# Patient Record
Sex: FEMALE | Race: BLACK OR AFRICAN AMERICAN | NOT HISPANIC OR LATINO | Employment: OTHER | URBAN - METROPOLITAN AREA
[De-identification: names, ages, dates, MRNs, and addresses within clinical notes are randomized per-mention and may not be internally consistent; named-entity substitution may affect disease eponyms.]

---

## 2017-03-31 ENCOUNTER — MEDICAL CORRESPONDENCE (OUTPATIENT)
Dept: ULTRASOUND IMAGING | Facility: CLINIC | Age: 27
End: 2017-03-31

## 2017-03-31 ENCOUNTER — HOSPITAL ENCOUNTER (OUTPATIENT)
Dept: ULTRASOUND IMAGING | Facility: CLINIC | Age: 27
Discharge: HOME | End: 2017-03-31
Attending: MIDWIFE | Admitting: MIDWIFE
Payer: COMMERCIAL

## 2017-03-31 DIAGNOSIS — Z32.01 POSITIVE PREGNANCY TEST: ICD-10-CM

## 2017-03-31 DIAGNOSIS — N91.2 AMENORRHEA: ICD-10-CM

## 2017-03-31 PROCEDURE — 76801 OB US < 14 WKS SINGLE FETUS: CPT

## 2017-04-20 ENCOUNTER — TRANSFERRED RECORDS (OUTPATIENT)
Dept: HEALTH INFORMATION MANAGEMENT | Facility: CLINIC | Age: 27
End: 2017-04-20

## 2017-05-17 ENCOUNTER — TRANSFERRED RECORDS (OUTPATIENT)
Dept: HEALTH INFORMATION MANAGEMENT | Facility: CLINIC | Age: 27
End: 2017-05-17

## 2017-06-05 ENCOUNTER — APPOINTMENT (OUTPATIENT)
Dept: LAB | Facility: CLINIC | Age: 27
End: 2017-06-05
Attending: OBSTETRICS & GYNECOLOGY
Payer: COMMERCIAL

## 2017-06-05 ENCOUNTER — OFFICE VISIT (OUTPATIENT)
Dept: OBGYN | Facility: CLINIC | Age: 27
End: 2017-06-05
Attending: OBSTETRICS & GYNECOLOGY
Payer: COMMERCIAL

## 2017-06-05 VITALS
DIASTOLIC BLOOD PRESSURE: 76 MMHG | HEART RATE: 80 BPM | SYSTOLIC BLOOD PRESSURE: 116 MMHG | WEIGHT: 133.1 LBS | BODY MASS INDEX: 24.34 KG/M2

## 2017-06-05 DIAGNOSIS — N96 HISTORY OF RECURRENT MISCARRIAGES: Primary | ICD-10-CM

## 2017-06-05 LAB — B-HCG SERPL-ACNC: 2 IU/L (ref 0–5)

## 2017-06-05 PROCEDURE — 84702 CHORIONIC GONADOTROPIN TEST: CPT | Performed by: OBSTETRICS & GYNECOLOGY

## 2017-06-05 PROCEDURE — 99212 OFFICE O/P EST SF 10 MIN: CPT | Mod: ZF

## 2017-06-05 PROCEDURE — 36415 COLL VENOUS BLD VENIPUNCTURE: CPT | Performed by: OBSTETRICS & GYNECOLOGY

## 2017-06-05 ASSESSMENT — PAIN SCALES - GENERAL: PAINLEVEL: NO PAIN (0)

## 2017-06-05 NOTE — NURSING NOTE
Chief Complaint   Patient presents with     Establish Care     Infertility consult   Olga Milian LPN

## 2017-06-05 NOTE — LETTER
"2017       RE: Pam Syed  [address]     [place] [address]     Dear Colleague,    Thank you for referring your patient, Pam Syed, to the [facility] at [facility]. Please see a copy of my visit note below.    26 yo  who is seen today for concern of RPL.  She notes her one FT pregnancy was achieved after she was put on progesterone for duration of her pregnancy.      She had  of term baby in . Subsequently she was pregnant again 2015.  She was told she was 7 wks pregnant recently and then found u/s showing no CA.  MAB was diagnosed. She never required d and c for MAB tx.  Passed tissue 2 wks ago in late May. Denies further bleeding.     Previously, she states she has \"million tests\" done in [place] re: RPL and all was negative. None of those records are available today.    She is interested in pursuing pregnancy, but is frustrated w/ recent multiple miscarriages. She wonders if she could be on progesterone in a future pregnancy or not.      In her term pregnancy, the only change was using progesterone.  She did not have HTN or DM.  She did not develop pre E. Her baby was normal wt and size w/o IUGR.       No past medical history on file.  Past Surgical History:   Procedure Laterality Date     APPENDECTOMY        ROS: 10 point ROS neg other than the symptoms noted above in the HPI.  Current Outpatient Prescriptions:      ibuprofen (ADVIL,MOTRIN) 600 MG tablet, Take 1 tablet (600 mg) by mouth every 6 hours as needed for moderate pain, Disp: 90 tablet, Rfl: 1     VITAMIN D, CHOLECALCIFEROL, PO, Take 2,000 Units by mouth daily, Disp: , Rfl:      Ferrous Sulfate (IRON SUPPLEMENT PO), Take 325 mg by mouth daily, Disp: , Rfl:   No current facility-administered medications for this visit.     Facility-Administered Medications Ordered in Other Visits:      lidocaine-EPINEPHrine 1.5 %-1:945453 injection, , EPIDURAL, PRN, " Agustina Salomon MD, 2 mL at 12/03/15 0234     bupivacaine (MARCAINE) 0.125 % injection (diluted from stock concentration by MD or CRNA), , EPIDURAL, PRN, Agustina Salomon MD, 5 mL at 12/03/15 0238      Vitals:    06/05/17 1433   BP: 116/76   Pulse: 80   Weight: 60.4 kg (133 lb 1.6 oz)     Gen: AA nad   Remainder of exam is not done.    26 yo  w/ 4 early miscarriages most recently in past month.      1. We discussed repeating HCG today and f/u u/s for evaluation of uterine contour/EM stripe.    2. We discussed normal recent TSH at [facility]. Consider records from [place] to evaluate APAS eval.  If not done, labs to eval.   3. We discussed possible MFM consult re: RPL in setting of normal labs.  Consider baby ASA and progesterone supplementation (despite lack of supporting evidence) in the setting of low side effects and possible benefits.     Rosanna Ybarra MD, FACOG  [facility]  OB/GYN    6/6/2017  2:37 PM    TT spent 15 min, >50% counseling and coordinating care

## 2017-06-05 NOTE — MR AVS SNAPSHOT
After Visit Summary   6/5/2017    Pam Syed    MRN: [id number]           Patient Information     Date Of Birth          1990        Visit Information        Provider Department      6/5/2017 2:15 PM Rosanna Ybarra MD [facility]        Today's Diagnoses     History of recurrent miscarriages    -  1       Follow-ups after your visit        Your next 10 appointments already scheduled     Jun 14, 2017  8:00 AM CDT   ULTRASOUND with [facility] ULTRASOUND   [facility] ([facility])    [place] [facility] [address]  [address]  [facility] [id number] [phone]              Future tests that were ordered for you today     Open Future Orders        Priority Expected Expires Ordered    US GYN Complete Transvaginal - 04038 (In Clinic) Routine  10/3/2017 6/5/2017            Who to contact     Please call your clinic at [phone] to:    Ask questions about your health    Make or cancel appointments    Discuss your medicines    Learn about your test results    Speak to your doctor   If you have compliments or concerns about an experience at your clinic, or if you wish to file a complaint, please contact [facility] Patient Relations at [phone] or email us at [email or web address]         Additional Information About Your Visit        MyChart Information     [employer] is an electronic gateway that provides easy, online access to your medical records. With [employer]onics, you can request a clinic appointment, read your test results, renew a prescription or communicate with your care team.     To sign up for Info[employer] visit the website at [email or web address]   You will be asked to enter the access code listed below, as well as some personal information. Please follow the directions to create your username and password.     Your access code is: [email or web address]  Expires: 8/20/2017  6:30  AM     Your access code will  in 90 days. If you need help or a new code, please contact your [facility] or call [phone] for assistance.        Care EveryWhere ID     This is your Care EveryWhere ID. This could be used by other organizations to access your [facility] medical records  [id number]        Your Vitals Were     Pulse Breastfeeding? BMI (Body Mass Index)             80 No 24.34 kg/m2          Blood Pressure from Last 3 Encounters:   17 116/76   12/05/15 114/76    Weight from Last 3 Encounters:   17 60.4 kg (133 lb 1.6 oz)   12/02/15 69.1 kg (152 lb 6.1 oz)              We Performed the Following     HCG Quantitative Pregnancy          Today's Medication Changes          These changes are accurate as of: 17 11:59 PM.  If you have any questions, ask your nurse or doctor.               Stop taking these medicines if you haven't already. Please contact your care team if you have questions.     prenatal multivitamin  plus iron 27-0.8 MG Tabs per tablet   Stopped by:  Rosanna Ybarra MD                    Primary Care Provider Office Phone # Fax #    [facility] [phone]        [facility] [address]        Thank you!     Thank you for choosing [facility]  for your care. Our goal is always to provide you with excellent care. Hearing back from our patients is one way we can continue to improve our services. Please take a few minutes to complete the written survey that you may receive in the mail after your visit with us. Thank you!             Your Updated Medication List - Protect others around you: Learn how to safely use, store and throw away your medicines at www.disposemymeds.org.          This list is accurate as of: 17 11:59 PM.  Always use your most recent med list.                   Brand Name Dispense Instructions for use    ibuprofen 600 MG tablet    ADVIL/MOTRIN    90  tablet    Take 1 tablet (600 mg) by mouth every 6 hours as needed for moderate pain       IRON SUPPLEMENT PO      Take 325 mg by mouth daily       VITAMIN D (CHOLECALCIFEROL) PO      Take 2,000 Units by mouth daily

## 2017-06-06 NOTE — PROGRESS NOTES
"28 yo  who is seen today for concern of RPL.  She notes her one FT pregnancy was achieved after she was put on progesterone for duration of her pregnancy.      She had  of term baby in . Subsequently she was pregnant again 2015.  She was told she was 7 wks pregnant recently and then found u/s showing no CA.  MAB was diagnosed. She never required d and c for MAB tx.  Passed tissue 2 wks ago in late May. Denies further bleeding.     Previously, she states she has \"million tests\" done in [place] re: RPL and all was negative. None of those records are available today.    She is interested in pursuing pregnancy, but is frustrated w/ recent multiple miscarriages. She wonders if she could be on progesterone in a future pregnancy or not.      In her term pregnancy, the only change was using progesterone.  She did not have HTN or DM.  She did not develop pre E. Her baby was normal wt and size w/o IUGR.       No past medical history on file.  Past Surgical History:   Procedure Laterality Date     APPENDECTOMY        ROS: 10 point ROS neg other than the symptoms noted above in the HPI.      Current Outpatient Prescriptions:      ibuprofen (ADVIL,MOTRIN) 600 MG tablet, Take 1 tablet (600 mg) by mouth every 6 hours as needed for moderate pain, Disp: 90 tablet, Rfl: 1     VITAMIN D, CHOLECALCIFEROL, PO, Take 2,000 Units by mouth daily, Disp: , Rfl:      Ferrous Sulfate (IRON SUPPLEMENT PO), Take 325 mg by mouth daily, Disp: , Rfl:   No current facility-administered medications for this visit.     Facility-Administered Medications Ordered in Other Visits:      lidocaine-EPINEPHrine 1.5 %-1:953706 injection, , EPIDURAL, PRN, Agustina Salomon MD, 2 mL at 12/03/15 0234     bupivacaine (MARCAINE) 0.125 % injection (diluted from stock concentration by MD or CRNA), , EPIDURAL, PRN, Agustina Salomon MD, 5 mL at 12/03/15 0238      Vitals:    17 1433   BP: 116/76   Pulse: 80   Weight: 60.4 kg (133 lb 1.6 oz)     Gen: " AA nad   Remainder of exam is not done.    26 yo  w/ 4 early miscarriages most recently in past month.      1. We discussed repeating HCG today and f/u u/s for evaluation of uterine contour/EM stripe.    2. We discussed normal recent TSH at [facility]. Consider records from [place] to evaluate APAS eval.  If not done, labs to eval.   3. We discussed possible MFM consult re: RPL in setting of normal labs.  Consider baby ASA and progesterone supplementation (despite lack of supporting evidence) in the setting of low side effects and possible benefits.     Rosanna Ybarra MD, FACOG  [facility]  OB/GYN    6/6/2017  2:37 PM    TT spent 15 min, >50% counseling and coordinating care

## 2017-07-13 ENCOUNTER — HOSPITAL ENCOUNTER (OUTPATIENT)
Dept: ULTRASOUND IMAGING | Facility: CLINIC | Age: 27
Discharge: HOME | End: 2017-07-13
Attending: OBSTETRICS & GYNECOLOGY | Admitting: OBSTETRICS & GYNECOLOGY
Payer: COMMERCIAL

## 2017-07-13 DIAGNOSIS — N96 HISTORY OF RECURRENT MISCARRIAGES: ICD-10-CM

## 2017-07-13 PROCEDURE — 76801 OB US < 14 WKS SINGLE FETUS: CPT

## 2017-09-25 ENCOUNTER — TELEPHONE (OUTPATIENT)
Dept: OBGYN | Facility: CLINIC | Age: 27
End: 2017-09-25

## 2017-09-25 DIAGNOSIS — O02.1 MISSED ABORTION: Primary | ICD-10-CM

## 2017-09-25 RX ORDER — DOXYCYCLINE 100 MG/10ML
100 INJECTION, POWDER, LYOPHILIZED, FOR SOLUTION INTRAVENOUS
Status: CANCELLED | OUTPATIENT
Start: 2017-09-25

## 2017-09-25 NOTE — TELEPHONE ENCOUNTER
Confirmed surgery date, time and location with patient 9/26/17 with arrival time at 8:00a.m with nothing to eat eight hours before scheduled surgery time and clear liquids up to two hours before scheduled surgery time.

## 2017-09-26 ENCOUNTER — SURGERY (OUTPATIENT)
Age: 27
End: 2017-09-26

## 2017-09-26 ENCOUNTER — HOSPITAL ENCOUNTER (OUTPATIENT)
Facility: CLINIC | Age: 27
Discharge: HOME | End: 2017-09-26
Attending: OBSTETRICS & GYNECOLOGY | Admitting: OBSTETRICS & GYNECOLOGY
Payer: COMMERCIAL

## 2017-09-26 ENCOUNTER — ANESTHESIA EVENT (OUTPATIENT)
Dept: SURGERY | Facility: CLINIC | Age: 27
End: 2017-09-26
Payer: COMMERCIAL

## 2017-09-26 ENCOUNTER — ANESTHESIA (OUTPATIENT)
Dept: SURGERY | Facility: CLINIC | Age: 27
End: 2017-09-26
Payer: COMMERCIAL

## 2017-09-26 VITALS
HEIGHT: 63 IN | TEMPERATURE: 98.2 F | BODY MASS INDEX: 23.48 KG/M2 | DIASTOLIC BLOOD PRESSURE: 83 MMHG | RESPIRATION RATE: 16 BRPM | WEIGHT: 132.5 LBS | SYSTOLIC BLOOD PRESSURE: 118 MMHG | OXYGEN SATURATION: 100 %

## 2017-09-26 DIAGNOSIS — Z98.890 POSTOPERATIVE STATE: Primary | ICD-10-CM

## 2017-09-26 LAB
ABO + RH BLD: NORMAL
ABO + RH BLD: NORMAL
BLD GP AB SCN SERPL QL: NORMAL
BLOOD BANK CMNT PATIENT-IMP: NORMAL
GLUCOSE BLDC GLUCOMTR-MCNC: 84 MG/DL (ref 70–99)
HGB BLD-MCNC: 12.3 G/DL (ref 11.7–15.7)
SPECIMEN EXP DATE BLD: NORMAL

## 2017-09-26 PROCEDURE — 88262 CHROMOSOME ANALYSIS 15-20: CPT | Performed by: OBSTETRICS & GYNECOLOGY

## 2017-09-26 PROCEDURE — 25000128 H RX IP 250 OP 636: Performed by: NURSE ANESTHETIST, CERTIFIED REGISTERED

## 2017-09-26 PROCEDURE — 86901 BLOOD TYPING SEROLOGIC RH(D): CPT | Performed by: OBSTETRICS & GYNECOLOGY

## 2017-09-26 PROCEDURE — 25000132 ZZH RX MED GY IP 250 OP 250 PS 637: Performed by: ANESTHESIOLOGY

## 2017-09-26 PROCEDURE — 37000009 ZZH ANESTHESIA TECHNICAL FEE, EACH ADDTL 15 MIN: Performed by: OBSTETRICS & GYNECOLOGY

## 2017-09-26 PROCEDURE — 88233 TISSUE CULTURE SKIN/BIOPSY: CPT | Performed by: OBSTETRICS & GYNECOLOGY

## 2017-09-26 PROCEDURE — 27210794 ZZH OR GENERAL SUPPLY STERILE: Performed by: OBSTETRICS & GYNECOLOGY

## 2017-09-26 PROCEDURE — 86900 BLOOD TYPING SEROLOGIC ABO: CPT | Performed by: OBSTETRICS & GYNECOLOGY

## 2017-09-26 PROCEDURE — 36415 COLL VENOUS BLD VENIPUNCTURE: CPT | Performed by: OBSTETRICS & GYNECOLOGY

## 2017-09-26 PROCEDURE — 88305 TISSUE EXAM BY PATHOLOGIST: CPT | Mod: 26 | Performed by: OBSTETRICS & GYNECOLOGY

## 2017-09-26 PROCEDURE — 25000125 ZZHC RX 250: Performed by: OBSTETRICS & GYNECOLOGY

## 2017-09-26 PROCEDURE — 36000053 ZZH SURGERY LEVEL 2 EA 15 ADDTL MIN - UMMC: Performed by: OBSTETRICS & GYNECOLOGY

## 2017-09-26 PROCEDURE — 25000125 ZZHC RX 250: Performed by: NURSE ANESTHETIST, CERTIFIED REGISTERED

## 2017-09-26 PROCEDURE — 00000159 ZZHCL STATISTIC H-SEND OUTS PREP: Performed by: OBSTETRICS & GYNECOLOGY

## 2017-09-26 PROCEDURE — 85018 HEMOGLOBIN: CPT | Performed by: OBSTETRICS & GYNECOLOGY

## 2017-09-26 PROCEDURE — 71000027 ZZH RECOVERY PHASE 2 EACH 15 MINS: Performed by: OBSTETRICS & GYNECOLOGY

## 2017-09-26 PROCEDURE — 36000051 ZZH SURGERY LEVEL 2 1ST 30 MIN - UMMC: Performed by: OBSTETRICS & GYNECOLOGY

## 2017-09-26 PROCEDURE — 40000170 ZZH STATISTIC PRE-PROCEDURE ASSESSMENT II: Performed by: OBSTETRICS & GYNECOLOGY

## 2017-09-26 PROCEDURE — 25000128 H RX IP 250 OP 636: Performed by: ANESTHESIOLOGY

## 2017-09-26 PROCEDURE — 37000008 ZZH ANESTHESIA TECHNICAL FEE, 1ST 30 MIN: Performed by: OBSTETRICS & GYNECOLOGY

## 2017-09-26 PROCEDURE — 88305 TISSUE EXAM BY PATHOLOGIST: CPT | Performed by: OBSTETRICS & GYNECOLOGY

## 2017-09-26 PROCEDURE — 82962 GLUCOSE BLOOD TEST: CPT

## 2017-09-26 PROCEDURE — 40000892 ZZHCL STATISTIC DNA ISOLATION: Performed by: OBSTETRICS & GYNECOLOGY

## 2017-09-26 PROCEDURE — 86850 RBC ANTIBODY SCREEN: CPT | Performed by: OBSTETRICS & GYNECOLOGY

## 2017-09-26 RX ORDER — ONDANSETRON 4 MG/1
4 TABLET, ORALLY DISINTEGRATING ORAL
Status: DISCONTINUED | OUTPATIENT
Start: 2017-09-26 | End: 2017-09-26 | Stop reason: HOSPADM

## 2017-09-26 RX ORDER — ACETAMINOPHEN 325 MG/1
650 TABLET ORAL
Status: DISCONTINUED | OUTPATIENT
Start: 2017-09-26 | End: 2017-09-26 | Stop reason: HOSPADM

## 2017-09-26 RX ORDER — LIDOCAINE 40 MG/G
CREAM TOPICAL
Status: DISCONTINUED | OUTPATIENT
Start: 2017-09-26 | End: 2017-09-26 | Stop reason: HOSPADM

## 2017-09-26 RX ORDER — SODIUM CHLORIDE, SODIUM LACTATE, POTASSIUM CHLORIDE, CALCIUM CHLORIDE 600; 310; 30; 20 MG/100ML; MG/100ML; MG/100ML; MG/100ML
INJECTION, SOLUTION INTRAVENOUS CONTINUOUS
Status: DISCONTINUED | OUTPATIENT
Start: 2017-09-26 | End: 2017-09-26 | Stop reason: HOSPADM

## 2017-09-26 RX ORDER — PROPOFOL 10 MG/ML
INJECTION, EMULSION INTRAVENOUS PRN
Status: DISCONTINUED | OUTPATIENT
Start: 2017-09-26 | End: 2017-09-26

## 2017-09-26 RX ORDER — IBUPROFEN 600 MG/1
600 TABLET, FILM COATED ORAL EVERY 6 HOURS PRN
Qty: 30 TABLET | Refills: 1 | Status: SHIPPED | OUTPATIENT
Start: 2017-09-26 | End: 2017-11-07

## 2017-09-26 RX ORDER — ONDANSETRON 4 MG/1
4 TABLET, ORALLY DISINTEGRATING ORAL EVERY 30 MIN PRN
Status: DISCONTINUED | OUTPATIENT
Start: 2017-09-26 | End: 2017-09-26 | Stop reason: HOSPADM

## 2017-09-26 RX ORDER — NALOXONE HYDROCHLORIDE 0.4 MG/ML
.1-.4 INJECTION, SOLUTION INTRAMUSCULAR; INTRAVENOUS; SUBCUTANEOUS
Status: DISCONTINUED | OUTPATIENT
Start: 2017-09-26 | End: 2017-09-26 | Stop reason: HOSPADM

## 2017-09-26 RX ORDER — PROPOFOL 10 MG/ML
INJECTION, EMULSION INTRAVENOUS CONTINUOUS PRN
Status: DISCONTINUED | OUTPATIENT
Start: 2017-09-26 | End: 2017-09-26

## 2017-09-26 RX ORDER — OXYCODONE HYDROCHLORIDE 5 MG/1
5-10 TABLET ORAL
Status: DISCONTINUED | OUTPATIENT
Start: 2017-09-26 | End: 2017-09-26 | Stop reason: HOSPADM

## 2017-09-26 RX ORDER — ACETAMINOPHEN 500 MG
1000 TABLET ORAL ONCE
Status: COMPLETED | OUTPATIENT
Start: 2017-09-26 | End: 2017-09-26

## 2017-09-26 RX ORDER — KETOROLAC TROMETHAMINE 30 MG/ML
INJECTION, SOLUTION INTRAMUSCULAR; INTRAVENOUS PRN
Status: DISCONTINUED | OUTPATIENT
Start: 2017-09-26 | End: 2017-09-26

## 2017-09-26 RX ORDER — ONDANSETRON 2 MG/ML
INJECTION INTRAMUSCULAR; INTRAVENOUS PRN
Status: DISCONTINUED | OUTPATIENT
Start: 2017-09-26 | End: 2017-09-26

## 2017-09-26 RX ORDER — LIDOCAINE HYDROCHLORIDE 10 MG/ML
INJECTION, SOLUTION INFILTRATION; PERINEURAL PRN
Status: DISCONTINUED | OUTPATIENT
Start: 2017-09-26 | End: 2017-09-26 | Stop reason: HOSPADM

## 2017-09-26 RX ORDER — GABAPENTIN 100 MG/1
300 CAPSULE ORAL ONCE
Status: COMPLETED | OUTPATIENT
Start: 2017-09-26 | End: 2017-09-26

## 2017-09-26 RX ORDER — HYDROMORPHONE HYDROCHLORIDE 1 MG/ML
.3-.5 INJECTION, SOLUTION INTRAMUSCULAR; INTRAVENOUS; SUBCUTANEOUS EVERY 10 MIN PRN
Status: DISCONTINUED | OUTPATIENT
Start: 2017-09-26 | End: 2017-09-26 | Stop reason: HOSPADM

## 2017-09-26 RX ORDER — LIDOCAINE HYDROCHLORIDE 20 MG/ML
INJECTION, SOLUTION INFILTRATION; PERINEURAL PRN
Status: DISCONTINUED | OUTPATIENT
Start: 2017-09-26 | End: 2017-09-26

## 2017-09-26 RX ORDER — ONDANSETRON 2 MG/ML
4 INJECTION INTRAMUSCULAR; INTRAVENOUS EVERY 30 MIN PRN
Status: DISCONTINUED | OUTPATIENT
Start: 2017-09-26 | End: 2017-09-26 | Stop reason: HOSPADM

## 2017-09-26 RX ORDER — DOXYCYCLINE 100 MG/10ML
100 INJECTION, POWDER, LYOPHILIZED, FOR SOLUTION INTRAVENOUS
Status: COMPLETED | OUTPATIENT
Start: 2017-09-26 | End: 2017-09-26

## 2017-09-26 RX ORDER — FENTANYL CITRATE 50 UG/ML
INJECTION, SOLUTION INTRAMUSCULAR; INTRAVENOUS PRN
Status: DISCONTINUED | OUTPATIENT
Start: 2017-09-26 | End: 2017-09-26

## 2017-09-26 RX ADMIN — ACETAMINOPHEN 1000 MG: 500 TABLET ORAL at 09:32

## 2017-09-26 RX ADMIN — FENTANYL CITRATE 25 MCG: 50 INJECTION, SOLUTION INTRAMUSCULAR; INTRAVENOUS at 10:44

## 2017-09-26 RX ADMIN — FENTANYL CITRATE 25 MCG: 50 INJECTION, SOLUTION INTRAMUSCULAR; INTRAVENOUS at 10:48

## 2017-09-26 RX ADMIN — GABAPENTIN 300 MG: 300 CAPSULE ORAL at 09:32

## 2017-09-26 RX ADMIN — MIDAZOLAM HYDROCHLORIDE 2 MG: 1 INJECTION, SOLUTION INTRAMUSCULAR; INTRAVENOUS at 10:37

## 2017-09-26 RX ADMIN — SODIUM CHLORIDE, POTASSIUM CHLORIDE, SODIUM LACTATE AND CALCIUM CHLORIDE: 600; 310; 30; 20 INJECTION, SOLUTION INTRAVENOUS at 09:45

## 2017-09-26 RX ADMIN — LIDOCAINE HYDROCHLORIDE 20 MG: 20 INJECTION, SOLUTION INFILTRATION; PERINEURAL at 10:45

## 2017-09-26 RX ADMIN — ONDANSETRON 4 MG: 2 INJECTION INTRAMUSCULAR; INTRAVENOUS at 11:04

## 2017-09-26 RX ADMIN — DOXYCYCLINE 100 MG: 100 INJECTION, POWDER, LYOPHILIZED, FOR SOLUTION INTRAVENOUS at 10:39

## 2017-09-26 RX ADMIN — KETOROLAC TROMETHAMINE 30 MG: 30 INJECTION, SOLUTION INTRAMUSCULAR at 11:09

## 2017-09-26 RX ADMIN — PROPOFOL 100 MCG/KG/MIN: 10 INJECTION, EMULSION INTRAVENOUS at 10:45

## 2017-09-26 RX ADMIN — LIDOCAINE HYDROCHLORIDE 20 ML: 10 INJECTION, SOLUTION INFILTRATION; PERINEURAL at 10:59

## 2017-09-26 RX ADMIN — PROPOFOL 30 MG: 10 INJECTION, EMULSION INTRAVENOUS at 10:45

## 2017-09-26 NOTE — ANESTHESIA CARE TRANSFER NOTE
"Patient: Pam Syed    Procedure(s):  Suction Dilation And Curettage  - Wound Class: II-Clean Contaminated    Diagnosis: Missed    Diagnosis Additional Information: No value filed.    Anesthesia Type:   MAC     Note:    Patient transferred to:Phase II  Comments: Patient is awake, conversive, stating she \"feels funny\". VSS, exchanging room air with O2 Sats 98%, normothermic. Reassured that sedation is wearing off. IV is patent. Report to RN.       Vitals: (Last set prior to Anesthesia Care Transfer)    CRNA VITALS  2017 1045 - 2017 1129      2017             NIBP: 98/72    Pulse: 78    SpO2: 98 %    Resp Rate (observed): 14                Electronically Signed By: ASHIA Wilson CRNA  2017  11:29 AM  "

## 2017-09-26 NOTE — OP NOTE
Operative Note   Name: Pam Syed  MRN:[id number]  : 1990  Date of Surgery: 2017    Pre-operative Diagnosis: Missed  at 11w5d gestation by ultrasound measurements  Post-operative Diagnosis: Same  Procedure(s): Suction Dilation & Curettage    Surgeon: Yolanda Samaniego MD   Assistants: Rob Haddad MD    Anesthesia: MAC  EBL: 10 mL   Urine Output: NA, patient voided before procedure   Fluids: 900 mL crystalloid    Specimens: Products of conception for routine pathology and cytogenetics   Complications: None apparent.  Findings:  EUA revealed normal cervix and mid-position uterus, no adnexal masses. External genitalia revealed one moderate and several small condyloma on left labia. Additional moderate condyloma on right labia. Class III infundibulation.    Indications: Pam Syed is a 27 year old year old  woman presenting for D&C due to 11w5d fetal demise. Hx of multiple first trimester pregnancy losses. She was counseled on the risks, benefits, and alternatives of the procedure, and she consented.   Procedure: The patient was taken to the operating room where she underwent MAC anesthesia without difficulty. She was placed in the dorsal lithotomy position. An examination was done and it was noted that her uterus was mid-position. She was prepped and draped in the usual sterile fashion. A sterile speculum was inserted into the vagina.   2 cc of 1% lidocaine was injected into the anterior cervical lip. A single tooth tenaculum was placed on that location. An additional 18cc of 1% lidocaine was injected at 4 o'clock and 8 o'clock to create a paracervical block.   The cervix was serially dilated to 35 Khmer using Roman dilators. A 11 mm suction curette was advanced gently to the uterine fundus. The suction device was activated and the curette rotated to clear the uterus of products of conception. Six additional passes of the suction curettage were performed. A gritty texture was noted in  the uterine cavity. There was minimal bleeding noted and the tenaculum removed with good hemostasis noted. The patient tolerated the procedure well and was taken to the recovery area in stable condition.     Dr. Samaniego was present for the entire procedure    Rob Haddad MD  09/26/2017, 10:38 AM    I was present during the entire procedure detailed above.     Yolanda Samaniego MD

## 2017-09-26 NOTE — DISCHARGE INSTRUCTIONS
Discharge Instructions: Following a Dilation   and Curettage/Dilation and Evacuation    What to expect:    Expect small to moderate amount of vaginal bleeding which should taper off in 4-5 days. It should not be heavier than your regular menstrual flow.    Do not douche, and use a pad rather than tampons.     No intercourse until bleeding has ceased.    Activity:    Rest the day of surgery. You may resume normal activity the next day.    You may bathe or shower.    Avoid heavy lifting (10-15 lbs) for one week.    Comfort:    The amount of discomfort you can expect is very unpredictable. If you have pain that cannot be controlled with non-aspirin pain relievers or with the prescription you may have received, you should notify your doctor.    Abdominal cramping (like menstrual cramps) or low back ache are common and should not be a cause for concern. You will be drowsy and weak the day of surgery and possibly the following day.    Diet:    You have no restrictions on your diet. Following surgery, drink plenty of fluids and eat a light meal.    Nausea:    The anesthesia medications you received during your surgical procedure may produce some nausea.    If you feel nauseated, stay in bed, keep your head down and try drinking fluids such as Seven-Up, tea or soup.    Notify Physician at once if you experience:    A fever over 100 degrees (a low grade fever under 100 degrees is usual after surgery).    Heavy flow and/or passing large clots. Saturating more than 1 pad per hour for 2 or more hours.     Severe pain or cramps.  Rev. 5/12        Same-Day Surgery   Adult Discharge Orders & Instructions     For 24 hours after surgery:  1. Get plenty of rest.  A responsible adult must stay with you for at least 24 hours after you leave the hospital.   2. Pain medication can slow your reflexes. Do not drive or use heavy equipment.  If you have weakness or tingling, don't drive or use heavy equipment until this feeling goes  away.  3. Mixing alcohol and pain medication can cause dizziness and slow your breathing. It can even be fatal. Do not drink alcohol while taking pain medication.  4. Avoid strenuous or risky activities.  Ask for help when climbing stairs.   5. You may feel lightheaded.  If so, sit for a few minutes before standing.  Have someone help you get up.   6. If you have nausea (feel sick to your stomach), drink only clear liquids such as apple juice, ginger ale, broth or 7-Up.  Rest may also help.  Be sure to drink enough fluids.  Move to a regular diet as you feel able. Take pain medications with a small amount of solid food, such as toast or crackers, to avoid nausea.   7. A slight fever is normal. Call the doctor if your fever is over 100 F (37.7 C) (taken under the tongue) or lasts longer than 24 hours.  8. You may have a dry mouth, muscle aches, trouble sleeping or a sore throat.  These symptoms should go away after 24 hours.  9. Do not make important or legal decisions.   Pain Management:      1. Take pain medication (if prescribed) for pain as directed by your physician.        2. WARNING: If the pain medication you have been prescribed contains Tylenol  (acetaminophen), DO NOT take additional doses of Tylenol (acetaminophen).     Call your doctor for any of the followin.  Signs of infection (fever, growing tenderness at the surgery site, severe pain, a large amount of drainage or bleeding, foul-smelling drainage, redness, swelling).    2.  It has been over 8 to 10 hours since surgery and you are still not able to urinate (pee).    3.  Headache for over 24 hours.    4.  Numbness, tingling or weakness the day after surgery (if you had spinal anesthesia).  To contact a doctor, call ________Dr Samaniego_____________________________ or:      [phone] and ask for the Resident On Call for:          _________________GET_________________________ (answered 24 hours a day)      Emergency Department:  [place]  Emergency Department: [phone]  [place] Emergency Department: [phone]               Rev. 10/2014

## 2017-09-26 NOTE — IP AVS SNAPSHOT
[facility] PREOP/PHASE II    [address] [place] [address]    Phone:  [phone]                                       After Visit Summary   9/26/2017    Pam Syed    MRN: [id number]           After Visit Summary Signature Page     I have received my discharge instructions, and my questions have been answered. I have discussed any challenges I see with this plan with the nurse or doctor.    ..........................................................................................................................................  Patient/Patient Representative Signature      ..........................................................................................................................................  Patient Representative Print Name and Relationship to Patient    ..................................................               ................................................  Date                                            Time    ..........................................................................................................................................  Reviewed by Signature/Title    ...................................................              ..............................................  Date                                                            Time

## 2017-09-26 NOTE — IP AVS SNAPSHOT
MRN:[id number]                      After Visit Summary   9/26/2017    Pam Syed    MRN: [id number]           Thank you!     Thank you for choosing [facility] for your care. Our goal is always to provide you with excellent care. Hearing back from our patients is one way we can continue to improve our services. Please take a few minutes to complete the written survey that you may receive in the mail after you visit with us. Thank you!        Patient Information     Date Of Birth          1990        About your hospital stay     You were admitted on:  September 26, 2017 You last received care in the:  UR PREOP/PHASE II    You were discharged on:  September 26, 2017       Who to Call     For medical emergencies, please call 911.  For non-urgent questions about your medical care, please call your primary care provider or clinic, [phone]  For questions related to your surgery, please call your surgery clinic        Attending Provider     Provider Specialty    Yolanda Samaniego MD OB/Gyn       Primary Care Provider Office Phone # Fax #    Clinic [facility] [phone]      After Care Instructions     Discharge Instructions       Patient has a follow-up appointment with Dr. Samaniego on 10/11/17            Ice to affected area       PRN as tolerated            Shower       Shower on Post-op day  0.   DO NOT take a bath                  Your next 10 appointments already scheduled     Oct 11, 2017  1:15 PM CDT   Return Visit with Yolanda Samanigeo MD   [facility] ([facility])    [facility] [facility] [address]  [address] [facility] [id number] [phone]              Further instructions from your care team       Discharge Instructions: Following a Dilation   and Curettage/Dilation and Evacuation    What to expect:    Expect small to moderate amount of vaginal bleeding which should taper off in 4-5 days. It should not  be heavier than your regular menstrual flow.    Do not douche, and use a pad rather than tampons.     No intercourse until bleeding has ceased.    Activity:    Rest the day of surgery. You may resume normal activity the next day.    You may bathe or shower.    Avoid heavy lifting (10-15 lbs) for one week.    Comfort:    The amount of discomfort you can expect is very unpredictable. If you have pain that cannot be controlled with non-aspirin pain relievers or with the prescription you may have received, you should notify your doctor.    Abdominal cramping (like menstrual cramps) or low back ache are common and should not be a cause for concern. You will be drowsy and weak the day of surgery and possibly the following day.    Diet:    You have no restrictions on your diet. Following surgery, drink plenty of fluids and eat a light meal.    Nausea:    The anesthesia medications you received during your surgical procedure may produce some nausea.    If you feel nauseated, stay in bed, keep your head down and try drinking fluids such as Seven-Up, tea or soup.    Notify Physician at once if you experience:    A fever over 100 degrees (a low grade fever under 100 degrees is usual after surgery).    Heavy flow and/or passing large clots. Saturating more than 1 pad per hour for 2 or more hours.     Severe pain or cramps.  Rev. 5/12        Same-Day Surgery   Adult Discharge Orders & Instructions     For 24 hours after surgery:  1. Get plenty of rest.  A responsible adult must stay with you for at least 24 hours after you leave the hospital.   2. Pain medication can slow your reflexes. Do not drive or use heavy equipment.  If you have weakness or tingling, don't drive or use heavy equipment until this feeling goes away.  3. Mixing alcohol and pain medication can cause dizziness and slow your breathing. It can even be fatal. Do not drink alcohol while taking pain medication.  4. Avoid strenuous or risky activities.  Ask for help  when climbing stairs.   5. You may feel lightheaded.  If so, sit for a few minutes before standing.  Have someone help you get up.   6. If you have nausea (feel sick to your stomach), drink only clear liquids such as apple juice, ginger ale, broth or 7-Up.  Rest may also help.  Be sure to drink enough fluids.  Move to a regular diet as you feel able. Take pain medications with a small amount of solid food, such as toast or crackers, to avoid nausea.   7. A slight fever is normal. Call the doctor if your fever is over 100 F (37.7 C) (taken under the tongue) or lasts longer than 24 hours.  8. You may have a dry mouth, muscle aches, trouble sleeping or a sore throat.  These symptoms should go away after 24 hours.  9. Do not make important or legal decisions.   Pain Management:      1. Take pain medication (if prescribed) for pain as directed by your physician.        2. WARNING: If the pain medication you have been prescribed contains Tylenol  (acetaminophen), DO NOT take additional doses of Tylenol (acetaminophen).     Call your doctor for any of the followin.  Signs of infection (fever, growing tenderness at the surgery site, severe pain, a large amount of drainage or bleeding, foul-smelling drainage, redness, swelling).    2.  It has been over 8 to 10 hours since surgery and you are still not able to urinate (pee).    3.  Headache for over 24 hours.    4.  Numbness, tingling or weakness the day after surgery (if you had spinal anesthesia).  To contact a doctor, call ________Dr Samaniego_____________________________ or:      [phone] and ask for the Resident On Call for:          _________________GET_________________________ (answered 24 hours a day)      Emergency Department:  [facility]: [phone]  [facility]: [phone]               Rev. 10/2014       Pending Results     Date and Time Order Name Status Description    2017 1139 Surgical pathology exam In  "process     2017 1102 CHROMOSOME SKIN PRODUCTS OF CONCEPTION With Professional Interpretation In process             Admission Information     Date & Time Provider Department Dept. Phone    2017 Yolanda Samaniego MD UR PREOP/PHASE -[phone]      Your Vitals Were     Blood Pressure Temperature Respirations Height Weight Pulse Oximetry    118/86 97.5  F (36.4  C) (Oral) 16 1.6 m (5' 3\") 60.1 kg (132 lb 7.9 oz) 100%    BMI (Body Mass Index)                   23.47 kg/m2           [employer] Information     [employer] lets you send messages to your doctor, view your test results, renew your prescriptions, schedule appointments and more. To sign up, go to [email or web address] . Click on \"Log in\" on the left side of the screen, which will take you to the Welcome page. Then click on \"Sign up Now\" on the right side of the page.     You will be asked to enter the access code listed below, as well as some personal information. Please follow the directions to create your username and password.     Your access code is: [email or web address]  Expires: 2017 11:52 AM     Your access code will  in 90 days. If you need help or a new code, please call your [facility] clinic or [phone].        Care EveryWhere ID     This is your Care EveryWhere ID. This could be used by other organizations to access your [facility] medical records  [id number]        Equal Access to Services     [facility]: Hadii courtney adams hadasho Sodenisseali, waaxda luqadaha, qaybta kaalmada carmitaegyacheryl, waxay federico spangler . So [facility] [phone].    ATENCIÓN: Si habla español, tiene a kaufman disposición servicios gratuitos de asistencia lingüística. Llame al [phone].    We comply with applicable federal civil rights laws and [place] laws. We do not discriminate on the basis of race, color, national origin, age, disability sex, sexual orientation or gender identity.               Review of your medicines      CONTINUE these " medicines which may have CHANGED, or have new prescriptions. If we are uncertain of the size of tablets/capsules you have at home, strength may be listed as something that might have changed.        Dose / Directions    * ibuprofen 600 MG tablet   Commonly known as:  ADVIL/MOTRIN   This may have changed:  Another medication with the same name was added. Make sure you understand how and when to take each.   Used for:   (normal spontaneous vaginal delivery)        Dose:  600 mg   Take 1 tablet (600 mg) by mouth every 6 hours as needed for moderate pain   Quantity:  90 tablet   Refills:  1       * ibuprofen 600 MG tablet   Commonly known as:  ADVIL/MOTRIN   This may have changed:  You were already taking a medication with the same name, and this prescription was added. Make sure you understand how and when to take each.   Used for:  Postoperative state        Dose:  600 mg   Take 1 tablet (600 mg) by mouth every 6 hours as needed for moderate pain   Quantity:  30 tablet   Refills:  1       * Notice:  This list has 2 medication(s) that are the same as other medications prescribed for you. Read the directions carefully, and ask your doctor or other care provider to review them with you.      CONTINUE these medicines which have NOT CHANGED        Dose / Directions    IRON SUPPLEMENT PO   Indication:  Anemia From Inadequate Iron in the Body        Dose:  325 mg   Take 325 mg by mouth daily   Refills:  0       VITAMIN D (CHOLECALCIFEROL) PO        Dose:  2000 Units   Take 2,000 Units by mouth daily   Refills:  0            Where to get your medicines      These medications were sent to [facility] [place], [place] - [address]  [address] [place] [address], [facility] [address]     Phone:  [phone]     ibuprofen 600 MG tablet                Protect others around you: Learn how to safely use, store and throw away your medicines at www.disposemymeds.org.             Medication List: This is a list of  all your medications and when to take them. Check marks below indicate your daily home schedule. Keep this list as a reference.      Medications           Morning Afternoon Evening Bedtime As Needed    * ibuprofen 600 MG tablet   Commonly known as:  ADVIL/MOTRIN   Take 1 tablet (600 mg) by mouth every 6 hours as needed for moderate pain                                * ibuprofen 600 MG tablet   Commonly known as:  ADVIL/MOTRIN   Take 1 tablet (600 mg) by mouth every 6 hours as needed for moderate pain                                IRON SUPPLEMENT PO   Take 325 mg by mouth daily                                VITAMIN D (CHOLECALCIFEROL) PO   Take 2,000 Units by mouth daily                                * Notice:  This list has 2 medication(s) that are the same as other medications prescribed for you. Read the directions carefully, and ask your doctor or other care provider to review them with you.

## 2017-09-26 NOTE — ANESTHESIA POSTPROCEDURE EVALUATION
Patient: Pam Syed    Procedure(s):  Suction Dilation And Curettage  - Wound Class: II-Clean Contaminated    Diagnosis:Missed    Diagnosis Additional Information: No value filed.    Anesthesia Type:  MAC    Note:  Anesthesia Post Evaluation    Patient location during evaluation: Phase 2  Patient participation: Able to fully participate in evaluation  Level of consciousness: awake and alert  Pain management: adequate  Airway patency: patent  Cardiovascular status: acceptable  Respiratory status: acceptable  Hydration status: acceptable  PONV: none     Anesthetic complications: None          Last vitals:  Vitals:    17 1215 17 1230 17 1245   BP: 102/80 118/83    Resp: 16 16 16   Temp:   36.8  C (98.2  F)   SpO2: 98% 100% 100%         Electronically Signed By: Ignacia House MD  2017  1:34 PM

## 2017-09-26 NOTE — ANESTHESIA PREPROCEDURE EVALUATION
Physical Exam  Normal systems: cardiovascular and pulmonary    Airway   Mallampati: I  TM distance: >3 FB  Neck ROM: full    Dental     Cardiovascular       Pulmonary                     Anesthesia Plan      History & Physical Review  History and physical reviewed and following examination; no interval change.    ASA Status:  2 .    NPO Status:  > 8 hours    Plan for MAC with Intravenous and Propofol induction. Maintenance will be TIVA.  Reason for MAC:  Deep or markedly invasive procedure (G8)  PONV prophylaxis:  Ondansetron (or other 5HT-3)       Postoperative Care  Postoperative pain management:  IV analgesics, Oral pain medications and Multi-modal analgesia.      Consents  Anesthetic plan, risks, benefits and alternatives discussed with:  Patient..                          .

## 2017-09-29 LAB — COPATH REPORT: NORMAL

## 2017-10-11 ENCOUNTER — OFFICE VISIT (OUTPATIENT)
Dept: OBGYN | Facility: CLINIC | Age: 27
End: 2017-10-11
Attending: OBSTETRICS & GYNECOLOGY
Payer: MEDICAID

## 2017-10-11 VITALS
SYSTOLIC BLOOD PRESSURE: 117 MMHG | DIASTOLIC BLOOD PRESSURE: 75 MMHG | WEIGHT: 139.1 LBS | HEIGHT: 63 IN | BODY MASS INDEX: 24.64 KG/M2 | HEART RATE: 80 BPM

## 2017-10-11 DIAGNOSIS — N96 HISTORY OF RECURRENT MISCARRIAGES, NOT CURRENTLY PREGNANT: Primary | ICD-10-CM

## 2017-10-11 NOTE — LETTER
"10/11/2017       RE: Pam Syed  [address]   [place] [address]     Dear Colleague,    Thank you for referring your patient, Pam Syed, to the [facility] Please see a copy of my visit note below.    [facility]  Postop visit    S:  Pam Syed is a 27 year old  who presents for follow up after suction D&C for 11w5d missed  on . She reports feeling well since surgery. She had two days of light bleeding, and none since. She has occasional cramping pain, which is mild and does not require pain medication. She is eating and drinking normally. She denies fevers, chills, nausea, vomiting or other complaints.    O:  Vitals:    10/11/17 1313   BP: 117/75   Pulse: 80   Weight: 63.1 kg (139 lb 1.6 oz)   Height: 1.6 m (5' 3\")     Gen: alert, oriented, NAD  Abd: soft, non-tender, non-distended     Pathology:   SPECIMEN(S):   Products of conception     FINAL DIAGNOSIS:     Products of conception, dilatation and curettage:        - Fetal and placental parts.      - 11 weeks five days gestation by dates.      - 12 weeks gestation by foot length.      - Chorionic villi with villous stromal sclerosis and rare foci of   perivillous fibrinoid.      - Fragments of decidua with patchy acute inflammation and necrosis.      - Fragments of fetal organs with appropriate development for   gestational age.     COMMENT:   Changes noted are consistent with intrauterine demise and retention. The   etiology of the demise is not clear.   I have personally reviewed all specimens and/or slides, including the   listed special stains, and used them with my medical judgement to   determine or confirm the final diagnosis.     A/P:  27 year old , history of recurrent pregnancy loss, presenting two weeks postop s/p suction D&C for missed , doing well    - Reviewed pathology  - Chromosomes still pending; will " call patient with results  - Discussed recurrent pregnancy loss. Reviewed genetic spontaneous abnormalities most likely cause. She has had 5 miscarriages; only this most recent one was treated with D&C. Will obtain SIS to rule out structural causes, however explained with prior normal pregnancy this is unlikely. With her son she was on vaginal prometrium; reviewed limited data to support this, but okay to try; explained use and Rx sent. Could consider APLA testing given this miscarriage was >10 weeks; will discuss with patient after chromosomes result as abnormal finding would be most likely explanation.    Again, thank you for allowing me to participate in the care of your patient.      Sincerely,    Yolanda Samaniego MD

## 2017-10-11 NOTE — PROGRESS NOTES
"[facility]  Postop visit    S:  Pam Syed is a 27 year old  who presents for follow up after suction D&C for 11w5d missed  on . She reports feeling well since surgery. She had two days of light bleeding, and none since. She has occasional cramping pain, which is mild and does not require pain medication. She is eating and drinking normally. She denies fevers, chills, nausea, vomiting or other complaints.    O:  Vitals:    10/11/17 1313   BP: 117/75   Pulse: 80   Weight: 63.1 kg (139 lb 1.6 oz)   Height: 1.6 m (5' 3\")     Gen: alert, oriented, NAD  Abd: soft, non-tender, non-distended     Pathology:   SPECIMEN(S):   Products of conception     FINAL DIAGNOSIS:     Products of conception, dilatation and curettage:        - Fetal and placental parts.      - 11 weeks five days gestation by dates.      - 12 weeks gestation by foot length.      - Chorionic villi with villous stromal sclerosis and rare foci of   perivillous fibrinoid.      - Fragments of decidua with patchy acute inflammation and necrosis.      - Fragments of fetal organs with appropriate development for   gestational age.     COMMENT:   Changes noted are consistent with intrauterine demise and retention. The   etiology of the demise is not clear.   I have personally reviewed all specimens and/or slides, including the   listed special stains, and used them with my medical judgement to   determine or confirm the final diagnosis.     A/P:  27 year old , history of recurrent pregnancy loss, presenting two weeks postop s/p suction D&C for missed , doing well    - Reviewed pathology  - Chromosomes still pending; will call patient with results  - Discussed recurrent pregnancy loss. Reviewed genetic spontaneous abnormalities most likely cause. She has had 5 miscarriages; only this most recent one was treated with D&C. Will obtain SIS to rule out structural causes, however explained with prior normal pregnancy " this is unlikely. With her son she was on vaginal prometrium; reviewed limited data to support this, but okay to try; explained use and Rx sent. Could consider APLA testing given this miscarriage was >10 weeks; will discuss with patient after chromosomes result as abnormal finding would be most likely explanation.    Yolanda Samaniego MD

## 2017-10-11 NOTE — MR AVS SNAPSHOT
After Visit Summary   10/11/2017    Pam Syed    MRN: [id number]           Patient Information     Date Of Birth          1990        Visit Information        Provider Department      10/11/2017 1:15 PM Yolanda Samaniego MD [facility]        Today's Diagnoses     History of recurrent miscarriages, not currently pregnant    -  1       Follow-ups after your visit        Follow-up notes from your care team     Return if symptoms worsen or fail to improve.      Future tests that were ordered for you today     Open Future Orders        Priority Expected Expires Ordered    US Hysterosonography (In Clinic) Routine  2018 10/11/2017            Who to contact     Please call your clinic at [phone] to:    Ask questions about your health    Make or cancel appointments    Discuss your medicines    Learn about your test results    Speak to your doctor   If you have compliments or concerns about an experience at your clinic, or if you wish to file a complaint, please contact [facility] Patient Relations at [phone] or email us at [email or web address]         Additional Information About Your Visit        [employer] Information     [employer] is an electronic gateway that provides easy, online access to your medical records. With [employer], you can request a clinic appointment, read your test results, renew a prescription or communicate with your care team.     To sign up for [employer] visit the website at [email or web address]   You will be asked to enter the access code listed below, as well as some personal information. Please follow the directions to create your username and password.     Your access code is: [email or web address]  Expires: 2017 11:52 AM     Your access code will  in 90 days. If you need help or a new code, please contact your [facility] or call [phone] for assistance.       "  Care EveryWhere ID     This is your Care EveryWhere ID. This could be used by other organizations to access your [facility] medical records  [id number]        Your Vitals Were     Pulse Height BMI (Body Mass Index)             80 1.6 m (5' 3\") 24.64 kg/m2          Blood Pressure from Last 3 Encounters:   10/11/17 117/75   09/26/17 118/83   06/05/17 116/76    Weight from Last 3 Encounters:   10/11/17 63.1 kg (139 lb 1.6 oz)   09/26/17 60.1 kg (132 lb 7.9 oz)   06/05/17 60.4 kg (133 lb 1.6 oz)                 Today's Medication Changes          These changes are accurate as of: 10/11/17  2:06 PM.  If you have any questions, ask your nurse or doctor.               Start taking these medicines.        Dose/Directions    progesterone 200 MG capsule   Commonly known as:  PROMETRIUM   Used for:  History of recurrent miscarriages, not currently pregnant   Started by:  Yolanda Samaniego MD        Dose:  200 mg   Place 1 capsule (200 mg) vaginally 2 times daily Start using on day 21 of your menstrual cycle (first day of bleeding is day 1)   Quantity:  60 capsule   Refills:  3            Where to get your medicines      These medications were sent to [id number] - [place], [place] - [address], [place] [address]     Phone:  [phone]     progesterone 200 MG capsule                Primary Care Provider Office Phone # Fax #    [facility] [phone]       [address] [facility] [address]        Equal Access to Services     JAZMIN HARO : Hadmireya cordero Sotere, waaxda luqadaha, qaybta kaalmada adeegyada, waxmorales idiphilipp crouch. So [facility] [phone].    ATENCIÓN: Si habla español, tiene a kaufman disposición servicios gratuitos de asistencia lingüística. Llame al [phone].    We comply with applicable federal civil rights laws and [place] laws. We do not discriminate on the basis of race, color, national origin, age, " disability, sex, sexual orientation, or gender identity.            Thank you!     Thank you for choosing [facility]  for your care. Our goal is always to provide you with excellent care. Hearing back from our patients is one way we can continue to improve our services. Please take a few minutes to complete the written survey that you may receive in the mail after your visit with us. Thank you!             Your Updated Medication List - Protect others around you: Learn how to safely use, store and throw away your medicines at www.disposemymeds.org.          This list is accurate as of: 10/11/17  2:06 PM.  Always use your most recent med list.                   Brand Name Dispense Instructions for use Diagnosis    * ibuprofen 600 MG tablet    ADVIL/MOTRIN    90 tablet    Take 1 tablet (600 mg) by mouth every 6 hours as needed for moderate pain     (normal spontaneous vaginal delivery)       * ibuprofen 600 MG tablet    ADVIL/MOTRIN    30 tablet    Take 1 tablet (600 mg) by mouth every 6 hours as needed for moderate pain    Postoperative state       IRON SUPPLEMENT PO      Take 325 mg by mouth daily        progesterone 200 MG capsule    PROMETRIUM    60 capsule    Place 1 capsule (200 mg) vaginally 2 times daily Start using on day 21 of your menstrual cycle (first day of bleeding is day 1)    History of recurrent miscarriages, not currently pregnant       VITAMIN D (CHOLECALCIFEROL) PO      Take 2,000 Units by mouth daily        * Notice:  This list has 2 medication(s) that are the same as other medications prescribed for you. Read the directions carefully, and ask your doctor or other care provider to review them with you.

## 2017-10-31 ENCOUNTER — OFFICE VISIT (OUTPATIENT)
Dept: OBGYN | Facility: CLINIC | Age: 27
End: 2017-10-31
Attending: OBSTETRICS & GYNECOLOGY
Payer: MEDICAID

## 2017-10-31 VITALS
SYSTOLIC BLOOD PRESSURE: 114 MMHG | BODY MASS INDEX: 24.54 KG/M2 | WEIGHT: 138.5 LBS | HEIGHT: 63 IN | HEART RATE: 74 BPM | DIASTOLIC BLOOD PRESSURE: 77 MMHG

## 2017-10-31 DIAGNOSIS — N84.0 ENDOMETRIAL POLYP: Primary | ICD-10-CM

## 2017-10-31 DIAGNOSIS — N96 HISTORY OF RECURRENT MISCARRIAGES, NOT CURRENTLY PREGNANT: ICD-10-CM

## 2017-10-31 PROCEDURE — 40000269 ZZH STATISTIC NO CHARGE FACILITY FEE

## 2017-10-31 PROCEDURE — 99212 OFFICE O/P EST SF 10 MIN: CPT | Mod: ZF

## 2017-10-31 PROCEDURE — 58340 CATHETER FOR HYSTEROGRAPHY: CPT | Mod: ZF | Performed by: OBSTETRICS & GYNECOLOGY

## 2017-10-31 NOTE — NURSING NOTE
Chief Complaint   Patient presents with     Minor Procedure     SIS       See VALERIE Hoyos 10/31/2017

## 2017-10-31 NOTE — MR AVS SNAPSHOT
After Visit Summary   10/31/2017    Pam Syed    MRN: [id number]           Patient Information     Date Of Birth          1990        Visit Information        Provider Department      10/31/2017 9:30 AM Eli Pickard MD; [facility] ULTRASOUND [facility]        Today's Diagnoses     Endometrial polyp    -  1    History of recurrent miscarriages, not currently pregnant           Follow-ups after your visit        Who to contact     Please call your clinic at [phone] to:    Ask questions about your health    Make or cancel appointments    Discuss your medicines    Learn about your test results    Speak to your doctor   If you have compliments or concerns about an experience at your clinic, or if you wish to file a complaint, please contact [facility] Patient Relations at [phone] or email us at [email or web address]         Additional Information About Your Visit        MyChart Information     [employer] is an electronic gateway that provides easy, online access to your medical records. With [employer], you can request a clinic appointment, read your test results, renew a prescription or communicate with your care team.     To sign up for [employer] visit the website at [email or web address]   You will be asked to enter the access code listed below, as well as some personal information. Please follow the directions to create your username and password.     Your access code is: [email or web address]  Expires: 2017 11:52 AM     Your access code will  in 90 days. If you need help or a new code, please contact your [facility] or call [phone] for assistance.        Care EveryWhere ID     This is your Care EveryWhere ID. This could be used by other organizations to access your [place] medical records  [id number]        Your Vitals Were     Pulse Height Last Period Breastfeeding? BMI (Body  "Mass Index)       74 1.6 m (5' 3\") 10/27/2017 (Exact Date) No 24.53 kg/m2        Blood Pressure from Last 3 Encounters:   10/31/17 114/77   10/11/17 117/75   17 118/83    Weight from Last 3 Encounters:   10/31/17 62.8 kg (138 lb 8 oz)   10/11/17 63.1 kg (139 lb 1.6 oz)   17 60.1 kg (132 lb 7.9 oz)              We Performed the Following     Mary-Operative Worksheet (Operative Hysteroscopy)     US Hysterosonography (In Clinic)        Primary Care Provider Office Phone # Fax #    Clinic [facility] [phone]        [facility] [address]        Equal Access to Services     JAZMIN HARO : Evelia cordero Sotere, waaxda luqadaha, qaybta kaalmada adeegyada, ada spangler . [facility] [phone].    ATENCIÓN: Si habla español, tiene a kaufman disposición servicios gratuitos de asistencia lingüística. [facility] [phone].    We comply with applicable federal civil rights laws and [place] laws. We do not discriminate on the basis of race, color, national origin, age, disability, sex, sexual orientation, or gender identity.            Thank you!     Thank you for choosing [facility]  for your care. Our goal is always to provide you with excellent care. Hearing back from our patients is one way we can continue to improve our services. Please take a few minutes to complete the written survey that you may receive in the mail after your visit with us. Thank you!             Your Updated Medication List - Protect others around you: Learn how to safely use, store and throw away your medicines at www.disposemymeds.org.          This list is accurate as of: 10/31/17 11:59 PM.  Always use your most recent med list.                   Brand Name Dispense Instructions for use Diagnosis    * ibuprofen 600 MG tablet    ADVIL/MOTRIN    90 tablet    Take 1 tablet (600 mg) by mouth every 6 hours as needed for moderate pain     (normal " spontaneous vaginal delivery)       * ibuprofen 600 MG tablet    ADVIL/MOTRIN    30 tablet    Take 1 tablet (600 mg) by mouth every 6 hours as needed for moderate pain    Postoperative state       IRON SUPPLEMENT PO      Take 325 mg by mouth daily        progesterone 200 MG capsule    PROMETRIUM    60 capsule    Place 1 capsule (200 mg) vaginally 2 times daily Start using on day 21 of your menstrual cycle (first day of bleeding is day 1)    History of recurrent miscarriages, not currently pregnant       VITAMIN D (CHOLECALCIFEROL) PO      Take 2,000 Units by mouth daily        * Notice:  This list has 2 medication(s) that are the same as other medications prescribed for you. Read the directions carefully, and ask your doctor or other care provider to review them with you.

## 2017-10-31 NOTE — LETTER
"10/31/2017       RE: Pam Syed  [address]   [place] [address]     Dear Colleague,    Thank you for referring your patient, Pam Syed, to the [facility] at [facility]. Please see a copy of my visit note below.    Saline Infusion Sonohysterogram                                                                       Time Out - \"Pause for the Cause\"  Just before the procedure begins, through verbal and active participation of team members, verify:    Initials   Patient Name    SLH   Patient Date of Birth SLH   Procedure to be performed  SLH   Site, laterality, level or multiples, noting patient position   SLH   Relevant images or diagnostics available/displayed   SLH   Implants, special equipment or special requirements        SLH     Consent:  Risks, benefits of treatment, and no treatment were discussed.  Patient's questions were elicited and answered. , Written consent signed and scanned into medical record. and Patient received and verbalized understanding of discharge instructions    Indication: Recurrent pregnancy loss      Using a medium Kathy speculum, the cervix was visualized. The cervix was prepped with Betadine. The catheter was then placed through the cervix and speculum was removed. Ultrasound probe then inserted and ultrasound performed. 20cc saline instilled to visualize caviity. Small polyp noted on anterior wall uterus. No other abnormality seen.     EBL: 0  Complications:  None  Tolerance of Procedure:  Patient did tolerate the procedure well.     Patient was instructed to call if she experiences any heavy bleeding, severe cramping, or abnormal vaginal discharge.  May take ibuprofen 400-800 mg PO TID PRN or naproxen 500 mg PO BID for cramping.    Reviwed findings of small polyp. Discussed that this is not usually associated with pregnancy loss, but can cause problems with becoming pregnant. Discussed hysteroscopic " resection of polyp and would be able to evaluate full cavity at that time as well. She is in agreement. Risks and benefits of surgery discussed and orders placed.       Again, thank you for allowing me to participate in the care of your patient.      Sincerely,    Eli Pickard MD

## 2017-11-01 NOTE — PROGRESS NOTES
"Saline Infusion Sonohysterogram                                                                       Time Out - \"Pause for the Cause\"  Just before the procedure begins, through verbal and active participation of team members, verify:    Initials   Patient Name    SLH   Patient Date of Birth SLH   Procedure to be performed  SLH   Site, laterality, level or multiples, noting patient position   SLH   Relevant images or diagnostics available/displayed   SLH   Implants, special equipment or special requirements        SLH     Consent:  Risks, benefits of treatment, and no treatment were discussed.  Patient's questions were elicited and answered. , Written consent signed and scanned into medical record. and Patient received and verbalized understanding of discharge instructions    Indication: Recurrent pregnancy loss      Using a medium Kathy speculum, the cervix was visualized. The cervix was prepped with Betadine. The catheter was then placed through the cervix and speculum was removed. Ultrasound probe then inserted and ultrasound performed. 20cc saline instilled to visualize caviity. Small polyp noted on anterior wall uterus. No other abnormality seen.     EBL: 0  Complications:  None  Tolerance of Procedure:  Patient did tolerate the procedure well.     Patient was instructed to call if she experiences any heavy bleeding, severe cramping, or abnormal vaginal discharge.  May take ibuprofen 400-800 mg PO TID PRN or naproxen 500 mg PO BID for cramping.    Reviwed findings of small polyp. Discussed that this is not usually associated with pregnancy loss, but can cause problems with becoming pregnant. Discussed hysteroscopic resection of polyp and would be able to evaluate full cavity at that time as well. She is in agreement. Risks and benefits of surgery discussed and orders placed.     Eli Pickard MD        "

## 2017-11-02 ENCOUNTER — TELEPHONE (OUTPATIENT)
Dept: OBGYN | Facility: CLINIC | Age: 27
End: 2017-11-02

## 2017-11-02 LAB — COPATH REPORT: NORMAL

## 2017-11-02 NOTE — TELEPHONE ENCOUNTER
Confirmed surgery date with patient 11/8/17 with arrival time at 9:00a.m with nothing to eat eight hours before scheduled and clear liquids up to two hours before scheduled surgery time, informed patient that she will need to sched a pre op physical within thirty days of surgery and that a map and letter will be mailed out.     to complete the following fields:            CHECKLIST     Google Calendar : Yes     Resident notified: Not Applicable     Clinic schedule blocked: Not Applicable    Patient notified:Yes      Pre op information sent: Yes     Given to patient over the phone.Yes    Comments:

## 2017-11-07 ENCOUNTER — ANESTHESIA EVENT (OUTPATIENT)
Dept: SURGERY | Facility: CLINIC | Age: 27
End: 2017-11-07
Payer: MEDICAID

## 2017-11-07 NOTE — ANESTHESIA PREPROCEDURE EVALUATION
Physical Exam  Normal systems: cardiovascular and pulmonary    Airway   Mallampati: I  TM distance: >3 FB  Neck ROM: full    Dental     Cardiovascular       Pulmonary                     Anesthesia Plan      History & Physical Review  History and physical reviewed and following examination; no interval change.    ASA Status:  2 .    NPO Status:  > 8 hours    Plan for MAC with Intravenous and Propofol induction. Maintenance will be TIVA.  Reason for MAC:  Deep or markedly invasive procedure (G8)  PONV prophylaxis:  Ondansetron (or other 5HT-3) and Dexamethasone or Solumedrol       Postoperative Care  Postoperative pain management:  IV analgesics, Oral pain medications and Multi-modal analgesia.      Consents  Anesthetic plan, risks, benefits and alternatives discussed with:  Patient..          ANESTHESIA PREOP EVALUATION    PROCEDURE: Procedure(s):  Operative Hysteroscopy, Dilation and Curettage and Polypectomy  - Wound Class:     HPI: Pam Syed is a 27 year old female who presents for above procedure.    PAST MEDICAL HISTORY:    History reviewed. No pertinent past medical history.    PAST SURGICAL HISTORY:    Past Surgical History:   Procedure Laterality Date     APPENDECTOMY       DILATION AND CURETTAGE SUCTION N/A 9/26/2017    Procedure: DILATION AND CURETTAGE SUCTION;  Suction Dilation And Curettage ;  Surgeon: Yolanda Samaniego MD;  Location: [facility] OR     GYN SURGERY  09/26/2017    suction D&C       PAST ANESTHESIA HISTORY:     No personal or family h/o anesthesia problems    SOCIAL HISTORY:       Social History   Substance Use Topics     Smoking status: Never Smoker     Smokeless tobacco: Never Used     Alcohol use No       ALLERGIES:     No Known Allergies    MEDICATIONS:     No prescriptions prior to admission.       Current Outpatient Prescriptions   Medication Sig Dispense Refill     Prenatal Vit-Fe Fumarate-FA (PRENATAL VITAMIN AND MINERAL PO) Take 1 tablet by mouth daily         Current  Facility-Administered Medications Ordered in Epic   Medication Dose Route Frequency Provider Last Rate Last Dose     lidocaine-EPINEPHrine 1.5 %-1:541693 injection   EPIDURAL PRN Agustina Salomon MD   2 mL at 12/03/15 0234     bupivacaine (MARCAINE) 0.125 % injection (diluted from stock concentration by MD or CRNA)   EPIDURAL PRN Agustina Salomon MD   5 mL at 12/03/15 0238     Current Outpatient Prescriptions Ordered in [facility]   Medication Sig Dispense Refill     Prenatal Vit-Fe Fumarate-FA (PRENATAL VITAMIN AND MINERAL PO) Take 1 tablet by mouth daily         PHYSICAL EXAM:    Vitals: T Data Unavailable, P Data Unavailable, BP Data Unavailable, R Data Unavailable, SpO2  , Weight   Wt Readings from Last 2 Encounters:   10/31/17 62.8 kg (138 lb 8 oz)   10/11/17 63.1 kg (139 lb 1.6 oz)       See doc flowsheet      No Data Recorded    NPO STATUS: see doc flowsheet    LABS:    BMP:  No results for input(s): NA, POTASSIUM, CHLORIDE, CO2, BUN, CR, GLC, JHON in the last 70232 hours.    LFTs:   No results for input(s): PROTTOTAL, ALBUMIN, BILITOTAL, ALKPHOS, AST, ALT, BILIDIRECT in the last 95610 hours.    CBC:   Recent Labs   Lab Test  09/26/17   0918  12/04/15   0630   WBC   --   13.5*   RBC   --   2.81*   HGB  12.3  7.1*   HCT   --   22.9*   MCV   --   82   MCH   --   25.3*   MCHC   --   31.0*   RDW   --   17.7*   PLT   --   127*       Coags:  No results for input(s): INR, PTT, FIBR in the last 08514 hours.    Imaging:  No orders to display       Red Slater MD  Anesthesiology Staff  Pager [phone]    11/7/2017  3:32 PM                    .

## 2017-11-08 ENCOUNTER — ANESTHESIA (OUTPATIENT)
Dept: SURGERY | Facility: CLINIC | Age: 27
End: 2017-11-08
Payer: MEDICAID

## 2017-11-08 ENCOUNTER — HOSPITAL ENCOUNTER (OUTPATIENT)
Facility: CLINIC | Age: 27
Discharge: HOME | End: 2017-11-08
Attending: OBSTETRICS & GYNECOLOGY | Admitting: OBSTETRICS & GYNECOLOGY
Payer: MEDICAID

## 2017-11-08 ENCOUNTER — SURGERY (OUTPATIENT)
Age: 27
End: 2017-11-08

## 2017-11-08 VITALS
OXYGEN SATURATION: 100 % | DIASTOLIC BLOOD PRESSURE: 76 MMHG | SYSTOLIC BLOOD PRESSURE: 121 MMHG | WEIGHT: 136.47 LBS | HEIGHT: 63 IN | HEART RATE: 73 BPM | RESPIRATION RATE: 16 BRPM | TEMPERATURE: 99 F | BODY MASS INDEX: 24.18 KG/M2

## 2017-11-08 DIAGNOSIS — G89.18 POST-OPERATIVE PAIN: Primary | ICD-10-CM

## 2017-11-08 LAB
ABO + RH BLD: NORMAL
ABO + RH BLD: NORMAL
BLD GP AB SCN SERPL QL: NORMAL
BLOOD BANK CMNT PATIENT-IMP: NORMAL
GLUCOSE SERPL-MCNC: 83 MG/DL (ref 70–99)
HCG UR QL: NEGATIVE
HGB BLD-MCNC: 11.4 G/DL (ref 11.7–15.7)
SPECIMEN EXP DATE BLD: NORMAL

## 2017-11-08 PROCEDURE — 85018 HEMOGLOBIN: CPT | Performed by: OBSTETRICS & GYNECOLOGY

## 2017-11-08 PROCEDURE — 25000125 ZZHC RX 250: Performed by: OBSTETRICS & GYNECOLOGY

## 2017-11-08 PROCEDURE — 86900 BLOOD TYPING SEROLOGIC ABO: CPT | Performed by: OBSTETRICS & GYNECOLOGY

## 2017-11-08 PROCEDURE — 88342 IMHCHEM/IMCYTCHM 1ST ANTB: CPT | Performed by: OBSTETRICS & GYNECOLOGY

## 2017-11-08 PROCEDURE — 86901 BLOOD TYPING SEROLOGIC RH(D): CPT | Performed by: OBSTETRICS & GYNECOLOGY

## 2017-11-08 PROCEDURE — 25000132 ZZH RX MED GY IP 250 OP 250 PS 637: Performed by: STUDENT IN AN ORGANIZED HEALTH CARE EDUCATION/TRAINING PROGRAM

## 2017-11-08 PROCEDURE — 85730 THROMBOPLASTIN TIME PARTIAL: CPT | Performed by: OBSTETRICS & GYNECOLOGY

## 2017-11-08 PROCEDURE — 00000167 ZZHCL STATISTIC INR NC: Performed by: OBSTETRICS & GYNECOLOGY

## 2017-11-08 PROCEDURE — 00000159 ZZHCL STATISTIC H-SEND OUTS PREP: Performed by: OBSTETRICS & GYNECOLOGY

## 2017-11-08 PROCEDURE — 86850 RBC ANTIBODY SCREEN: CPT | Performed by: OBSTETRICS & GYNECOLOGY

## 2017-11-08 PROCEDURE — 37000008 ZZH ANESTHESIA TECHNICAL FEE, 1ST 30 MIN: Performed by: OBSTETRICS & GYNECOLOGY

## 2017-11-08 PROCEDURE — 37000009 ZZH ANESTHESIA TECHNICAL FEE, EACH ADDTL 15 MIN: Performed by: OBSTETRICS & GYNECOLOGY

## 2017-11-08 PROCEDURE — 86146 BETA-2 GLYCOPROTEIN ANTIBODY: CPT | Performed by: OBSTETRICS & GYNECOLOGY

## 2017-11-08 PROCEDURE — 71000027 ZZH RECOVERY PHASE 2 EACH 15 MINS: Performed by: OBSTETRICS & GYNECOLOGY

## 2017-11-08 PROCEDURE — 25000128 H RX IP 250 OP 636

## 2017-11-08 PROCEDURE — 81025 URINE PREGNANCY TEST: CPT | Performed by: OBSTETRICS & GYNECOLOGY

## 2017-11-08 PROCEDURE — 86147 CARDIOLIPIN ANTIBODY EA IG: CPT | Performed by: OBSTETRICS & GYNECOLOGY

## 2017-11-08 PROCEDURE — 27210794 ZZH OR GENERAL SUPPLY STERILE: Performed by: OBSTETRICS & GYNECOLOGY

## 2017-11-08 PROCEDURE — 82947 ASSAY GLUCOSE BLOOD QUANT: CPT | Performed by: STUDENT IN AN ORGANIZED HEALTH CARE EDUCATION/TRAINING PROGRAM

## 2017-11-08 PROCEDURE — 88342 IMHCHEM/IMCYTCHM 1ST ANTB: CPT | Mod: 26 | Performed by: OBSTETRICS & GYNECOLOGY

## 2017-11-08 PROCEDURE — 36000059 ZZH SURGERY LEVEL 3 EA 15 ADDTL MIN UMMC: Performed by: OBSTETRICS & GYNECOLOGY

## 2017-11-08 PROCEDURE — 00000401 ZZHCL STATISTIC THROMBIN TIME NC: Performed by: OBSTETRICS & GYNECOLOGY

## 2017-11-08 PROCEDURE — 25000128 H RX IP 250 OP 636: Performed by: NURSE ANESTHETIST, CERTIFIED REGISTERED

## 2017-11-08 PROCEDURE — 85613 RUSSELL VIPER VENOM DILUTED: CPT | Performed by: OBSTETRICS & GYNECOLOGY

## 2017-11-08 PROCEDURE — 36000057 ZZH SURGERY LEVEL 3 1ST 30 MIN - UMMC: Performed by: OBSTETRICS & GYNECOLOGY

## 2017-11-08 PROCEDURE — 40000170 ZZH STATISTIC PRE-PROCEDURE ASSESSMENT II: Performed by: OBSTETRICS & GYNECOLOGY

## 2017-11-08 PROCEDURE — 25000125 ZZHC RX 250

## 2017-11-08 PROCEDURE — 88305 TISSUE EXAM BY PATHOLOGIST: CPT | Mod: 26 | Performed by: OBSTETRICS & GYNECOLOGY

## 2017-11-08 PROCEDURE — 36415 COLL VENOUS BLD VENIPUNCTURE: CPT | Performed by: OBSTETRICS & GYNECOLOGY

## 2017-11-08 PROCEDURE — 88305 TISSUE EXAM BY PATHOLOGIST: CPT | Performed by: OBSTETRICS & GYNECOLOGY

## 2017-11-08 RX ORDER — ACETAMINOPHEN 325 MG/1
650 TABLET ORAL EVERY 4 HOURS PRN
Qty: 20 TABLET | Refills: 0 | Status: SHIPPED | OUTPATIENT
Start: 2017-11-08 | End: 2018-05-02

## 2017-11-08 RX ORDER — ACETAMINOPHEN 325 MG/1
975 TABLET ORAL ONCE
Status: COMPLETED | OUTPATIENT
Start: 2017-11-08 | End: 2017-11-08

## 2017-11-08 RX ORDER — ONDANSETRON 4 MG/1
4 TABLET, ORALLY DISINTEGRATING ORAL EVERY 30 MIN PRN
Status: DISCONTINUED | OUTPATIENT
Start: 2017-11-08 | End: 2017-11-08 | Stop reason: HOSPADM

## 2017-11-08 RX ORDER — ONDANSETRON 2 MG/ML
4 INJECTION INTRAMUSCULAR; INTRAVENOUS EVERY 30 MIN PRN
Status: DISCONTINUED | OUTPATIENT
Start: 2017-11-08 | End: 2017-11-08 | Stop reason: HOSPADM

## 2017-11-08 RX ORDER — SODIUM CHLORIDE, SODIUM LACTATE, POTASSIUM CHLORIDE, CALCIUM CHLORIDE 600; 310; 30; 20 MG/100ML; MG/100ML; MG/100ML; MG/100ML
INJECTION, SOLUTION INTRAVENOUS CONTINUOUS
Status: DISCONTINUED | OUTPATIENT
Start: 2017-11-08 | End: 2017-11-08 | Stop reason: HOSPADM

## 2017-11-08 RX ORDER — OXYCODONE HYDROCHLORIDE 5 MG/1
5-10 TABLET ORAL
Status: DISCONTINUED | OUTPATIENT
Start: 2017-11-08 | End: 2017-11-08 | Stop reason: HOSPADM

## 2017-11-08 RX ORDER — FENTANYL CITRATE 50 UG/ML
25-50 INJECTION, SOLUTION INTRAMUSCULAR; INTRAVENOUS
Status: DISCONTINUED | OUTPATIENT
Start: 2017-11-08 | End: 2017-11-08 | Stop reason: HOSPADM

## 2017-11-08 RX ORDER — LIDOCAINE 40 MG/G
CREAM TOPICAL
Status: DISCONTINUED | OUTPATIENT
Start: 2017-11-08 | End: 2017-11-08 | Stop reason: HOSPADM

## 2017-11-08 RX ORDER — NALOXONE HYDROCHLORIDE 0.4 MG/ML
.1-.4 INJECTION, SOLUTION INTRAMUSCULAR; INTRAVENOUS; SUBCUTANEOUS
Status: DISCONTINUED | OUTPATIENT
Start: 2017-11-08 | End: 2017-11-08 | Stop reason: HOSPADM

## 2017-11-08 RX ORDER — IBUPROFEN 600 MG/1
600 TABLET, FILM COATED ORAL EVERY 6 HOURS PRN
Qty: 30 TABLET | Refills: 0 | Status: SHIPPED | OUTPATIENT
Start: 2017-11-08 | End: 2018-05-02

## 2017-11-08 RX ORDER — ONDANSETRON 2 MG/ML
INJECTION INTRAMUSCULAR; INTRAVENOUS PRN
Status: DISCONTINUED | OUTPATIENT
Start: 2017-11-08 | End: 2017-11-08

## 2017-11-08 RX ORDER — LIDOCAINE HYDROCHLORIDE 20 MG/ML
INJECTION, SOLUTION INFILTRATION; PERINEURAL PRN
Status: DISCONTINUED | OUTPATIENT
Start: 2017-11-08 | End: 2017-11-08

## 2017-11-08 RX ORDER — FENTANYL CITRATE 50 UG/ML
INJECTION, SOLUTION INTRAMUSCULAR; INTRAVENOUS PRN
Status: DISCONTINUED | OUTPATIENT
Start: 2017-11-08 | End: 2017-11-08

## 2017-11-08 RX ORDER — GABAPENTIN 100 MG/1
300 CAPSULE ORAL ONCE
Status: COMPLETED | OUTPATIENT
Start: 2017-11-08 | End: 2017-11-08

## 2017-11-08 RX ORDER — LIDOCAINE HYDROCHLORIDE 10 MG/ML
INJECTION, SOLUTION INFILTRATION; PERINEURAL PRN
Status: DISCONTINUED | OUTPATIENT
Start: 2017-11-08 | End: 2017-11-08 | Stop reason: HOSPADM

## 2017-11-08 RX ORDER — PROPOFOL 10 MG/ML
INJECTION, EMULSION INTRAVENOUS PRN
Status: DISCONTINUED | OUTPATIENT
Start: 2017-11-08 | End: 2017-11-08

## 2017-11-08 RX ORDER — SODIUM CHLORIDE, SODIUM LACTATE, POTASSIUM CHLORIDE, CALCIUM CHLORIDE 600; 310; 30; 20 MG/100ML; MG/100ML; MG/100ML; MG/100ML
INJECTION, SOLUTION INTRAVENOUS CONTINUOUS PRN
Status: DISCONTINUED | OUTPATIENT
Start: 2017-11-08 | End: 2017-11-08

## 2017-11-08 RX ORDER — DEXAMETHASONE SODIUM PHOSPHATE 4 MG/ML
INJECTION, SOLUTION INTRA-ARTICULAR; INTRALESIONAL; INTRAMUSCULAR; INTRAVENOUS; SOFT TISSUE PRN
Status: DISCONTINUED | OUTPATIENT
Start: 2017-11-08 | End: 2017-11-08

## 2017-11-08 RX ORDER — PROPOFOL 10 MG/ML
INJECTION, EMULSION INTRAVENOUS CONTINUOUS PRN
Status: DISCONTINUED | OUTPATIENT
Start: 2017-11-08 | End: 2017-11-08

## 2017-11-08 RX ORDER — IBUPROFEN 600 MG/1
600 TABLET, FILM COATED ORAL
Status: COMPLETED | OUTPATIENT
Start: 2017-11-08 | End: 2017-11-08

## 2017-11-08 RX ORDER — MEPERIDINE HYDROCHLORIDE 25 MG/ML
12.5 INJECTION INTRAMUSCULAR; INTRAVENOUS; SUBCUTANEOUS
Status: DISCONTINUED | OUTPATIENT
Start: 2017-11-08 | End: 2017-11-08 | Stop reason: HOSPADM

## 2017-11-08 RX ADMIN — FENTANYL CITRATE 50 MCG: 50 INJECTION, SOLUTION INTRAMUSCULAR; INTRAVENOUS at 12:11

## 2017-11-08 RX ADMIN — FENTANYL CITRATE 25 MCG: 50 INJECTION, SOLUTION INTRAMUSCULAR; INTRAVENOUS at 12:21

## 2017-11-08 RX ADMIN — PROPOFOL 30 MG: 10 INJECTION, EMULSION INTRAVENOUS at 12:11

## 2017-11-08 RX ADMIN — GABAPENTIN 300 MG: 300 CAPSULE ORAL at 09:47

## 2017-11-08 RX ADMIN — LIDOCAINE HYDROCHLORIDE 12 ML: 10 INJECTION, SOLUTION INFILTRATION; PERINEURAL at 12:22

## 2017-11-08 RX ADMIN — ACETAMINOPHEN 975 MG: 325 TABLET, FILM COATED ORAL at 09:47

## 2017-11-08 RX ADMIN — DEXAMETHASONE SODIUM PHOSPHATE 4 MG: 4 INJECTION, SOLUTION INTRAMUSCULAR; INTRAVENOUS at 12:25

## 2017-11-08 RX ADMIN — PROPOFOL 15 MG: 10 INJECTION, EMULSION INTRAVENOUS at 12:21

## 2017-11-08 RX ADMIN — IBUPROFEN 600 MG: 600 TABLET ORAL at 13:11

## 2017-11-08 RX ADMIN — MIDAZOLAM HYDROCHLORIDE 2 MG: 1 INJECTION, SOLUTION INTRAMUSCULAR; INTRAVENOUS at 12:04

## 2017-11-08 RX ADMIN — ONDANSETRON 4 MG: 2 INJECTION INTRAMUSCULAR; INTRAVENOUS at 12:48

## 2017-11-08 RX ADMIN — SODIUM CHLORIDE, POTASSIUM CHLORIDE, SODIUM LACTATE AND CALCIUM CHLORIDE: 600; 310; 30; 20 INJECTION, SOLUTION INTRAVENOUS at 12:04

## 2017-11-08 RX ADMIN — PROPOFOL 100 MCG/KG/MIN: 10 INJECTION, EMULSION INTRAVENOUS at 12:11

## 2017-11-08 RX ADMIN — LIDOCAINE HYDROCHLORIDE 40 MG: 20 INJECTION, SOLUTION INFILTRATION; PERINEURAL at 12:11

## 2017-11-08 NOTE — ANESTHESIA POSTPROCEDURE EVALUATION
Patient: Pam Syed    Procedure(s):  Operative Hysteroscopy, Dilation and Curettage  - Wound Class: II-Clean Contaminated    Diagnosis:Endometrial Polyp   Diagnosis Additional Information: No value filed.    Anesthesia Type:  MAC    Note:  Anesthesia Post Evaluation    Patient location during evaluation: PACU  Patient participation: Able to fully participate in evaluation  Level of consciousness: awake and alert  Pain management: adequate  Airway patency: patent  Cardiovascular status: acceptable  Respiratory status: acceptable  Hydration status: acceptable  PONV: none     Anesthetic complications: None          Last vitals:  Vitals:    11/08/17 1300 11/08/17 1315 11/08/17 1330   BP: 124/80 125/76 121/76   Pulse:      Resp: 18 16 16   Temp:   37.2  C (99  F)   SpO2: 100% 100% 100%         Electronically Signed By: Red Slater MD  November 8, 2017

## 2017-11-08 NOTE — IP AVS SNAPSHOT
MRN:[id number]                      After Visit Summary   11/8/2017    Pam Syed    MRN: [id number]           Thank you!     Thank you for choosing [facility] for your care. Our goal is always to provide you with excellent care. Hearing back from our patients is one way we can continue to improve our services. Please take a few minutes to complete the written survey that you may receive in the mail after you visit with us. Thank you!        Patient Information     Date Of Birth          1990        About your hospital stay     You were admitted on:  November 8, 2017 You last received care in the:  [facility] OR    You were discharged on:  November 8, 2017       Who to Call     For medical emergencies, please call 911.  For non-urgent questions about your medical care, please call your primary care provider or clinic, [phone]  For questions related to your surgery, please call your surgery clinic        Attending Provider     Provider Eli Barbosa MD OB/Gyn       Primary Care Provider Office Phone # Fax #    Clinic [facility] [phone]      After Care Instructions     Discharge Instructions       Resume pre procedure diet            Discharge Instructions       Pelvic Rest. No tampons, douching or intercourse until vaginal bleeding stops            No alcohol       NO ALCOHOL for 24 hours post procedure            No driving or operating machinery       No driving or operating machinery until day after procedure            Shower        Shower on Post-op day  1.   DO NOT take a bath                  Your next 10 appointments already scheduled     Nov 28, 2017 10:45 AM CST   Return Visit with Eli Pickard MD   [facility] ([facility])    [facility] [address]  [address]  [address] [facility] [address] [phone]              Further instructions from your care team       Same-Day Surgery    Adult Discharge Orders & Instructions     For 24 hours after surgery:  1. Get plenty of rest.  A responsible adult must stay with you for at least 24 hours after you leave the hospital.   2. Pain medication can slow your reflexes. Do not drive or use heavy equipment.  If you have weakness or tingling, don't drive or use heavy equipment until this feeling goes away.  3. Mixing alcohol and pain medication can cause dizziness and slow your breathing. It can even be fatal. Do not drink alcohol while taking pain medication.  4. Avoid strenuous or risky activities.  Ask for help when climbing stairs.   5. You may feel lightheaded.  If so, sit for a few minutes before standing.  Have someone help you get up.   6. If you have nausea (feel sick to your stomach), drink only clear liquids such as apple juice, ginger ale, broth or 7-Up.  Rest may also help.  Be sure to drink enough fluids.  Move to a regular diet as you feel able. Take pain medications with a small amount of solid food, such as toast or crackers, to avoid nausea.   7. A slight fever is normal. Call the doctor if your fever is over 100 F (37.7 C) (taken under the tongue) or lasts longer than 24 hours.  8. You may have a dry mouth, muscle aches, trouble sleeping or a sore throat.  These symptoms should go away after 24 hours.  9. Do not make important or legal decisions.   Pain Management:      1. Take pain medication (if prescribed) for pain as directed by your physician.        2. WARNING: If the pain medication you have been prescribed contains Tylenol  (acetaminophen), DO NOT take additional doses of Tylenol (acetaminophen).     Call your doctor for any of the followin.  Signs of infection (fever, growing tenderness at the surgery site, severe pain, a large amount of drainage or bleeding, foul-smelling drainage, redness, swelling).    2.  It has been over 8 to 10 hours since surgery and you are still not able to urinate (pee).    3.  Headache for over  24 hours.    4.  Numbness, tingling or weakness the day after surgery (if you had spinal anesthesia).  To contact a doctor, call _____________________________________ or:      [phone] and ask for the Resident On Call for:          __________________________________________ (answered 24 hours a day)      Emergency Department:  [place]: [phone]  [place] [facility]: [phone]               Rev. 10/2014   Discharge Instructions: Following a Dilation   and Curettage/Dilation and Evacuation    What to expect:    Expect small to moderate amount of vaginal bleeding which should taper off in 4-5 days. It should not be heavier than your regular menstrual flow.    Do not douche, and use a pad rather than tampons.     No intercourse until bleeding has ceased.    Activity:    Rest the day of surgery. You may resume normal activity the next day.    You may bathe or shower.    Avoid heavy lifting (10-15 lbs) for one week.    Comfort:    The amount of discomfort you can expect is very unpredictable. If you have pain that cannot be controlled with non-aspirin pain relievers or with the prescription you may have received, you should notify your doctor.    Abdominal cramping (like menstrual cramps) or low back ache are common and should not be a cause for concern. You will be drowsy and weak the day of surgery and possibly the following day.    Diet:    You have no restrictions on your diet. Following surgery, drink plenty of fluids and eat a light meal.    Nausea:    The anesthesia medications you received during your surgical procedure may produce some nausea.    If you feel nauseated, stay in bed, keep your head down and try drinking fluids such as Seven-Up, tea or soup.    Notify Physician at once if you experience:    A fever over 100 degrees (a low grade fever under 100 degrees is usual after surgery).    Heavy flow and/or passing large clots. Saturating more than 1 pad per hour  "for 2 or more hours.     Severe pain or cramps.  Rev.           Pending Results     Date and Time Order Name Status Description    2017 09 Beta 2 Glycoprotein Antibodies IGG IGM In process     2017 Cardiolipin Mavis IgG and IgM In process     2017 09 Lupus Anticoagulant Panel In process             Admission Information     Date & Time Provider Department Dept. Phone    2017 Eli Pickard MD [facility] [phone]      Your Vitals Were     Blood Pressure Pulse Temperature Respirations Height Weight    118/72 73 98.1  F (36.7  C) (Oral) 16 1.6 m (5' 2.99\") 61.9 kg (136 lb 7.4 oz)    Last Period Pulse Oximetry BMI (Body Mass Index)             10/27/2017 (Exact Date) 100% 24.18 kg/m2         [email or web address] Information     [employer] lets you send messages to your doctor, view your test results, renew your prescriptions, schedule appointments and more. To sign up, go to [email or web address] . Click on \"Log in\" on the left side of the screen, which will take you to the Welcome page. Then click on \"Sign up Now\" on the right side of the page.     You will be asked to enter the access code listed below, as well as some personal information. Please follow the directions to create your username and password.     Your access code is: [email or web address]  Expires: 2017 10:52 AM     Your access code will  in 90 days. If you need help or a new code, please call your [facility] clinic or [phone].        Care EveryWhere ID     This is your Care EveryWhere ID. This could be used by other organizations to access your [facility] medical records  [id number]        Equal Access to Services     JAZMIN HARO : Evelia Harrison, christina madrid, john bearden, ada crouch. [facility] [phone].    ATENCIÓN: Si habla español, tiene a kaufman disposición servicios gratuitos de asistencia lingüística. Llaleyda al [phone].    We comply with " applicable federal civil rights laws and [place] laws. We do not discriminate on the basis of race, color, national origin, age, disability, sex, sexual orientation, or gender identity.               Review of your medicines      START taking        Dose / Directions    acetaminophen 325 MG tablet   Commonly known as:  TYLENOL   Used for:  Post-operative pain        Dose:  650 mg   Take 2 tablets (650 mg) by mouth every 4 hours as needed for other (mild pain)   Quantity:  20 tablet   Refills:  0       ibuprofen 600 MG tablet   Commonly known as:  ADVIL/MOTRIN   Used for:  Post-operative pain        Dose:  600 mg   Take 1 tablet (600 mg) by mouth every 6 hours as needed for pain (mild)   Quantity:  30 tablet   Refills:  0         CONTINUE these medicines which have NOT CHANGED        Dose / Directions    PRENATAL VITAMIN AND MINERAL PO        Dose:  1 tablet   Take 1 tablet by mouth daily   Refills:  0            Where to get your medicines      These medications were sent to [facility] [address]  [address] [address]     Phone:  [phone]     acetaminophen 325 MG tablet    ibuprofen 600 MG tablet                Protect others around you: Learn how to safely use, store and throw away your medicines at www.disposemymeds.org.             Medication List: This is a list of all your medications and when to take them. Check marks below indicate your daily home schedule. Keep this list as a reference.      Medications           Morning Afternoon Evening Bedtime As Needed    acetaminophen 325 MG tablet   Commonly known as:  TYLENOL   Take 2 tablets (650 mg) by mouth every 4 hours as needed for other (mild pain)   Last time this was given:  975 mg on 11/8/2017  9:47 AM                                ibuprofen 600 MG tablet   Commonly known as:  ADVIL/MOTRIN   Take 1 tablet (600 mg) by mouth every 6 hours as needed for pain (mild)                                 PRENATAL VITAMIN AND MINERAL PO   Take 1 tablet by mouth daily

## 2017-11-08 NOTE — IP AVS SNAPSHOT
MAIN OR    [address]    [place] [address]    Phone:  [phone]                                       After Visit Summary   11/8/2017    Pam Syed    MRN: [id number]           After Visit Summary Signature Page     I have received my discharge instructions, and my questions have been answered. I have discussed any challenges I see with this plan with the nurse or doctor.    ..........................................................................................................................................  Patient/Patient Representative Signature      ..........................................................................................................................................  Patient Representative Print Name and Relationship to Patient    ..................................................               ................................................  Date                                            Time    ..........................................................................................................................................  Reviewed by Signature/Title    ...................................................              ..............................................  Date                                                            Time

## 2017-11-08 NOTE — ANESTHESIA CARE TRANSFER NOTE
Patient: Pam Syed    Procedure(s):  Operative Hysteroscopy, Dilation and Curettage  - Wound Class: II-Clean Contaminated    Diagnosis: Endometrial Polyp   Diagnosis Additional Information: No value filed.    Anesthesia Type:   MAC     Note:  Airway :Face Mask  Patient transferred to:Phase II  Comments: To patient's room, VSS.  Report to RN.    118/73, sat 97%, RR 16, HR 72Handoff Report: Identifed the Patient, Identified the Reponsible Provider, Reviewed the pertinent medical history, Discussed the surgical course, Reviewed Intra-OP anesthesia mangement and issues during anesthesia, Set expectations for post-procedure period and Allowed opportunity for questions and acknowledgement of understanding      Vitals: (Last set prior to Anesthesia Care Transfer)    CRNA VITALS  11/8/2017 1213 - 11/8/2017 1253      11/8/2017             Pulse: 87    SpO2: 100 %                Electronically Signed By: ASHIA Hogue CRNA  November 8, 2017  12:53 PM

## 2017-11-08 NOTE — DISCHARGE INSTRUCTIONS
Same-Day Surgery   Adult Discharge Orders & Instructions     For 24 hours after surgery:  1. Get plenty of rest.  A responsible adult must stay with you for at least 24 hours after you leave the hospital.   2. Pain medication can slow your reflexes. Do not drive or use heavy equipment.  If you have weakness or tingling, don't drive or use heavy equipment until this feeling goes away.  3. Mixing alcohol and pain medication can cause dizziness and slow your breathing. It can even be fatal. Do not drink alcohol while taking pain medication.  4. Avoid strenuous or risky activities.  Ask for help when climbing stairs.   5. You may feel lightheaded.  If so, sit for a few minutes before standing.  Have someone help you get up.   6. If you have nausea (feel sick to your stomach), drink only clear liquids such as apple juice, ginger ale, broth or 7-Up.  Rest may also help.  Be sure to drink enough fluids.  Move to a regular diet as you feel able. Take pain medications with a small amount of solid food, such as toast or crackers, to avoid nausea.   7. A slight fever is normal. Call the doctor if your fever is over 100 F (37.7 C) (taken under the tongue) or lasts longer than 24 hours.  8. You may have a dry mouth, muscle aches, trouble sleeping or a sore throat.  These symptoms should go away after 24 hours.  9. Do not make important or legal decisions.   Pain Management:      1. Take pain medication (if prescribed) for pain as directed by your physician.        2. WARNING: If the pain medication you have been prescribed contains Tylenol  (acetaminophen), DO NOT take additional doses of Tylenol (acetaminophen).     Call your doctor for any of the followin.  Signs of infection (fever, growing tenderness at the surgery site, severe pain, a large amount of drainage or bleeding, foul-smelling drainage, redness, swelling).    2.  It has been over 8 to 10 hours since surgery and you are still not able to urinate (pee).    3.   Headache for over 24 hours.    4.  Numbness, tingling or weakness the day after surgery (if you had spinal anesthesia).  To contact a doctor, call _____________________________________ or:      [phone] and ask for the Resident On Call for:          __________________________________________ (answered 24 hours a day)      Emergency Department:  [facility]: [phone]  [facility]: [phone]               Rev. 10/2014   Discharge Instructions: Following a Dilation   and Curettage/Dilation and Evacuation    What to expect:    Expect small to moderate amount of vaginal bleeding which should taper off in 4-5 days. It should not be heavier than your regular menstrual flow.    Do not douche, and use a pad rather than tampons.     No intercourse until bleeding has ceased.    Activity:    Rest the day of surgery. You may resume normal activity the next day.    You may bathe or shower.    Avoid heavy lifting (10-15 lbs) for one week.    Comfort:    The amount of discomfort you can expect is very unpredictable. If you have pain that cannot be controlled with non-aspirin pain relievers or with the prescription you may have received, you should notify your doctor.    Abdominal cramping (like menstrual cramps) or low back ache are common and should not be a cause for concern. You will be drowsy and weak the day of surgery and possibly the following day.    Diet:    You have no restrictions on your diet. Following surgery, drink plenty of fluids and eat a light meal.    Nausea:    The anesthesia medications you received during your surgical procedure may produce some nausea.    If you feel nauseated, stay in bed, keep your head down and try drinking fluids such as Seven-Up, tea or soup.    Notify Physician at once if you experience:    A fever over 100 degrees (a low grade fever under 100 degrees is usual after surgery).    Heavy flow and/or passing large clots. Saturating more  than 1 pad per hour for 2 or more hours.     Severe pain or cramps.  Rev. 5/12

## 2017-11-08 NOTE — BRIEF OP NOTE
[facility] Gynecology Brief Operative Note    Pre-operative diagnosis: Endometrial Polyp    Post-operative diagnosis: Filmy uterine adhesions, no polyp noted   Procedure: Operative Hysteroscopy  Dilation and curettage    Surgeon: Eli Pickard MD   Assistant(s): Sinai Clark MD PhD PGY-2   Anesthesia: MAC (monitored anesthesia care)   Estimated blood loss: 5 ml   Total IV fluids: (See anesthesia record)  600ml  Fluid deficit 200 ml   Blood transfusion: No transfusion was given during surgery   Total urine output: (See anesthesia record)  None   Drains: None   Specimens: Endometrial curettings   Findings: EUA: small mobile uterus, no adnexal mass or fullness noted. Female circumcision present. Two condylomas present at 2 and 11 o'clock around the introitus    Complications: None   Condition: Stable   Comments: See dictated operative report for full details    Sinai Clark MD PhD  Ob/Gyn PGY-2  11/8/2017 12:44 PM

## 2017-11-08 NOTE — OP NOTE
[facility]  Full Operative Note   Date of Service: 2017  Surgeon:  Eli Pickard MD  Assistants:  Sinai Clark PGY2       Preop Dx:    - Endometrial polyp  - Recurrent pregnancy loss    Postop Dx:    - recurrent pregnancy loss  - Filmy adhesions at both cornua and fundua  - Bilateral Vulvar condyloma    Procedure:  EUA, hysteroscopy, dilation & curettage    Anesthesia:  MAC & Local  EBL:  5 cc  IVF:  600 cc crystalloid   UOP:  0 cc  Drains:  None  Fluid Deficit: 200 cc  Specimens:  Endometrial curettings   Complications:  None apparent    Indications:   Ms. Pam Syed is a 27 year old  with recurrent pregnancy loss, with an endometrial polyp found on SIS.  Hysteroscopy was recommended. The risks, benefits, and alternatives were discussed with the patient, and she agreed to proceed.     Findings:   EUA: small mobile uterus, no adnexal mass or fullness noted. Female circumcision present. Two condylomas present at 2 and 11 o'clock around the introitus   Hysteroscopy: Cervix dilated easily with roman dilator. Filmy adhesions noted in both cornua and at the fundus with were taken down sharply. Otherwise endometrial cavity and cervix were normal in appearance. No endometrial polyp was noted.     Procedure Details:   The patient was brought to the operating room where adequate MAC was administered. Exam under anesthesia revealed the findings noted above. The patient was prepped and draped in the usual sterile fashion. A sterile speculum was placed. 2 cc of 1% lidocaine was injected into the anterior lip of the cervix, which was then grasped with a single tooth tenaculum. A paracervical block was performed with a total of 10 cc of the 1 % lidocaine injected at 4 and 8 o'clock in the cervicovaginal junction. The cervix was dilated easily to 23 using a Roman dilator. The hysteroscope was placed, and the uterine cavity was explored with the findings noted above. A scissors was placed  through the hysteroscope and the filmy adhesions at both cornua were taken down sharply. The hysteroscope and scissors were removed.    Sharp curettage of the endometrium was performed. The endometrial curettings were sent to pathology.  The tenaculum was removed and tenaculum sites were noted to be hemostatic. All instruments were removed from the vagina.     The sponge, needle, and instrument counts were correct. The patient tolerated the procedure well and was transferred to recovery in stable condition. Dr. Pickard was present and scrubbed for the entirety of the procedure.     Sinai Clark MD, PhD  Ob/Gyn, PGY-2  11/8/2017, 10:37 AM    I was present and scrubbed for entire procedure.   Eli Pickard MD

## 2017-11-09 LAB
B2 GLYCOPROT1 IGG SERPL IA-ACNC: <0.6 U/ML
B2 GLYCOPROT1 IGM SERPL IA-ACNC: 1.7 U/ML
CARDIOLIPIN ANTIBODY IGG: <1.6 GPL-U/ML (ref 0–19.9)
CARDIOLIPIN ANTIBODY IGM: <0.2 MPL-U/ML (ref 0–19.9)
LA PPP-IMP: NEGATIVE

## 2017-11-14 LAB — COPATH REPORT: NORMAL

## 2017-11-28 ENCOUNTER — OFFICE VISIT (OUTPATIENT)
Dept: OBGYN | Facility: CLINIC | Age: 27
End: 2017-11-28
Attending: OBSTETRICS & GYNECOLOGY
Payer: MEDICAID

## 2017-11-28 VITALS
SYSTOLIC BLOOD PRESSURE: 118 MMHG | DIASTOLIC BLOOD PRESSURE: 77 MMHG | HEART RATE: 80 BPM | WEIGHT: 137.3 LBS | BODY MASS INDEX: 24.33 KG/M2

## 2017-11-28 DIAGNOSIS — N96 RECURRENT PREGNANCY LOSS WITHOUT CURRENT PREGNANCY: Primary | ICD-10-CM

## 2017-11-28 PROCEDURE — 99212 OFFICE O/P EST SF 10 MIN: CPT | Mod: ZF

## 2017-11-28 NOTE — PROGRESS NOTES
[facility] Visit    CC: post op check    S: 27 year old  here for post op visit. Had hysteroscopy for suspected endometrial polyp. During procedure noted to have some filmy adhesions near cornua, pathology returned as possible old chorionic villi. Has history of recurrent pregnancy loss. APS labs done at that time were normal. Used prometrium with last 2 pregnancies, used 200mg with term pregnancy and 100mg with most recent loss.     O: /77 (BP Location: Right arm, Patient Position: Chair)  Pulse 80  Wt 62.3 kg (137 lb 4.8 oz)  LMP 11/20/2017  BMI 24.33 kg/m2  General: No distress  Abdomen: Soft, non-tender, non-distended, no masses      A:27 year old s/p hysteroscopy with recurrent pregnancy loss    P: Reviewed operative findings, pathology  Plan for prometrium in next pregnancy- 200mg QHS- will call with +upt      Eli Pickard MD FACOG

## 2017-11-28 NOTE — MR AVS SNAPSHOT
After Visit Summary   2017    Pam Syed    MRN: [id number]           Patient Information     Date Of Birth          1990        Visit Information        Provider Department      2017 10:45 AM Eli Pickard MD [facility]        Today's Diagnoses     Recurrent pregnancy loss without current pregnancy    -  1       Follow-ups after your visit        Who to contact     Please call your clinic at [phone] to:    Ask questions about your health    Make or cancel appointments    Discuss your medicines    Learn about your test results    Speak to your doctor   If you have compliments or concerns about an experience at your clinic, or if you wish to file a complaint, please contact [facility] Patient Relations at [phone] or email us at [email or web address]         Additional Information About Your Visit        MyChart Information     Khush is an electronic gateway that provides easy, online access to your medical records. With Khush, you can request a clinic appointment, read your test results, renew a prescription or communicate with your care team.     To sign up for [employer] visit the website at [email or web address]   You will be asked to enter the access code listed below, as well as some personal information. Please follow the directions to create your username and password.     Your access code is: [email or web address]  Expires: 2017 10:52 AM     Your access code will  in 90 days. If you need help or a new code, please contact your [facility] or call [phone] for assistance.        Care EveryWhere ID     This is your Care EveryWhere ID. This could be used by other organizations to access your [place] medical records  [id number]        Your Vitals Were     Pulse Last Period BMI (Body Mass Index)             80 2017 24.33 kg/m2          Blood  Pressure from Last 3 Encounters:   11/28/17 118/77   11/08/17 121/76   10/31/17 114/77    Weight from Last 3 Encounters:   11/28/17 62.3 kg (137 lb 4.8 oz)   11/08/17 61.9 kg (136 lb 7.4 oz)   10/31/17 62.8 kg (138 lb 8 oz)               Primary Care Provider Office Phone # Fax #    [facility] [phone]       [address] [address]        Equal Access to Services     JAZMIN HARO : Hadii courtney adams hadasho Soomaali, waaxda luqadaha, qaybta kaalmada adeegyacheryl, ada crouch. So [facility] [phone].    ATENCIÓN: Si habla español, tiene a kaufman disposición servicios gratuitos de asistencia lingüística. Ll[facility] [phone].    We comply with applicable federal civil rights laws and [place] laws. We do not discriminate on the basis of race, color, national origin, age, disability, sex, sexual orientation, or gender identity.            Thank you!     Thank you for choosing [facility]  for your care. Our goal is always to provide you with excellent care. Hearing back from our patients is one way we can continue to improve our services. Please take a few minutes to complete the written survey that you may receive in the mail after your visit with us. Thank you!             Your Updated Medication List - Protect others around you: Learn how to safely use, store and throw away your medicines at www.disposemymeds.org.          This list is accurate as of: 11/28/17 11:59 PM.  Always use your most recent med list.                   Brand Name Dispense Instructions for use Diagnosis    acetaminophen 325 MG tablet    TYLENOL    20 tablet    Take 2 tablets (650 mg) by mouth every 4 hours as needed for other (mild pain)    Post-operative pain       ibuprofen 600 MG tablet    ADVIL/MOTRIN    30 tablet    Take 1 tablet (600 mg) by mouth every 6 hours as needed for pain (mild)    Post-operative pain       PRENATAL VITAMIN AND MINERAL PO      Take 1  tablet by mouth daily

## 2017-11-28 NOTE — LETTER
11/28/2017       RE: Pam Syed    [address]  [place] [address]     Dear Colleague,    Thank you for referring your patient, Pam Syed, to the [facility] at [facility]. Please see a copy of my visit note below.    [facility] Visit    CC: post op check    S: 27 year old  here for post op visit. Had hysteroscopy for suspected endometrial polyp. During procedure noted to have some filmy adhesions near cornua, pathology returned as possible old chorionic villi. Has history of recurrent pregnancy loss. APS labs done at that time were normal. Used prometrium with last 2 pregnancies, used 200mg with term pregnancy and 100mg with most recent loss.     O: /77 (BP Location: Right arm, Patient Position: Chair)  Pulse 80  Wt 62.3 kg (137 lb 4.8 oz)  LMP 11/20/2017  BMI 24.33 kg/m2  General: No distress  Abdomen: Soft, non-tender, non-distended, no masses      A:27 year old s/p hysteroscopy with recurrent pregnancy loss    P: Reviewed operative findings, pathology  Plan for prometrium in next pregnancy- 200mg QHS- will call with +upt      Eli Pickard MD FACOG

## 2017-12-18 ENCOUNTER — TELEPHONE (OUTPATIENT)
Dept: OBGYN | Facility: CLINIC | Age: 27
End: 2017-12-18

## 2017-12-18 DIAGNOSIS — N96 RECURRENT PREGNANCY LOSS WITHOUT CURRENT PREGNANCY: ICD-10-CM

## 2017-12-18 DIAGNOSIS — Z34.91 NORMAL PREGNANCY IN FIRST TRIMESTER: Primary | ICD-10-CM

## 2017-12-18 LAB — PROGEST SERPL-MCNC: 20.2 NG/ML

## 2017-12-18 PROCEDURE — 36415 COLL VENOUS BLD VENIPUNCTURE: CPT | Performed by: OBSTETRICS & GYNECOLOGY

## 2017-12-18 PROCEDURE — 84144 ASSAY OF PROGESTERONE: CPT | Performed by: OBSTETRICS & GYNECOLOGY

## 2017-12-18 NOTE — TELEPHONE ENCOUNTER
Patient called and would like to establish prenatal care   Patient LMP   Patient   Patient complains of nothing at this time.  Patient is taking prenatal vitamins.

## 2017-12-20 ENCOUNTER — TELEPHONE (OUTPATIENT)
Dept: OBGYN | Facility: CLINIC | Age: 27
End: 2017-12-20

## 2017-12-20 DIAGNOSIS — Z34.90 EARLY STAGE OF PREGNANCY: Primary | ICD-10-CM

## 2017-12-20 RX ORDER — PNV NO.95/FERROUS FUM/FOLIC AC 28MG-0.8MG
1 TABLET ORAL DAILY
Qty: 90 TABLET | Refills: 3 | Status: SHIPPED | OUTPATIENT
Start: 2017-12-20 | End: 2024-05-12

## 2017-12-20 NOTE — TELEPHONE ENCOUNTER
Called Pam with progesterone level- normal for early pregnancy. Is taking 200mg vaginal prometrium for recurrent pregnancy loss. WIll continue until 10weeks. Rx for prenatal vitamin sent to pharmacy. Has US and intake already scheduled.   Eli Pickard MD

## 2018-01-16 ENCOUNTER — OFFICE VISIT (OUTPATIENT)
Dept: OBGYN | Facility: CLINIC | Age: 28
End: 2018-01-16
Attending: ADVANCED PRACTICE MIDWIFE
Payer: COMMERCIAL

## 2018-01-16 VITALS — HEIGHT: 63 IN | BODY MASS INDEX: 24.45 KG/M2 | WEIGHT: 138 LBS

## 2018-01-16 DIAGNOSIS — O09.91 PREGNANCY, SUPERVISION FOR, HIGH-RISK, FIRST TRIMESTER: Primary | ICD-10-CM

## 2018-01-16 DIAGNOSIS — O09.91 HIGH-RISK PREGNANCY IN FIRST TRIMESTER: Primary | ICD-10-CM

## 2018-01-16 DIAGNOSIS — N96 HISTORY OF RECURRENT ABORTION, NOT CURRENTLY PREGNANT: ICD-10-CM

## 2018-01-16 LAB
ABO + RH BLD: NORMAL
ABO + RH BLD: NORMAL
BASOPHILS # BLD AUTO: 0 10E9/L (ref 0–0.2)
BASOPHILS NFR BLD AUTO: 0.3 %
BLD GP AB SCN SERPL QL: NORMAL
BLOOD BANK CMNT PATIENT-IMP: NORMAL
DIFFERENTIAL METHOD BLD: NORMAL
EOSINOPHIL # BLD AUTO: 0.1 10E9/L (ref 0–0.7)
EOSINOPHIL NFR BLD AUTO: 0.9 %
ERYTHROCYTE [DISTWIDTH] IN BLOOD BY AUTOMATED COUNT: 14.2 % (ref 10–15)
HCT VFR BLD AUTO: 35.1 % (ref 35–47)
HGB BLD-MCNC: 12 G/DL (ref 11.7–15.7)
IMM GRANULOCYTES # BLD: 0 10E9/L (ref 0–0.4)
IMM GRANULOCYTES NFR BLD: 0.3 %
LYMPHOCYTES # BLD AUTO: 2 10E9/L (ref 0.8–5.3)
LYMPHOCYTES NFR BLD AUTO: 26.7 %
MCH RBC QN AUTO: 28.6 PG (ref 26.5–33)
MCHC RBC AUTO-ENTMCNC: 34.2 G/DL (ref 31.5–36.5)
MCV RBC AUTO: 84 FL (ref 78–100)
MONOCYTES # BLD AUTO: 0.5 10E9/L (ref 0–1.3)
MONOCYTES NFR BLD AUTO: 6.5 %
NEUTROPHILS # BLD AUTO: 4.9 10E9/L (ref 1.6–8.3)
NEUTROPHILS NFR BLD AUTO: 65.3 %
NRBC # BLD AUTO: 0 10*3/UL
NRBC BLD AUTO-RTO: 0 /100
PLATELET # BLD AUTO: 215 10E9/L (ref 150–450)
PROGEST SERPL-MCNC: 40.1 NG/ML
RBC # BLD AUTO: 4.2 10E12/L (ref 3.8–5.2)
SPECIMEN EXP DATE BLD: NORMAL
WBC # BLD AUTO: 7.5 10E9/L (ref 4–11)

## 2018-01-16 PROCEDURE — 76801 OB US < 14 WKS SINGLE FETUS: CPT | Mod: ZF

## 2018-01-16 PROCEDURE — 87389 HIV-1 AG W/HIV-1&-2 AB AG IA: CPT | Performed by: ADVANCED PRACTICE MIDWIFE

## 2018-01-16 PROCEDURE — G0499 HEPB SCREEN HIGH RISK INDIV: HCPCS | Performed by: ADVANCED PRACTICE MIDWIFE

## 2018-01-16 PROCEDURE — 86850 RBC ANTIBODY SCREEN: CPT | Performed by: ADVANCED PRACTICE MIDWIFE

## 2018-01-16 PROCEDURE — 85025 COMPLETE CBC W/AUTO DIFF WBC: CPT | Performed by: ADVANCED PRACTICE MIDWIFE

## 2018-01-16 PROCEDURE — 84144 ASSAY OF PROGESTERONE: CPT | Performed by: ADVANCED PRACTICE MIDWIFE

## 2018-01-16 PROCEDURE — 86780 TREPONEMA PALLIDUM: CPT | Performed by: ADVANCED PRACTICE MIDWIFE

## 2018-01-16 PROCEDURE — 86901 BLOOD TYPING SEROLOGIC RH(D): CPT | Performed by: ADVANCED PRACTICE MIDWIFE

## 2018-01-16 PROCEDURE — 36415 COLL VENOUS BLD VENIPUNCTURE: CPT | Performed by: ADVANCED PRACTICE MIDWIFE

## 2018-01-16 PROCEDURE — 87086 URINE CULTURE/COLONY COUNT: CPT | Performed by: ADVANCED PRACTICE MIDWIFE

## 2018-01-16 PROCEDURE — 86762 RUBELLA ANTIBODY: CPT | Performed by: ADVANCED PRACTICE MIDWIFE

## 2018-01-16 PROCEDURE — G0463 HOSPITAL OUTPT CLINIC VISIT: HCPCS

## 2018-01-16 PROCEDURE — 82306 VITAMIN D 25 HYDROXY: CPT | Performed by: ADVANCED PRACTICE MIDWIFE

## 2018-01-16 PROCEDURE — 86900 BLOOD TYPING SEROLOGIC ABO: CPT | Performed by: ADVANCED PRACTICE MIDWIFE

## 2018-01-16 RX ORDER — SENNOSIDES 8.6 MG
1 TABLET ORAL 2 TIMES DAILY PRN
Qty: 60 TABLET | Refills: 1 | Status: SHIPPED | OUTPATIENT
Start: 2018-01-16 | End: 2019-02-22

## 2018-01-16 NOTE — PROGRESS NOTES
Subjective   27 year old female who presents to clinic for initiation of OB care.   at 8w1d with estimated date of delivery of Aug 27, 2018 based on LMP. US confirms. Pregnancy is planned.  Symptoms since LMP include nausea.  Patient has tried these relief measures: small frequent meals.  Co-morbids: recurrent pregnancy loss. Genetics: none. Reviewed dating ultrasound.       PERSONAL/SOCIAL HISTORY  Lives lives with her  and son.  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  Additional items: None  Objective  -VS: reviewed and within normal limits   -General appearance: no acute distress, patient is comfortable   NEUROLOGICAL/PSYCHIATRIC   - Orientated x3,   -Mood and affect: : normal   Genetic/Infection questionnaire completed, risks include history of pregnancy loss.    Assessment/Plan   at 8w1d  by Patient's last menstrual period was 2017. US done 2018 dates 8w 0d.  Encounter Diagnoses   Name Primary?     History of recurrent , not currently pregnant      High-risk pregnancy in first trimester Yes     Orders Placed This Encounter   Procedures     25- [place]-Vitamin D     CBC with Platelets Differential [XYO301]     Anti Treponema [SNS8785]     Rubella Antibody IgG Quantitative [JNX0088]     Hepatitis B Surface Antigen [ABN588]     HIV Antigen Antibody Combo [SVI1152]     Progesterone     ABO/Rh Type and Screen [VXM557]     Orders Placed This Encounter   Medications     PROGESTERONE MICRONIZED PO     Sig: Place 200 mg vaginally     sennosides (SENOKOT) 8.6 MG tablet     Sig: Take 1 tablet by mouth 2 times daily as needed for constipation     Dispense:  60 tablet     Refill:  1     -Patient desired progesterone level today which was ordered in addition to OB labs.  - Oriented to Practice, types of care, and how to reach a provider.   - Patient received 1st trimester new OB education packet complete with aide of [facility] [employer] [employer] booklet  including information on genetic screening test options.  - Patient declines 1st trimester screening.  - Patient was encouraged to start prenatal vitamins as tolerated.    - Patient was sent to lab for routine OB labs including progesterone level.    - Patient instructed to schedule new OB exam with provider at 10 weeks.   - Pregnancy concerns to be addressed by provider at new OB exam include: history of multiple pregnancy losses, high risk pregnancy first trimester.    Pt to RTO for NOB visit in 2 weeks and prn if questions or concerns.    I, AMELIA, am serving as a scribe to document services personally performed by CNM based on the provider's statements to me. AMELIA Holman  Seen with student who acted as my scribe. I personally assessed, examined and made medical decisions reflected in the documentation. Any necessary changes have been made by myself.  ASHIA Martin CNM

## 2018-01-16 NOTE — PROGRESS NOTES
SUBJECTIVE:    27 year old, female, , 8w1d,  who presents to the clinic today for a new ob visit. Feels well. Has  started PNV.  Estimated Date of Delivery: Aug 27, 2018   Aug 27, 2018 is calculated from Patient's last menstrual period was 2017. US done on 2018, date 8w0d.  She has not had bleeding since her LMP.   She has had mild nausea. Weight loss has not occurred.   This was a planned pregnancy.   FOB is involved.     OTHER CONCERNS: History of multiple pregnancy losses. Excited and worried about this pregnancy.    ===========================================  ROS  PHQ9: Last PHQ-9 score on record= No Value exists for the : HP#PHQ9  Social History   Substance Use Topics     Smoking status: Never Smoker     Smokeless tobacco: Never Used     Alcohol use No     History   Drug Use No     History   Smoking Status     Never Smoker   Smokeless Tobacco     Never Used       Alcohol use No     History reviewed. No pertinent family history.  ============================================  MEDICAL HISTORY   No Known Allergies    [unfilled]      Current Outpatient Prescriptions:      PROGESTERONE MICRONIZED PO, Place 200 mg vaginally, Disp: , Rfl:      sennosides (SENOKOT) 8.6 MG tablet, Take 1 tablet by mouth 2 times daily as needed for constipation, Disp: 60 tablet, Rfl: 1     Prenatal Vit-Fe Fumarate-FA (PRENATAL VITAMIN AND MINERAL) 28-0.8 MG TABS, Take 1 tablet by mouth daily, Disp: 90 tablet, Rfl: 3     acetaminophen (TYLENOL) 325 MG tablet, Take 2 tablets (650 mg) by mouth every 4 hours as needed for other (mild pain) (Patient not taking: Reported on 2018), Disp: 20 tablet, Rfl: 0     ibuprofen (ADVIL/MOTRIN) 600 MG tablet, Take 1 tablet (600 mg) by mouth every 6 hours as needed for pain (mild) (Patient not taking: Reported on 2018), Disp: 30 tablet, Rfl: 0  No current facility-administered medications for this visit.     Facility-Administered Medications Ordered in Other Visits:  "     lidocaine-EPINEPHrine 1.5 %-1:053346 injection, , EPIDURAL, Aime HERNANDEZ Chen Thay, MD, 2 mL at 12/03/15 0234     bupivacaine (MARCAINE) 0.125 % injection (diluted from stock concentration by MD or CRNA), , EPIDURAL, Aime HERNANDEZ Chen Thay, MD, 5 mL at 12/03/15 0238    History reviewed. No pertinent past medical history.    Past Surgical History:   Procedure Laterality Date     APPENDECTOMY Right      DILATION AND CURETTAGE SUCTION N/A 2017    Procedure: DILATION AND CURETTAGE SUCTION;  Suction Dilation And Curettage ;  Surgeon: Yolanda Samaniego MD;  Location: [facility]     DILATION AND CURETTAGE, HYSTEROSCOPY DIAGNOSTIC, COMBINED N/A 2017    Procedure: COMBINED DILATION AND CURETTAGE, HYSTEROSCOPY DIAGNOSTIC;  Operative Hysteroscopy, Dilation and Curettage ;  Surgeon: Eli Pickard MD;  Location: [facility] OR     GYN SURGERY  2017    suction D&C            Obstetric History       T1      L1     SAB3   TAB0   Ectopic0   Multiple0   Live Births1       # Outcome Date GA Lbr Michael/2nd Weight Sex Delivery Anes PTL Lv   7 Current            6 Term 12/03/15 38w5d 03:45 / 01:16 3.11 kg (6 lb 13.7 oz) M Vag-Spont EPI  RAMILA      Apgar1:  9                Apgar5: 9   5 AB            4 AB            3 SAB            2 SAB            1 SAB                   GYN History- *** Abnormal Pap Smears                        Cervical procedures: ***                        History of STI: ***    I personally reviewed the past social/family/medical and surgical history on the date of service.   I reviewed lab work done at Intake visit with patient.    OBJECTIVE:   PHYSICAL EXAM:  Ht 1.6 m (5' 2.99\")  Wt 62.6 kg (138 lb)  LMP 2017  BMI 24.45 kg/m2  BMI- Body mass index is 24.45 kg/(m^2)., GENERAL:  Pleasant pregnant female, alert, cooperative *** and well groomed.  SKIN:  Warm and dry, without lesions or rashes  HEAD: Symmetrical features.  MOUTH:  Buccal mucosa pink, moist without lesions.  Teeth " "in {GOOD/FAIR/POOR:658186::\"good\"} repair.    NECK:  Thyroid without enlargement and nodules.  Lymph nodes not palpable. ***  LUNGS:  Clear to auscultation.  BREAST:    No dominant, fixed or suspicious masses are noted.  No skin or nipple changes or axillary nodes.   Nipples {NIPPLES:591386::\"everted\"}.      HEART:  RRR without murmur.  ABDOMEN: Soft without masses , tenderness or organomegaly.  No CVA tenderness.  Uterus {UTERUS:123742} at size equal to dates.  {ABD OB:783538}. Fetal heart tones present.  MUSCULOSKELETAL:  Full range of motion  EXTREMITIES:  No edema. No significant varicosities.   PELVIC EXAM:  GENITALIA: EGBUS  External genitalia, Bartholin's glands, urethra & Talty's glands:normal. Vulva reveals no erythema or lesions.  ***       VAGINA:  pink, normal rugae and discharge, no lesions, good tone. ***  CERVIX:  smooth, without discharge or CMT. ***               UTERUS: {UTERINE POSITION:231088::\"Anteverted\"},  nontender *** week size.   ADNEXA:  Without masses or tenderness. ***  RECTAL:  Normal appearance.  Digital exam deferred.  WET PREP:{VAGINAL WET PREP:329456::\"Not done\"}  GC/CHLAMYDIA CULTURE OBTAINED:{YES/ NO (recomended):717439::\"PATIENT DECLINED\"}    ASSESSMENT:  Intrauterine pregnancy 8w1d size *** consistent with dates  Genetic Screening: {WHSGENETICSCREENIN}  Encounter Diagnoses   Name Primary?     History of recurrent , not currently pregnant      High-risk pregnancy in first trimester Yes        PLAN:  Orders Placed This Encounter   Procedures     25- OH-Vitamin D     CBC with Platelets Differential [PVM772]     Anti Treponema [DIM4637]     Rubella Antibody IgG Quantitative [QEV9356]     Hepatitis B Surface Antigen [JVU520]     HIV Antigen Antibody Combo [OOH1437]     Progesterone     ABO/Rh Type and Screen [BIU378]     Orders Placed This Encounter   Medications     PROGESTERONE MICRONIZED PO     Sig: Place 200 mg vaginally     sennosides (SENOKOT) 8.6 MG tablet "     Sig: Take 1 tablet by mouth 2 times daily as needed for constipation     Dispense:  60 tablet     Refill:  1     - Reviewed use of triage nurse line and contacting the on-call provider after hours for an urgent need such as fever, vagina bleeding, bladder or vaginal infection, rupture of membranes,  or term labor.    - Reviewed best evidence for: weight gain for her weight and height for pregnancy:  RECOMMENDED WEIGHT GAIN: {WGT GAIN:462977}   healthy diet and foods to avoid; exercise and activity during pregnancy;avoiding exposure to toxoplasmosis; and maintenance of a generally healthy lifestyle.   - Discussed the harms, benefits, side effects and alternative therapies for current prescribed and OTC medications.    - All pt's and FOB's *** questions discussed and answered.  Pt verbalized understanding of and agreement to plan of care.     - Continue scheduled prenatal care and prn if questions or concerns    Caron Mauricio

## 2018-01-16 NOTE — PROGRESS NOTES
27 year old female, , with LMP 2017, 8 1/7 weeks. Estimated Date of Delivery: 2018,  presents for confirmation of dates and assessment of viability. This study was done transabdominally.    Measurements     CRL = 15.7 mm = 8 0/7 weeks  EGA.     Fetal anatomy appears normal for gestational age.     Fetal/Fetal Cardiac Activity: Present.  FHR = 165bpm.     Implantation: Intrauterine.     Cervix = 3.5 cm      Maternal structures appear normal.    Impression: Single viable intrauterine pregnancy at 8w1d by LMP c/w today's scan.  Use BEBETO of 18 based on LMP.    Recommend comprehensive scan at 18 to 20 weeks.    CATIE Wells MD

## 2018-01-16 NOTE — LETTER
2018       RE: Pam Syed  [address]   [place] [address]     Dear Colleague,    Thank you for referring your patient, Pam Syed, to the [facility] at [facility]. Please see a copy of my visit note below.    Subjective   27 year old female who presents to clinic for initiation of OB care.   at 8w1d with estimated date of delivery of Aug 27, 2018 based on LMP. US confirms. Pregnancy is planned.  Symptoms since LMP include nausea.  Patient has tried these relief measures: small frequent meals.  Co-morbids: recurrent pregnancy loss. Genetics: none. Reviewed dating ultrasound.       PERSONAL/SOCIAL HISTORY  Lives lives with her  and son.  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  Additional items: None  Objective  -VS: reviewed and within normal limits   -General appearance: no acute distress, patient is comfortable   NEUROLOGICAL/PSYCHIATRIC   - Orientated x3,   -Mood and affect: : normal   Genetic/Infection questionnaire completed, risks include history of pregnancy loss.    Assessment/Plan   at 8w1d  by Patient's last menstrual period was 2017. US done 2018 dates 8w 0d.  Encounter Diagnoses   Name Primary?     History of recurrent , not currently pregnant      High-risk pregnancy in first trimester Yes     Orders Placed This Encounter   Procedures     25- OH-Vitamin D     CBC with Platelets Differential [WSW003]     Anti Treponema [PJJ4146]     Rubella Antibody IgG Quantitative [ZCN5251]     Hepatitis B Surface Antigen [ALB507]     HIV Antigen Antibody Combo [BMF0088]     Progesterone     ABO/Rh Type and Screen [KPZ242]     Orders Placed This Encounter   Medications     PROGESTERONE MICRONIZED PO     Sig: Place 200 mg vaginally     sennosides (SENOKOT) 8.6 MG tablet     Sig: Take 1 tablet by mouth 2 times daily as needed for constipation      Dispense:  60 tablet     Refill:  1     -Patient desired progesterone level today which was ordered in addition to OB labs.  - Oriented to Practice, types of care, and how to reach a provider.   - Patient received 1st trimester new OB education packet complete with aide of The Expectant Family booklet including information on genetic screening test options.  - Patient declines 1st trimester screening.  - Patient was encouraged to start prenatal vitamins as tolerated.    - Patient was sent to lab for routine OB labs including progesterone level.    - Patient instructed to schedule new OB exam with provider at 10 weeks.   - Pregnancy concerns to be addressed by provider at new OB exam include: history of multiple pregnancy losses, high risk pregnancy first trimester.    Pt to RTO for NOB visit in 2 weeks and prn if questions or concerns.    I, AMELIA, am serving as a scribe to document services personally performed by CNM based on the provider's statements to me. AMELIA Holman  Seen with student who acted as my scribe. I personally assessed, examined and made medical decisions reflected in the documentation. Any necessary changes have been made by myself.  ASHIA Martin CNM

## 2018-01-16 NOTE — LETTER
2018       RE: Pam Syed  [address]   [place] [address]     Dear Colleague,    Thank you for referring your patient, Pam Syed, to the [facility] at [facility]. Please see a copy of my visit note below.    27 year old female, , with LMP 2017, 8 1/7 weeks. Estimated Date of Delivery: 2018,  presents for confirmation of dates and assessment of viability. This study was done transabdominally.    Measurements     CRL = 15.7 mm = 8 0/7 weeks  EGA.     Fetal anatomy appears normal for gestational age.     Fetal/Fetal Cardiac Activity: Present.  FHR = 165bpm.     Implantation: Intrauterine.     Cervix = 3.5 cm      Maternal structures appear normal.    Impression: Single viable intrauterine pregnancy at 8w1d by LMP c/w today's scan.  Use BEBETO of 18 based on LMP.    Recommend comprehensive scan at 18 to 20 weeks.    CATIE Wells MD    Again, thank you for allowing me to participate in the care of your patient.      Sincerely,    [facility]

## 2018-01-16 NOTE — MR AVS SNAPSHOT
After Visit Summary   2018    Pam Syed    MRN: [id number]           Patient Information     Date Of Birth          1990        Visit Information        Provider Department      2018 3:00 PM Liz Reza APRN CNM [facility]        Today's Diagnoses     High-risk pregnancy in first trimester    -  1    History of recurrent , not currently pregnant           Follow-ups after your visit        Follow-up notes from your care team     Return in about 2 weeks (around 2018) for NOB exam 30 minutes.      Your next 10 appointments already scheduled     2018  2:15 PM CST   NEW OB with ASHIA Ruiz CNM   [facility] ([facility])    [facility] [address]  [address]  [place] [address] [phone]              Who to contact     Please call your clinic at [phone] to:    Ask questions about your health    Make or cancel appointments    Discuss your medicines    Learn about your test results    Speak to your doctor   If you have compliments or concerns about an experience at your clinic, or if you wish to file a complaint, please contact [facility] Patient Relations at [phone] or email us at [email or web address]         Additional Information About Your Visit        MyChart Information     [employer] is an electronic gateway that provides easy, online access to your medical records. With [employer], you can request a clinic appointment, read your test results, renew a prescription or communicate with your care team.     To sign up for [employer] visit the website at [email or web address]   You will be asked to enter the access code listed below, as well as some personal information. Please follow the directions to create your username and password.     Your access code is: [email or web address]  Expires: 2018  6:30 AM    "  Your access code will  in 90 days. If you need help or a new code, please contact your [facility] or call [phone] for assistance.        Care EveryWhere ID     This is your Care EveryWhere ID. This could be used by other organizations to access your [place] medical records  [id number]        Your Vitals Were     Height Last Period BMI (Body Mass Index)             1.6 m (5' 2.99\") 2017 24.45 kg/m2          Blood Pressure from Last 3 Encounters:   17 118/77   17 121/76   10/31/17 114/77    Weight from Last 3 Encounters:   18 62.6 kg (138 lb)   17 62.3 kg (137 lb 4.8 oz)   17 61.9 kg (136 lb 7.4 oz)              We Performed the Following     25- OH-Vitamin D     ABO/Rh Type and Screen [LDD497]     Anti Treponema [WCU4596]     CBC with Platelets Differential [GPO098]     Hepatitis B Surface Antigen [ZRS269]     HIV Antigen Antibody Combo [BJI4870]     Progesterone     Rubella Antibody IgG Quantitative [ZUM0735]     Urine Culture Aerobic Bacterial [RJK687]          Today's Medication Changes          These changes are accurate as of: 18  4:17 PM.  If you have any questions, ask your nurse or doctor.               Start taking these medicines.        Dose/Directions    sennosides 8.6 MG tablet   Commonly known as:  SENOKOT   Used for:  High-risk pregnancy in first trimester   Started by:  Liz Reza APRN CNM        Dose:  1 tablet   Take 1 tablet by mouth 2 times daily as needed for constipation   Quantity:  60 tablet   Refills:  1            Where to get your medicines      These medications were sent to [id number] - [place], [address] [place] [address]     Phone:  [phone]     sennosides 8.6 MG tablet                Primary Care Provider Office Phone # Fax #    [facility] [phone]        [facility] [address]        Equal Access to " Services     [facility]: Hadii aad ku hadcelinearnoldo Rebekahtere, waramseyda luqadaha, qaybta kakokocheryl bearden, ada spangler . So [facility] [phone].    ATENCIÓN: Si selwynla eamon, tiene a kaufman disposición servicios gratuitos de asistencia lingüística. Llame al [phone].    We comply with applicable federal civil rights laws and [place] laws. We do not discriminate on the basis of race, color, national origin, age, disability, sex, sexual orientation, or gender identity.            Thank you!     Thank you for choosing [facility]  for your care. Our goal is always to provide you with excellent care. Hearing back from our patients is one way we can continue to improve our services. Please take a few minutes to complete the written survey that you may receive in the mail after your visit with us. Thank you!             Your Updated Medication List - Protect others around you: Learn how to safely use, store and throw away your medicines at www.disposemymeds.org.          This list is accurate as of: 1/16/18  4:17 PM.  Always use your most recent med list.                   Brand Name Dispense Instructions for use Diagnosis    acetaminophen 325 MG tablet    TYLENOL    20 tablet    Take 2 tablets (650 mg) by mouth every 4 hours as needed for other (mild pain)    Post-operative pain       ibuprofen 600 MG tablet    ADVIL/MOTRIN    30 tablet    Take 1 tablet (600 mg) by mouth every 6 hours as needed for pain (mild)    Post-operative pain       PRENATAL VITAMIN AND MINERAL 28-0.8 MG Tabs     90 tablet    Take 1 tablet by mouth daily    Early stage of pregnancy       PROGESTERONE MICRONIZED PO      Place 200 mg vaginally        sennosides 8.6 MG tablet    SENOKOT    60 tablet    Take 1 tablet by mouth 2 times daily as needed for constipation    High-risk pregnancy in first trimester

## 2018-01-16 NOTE — MR AVS SNAPSHOT
After Visit Summary   2018    Pam Syed    MRN: [id number]           Patient Information     Date Of Birth          1990        Visit Information        Provider Department      2018 2:30 PM [facility]TRASOUND [facility]        Today's Diagnoses     Pregnancy, supervision for, high-risk, first trimester    -  1       Follow-ups after your visit        Who to contact     Please call your clinic at [phone] to:    Ask questions about your health    Make or cancel appointments    Discuss your medicines    Learn about your test results    Speak to your doctor   If you have compliments or concerns about an experience at your clinic, or if you wish to file a complaint, please contact [facility] Patient Relations at [phone] or email us at [email or web address]         Additional Information About Your Visit        MyChart Information     [employer] is an electronic gateway that provides easy, online access to your medical records. With [employer], you can request a clinic appointment, read your test results, renew a prescription or communicate with your care team.     To sign up for [employer] visit the website at [email or web address]   You will be asked to enter the access code listed below, as well as some personal information. Please follow the directions to create your username and password.     Your access code is: [email or web address]  Expires: 2018  6:30 AM     Your access code will  in 90 days. If you need help or a new code, please contact your [facility] or call [phone] for assistance.        Care EveryWhere ID     This is your Care EveryWhere ID. This could be used by other organizations to access your [place] medical records  [id number]        Your Vitals Were     Last Period                   2017            Blood Pressure from Last 3 Encounters:   17 118/77    11/08/17 121/76   10/31/17 114/77    Weight from Last 3 Encounters:   01/16/18 62.6 kg (138 lb)   11/28/17 62.3 kg (137 lb 4.8 oz)   11/08/17 61.9 kg (136 lb 7.4 oz)               Primary Care Provider Office Phone # Fax #    Clinic [facility] [phone]       [address] [facility] [address]        Equal Access to Services     JAZMIN HARO : Hadii aad ku hadasho Soomaali, waaxda luqadaha, qaybta kaalmada adeegyada, waxay idiin hayaan adejoss mathewaraaugusta lajack crouch. So [facility] [phone].    ATENCIÓN: Si habla español, tiene a kaufman disposición servicios gratuitos de asistencia lingüística. [facility] [phone].    We comply with applicable federal civil rights laws and [place] laws. We do not discriminate on the basis of race, color, national origin, age, disability, sex, sexual orientation, or gender identity.            Thank you!     Thank you for choosing [facility]  for your care. Our goal is always to provide you with excellent care. Hearing back from our patients is one way we can continue to improve our services. Please take a few minutes to complete the written survey that you may receive in the mail after your visit with us. Thank you!             Your Updated Medication List - Protect others around you: Learn how to safely use, store and throw away your medicines at www.disposemymeds.org.          This list is accurate as of: 1/16/18  3:11 PM.  Always use your most recent med list.                   Brand Name Dispense Instructions for use Diagnosis    acetaminophen 325 MG tablet    TYLENOL    20 tablet    Take 2 tablets (650 mg) by mouth every 4 hours as needed for other (mild pain)    Post-operative pain       ibuprofen 600 MG tablet    ADVIL/MOTRIN    30 tablet    Take 1 tablet (600 mg) by mouth every 6 hours as needed for pain (mild)    Post-operative pain       PRENATAL VITAMIN AND MINERAL 28-0.8 MG Tabs     90 tablet    Take 1 tablet by mouth daily    Early  stage of pregnancy       PROGESTERONE MICRONIZED PO      Place 200 mg vaginally

## 2018-01-17 LAB
BACTERIA SPEC CULT: NO GROWTH
DEPRECATED CALCIDIOL+CALCIFEROL SERPL-MC: 23 UG/L (ref 20–75)
HBV SURFACE AG SERPL QL IA: NONREACTIVE
HIV 1+2 AB+HIV1 P24 AG SERPL QL IA: NONREACTIVE
Lab: NORMAL
RUBV IGG SERPL IA-ACNC: 118 IU/ML
SPECIMEN SOURCE: NORMAL
T PALLIDUM IGG+IGM SER QL: NEGATIVE

## 2018-01-30 ENCOUNTER — OFFICE VISIT (OUTPATIENT)
Dept: OBGYN | Facility: CLINIC | Age: 28
End: 2018-01-30
Attending: ADVANCED PRACTICE MIDWIFE
Payer: COMMERCIAL

## 2018-01-30 VITALS
WEIGHT: 140.1 LBS | DIASTOLIC BLOOD PRESSURE: 75 MMHG | BODY MASS INDEX: 24.82 KG/M2 | HEART RATE: 85 BPM | HEIGHT: 63 IN | SYSTOLIC BLOOD PRESSURE: 107 MMHG

## 2018-01-30 DIAGNOSIS — O09.91 SUPERVISION OF HIGH RISK PREGNANCY IN FIRST TRIMESTER: Primary | ICD-10-CM

## 2018-01-30 DIAGNOSIS — Z36.89 ENCOUNTER FOR FETAL ANATOMIC SURVEY: ICD-10-CM

## 2018-01-30 DIAGNOSIS — Z36.82 ENCOUNTER FOR (NT) NUCHAL TRANSLUCENCY SCAN: ICD-10-CM

## 2018-01-30 DIAGNOSIS — O26.21 HISTORY OF RECURRENT ABORTION, CURRENTLY PREGNANT IN FIRST TRIMESTER: ICD-10-CM

## 2018-01-30 DIAGNOSIS — O09.91 HIGH-RISK PREGNANCY IN FIRST TRIMESTER: ICD-10-CM

## 2018-01-30 PROBLEM — O09.90 HIGH RISK PREGNANCY, ANTEPARTUM: Status: ACTIVE | Noted: 2018-01-16

## 2018-01-30 PROCEDURE — 87591 N.GONORRHOEAE DNA AMP PROB: CPT | Performed by: ADVANCED PRACTICE MIDWIFE

## 2018-01-30 PROCEDURE — 87491 CHLMYD TRACH DNA AMP PROBE: CPT | Performed by: ADVANCED PRACTICE MIDWIFE

## 2018-01-30 PROCEDURE — G0463 HOSPITAL OUTPT CLINIC VISIT: HCPCS | Mod: ZF

## 2018-01-30 ASSESSMENT — PATIENT HEALTH QUESTIONNAIRE - PHQ9: 5. POOR APPETITE OR OVEREATING: NOT AT ALL

## 2018-01-30 ASSESSMENT — ANXIETY QUESTIONNAIRES
1. FEELING NERVOUS, ANXIOUS, OR ON EDGE: NOT AT ALL
2. NOT BEING ABLE TO STOP OR CONTROL WORRYING: NOT AT ALL
GAD7 TOTAL SCORE: 0
7. FEELING AFRAID AS IF SOMETHING AWFUL MIGHT HAPPEN: NOT AT ALL
3. WORRYING TOO MUCH ABOUT DIFFERENT THINGS: NOT AT ALL
5. BEING SO RESTLESS THAT IT IS HARD TO SIT STILL: NOT AT ALL
6. BECOMING EASILY ANNOYED OR IRRITABLE: NOT AT ALL

## 2018-01-30 NOTE — LETTER
2018       RE: Pam Syed  [address]   [place] [address]     Dear Colleague,    Thank you for referring your patient, Pam Syed, to the [facility] at [facility]. Please see a copy of my visit note below.      SUBJECTIVE:    27 year old, female, , 10w1d,  who presents to the clinic today for a new ob visit.    Feels well. Has  started PNV but stopped recently d/t some nausea.  Estimated Date of Delivery: Aug 27, 2018   Aug 27, 2018 is calculated from Patient's last menstrual period was 2017..      She has not had bleeding since her LMP.   Having some discharge from progesterone but no malodor, no itching or pain.   She has had mild nausea. Weight loss has not occurred.   This was a planned pregnancy.   FOB is involved, very supportive but not present for this visit.      OTHER CONCERNS: In school now - taking 3 classes.  Has evening classes M, R and the other is online. Should finish degree around her due date.  Plans to go right into masters after taking the semester off initially postpartum.   - Has never had a pap smear and is willing to have done this pregnancy but desires to wait until postpartum exam.   - Is interested in first trimester screening.   - Has decided to continue coming to [facility] instead of [facility] since everything is in the same building with [facility].     ===========================================  ROS  PSYCHIATRIC:  Denies mood changes, difficulty concentrating, depression, anxiety, panic attacks or post partum depression  PHQ9: Last PHQ-9 score on record= 1  Social History   Substance Use Topics     Smoking status: Never Smoker     Smokeless tobacco: Never Used     Alcohol use No     History   Drug Use No     History   Smoking Status     Never Smoker   Smokeless Tobacco     Never Used       Alcohol use No     No family history on file.  ============================================  MEDICAL  HISTORY   No Known Allergies        Current Outpatient Prescriptions:      PROGESTERONE MICRONIZED PO, Place 200 mg vaginally, Disp: , Rfl:      sennosides (SENOKOT) 8.6 MG tablet, Take 1 tablet by mouth 2 times daily as needed for constipation, Disp: 60 tablet, Rfl: 1     Prenatal Vit-Fe Fumarate-FA (PRENATAL VITAMIN AND MINERAL) 28-0.8 MG TABS, Take 1 tablet by mouth daily, Disp: 90 tablet, Rfl: 3     acetaminophen (TYLENOL) 325 MG tablet, Take 2 tablets (650 mg) by mouth every 4 hours as needed for other (mild pain) (Patient not taking: Reported on 2018), Disp: 20 tablet, Rfl: 0     ibuprofen (ADVIL/MOTRIN) 600 MG tablet, Take 1 tablet (600 mg) by mouth every 6 hours as needed for pain (mild) (Patient not taking: Reported on 2018), Disp: 30 tablet, Rfl: 0  No current facility-administered medications for this visit.     Facility-Administered Medications Ordered in Other Visits:      lidocaine-EPINEPHrine 1.5 %-1:652567 injection, , EPIDURAL, PRN, Agustina Salomon MD, 2 mL at 12/03/15 0234     bupivacaine (MARCAINE) 0.125 % injection (diluted from stock concentration by MD or CRNA), , EPIDURAL, PRAime FERRER Chen Thay, MD, 5 mL at 12/03/15 0238    Past Medical History:   Diagnosis Date     Recurrent pregnancy loss (CODE)        Past Surgical History:   Procedure Laterality Date     APPENDECTOMY Right      DILATION AND CURETTAGE SUCTION N/A 2017    Procedure: DILATION AND CURETTAGE SUCTION;  Suction Dilation And Curettage ;  Surgeon: Yolanda Samaniego MD;  [facility]     DILATION AND CURETTAGE, HYSTEROSCOPY DIAGNOSTIC, COMBINED N/A 2017    Procedure: COMBINED DILATION AND CURETTAGE, HYSTEROSCOPY DIAGNOSTIC;  Operative Hysteroscopy, Dilation and Curettage ;  Surgeon: Eli Pickard MD;  Location: [facility] OR     GYN SURGERY  2017    suction D&C            Obstetric History       T1      L1     SAB5   TAB0   Ectopic0   Multiple0   Live Births1       # Outcome Date GA  "Lbr Michael/2nd Weight Sex Delivery Anes PTL Lv   7 Current            6 Term 12/03/15 38w5d 03:45 / 01:16 3.11 kg (6 lb 13.7 oz) M Vag-Spont EPI  RAMILA      Apgar1:  9                Apgar5: 9   5 SAB            4 SAB            3 SAB            2 SAB            1 SAB                   GYN History- No Abnormal Pap Smears - has never had one.  Plans postpartm                         Cervical procedures: none                        History of STI: none    I personally reviewed the past social/family/medical and surgical history on the date of service.   I reviewed lab work done at Intake visit with patient.    OBJECTIVE:   PHYSICAL EXAM:  /75  Pulse 85  Ht 1.6 m (5' 2.99\")  Wt 63.5 kg (140 lb 1.6 oz)  LMP 11/20/2017  BMI 24.82 kg/m2  BMI- Body mass index is 24.82 kg/(m^2)., GENERAL:  Pleasant pregnant female, alert, cooperative and well groomed.  SKIN:  Warm and dry, without lesions or rashes  HEAD: Symmetrical features.  MOUTH:  Buccal mucosa pink, moist without lesions.  Teeth in good repair.    NECK:  Thyroid without enlargement and nodules.  Lymph nodes not palpable.   LUNGS:  Clear to auscultation.  BREAST:   Declined   HEART:  RRR without murmur.  ABDOMEN: Soft without masses , tenderness or organomegaly.  No CVA tenderness.  Uterus palpable at size equal to dates.  Well healed scar from appendectomy. Fetal heart tones present.  MUSCULOSKELETAL:  Full range of motion  EXTREMITIES:  No edema. No significant varicosities.   PELVIC EXAM: Pt declined.  No worrisome s/s. Plans pap postpartum. Proven pelvis to ~7lbs.  +FHTs  GC/CHLAMYDIA CULTURE OBTAINED:YES    ASSESSMENT:  Intrauterine pregnancy 10w1d size is consistent with dates  Genetic Screening: First Trimester Screen  Encounter Diagnoses   Name Primary?     Supervision of high risk pregnancy in first trimester Yes     High-risk pregnancy in first trimester         PLAN:  (O09.91) Supervision of high risk pregnancy in first trimester  (primary encounter " diagnosis)  Plan: Neisseria gonorrhoeae PCR, Chlamydia PCR         (Clinic Collect)          - Reviewed use of triage nurse line and contacting the on-call provider after hours for an urgent need such as fever, vagina bleeding, bladder or vaginal infection, rupture of membranes,  or term labor.    - Reviewed best evidence for: weight gain for her weight and height for pregnancy:  RECOMMENDED WEIGHT GAIN: 25-35 lbs.   healthy diet and foods to avoid; exercise and activity during pregnancy;avoiding exposure to toxoplasmosis; and maintenance of a generally healthy lifestyle.   - Discussed the harms, benefits, side effects and alternative therapies for current prescribed and OTC medications.  - First trimester screen was discussed and ordered.    - Plan pap postpartum.   - All pt's questions discussed and answered.  Pt verbalized understanding of and agreement to plan of care.     - Continue scheduled prenatal care and prn if questions or concerns      Again, thank you for allowing me to participate in the care of your patient.      Sincerely,    ASHIA Disla CNM

## 2018-01-30 NOTE — PROGRESS NOTES
SUBJECTIVE:    27 year old, female, , 10w1d,  who presents to the clinic today for a new ob visit.    Feels well. Has  started PNV but stopped recently d/t some nausea.  Estimated Date of Delivery: Aug 27, 2018   Aug 27, 2018 is calculated from Patient's last menstrual period was 2017..      She has not had bleeding since her LMP.   Having some discharge from progesterone but no malodor, no itching or pain.   She has had mild nausea. Weight loss has not occurred.   This was a planned pregnancy.   FOB is involved, very supportive but not present for this visit.      OTHER CONCERNS: In school now - taking 3 classes.  Has evening classes M, R and the other is online. Should finish degree around her due date.  Plans to go right into masters after taking the semester off initially postpartum.   - Has never had a pap smear and is willing to have done this pregnancy but desires to wait until postpartum exam.   - Is interested in first trimester screening.   - Has decided to continue coming to [facility] instead of [facility] since everything is in the same building with [facility].     ===========================================  ROS  PSYCHIATRIC:  Denies mood changes, difficulty concentrating, depression, anxiety, panic attacks or post partum depression  PHQ9: Last PHQ-9 score on record= 1  Social History   Substance Use Topics     Smoking status: Never Smoker     Smokeless tobacco: Never Used     Alcohol use No     History   Drug Use No     History   Smoking Status     Never Smoker   Smokeless Tobacco     Never Used       Alcohol use No     No family history on file.  ============================================  MEDICAL HISTORY   No Known Allergies        Current Outpatient Prescriptions:      PROGESTERONE MICRONIZED PO, Place 200 mg vaginally, Disp: , Rfl:      sennosides (SENOKOT) 8.6 MG tablet, Take 1 tablet by mouth 2 times daily as needed for constipation, Disp: 60 tablet, Rfl: 1     Prenatal Vit-Fe Fumarate-FA  (PRENATAL VITAMIN AND MINERAL) 28-0.8 MG TABS, Take 1 tablet by mouth daily, Disp: 90 tablet, Rfl: 3     acetaminophen (TYLENOL) 325 MG tablet, Take 2 tablets (650 mg) by mouth every 4 hours as needed for other (mild pain) (Patient not taking: Reported on 2018), Disp: 20 tablet, Rfl: 0     ibuprofen (ADVIL/MOTRIN) 600 MG tablet, Take 1 tablet (600 mg) by mouth every 6 hours as needed for pain (mild) (Patient not taking: Reported on 2018), Disp: 30 tablet, Rfl: 0  No current facility-administered medications for this visit.     Facility-Administered Medications Ordered in Other Visits:      lidocaine-EPINEPHrine 1.5 %-1:987300 injection, , EPIDURAL, PRN, Agustina Salomon MD, 2 mL at 12/03/15 0234     bupivacaine (MARCAINE) 0.125 % injection (diluted from stock concentration by MD or CRNA), , EPIDURAL, PRN, Agustina Salomon MD, 5 mL at 12/03/15 0238    Past Medical History:   Diagnosis Date     Recurrent pregnancy loss (CODE)        Past Surgical History:   Procedure Laterality Date     APPENDECTOMY Right      DILATION AND CURETTAGE SUCTION N/A 2017    Procedure: DILATION AND CURETTAGE SUCTION;  Suction Dilation And Curettage ;  Surgeon: Yolanda Samaniego MD;  [facility]     DILATION AND CURETTAGE, HYSTEROSCOPY DIAGNOSTIC, COMBINED N/A 2017    Procedure: COMBINED DILATION AND CURETTAGE, HYSTEROSCOPY DIAGNOSTIC;  Operative Hysteroscopy, Dilation and Curettage ;  Surgeon: Eli Pickard MD;  [facility]     GYN SURGERY  2017    suction D&C            Obstetric History       T1      L1     SAB5   TAB0   Ectopic0   Multiple0   Live Births1       # Outcome Date GA Lbr Michael/2nd Weight Sex Delivery Anes PTL Lv   7 Current            6 Term 12/03/15 38w5d 03:45 / 01:16 3.11 kg (6 lb 13.7 oz) M Vag-Spont EPI  RAMILA      Apgar1:  9                Apgar5: 9   5 SAB            4 SAB            3 SAB            2 SAB            1 SAB                   GYN History- No  "Abnormal Pap Smears - has never had one.  Plans postpartm                         Cervical procedures: none                        History of STI: none    I personally reviewed the past social/family/medical and surgical history on the date of service.   I reviewed lab work done at Intake visit with patient.    OBJECTIVE:   PHYSICAL EXAM:  /75  Pulse 85  Ht 1.6 m (5' 2.99\")  Wt 63.5 kg (140 lb 1.6 oz)  LMP 2017  BMI 24.82 kg/m2  BMI- Body mass index is 24.82 kg/(m^2)., GENERAL:  Pleasant pregnant female, alert, cooperative and well groomed.  SKIN:  Warm and dry, without lesions or rashes  HEAD: Symmetrical features.  MOUTH:  Buccal mucosa pink, moist without lesions.  Teeth in good repair.    NECK:  Thyroid without enlargement and nodules.  Lymph nodes not palpable.   LUNGS:  Clear to auscultation.  BREAST:   Declined   HEART:  RRR without murmur.  ABDOMEN: Soft without masses , tenderness or organomegaly.  No CVA tenderness.  Uterus palpable at size equal to dates.  Well healed scar from appendectomy. Fetal heart tones present.  MUSCULOSKELETAL:  Full range of motion  EXTREMITIES:  No edema. No significant varicosities.   PELVIC EXAM: Pt declined.  No worrisome s/s. Plans pap postpartum. Proven pelvis to ~7lbs.  +FHTs  GC/CHLAMYDIA CULTURE OBTAINED:YES    ASSESSMENT:  Intrauterine pregnancy 10w1d size is consistent with dates  Genetic Screening: First Trimester Screen  Encounter Diagnoses   Name Primary?     Supervision of high risk pregnancy in first trimester Yes     High-risk pregnancy in first trimester         PLAN:  (O09.91) Supervision of high risk pregnancy in first trimester  (primary encounter diagnosis)  Plan: Neisseria gonorrhoeae PCR, Chlamydia PCR         (Clinic Collect)          - Reviewed use of triage nurse line and contacting the on-call provider after hours for an urgent need such as fever, vagina bleeding, bladder or vaginal infection, rupture of membranes,  or term " labor.    - Reviewed best evidence for: weight gain for her weight and height for pregnancy:  RECOMMENDED WEIGHT GAIN: 25-35 lbs.   healthy diet and foods to avoid; exercise and activity during pregnancy;avoiding exposure to toxoplasmosis; and maintenance of a generally healthy lifestyle.   - Discussed the harms, benefits, side effects and alternative therapies for current prescribed and OTC medications.  - First trimester screen was discussed and ordered.    - Plan pap postpartum.   - All pt's questions discussed and answered.  Pt verbalized understanding of and agreement to plan of care.     - Continue scheduled prenatal care and prn if questions or concerns    ASHIA Disla CNM

## 2018-01-30 NOTE — PATIENT INSTRUCTIONS
"  Thank you for choosing [facility] for your [facility]etrical care.  We are an integrated health clinic with obstetricians, midwives, a psychologist, an acupuncturist, a nutritionist, a pharmacist, internal medicine and family practice all in one.  If you have questions about services offered please ask.      o Please keep all appointments with your provider.  You will help catch, prevent and treat problems early.  o Review your Expectant Family booklet and folder given to you at this intake visit.  It can answer many basic questions including:    Treatment for nausea and vomiting    Medications that are safe in pregnancy    Healthy diet and weight gain    Exercise and activity  o ASK questions!!  Please use \"Questions for my New OB visit\" form to write down any questions or concerns for your next visit.  o Eat a healthy diet.  Visit www.choosemyplate.gov and click on  Pregnancy and Breastfeeding  for information and tips  o Do not smoke.  Avoid other people's smoke, too.  We are happy to help with referrals to stop smoking programs.  o Do not drink alcohol.  o Try to avoid people who have colds or other infections.  Practice good hand washing.  o Consider registering for our Healthy Pregnancy Class here at [facility].  This class is offered every 3rd Wednesday from 2:30-4:30 p.m.  [place] at [phone] or online at [email or web address] or [email or web address]  o Consider registering for prenatal education classes through [place] at [facility].  You can view class schedules and register online at [email or web address]) or call [phone] for questions     For urgent concerns, call [facility] at [phone] to speak with a triage nurse during regular clinic hours (8:00 am - 4:30 pm).  This line is answered by our service 24 hours a day, after hours a provider will return your call.  "

## 2018-01-31 LAB
C TRACH DNA SPEC QL NAA+PROBE: NEGATIVE
N GONORRHOEA DNA SPEC QL NAA+PROBE: NEGATIVE
SPECIMEN SOURCE: NORMAL
SPECIMEN SOURCE: NORMAL

## 2018-01-31 ASSESSMENT — ANXIETY QUESTIONNAIRES: GAD7 TOTAL SCORE: 0

## 2018-01-31 ASSESSMENT — PATIENT HEALTH QUESTIONNAIRE - PHQ9: SUM OF ALL RESPONSES TO PHQ QUESTIONS 1-9: 1

## 2018-02-20 ENCOUNTER — PRE VISIT (OUTPATIENT)
Dept: MATERNAL FETAL MEDICINE | Facility: CLINIC | Age: 28
End: 2018-02-20

## 2018-02-21 ENCOUNTER — HOSPITAL ENCOUNTER (OUTPATIENT)
Dept: ULTRASOUND IMAGING | Facility: CLINIC | Age: 28
Discharge: HOME | End: 2018-02-21
Attending: ADVANCED PRACTICE MIDWIFE | Admitting: OBSTETRICS & GYNECOLOGY
Payer: COMMERCIAL

## 2018-02-21 ENCOUNTER — OFFICE VISIT (OUTPATIENT)
Dept: MATERNAL FETAL MEDICINE | Facility: CLINIC | Age: 28
End: 2018-02-21
Attending: ADVANCED PRACTICE MIDWIFE
Payer: COMMERCIAL

## 2018-02-21 DIAGNOSIS — Z36.9 ENCOUNTER FOR ANTENATAL SCREENING OF MOTHER: ICD-10-CM

## 2018-02-21 DIAGNOSIS — O26.20 RECURRENT PREGNANCY LOSS WITH CURRENT PREGNANCY: Primary | ICD-10-CM

## 2018-02-21 DIAGNOSIS — Z36.82 NUCHAL TRANSLUCENCY OF FETUS ON PRENATAL ULTRASOUND: Primary | ICD-10-CM

## 2018-02-21 DIAGNOSIS — O26.90 PREGNANCY RELATED CONDITION, ANTEPARTUM: ICD-10-CM

## 2018-02-21 DIAGNOSIS — O09.90 HIGH RISK PREGNANCY, ANTEPARTUM: ICD-10-CM

## 2018-02-21 PROCEDURE — 76813 OB US NUCHAL MEAS 1 GEST: CPT

## 2018-02-21 PROCEDURE — 96040 ZZH GENETIC COUNSELING, EACH 30 MINUTES: CPT | Mod: ZF | Performed by: GENETIC COUNSELOR, MS

## 2018-02-21 NOTE — PROGRESS NOTES
[facility]  Genetic Counseling Consult    Patient: Pam Syed YOB: 1990   Date of Service: 18      Pam Syed was seen at [facility] for genetic consultation to discuss the options for screening and testing for fetal chromosome abnormalities.  The indication for genetic counseling is routine screening for aneuploidy. Pam was accompanied to her appointment today by her  Nislon and their young son.         Impression/Plan:   1.  Pam had an NT ultrasound today, the results of which were normal.  Pam declines serum screening at this time, but knows that there are testing options available throughout pregnancy.  We reviewed the cutoff for first trimester screen (13 weeks 6 days), that there is a similar screen available in the second trimester (Quad) and that NIPT is available at any point in time during pregnancy if Pam decides that screening would be beneficial for her.      2.  Maternal serum AFP (single marker screen) is recommended after 15 weeks to screen for open neural tube defects. This can be done as part of a quad screen if no previous aneuploidy screening has been completed or as a standalone test.    3.  Pam will return to [facility] for a comprehensive ultrasound at 18-20 weeks to screen for birth defects and markers of chromosome abnormalities.      Pregnancy History:   /Parity:    Age at Delivery: 28 year old  BEBETO: 2018, by Last Menstrual Period  Gestational Age: 13w2d    No significant complications or exposures were reported in the current pregnancy.    Pam s pregnancy history is significant for a reported 3 successive first trimester miscarriages with no known cause followed by 1 full term pregnancy with no reported complications and 1 additional first trimester miscarriage.  Her last miscarriage had a normal, 46, XX chromosome complement on POC testing.    Recurrent  pregnancy loss:     Pam reports having a full workup for her history of miscarriages with no specific cause identified.  We discussed chromosome analysis as a possible test today to assess both her and Nilson for possible chromosome rearrangements as an explanation for the couple's history of pregnancy loss.  Pam reports that she may have had this testing done previously, but she does not remember where.  These records were not available for our review today.  The couple had previously discussed additional testing to evaluate for potential causes of miscarriage and decided as a couple that they would prefer to defer any genetic testing of the parents until after the pregnancy, as they would prefer to focus on Pam's ongoing pregnancy instead.  Pam reports that she would not consider an amniocentesis because of the risks associated, and she would not alter the course of her pregnancy based on any genetic conditions.         Family History:   A three-generation pedigree was obtained, and is scanned under the  Media  tab.   The following significant findings were reported by Pam:    Son, 2 years old, healthy and developmentally on track    4 full siblings, all healthy, all with healthy children    4 maternal half siblings, all healthy, all with healthy children    Father  at 70 from pancreatic cancer, no other history of cancer in the family    FOB: 35, healthy with two healthy daughters from a previous relationship    FOB: 7 full sisters, all healthy, all with healthy children    FOB: 10 paternal half siblings, all healthy    FOB: Parents alive and well    The reported family history is negative for multiple miscarriages, stillbirths, birth defects, cognitive impairment, known genetic conditions, and consanguinity.       Carrier Screening:   The patient reports that she and the father of the pregnancy have  ancestry:      The hemoglobinopathies are a group of genetic blood diseases that occur with  increased frequency in individuals of  ancestry and carrier screening for these conditions is available.  Carrier screening for the hemoglobinopathies includes a CBC with red blood cell indices, a ferritin level, and a quantitative hemoglobin electrophoresis or HPLC.  In addition,  screening in the [facility] includes many of the hemoglobinopathies.      Expanded carrier screening for mutations in a large panel of genes associated with autosomal recessive conditions including cystic fibrosis, spinal muscular atrophy, and others, is now available.      The patient has had previous carrier screening for hemoglobinopathies, the results of which were normal.  A copy of the report was available for our review today.       Risk Assessment for Chromosome Conditions:   We explained that the risk for fetal chromosome abnormalities increases with maternal age. We discussed specific features of common chromosome abnormalities, including Down syndrome, trisomy 13, trisomy 18, and sex chromosome trisomies.      - At age 28 at midtrimester, the risk to have a baby with Down syndrome is 1 in 690.     - At age 28 at midtrimester, the risk to have a baby with any chromosome abnormality is 1 in 345.     - At age 28 at delivery, the risk to have a baby with Down syndrome is 1 in 952.    - At age 28 at delivery, the risk to have a baby with any chromosome abnormality is 1 in 384.          Testing Options:   We discussed the following options:   First trimester screening    First trimester ultrasound with nuchal translucency and nasal bone assessments, maternal plasma hCG, LA-A, and AFP measurement    Screens for fetal trisomy 21, trisomy 13, and trisomy 18    Cannot screen for open neural tube defects; maternal serum AFP after 15 weeks is recommended       Non-invasive Prenatal Testing (NIPT)    Maternal plasma cell-free DNA testing; first trimester ultrasound with nuchal translucency and nasal bone assessment  is recommended, when appropriate    Screens for fetal trisomy 21, trisomy 13, trisomy 18, and sex chromosome aneuploidy    Cannot screen for open neural tube defects; maternal serum AFP after 15 weeks is recommended       Quad screen:     Maternal plasma AFP, hCG, estriol, and inhibin measurement between 15-24 weeks gestation    Provides risk assessment for fetal Down syndrome, trisomy 18, and neural tube defects     Genetic Amniocentesis    Invasive procedure typically performed in the second trimester by which amniotic fluid is obtained for the purpose of chromosome analysis and/or other prenatal genetic analysis    Diagnostic results; >99% sensitivity for fetal chromosome abnormalities    AFAFP measurement tests for open neural tube defects       Comprehensive (Level II) ultrasound:     Detailed ultrasound performed between 18-22 weeks gestation    Screen for major birth defects and markers for aneuploidy    We reviewed the benefits and limitations of this testing.  Screening tests provide a risk assessment specific to the pregnancy for certain fetal chromosome abnormalities, but cannot definitively diagnose or exclude a fetal chromosome abnormality.  Follow-up genetic counseling and consideration of diagnostic testing is recommended with any abnormal screening result. Diagnostic tests carry inherent risks- including risk of miscarriage- that require careful consideration.  These tests can detect fetal chromosome abnormalities with greater than 99% certainty.  Results can be compromised by maternal cell contamination or mosaicism, and are limited by the resolution of cytogenetic G-banding technology.  There is no screening nor diagnostic test that can detect all forms of birth defects or mental disability.     It was a pleasure to be involved with [facility]. Face-to-face time of the meeting was 35 minutes.      Mario Alberto Anderson MS, [facility]  Licensed Genetic Counselor  Phone: [phone]  Pager:  [phone]

## 2018-02-21 NOTE — MR AVS SNAPSHOT
After Visit Summary   2/21/2018    Pam Syed    MRN: [id number]           Patient Information     Date Of Birth          1990        Visit Information        Provider Department      2/21/2018 3:30 PM Arnold Shields MD [facility] Maternal Fetal Medicine [facility]        Today's Diagnoses     Nuchal translucency of fetus on prenatal ultrasound    -  1       Follow-ups after your visit        Your next 10 appointments already scheduled     Feb 27, 2018  4:00 PM CST   RETURN OB with ASHIA Abraham CNM   [facility] ([facility])    [facility] [address]  [address]  [address]  [facility] [address]   [phone]            Mar 30, 2018  2:15 PM CDT   (Arrive by 2:00 PM)   JOSE US COMP with [id number]   [facility] Maternal Fetal Medicine Ultrasound - [place] ([facility])    [address]  [facility] [id number] [phone]           Wear comfortable clothes and leave your valuables at home.            Mar 30, 2018  2:45 PM CDT   Radiology MD with UR JOSE GONZALEZ   [facility] Maternal Fetal Medicine - [place] ([facility])    [address]  [facility] [address]   [phone]           Please arrive at the time given for your first appointment. This visit is used internally to schedule the physician's time during your ultrasound.              Who to contact     If you have questions or need follow up information about today's clinic visit or your schedule please contact [facility] MATERNAL FETAL MEDICINE [facility] directly at [phone].  Normal or non-critical lab and imaging results will be communicated to you by MyChart, letter or phone within 4 business days after the clinic has received the results. If you do not hear from us within 7 days, please contact the clinic through MyChart or phone. If you have a critical or abnormal lab  "result, we will notify you by phone as soon as possible.  Submit refill requests through Cycle or call your pharmacy and they will forward the refill request to us. Please allow 3 business days for your refill to be completed.          Additional Information About Your Visit        HeyLetshart Information     Cycle lets you send messages to your doctor, view your test results, renew your prescriptions, schedule appointments and more. To sign up, go to [email or web address] . Click on \"Log in\" on the left side of the screen, which will take you to the Welcome page. Then click on \"Sign up Now\" on the right side of the page.     You will be asked to enter the access code listed below, as well as some personal information. Please follow the directions to create your username and password.     Your access code is: [email or web address]  Expires: 2018  6:30 AM     Your access code will  in 90 days. If you need help or a new code, please call your [place] clinic or [phone].        Care EveryWhere ID     This is your Care EveryWhere ID. This could be used by other organizations to access your [place] medical records  [id number]        Your Vitals Were     Last Period                   2017            Blood Pressure from Last 3 Encounters:   18 107/75   17 118/77   17 121/76    Weight from Last 3 Encounters:   18 63.5 kg (140 lb 1.6 oz)   18 62.6 kg (138 lb)   17 62.3 kg (137 lb 4.8 oz)              Today, you had the following     No orders found for display       Primary Care Provider Office Phone # Fax #    Clinic [facility] [phone]        [facility] [address]        Equal Access to Services     JAZMIN HARO : Evelia Harrison, wakerri madrid, qaybta kaalmacheryl bearden, ada crouch. So [facility] [phone].    ATENCIÓN: Si habla español, tiene a kaufman disposición servicios gratuitos de asistencia " lingüística. Belén al [phone].    We comply with applicable federal civil rights laws and [place] laws. We do not discriminate on the basis of race, color, national origin, age, disability, sex, sexual orientation, or gender identity.            Thank you!     Thank you for choosing [facility]  for your care. Our goal is always to provide you with excellent care. Hearing back from our patients is one way we can continue to improve our services. Please take a few minutes to complete the written survey that you may receive in the mail after your visit with us. Thank you!             Your Updated Medication List - Protect others around you: Learn how to safely use, store and throw away your medicines at www.disposemymeds.org.          This list is accurate as of 2/21/18  4:45 PM.  Always use your most recent med list.                   Brand Name Dispense Instructions for use Diagnosis    acetaminophen 325 MG tablet    TYLENOL    20 tablet    Take 2 tablets (650 mg) by mouth every 4 hours as needed for other (mild pain)    Post-operative pain       ibuprofen 600 MG tablet    ADVIL/MOTRIN    30 tablet    Take 1 tablet (600 mg) by mouth every 6 hours as needed for pain (mild)    Post-operative pain       PRENATAL VITAMIN AND MINERAL 28-0.8 MG Tabs     90 tablet    Take 1 tablet by mouth daily    Early stage of pregnancy       PROGESTERONE MICRONIZED PO      Place 200 mg vaginally        sennosides 8.6 MG tablet    SENOKOT    60 tablet    Take 1 tablet by mouth 2 times daily as needed for constipation    High-risk pregnancy in first trimester

## 2018-02-21 NOTE — MR AVS SNAPSHOT
After Visit Summary   2018    Pam Syed    MRN: [id number]           Patient Information     Date Of Birth          1990        Visit Information        Provider Department      2018 2:15 PM Mario Alberto Anderson GC [facility] Maternal Fetal Medicine [facility]        Today's Diagnoses     Recurrent pregnancy loss with current pregnancy    -  1    Pregnancy related condition, antepartum        High risk pregnancy, antepartum        Encounter for  screening of mother           Follow-ups after your visit        Your next 10 appointments already scheduled     2018  4:00 PM CST   RETURN OB with ASHIA Abraham CNM   [facility] ([facility])    [facility] [address]  [address]  [address]  [facility] [address] [phone]            Mar 30, 2018  2:15 PM CDT   (Arrive by 2:00 PM)   JOSE US COMP with URMFMUSR1   [facility] Maternal Fetal Medicine Ultrasound - [facility])    [address]  [facility] [address] [phone]           Wear comfortable clothes and leave your valuables at home.            Mar 30, 2018  2:45 PM CDT   Radiology MD with UR JOSE GONZALEZ   [facility] Maternal Fetal Medicine - [facility])    [address]  [facility] [address] [phone]           Please arrive at the time given for your first appointment. This visit is used internally to schedule the physician's time during your ultrasound.              Who to contact     If you have questions or need follow up information about today's clinic visit or your schedule please contact [facility] MATERNAL FETAL MEDICINE [facility] directly at [phone].  Normal or non-critical lab and imaging results will be communicated to you by MyChart, letter or phone within 4 business days after the clinic has received the results. If  "you do not hear from us within 7 days, please contact the clinic through Transcepta or phone. If you have a critical or abnormal lab result, we will notify you by phone as soon as possible.  Submit refill requests through Transcepta or call your pharmacy and they will forward the refill request to us. Please allow 3 business days for your refill to be completed.          Additional Information About Your Visit        [employer] Information     Transcepta lets you send messages to your doctor, view your test results, renew your prescriptions, schedule appointments and more. To sign up, go to [email or web address] . Click on \"Log in\" on the left side of the screen, which will take you to the Welcome page. Then click on \"Sign up Now\" on the right side of the page.     You will be asked to enter the access code listed below, as well as some personal information. Please follow the directions to create your username and password.     Your access code is: [email or web address]  Expires: 2018  6:30 AM     Your access code will  in 90 days. If you need help or a new code, please call your [place] clinic or [phone].        Care EveryWhere ID     This is your Care EveryWhere ID. This could be used by other organizations to access your [place] medical records  [id number]        Your Vitals Were     Last Period                   2017            Blood Pressure from Last 3 Encounters:   18 107/75   17 118/77   17 121/76    Weight from Last 3 Encounters:   18 63.5 kg (140 lb 1.6 oz)   18 62.6 kg (138 lb)   17 62.3 kg (137 lb 4.8 oz)              We Performed the Following     [facility] Genetic Counseling        Primary Care Provider Office Phone # Fax #    Clinic [facility] [phone]        [facility] [address]        Equal Access to Services     JAZMIN HARO AH: Evelia Harrison, waramseyda luqadaha, qaybta anivalalayla bearden, ada ma " lisa spangler ah. So [facility] [phone].    ATENCIÓN: Si katheryn knutson, tiene a kaufman disposición servicios gratuitos de asistencia lingüística. Belén gonzalez [phone].    We comply with applicable federal civil rights laws and [place] laws. We do not discriminate on the basis of race, color, national origin, age, disability, sex, sexual orientation, or gender identity.            Thank you!     Thank you for choosing [facility]  for your care. Our goal is always to provide you with excellent care. Hearing back from our patients is one way we can continue to improve our services. Please take a few minutes to complete the written survey that you may receive in the mail after your visit with us. Thank you!             Your Updated Medication List - Protect others around you: Learn how to safely use, store and throw away your medicines at www.disposemymeds.org.          This list is accurate as of 2/21/18  4:06 PM.  Always use your most recent med list.                   Brand Name Dispense Instructions for use Diagnosis    acetaminophen 325 MG tablet    TYLENOL    20 tablet    Take 2 tablets (650 mg) by mouth every 4 hours as needed for other (mild pain)    Post-operative pain       ibuprofen 600 MG tablet    ADVIL/MOTRIN    30 tablet    Take 1 tablet (600 mg) by mouth every 6 hours as needed for pain (mild)    Post-operative pain       PRENATAL VITAMIN AND MINERAL 28-0.8 MG Tabs     90 tablet    Take 1 tablet by mouth daily    Early stage of pregnancy       PROGESTERONE MICRONIZED PO      Place 200 mg vaginally        sennosides 8.6 MG tablet    SENOKOT    60 tablet    Take 1 tablet by mouth 2 times daily as needed for constipation    High-risk pregnancy in first trimester

## 2018-02-21 NOTE — PROGRESS NOTES
Please see full imaging report from ViewPoint program under imaging tab.    Normal NT. Declines serum component of first trimester screen.     Arnold Shields MD  Maternal Fetal Medicine

## 2018-02-27 ENCOUNTER — OFFICE VISIT (OUTPATIENT)
Dept: OBGYN | Facility: CLINIC | Age: 28
End: 2018-02-27
Attending: ADVANCED PRACTICE MIDWIFE
Payer: COMMERCIAL

## 2018-02-27 VITALS
SYSTOLIC BLOOD PRESSURE: 114 MMHG | WEIGHT: 142.5 LBS | HEIGHT: 62 IN | BODY MASS INDEX: 26.22 KG/M2 | HEART RATE: 99 BPM | DIASTOLIC BLOOD PRESSURE: 75 MMHG

## 2018-02-27 DIAGNOSIS — O09.892 SUPERVISION OF OTHER HIGH RISK PREGNANCIES, SECOND TRIMESTER: Primary | ICD-10-CM

## 2018-02-27 PROCEDURE — G0463 HOSPITAL OUTPT CLINIC VISIT: HCPCS | Mod: ZF

## 2018-02-27 NOTE — PROGRESS NOTES
"Subjective:      28 year old  at 14w1d presents for a routine prenatal appointment.       No vaginal bleeding or leakage of fluid.  No contractions or cramping.    Starting to feel fetal movement.       No HA, visual changes, RUQ or epigastric pain.   The patient presents with the following concerns: Has been taking progesterone, may now stop. Had nuchal translucency screen yesterday, wnl. Level 2 arranged. Nausea improving.   Offer MSAFP next visit.      Objective:  Vitals:    18 1625   BP: 114/75   BP Location: Left arm   Patient Position: Chair   Pulse: 99   Weight: 64.6 kg (142 lb 8 oz)   Height: 1.575 m (5' 2\")      See OB flowsheet    Assessment/Plan     Encounter Diagnosis   Name Primary?     Supervision of other high risk pregnancies, second trimester Yes          Patient education/orders or handouts today:  Stop progesterone   AFP only  - Reviewed total weight gain, encouraged continued healthy diet and exercise.      - Reviewed why/how to contact provider.   - Desires follow up in 2 weeks due to history of recurrent loss, would like to hear FHTs for reassurance.    Return to clinic in 2 weeks and prn if questions or concerns.   ASHIA Abraham CNM              "

## 2018-02-27 NOTE — LETTER
"2018       RE: Pam Syed  [address]   [place] [address]     Dear Colleague,    Thank you for referring your patient, Pam Syed, to the [facility] at [facility]. Please see a copy of my visit note below.    Subjective:      28 year old  at 14w1d presents for a routine prenatal appointment.       No vaginal bleeding or leakage of fluid.  No contractions or cramping.    Starting to feel fetal movement.       No HA, visual changes, RUQ or epigastric pain.   The patient presents with the following concerns: Has been taking progesterone, may now stop. Had nuchal translucency screen yesterday, wnl. Level 2 arranged. Nausea improving.   Offer MSAFP next visit.      Objective:  Vitals:    18 1625   BP: 114/75   BP Location: Left arm   Patient Position: Chair   Pulse: 99   Weight: 64.6 kg (142 lb 8 oz)   Height: 1.575 m (5' 2\")      See OB flowsheet    Assessment/Plan     Encounter Diagnosis   Name Primary?     Supervision of other high risk pregnancies, second trimester Yes          Patient education/orders or handouts today:  Stop progesterone   AFP only  - Reviewed total weight gain, encouraged continued healthy diet and exercise.      - Reviewed why/how to contact provider.   - Desires follow up in 2 weeks due to history of recurrent loss, would like to hear FHTs for reassurance.    Return to clinic in 2 weeks and prn if questions or concerns.   ASHIA Abraham CNM      "

## 2018-02-27 NOTE — MR AVS SNAPSHOT
After Visit Summary   2/27/2018    Pam Syed    MRN: [id number]           Patient Information     Date Of Birth          1990        Visit Information        Provider Department      2/27/2018 4:00 PM Cortney Paul APRN CNM [facility]        Today's Diagnoses     Supervision of other high risk pregnancies, second trimester    -  1       Follow-ups after your visit        Follow-up notes from your care team     Return in about 2 weeks (around 3/13/2018).      Your next 10 appointments already scheduled     Mar 13, 2018  3:15 PM CDT   RETURN OB with ASHIA Ruggiero CNM   [facility] ([facility])    [facility] [address]  [address]  [address]  [facility] [address] [phone]            Mar 30, 2018  2:15 PM CDT   (Arrive by 2:00 PM)   JOSE US COMP with [id number]   [facility]aternal [facility]etal [facility] Ultrasound - [facility])    [address]  [facility] [address] [phone]           Wear comfortable clothes and leave your valuables at home.            Mar 30, 2018  2:45 PM CDT   Radiology MD with UR JOSE GONZALEZ   [facility] - [facility])    [address]  [facility] [address] [phone]           Please arrive at the time given for your first appointment. This visit is used internally to schedule the physician's time during your ultrasound.              Who to contact     Please call your clinic at [phone] to:    Ask questions about your health    Make or cancel appointments    Discuss your medicines    Learn about your test results    Speak to your doctor            Additional Information About Your Visit        [address] [employer] Information     MindEdge is an electronic gateway that provides easy, online access to your medical records. With MindEdge, you can request  "a clinic appointment, read your test results, renew a prescription or communicate with your care team.     To sign up for Agilencet visit the website at [email or web address]   You will be asked to enter the access code listed below, as well as some personal information. Please follow the directions to create your username and password.     Your access code is: [email or web address]  Expires: 2018  6:30 AM     Your access code will  in 90 days. If you need help or a new code, please contact your [facility] or call [phone] for assistance.        Care EveryWhere ID     This is your Care EveryWhere ID. This could be used by other organizations to access your [place] medical records  [id number]        Your Vitals Were     Pulse Height Last Period BMI (Body Mass Index)          99 1.575 m (5' 2\") 2017 26.06 kg/m2         Blood Pressure from Last 3 Encounters:   18 114/75   18 107/75   17 118/77    Weight from Last 3 Encounters:   18 64.6 kg (142 lb 8 oz)   18 63.5 kg (140 lb 1.6 oz)   18 62.6 kg (138 lb)              Today, you had the following     No orders found for display       Primary Care Provider Office Phone # Fax #    Clinic [facility] [phone]        [facility] [address]        Equal Access to Services     JAZMIN HARO : Hadii courtney ku hadasho Soomaali, waaxda luqadaha, qaybta kaalmada adeegyacheryl, ada spangler . So [facility] [phone].    ATENCIÓN: Si habla eamon, tiene a kaufman disposición servicios gratuitos de asistencia lingüística. Llame al [phone].    We comply with applicable federal civil rights laws and [place] laws. We do not discriminate on the basis of race, color, national origin, age, disability, sex, sexual orientation, or gender identity.            Thank you!     Thank you for choosing [facility]  for your care. Our goal is " always to provide you with excellent care. Hearing back from our patients is one way we can continue to improve our services. Please take a few minutes to complete the written survey that you may receive in the mail after your visit with us. Thank you!             Your Updated Medication List - Protect others around you: Learn how to safely use, store and throw away your medicines at www.disposemymeds.org.          This list is accurate as of 2/27/18  5:02 PM.  Always use your most recent med list.                   Brand Name Dispense Instructions for use Diagnosis    acetaminophen 325 MG tablet    TYLENOL    20 tablet    Take 2 tablets (650 mg) by mouth every 4 hours as needed for other (mild pain)    Post-operative pain       ibuprofen 600 MG tablet    ADVIL/MOTRIN    30 tablet    Take 1 tablet (600 mg) by mouth every 6 hours as needed for pain (mild)    Post-operative pain       PRENATAL VITAMIN AND MINERAL 28-0.8 MG Tabs     90 tablet    Take 1 tablet by mouth daily    Early stage of pregnancy       PROGESTERONE MICRONIZED PO      Place 200 mg vaginally        sennosides 8.6 MG tablet    SENOKOT    60 tablet    Take 1 tablet by mouth 2 times daily as needed for constipation    High-risk pregnancy in first trimester

## 2018-03-13 ENCOUNTER — OFFICE VISIT (OUTPATIENT)
Dept: OBGYN | Facility: CLINIC | Age: 28
End: 2018-03-13
Payer: COMMERCIAL

## 2018-03-13 VITALS
BODY MASS INDEX: 26.43 KG/M2 | HEIGHT: 62 IN | HEART RATE: 123 BPM | SYSTOLIC BLOOD PRESSURE: 118 MMHG | DIASTOLIC BLOOD PRESSURE: 79 MMHG | WEIGHT: 143.6 LBS

## 2018-03-13 DIAGNOSIS — O09.90 HIGH RISK PREGNANCY, ANTEPARTUM: Primary | ICD-10-CM

## 2018-03-13 PROCEDURE — 82105 ALPHA-FETOPROTEIN SERUM: CPT | Performed by: ADVANCED PRACTICE MIDWIFE

## 2018-03-13 PROCEDURE — 36415 COLL VENOUS BLD VENIPUNCTURE: CPT | Performed by: ADVANCED PRACTICE MIDWIFE

## 2018-03-13 PROCEDURE — G0463 HOSPITAL OUTPT CLINIC VISIT: HCPCS | Mod: ZF

## 2018-03-13 PROCEDURE — 84999 UNLISTED CHEMISTRY PROCEDURE: CPT | Performed by: ADVANCED PRACTICE MIDWIFE

## 2018-03-13 NOTE — PROGRESS NOTES
"Subjective:      28 year old  at 16w1d presents for a routine prenatal appointment.       No vaginal bleeding or leakage of fluid.  No contractions or cramping.    Feeling fetal movement x 2 weeks.       No HA, visual changes, RUQ or epigastric pain.   The patient presents with the following concerns: Feeling much better but has low energy. Able to nap when her son naps. Still feeling nervous about the pregnancy given her hx of miscarriages. Tearful today when hearing heart tones. Reassured that she is now feeling fetal movement most days.   Level II US is scheduled.   Offered AFP - desires today.      Objective:  Vitals:    18 1519   BP: 118/79   Pulse: 123   Weight: 65.1 kg (143 lb 9.6 oz)   Height: 1.575 m (5' 2.01\")     See OB flowsheet    Assessment/Plan     Encounter Diagnosis   Name Primary?     High risk pregnancy, antepartum - [facility] CNM Yes     - Reviewed total weight gain, encouraged continued healthy diet and exercise.      - Reviewed why/how to contact provider.      Return to clinic in 4 weeks and prn if questions or concerns.     Corina Campbell, ASHIA CNM                "

## 2018-03-13 NOTE — MR AVS SNAPSHOT
After Visit Summary   3/13/2018    Pam Syed    MRN: [id number]           Patient Information     Date Of Birth          1990        Visit Information        Provider Department      3/13/2018 3:15 PM Corina Campbell APRN CNM [facility]        Today's Diagnoses     High risk pregnancy, antepartum - [facility] CNM    -  1       Follow-ups after your visit        Follow-up notes from your care team     Return in about 4 weeks (around 4/10/2018) for Return OB.      Your next 10 appointments already scheduled     Mar 30, 2018  2:15 PM CDT   (Arrive by 2:00 PM)   JOSE US COMP with [id number]   [facility])    [address]  [facility] [address] [phone]           Wear comfortable clothes and leave your valuables at home.            Mar 30, 2018  2:45 PM CDT   Radiology MD with UR JOSE GONZALEZ   [facility])    [address]  [facility] [id number] [phone]           Please arrive at the time given for your first appointment. This visit is used internally to schedule the physician's time during your ultrasound.              Who to contact     Please call your clinic at [phone] to:    Ask questions about your health    Make or cancel appointments    Discuss your medicines    Learn about your test results    Speak to your doctor            Additional Information About Your Visit        MyChart Information     Nuforce is an electronic gateway that provides easy, online access to your medical records. With Nuforce, you can request a clinic appointment, read your test results, renew a prescription or communicate with your care team.     To sign up for SocialMartt visit the website at [email or web address]   You will be asked to enter the access code listed below, as well as some  "personal information. Please follow the directions to create your username and password.     Your access code is: [email or web address]  Expires: 2018  7:30 AM     Your access code will  in 90 days. If you need help or a new code, please contact your [facility] or call [phone] for assistance.        Care EveryWhere ID     This is your Care EveryWhere ID. This could be used by other organizations to access your [place] medical records  [id number]        Your Vitals Were     Pulse Height Last Period BMI (Body Mass Index)          123 1.575 m (5' 2.01\") 2017 26.26 kg/m2         Blood Pressure from Last 3 Encounters:   18 118/79   18 114/75   18 107/75    Weight from Last 3 Encounters:   18 65.1 kg (143 lb 9.6 oz)   18 64.6 kg (142 lb 8 oz)   18 63.5 kg (140 lb 1.6 oz)              We Performed the Following     AFP - Alpha Fetoprotein Maternal Screen        Primary Care Provider Office Phone # Fax #    Clinic [facility] [phone]        [place] [address]        Equal Access to Services     JAZMIN HARO AH: Hadii aad ku hadasho Soomaali, waaxda luqadaha, qaybta kaalmada adeegyada, waxay idiin hayjeovanyn anil gonzalez lajack crouch. So [facility] [phone].    ATENCIÓN: Si habla español, tiene a kaufman disposición servicios gratuitos de asistencia lingüística. Llame al [phone].    We comply with applicable federal civil rights laws and [place] laws. We do not discriminate on the basis of race, color, national origin, age, disability, sex, sexual orientation, or gender identity.            Thank you!     Thank you for choosing [facility]  for your care. Our goal is always to provide you with excellent care. Hearing back from our patients is one way we can continue to improve our services. Please take a few minutes to complete the written survey that you may receive in the mail after your visit " with us. Thank you!             Your Updated Medication List - Protect others around you: Learn how to safely use, store and throw away your medicines at www.disposemymeds.org.          This list is accurate as of 3/13/18  3:38 PM.  Always use your most recent med list.                   Brand Name Dispense Instructions for use Diagnosis    acetaminophen 325 MG tablet    TYLENOL    20 tablet    Take 2 tablets (650 mg) by mouth every 4 hours as needed for other (mild pain)    Post-operative pain       ibuprofen 600 MG tablet    ADVIL/MOTRIN    30 tablet    Take 1 tablet (600 mg) by mouth every 6 hours as needed for pain (mild)    Post-operative pain       PRENATAL VITAMIN AND MINERAL 28-0.8 MG Tabs     90 tablet    Take 1 tablet by mouth daily    Early stage of pregnancy       PROGESTERONE MICRONIZED PO      Place 200 mg vaginally        sennosides 8.6 MG tablet    SENOKOT    60 tablet    Take 1 tablet by mouth 2 times daily as needed for constipation    High-risk pregnancy in first trimester

## 2018-03-30 ENCOUNTER — OFFICE VISIT (OUTPATIENT)
Dept: MATERNAL FETAL MEDICINE | Facility: CLINIC | Age: 28
End: 2018-03-30
Attending: ADVANCED PRACTICE MIDWIFE
Payer: COMMERCIAL

## 2018-03-30 ENCOUNTER — HOSPITAL ENCOUNTER (OUTPATIENT)
Dept: ULTRASOUND IMAGING | Facility: CLINIC | Age: 28
Discharge: HOME | End: 2018-03-30
Attending: ADVANCED PRACTICE MIDWIFE | Admitting: ADVANCED PRACTICE MIDWIFE
Payer: COMMERCIAL

## 2018-03-30 DIAGNOSIS — O26.90 PREGNANCY RELATED CONDITION, ANTEPARTUM: ICD-10-CM

## 2018-03-30 DIAGNOSIS — O26.22 RECURRENT PREGNANCY LOSS IN PREGNANT PATIENT IN SECOND TRIMESTER, ANTEPARTUM: Primary | ICD-10-CM

## 2018-03-30 PROCEDURE — 76805 OB US >/= 14 WKS SNGL FETUS: CPT

## 2018-03-30 NOTE — MR AVS SNAPSHOT
"              After Visit Summary   3/30/2018    Pam Syed    MRN: [id number]           Patient Information     Date Of Birth          1990        Visit Information        Provider Department      3/30/2018 2:45 PM [facility]ia Jacobs,  [facility]        Today's Diagnoses     Recurrent pregnancy loss in pregnant patient in second trimester, antepartum    -  1       Follow-ups after your visit        Your next 10 appointments already scheduled     Apr 10, 2018  3:15 PM CDT   RETURN OB with ASHIA Ruiz CNM   [facility])    [facility] [address]  [address]  [address]  [facility] [address] [phone]              Who to contact     If you have questions or need follow up information about today's clinic visit or your schedule please contact [facility] directly at [phone].  Normal or non-critical lab and imaging results will be communicated to you by Endocrine Technologyhart, letter or phone within 4 business days after the clinic has received the results. If you do not hear from us within 7 days, please contact the clinic through Endocrine Technologyhart or phone. If you have a critical or abnormal lab result, we will notify you by phone as soon as possible.  Submit refill requests through AirDroids or call your pharmacy and they will forward the refill request to us. Please allow 3 business days for your refill to be completed.          Additional Information About Your Visit        Endocrine TechnologyharNambii Information     AirDroids lets you send messages to your doctor, view your test results, renew your prescriptions, schedule appointments and more. To sign up, go to [email or web address] . Click on \"Log in\" on the left side of the screen, which will take you to the Welcome page. Then click on \"Sign up Now\" on the right side of the page.     You will be asked to enter the access code listed below, as " well as some personal information. Please follow the directions to create your username and password.     Your access code is: [email or web address]  Expires: 2018  7:30 AM     Your access code will  in 90 days. If you need help or a new code, please call your [place] clinic or [phone].        Care EveryWhere ID     This is your Care EveryWhere ID. This could be used by other organizations to access your [place] medical records  [id number]        Your Vitals Were     Last Period                   2017            Blood Pressure from Last 3 Encounters:   18 118/79   18 114/75   18 107/75    Weight from Last 3 Encounters:   18 65.1 kg (143 lb 9.6 oz)   18 64.6 kg (142 lb 8 oz)   18 63.5 kg (140 lb 1.6 oz)              Today, you had the following     No orders found for display       Primary Care Provider Office Phone # Fax #    Clinic [facility] [phone]        [facility] [address]        Equal Access to Services     JAZMIN HARO AH: Hadii courtney ku hadasho Sodenisseali, waaxda luqadaha, qaybta kaalmada ademarkos, ada spangler . So [facility] [phone].    ATENCIÓN: Si habla español, tiene a kaufman disposición servicios gratuitos de asistencia lingüística. Aroname al [phone].    We comply with applicable federal civil rights laws and [place] laws. We do not discriminate on the basis of race, color, national origin, age, disability, sex, sexual orientation, or gender identity.            Thank you!     Thank you for choosing [facility]  for your care. Our goal is always to provide you with excellent care. Hearing back from our patients is one way we can continue to improve our services. Please take a few minutes to complete the written survey that you may receive in the mail after your visit with us. Thank you!             Your Updated Medication List - Protect others around you: Learn  how to safely use, store and throw away your medicines at www.disposemymeds.org.          This list is accurate as of 3/30/18  4:17 PM.  Always use your most recent med list.                   Brand Name Dispense Instructions for use Diagnosis    acetaminophen 325 MG tablet    TYLENOL    20 tablet    Take 2 tablets (650 mg) by mouth every 4 hours as needed for other (mild pain)    Post-operative pain       ibuprofen 600 MG tablet    ADVIL/MOTRIN    30 tablet    Take 1 tablet (600 mg) by mouth every 6 hours as needed for pain (mild)    Post-operative pain       PRENATAL VITAMIN AND MINERAL 28-0.8 MG Tabs     90 tablet    Take 1 tablet by mouth daily    Early stage of pregnancy       PROGESTERONE MICRONIZED PO      Place 200 mg vaginally        sennosides 8.6 MG tablet    SENOKOT    60 tablet    Take 1 tablet by mouth 2 times daily as needed for constipation    High-risk pregnancy in first trimester

## 2018-03-30 NOTE — PROGRESS NOTES
"Please see \"Imaging\" tab under \"Chart Review\" for details of today's US.      Germania Jacobs, DO  Maternal-Fetal Medicine        "

## 2018-04-03 ENCOUNTER — TELEPHONE (OUTPATIENT)
Dept: OBGYN | Facility: CLINIC | Age: 28
End: 2018-04-03

## 2018-04-03 NOTE — TELEPHONE ENCOUNTER
Received call from Pam who stated she has the following yeast symptoms: itching and occasional white chunky discharge. Denies odor and bleeding. Has not tried any OTC products.    Advised can try OTC Monistat 7 day cream. Call clinic if this does not resolve symptoms or if they get worse. She indicated understanding and agreed with plan.

## 2018-04-10 ENCOUNTER — OFFICE VISIT (OUTPATIENT)
Dept: OBGYN | Facility: CLINIC | Age: 28
End: 2018-04-10
Attending: ADVANCED PRACTICE MIDWIFE
Payer: COMMERCIAL

## 2018-04-10 VITALS
SYSTOLIC BLOOD PRESSURE: 109 MMHG | HEIGHT: 62 IN | BODY MASS INDEX: 27.64 KG/M2 | DIASTOLIC BLOOD PRESSURE: 72 MMHG | WEIGHT: 150.2 LBS | HEART RATE: 89 BPM

## 2018-04-10 DIAGNOSIS — R35.0 URINARY FREQUENCY: ICD-10-CM

## 2018-04-10 DIAGNOSIS — O26.22 HISTORY OF RECURRENT ABORTION, CURRENTLY PREGNANT IN SECOND TRIMESTER: ICD-10-CM

## 2018-04-10 DIAGNOSIS — O99.891 BACK PAIN IN PREGNANCY: ICD-10-CM

## 2018-04-10 DIAGNOSIS — O09.90 HIGH RISK PREGNANCY, ANTEPARTUM: Primary | ICD-10-CM

## 2018-04-10 DIAGNOSIS — O26.899 PELVIC PAIN IN PREGNANCY: ICD-10-CM

## 2018-04-10 DIAGNOSIS — M54.9 BACK PAIN IN PREGNANCY: ICD-10-CM

## 2018-04-10 DIAGNOSIS — R10.2 PELVIC PAIN IN PREGNANCY: ICD-10-CM

## 2018-04-10 PROBLEM — N96 HISTORY OF RECURRENT ABORTION, NOT CURRENTLY PREGNANT: Status: RESOLVED | Noted: 2017-05-03 | Resolved: 2018-04-10

## 2018-04-10 PROCEDURE — 87086 URINE CULTURE/COLONY COUNT: CPT | Performed by: ADVANCED PRACTICE MIDWIFE

## 2018-04-10 PROCEDURE — 87088 URINE BACTERIA CULTURE: CPT | Performed by: ADVANCED PRACTICE MIDWIFE

## 2018-04-10 PROCEDURE — G0463 HOSPITAL OUTPT CLINIC VISIT: HCPCS | Mod: ZF

## 2018-04-10 NOTE — LETTER
"4/10/2018       RE: Pam Syed  [address]   [facility] [place] [address]     Dear Colleague,    Thank you for referring your patient, Pam Syed, to the [facility] at [facility]. Please see a copy of my visit note below.    Subjective:      28 year old  at 20w1d presents for a routine prenatal appointment with young son.   no vaginal bleeding or leakage of fluid.  no contractions or cramping.  + fetal movement.       No HA, visual changes, RUQ or epigastric pain.   The patient presents with the following concerns:    Level II US  Results reviewed normal scan.     - Treated yeast infection with home monistat, symptoms resolved  - Having some urinary frequency.   - Feeling \"more pregnant\" but nauesa resolved.  Agreeable to heat pad and maternity belt RX.     Objective:  Vitals:    04/10/18 1510   BP: 109/72   BP Location: Left arm   Patient Position: Chair   Pulse: 89   Weight: 68.1 kg (150 lb 3.2 oz)   Height: 1.575 m (5' 2\")     See OB flowsheet    Assessment/Plan     Encounter Diagnoses   Name Primary?     High risk pregnancy, antepartum - [facility] CNM Yes     History of recurrent , currently pregnant in second trimester      Urinary frequency      Pelvic pain in pregnancy      Back pain in pregnancy      (R35.0) Urinary frequency  Plan: Urine Culture Aerobic Bacterial    (O26.899,  R10.2) Pelvic pain in pregnancy  Plan: order for DME      (O26.899,  M54.9) Back pain in pregnancy  Plan: Heat Wraps (THERMACARE BACK/HIP) MISC    - Reviewed s/s of UTI and how/why to contact CNM prn and start ABX presumptively while waiting for urine culture - pt declines to do so today, prefers to wait for culture results.   - Reviewed total weight gain, encouraged continued healthy diet and exercise.      - Reviewed why/how to contact provider.    Patient education/orders or handouts today:PTL signs/symptoms, Fetal movement and PTL handout   " Return to clinic in 4 weeks and prn if questions or concerns.       Again, thank you for allowing me to participate in the care of your patient.      Sincerely,    ASHIA Disla CNM

## 2018-04-10 NOTE — MR AVS SNAPSHOT
After Visit Summary   4/10/2018    Pam Syed    MRN: [id number]           Patient Information     Date Of Birth          1990        Visit Information        Provider Department      4/10/2018 3:15 PM Kacie Obrien APRN CNM [facility]        Today's Diagnoses     High risk pregnancy, antepartum - [facility] CNM    -  1    History of recurrent , currently pregnant in second trimester        Urinary frequency        Pelvic pain in pregnancy        Back pain in pregnancy           Follow-ups after your visit        Follow-up notes from your care team     Discussed this visit Return in about 4 weeks (around 2018) for CAS.      Who to contact     Please call your clinic at [phone] to:    Ask questions about your health    Make or cancel appointments    Discuss your medicines    Learn about your test results    Speak to your doctor            Additional Information About Your Visit        MyChart Information     Looop Online is an electronic gateway that provides easy, online access to your medical records. With Looop Online, you can request a clinic appointment, read your test results, renew a prescription or communicate with your care team.     To sign up for Orecont visit the website at [email or web address]   You will be asked to enter the access code listed below, as well as some personal information. Please follow the directions to create your username and password.     Your access code is: [email or web address]  Expires: 2018  3:29 PM     Your access code will  in 90 days. If you need help or a new code, please contact your [facility] or call [phone] for assistance.        Care EveryWhere ID     This is your Care EveryWhere ID. This could be used by other organizations to access your [place] medical records  [id number]        Your Vitals Were     Pulse Height Last Period BMI (Body Mass Index)          89 1.575 m (5'  "2\") 11/20/2017 27.47 kg/m2         Blood Pressure from Last 3 Encounters:   04/10/18 109/72   03/13/18 118/79   02/27/18 114/75    Weight from Last 3 Encounters:   04/10/18 68.1 kg (150 lb 3.2 oz)   03/13/18 65.1 kg (143 lb 9.6 oz)   02/27/18 64.6 kg (142 lb 8 oz)              We Performed the Following     Urine Culture Aerobic Bacterial          Today's Medication Changes          These changes are accurate as of 4/10/18  3:29 PM.  If you have any questions, ask your nurse or doctor.               Start taking these medicines.        Dose/Directions    order for DME   Used for:  Pelvic pain in pregnancy        Maternity Belt order   Quantity:  1 each   Refills:  0       THERMACARE BACK/HIP Misc   Used for:  Back pain in pregnancy        Dose:  1 Device   1 Device daily as needed   Quantity:  9 each   Refills:  3            Where to get your medicines      These medications were sent to [id number] - [place], [address] [place] [address]     Phone:  [phone]     THERMACARE BACK/HIP Misc         Some of these will need a paper prescription and others can be bought over the counter.  Ask your nurse if you have questions.     Bring a paper prescription for each of these medications     order for DME                Primary Care Provider Office Phone # Fax #    Clinic [facility] [phone]       [address] [facility] [address]        Equal Access to Services     JAZMIN HARO AH: Hadmireya Harrison, waaxda luqadaha, qaybta kaalmada adejossyada, waxay federico crouch. So [facility] [phone].    ATENCIÓN: Si habla español, tiene a kaufman disposición servicios gratuitos de asistencia lingüística. Llame al [phone].    We comply with applicable federal civil rights laws and [place] laws. We do not discriminate on the basis of race, color, national origin, age, disability, sex, sexual orientation, or gender identity.            Thank " you!     Thank you for choosing [facility]  for your care. Our goal is always to provide you with excellent care. Hearing back from our patients is one way we can continue to improve our services. Please take a few minutes to complete the written survey that you may receive in the mail after your visit with us. Thank you!             Your Updated Medication List - Protect others around you: Learn how to safely use, store and throw away your medicines at www.disposemymeds.org.          This list is accurate as of 4/10/18  3:29 PM.  Always use your most recent med list.                   Brand Name Dispense Instructions for use Diagnosis    acetaminophen 325 MG tablet    TYLENOL    20 tablet    Take 2 tablets (650 mg) by mouth every 4 hours as needed for other (mild pain)    Post-operative pain       ibuprofen 600 MG tablet    ADVIL/MOTRIN    30 tablet    Take 1 tablet (600 mg) by mouth every 6 hours as needed for pain (mild)    Post-operative pain       order for DME     1 each    Maternity Belt order    Pelvic pain in pregnancy       PRENATAL VITAMIN AND MINERAL 28-0.8 MG Tabs     90 tablet    Take 1 tablet by mouth daily    Early stage of pregnancy       PROGESTERONE MICRONIZED PO      Place 200 mg vaginally        sennosides 8.6 MG tablet    SENOKOT    60 tablet    Take 1 tablet by mouth 2 times daily as needed for constipation    High-risk pregnancy in first trimester       THERMACARE BACK/HIP Misc     9 each    1 Device daily as needed    Back pain in pregnancy

## 2018-04-10 NOTE — PROGRESS NOTES
"Subjective:      28 year old  at 20w1d presents for a routine prenatal appointment with young son.   no vaginal bleeding or leakage of fluid.  no contractions or cramping.  + fetal movement.       No HA, visual changes, RUQ or epigastric pain.   The patient presents with the following concerns:    Level II US  Results reviewed normal scan.     - Treated yeast infection with home monistat, symptoms resolved  - Having some urinary frequency.   - Feeling \"more pregnant\" but nauesa resolved.  Agreeable to heat pad and maternity belt RX.     Objective:  Vitals:    04/10/18 1510   BP: 109/72   BP Location: Left arm   Patient Position: Chair   Pulse: 89   Weight: 68.1 kg (150 lb 3.2 oz)   Height: 1.575 m (5' 2\")     See OB flowsheet    Assessment/Plan     Encounter Diagnoses   Name Primary?     High risk pregnancy, antepartum - [facility] CNM Yes     History of recurrent , currently pregnant in second trimester      Urinary frequency      Pelvic pain in pregnancy      Back pain in pregnancy      (R35.0) Urinary frequency  Plan: Urine Culture Aerobic Bacterial    (O26.899,  R10.2) Pelvic pain in pregnancy  Plan: order for DME      (O26.899,  M54.9) Back pain in pregnancy  Plan: Heat Wraps (THERMACARE BACK/HIP) MISC    - Reviewed s/s of UTI and how/why to contact CNM prn and start ABX presumptively while waiting for urine culture - pt declines to do so today, prefers to wait for culture results.   - Reviewed total weight gain, encouraged continued healthy diet and exercise.      - Reviewed why/how to contact provider.    Patient education/orders or handouts today:PTL signs/symptoms, Fetal movement and PTL handout   Return to clinic in 4 weeks and prn if questions or concerns.   Kacie Obrien, ASHIA SIMMONSM                "

## 2018-04-12 LAB
BACTERIA SPEC CULT: ABNORMAL
BACTERIA SPEC CULT: ABNORMAL
Lab: ABNORMAL
SPECIMEN SOURCE: ABNORMAL

## 2018-04-17 ENCOUNTER — TELEPHONE (OUTPATIENT)
Dept: OBGYN | Facility: CLINIC | Age: 28
End: 2018-04-17

## 2018-04-17 DIAGNOSIS — B95.1 GROUP B STREPTOCOCCUS URINARY TRACT INFECTION AFFECTING PREGNANCY IN SECOND TRIMESTER: Primary | ICD-10-CM

## 2018-04-17 DIAGNOSIS — O23.42 GROUP B STREPTOCOCCUS URINARY TRACT INFECTION AFFECTING PREGNANCY IN SECOND TRIMESTER: Primary | ICD-10-CM

## 2018-04-17 DIAGNOSIS — R35.0 URINARY FREQUENCY: Primary | ICD-10-CM

## 2018-04-17 RX ORDER — AMOXICILLIN 500 MG/1
500 CAPSULE ORAL 2 TIMES DAILY
Qty: 10 CAPSULE | Refills: 0 | Status: SHIPPED | OUTPATIENT
Start: 2018-04-17 | End: 2019-02-22

## 2018-04-17 NOTE — TELEPHONE ENCOUNTER
Discussed +GBS in urine with patient- she will  prescription for amoxacillin today. She understands needing IV PCN during labor. She will return to lab for urine re-test after completed of abx.     Also states that she is having some round ligament pain and will go to  rx for maternity belt as sent by shaquille rodriguez.

## 2018-04-24 DIAGNOSIS — R35.0 URINARY FREQUENCY: ICD-10-CM

## 2018-04-24 PROCEDURE — 87086 URINE CULTURE/COLONY COUNT: CPT | Performed by: ADVANCED PRACTICE MIDWIFE

## 2018-04-25 LAB
# FETUSES US: NORMAL
AFP ADJ MOM AMN: NORMAL
AFP SERPL-MCNC: NORMAL NG/ML
AGE - REPORTED: NORMAL
BACTERIA SPEC CULT: NORMAL
DIABETES STATUS PATIENT: NO
FAMILY MEMBER DISEASES HX: NO
FAMILY MEMBER DISEASES HX: NORMAL
GA METHOD: NORMAL
GA METHOD: NORMAL
GA: NORMAL WK
HX OF HEREDITARY DISORDERS: NORMAL
IDDM PATIENT QL: NORMAL
INTEGRATED SCN PATIENT-IMP: NORMAL
LAB SCANNED RESULT: NORMAL
LMP START DATE: NORMAL
Lab: NORMAL
PREV HX CHROMOSOME ABNORMALITY: NORMAL
SPECIMEN DRAWN SERPL: NORMAL
SPECIMEN SOURCE: NORMAL
TWINS: NORMAL

## 2018-05-02 ENCOUNTER — HOSPITAL ENCOUNTER (OUTPATIENT)
Facility: CLINIC | Age: 28
Discharge: HOME | End: 2018-05-02
Attending: EMERGENCY MEDICINE | Admitting: ADVANCED PRACTICE MIDWIFE
Payer: COMMERCIAL

## 2018-05-02 VITALS
RESPIRATION RATE: 16 BRPM | SYSTOLIC BLOOD PRESSURE: 117 MMHG | OXYGEN SATURATION: 97 % | HEART RATE: 83 BPM | BODY MASS INDEX: 27.25 KG/M2 | DIASTOLIC BLOOD PRESSURE: 63 MMHG | WEIGHT: 149 LBS | TEMPERATURE: 98.2 F

## 2018-05-02 DIAGNOSIS — R10.84 ABDOMINAL PAIN, GENERALIZED: ICD-10-CM

## 2018-05-02 DIAGNOSIS — Z3A.23 23 WEEKS GESTATION OF PREGNANCY: ICD-10-CM

## 2018-05-02 DIAGNOSIS — O26.893 HEADACHE IN PREGNANCY, THIRD TRIMESTER: ICD-10-CM

## 2018-05-02 DIAGNOSIS — R51.9 HEADACHE IN PREGNANCY, THIRD TRIMESTER: ICD-10-CM

## 2018-05-02 DIAGNOSIS — O09.90 HIGH-RISK PREGNANCY, UNSPECIFIED TRIMESTER: ICD-10-CM

## 2018-05-02 DIAGNOSIS — D50.8 OTHER IRON DEFICIENCY ANEMIA: Primary | ICD-10-CM

## 2018-05-02 LAB
ABO + RH BLD: NORMAL
ABO + RH BLD: NORMAL
ALBUMIN SERPL-MCNC: 2.8 G/DL (ref 3.4–5)
ALBUMIN UR-MCNC: NEGATIVE MG/DL
ALP SERPL-CCNC: 87 U/L (ref 40–150)
ALT SERPL W P-5'-P-CCNC: 11 U/L (ref 0–50)
ANION GAP SERPL CALCULATED.3IONS-SCNC: 7 MMOL/L (ref 3–14)
APPEARANCE UR: CLEAR
APTT PPP: 28 SEC (ref 22–37)
AST SERPL W P-5'-P-CCNC: 14 U/L (ref 0–45)
BASOPHILS # BLD AUTO: 0 10E9/L (ref 0–0.2)
BASOPHILS NFR BLD AUTO: 0.1 %
BILIRUB SERPL-MCNC: 0.5 MG/DL (ref 0.2–1.3)
BILIRUB UR QL STRIP: NEGATIVE
BLD GP AB SCN SERPL QL: NORMAL
BLOOD BANK CMNT PATIENT-IMP: NORMAL
BUN SERPL-MCNC: 9 MG/DL (ref 7–30)
CALCIUM SERPL-MCNC: 8.6 MG/DL (ref 8.5–10.1)
CHLORIDE SERPL-SCNC: 106 MMOL/L (ref 94–109)
CO2 SERPL-SCNC: 24 MMOL/L (ref 20–32)
COLOR UR AUTO: ABNORMAL
CREAT SERPL-MCNC: 0.61 MG/DL (ref 0.52–1.04)
DIFFERENTIAL METHOD BLD: ABNORMAL
EOSINOPHIL # BLD AUTO: 0.1 10E9/L (ref 0–0.7)
EOSINOPHIL NFR BLD AUTO: 1.4 %
ERYTHROCYTE [DISTWIDTH] IN BLOOD BY AUTOMATED COUNT: 12.8 % (ref 10–15)
FIBRINOGEN PPP-MCNC: 424 MG/DL (ref 200–420)
GFR SERPL CREATININE-BSD FRML MDRD: >90 ML/MIN/1.7M2
GLUCOSE SERPL-MCNC: 94 MG/DL (ref 70–99)
GLUCOSE UR STRIP-MCNC: NEGATIVE MG/DL
HCT VFR BLD AUTO: 28.5 % (ref 35–47)
HGB BLD-MCNC: 9.6 G/DL (ref 11.7–15.7)
HGB F BLD QL KLEIH BETKE: NORMAL
HGB UR QL STRIP: NEGATIVE
IMM GRANULOCYTES # BLD: 0.1 10E9/L (ref 0–0.4)
IMM GRANULOCYTES NFR BLD: 0.6 %
INR PPP: 1.01 (ref 0.86–1.14)
KETONES UR STRIP-MCNC: NEGATIVE MG/DL
LEUKOCYTE ESTERASE UR QL STRIP: NEGATIVE
LYMPHOCYTES # BLD AUTO: 1.7 10E9/L (ref 0.8–5.3)
LYMPHOCYTES NFR BLD AUTO: 19.7 %
MCH RBC QN AUTO: 28.7 PG (ref 26.5–33)
MCHC RBC AUTO-ENTMCNC: 33.7 G/DL (ref 31.5–36.5)
MCV RBC AUTO: 85 FL (ref 78–100)
MONOCYTES # BLD AUTO: 0.6 10E9/L (ref 0–1.3)
MONOCYTES NFR BLD AUTO: 6.5 %
MUCOUS THREADS #/AREA URNS LPF: PRESENT /LPF
NEUTROPHILS # BLD AUTO: 6.3 10E9/L (ref 1.6–8.3)
NEUTROPHILS NFR BLD AUTO: 71.7 %
NITRATE UR QL: NEGATIVE
NRBC # BLD AUTO: 0 10*3/UL
NRBC BLD AUTO-RTO: 0 /100
PH UR STRIP: 5.5 PH (ref 5–7)
PLATELET # BLD AUTO: 155 10E9/L (ref 150–450)
POTASSIUM SERPL-SCNC: 3.4 MMOL/L (ref 3.4–5.3)
PROT SERPL-MCNC: 7.1 G/DL (ref 6.8–8.8)
RBC # BLD AUTO: 3.35 10E12/L (ref 3.8–5.2)
RBC #/AREA URNS AUTO: 1 /HPF (ref 0–2)
SODIUM SERPL-SCNC: 137 MMOL/L (ref 133–144)
SOURCE: ABNORMAL
SP GR UR STRIP: 1.01 (ref 1–1.03)
SPECIMEN EXP DATE BLD: NORMAL
SQUAMOUS #/AREA URNS AUTO: 6 /HPF (ref 0–1)
UROBILINOGEN UR STRIP-MCNC: NORMAL MG/DL (ref 0–2)
WBC # BLD AUTO: 8.8 10E9/L (ref 4–11)
WBC #/AREA URNS AUTO: 2 /HPF (ref 0–5)

## 2018-05-02 PROCEDURE — 85610 PROTHROMBIN TIME: CPT | Performed by: EMERGENCY MEDICINE

## 2018-05-02 PROCEDURE — 99284 EMERGENCY DEPT VISIT MOD MDM: CPT | Mod: Z6 | Performed by: EMERGENCY MEDICINE

## 2018-05-02 PROCEDURE — 99283 EMERGENCY DEPT VISIT LOW MDM: CPT

## 2018-05-02 PROCEDURE — G0463 HOSPITAL OUTPT CLINIC VISIT: HCPCS

## 2018-05-02 PROCEDURE — 85460 HEMOGLOBIN FETAL: CPT | Performed by: EMERGENCY MEDICINE

## 2018-05-02 PROCEDURE — 81001 URINALYSIS AUTO W/SCOPE: CPT | Performed by: EMERGENCY MEDICINE

## 2018-05-02 PROCEDURE — 80053 COMPREHEN METABOLIC PANEL: CPT | Performed by: EMERGENCY MEDICINE

## 2018-05-02 PROCEDURE — 85384 FIBRINOGEN ACTIVITY: CPT | Performed by: EMERGENCY MEDICINE

## 2018-05-02 PROCEDURE — 86850 RBC ANTIBODY SCREEN: CPT | Performed by: EMERGENCY MEDICINE

## 2018-05-02 PROCEDURE — 85730 THROMBOPLASTIN TIME PARTIAL: CPT | Performed by: EMERGENCY MEDICINE

## 2018-05-02 PROCEDURE — 86900 BLOOD TYPING SEROLOGIC ABO: CPT | Performed by: EMERGENCY MEDICINE

## 2018-05-02 PROCEDURE — 85025 COMPLETE CBC W/AUTO DIFF WBC: CPT | Performed by: EMERGENCY MEDICINE

## 2018-05-02 PROCEDURE — 86901 BLOOD TYPING SEROLOGIC RH(D): CPT | Performed by: EMERGENCY MEDICINE

## 2018-05-02 RX ORDER — ONDANSETRON 2 MG/ML
4 INJECTION INTRAMUSCULAR; INTRAVENOUS EVERY 6 HOURS PRN
Status: DISCONTINUED | OUTPATIENT
Start: 2018-05-02 | End: 2018-05-03 | Stop reason: HOSPADM

## 2018-05-02 ASSESSMENT — ENCOUNTER SYMPTOMS
DYSURIA: 0
ABDOMINAL PAIN: 1
NECK PAIN: 0
HEADACHES: 0
VOMITING: 0
DIARRHEA: 0
SHORTNESS OF BREATH: 0
BACK PAIN: 0
WEAKNESS: 0
NAUSEA: 0
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
## 2018-05-02 NOTE — IP AVS SNAPSHOT
[facility]    [address]    [place] [address]    Phone:  [phone]                                       After Visit Summary   5/2/2018    Pam Syed    MRN: [id number]           After Visit Summary Signature Page     I have received my discharge instructions, and my questions have been answered. I have discussed any challenges I see with this plan with the nurse or doctor.    ..........................................................................................................................................  Patient/Patient Representative Signature      ..........................................................................................................................................  Patient Representative Print Name and Relationship to Patient    ..................................................               ................................................  Date                                            Time    ..........................................................................................................................................  Reviewed by Signature/Title    ...................................................              ..............................................  Date                                                            Time
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
## 2018-05-03 NOTE — PHARMACY-ADMISSION MEDICATION HISTORY
Admission medication history for the May 2, 2018 admission is complete.     Interview sources:  Patient    Reliability of source: Good - patient knew the name of prenatal and senokot    Medication compliance: Moderate/poor - pt has not been taking her prenatal because it makes her nauseous     Changes made to PTA medication list (reason)  Added: none  Deleted: none  Changed: none    Additional medication history information:   None    Prior to Admission medications    Medication Sig Last Dose Taking? Auth Provider   sennosides (SENOKOT) 8.6 MG tablet Take 1 tablet by mouth 2 times daily as needed for constipation Past Month at prn Yes Liz Reza APRN CNM   Heat Wraps (THERMACARE BACK/HIP) MISC 1 Device daily as needed prn  Kacie Obrien APRN CNM   order for DME Maternity Belt order   Kacie Obrien APRN CNM   Prenatal Vit-Fe Fumarate-FA (PRENATAL VITAMIN AND MINERAL) 28-0.8 MG TABS Take 1 tablet by mouth daily More than a month  Eli Pickard MD       Time spent: 10 minutes    Medication history completed by:   Anamika Espitia PharmD  [facility]  Available daily from 1-9 PM: phone [phone], pager [phone]
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
## 2018-05-08 ENCOUNTER — OFFICE VISIT (OUTPATIENT)
Dept: OBGYN | Facility: CLINIC | Age: 28
End: 2018-05-08
Attending: MIDWIFE
Payer: COMMERCIAL

## 2018-05-08 VITALS
SYSTOLIC BLOOD PRESSURE: 103 MMHG | HEART RATE: 84 BPM | DIASTOLIC BLOOD PRESSURE: 66 MMHG | HEIGHT: 62 IN | WEIGHT: 154.1 LBS | BODY MASS INDEX: 28.36 KG/M2

## 2018-05-08 DIAGNOSIS — D50.8 IRON DEFICIENCY ANEMIA SECONDARY TO INADEQUATE DIETARY IRON INTAKE: ICD-10-CM

## 2018-05-08 DIAGNOSIS — O09.90 HIGH-RISK PREGNANCY, UNSPECIFIED TRIMESTER: Primary | ICD-10-CM

## 2018-05-08 PROCEDURE — G0463 HOSPITAL OUTPT CLINIC VISIT: HCPCS | Mod: ZF

## 2018-05-08 RX ORDER — ASCORBIC ACID 250 MG
250 TABLET,CHEWABLE ORAL DAILY
Qty: 90 TABLET | Refills: 0 | Status: SHIPPED | OUTPATIENT
Start: 2018-05-08 | End: 2019-02-22
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
## 2018-05-08 NOTE — MR AVS SNAPSHOT
"              After Visit Summary   2018    Pam Syed    MRN: [id number]           Patient Information     Date Of Birth          1990        Visit Information        Provider Department      2018 3:30 PM Daisy Boles APRN CNM [facility]        Today's Diagnoses     High-risk pregnancy, unspecified trimester    -  1    Iron deficiency anemia secondary to inadequate dietary iron intake           Follow-ups after your visit        Follow-up notes from your care team     Return in about 4 weeks (around 2018) for EOB.      Who to contact     Please call your clinic at [phone] to:    Ask questions about your health    Make or cancel appointments    Discuss your medicines    Learn about your test results    Speak to your doctor            Additional Information About Your Visit        MyChart Information     [employer] is an electronic gateway that provides easy, online access to your medical records. With [employer], you can request a clinic appointment, read your test results, renew a prescription or communicate with your care team.     To sign up for [employer] [place] visit the website at [email or web address]   You will be asked to enter the access code listed below, as well as some personal information. Please follow the directions to create your username and password.     Your access code is: [email or web address]  Expires: 2018  3:29 PM     Your access code will  in 90 days. If you need help or a new code, please contact your [facility] or call [phone] for assistance.        Care EveryWhere ID     This is your Care EveryWhere ID. This could be used by other organizations to access your [facility] medical records  [id number]        Your Vitals Were     Pulse Height Last Period BMI (Body Mass Index)          84 1.575 m (5' 2\") 2017 28.19 kg/m2         Blood Pressure from Last 3 Encounters:   18 103/66   18 117/63 "   04/10/18 109/72    Weight from Last 3 Encounters:   05/08/18 69.9 kg (154 lb 1.6 oz)   05/02/18 67.6 kg (149 lb)   04/10/18 68.1 kg (150 lb 3.2 oz)              Today, you had the following     No orders found for display         Today's Medication Changes          These changes are accurate as of 5/8/18  4:01 PM.  If you have any questions, ask your nurse or doctor.               Start taking these medicines.        Dose/Directions    ascorbic acid 250 MG Chew chewable tablet   Commonly known as:  vitamin C   Used for:  Iron deficiency anemia secondary to inadequate dietary iron intake        Dose:  250 mg   Take 1 tablet (250 mg) by mouth daily   Quantity:  90 tablet   Refills:  0       Cyanocobalamin 1000 MCG Lozg   Used for:  Iron deficiency anemia secondary to inadequate dietary iron intake        Dose:  1 tablet   Take 1 tablet by mouth daily   Quantity:  30 lozenge   Refills:  3            Where to get your medicines      These medications were sent to [id number] - [place], [address] [address]     Phone:  [phone]     ascorbic acid 250 MG Chew chewable tablet    Cyanocobalamin 1000 MCG Lozg                Primary Care Provider Office Phone # Fax #    [facility] [phone]       [address] [place] [address], [address]  [facility] [address]        Equal Access to Services     JAZMIN HARO AH: Hadii courtney adams hadcelineo Sotere, waaxda luqadaha, qaybta kaalmada suleiman, ada crouch. So [facility] [phone].    ATENCIÓN: Si habla español, tiene a kaufman disposición servicios gratuitos de asistencia lingüística. Llame al [phone].    We comply with applicable federal civil rights laws and [place] laws. We do not discriminate on the basis of race, color, national origin, age, disability, sex, sexual orientation, or gender identity.            Thank you!     Thank you for choosing [facility]  for  your care. Our goal is always to provide you with excellent care. Hearing back from our patients is one way we can continue to improve our services. Please take a few minutes to complete the written survey that you may receive in the mail after your visit with us. Thank you!             Your Updated Medication List - Protect others around you: Learn how to safely use, store and throw away your medicines at www.disposemymeds.org.          This list is accurate as of 5/8/18  4:01 PM.  Always use your most recent med list.                   Brand Name Dispense Instructions for use Diagnosis    ascorbic acid 250 MG Chew chewable tablet    vitamin C    90 tablet    Take 1 tablet (250 mg) by mouth daily    Iron deficiency anemia secondary to inadequate dietary iron intake       Cyanocobalamin 1000 MCG Lozg     30 lozenge    Take 1 tablet by mouth daily    Iron deficiency anemia secondary to inadequate dietary iron intake       ferrous sulfate 220 (44 Fe) MG/5ML Liqd     1 Bottle    Take 5 mLs by mouth daily    Other iron deficiency anemia       order for DME     1 each    Maternity Belt order    Pelvic pain in pregnancy       PRENATAL VITAMIN AND MINERAL 28-0.8 MG Tabs     90 tablet    Take 1 tablet by mouth daily    Early stage of pregnancy       sennosides 8.6 MG tablet    SENOKOT    60 tablet    Take 1 tablet by mouth 2 times daily as needed for constipation    High-risk pregnancy in first trimester       THERMACARE BACK/HIP Misc     9 each    1 Device daily as needed    Back pain in pregnancy

## 2018-05-08 NOTE — LETTER
Date:May 9, 2018      Patient was self referred, no letter generated. Do not send.        [facility] Information

## 2018-05-15 ENCOUNTER — TELEPHONE (OUTPATIENT)
Dept: OBGYN | Facility: CLINIC | Age: 28
End: 2018-05-15

## 2018-05-15 PROBLEM — D50.8 IRON DEFICIENCY ANEMIA SECONDARY TO INADEQUATE DIETARY IRON INTAKE: Status: ACTIVE | Noted: 2018-05-15

## 2018-05-15 RX ORDER — ACETAMINOPHEN 325 MG/1
650 TABLET ORAL
Status: CANCELLED
Start: 2018-05-15

## 2018-05-15 RX ORDER — DIPHENHYDRAMINE HCL 25 MG
25 CAPSULE ORAL
Status: CANCELLED | OUTPATIENT
Start: 2018-05-15

## 2018-05-15 NOTE — TELEPHONE ENCOUNTER
Patient called to report she is not tolerating oral iron, tablets or liquid, as they cause her nausea and vomiting. Patient is feeling symptomatic of anemia -- fatigued/light headed. Hgb was low (9.6) when checked in ED on 5/2.     Spoke with Liz Reza who advises iron infusions are appropriate for patient and placed therapy plan order.     Called patient and informed how she [place] [facility] [place] [facility] Advised to complete 2-3 days apart for 3 doses.     Patient expressed understanding and is agreeable with plan.

## 2018-05-17 ENCOUNTER — INFUSION THERAPY VISIT (OUTPATIENT)
Dept: INFUSION THERAPY | Facility: CLINIC | Age: 28
End: 2018-05-17
Attending: ADVANCED PRACTICE MIDWIFE
Payer: COMMERCIAL

## 2018-05-17 VITALS
SYSTOLIC BLOOD PRESSURE: 111 MMHG | OXYGEN SATURATION: 99 % | DIASTOLIC BLOOD PRESSURE: 61 MMHG | RESPIRATION RATE: 16 BRPM | TEMPERATURE: 98.8 F | HEART RATE: 89 BPM

## 2018-05-17 DIAGNOSIS — D50.8 IRON DEFICIENCY ANEMIA SECONDARY TO INADEQUATE DIETARY IRON INTAKE: Primary | ICD-10-CM

## 2018-05-17 PROCEDURE — 96366 THER/PROPH/DIAG IV INF ADDON: CPT

## 2018-05-17 PROCEDURE — 25000128 H RX IP 250 OP 636: Mod: ZF | Performed by: ADVANCED PRACTICE MIDWIFE

## 2018-05-17 PROCEDURE — 96365 THER/PROPH/DIAG IV INF INIT: CPT

## 2018-05-17 RX ORDER — ACETAMINOPHEN 325 MG/1
650 TABLET ORAL
Status: CANCELLED
Start: 2018-05-17

## 2018-05-17 RX ORDER — DIPHENHYDRAMINE HCL 25 MG
25 CAPSULE ORAL
Status: CANCELLED | OUTPATIENT
Start: 2018-05-17

## 2018-05-17 RX ADMIN — IRON SUCROSE 300 MG: 20 INJECTION, SOLUTION INTRAVENOUS at 14:28

## 2018-05-17 NOTE — MR AVS SNAPSHOT
After Visit Summary   5/17/2018    Pam Syed    MRN: [id number]           Patient Information     Date Of Birth          1990        Visit Information        Provider Department      5/17/2018 2:00 PM UC 50 ATC; UC SPEC [facility]        Today's Diagnoses     Iron deficiency anemia secondary to inadequate dietary iron intake    -  1      Care Instructions      Patient Education    Iron Sucrose Chewable tablet    Iron Sucrose Solution for injection  Iron Sucrose Solution for injection  What is this medicine?  IRON SUCROSE (AHY tracey ZARA krohs) is an iron complex. Iron is used to make healthy red blood cells, which carry oxygen and nutrients throughout the body. This medicine is used to treat iron deficiency anemia in people with chronic kidney disease.  This medicine may be used for other purposes; ask your health care provider or pharmacist if you have questions.  What should I tell my health care provider before I take this medicine?  They need to know if you have any of these conditions:    anemia not caused by low iron levels    heart disease    high levels of iron in the blood    kidney disease    liver disease    an unusual or allergic reaction to iron, other medicines, foods, dyes, or preservatives    pregnant or trying to get pregnant    breast-feeding  How should I use this medicine?  This medicine is for infusion into a vein. It is given by a health care professional in a hospital or clinic setting.  Talk to your pediatrician regarding the use of this medicine in children. While this drug may be prescribed for children as young as 2 years for selected conditions, precautions do apply.  Overdosage: If you think you have taken too much of this medicine contact a poison control center or emergency room at once.  NOTE: This medicine is only for you. Do not share this medicine with others.  What if I miss a dose?  It is important not  to miss your dose. Call your doctor or health care professional if you are unable to keep an appointment.  What may interact with this medicine?  Do not take this medicine with any of the following medications:    deferoxamine    dimercaprol    other iron products  This medicine may also interact with the following medications:    chloramphenicol    deferasirox  This list may not describe all possible interactions. Give your health care provider a list of all the medicines, herbs, non-prescription drugs, or dietary supplements you use. Also tell them if you smoke, drink alcohol, or use illegal drugs. Some items may interact with your medicine.  What should I watch for while using this medicine?  Visit your doctor or healthcare professional regularly. Tell your doctor or healthcare professional if your symptoms do not start to get better or if they get worse. You may need blood work done while you are taking this medicine.  You may need to follow a special diet. Talk to your doctor. Foods that contain iron include: whole grains/cereals, dried fruits, beans, or peas, leafy green vegetables, and organ meats (liver, kidney).  What side effects may I notice from receiving this medicine?  Side effects that you should report to your doctor or health care professional as soon as possible:    allergic reactions like skin rash, itching or hives, swelling of the face, lips, or tongue    breathing problems    changes in blood pressure    cough    fast, irregular heartbeat    feeling faint or lightheaded, falls    fever or chills    flushing, sweating, or hot feelings    joint or muscle aches/pains    seizures    swelling of the ankles or feet    unusually weak or tired  Side effects that usually do not require medical attention (report to your doctor or health care professional if they continue or are bothersome):    diarrhea    feeling achy    headache    irritation at site where injected    nausea, vomiting    stomach  upset    tiredness  This list may not describe all possible side effects. Call your doctor for medical advice about side effects. You may report side effects to FDA at 7-303-SQZ-3518.  Where should I keep my medicine?  This drug is given in a hospital or clinic and will not be stored at home.  NOTE:This sheet is a summary. It may not cover all possible information. If you have questions about this medicine, talk to your doctor, pharmacist, or health care provider. Copyright  2016 Gold Standard                Follow-ups after your visit        Your next 10 appointments already scheduled     May 19, 2018 12:00 PM CDT   Infusion 120 with UC SPEC INFUSION, UC 46 ATC   [facility] ([facility])    [address]  [address]  [facility] [address] [phone]            May 21, 2018 12:00 PM CDT   Infusion 120 with UC SPEC INFUSION, UC 47 ATC   [facility] ([facility] [place])    [address]  [address]  [facility] [address] [phone]            Jun 12, 2018  3:00 PM CDT   Return Obstetrics Extended with Vida Sandoval CNM   [facility] ([facility])    [place] [address] [facility] [address]  [address]  [facility] [id number] [phone]              Who to contact     If you have questions or need follow up information about today's clinic visit or your schedule please contact [facility] directly at [phone].  Normal or non-critical lab and imaging results will be communicated to you by MyChart, letter or phone within 4 business days after the clinic has received the results. If you do not hear from us within 7 days, please contact the clinic through MyChart or phone. If you have a critical or abnormal lab result, we will notify you by  "phone as soon as possible.  Submit refill requests through [employer] or call your pharmacy and they will forward the refill request to us. Please allow 3 business days for your refill to be completed.          Additional Information About Your Visit        [employer] Information     [employer] lets you send messages to your doctor, view your test results, renew your prescriptions, schedule appointments and more. To sign up, go to [email or web address] . Click on \"Log in\" on the left side of the screen, which will take you to the Welcome page. Then click on \"Sign up Now\" on the right side of the page.     You will be asked to enter the access code listed below, as well as some personal information. Please follow the directions to create your username and password.     Your access code is: [email or web address]  Expires: 2018  3:29 PM     Your access code will  in 90 days. If you need help or a new code, please call your [place] clinic or [phone].        Care EveryWhere ID     This is your Care EveryWhere ID. This could be used by other organizations to access your [place] medical records  [id number]        Your Vitals Were     Pulse Temperature Respirations Last Period Pulse Oximetry       95 98.8  F (37.1  C) (Oral) 16 2017 99%        Blood Pressure from Last 3 Encounters:   18 108/62   18 103/66   18 117/63    Weight from Last 3 Encounters:   18 69.9 kg (154 lb 1.6 oz)   18 67.6 kg (149 lb)   04/10/18 68.1 kg (150 lb 3.2 oz)              Today, you had the following     No orders found for display       Primary Care Provider Office Phone # Fax #    [facility] [phone]       [address]  [facility] [address]        Equal Access to Services     TREMAINE HARO : Evelia Harrison, christina madrid, qauzair florianalada robins. [facility] [phone].    ATENCIÓN: Si katheryn knutson, " tiene a kaufman disposición servicios gratuitos de asistencia lingüística. Belén gonzalez [phone].    We comply with applicable federal civil rights laws and [place] laws. We do not discriminate on the basis of race, color, national origin, age, disability, sex, sexual orientation, or gender identity.            Thank you!     Thank you for choosing [facility] SPECIAL[facility] PROCEDURE  for your care. Our goal is always to provide you with excellent care. Hearing back from our patients is one way we can continue to improve our services. Please take a few minutes to complete the written survey that you may receive in the mail after your visit with us. Thank you!             Your Updated Medication List - Protect others around you: Learn how to safely use, store and throw away your medicines at www.disposemymeds.org.          This list is accurate as of 5/17/18  2:23 PM.  Always use your most recent med list.                   Brand Name Dispense Instructions for use Diagnosis    ascorbic acid 250 MG Chew chewable tablet    vitamin C    90 tablet    Take 1 tablet (250 mg) by mouth daily    Iron deficiency anemia secondary to inadequate dietary iron intake       Cyanocobalamin 1000 MCG Lozg     30 lozenge    Take 1 tablet by mouth daily    Iron deficiency anemia secondary to inadequate dietary iron intake       ferrous sulfate 220 (44 Fe) MG/5ML Liqd     1 Bottle    Take 5 mLs by mouth daily    Other iron deficiency anemia       order for DME     1 each    Maternity Belt order    Pelvic pain in pregnancy       PRENATAL VITAMIN AND MINERAL 28-0.8 MG Tabs     90 tablet    Take 1 tablet by mouth daily    Early stage of pregnancy       sennosides 8.6 MG tablet    SENOKOT    60 tablet    Take 1 tablet by mouth 2 times daily as needed for constipation    High-risk pregnancy in first trimester       THERMACARE BACK/HIP Misc     9 each    1 Device daily as needed    Back pain in pregnancy

## 2018-05-17 NOTE — PROGRESS NOTES
Nursing Note  [facility]:   Chief Complaint   Patient presents with     Infusion     Venofer     During [facility]'s [facility] appointment, orders from Liz Reza APRN CNM were completed.  Frequency: today is dose 1 of 3 total.    Progress note:  Patient identification verified by name and date of birth.  Assessment completed.  Vitals recorded in Doc Flowsheets.  Patient was provided with education regarding infusion and possible side effects.  Patient verbalized understanding.      needed: No  Premedications: were not ordered.  Infusion Rates: infusion given over approximately 90 minutes.  Approximate Infusion length:2 hours.   Labs: were not ordered for this appointment.  Vascular access: peripheral IV placed today.  Treatment Conditions: patient denies fever, chills, signs of infection, recent illness, on antibiotics, productive cough or elevated temperature.  Patient tolerated infusion: well.      Discharge Plan:   Follow up plan of care with: ongoing infusions [facility].  Discharge instructions were reviewed with patient.  Patient/representative verbalized understanding of discharge instructions and all questions answered.  [facility] in stable condition.    Bisi Galvez RN    Administrations This Visit     iron sucrose (VENOFER) 300 mg in sodium chloride 0.9 % 250 mL intermittent infusion     Admin Date Action Dose Rate Route Administered By          05/17/2018 New Bag 300 mg 193.3 mL/hr Intravenous Bisi Galvez RN                         /62  Pulse 95  Temp 98.8  F (37.1  C) (Oral)  Resp 16  LMP 11/20/2017  SpO2 99%

## 2018-05-17 NOTE — PATIENT INSTRUCTIONS
Patient Education    Iron Sucrose Chewable tablet    Iron Sucrose Solution for injection  Iron Sucrose Solution for injection  What is this medicine?  IRON SUCROSE (ARELY alexander) is an iron complex. Iron is used to make healthy red blood cells, which carry oxygen and nutrients throughout the body. This medicine is used to treat iron deficiency anemia in people with chronic kidney disease.  This medicine may be used for other purposes; ask your health care provider or pharmacist if you have questions.  What should I tell my health care provider before I take this medicine?  They need to know if you have any of these conditions:    anemia not caused by low iron levels    heart disease    high levels of iron in the blood    kidney disease    liver disease    an unusual or allergic reaction to iron, other medicines, foods, dyes, or preservatives    pregnant or trying to get pregnant    breast-feeding  How should I use this medicine?  This medicine is for infusion into a vein. It is given by a health care professional in a hospital or clinic setting.  Talk to your pediatrician regarding the use of this medicine in children. While this drug may be prescribed for children as young as 2 years for selected conditions, precautions do apply.  Overdosage: If you think you have taken too much of this medicine contact a poison control center or emergency room at once.  NOTE: This medicine is only for you. Do not share this medicine with others.  What if I miss a dose?  It is important not to miss your dose. Call your doctor or health care professional if you are unable to keep an appointment.  What may interact with this medicine?  Do not take this medicine with any of the following medications:    deferoxamine    dimercaprol    other iron products  This medicine may also interact with the following medications:    chloramphenicol    deferasirox  This list may not describe all possible interactions. Give your health care  provider a list of all the medicines, herbs, non-prescription drugs, or dietary supplements you use. Also tell them if you smoke, drink alcohol, or use illegal drugs. Some items may interact with your medicine.  What should I watch for while using this medicine?  Visit your doctor or healthcare professional regularly. Tell your doctor or healthcare professional if your symptoms do not start to get better or if they get worse. You may need blood work done while you are taking this medicine.  You may need to follow a special diet. Talk to your doctor. Foods that contain iron include: whole grains/cereals, dried fruits, beans, or peas, leafy green vegetables, and organ meats (liver, kidney).  What side effects may I notice from receiving this medicine?  Side effects that you should report to your doctor or health care professional as soon as possible:    allergic reactions like skin rash, itching or hives, swelling of the face, lips, or tongue    breathing problems    changes in blood pressure    cough    fast, irregular heartbeat    feeling faint or lightheaded, falls    fever or chills    flushing, sweating, or hot feelings    joint or muscle aches/pains    seizures    swelling of the ankles or feet    unusually weak or tired  Side effects that usually do not require medical attention (report to your doctor or health care professional if they continue or are bothersome):    diarrhea    feeling achy    headache    irritation at site where injected    nausea, vomiting    stomach upset    tiredness  This list may not describe all possible side effects. Call your doctor for medical advice about side effects. You may report side effects to FDA at 8-392-FDA-5460.  Where should I keep my medicine?  This drug is given in a hospital or clinic and will not be stored at home.  NOTE:This sheet is a summary. It may not cover all possible information. If you have questions about this medicine, talk to your doctor, pharmacist, or  health care provider. Copyright  2016 Gold Standard

## 2018-05-19 ENCOUNTER — INFUSION THERAPY VISIT (OUTPATIENT)
Dept: INFUSION THERAPY | Facility: CLINIC | Age: 28
End: 2018-05-19
Attending: ADVANCED PRACTICE MIDWIFE
Payer: COMMERCIAL

## 2018-05-19 VITALS
RESPIRATION RATE: 16 BRPM | OXYGEN SATURATION: 98 % | TEMPERATURE: 96.2 F | DIASTOLIC BLOOD PRESSURE: 66 MMHG | SYSTOLIC BLOOD PRESSURE: 104 MMHG

## 2018-05-19 DIAGNOSIS — D50.8 IRON DEFICIENCY ANEMIA SECONDARY TO INADEQUATE DIETARY IRON INTAKE: Primary | ICD-10-CM

## 2018-05-19 PROCEDURE — 25000128 H RX IP 250 OP 636: Mod: ZF | Performed by: ADVANCED PRACTICE MIDWIFE

## 2018-05-19 PROCEDURE — 96365 THER/PROPH/DIAG IV INF INIT: CPT

## 2018-05-19 PROCEDURE — 96366 THER/PROPH/DIAG IV INF ADDON: CPT

## 2018-05-19 RX ORDER — DIPHENHYDRAMINE HCL 25 MG
25 CAPSULE ORAL
Status: CANCELLED | OUTPATIENT
Start: 2018-05-19

## 2018-05-19 RX ORDER — ACETAMINOPHEN 325 MG/1
650 TABLET ORAL
Status: CANCELLED
Start: 2018-05-19

## 2018-05-19 RX ADMIN — IRON SUCROSE 300 MG: 20 INJECTION, SOLUTION INTRAVENOUS at 12:22

## 2018-05-19 NOTE — MR AVS SNAPSHOT
After Visit Summary   5/19/2018    Pam Syed    MRN: [id number]           Patient Information     Date Of Birth          1990        Visit Information        Provider Department      5/19/2018 12:00 PM UC 46 ATC; UC SPEC INFUSION [facility] Procedure        Today's Diagnoses     Iron deficiency anemia secondary to inadequate dietary iron intake    -  1       Follow-ups after your visit        Your next 10 appointments already scheduled     May 21, 2018 12:00 PM CDT   Infusion 120 with UC SPEC INFUSION, UC 47 ATC   [facility] Procedure ([facility])    [address]  [address]  [facility] [id number] [phone]            Jun 12, 2018  3:00 PM CDT   Return Obstetrics Extended with Vida Sandoval CNM   [facility] ([facility])    [facility] [address]  [address]  [address]  [facility] [address] [phone]              Who to contact     If you have questions or need follow up information about today's clinic visit or your schedule please contact [facility] PROCEDURE directly at [phone].  Normal or non-critical lab and imaging results will be communicated to you by MyChart, letter or phone within 4 business days after the clinic has received the results. If you do not hear from us within 7 days, please contact the clinic through MyChart or phone. If you have a critical or abnormal lab result, we will notify you by phone as soon as possible.  Submit refill requests through [employer] or call your pharmacy and they will forward the refill request to us. Please allow 3 business days for your refill to be completed.          Additional Information About Your Visit        [employer] Information     [employer] lets you send messages to your doctor, view your test results, renew  "your prescriptions, schedule appointments and more. To sign up, go to [email or web address] . Click on \"Log in\" on the left side of the screen, which will take you to the Welcome page. Then click on \"Sign up Now\" on the right side of the page.     You will be asked to enter the access code listed below, as well as some personal information. Please follow the directions to create your username and password.     Your access code is: [email or web address]  Expires: 2018  3:29 PM     Your access code will  in 90 days. If you need help or a new code, please call your [place] clinic or [phone].        Care EveryWhere ID     This is your Care EveryWhere ID. This could be used by other organizations to access your [place] medical records  [id number]        Your Vitals Were     Temperature Respirations Last Period Pulse Oximetry          96.2  F (35.7  C) (Oral) 16 2017 98%         Blood Pressure from Last 3 Encounters:   18 104/66   18 111/61   18 103/66    Weight from Last 3 Encounters:   18 69.9 kg (154 lb 1.6 oz)   18 67.6 kg (149 lb)   04/10/18 68.1 kg (150 lb 3.2 oz)              Today, you had the following     No orders found for display       Primary Care Provider Office Phone # Fax #    [facility] [phone]       [address]  [facility] [address]        Equal Access to Services     [facility]: Hadii courtney adams hadasharnoldo Sotere, waaxda luqadaha, qaybta kaalmada suleiman, ada spangler . So [facility] [phone].    ATENCIÓN: Si habla español, tiene a kaufman disposición servicios gratuitos de asistencia lingüística. Llame al [phone].    We comply with applicable federal civil rights laws and [place] laws. We do not discriminate on the basis of race, color, national origin, age, disability, sex, sexual orientation, or gender identity.            Thank you!     Thank you for choosing [facility]  for your care. Our goal is always to provide you with excellent care. Hearing back from our patients is one way we can continue to improve our services. Please take a few minutes to complete the written survey that you may receive in the mail after your visit with us. Thank you!             Your Updated Medication List - Protect others around you: Learn how to safely use, store and throw away your medicines at www.disposemymeds.org.          This list is accurate as of 5/19/18  2:17 PM.  Always use your most recent med list.                   Brand Name Dispense Instructions for use Diagnosis    ascorbic acid 250 MG Chew chewable tablet    vitamin C    90 tablet    Take 1 tablet (250 mg) by mouth daily    Iron deficiency anemia secondary to inadequate dietary iron intake       Cyanocobalamin 1000 MCG Lozg     30 lozenge    Take 1 tablet by mouth daily    Iron deficiency anemia secondary to inadequate dietary iron intake       ferrous sulfate 220 (44 Fe) MG/5ML Liqd     1 Bottle    Take 5 mLs by mouth daily    Other iron deficiency anemia       order for DME     1 each    Maternity Belt order    Pelvic pain in pregnancy       PRENATAL VITAMIN AND MINERAL 28-0.8 MG Tabs     90 tablet    Take 1 tablet by mouth daily    Early stage of pregnancy       sennosides 8.6 MG tablet    SENOKOT    60 tablet    Take 1 tablet by mouth 2 times daily as needed for constipation    High-risk pregnancy in first trimester       THERMACARE BACK/HIP Misc     9 each    1 Device daily as needed    Back pain in pregnancy

## 2018-05-19 NOTE — PROGRESS NOTES
Infusion Nursing Note:  Pam Syed presents today to [facility] for a venofer infusion.  During today's [facility] appointment orders from Liz Reza CNM were completed.  Frequency: dose 2/3    Progress note:  ID verified by name and .  Assessment completed. Vitals were stable throughout time in [facility]. Patient education regarding infusion and possible side effects provided.  Patient verbalized understanding. yes    Premedications: were not given; pt tolerated previous dose without premeds.    Infusion Rates: Infusion given over approximately 90 minutes.     Labs were not ordered for this appointment.    Vascular access: Peripheral IV placed today.    Treatment Conditions:   No treatment conditions.    Patient tolerated infusion well.    Discharge Plan:   Follow up plan of care with: Ongoing infusions [facility]  Discharge instructions were reviewed with patient.  Patient/representative verbalized understanding of discharge instructions and all questions answered.    [facility] in stable condition.    Stella Radford RN        /65 (BP Location: Left arm)  Temp 96.2  F (35.7  C) (Oral)  Resp 16  LMP 2017  SpO2 99%     Administrations This Visit     iron sucrose (VENOFER) 300 mg in sodium chloride 0.9 % 250 mL intermittent infusion     Admin Date Action Dose Rate Route Administered By          2018 New Bag 300 mg 196.7 mL/hr Intravenous Stella Radford RN

## 2018-05-21 ENCOUNTER — INFUSION THERAPY VISIT (OUTPATIENT)
Dept: INFUSION THERAPY | Facility: CLINIC | Age: 28
End: 2018-05-21
Attending: ADVANCED PRACTICE MIDWIFE
Payer: COMMERCIAL

## 2018-05-21 VITALS
OXYGEN SATURATION: 100 % | TEMPERATURE: 99 F | SYSTOLIC BLOOD PRESSURE: 115 MMHG | RESPIRATION RATE: 18 BRPM | DIASTOLIC BLOOD PRESSURE: 61 MMHG

## 2018-05-21 DIAGNOSIS — D50.8 IRON DEFICIENCY ANEMIA SECONDARY TO INADEQUATE DIETARY IRON INTAKE: Primary | ICD-10-CM

## 2018-05-21 PROCEDURE — 96365 THER/PROPH/DIAG IV INF INIT: CPT

## 2018-05-21 PROCEDURE — 25000128 H RX IP 250 OP 636: Mod: ZF | Performed by: ADVANCED PRACTICE MIDWIFE

## 2018-05-21 PROCEDURE — 96366 THER/PROPH/DIAG IV INF ADDON: CPT

## 2018-05-21 RX ORDER — ACETAMINOPHEN 325 MG/1
650 TABLET ORAL
Status: CANCELLED
Start: 2018-05-21

## 2018-05-21 RX ORDER — DIPHENHYDRAMINE HCL 25 MG
25 CAPSULE ORAL
Status: CANCELLED | OUTPATIENT
Start: 2018-05-21

## 2018-05-21 RX ADMIN — IRON SUCROSE 300 MG: 20 INJECTION, SOLUTION INTRAVENOUS at 12:34

## 2018-05-21 NOTE — PROGRESS NOTES
Nursing Note  Pam Syed [facility]:   Chief Complaint   Patient presents with     Infusion     Venofer     During [facility]'s [facility] appointment, orders from ASHIA Martin CNM were completed.  Frequency: today is dose 3 of 3 total.    Progress note:  Patient identification verified by name and date of birth.  Assessment completed.  Vitals recorded in Doc Flowsheets.  Patient was provided with education regarding infusion and possible side effects.  Patient verbalized understanding.      needed: No  Premedications: historically patient has not taken pre-medications prior to infusion.  Infusion Rates: infusion given over approximately 90 minutes.  Approximate Infusion length:2 hours.   Labs: were not ordered for this appointment.  Vascular access: peripheral IV placed today.  Treatment Conditions: non-applicable.  Patient tolerated infusion: well.      Discharge Plan:   Follow up plan of care with: OB follow up.  Discharge instructions were reviewed with patient.  Patient/representative verbalized understanding of discharge instructions and all questions answered.  [facility] in stable condition.    Danni Tam RN    Administrations This Visit     iron sucrose (VENOFER) 300 mg in sodium chloride 0.9 % 250 mL intermittent infusion     Admin Date Action Dose Rate Route Administered By          05/21/2018 New Bag 300 mg 193.3 mL/hr Intravenous Danni Tam RN                         /61  Temp 99  F (37.2  C) (Oral)  Resp 18  LMP 11/20/2017  SpO2 100%

## 2018-05-21 NOTE — PATIENT INSTRUCTIONS
Dear Pam Syed    Thank you for choosing [facility] ([facility]) for your infusion.  The following information is a summary of our appointment as well as important reminders.        Iron Sucrose injection  Brand Name: Venofer  What is this medicine?  IRON SUCROSE (ARELY alexander) is an iron complex. Iron is used to make healthy red blood cells, which carry oxygen and nutrients throughout the body. This medicine is used to treat iron deficiency anemia in people with chronic kidney disease.  How should I use this medicine?  This medicine is for infusion into a vein. It is given by a health care professional in a hospital or clinic setting.  Talk to your pediatrician regarding the use of this medicine in children. While this drug may be prescribed for children as young as 2 years for selected conditions, precautions do apply.  What side effects may I notice from receiving this medicine?  Side effects that you should report to your doctor or health care professional as soon as possible:    allergic reactions like skin rash, itching or hives, swelling of the face, lips, or tongue    breathing problems    changes in blood pressure    cough    fast, irregular heartbeat    feeling faint or lightheaded, falls    fever or chills    flushing, sweating, or hot feelings    joint or muscle aches/pains    seizures    swelling of the ankles or feet    unusually weak or tired  Side effects that usually do not require medical attention (report to your doctor or health care professional if they continue or are bothersome):    diarrhea    feeling achy    headache    irritation at site where injected    nausea, vomiting    stomach upset    tiredness  What may interact with this medicine?  Do not take this medicine with any of the following medications:    deferoxamine    dimercaprol    other iron products  This medicine may also interact with the following  medications:    chloramphenicol    deferasirox  What if I miss a dose?  It is important not to miss your dose. Call your doctor or health care professional if you are unable to keep an appointment.  Where should I keep my medicine?  This drug is given in a hospital or clinic and will not be stored at home.  What should I tell my health care provider before I take this medicine?  They need to know if you have any of these conditions:    anemia not caused by low iron levels    heart disease    high levels of iron in the blood    kidney disease    liver disease    an unusual or allergic reaction to iron, other medicines, foods, dyes, or preservatives    pregnant or trying to get pregnant    breast-feeding  What should I watch for while using this medicine?  Visit your doctor or healthcare professional regularly. Tell your doctor or healthcare professional if your symptoms do not start to get better or if they get worse. You may need blood work done while you are taking this medicine.  You may need to follow a special diet. Talk to your doctor. Foods that contain iron include: whole grains/cereals, dried fruits, beans, or peas, leafy green vegetables, and organ meats (liver, kidney).  NOTE:This sheet is a summary. It may not cover all possible information. If you have questions about this medicine, talk to your doctor, pharmacist, or health care provider. Copyright  2018 Elsevier          Additional information: you had an infusion of Venofer via IV today.       We look forward in seeing you on your next appointment here at [facility].  Please don t hesitate to call us at [phone] to reschedule any of your appointments or to speak with one of the [facility] registered nurses.  It was a pleasure taking care of you today.    Sincerely,    [facility]  [facility]  [address] [place] [address]  [address]  Phone:  [phone]

## 2018-05-21 NOTE — MR AVS SNAPSHOT
After Visit Summary   5/21/2018    Pam Syed    MRN: [id number]           Patient Information     Date Of Birth          1990        Visit Information        Provider Department      5/21/2018 12:00 PM [facility]; [facility] Procedure        Today's Diagnoses     Iron deficiency anemia secondary to inadequate dietary iron intake    -  1      Care Instructions    Dear Pam Syed    Thank you for choosing [facility] ([facility]) for your infusion.  The following information is a summary of our appointment as well as important reminders.        Iron Sucrose injection  Brand Name: Venofer  What is this medicine?  IRON SUCROSE (CEFERINOY tracey ZARA krohs) is an iron complex. Iron is used to make healthy red blood cells, which carry oxygen and nutrients throughout the body. This medicine is used to treat iron deficiency anemia in people with chronic kidney disease.  How should I use this medicine?  This medicine is for infusion into a vein. It is given by a health care professional in a hospital or clinic setting.  Talk to your pediatrician regarding the use of this medicine in children. While this drug may be prescribed for children as young as 2 years for selected conditions, precautions do apply.  What side effects may I notice from receiving this medicine?  Side effects that you should report to your doctor or health care professional as soon as possible:    allergic reactions like skin rash, itching or hives, swelling of the face, lips, or tongue    breathing problems    changes in blood pressure    cough    fast, irregular heartbeat    feeling faint or lightheaded, falls    fever or chills    flushing, sweating, or hot feelings    joint or muscle aches/pains    seizures    swelling of the ankles or feet    unusually weak or tired  Side effects that usually do not require medical  attention (report to your doctor or health care professional if they continue or are bothersome):    diarrhea    feeling achy    headache    irritation at site where injected    nausea, vomiting    stomach upset    tiredness  What may interact with this medicine?  Do not take this medicine with any of the following medications:    deferoxamine    dimercaprol    other iron products  This medicine may also interact with the following medications:    chloramphenicol    deferasirox  What if I miss a dose?  It is important not to miss your dose. Call your doctor or health care professional if you are unable to keep an appointment.  Where should I keep my medicine?  This drug is given in a hospital or clinic and will not be stored at home.  What should I tell my health care provider before I take this medicine?  They need to know if you have any of these conditions:    anemia not caused by low iron levels    heart disease    high levels of iron in the blood    kidney disease    liver disease    an unusual or allergic reaction to iron, other medicines, foods, dyes, or preservatives    pregnant or trying to get pregnant    breast-feeding  What should I watch for while using this medicine?  Visit your doctor or healthcare professional regularly. Tell your doctor or healthcare professional if your symptoms do not start to get better or if they get worse. You may need blood work done while you are taking this medicine.  You may need to follow a special diet. Talk to your doctor. Foods that contain iron include: whole grains/cereals, dried fruits, beans, or peas, leafy green vegetables, and organ meats (liver, kidney).  NOTE:This sheet is a summary. It may not cover all possible information. If you have questions about this medicine, talk to your doctor, pharmacist, or health care provider. Copyright  2018 Elsevier          Additional information: you had an infusion of Venofer via IV today.       We look forward in seeing you  "on your next appointment here at [facility].  Please don t hesitate to call us at [phone] to reschedule any of your appointments or to speak with one of the [facility] registered nurses.  It was a pleasure taking care of you today.    Sincerely,    [facility]  [facility]  [address] [place]  [address]  Phone:  [phone]            Follow-ups after your visit        Your next 10 appointments already scheduled     Jun 12, 2018  3:00 PM CDT   Return Obstetrics Extended with Vida Sandoval CNM   [facility] ([facility])    [place] [facility] [address]  [address]  [facility] [address] [phone]              Who to contact     If you have questions or need follow up information about today's clinic visit or your schedule please contact [facility] [place] [facility] directly at [phone].  Normal or non-critical lab and imaging results will be communicated to you by MyChart, letter or phone within 4 business days after the clinic has received the results. If you do not hear from us within 7 days, please contact the clinic through MyChart or phone. If you have a critical or abnormal lab result, we will notify you by phone as soon as possible.  Submit refill requests through [employer] or call your pharmacy and they will forward the refill request to us. Please allow 3 business days for your refill to be completed.          Additional Information About Your Visit        [employer] Information     [employer] lets you send messages to your doctor, view your test results, renew your prescriptions, schedule appointments and more. To sign up, go to [email or web address] . Click on \"Log in\" on the left side of the screen, which will take you to the Welcome page. Then click on \"Sign up Now\" on the right side of the page.     You will be asked to enter the " access code listed below, as well as some personal information. Please follow the directions to create your username and password.     Your access code is: [email or web address]  Expires: 2018  3:29 PM     Your access code will  in 90 days. If you need help or a new code, please call your [facility] [place] clinic or [phone].        Care EveryWhere ID     This is your Care EveryWhere ID. This could be used by other organizations to access your [facility] medical records  [id number]        Your Vitals Were     Temperature Respirations Last Period Pulse Oximetry          99  F (37.2  C) (Oral) 18 2017 100%         Blood Pressure from Last 3 Encounters:   18 115/61   18 104/66   18 111/61    Weight from Last 3 Encounters:   18 69.9 kg (154 lb 1.6 oz)   18 67.6 kg (149 lb)   04/10/18 68.1 kg (150 lb 3.2 oz)              Today, you had the following     No orders found for display       Primary Care Provider Office Phone # Fax #    [facility] [phone]       [address]  [facility] [address]        Equal Access to Services     JAZMIN HARO : Hadii aad ku hadasho Soomaali, waaxda luqadaha, qaybta kaalmada adeegyada, waxay idiin hayjeovanyn anil crouch. So [facility] [phone].    ATENCIÓN: Si habla español, tiene a kaufman disposición servicios gratuitos de asistencia lingüística. Llame al [phone].    We comply with applicable federal civil rights laws and [place] laws. We do not discriminate on the basis of race, color, national origin, age, disability, sex, sexual orientation, or gender identity.            Thank you!     Thank you for choosing [facility]  for your care. Our goal is always to provide you with excellent care. Hearing back from our patients is one way we can continue to improve our services. Please take a few minutes to complete the written survey that you may receive in the  mail after your visit with us. Thank you!             Your Updated Medication List - Protect others around you: Learn how to safely use, store and throw away your medicines at www.disposemymeds.org.          This list is accurate as of 5/21/18  3:07 PM.  Always use your most recent med list.                   Brand Name Dispense Instructions for use Diagnosis    ascorbic acid 250 MG Chew chewable tablet    vitamin C    90 tablet    Take 1 tablet (250 mg) by mouth daily    Iron deficiency anemia secondary to inadequate dietary iron intake       Cyanocobalamin 1000 MCG Lozg     30 lozenge    Take 1 tablet by mouth daily    Iron deficiency anemia secondary to inadequate dietary iron intake       ferrous sulfate 220 (44 Fe) MG/5ML Liqd     1 Bottle    Take 5 mLs by mouth daily    Other iron deficiency anemia       order for DME     1 each    Maternity Belt order    Pelvic pain in pregnancy       PRENATAL VITAMIN AND MINERAL 28-0.8 MG Tabs     90 tablet    Take 1 tablet by mouth daily    Early stage of pregnancy       sennosides 8.6 MG tablet    SENOKOT    60 tablet    Take 1 tablet by mouth 2 times daily as needed for constipation    High-risk pregnancy in first trimester       THERMACARE BACK/HIP Misc     9 each    1 Device daily as needed    Back pain in pregnancy

## 2018-06-19 ENCOUNTER — OFFICE VISIT (OUTPATIENT)
Dept: OBGYN | Facility: CLINIC | Age: 28
End: 2018-06-19
Attending: ADVANCED PRACTICE MIDWIFE
Payer: COMMERCIAL

## 2018-06-19 VITALS
HEART RATE: 86 BPM | SYSTOLIC BLOOD PRESSURE: 105 MMHG | BODY MASS INDEX: 29.44 KG/M2 | HEIGHT: 62 IN | WEIGHT: 160 LBS | DIASTOLIC BLOOD PRESSURE: 71 MMHG

## 2018-06-19 DIAGNOSIS — B95.1 GROUP B STREPTOCOCCUS URINARY TRACT INFECTION AFFECTING PREGNANCY IN SECOND TRIMESTER: ICD-10-CM

## 2018-06-19 DIAGNOSIS — D50.8 IRON DEFICIENCY ANEMIA SECONDARY TO INADEQUATE DIETARY IRON INTAKE: ICD-10-CM

## 2018-06-19 DIAGNOSIS — O23.42 GROUP B STREPTOCOCCUS URINARY TRACT INFECTION AFFECTING PREGNANCY IN SECOND TRIMESTER: ICD-10-CM

## 2018-06-19 DIAGNOSIS — N39.0 URINARY TRACT INFECTION WITHOUT HEMATURIA, SITE UNSPECIFIED: ICD-10-CM

## 2018-06-19 DIAGNOSIS — O09.90 HIGH RISK PREGNANCY, ANTEPARTUM: Primary | ICD-10-CM

## 2018-06-19 LAB
APPEARANCE UR: CLEAR
BILIRUB UR QL: NORMAL
COLOR UR: YELLOW
DEPRECATED CALCIDIOL+CALCIFEROL SERPL-MC: 11 UG/L (ref 20–75)
ERYTHROCYTE [DISTWIDTH] IN BLOOD BY AUTOMATED COUNT: 15.4 % (ref 10–15)
GLUCOSE 1H P 50 G GLC PO SERPL-MCNC: 126 MG/DL (ref 60–129)
GLUCOSE URINE: NORMAL MG/DL
HCT VFR BLD AUTO: 33 % (ref 35–47)
HGB BLD-MCNC: 11 G/DL (ref 11.7–15.7)
HGB UR QL: NORMAL
KETONES UR QL: NORMAL MG/DL
LEUKOCYTE ESTERASE URINE: NORMAL
MCH RBC QN AUTO: 28.4 PG (ref 26.5–33)
MCHC RBC AUTO-ENTMCNC: 33.3 G/DL (ref 31.5–36.5)
MCV RBC AUTO: 85 FL (ref 78–100)
NITRITE UR QL STRIP: NORMAL
PH UR STRIP: 6 PH (ref 5–7)
PLATELET # BLD AUTO: 123 10E9/L (ref 150–450)
PROTEIN ALBUMIN URINE: 30 MG/DL
RBC # BLD AUTO: 3.87 10E12/L (ref 3.8–5.2)
SOURCE: NORMAL
SP GR UR STRIP: 1.03 (ref 1–1.03)
UROBILINOGEN UR QL STRIP: 1 EU/DL (ref 0.2–1)
WBC # BLD AUTO: 8.2 10E9/L (ref 4–11)

## 2018-06-19 PROCEDURE — 85027 COMPLETE CBC AUTOMATED: CPT | Performed by: ADVANCED PRACTICE MIDWIFE

## 2018-06-19 PROCEDURE — G0463 HOSPITAL OUTPT CLINIC VISIT: HCPCS | Mod: ZF

## 2018-06-19 PROCEDURE — 86780 TREPONEMA PALLIDUM: CPT | Performed by: ADVANCED PRACTICE MIDWIFE

## 2018-06-19 PROCEDURE — 36415 COLL VENOUS BLD VENIPUNCTURE: CPT | Performed by: ADVANCED PRACTICE MIDWIFE

## 2018-06-19 PROCEDURE — 81003 URINALYSIS AUTO W/O SCOPE: CPT | Mod: ZF | Performed by: ADVANCED PRACTICE MIDWIFE

## 2018-06-19 PROCEDURE — 82950 GLUCOSE TEST: CPT | Performed by: ADVANCED PRACTICE MIDWIFE

## 2018-06-19 PROCEDURE — 82306 VITAMIN D 25 HYDROXY: CPT | Performed by: ADVANCED PRACTICE MIDWIFE

## 2018-06-19 PROCEDURE — 87086 URINE CULTURE/COLONY COUNT: CPT | Performed by: ADVANCED PRACTICE MIDWIFE

## 2018-06-19 RX ORDER — AMOXICILLIN 500 MG/1
500 CAPSULE ORAL 3 TIMES DAILY
Qty: 15 CAPSULE | Refills: 0 | Status: SHIPPED | OUTPATIENT
Start: 2018-06-19 | End: 2018-08-17

## 2018-06-19 NOTE — MR AVS SNAPSHOT
After Visit Summary   2018    Pam Syed    MRN: [id number]           Patient Information     Date Of Birth          1990        Visit Information        Provider Department      2018 11:15 AM Vida Sandoval CNM [facility]        Today's Diagnoses     High risk pregnancy, antepartum - [facility] CNM    -  1    Iron deficiency anemia secondary to inadequate dietary iron intake        Group B Streptococcus urinary tract infection affecting pregnancy in second trimester - >10,000 treat per consensus        Urinary tract infection without hematuria, site unspecified           Follow-ups after your visit        Follow-up notes from your care team     Return in about 2 weeks (around 7/3/2018) for RADHA ATWOOD.      Who to contact     Please call your clinic at [phone] to:    Ask questions about your health    Make or cancel appointments    Discuss your medicines    Learn about your test results    Speak to your doctor            Additional Information About Your Visit        MyChart Information     [employer] is an electronic gateway that provides easy, online access to your medical records. With [employer], you can request a clinic appointment, read your test results, renew a prescription or communicate with your care team.     To sign up for [employer] visit the website at [email or web address]   You will be asked to enter the access code listed below, as well as some personal information. Please follow the directions to create your username and password.     Your access code is: [email or web address]  Expires: 2018  3:29 PM     Your access code will  in 90 days. If you need help or a new code, please contact your [facility] or call [phone] for assistance.        Care EveryWhere ID     This is your Care EveryWhere ID. This could be used by other organizations to access your [facility] medical records  [id number]       "  Your Vitals Were     Pulse Height Last Period BMI (Body Mass Index)          86 1.575 m (5' 2.01\") 11/20/2017 29.26 kg/m2         Blood Pressure from Last 3 Encounters:   06/19/18 105/71   05/21/18 115/61   05/19/18 104/66    Weight from Last 3 Encounters:   06/19/18 72.6 kg (160 lb)   05/08/18 69.9 kg (154 lb 1.6 oz)   05/02/18 67.6 kg (149 lb)              We Performed the Following     25- OH-Vitamin D     CBC with Platelets     Glucose 1 Hour     Treponema Abs w Reflex to RPR and Titer     Urinalysis chemical screen POCT     Urine Culture Aerobic Bacterial          Today's Medication Changes          These changes are accurate as of 6/19/18 12:15 PM.  If you have any questions, ask your nurse or doctor.               Start taking these medicines.        Dose/Directions    amoxicillin 500 MG capsule   Commonly known as:  AMOXIL   Used for:  High risk pregnancy, antepartum, Urinary tract infection without hematuria, site unspecified   Started by:  Vida Sandoval CNM        Dose:  500 mg   Take 1 capsule (500 mg) by mouth 3 times daily   Quantity:  15 capsule   Refills:  0            Where to get your medicines      These medications were sent to [id number] - [place] [address]     Phone:  [phone]     amoxicillin 500 MG capsule                Primary Care Provider Office Phone # Fax #    [facility] [phone]       [address] [place] [address], [address]  [facility] [address]        Equal Access to Services     [facility] AH: Hadii aad ku hadasho Sotere, waaxda luqadaha, qaybta kaalmada suleiman, ada crouch. So [facility] [phone].    ATENCIÓN: Si habla español, tiene a kaufman disposición servicios gratuitos de asistencia lingüística. Llame al [phone].    We comply with applicable federal civil rights laws and [place] laws. We do not discriminate on the basis of race, color, " national origin, age, disability, sex, sexual orientation, or gender identity.            Thank you!     Thank you for choosing [facility]  for your care. Our goal is always to provide you with excellent care. Hearing back from our patients is one way we can continue to improve our services. Please take a few minutes to complete the written survey that you may receive in the mail after your visit with us. Thank you!             Your Updated Medication List - Protect others around you: Learn how to safely use, store and throw away your medicines at www.disposemymeds.org.          This list is accurate as of 6/19/18 12:15 PM.  Always use your most recent med list.                   Brand Name Dispense Instructions for use Diagnosis    amoxicillin 500 MG capsule    AMOXIL    15 capsule    Take 1 capsule (500 mg) by mouth 3 times daily    High risk pregnancy, antepartum, Urinary tract infection without hematuria, site unspecified       ascorbic acid 250 MG Chew chewable tablet    vitamin C    90 tablet    Take 1 tablet (250 mg) by mouth daily    Iron deficiency anemia secondary to inadequate dietary iron intake       Cyanocobalamin 1000 MCG Lozg     30 lozenge    Take 1 tablet by mouth daily    Iron deficiency anemia secondary to inadequate dietary iron intake       ferrous sulfate 220 (44 Fe) MG/5ML Liqd     1 Bottle    Take 5 mLs by mouth daily    Other iron deficiency anemia       order for DME     1 each    Maternity Belt order    Pelvic pain in pregnancy       PRENATAL VITAMIN AND MINERAL 28-0.8 MG Tabs     90 tablet    Take 1 tablet by mouth daily    Early stage of pregnancy       sennosides 8.6 MG tablet    SENOKOT    60 tablet    Take 1 tablet by mouth 2 times daily as needed for constipation    High-risk pregnancy in first trimester       THERMACARE BACK/HIP Misc     9 each    1 Device daily as needed    Back pain in pregnancy

## 2018-06-19 NOTE — LETTER
"2018       RE: Pam Syed  [address]  [place] [address]     Dear Colleague,    Thank you for referring your patient, Pam Syed, to the [facility] at [facility]. Please see a copy of my visit note below.     28 year old, , 30w1d, presents for EOB.    Patient concerns: Feeling well overall.     Received IV iron x 3 after hemoglobin of 9.2 on 18. IV iron infusions completed , , .    Denies cramping/contractions, vaginal bleeding, discharge or leakage of fluid. Reports +fetal movement.  No HA, vision changes, ruq/epigastric pain.    PHQ9= 1, GAD7= 0    Exam:  /71  Pulse 86  Ht 1.575 m (5' 2.01\")  Wt 72.6 kg (160 lb)  LMP 2017  BMI 29.26 kg/m2   Afebrile- temperature 97.9.    Negative CVAT. No suprapubic tenderness with palpation.    UA:  UA RESULTS:  Recent Labs   Lab Test 18   2230   COLOR  Yellow  Light Yellow   APPEARANCE  Clear  Clear   URINEGLC  Neg  Negative   URINEBILI  Small  Negative   URINEKETONE  Neg  Negative   SG  1.030  1.009   UBLD  Neg  Negative   URINEPH  6.0  5.5   PROTEIN  30   Negative   UROBILINOGEN  1    --    NITRITE  Neg  Negative   LEUKEST  Neg  Negative   RBCU   --   1   WBCU   --   2        Education completed today includes breast feeding, [facility] hand out , contraception, counting movements, signs of pre-term labor, when to present to birthplace, post partum depression, GBS, getting enough iron and labor induction.    Birth preferences reviewed: Medicated  Interested in nitrous oxide first; then epidural.     Last birth @ 38+5, ROM at home, no contractions, labor spontaneously then had epidural (does not feel that it worked)-  [facility] CNM.     Labor support:  , someone will be watching their son    Feeding plans : Breastfeeding  Exclusively  first baby x 15 months   Contraception planned:  May be interested in IUD - unsure " which one, will discuss more     The following labs were ordered today:       GCT, CBC w platelets, Vitamin D and Anti-treponema  Water birth consent form was not given- pt not interested.    Blood type:   ABO   Date Value Ref Range Status   05/02/2018 O  Final     RH(D)   Date Value Ref Range Status   05/02/2018 Pos  Final     Antibody Screen   Date Value Ref Range Status   05/02/2018 Neg  Final   Rhogam  was not given.    Pt agreeable to Tdap but accidentally left clinic prior to administration. Give at next visit.     A/P:  Encounter Diagnosis   Name Primary?     High risk pregnancy, antepartum - [facility] CNM Yes     Orders Placed This Encounter   Procedures     Glucose 1 Hour     CBC with Platelets     Treponema Abs w Reflex to RPR and Titer     25- OH-Vitamin D     Urinalysis chemical screen POCT     Orders Placed This Encounter   Medications     amoxicillin (AMOXIL) 500 MG capsule     Sig: Take 1 capsule (500 mg) by mouth 3 times daily     Dispense:  15 capsule     Refill:  0     EOB education reviewed.  EOB labs ordered- 1 hour glucose, cbc with plts, anti-trep, vitamin d.  CBC checked today- has been >2 weeks since last IV iron infusion.  Reviewed GBS + (from urine) and recommendation for PCN prophylaxis in labor.  Needs Tdap at next visit.    UA done. Reviewed elevated spec gravity- encouraged adequate PO hydration.  Urine culture sent to lab.   Prescription sent for amoxicillin 500mg TID x 5 days.  Call patient with UC results.    Continue scheduled prenatal care, RTC in 2 weeks or prn for questions or concerns.     ASHIA Rose, CNM

## 2018-06-19 NOTE — PROGRESS NOTES
" 28 year old, , 30w1d, presents for EOB.    Patient concerns: Feeling well overall.     Received IV iron x 3 after hemoglobin of 9.2 on 18. IV iron infusions completed , , .    Denies cramping/contractions, vaginal bleeding, discharge or leakage of fluid. Reports +fetal movement.  No HA, vision changes, ruq/epigastric pain.    PHQ9= 1, GAD7= 0    Exam:  /71  Pulse 86  Ht 1.575 m (5' 2.01\")  Wt 72.6 kg (160 lb)  LMP 2017  BMI 29.26 kg/m2   Afebrile- temperature 97.9.    Negative CVAT. No suprapubic tenderness with palpation.    UA:  UA RESULTS:  Recent Labs   Lab Test 18   2230   COLOR  Yellow  Light Yellow   APPEARANCE  Clear  Clear   URINEGLC  Neg  Negative   URINEBILI  Small  Negative   URINEKETONE  Neg  Negative   SG  1.030  1.009   UBLD  Neg  Negative   URINEPH  6.0  5.5   PROTEIN  30   Negative   UROBILINOGEN  1    --    NITRITE  Neg  Negative   LEUKEST  Neg  Negative   RBCU   --   1   WBCU   --   2        Education completed today includes breast feeding, [facility] hand out , contraception, counting movements, signs of pre-term labor, when to present to birthplace, post partum depression, GBS, getting enough iron and labor induction.    Birth preferences reviewed: Medicated  Interested in nitrous oxide first; then epidural.     Last birth @ 38+5, ROM at home, no contractions, labor spontaneously then had epidural (does not feel that it worked)-  [facility] CNM.     Labor support:  , someone will be watching their son   [place] Feeding plans : Breastfeeding  Exclusively  first baby x 15 months   Contraception planned:  May be interested in IUD - unsure which one, will discuss more     The following labs were ordered today:       GCT, CBC w platelets, Vitamin D and Anti-treponema  Water birth consent form was not given- pt not interested.    Blood type:   ABO   Date Value Ref Range Status   2018 O  Final     RH(D)   Date Value Ref Range Status "   05/02/2018 Pos  Final     Antibody Screen   Date Value Ref Range Status   05/02/2018 Neg  Final   Rhogam  was not given.    Pt agreeable to Tdap but accidentally left clinic prior to administration. Give at next visit.     A/P:  Encounter Diagnosis   Name Primary?     High risk pregnancy, antepartum - [facility] CNM Yes     Orders Placed This Encounter   Procedures     Glucose 1 Hour     CBC with Platelets     Treponema Abs w Reflex to RPR and Titer     25- OH-Vitamin D     Urinalysis chemical screen POCT     Orders Placed This Encounter   Medications     amoxicillin (AMOXIL) 500 MG capsule     Sig: Take 1 capsule (500 mg) by mouth 3 times daily     Dispense:  15 capsule     Refill:  0     EOB education reviewed.  EOB labs ordered- 1 hour glucose, cbc with plts, anti-trep, vitamin d.  CBC checked today- has been >2 weeks since last IV iron infusion.  Reviewed GBS + (from urine) and recommendation for PCN prophylaxis in labor.  Needs Tdap at next visit.    UA done. Reviewed elevated spec gravity- encouraged adequate PO hydration.  Urine culture sent to lab.   Prescription sent for amoxicillin 500mg TID x 5 days.  Call patient with UC results.    Continue scheduled prenatal care, RTC in 2 weeks or prn for questions or concerns.     ASHIA Rose, CNM

## 2018-06-20 LAB
BACTERIA SPEC CULT: NO GROWTH
Lab: NORMAL
SPECIMEN SOURCE: NORMAL
T PALLIDUM AB SER QL: NONREACTIVE

## 2018-06-26 ENCOUNTER — TELEPHONE (OUTPATIENT)
Dept: OBGYN | Facility: CLINIC | Age: 28
End: 2018-06-26

## 2018-06-26 NOTE — TELEPHONE ENCOUNTER
Pam calling for lab results. She doesn't use mychart. Informed her passed 1 hr glucose and uc negative along with syphilis . She has appt next week so will discuss low vit d and hgb results. She reports not taking vit d d/t some nausea. Not vomiting and able to drink fluids and eat, just doesn't like to take her vitamins. Pt indicated understanding and agreed with plan.

## 2018-07-03 ENCOUNTER — OFFICE VISIT (OUTPATIENT)
Dept: OBGYN | Facility: CLINIC | Age: 28
End: 2018-07-03
Attending: ADVANCED PRACTICE MIDWIFE
Payer: COMMERCIAL

## 2018-07-03 VITALS
DIASTOLIC BLOOD PRESSURE: 63 MMHG | BODY MASS INDEX: 29.81 KG/M2 | HEART RATE: 82 BPM | WEIGHT: 162 LBS | HEIGHT: 62 IN | SYSTOLIC BLOOD PRESSURE: 92 MMHG

## 2018-07-03 DIAGNOSIS — O23.42 GROUP B STREPTOCOCCUS URINARY TRACT INFECTION AFFECTING PREGNANCY IN SECOND TRIMESTER: ICD-10-CM

## 2018-07-03 DIAGNOSIS — E55.9 VITAMIN D DEFICIENCY: ICD-10-CM

## 2018-07-03 DIAGNOSIS — O09.90 HIGH RISK PREGNANCY, ANTEPARTUM: Primary | ICD-10-CM

## 2018-07-03 DIAGNOSIS — Z23 NEED FOR TDAP VACCINATION: ICD-10-CM

## 2018-07-03 DIAGNOSIS — B95.1 GROUP B STREPTOCOCCUS URINARY TRACT INFECTION AFFECTING PREGNANCY IN SECOND TRIMESTER: ICD-10-CM

## 2018-07-03 PROCEDURE — 90715 TDAP VACCINE 7 YRS/> IM: CPT | Mod: ZF

## 2018-07-03 PROCEDURE — 90471 IMMUNIZATION ADMIN: CPT | Mod: ZF

## 2018-07-03 PROCEDURE — G0463 HOSPITAL OUTPT CLINIC VISIT: HCPCS | Mod: 25,ZF

## 2018-07-03 PROCEDURE — 25000128 H RX IP 250 OP 636: Mod: ZF

## 2018-07-03 NOTE — PROGRESS NOTES
"Subjective:      Pam Syed is a 28 year old  at 32w1d presentst for a routine prenatal appointment.   Denies vaginal bleeding or leakage of fluid. Denies contractions. Denies fetal movement.      No HA, visual changes, RUQ or epigastric pain.   Patient concerns: Feeling well overall.  1) Tired- patient reports that she is tired often during the day, she reports sleeping at night but waking up often, she is able to change positions and fall back asleep  Reviewed EOB labs with patient.  Reviewed TDAP Consents today    Objective:  Vitals:    18 1527   BP: 92/63   BP Location: Left arm   Patient Position: Chair   Pulse: 82   Weight: 73.5 kg (162 lb)   Height: 1.575 m (5' 2\")   See ob flowsheet    Assessment/Plan     Encounter Diagnoses   Name Primary?     High risk pregnancy, antepartum - [facility] CNM Yes     Group B Streptococcus urinary tract infection affecting pregnancy in second trimester - >10,000 treat per consensus      Need for Tdap vaccination      Vitamin D deficiency      Orders Placed This Encounter   Procedures     TDAP VACCINE (BOOSTRIX)     No orders of the defined types were placed in this encounter.    ABO   Date Value Ref Range Status   2018 O  Final     RH(D)   Date Value Ref Range Status   2018 Pos  Final     Antibody Screen   Date Value Ref Range Status   2018 Neg  Final   Rhogam was not given.    - Encouraged that patient take vitamin D supplement, discussed smaller size tablet   - Encouraged patient take prenatal with Vitamin C and B12, if not tolerated discussed iron rich foods to take with Vitamin C   - Discussed the use of  and provided resources for Everyday Miracles     - Reviewed total weight gain, encouraged continued healthy diet and exercise. Reviewed importance of daily fetal kick count and why/how to contact provider.  - Reviewed why/how to contact provider if headache/visual changes/RUQ or epigastric pain, decreased fetal movement, vaginal " bleeding, leakage of fluid or more than 4 contractions in an hour.  - Patient education/orders or handouts today:  PTL signs/symptoms and TDAP    Return to clinic in 2 weeks and prn if questions or concerns.     I, Monica Moreno, am serving as a scribe; to document services personally performed by ASHIA Guillen CNM based on data collection and the provider's statements to me.     Monica Moreno, RN BSN WHNP Student     I agree with the note completed by the NP Student, except for changes made by me. The encounter was performed by me and scribed by the student. The scribed note accurately reflects my personal services and decisions made by me.    ASHIA Guillen CNM, APRN CNM

## 2018-07-03 NOTE — LETTER
"7/3/2018       RE: Pam Syed  [address]  [place] [address]     Dear Colleague,    Thank you for referring your patient, Pam Syed, to the [facility] at [facility]. Please see a copy of my visit note below.    Subjective:      Pam Syed is a 28 year old  at 32w1d presentst for a routine prenatal appointment.   Denies vaginal bleeding or leakage of fluid. Denies contractions. Denies fetal movement.      No HA, visual changes, RUQ or epigastric pain.   Patient concerns: Feeling well overall.  1) Tired- patient reports that she is tired often during the day, she reports sleeping at night but waking up often, she is able to change positions and fall back asleep  Reviewed EOB labs with patient.  Reviewed TDAP Consents today    Objective:  Vitals:    18 1527   BP: 92/63   BP Location: Left arm   Patient Position: Chair   Pulse: 82   Weight: 73.5 kg (162 lb)   Height: 1.575 m (5' 2\")   See ob flowsheet    Assessment/Plan     Encounter Diagnoses   Name Primary?     High risk pregnancy, antepartum - [facility] CNM Yes     Group B Streptococcus urinary tract infection affecting pregnancy in second trimester - >10,000 treat per consensus      Need for Tdap vaccination      Vitamin D deficiency      Orders Placed This Encounter   Procedures     TDAP VACCINE (BOOSTRIX)     No orders of the defined types were placed in this encounter.    ABO   Date Value Ref Range Status   2018 O  Final     RH(D)   Date Value Ref Range Status   2018 Pos  Final     Antibody Screen   Date Value Ref Range Status   2018 Neg  Final   Rhogam was not given.    - Encouraged that patient take vitamin D supplement, discussed smaller size tablet   - Encouraged patient take prenatal with Vitamin C and B12, if not tolerated discussed iron rich foods to take with Vitamin C   - Discussed the use of  and provided resources for Everyday " Miracles     - Reviewed total weight gain, encouraged continued healthy diet and exercise. Reviewed importance of daily fetal kick count and why/how to contact provider.  - Reviewed why/how to contact provider if headache/visual changes/RUQ or epigastric pain, decreased fetal movement, vaginal bleeding, leakage of fluid or more than 4 contractions in an hour.  - Patient education/orders or handouts today:  PTL signs/symptoms and TDAP    Return to clinic in 2 weeks and prn if questions or concerns.     I, Monica Moreno, am serving as a scribe; to document services personally performed by ASHIA Guillen CNM based on data collection and the provider's statements to me.     Monica Moreno, RN BSN WHNP Student     I agree with the note completed by the NP Student, except for changes made by me. The encounter was performed by me and scribed by the student. The scribed note accurately reflects my personal services and decisions made by me.    ASHIA Guillen CNM, APRN CNM        Again, thank you for allowing me to participate in the care of your patient.      Sincerely,    ASHIA Guillen CNM

## 2018-07-03 NOTE — LETTER
Date:July 5, 2018      Provider requested that no letter be sent. Do not send.       [facility] Health Information

## 2018-07-03 NOTE — MR AVS SNAPSHOT
After Visit Summary   7/3/2018    Pam Syed    MRN: [id number]           Patient Information     Date Of Birth          1990        Visit Information        Provider Department      7/3/2018 3:15 PM Corina Campbell APRN CNM [facility]        Today's Diagnoses     High risk pregnancy, antepartum - [facility] CNM    -  1    Group B Streptococcus urinary tract infection affecting pregnancy in second trimester - >10,000 treat per consensus        Need for Tdap vaccination        Vitamin D deficiency           Follow-ups after your visit        Follow-up notes from your care team     Return in about 2 weeks (around 7/17/2018) for Return OB.      Your next 10 appointments already scheduled     Jul 17, 2018  3:15 PM CDT   RETURN OB with ASHIA Ruiz CNM   [facility] ([facility])    [facility] [address]  [address]  [place] [address]   [phone]            Jul 26, 2018  3:45 PM CDT   RETURN OB with ASHIA Ruggiero   [facility] ([facility])    [facility]  [address]  [facility] [address]   [phone]            Aug 02, 2018  3:45 PM CDT   RETURN OB with ASHIA Cary   [facility] ([facility])    [facility] [address]  [address]  [facility] [address]   [phone]            Aug 09, 2018  3:45 PM CDT   RETURN OB with ASHIA Mendez CNM   [facility] ([facility])    [facility] [address]  [address]  [facility] [address]   [phone]            Aug 14, 2018  3:45 PM CDT   RETURN OB with ASHIA Ruiz CNM   [facility] ([facility])    [facility] [address]  [address]  [place]  "[place] [id number] [phone]              Who to contact     Please call your clinic at [phone] to:    Ask questions about your health    Make or cancel appointments    Discuss your medicines    Learn about your test results    Speak to your doctor            Additional Information About Your Visit        MyChart Information     [employer] is an electronic gateway that provides easy, online access to your medical records. With [employer], you can request a clinic appointment, read your test results, renew a prescription or communicate with your care team.     To sign up for [employer] visit the website at [email or web address]   You will be asked to enter the access code listed below, as well as some personal information. Please follow the directions to create your username and password.     Your access code is: [email or web address]  Expires: 2018  3:29 PM     Your access code will  in 90 days. If you need help or a new code, please contact your [facility] or call [phone] for assistance.        Care EveryWhere ID     This is your Care EveryWhere ID. This could be used by other organizations to access your [facility] medical records  [id number]        Your Vitals Were     Pulse Height Last Period BMI (Body Mass Index)          82 1.575 m (5' 2\") 2017 29.63 kg/m2         Blood Pressure from Last 3 Encounters:   18 92/63   18 105/71   18 115/61    Weight from Last 3 Encounters:   18 73.5 kg (162 lb)   18 72.6 kg (160 lb)   18 69.9 kg (154 lb 1.6 oz)              We Performed the Following     TDAP VACCINE (BOOSTRIX)        Primary Care Provider Office Phone # Fax #    [facility] [phone]       [address]  [facility] [address]        Equal Access to Services     JAZMIN HARO AH: Evelia Harrison, waaxda luqadaha, qaybta kaalmacheryl bearden, ada spangler " ah. So [facility] [phone].    ATENCIÓN: Si katheryn knutson, tiene a kaufman disposición servicios gratuitos de asistencia lingüística. Belén al [phone].    We comply with applicable federal civil rights laws and [place] laws. We do not discriminate on the basis of race, color, national origin, age, disability, sex, sexual orientation, or gender identity.            Thank you!     Thank you for choosing [facility]  for your care. Our goal is always to provide you with excellent care. Hearing back from our patients is one way we can continue to improve our services. Please take a few minutes to complete the written survey that you may receive in the mail after your visit with us. Thank you!             Your Updated Medication List - Protect others around you: Learn how to safely use, store and throw away your medicines at www.disposemymeds.org.          This list is accurate as of 7/3/18  4:55 PM.  Always use your most recent med list.                   Brand Name Dispense Instructions for use Diagnosis    amoxicillin 500 MG capsule    AMOXIL    15 capsule    Take 1 capsule (500 mg) by mouth 3 times daily    High risk pregnancy, antepartum, Urinary tract infection without hematuria, site unspecified       ascorbic acid 250 MG Chew chewable tablet    vitamin C    90 tablet    Take 1 tablet (250 mg) by mouth daily    Iron deficiency anemia secondary to inadequate dietary iron intake       Cyanocobalamin 1000 MCG Lozg     30 lozenge    Take 1 tablet by mouth daily    Iron deficiency anemia secondary to inadequate dietary iron intake       ferrous sulfate 220 (44 Fe) MG/5ML Liqd     1 Bottle    Take 5 mLs by mouth daily    Other iron deficiency anemia       order for DME     1 each    Maternity Belt order    Pelvic pain in pregnancy       PRENATAL VITAMIN AND MINERAL 28-0.8 MG Tabs     90 tablet    Take 1 tablet by mouth daily    Early stage of pregnancy       sennosides 8.6 MG tablet    SENOKOT    60  tablet    Take 1 tablet by mouth 2 times daily as needed for constipation    High-risk pregnancy in first trimester       THERMACARE BACK/HIP Misc     9 each    1 Device daily as needed    Back pain in pregnancy

## 2018-07-26 ENCOUNTER — OFFICE VISIT (OUTPATIENT)
Dept: OBGYN | Facility: CLINIC | Age: 28
End: 2018-07-26
Attending: MIDWIFE
Payer: COMMERCIAL

## 2018-07-26 VITALS
DIASTOLIC BLOOD PRESSURE: 73 MMHG | BODY MASS INDEX: 30.46 KG/M2 | SYSTOLIC BLOOD PRESSURE: 113 MMHG | HEART RATE: 88 BPM | WEIGHT: 165.5 LBS | HEIGHT: 62 IN

## 2018-07-26 DIAGNOSIS — O09.90 HIGH RISK PREGNANCY, ANTEPARTUM: Primary | ICD-10-CM

## 2018-07-26 DIAGNOSIS — D69.6 THROMBOCYTOPENIA (H): ICD-10-CM

## 2018-07-26 LAB
ERYTHROCYTE [DISTWIDTH] IN BLOOD BY AUTOMATED COUNT: 15.5 % (ref 10–15)
HCT VFR BLD AUTO: 35.3 % (ref 35–47)
HGB BLD-MCNC: 11.5 G/DL (ref 11.7–15.7)
MCH RBC QN AUTO: 27.7 PG (ref 26.5–33)
MCHC RBC AUTO-ENTMCNC: 32.6 G/DL (ref 31.5–36.5)
MCV RBC AUTO: 85 FL (ref 78–100)
PLATELET # BLD AUTO: 127 10E9/L (ref 150–450)
RBC # BLD AUTO: 4.15 10E12/L (ref 3.8–5.2)
WBC # BLD AUTO: 7.9 10E9/L (ref 4–11)

## 2018-07-26 PROCEDURE — G0463 HOSPITAL OUTPT CLINIC VISIT: HCPCS | Mod: ZF

## 2018-07-26 PROCEDURE — 85027 COMPLETE CBC AUTOMATED: CPT | Performed by: MIDWIFE

## 2018-07-26 PROCEDURE — 36415 COLL VENOUS BLD VENIPUNCTURE: CPT | Performed by: MIDWIFE

## 2018-07-26 NOTE — LETTER
"2018       RE: Pam Syed  [address]  [place] [address]     Dear Colleague,    Thank you for referring your patient, Pam Syed, to the [facility] at [facility]. Please see a copy of my visit note below.    Subjective:      28 year old  at 35w3d presents for a routine prenatal appointment.         no vaginal bleeding,  leakage of fluid, or change in vaginal discharge.  occ contractions.  Good  fetal movement.     No HA, visual changes, RUQ or epigastric pain.   Patient concerns: feeling more heavy    Feeling well overall.  Reviewed low platelets  Will check weekly now.   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 Plan prophylaxis in labor.  Birth preferences reviewed: :  Checking with everyday miracles.  Plans nitrous if needed will consider epidural prn  Had one sided relief  Was not great   Labor signs discussed. Reinforced daily fetal movement counts.  Reviewed why/how to contact provider if headache/visual changes/RUQ or epigastric pain, decreased fetal movement, vaginal bleeding, leakage of fluid.   Return to clinic in 1 week and prn if questions or concerns.   CBC  Today     Again, thank you for allowing me to participate in the care of your patient.      Sincerely,    ASHIA Nagy CNM      "

## 2018-07-26 NOTE — MR AVS SNAPSHOT
After Visit Summary   7/26/2018    Pam Syed    MRN: [id number]           Patient Information     Date Of Birth          1990        Visit Information        Provider Department      7/26/2018 8:45 AM Daisy Boles APRN [facility]        Today's Diagnoses     High risk pregnancy, antepartum - WHS CNM    -  1    Thrombocytopenia (H)           Follow-ups after your visit        Follow-up notes from your care team     Return in about 1 week (around 8/2/2018) for CAS.      Your next 10 appointments already scheduled     Aug 02, 2018  3:45 PM CDT   RETURN OB with ASHIA Cary CNM   [facility] ([facility])    [facility] [address]  [address]  [address]  [facility] [address] [phone]            Aug 09, 2018  3:45 PM CDT   RETURN OB with ASHIA Mendez [facility] ([facility])    [facility] [address]  [address]  [address]  [facility] [address] [phone]            Aug 14, 2018  3:45 PM CDT   RETURN OB with ASHIA Ruiz [facility] ([facility])    [facility] [address]  [address]  [address]  [facility] [id number] [phone]              Who to contact     Please call your clinic at [phone] to:    Ask questions about your health    Make or cancel appointments    Discuss your medicines    Learn about your test results    Speak to your doctor            Additional Information About Your Visit        MyChart Information     Conecta 2 is an electronic gateway that provides easy, online access to your medical records. With Conecta 2, you can request a clinic appointment, read your test results, renew a prescription or communicate with your care team.     To sign up for Zynstrat visit the website at [email or web address]   You will be asked to  "enter the access code listed below, as well as some personal information. Please follow the directions to create your username and password.     Your access code is: [email or web address]  Expires: 10/24/2018  9:06 AM     Your access code will  in 90 days. If you need help or a new code, please contact your [facility] or call [phone] for assistance.        Care EveryWhere ID     This is your Care EveryWhere ID. This could be used by other organizations to access your [place] medical records  [id number]        Your Vitals Were     Pulse Height Last Period BMI (Body Mass Index)          88 1.575 m (5' 2.01\") 2017 30.26 kg/m2         Blood Pressure from Last 3 Encounters:   18 113/73   18 92/63   18 105/71    Weight from Last 3 Encounters:   18 75.1 kg (165 lb 8 oz)   18 73.5 kg (162 lb)   18 72.6 kg (160 lb)              We Performed the Following     CBC with Platelets        Primary Care Provider Office Phone # Fax #    [facility] [phone]       [address]  [facility] [address]        Equal Access to Services     JAZMIN HARO : Hadii courtney ku hadasho Soomaali, waaxda luqadaha, qaybta kaalmada adeegyada, ada idiin hayjennifer spangler . So [facility] [phone].    ATENCIÓN: Si habla español, tiene a kaufman disposición servicios gratuitos de asistencia lingüística. Llame al [phone].    We comply with applicable federal civil rights laws and [place] laws. We do not discriminate on the basis of race, color, national origin, age, disability, sex, sexual orientation, or gender identity.            Thank you!     Thank you for choosing [facility]  for your care. Our goal is always to provide you with excellent care. Hearing back from our patients is one way we can continue to improve our services. Please take a few minutes to complete the written survey that you may " receive in the mail after your visit with us. Thank you!             Your Updated Medication List - Protect others around you: Learn how to safely use, store and throw away your medicines at www.disposemymeds.org.          This list is accurate as of 7/26/18  9:06 AM.  Always use your most recent med list.                   Brand Name Dispense Instructions for use Diagnosis    amoxicillin 500 MG capsule    AMOXIL    15 capsule    Take 1 capsule (500 mg) by mouth 3 times daily    High risk pregnancy, antepartum, Urinary tract infection without hematuria, site unspecified       ascorbic acid 250 MG Chew chewable tablet    vitamin C    90 tablet    Take 1 tablet (250 mg) by mouth daily    Iron deficiency anemia secondary to inadequate dietary iron intake       Cyanocobalamin 1000 MCG Lozg     30 lozenge    Take 1 tablet by mouth daily    Iron deficiency anemia secondary to inadequate dietary iron intake       ferrous sulfate 220 (44 Fe) MG/5ML Liqd     1 Bottle    Take 5 mLs by mouth daily    Other iron deficiency anemia       order for DME     1 each    Maternity Belt order    Pelvic pain in pregnancy       PRENATAL VITAMIN AND MINERAL 28-0.8 MG Tabs     90 tablet    Take 1 tablet by mouth daily    Early stage of pregnancy       sennosides 8.6 MG tablet    SENOKOT    60 tablet    Take 1 tablet by mouth 2 times daily as needed for constipation    High-risk pregnancy in first trimester       THERMACARE BACK/HIP Misc     9 each    1 Device daily as needed    Back pain in pregnancy

## 2018-07-26 NOTE — PROGRESS NOTES
"Subjective:      28 year old  at 35w3d presents for a routine prenatal appointment.         no vaginal bleeding,  leakage of fluid, or change in vaginal discharge.  occ contractions.  Good  fetal movement.     No HA, visual changes, RUQ or epigastric pain.   Patient concerns: feeling more heavy    Feeling well overall.  Reviewed low platelets  Will check weekly now.   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 Plan prophylaxis in labor.  Birth preferences reviewed: :  Checking with [facility].  Plans nitrous if needed will consider epidural prn  Had one sided relief  Was not great   Labor signs discussed. Reinforced daily fetal movement counts.  Reviewed why/how to contact provider if headache/visual changes/RUQ or epigastric pain, decreased fetal movement, vaginal bleeding, leakage of fluid.   Return to clinic in 1 week and prn if questions or concerns.   CBC  Today   ASHIA Nagy CNM  "

## 2018-08-02 ENCOUNTER — OFFICE VISIT (OUTPATIENT)
Dept: OBGYN | Facility: CLINIC | Age: 28
End: 2018-08-02
Attending: ADVANCED PRACTICE MIDWIFE
Payer: COMMERCIAL

## 2018-08-02 VITALS
HEART RATE: 83 BPM | SYSTOLIC BLOOD PRESSURE: 116 MMHG | DIASTOLIC BLOOD PRESSURE: 80 MMHG | HEIGHT: 62 IN | BODY MASS INDEX: 31.15 KG/M2 | WEIGHT: 169.3 LBS

## 2018-08-02 DIAGNOSIS — O09.90 HIGH RISK PREGNANCY, ANTEPARTUM: Primary | ICD-10-CM

## 2018-08-02 LAB
ERYTHROCYTE [DISTWIDTH] IN BLOOD BY AUTOMATED COUNT: 15.5 % (ref 10–15)
HCT VFR BLD AUTO: 35.3 % (ref 35–47)
HGB BLD-MCNC: 12 G/DL (ref 11.7–15.7)
MCH RBC QN AUTO: 28.9 PG (ref 26.5–33)
MCHC RBC AUTO-ENTMCNC: 34 G/DL (ref 31.5–36.5)
MCV RBC AUTO: 85 FL (ref 78–100)
PLATELET # BLD AUTO: 129 10E9/L (ref 150–450)
RBC # BLD AUTO: 4.15 10E12/L (ref 3.8–5.2)
WBC # BLD AUTO: 8.3 10E9/L (ref 4–11)

## 2018-08-02 PROCEDURE — 36415 COLL VENOUS BLD VENIPUNCTURE: CPT | Performed by: ADVANCED PRACTICE MIDWIFE

## 2018-08-02 PROCEDURE — 85027 COMPLETE CBC AUTOMATED: CPT | Performed by: ADVANCED PRACTICE MIDWIFE

## 2018-08-02 PROCEDURE — G0463 HOSPITAL OUTPT CLINIC VISIT: HCPCS | Mod: ZF

## 2018-08-02 NOTE — PROGRESS NOTES
"Subjective:     28 year old  at 36w3d presents for a routine prenatal appointment.  Denies vaginal bleeding,  leakage of fluid, or change in vaginal discharge.  Denies contractions.  + fetal movement.       No HA, visual changes, RUQ or epigastric pain.   Patient concerns: Feeling well overall. Mild ANNEMARIE.    Objective:  Vitals:    18 0851   BP: 116/80   Pulse: 83   Weight: 76.8 kg (169 lb 4.8 oz)   Height: 1.575 m (5' 2.01\")   See OB flowsheet    Assessment/Plan:  Encounter Diagnosis   Name Primary?     High risk pregnancy, antepartum - [facility]S CNM Yes     Orders Placed This Encounter   Procedures     CBC with Platelets     PHQ-9 SCORE 2018   Total Score 1     GBS screening: positive in urine.  Labor signs discussed. Reinforced daily fetal movement counts.  Reviewed why/how to contact provider if headache/visual changes/RUQ or epigastric pain, decreased fetal movement, vaginal bleeding, leakage of fluid.  Repeat CBC with platelets today  Return to clinic in 1 week and prn if questions or concerns.   "

## 2018-08-02 NOTE — LETTER
"2018       RE: Pam Syed  [address]  [place] [address]     Dear Colleague,    Thank you for referring your patient, Pam Syed, to the [facility] at [facility]. Please see a copy of my visit note below.    Subjective:     28 year old  at 36w3d presents for a routine prenatal appointment.  Denies vaginal bleeding,  leakage of fluid, or change in vaginal discharge.  Denies contractions.  + fetal movement.       No HA, visual changes, RUQ or epigastric pain.   Patient concerns: Feeling well overall. Mild ANNEMARIE.    Objective:  Vitals:    18 0851   BP: 116/80   Pulse: 83   Weight: 76.8 kg (169 lb 4.8 oz)   Height: 1.575 m (5' 2.01\")   See OB flowsheet    Assessment/Plan:  Encounter Diagnosis   Name Primary?     High risk pregnancy, antepartum - WHS CNM Yes     Orders Placed This Encounter   Procedures     CBC with Platelets     PHQ-9 SCORE 2018   Total Score 1     GBS screening: positive in urine.  Labor signs discussed. Reinforced daily fetal movement counts.  Reviewed why/how to contact provider if headache/visual changes/RUQ or epigastric pain, decreased fetal movement, vaginal bleeding, leakage of fluid.  Repeat CBC with platelets today  Return to clinic in 1 week and prn if questions or concerns.     Again, thank you for allowing me to participate in the care of your patient.      Sincerely,    ASHIA Gipson CNM      "

## 2018-08-02 NOTE — MR AVS SNAPSHOT
After Visit Summary   2018    Pam Syed    MRN: [id number]           Patient Information     Date Of Birth          1990        Visit Information        Provider Department      2018 8:45 AM Justice Cosby APRN [facility]        Today's Diagnoses     High risk pregnancy, antepartum - [facility] CNM    -  1       Follow-ups after your visit        Follow-up notes from your care team     Return in about 1 week (around 2018) for CAS Visit.      Your next 10 appointments already scheduled     Aug 09, 2018  8:15 AM CDT   RETURN OB with ASHIA Abraham CNM   [facility] ([facility])    [facility] [address]  [address]  [address]  [facility] [id number] [phone]            Aug 14, 2018  3:45 PM CDT   [facility]    [facility] [address]  [address]  [address] [facility] [address] [phone]              Who to contact     Please call your clinic at [phone] to:    Ask questions about your health    Make or cancel appointments    Discuss your medicines    Learn about your test results    Speak to your doctor            Additional Information About Your Visit        MyChart Information     fuseSPORTt is an electronic gateway that provides easy, online access to your medical records. With [employer], you can request a clinic appointment, read your test results, renew a prescription or communicate with your care team.     To sign up for fuseSPORTt visit the website at [email or web address]   You will be asked to enter the access code listed below, as well as some personal information. Please follow the directions to create your username and password.     Your access code is: [email or web address]  Expires: 10/24/2018  9:06 AM     Your access code will  in 90 days. If you need help  "or a new code, please contact your [facility] or call [phone] for assistance.        Care EveryWhere ID     This is your Care EveryWhere ID. This could be used by other organizations to access your [place] medical records  [id number]        Your Vitals Were     Pulse Height Last Period BMI (Body Mass Index)          83 1.575 m (5' 2.01\") 11/20/2017 30.96 kg/m2         Blood Pressure from Last 3 Encounters:   08/02/18 116/80   07/26/18 113/73   07/03/18 92/63    Weight from Last 3 Encounters:   08/02/18 76.8 kg (169 lb 4.8 oz)   07/26/18 75.1 kg (165 lb 8 oz)   07/03/18 73.5 kg (162 lb)              We Performed the Following     CBC with Platelets        Primary Care Provider Office Phone # Fax #    [facility] [phone]       [address]  [facility] [address]        Equal Access to Services     JAZMIN HARO : Hadii courtney ku hadasho Soomaali, waaxda luqadaha, qaybta kaalmada adeegyada, ada spangler . So [facility] [phone].    ATENCIÓN: Si habla español, tiene a kaufman disposición servicios gratuitos de asistencia lingüística. Llame al [phone].    We comply with applicable federal civil rights laws and [place] laws. We do not discriminate on the basis of race, color, national origin, age, disability, sex, sexual orientation, or gender identity.            Thank you!     Thank you for choosing [facility]  for your care. Our goal is always to provide you with excellent care. Hearing back from our patients is one way we can continue to improve our services. Please take a few minutes to complete the written survey that you may receive in the mail after your visit with us. Thank you!             Your Updated Medication List - Protect others around you: Learn how to safely use, store and throw away your medicines at www.disposemymeds.org.          This list is accurate as of 8/2/18  9:01 " AM.  Always use your most recent med list.                   Brand Name Dispense Instructions for use Diagnosis    amoxicillin 500 MG capsule    AMOXIL    15 capsule    Take 1 capsule (500 mg) by mouth 3 times daily    High risk pregnancy, antepartum, Urinary tract infection without hematuria, site unspecified       ascorbic acid 250 MG Chew chewable tablet    vitamin C    90 tablet    Take 1 tablet (250 mg) by mouth daily    Iron deficiency anemia secondary to inadequate dietary iron intake       Cyanocobalamin 1000 MCG Lozg     30 lozenge    Take 1 tablet by mouth daily    Iron deficiency anemia secondary to inadequate dietary iron intake       ferrous sulfate 220 (44 Fe) MG/5ML Liqd     1 Bottle    Take 5 mLs by mouth daily    Other iron deficiency anemia       order for DME     1 each    Maternity Belt order    Pelvic pain in pregnancy       PRENATAL VITAMIN AND MINERAL 28-0.8 MG Tabs     90 tablet    Take 1 tablet by mouth daily    Early stage of pregnancy       sennosides 8.6 MG tablet    SENOKOT    60 tablet    Take 1 tablet by mouth 2 times daily as needed for constipation    High-risk pregnancy in first trimester       THERMACARE BACK/HIP Misc     9 each    1 Device daily as needed    Back pain in pregnancy

## 2018-08-09 ENCOUNTER — OFFICE VISIT (OUTPATIENT)
Dept: OBGYN | Facility: CLINIC | Age: 28
End: 2018-08-09
Attending: ADVANCED PRACTICE MIDWIFE
Payer: COMMERCIAL

## 2018-08-09 ENCOUNTER — APPOINTMENT (OUTPATIENT)
Dept: LAB | Facility: CLINIC | Age: 28
End: 2018-08-09
Attending: ADVANCED PRACTICE MIDWIFE
Payer: COMMERCIAL

## 2018-08-09 VITALS
WEIGHT: 169.2 LBS | HEART RATE: 97 BPM | HEIGHT: 62 IN | DIASTOLIC BLOOD PRESSURE: 76 MMHG | SYSTOLIC BLOOD PRESSURE: 109 MMHG | BODY MASS INDEX: 31.14 KG/M2

## 2018-08-09 DIAGNOSIS — O09.893 SUPERVISION OF OTHER HIGH RISK PREGNANCIES, THIRD TRIMESTER: Primary | ICD-10-CM

## 2018-08-09 LAB
ERYTHROCYTE [DISTWIDTH] IN BLOOD BY AUTOMATED COUNT: 15.9 % (ref 10–15)
HCT VFR BLD AUTO: 35.4 % (ref 35–47)
HGB BLD-MCNC: 12 G/DL (ref 11.7–15.7)
MCH RBC QN AUTO: 28.9 PG (ref 26.5–33)
MCHC RBC AUTO-ENTMCNC: 33.9 G/DL (ref 31.5–36.5)
MCV RBC AUTO: 85 FL (ref 78–100)
PLATELET # BLD AUTO: 117 10E9/L (ref 150–450)
RBC # BLD AUTO: 4.15 10E12/L (ref 3.8–5.2)
WBC # BLD AUTO: 8.3 10E9/L (ref 4–11)

## 2018-08-09 PROCEDURE — 85027 COMPLETE CBC AUTOMATED: CPT | Performed by: ADVANCED PRACTICE MIDWIFE

## 2018-08-09 PROCEDURE — 36415 COLL VENOUS BLD VENIPUNCTURE: CPT | Performed by: ADVANCED PRACTICE MIDWIFE

## 2018-08-09 PROCEDURE — G0463 HOSPITAL OUTPT CLINIC VISIT: HCPCS | Mod: ZF

## 2018-08-09 ASSESSMENT — ANXIETY QUESTIONNAIRES
2. NOT BEING ABLE TO STOP OR CONTROL WORRYING: NOT AT ALL
GAD7 TOTAL SCORE: 0
6. BECOMING EASILY ANNOYED OR IRRITABLE: NOT AT ALL
3. WORRYING TOO MUCH ABOUT DIFFERENT THINGS: NOT AT ALL
1. FEELING NERVOUS, ANXIOUS, OR ON EDGE: NOT AT ALL
5. BEING SO RESTLESS THAT IT IS HARD TO SIT STILL: NOT AT ALL
7. FEELING AFRAID AS IF SOMETHING AWFUL MIGHT HAPPEN: NOT AT ALL

## 2018-08-09 ASSESSMENT — PATIENT HEALTH QUESTIONNAIRE - PHQ9: 5. POOR APPETITE OR OVEREATING: NOT AT ALL

## 2018-08-09 NOTE — MR AVS SNAPSHOT
After Visit Summary   2018    Pam Syed    MRN: [id number]           Patient Information     Date Of Birth          1990        Visit Information        Provider Department      2018 8:15 AM Cortney Paul APRN CNM [facility]        Today's Diagnoses     Supervision of other high risk pregnancies, third trimester    -  1       Follow-ups after your visit        Follow-up notes from your care team     Return in about 1 week (around 2018).      Your next 10 appointments already scheduled     Aug 14, 2018  3:45 PM CDT   RETURN OB with ASHIA Ruiz CNM   [facility] ([facility])    [facility] [address]  [address]  [address]  [facility] [address] [phone]              Who to contact     Please call your clinic at [phone] to:    Ask questions about your health    Make or cancel appointments    Discuss your medicines    Learn about your test results    Speak to your doctor            Additional Information About Your Visit        MyChart Information     Clipik is an electronic gateway that provides easy, online access to your medical records. With Clipik, you can request a clinic appointment, read your test results, renew a prescription or communicate with your care team.     To sign up for Clipik visit the website at [email or web address]   You will be asked to enter the access code listed below, as well as some personal information. Please follow the directions to create your username and password.     Your access code is: [email or web address]  Expires: 10/24/2018  9:06 AM     Your access code will  in 90 days. If you need help or a new code, please contact your [facility] or call [phone] for assistance.        Care EveryWhere ID     This is your Care EveryWhere ID. This could be used by other organizations to access your  "[place] medical records  [id number]        Your Vitals Were     Pulse Height Last Period BMI (Body Mass Index)          97 1.575 m (5' 2\") 11/20/2017 30.95 kg/m2         Blood Pressure from Last 3 Encounters:   08/09/18 109/76   08/02/18 116/80   07/26/18 113/73    Weight from Last 3 Encounters:   08/09/18 76.7 kg (169 lb 3.2 oz)   08/02/18 76.8 kg (169 lb 4.8 oz)   07/26/18 75.1 kg (165 lb 8 oz)              We Performed the Following     CBC with Platelets        Primary Care Provider Office Phone # Fax #    [facility] [phone]       [address]  [facility] [address]        Equal Access to Services     JAZMIN HARO : Hadii courtney Harrison, waaxda luqadaha, qaybta kaalmada suleiman, ada spangler . So [facility] [phone].    ATENCIÓN: Si habla español, tiene a kaufman disposición servicios gratuitos de asistencia lingüística. Belén al [phone].    We comply with applicable federal civil rights laws and [place] laws. We do not discriminate on the basis of race, color, national origin, age, disability, sex, sexual orientation, or gender identity.            Thank you!     Thank you for choosing [facility]  for your care. Our goal is always to provide you with excellent care. Hearing back from our patients is one way we can continue to improve our services. Please take a few minutes to complete the written survey that you may receive in the mail after your visit with us. Thank you!             Your Updated Medication List - Protect others around you: Learn how to safely use, store and throw away your medicines at www.disposemymeds.org.          This list is accurate as of 8/9/18  8:35 AM.  Always use your most recent med list.                   Brand Name Dispense Instructions for use Diagnosis    amoxicillin 500 MG capsule    AMOXIL    15 capsule    Take 1 capsule (500 mg) by mouth 3 times daily    High risk " pregnancy, antepartum, Urinary tract infection without hematuria, site unspecified       ascorbic acid 250 MG Chew chewable tablet    vitamin C    90 tablet    Take 1 tablet (250 mg) by mouth daily    Iron deficiency anemia secondary to inadequate dietary iron intake       Cyanocobalamin 1000 MCG Lozg     30 lozenge    Take 1 tablet by mouth daily    Iron deficiency anemia secondary to inadequate dietary iron intake       ferrous sulfate 220 (44 Fe) MG/5ML Liqd     1 Bottle    Take 5 mLs by mouth daily    Other iron deficiency anemia       order for DME     1 each    Maternity Belt order    Pelvic pain in pregnancy       PRENATAL VITAMIN AND MINERAL 28-0.8 MG Tabs     90 tablet    Take 1 tablet by mouth daily    Early stage of pregnancy       sennosides 8.6 MG tablet    SENOKOT    60 tablet    Take 1 tablet by mouth 2 times daily as needed for constipation    High-risk pregnancy in first trimester       THERMACARE BACK/HIP Misc     9 each    1 Device daily as needed    Back pain in pregnancy

## 2018-08-09 NOTE — PROGRESS NOTES
"Subjective:     28 year old  at 37w3d presents for routine prenatal visit. Here with toddler.   No vaginal bleeding or leakage of fluid.  Occasional contractions.  Positive fetal movement.       No HA, visual changes, RUQ or epigastric pain.   Patient concerns: No new concerns. Ready for labor. Plans  for labor support. Would like to use nitrous, then epidural.     Objective:  Vitals:    18 0814   BP: 109/76   BP Location: Left arm   Patient Position: Chair   Pulse: 97   Weight: 76.7 kg (169 lb 3.2 oz)   Height: 1.575 m (5' 2\")     See OB flowsheet    Assessment/Plan     Encounter Diagnosis   Name Primary?     Supervision of other high risk pregnancies, third trimester Yes     Orders Placed This Encounter   Procedures     CBC with Platelets       - Reviewed postdates testing including BPP => 41 weeks and rationale for induction of labor based on results.   Patient desires spontaneous labor, postdates testing.  - Reviewed why/how to contact provider if headache/visual changes/RUQ or epigastric pain, decreased fetal movement, vaginal bleeding, leakage of fluid or strong/regular contractions.   Patient education/orders or handouts today:  Sign/symptoms of labor, When to call for labor or other concerns and Postdates testing discussion  Return to clinic in 1 week and prn if questions or concerns.   ASHIA Abraham CNM    "

## 2018-08-09 NOTE — LETTER
"2018       RE: Pam Syed  [address]  [place] [address]     Dear Colleague,    Thank you for referring your patient, Pam Syed, to the [facility] at [facility]. Please see a copy of my visit note below.    Subjective:     28 year old  at 37w3d presents for routine prenatal visit. Here with toddler.   No vaginal bleeding or leakage of fluid.  Occasional contractions.  Positive fetal movement.       No HA, visual changes, RUQ or epigastric pain.   Patient concerns: No new concerns. Ready for labor. Plans  for labor support. Would like to use nitrous, then epidural.     Objective:  Vitals:    18 0814   BP: 109/76   BP Location: Left arm   Patient Position: Chair   Pulse: 97   Weight: 76.7 kg (169 lb 3.2 oz)   Height: 1.575 m (5' 2\")     See OB flowsheet    Assessment/Plan     Encounter Diagnosis   Name Primary?     Supervision of other high risk pregnancies, third trimester Yes     Orders Placed This Encounter   Procedures     CBC with Platelets       - Reviewed postdates testing including BPP => 41 weeks and rationale for induction of labor based on results.   Patient desires spontaneous labor, postdates testing.  - Reviewed why/how to contact provider if headache/visual changes/RUQ or epigastric pain, decreased fetal movement, vaginal bleeding, leakage of fluid or strong/regular contractions.   Patient education/orders or handouts today:  Sign/symptoms of labor, When to call for labor or other concerns and Postdates testing discussion  Return to clinic in 1 week and prn if questions or concerns.       Again, thank you for allowing me to participate in the care of your patient.      Sincerely,    ASHIA Abraham CNM      "

## 2018-08-10 ASSESSMENT — ANXIETY QUESTIONNAIRES: GAD7 TOTAL SCORE: 0

## 2018-08-10 ASSESSMENT — PATIENT HEALTH QUESTIONNAIRE - PHQ9: SUM OF ALL RESPONSES TO PHQ QUESTIONS 1-9: 2

## 2018-08-17 ENCOUNTER — OFFICE VISIT (OUTPATIENT)
Dept: OBGYN | Facility: CLINIC | Age: 28
End: 2018-08-17
Attending: ADVANCED PRACTICE MIDWIFE
Payer: COMMERCIAL

## 2018-08-17 VITALS — BODY MASS INDEX: 31.02 KG/M2 | SYSTOLIC BLOOD PRESSURE: 128 MMHG | WEIGHT: 169.6 LBS | DIASTOLIC BLOOD PRESSURE: 77 MMHG

## 2018-08-17 DIAGNOSIS — D69.6 THROMBOCYTOPENIA (H): ICD-10-CM

## 2018-08-17 DIAGNOSIS — O09.90 HIGH RISK PREGNANCY, ANTEPARTUM: Primary | ICD-10-CM

## 2018-08-17 LAB
ERYTHROCYTE [DISTWIDTH] IN BLOOD BY AUTOMATED COUNT: 15.7 % (ref 10–15)
HCT VFR BLD AUTO: 36 % (ref 35–47)
HGB BLD-MCNC: 12 G/DL (ref 11.7–15.7)
MCH RBC QN AUTO: 28.6 PG (ref 26.5–33)
MCHC RBC AUTO-ENTMCNC: 33.3 G/DL (ref 31.5–36.5)
MCV RBC AUTO: 86 FL (ref 78–100)
PLATELET # BLD AUTO: 107 10E9/L (ref 150–450)
RBC # BLD AUTO: 4.19 10E12/L (ref 3.8–5.2)
WBC # BLD AUTO: 7.1 10E9/L (ref 4–11)

## 2018-08-17 PROCEDURE — G0463 HOSPITAL OUTPT CLINIC VISIT: HCPCS | Mod: ZF

## 2018-08-17 PROCEDURE — 85027 COMPLETE CBC AUTOMATED: CPT | Performed by: ADVANCED PRACTICE MIDWIFE

## 2018-08-17 PROCEDURE — 36415 COLL VENOUS BLD VENIPUNCTURE: CPT | Performed by: ADVANCED PRACTICE MIDWIFE

## 2018-08-17 ASSESSMENT — PAIN SCALES - GENERAL: PAINLEVEL: NO PAIN (0)

## 2018-08-17 NOTE — MR AVS SNAPSHOT
After Visit Summary   2018    Pam Syed    MRN: [id number]           Patient Information     Date Of Birth          1990        Visit Information        Provider Department      2018 8:45 AM Vida Sandoval CNM [facility]        Today's Diagnoses     High risk pregnancy, antepartum - [facility] CNM    -  1    Thrombocytopenia (H)           Follow-ups after your visit        Follow-up notes from your care team     Return in about 1 week (around 2018) for RADHA ATWOOD.      Your next 10 appointments already scheduled     Aug 21, 2018 11:45 AM CDT   RETURN OB with ASHIA Mendez CNM   [facility] ([facility])    [facility] [address]  [address]  [facility] [address] [phone]              Who to contact     Please call your clinic at [phone] to:    Ask questions about your health    Make or cancel appointments    Discuss your medicines    Learn about your test results    Speak to your doctor            Additional Information About Your Visit        MyChart Information     DonorSearch is an electronic gateway that provides easy, online access to your medical records. With DonorSearch, you can request a clinic appointment, read your test results, renew a prescription or communicate with your care team.     To sign up for DonorSearch visit the website at [email or web address]   You will be asked to enter the access code listed below, as well as some personal information. Please follow the directions to create your username and password.     Your access code is: [email or web address]  Expires: 10/24/2018  9:06 AM     Your access code will  in 90 days. If you need help or a new code, please contact your [facility] or call [phone] for assistance.        Care EveryWhere ID     This is your Care EveryWhere ID. This could be used by other  organizations to access your [place] medical records  [id number]        Your Vitals Were     Last Period BMI (Body Mass Index)                11/20/2017 31.02 kg/m2           Blood Pressure from Last 3 Encounters:   08/17/18 128/77   08/09/18 109/76   08/02/18 116/80    Weight from Last 3 Encounters:   08/17/18 76.9 kg (169 lb 9.6 oz)   08/09/18 76.7 kg (169 lb 3.2 oz)   08/02/18 76.8 kg (169 lb 4.8 oz)              We Performed the Following     CBC with Platelets          Today's Medication Changes          These changes are accurate as of 8/17/18 11:59 PM.  If you have any questions, ask your nurse or doctor.               Stop taking these medicines if you haven't already. Please contact your care team if you have questions.     amoxicillin 500 MG capsule   Commonly known as:  AMOXIL           ferrous sulfate 220 (44 Fe) MG/5ML Liqd           order for DME           THERMACARE BACK/HIP Misc                    Primary Care Provider Office Phone # Fax #    [facility] [phone]       [address]  [facility] [address]        Equal Access to Services     JAZMIN HARO AH: Hadii courtney Harrison, waramseyda julius, qahananeta kaalmada suleiman, ada crouch. So [facility] [phone].    ATENCIÓN: Si habla español, tiene a kaufman disposición servicios gratuitos de asistencia lingüística. Belén al [phone].    We comply with applicable federal civil rights laws and [place] laws. We do not discriminate on the basis of race, color, national origin, age, disability, sex, sexual orientation, or gender identity.            Thank you!     Thank you for choosing [facility]  for your care. Our goal is always to provide you with excellent care. Hearing back from our patients is one way we can continue to improve our services. Please take a few minutes to complete the written survey that you may receive in the mail after your visit  with us. Thank you!             Your Updated Medication List - Protect others around you: Learn how to safely use, store and throw away your medicines at www.disposemymeds.org.          This list is accurate as of 8/17/18 11:59 PM.  Always use your most recent med list.                   Brand Name Dispense Instructions for use Diagnosis    ascorbic acid 250 MG Chew chewable tablet    vitamin C    90 tablet    Take 1 tablet (250 mg) by mouth daily    Iron deficiency anemia secondary to inadequate dietary iron intake       Cyanocobalamin 1000 MCG Lozg     30 lozenge    Take 1 tablet by mouth daily    Iron deficiency anemia secondary to inadequate dietary iron intake       PRENATAL VITAMIN AND MINERAL 28-0.8 MG Tabs     90 tablet    Take 1 tablet by mouth daily    Early stage of pregnancy       sennosides 8.6 MG tablet    SENOKOT    60 tablet    Take 1 tablet by mouth 2 times daily as needed for constipation    High-risk pregnancy in first trimester

## 2018-08-17 NOTE — LETTER
Date:August 20, 2018      Patient was self referred, no letter generated. Do not send.        [facility] Information

## 2018-08-17 NOTE — PROGRESS NOTES
Subjective:     28 year old  at 38w4d presents for routine prenatal visit.     Patient concerns:  Feeling well overall. Felt some menstrual-like cramping this morning and some occasional contractions. Felt contractions during an exam she was taking on Tuesday. No regular contractions. Denies vaginal bleeding, discharge or leakage of fluid. Reports +fetal movement.     Does not desire SVE today as she is worried she will go into labor. States that last pregnancy she had the baby at 38+5, and it was the same day as a cervical exam in clinic. 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 Would like to finish that paper tonight before going into labor. Very excited about the completion of her degree and very excited about meeting her baby soon. Present today at appointment with her son.     Objective:  Vitals:    18 0913   BP: 128/77   Weight: 76.9 kg (169 lb 9.6 oz)      See OB flowsheet    Assessment/Plan     Encounter Diagnosis   Name Primary?     High risk pregnancy, antepartum - [facility] CNM Yes     Orders Placed This Encounter   Procedures     CBC with Platelets     - Reviewed why/how to contact provider if headache/visual changes/RUQ or epigastric pain, decreased fetal movement, vaginal bleeding, leakage of fluid or strong/regular contractions.   Patient education/orders or handouts today:  Sign/symptoms of labor and When to call for labor or other concerns    Agreeable to CBC with platelets today d/t thrombocytopenia. Refer to hematology for <100K. Continue weekly checks.   Plan next appointment on Tuesday with ANGELIC Shaffer as pt would like to meet all the CNMs (this is the only CNM she has not met between last pregnancy and this one).     Continue scheduled prenatal care, RTC in 1 week and prn if questions or concerns.     ASHIA Rose, ANGELIC

## 2018-08-17 NOTE — LETTER
2018       RE: Pam Syed  [address]  [place] [address]     Dear Colleague,    Thank you for referring your patient, Pam Syed, to the [facility] at [facility]. Please see a copy of my visit note below.    Subjective:     28 year old  at 38w4d presents for routine prenatal visit.     Patient concerns:  Feeling well overall. Felt some menstrual-like cramping this morning and some occasional contractions. Felt contractions during an exam she was taking on Tuesday. No regular contractions. Denies vaginal bleeding, discharge or leakage of fluid. Reports +fetal movement.     Does not desire SVE today as she is worried she will go into labor. States that last pregnancy she had the baby at 38+5, and it was the same day as a cervical exam in clinic. 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 Would like to finish that paper tonight before going into labor. Very excited about the completion of her degree and very excited about meeting her baby soon. Present today at appointment with her son.     Objective:  Vitals:    18 0913   BP: 128/77   Weight: 76.9 kg (169 lb 9.6 oz)      See OB flowsheet    Assessment/Plan     Encounter Diagnosis   Name Primary?     High risk pregnancy, antepartum - [facility] ANGELIC Yes     Orders Placed This Encounter   Procedures     CBC with Platelets     - Reviewed why/how to contact provider if headache/visual changes/RUQ or epigastric pain, decreased fetal movement, vaginal bleeding, leakage of fluid or strong/regular contractions.   Patient education/orders or handouts today:  Sign/symptoms of labor and When to call for labor or other concerns    Agreeable to CBC with platelets today d/t thrombocytopenia. Refer to hematology for <100K. Continue weekly checks.   Plan next appointment on Tuesday with ANGELIC Patel  Page as pt would like to meet all the CNMs (this is the only CNM she has not met between last pregnancy and this one).     Continue scheduled prenatal care, RTC in 1 week and prn if questions or concerns.     ASHIA Rose CNM        Again, thank you for allowing me to participate in the care of your patient.      Sincerely,    Vida Sandoval CNM

## 2018-08-21 ENCOUNTER — OFFICE VISIT (OUTPATIENT)
Dept: OBGYN | Facility: CLINIC | Age: 28
End: 2018-08-21
Attending: ADVANCED PRACTICE MIDWIFE
Payer: COMMERCIAL

## 2018-08-21 VITALS
HEIGHT: 62 IN | BODY MASS INDEX: 31.47 KG/M2 | HEART RATE: 87 BPM | DIASTOLIC BLOOD PRESSURE: 87 MMHG | SYSTOLIC BLOOD PRESSURE: 126 MMHG | WEIGHT: 171 LBS

## 2018-08-21 DIAGNOSIS — D69.6 THROMBOCYTOPENIA (H): ICD-10-CM

## 2018-08-21 DIAGNOSIS — B95.1 GROUP B STREPTOCOCCUS URINARY TRACT INFECTION AFFECTING PREGNANCY IN SECOND TRIMESTER: ICD-10-CM

## 2018-08-21 DIAGNOSIS — O09.90 HIGH RISK PREGNANCY, ANTEPARTUM: Primary | ICD-10-CM

## 2018-08-21 DIAGNOSIS — O23.42 GROUP B STREPTOCOCCUS URINARY TRACT INFECTION AFFECTING PREGNANCY IN SECOND TRIMESTER: ICD-10-CM

## 2018-08-21 PROCEDURE — G0463 HOSPITAL OUTPT CLINIC VISIT: HCPCS | Mod: ZF

## 2018-08-21 NOTE — LETTER
"2018       RE: Pam Syed  [address]  [place] [address]     Dear Colleague,    Thank you for referring your patient, Pam Syed, to the [facility] at [facility]. Please see a copy of my visit note below.    Subjective:     28 year old  at 39w1d presents for routine prenatal visit. Here w , ready for baby! Desires membrane sweep           no vaginal bleeding or leakage of fluid.  Pos mild contractions.  pos fetal movement.        No HA, visual changes, RUQ or epigastric pain.   Patient concerns:   Feeling well overall.  Objective:  Vitals:    18 1152   BP: 126/87   BP Location: Left arm   Patient Position: Chair   Pulse: 87   Weight: 77.6 kg (171 lb)   Height: 1.575 m (5' 2\")    See OB flowsheet  Membrane sweep today, very small amount bloody show w exam  Assessment/Plan     Encounter Diagnoses   Name Primary?     High risk pregnancy, antepartum - [facility] CNM Yes     Group B Streptococcus urinary tract infection affecting pregnancy in second trimester - >10,000 treat per consensus      Thrombocytopenia (H)      Orders Placed This Encounter   Procedures     CBC with Platelets     No orders of the defined types were placed in this encounter.      - Reviewed why/how to contact provider if headache/visual changes/RUQ or epigastric pain, decreased fetal movement, vaginal bleeding, leakage of fluid or strong/regular contractions.   Patient education/orders or handouts today:  Sign/symptoms of labor and When to call for labor or other concerns  Return to clinic in 1 week and prn if questions or concerns.       Again, thank you for allowing me to participate in the care of your patient.      Sincerely,    ASHIA Wall CNM      "

## 2018-08-21 NOTE — MR AVS SNAPSHOT
After Visit Summary   2018    Pam Syed    MRN: [id number]           Patient Information     Date Of Birth          1990        Visit Information        Provider Department      2018 11:45 AM Kim Shaffer APRN CNM [facility]        Today's Diagnoses     High risk pregnancy, antepartum - [facility] CNM    -  1    Group B Streptococcus urinary tract infection affecting pregnancy in second trimester - >10,000 treat per consensus        Thrombocytopenia (H)           Follow-ups after your visit        Follow-up notes from your care team     Return in about 1 week (around 2018) for CAS.      Future tests that were ordered for you today     Open Future Orders        Priority Expected Expires Ordered    CBC with Platelets Routine 2018            Who to contact     Please call your clinic at [phone] to:    Ask questions about your health    Make or cancel appointments    Discuss your medicines    Learn about your test results    Speak to your doctor            Additional Information About Your Visit        MyChart Information     BitX is an electronic gateway that provides easy, online access to your medical records. With BitX, you can request a clinic appointment, read your test results, renew a prescription or communicate with your care team.     To sign up for BitX visit the website at [email or web address]   You will be asked to enter the access code listed below, as well as some personal information. Please follow the directions to create your username and password.     Your access code is: [email or web address]  Expires: 10/24/2018  9:06 AM     Your access code will  in 90 days. If you need help or a new code, please contact your [facility] or call [phone] for assistance.        Care EveryWhere ID     This is your Care EveryWhere ID. This could be used by other organizations  "to access your [place] medical records  [id number]        Your Vitals Were     Pulse Height Last Period BMI (Body Mass Index)          87 1.575 m (5' 2\") 11/20/2017 31.28 kg/m2         Blood Pressure from Last 3 Encounters:   08/21/18 126/87   08/17/18 128/77   08/09/18 109/76    Weight from Last 3 Encounters:   08/21/18 77.6 kg (171 lb)   08/17/18 76.9 kg (169 lb 9.6 oz)   08/09/18 76.7 kg (169 lb 3.2 oz)               Primary Care Provider Office Phone # Fax #    [facility] [phone]       [address]  [facility] [address]        Equal Access to Services     JAZMIN [facility] Center[facility] : Hadii courtnye brookso Sotere, waaxda luqadaha, qaybta kaalmada adeegyada, ada crouch. So [facility] [phone].    ATENCIÓN: Si habla español, tiene a kaufman disposición servicios gratuitos de asistencia lingüística. Belén al [phone].    We comply with applicable federal civil rights laws and [place] laws. We do not discriminate on the basis of race, color, national origin, age, disability, sex, sexual orientation, or gender identity.            Thank you!     Thank you for choosing [facility]  for your care. Our goal is always to provide you with excellent care. Hearing back from our patients is one way we can continue to improve our services. Please take a few minutes to complete the written survey that you may receive in the mail after your visit with us. Thank you!             Your Updated Medication List - Protect others around you: Learn how to safely use, store and throw away your medicines at www.disposemymeds.org.          This list is accurate as of 8/21/18  5:22 PM.  Always use your most recent med list.                   Brand Name Dispense Instructions for use Diagnosis    ascorbic acid 250 MG Chew chewable tablet    vitamin C    90 tablet    Take 1 tablet (250 mg) by mouth daily    Iron deficiency anemia secondary to inadequate " dietary iron intake       Cyanocobalamin 1000 MCG Lozg     30 lozenge    Take 1 tablet by mouth daily    Iron deficiency anemia secondary to inadequate dietary iron intake       PRENATAL VITAMIN AND MINERAL 28-0.8 MG Tabs     90 tablet    Take 1 tablet by mouth daily    Early stage of pregnancy       sennosides 8.6 MG tablet    SENOKOT    60 tablet    Take 1 tablet by mouth 2 times daily as needed for constipation    High-risk pregnancy in first trimester

## 2018-08-21 NOTE — PROGRESS NOTES
"Subjective:     28 year old  at 39w1d presents for routine prenatal visit. Here w , ready for baby! Desires membrane sweep           no vaginal bleeding or leakage of fluid.  Pos mild contractions.  pos fetal movement.        No HA, visual changes, RUQ or epigastric pain.   Patient concerns:   Feeling well overall.  Objective:  Vitals:    18 1152   BP: 126/87   BP Location: Left arm   Patient Position: Chair   Pulse: 87   Weight: 77.6 kg (171 lb)   Height: 1.575 m (5' 2\")    See OB flowsheet  Membrane sweep today, very small amount bloody show w exam  Assessment/Plan     Encounter Diagnoses   Name Primary?     High risk pregnancy, antepartum - [facility] CNM Yes     Group B Streptococcus urinary tract infection affecting pregnancy in second trimester - >10,000 treat per consensus      Thrombocytopenia (H)      Orders Placed This Encounter   Procedures     CBC with Platelets     No orders of the defined types were placed in this encounter.      - Reviewed why/how to contact provider if headache/visual changes/RUQ or epigastric pain, decreased fetal movement, vaginal bleeding, leakage of fluid or strong/regular contractions.   Patient education/orders or handouts today:  Sign/symptoms of labor and When to call for labor or other concerns  Return to clinic in 1 week and prn if questions or concerns.   Kim Shaffer, APRN CNM      "

## 2018-08-22 ENCOUNTER — HOSPITAL ENCOUNTER (INPATIENT)
Facility: CLINIC | Age: 28
LOS: 1 days | Discharge: HOME | End: 2018-08-24
Attending: ADVANCED PRACTICE MIDWIFE | Admitting: ADVANCED PRACTICE MIDWIFE
Payer: COMMERCIAL

## 2018-08-22 DIAGNOSIS — O36.4XX0 IUFD AT 20 WEEKS OR MORE OF GESTATION: Primary | ICD-10-CM

## 2018-08-22 DIAGNOSIS — D50.8 IRON DEFICIENCY ANEMIA SECONDARY TO INADEQUATE DIETARY IRON INTAKE: ICD-10-CM

## 2018-08-22 PROBLEM — Z36.89 ENCOUNTER FOR TRIAGE IN PREGNANT PATIENT: Status: ACTIVE | Noted: 2018-08-22

## 2018-08-22 LAB — RUPTURE OF FETAL MEMBRANES BY ROM PLUS: NEGATIVE

## 2018-08-22 PROCEDURE — 84112 EVAL AMNIOTIC FLUID PROTEIN: CPT | Performed by: ADVANCED PRACTICE MIDWIFE

## 2018-08-22 NOTE — IP AVS SNAPSHOT
[facility]    [address]    [place] [address]    Phone:  [phone]                                       After Visit Summary   8/22/2018    Pam Syed    MRN: [id number]           After Visit Summary Signature Page     I have received my discharge instructions, and my questions have been answered. I have discussed any challenges I see with this plan with the nurse or doctor.    ..........................................................................................................................................  Patient/Patient Representative Signature      ..........................................................................................................................................  Patient Representative Print Name and Relationship to Patient    ..................................................               ................................................  Date                                            Time    ..........................................................................................................................................  Reviewed by Signature/Title    ...................................................              ..............................................  Date                                                            Time          22EPIC Rev 08/18

## 2018-08-22 NOTE — IP AVS SNAPSHOT
"                  MRN:[id number]                      After Visit Summary   2018    Pam Syed    MRN: [id number]           Thank you!     Thank you for choosing [facility] for your care. Our goal is always to provide you with excellent care. Hearing back from our patients is one way we can continue to improve our services. Please take a few minutes to complete the written survey that you may receive in the mail after you visit with us. Thank you!        Patient Information     Date Of Birth          1990        About your hospital stay     You were admitted on:  2018 You last received care in the:  [facility]    You were discharged on:  2018       Who to Call     For medical emergencies, please call 911.  For non-urgent questions about your medical care, please call your primary care provider or clinic, [phone]          Attending Provider     Provider Specialty    Vida Sandoval CN[facility]wives    Amy Sanford APRN [facility]       Primary Care Provider Office Phone # Fax #    Deandre Patiño [phone]      Your next 10 appointments already scheduled     Aug 27, 2018  9:00 AM CDT   RETURN EXTENDED with Vida Sandoval CNM   [facility] ([facility])    [facility] [address]  [address]  [address] [facility] [address] [phone]              Further instructions from your care team        Loss Discharge Instructions   We recommend you see your provider to check on your physical and emotional recovery, and to walk through your experience within 1-2 weeks.  Any further recommendations for follow up will be discussed with your provider at that time.       The Blues and Grief  After delivery you will experience hormone changes and strong emotions, often referred to as the \"baby blues\".  You may find yourself easily upset, tearful or angry.  In " addition, you will be grieving for your own loss and may feel terribly tired and not want to face friends, family or new babies.    Your feelings will be hard to predict during this time.  You may have many ups and downs.  Be kind and patient with yourself.    No two people grieve the same way.  This is true of spouses and partners.  Try to have open communication, but don't depend solely on each other for support.  Reach out to friends, family, clergy and your health care providers.  You don't have to handle this alone.    Normal grief after a pregnancy or  loss often lasts for weeks or months.  Over time, you will have more good days than bad ones.  The first year is usually the hardest as you face significant dates and events for the first time.    At first, your sadness or anxiety may keep you from sleeping, eating, being with others or getting out of the house.  If, after a week, you aren't able to take care of basic daily tasks, please call your care provider right away.  It could be more serious than the blues or grief.  Going back to work:  Even though you did not bring home a living baby, you and your partner have a right to any family and bereavement leave benefits your employer offers.  When you do return to work, be prepared for some adjustment time.    Call your health care provider if you have any of these symptoms:  You soak a sanitary pad with blood within 1 hour, or you see blood clots larger than a golf ball.  Bleeding that lasts more than 6 weeks.  You have vaginal discharge that smells bad.   A fever above 100.4 F (38 C), with or without chills  Severe, pain, cramping or tenderness in your lower belly area.  If you have pain that increases or does not go away from an episiotomy or perineal tear  Increased pain, swelling, redness or fluid around your stitches.  A need to urinate more frequently (use the toilet more often), more urgently (use the toilet very quickly), or it burns when you  "urinate.  Redness, swelling or pain around a vein in your leg.  Problems with coping with sadness, anxiety, or depression.  Your breasts are engorged (hard and swollen), red and very tender and you have a fever.   If you have nausea and vomiting.  If you have chest pain and cough or are gasping for air.  You have questions or concerns after you return home.    Keep your hands clean:  Always wash your hands before touching your perineal area and stitches.  This helps reduce your risk of infection.  If your hands aren't dirty, you may use an alcohol hand-rub to clean your hands. Keep your nails clean and short.        Pending Results     Date and Time Order Name Status Description    8/23/2018 0900 CHROMOSOME SKIN PRODUCTS OF CONCEPTION With Professional Interpretation In process     8/23/2018 0358 Placenta path order and indications In process     8/23/2018 0200 Urine Culture Aerobic Bacterial Preliminary     8/23/2018 0046 Toxoplasma gondii abys IgG and IgM In process     8/23/2018 0046 Parvovirus B19 antibodies IgG IgM In process     8/23/2018 0046 Lupus Anticoagulant Panel In process             Statement of Approval     Ordered          08/24/18 0907  I have reviewed and agree with all the recommendations and orders detailed in this document.  EFFECTIVE NOW     Approved and electronically signed by:  Amy Sanford APRN CNM             Admission Information     Date & Time Provider Department Dept. Phone    8/22/2018 Amy Sanford APRN CNM [facility] [phone]      Your Vitals Were     Blood Pressure Pulse Temperature Respirations Last Period       131/83 75 99.2  F (37.3  C) (Oral) 18 11/20/2017       [employer] Information     [employer] lets you send messages to your doctor, view your test results, renew your prescriptions, schedule appointments and more. To sign up, go to [email or web address] . Click on \"Log in\" on the left side of the screen, which will take you to the Welcome " "page. Then click on \"Sign up Now\" on the right side of the page.     You will be asked to enter the access code listed below, as well as some personal information. Please follow the directions to create your username and password.     Your access code is: [email or web address]  Expires: 10/24/2018  9:06 AM     Your access code will  in 90 days. If you need help or a new code, please call your [facility] clinic or [phone].        Care EveryWhere ID     This is your Care EveryWhere ID. This could be used by other organizations to access your [facility] medical records  [id number]        Equal Access to Services     [facility] : Evelia Harrison, christina madrid, john bearden, ada crouch. So [facility] [phone].    ATENCIÓN: Si habla español, tiene a kaufman disposición servicios gratuitos de asistencia lingüística. Llame al [phone].    We comply with applicable federal civil rights laws and [place] laws. We do not discriminate on the basis of race, color, national origin, age, disability, sex, sexual orientation, or gender identity.               Review of your medicines      UNREVIEWED medicines. Ask your doctor about these medicines        Dose / Directions    Cyanocobalamin 1000 MCG Lozg   Used for:  Iron deficiency anemia secondary to inadequate dietary iron intake        Dose:  1 tablet   Take 1 tablet by mouth daily   Quantity:  30 lozenge   Refills:  3       sennosides 8.6 MG tablet   Commonly known as:  SENOKOT   Used for:  High-risk pregnancy in first trimester        Dose:  1 tablet   Take 1 tablet by mouth 2 times daily as needed for constipation   Quantity:  60 tablet   Refills:  1         START taking        Dose / Directions    ferrous sulfate 325 (65 Fe) MG tablet   Commonly known as:  IRON   Used for:  Iron deficiency anemia secondary to inadequate dietary iron intake        Dose:  325 mg   Take 1 tablet (325 mg) by mouth daily (with breakfast) "   Quantity:  30 tablet   Refills:  2       ibuprofen 200 MG tablet   Commonly known as:  ADVIL/MOTRIN        Dose:  600 mg   Take 3 tablets (600 mg) by mouth every 6 hours as needed for other (cramping)   Quantity:  50 tablet   Refills:  0         CONTINUE these medicines which have NOT CHANGED        Dose / Directions    ascorbic acid 250 MG Chew chewable tablet   Commonly known as:  vitamin C   Used for:  Iron deficiency anemia secondary to inadequate dietary iron intake        Dose:  250 mg   Take 1 tablet (250 mg) by mouth daily   Quantity:  90 tablet   Refills:  0       PRENATAL VITAMIN AND MINERAL 28-0.8 MG Tabs   Used for:  Early stage of pregnancy        Dose:  1 tablet   Take 1 tablet by mouth daily   Quantity:  90 tablet   Refills:  3            Where to get your medicines      These medications were sent to [facility] [place], [place] - [address]  [address] [address]     Phone:  [phone]     ferrous sulfate 325 (65 Fe) MG tablet    ibuprofen 200 MG tablet                Protect others around you: Learn how to safely use, store and throw away your medicines at www.disposemymeds.org.             Medication List: This is a list of all your medications and when to take them. Check marks below indicate your daily home schedule. Keep this list as a reference.      Medications           Morning Afternoon Evening Bedtime As Needed    ascorbic acid 250 MG Chew chewable tablet   Commonly known as:  vitamin C   Take 1 tablet (250 mg) by mouth daily                                Cyanocobalamin 1000 MCG Lozg   Take 1 tablet by mouth daily                                ferrous sulfate 325 (65 Fe) MG tablet   Commonly known as:  IRON   Take 1 tablet (325 mg) by mouth daily (with breakfast)                                ibuprofen 200 MG tablet   Commonly known as:  ADVIL/MOTRIN   Take 3 tablets (600 mg) by mouth every 6 hours as needed for other (cramping)   Last  time this was given:  800 mg on 8/23/2018  3:54 AM                                PRENATAL VITAMIN AND MINERAL 28-0.8 MG Tabs   Take 1 tablet by mouth daily                                sennosides 8.6 MG tablet   Commonly known as:  SENOKOT   Take 1 tablet by mouth 2 times daily as needed for constipation

## 2018-08-23 PROBLEM — O36.4XX0 IUFD AT 20 WEEKS OR MORE OF GESTATION: Status: ACTIVE | Noted: 2018-08-23

## 2018-08-23 LAB
ABO + RH BLD: NORMAL
ABO + RH BLD: NORMAL
ALBUMIN UR-MCNC: 300 MG/DL
ALT SERPL W P-5'-P-CCNC: 11 U/L (ref 0–50)
ALT SERPL W P-5'-P-CCNC: 11 U/L (ref 0–50)
AMPHETAMINES UR QL SCN: NEGATIVE
APPEARANCE UR: ABNORMAL
APTT PPP: 28 SEC (ref 22–37)
AST SERPL W P-5'-P-CCNC: 24 U/L (ref 0–45)
AST SERPL W P-5'-P-CCNC: NORMAL U/L (ref 0–45)
B2 GLYCOPROT1 IGG SERPL IA-ACNC: <0.6 U/ML
B2 GLYCOPROT1 IGM SERPL IA-ACNC: 1.5 U/ML
BACTERIA #/AREA URNS HPF: ABNORMAL /HPF
BASOPHILS # BLD AUTO: 0 10E9/L (ref 0–0.2)
BASOPHILS NFR BLD AUTO: 0.3 %
BILIRUB UR QL STRIP: NEGATIVE
BLD GP AB SCN SERPL QL: NORMAL
BLOOD BANK CMNT PATIENT-IMP: NORMAL
CANNABINOIDS UR QL: NEGATIVE
CARDIOLIPIN ANTIBODY IGG: <1.6 GPL-U/ML (ref 0–19.9)
CARDIOLIPIN ANTIBODY IGM: <0.2 MPL-U/ML (ref 0–19.9)
CMV IGG SERPL QL IA: >8 AI (ref 0–0.8)
CMV IGM SERPL QL IA: <0.2 AI (ref 0–0.8)
COCAINE UR QL: NEGATIVE
COLOR UR AUTO: YELLOW
CREAT SERPL-MCNC: 0.65 MG/DL (ref 0.52–1.04)
CREAT UR-MCNC: 106 MG/DL
CREAT UR-MCNC: 436 MG/DL
DIFFERENTIAL METHOD BLD: ABNORMAL
EOSINOPHIL # BLD AUTO: 0.1 10E9/L (ref 0–0.7)
EOSINOPHIL NFR BLD AUTO: 0.5 %
ERYTHROCYTE [DISTWIDTH] IN BLOOD BY AUTOMATED COUNT: 15.1 % (ref 10–15)
ERYTHROCYTE [DISTWIDTH] IN BLOOD BY AUTOMATED COUNT: 15.5 % (ref 10–15)
GFR SERPL CREATININE-BSD FRML MDRD: >90 ML/MIN/1.7M2
GLUCOSE SERPL-MCNC: 75 MG/DL (ref 70–99)
GLUCOSE UR STRIP-MCNC: NEGATIVE MG/DL
HBA1C MFR BLD: 5.6 % (ref 0–5.6)
HCT VFR BLD AUTO: 31 % (ref 35–47)
HCT VFR BLD AUTO: 35.9 % (ref 35–47)
HGB BLD-MCNC: 10.3 G/DL (ref 11.7–15.7)
HGB BLD-MCNC: 11.8 G/DL (ref 11.7–15.7)
HGB BLD-MCNC: 12.2 G/DL (ref 11.7–15.7)
HGB F BLD QL KLEIH BETKE: NORMAL
HGB UR QL STRIP: ABNORMAL
HYALINE CASTS #/AREA URNS LPF: 13 /LPF (ref 0–2)
IMM GRANULOCYTES # BLD: 0.1 10E9/L (ref 0–0.4)
IMM GRANULOCYTES NFR BLD: 1.1 %
INR PPP: 0.99 (ref 0.86–1.14)
KETONES UR STRIP-MCNC: NEGATIVE MG/DL
LEUKOCYTE ESTERASE UR QL STRIP: ABNORMAL
LYMPHOCYTES # BLD AUTO: 2.2 10E9/L (ref 0.8–5.3)
LYMPHOCYTES NFR BLD AUTO: 23.7 %
MCH RBC QN AUTO: 27.9 PG (ref 26.5–33)
MCH RBC QN AUTO: 28.6 PG (ref 26.5–33)
MCHC RBC AUTO-ENTMCNC: 33.2 G/DL (ref 31.5–36.5)
MCHC RBC AUTO-ENTMCNC: 34 G/DL (ref 31.5–36.5)
MCV RBC AUTO: 84 FL (ref 78–100)
MCV RBC AUTO: 84 FL (ref 78–100)
MONOCYTES # BLD AUTO: 0.8 10E9/L (ref 0–1.3)
MONOCYTES NFR BLD AUTO: 8.7 %
MUCOUS THREADS #/AREA URNS LPF: PRESENT /LPF
NEUTROPHILS # BLD AUTO: 6 10E9/L (ref 1.6–8.3)
NEUTROPHILS NFR BLD AUTO: 65.7 %
NITRATE UR QL: NEGATIVE
NRBC # BLD AUTO: 0 10*3/UL
NRBC BLD AUTO-RTO: 0 /100
OPIATES UR QL SCN: NEGATIVE
PCP UR QL SCN: NEGATIVE
PH UR STRIP: 6.5 PH (ref 5–7)
PLATELET # BLD AUTO: 119 10E9/L (ref 150–450)
PLATELET # BLD AUTO: 124 10E9/L (ref 150–450)
PROT UR-MCNC: 17.63 G/L
PROT UR-MCNC: 6.41 G/L
PROT/CREAT 24H UR: 1.47 G/G CR (ref 0–0.2)
PROT/CREAT 24H UR: 16.17 G/G CR (ref 0–0.2)
RBC # BLD AUTO: 3.69 10E12/L (ref 3.8–5.2)
RBC # BLD AUTO: 4.26 10E12/L (ref 3.8–5.2)
RBC #/AREA URNS AUTO: 7 /HPF (ref 0–2)
SOURCE: ABNORMAL
SP GR UR STRIP: 1.02 (ref 1–1.03)
SPECIMEN EXP DATE BLD: NORMAL
SQUAMOUS #/AREA URNS AUTO: 13 /HPF (ref 0–1)
T PALLIDUM AB SER QL: NONREACTIVE
TRANS CELLS #/AREA URNS HPF: 17 /HPF (ref 0–1)
TSH SERPL DL<=0.005 MIU/L-ACNC: 3.22 MU/L (ref 0.4–4)
UROBILINOGEN UR STRIP-MCNC: NORMAL MG/DL (ref 0–2)
WBC # BLD AUTO: 12.4 10E9/L (ref 4–11)
WBC # BLD AUTO: 9.2 10E9/L (ref 4–11)
WBC #/AREA URNS AUTO: 147 /HPF (ref 0–5)

## 2018-08-23 PROCEDURE — 85730 THROMBOPLASTIN TIME PARTIAL: CPT | Performed by: ADVANCED PRACTICE MIDWIFE

## 2018-08-23 PROCEDURE — 85460 HEMOGLOBIN FETAL: CPT | Performed by: ADVANCED PRACTICE MIDWIFE

## 2018-08-23 PROCEDURE — 82565 ASSAY OF CREATININE: CPT | Performed by: ADVANCED PRACTICE MIDWIFE

## 2018-08-23 PROCEDURE — 86147 CARDIOLIPIN ANTIBODY EA IG: CPT | Performed by: ADVANCED PRACTICE MIDWIFE

## 2018-08-23 PROCEDURE — 84156 ASSAY OF PROTEIN URINE: CPT | Performed by: ADVANCED PRACTICE MIDWIFE

## 2018-08-23 PROCEDURE — 86778 TOXOPLASMA ANTIBODY IGM: CPT | Performed by: ADVANCED PRACTICE MIDWIFE

## 2018-08-23 PROCEDURE — 00000159 ZZHCL STATISTIC H-SEND OUTS PREP: Performed by: ADVANCED PRACTICE MIDWIFE

## 2018-08-23 PROCEDURE — 85613 RUSSELL VIPER VENOM DILUTED: CPT | Performed by: ADVANCED PRACTICE MIDWIFE

## 2018-08-23 PROCEDURE — 84450 TRANSFERASE (AST) (SGOT): CPT | Performed by: ADVANCED PRACTICE MIDWIFE

## 2018-08-23 PROCEDURE — 84460 ALANINE AMINO (ALT) (SGPT): CPT | Performed by: ADVANCED PRACTICE MIDWIFE

## 2018-08-23 PROCEDURE — 86777 TOXOPLASMA ANTIBODY: CPT | Performed by: ADVANCED PRACTICE MIDWIFE

## 2018-08-23 PROCEDURE — 85018 HEMOGLOBIN: CPT | Performed by: ADVANCED PRACTICE MIDWIFE

## 2018-08-23 PROCEDURE — 82947 ASSAY GLUCOSE BLOOD QUANT: CPT | Performed by: ADVANCED PRACTICE MIDWIFE

## 2018-08-23 PROCEDURE — 86850 RBC ANTIBODY SCREEN: CPT | Performed by: ADVANCED PRACTICE MIDWIFE

## 2018-08-23 PROCEDURE — 88307 TISSUE EXAM BY PATHOLOGIST: CPT | Performed by: ADVANCED PRACTICE MIDWIFE

## 2018-08-23 PROCEDURE — 00000401 ZZHCL STATISTIC THROMBIN TIME NC: Performed by: ADVANCED PRACTICE MIDWIFE

## 2018-08-23 PROCEDURE — 83036 HEMOGLOBIN GLYCOSYLATED A1C: CPT | Performed by: ADVANCED PRACTICE MIDWIFE

## 2018-08-23 PROCEDURE — 25000128 H RX IP 250 OP 636: Performed by: ADVANCED PRACTICE MIDWIFE

## 2018-08-23 PROCEDURE — 86645 CMV ANTIBODY IGM: CPT | Performed by: ADVANCED PRACTICE MIDWIFE

## 2018-08-23 PROCEDURE — 12000030 ZZH R&B OB INTERMEDIATE UMMC

## 2018-08-23 PROCEDURE — 86900 BLOOD TYPING SEROLOGIC ABO: CPT | Performed by: ADVANCED PRACTICE MIDWIFE

## 2018-08-23 PROCEDURE — 86747 PARVOVIRUS ANTIBODY: CPT | Performed by: ADVANCED PRACTICE MIDWIFE

## 2018-08-23 PROCEDURE — 85610 PROTHROMBIN TIME: CPT | Performed by: ADVANCED PRACTICE MIDWIFE

## 2018-08-23 PROCEDURE — 86146 BETA-2 GLYCOPROTEIN ANTIBODY: CPT | Performed by: ADVANCED PRACTICE MIDWIFE

## 2018-08-23 PROCEDURE — 85027 COMPLETE CBC AUTOMATED: CPT | Performed by: ADVANCED PRACTICE MIDWIFE

## 2018-08-23 PROCEDURE — 80307 DRUG TEST PRSMV CHEM ANLYZR: CPT | Performed by: ADVANCED PRACTICE MIDWIFE

## 2018-08-23 PROCEDURE — 86644 CMV ANTIBODY: CPT | Performed by: ADVANCED PRACTICE MIDWIFE

## 2018-08-23 PROCEDURE — 25000132 ZZH RX MED GY IP 250 OP 250 PS 637: Performed by: ADVANCED PRACTICE MIDWIFE

## 2018-08-23 PROCEDURE — 88313 SPECIAL STAINS GROUP 2: CPT | Performed by: ADVANCED PRACTICE MIDWIFE

## 2018-08-23 PROCEDURE — 87086 URINE CULTURE/COLONY COUNT: CPT | Performed by: ADVANCED PRACTICE MIDWIFE

## 2018-08-23 PROCEDURE — 36415 COLL VENOUS BLD VENIPUNCTURE: CPT | Performed by: ADVANCED PRACTICE MIDWIFE

## 2018-08-23 PROCEDURE — 86780 TREPONEMA PALLIDUM: CPT | Performed by: ADVANCED PRACTICE MIDWIFE

## 2018-08-23 PROCEDURE — 84443 ASSAY THYROID STIM HORMONE: CPT | Performed by: ADVANCED PRACTICE MIDWIFE

## 2018-08-23 PROCEDURE — 88307 TISSUE EXAM BY PATHOLOGIST: CPT | Mod: 26 | Performed by: ADVANCED PRACTICE MIDWIFE

## 2018-08-23 PROCEDURE — 85025 COMPLETE CBC W/AUTO DIFF WBC: CPT | Performed by: ADVANCED PRACTICE MIDWIFE

## 2018-08-23 PROCEDURE — 88262 CHROMOSOME ANALYSIS 15-20: CPT | Performed by: ADVANCED PRACTICE MIDWIFE

## 2018-08-23 PROCEDURE — 10907ZC DRAINAGE OF AMNIOTIC FLUID, THERAPEUTIC FROM PRODUCTS OF CONCEPTION, VIA NATURAL OR ARTIFICIAL OPENING: ICD-10-PCS | Performed by: ADVANCED PRACTICE MIDWIFE

## 2018-08-23 PROCEDURE — 88233 TISSUE CULTURE SKIN/BIOPSY: CPT | Performed by: ADVANCED PRACTICE MIDWIFE

## 2018-08-23 PROCEDURE — 88313 SPECIAL STAINS GROUP 2: CPT | Mod: 26 | Performed by: ADVANCED PRACTICE MIDWIFE

## 2018-08-23 PROCEDURE — 25000125 ZZHC RX 250: Performed by: ADVANCED PRACTICE MIDWIFE

## 2018-08-23 PROCEDURE — 81001 URINALYSIS AUTO W/SCOPE: CPT | Performed by: ADVANCED PRACTICE MIDWIFE

## 2018-08-23 PROCEDURE — 86901 BLOOD TYPING SEROLOGIC RH(D): CPT | Performed by: ADVANCED PRACTICE MIDWIFE

## 2018-08-23 RX ORDER — LIDOCAINE 40 MG/G
CREAM TOPICAL
Status: DISCONTINUED | OUTPATIENT
Start: 2018-08-23 | End: 2018-08-24 | Stop reason: HOSPADM

## 2018-08-23 RX ORDER — MORPHINE SULFATE 2 MG/ML
2-4 INJECTION, SOLUTION INTRAMUSCULAR; INTRAVENOUS
Status: DISCONTINUED | OUTPATIENT
Start: 2018-08-23 | End: 2018-08-24 | Stop reason: HOSPADM

## 2018-08-23 RX ORDER — BISACODYL 10 MG
10 SUPPOSITORY, RECTAL RECTAL DAILY PRN
Status: DISCONTINUED | OUTPATIENT
Start: 2018-08-25 | End: 2018-08-24 | Stop reason: HOSPADM

## 2018-08-23 RX ORDER — IBUPROFEN 800 MG/1
800 TABLET, FILM COATED ORAL EVERY 6 HOURS PRN
Status: DISCONTINUED | OUTPATIENT
Start: 2018-08-23 | End: 2018-08-24 | Stop reason: HOSPADM

## 2018-08-23 RX ORDER — OXYTOCIN 10 [USP'U]/ML
10 INJECTION, SOLUTION INTRAMUSCULAR; INTRAVENOUS
Status: DISCONTINUED | OUTPATIENT
Start: 2018-08-23 | End: 2018-08-24 | Stop reason: HOSPADM

## 2018-08-23 RX ORDER — METHYLERGONOVINE MALEATE 0.2 MG/ML
200 INJECTION INTRAVENOUS
Status: DISCONTINUED | OUTPATIENT
Start: 2018-08-23 | End: 2018-08-24 | Stop reason: HOSPADM

## 2018-08-23 RX ORDER — IBUPROFEN 800 MG/1
800 TABLET, FILM COATED ORAL
Status: COMPLETED | OUTPATIENT
Start: 2018-08-23 | End: 2018-08-23

## 2018-08-23 RX ORDER — DIPHENOXYLATE HCL/ATROPINE 2.5-.025MG
2 TABLET ORAL EVERY 4 HOURS PRN
Status: DISCONTINUED | OUTPATIENT
Start: 2018-08-23 | End: 2018-08-24 | Stop reason: HOSPADM

## 2018-08-23 RX ORDER — SODIUM CHLORIDE, SODIUM LACTATE, POTASSIUM CHLORIDE, CALCIUM CHLORIDE 600; 310; 30; 20 MG/100ML; MG/100ML; MG/100ML; MG/100ML
INJECTION, SOLUTION INTRAVENOUS CONTINUOUS
Status: DISCONTINUED | OUTPATIENT
Start: 2018-08-23 | End: 2018-08-24 | Stop reason: HOSPADM

## 2018-08-23 RX ORDER — OXYCODONE AND ACETAMINOPHEN 5; 325 MG/1; MG/1
1 TABLET ORAL
Status: DISCONTINUED | OUTPATIENT
Start: 2018-08-23 | End: 2018-08-24 | Stop reason: HOSPADM

## 2018-08-23 RX ORDER — CARBOPROST TROMETHAMINE 250 UG/ML
250 INJECTION, SOLUTION INTRAMUSCULAR
Status: DISCONTINUED | OUTPATIENT
Start: 2018-08-23 | End: 2018-08-24 | Stop reason: HOSPADM

## 2018-08-23 RX ORDER — ACETAMINOPHEN 325 MG/1
650 TABLET ORAL EVERY 4 HOURS PRN
Status: DISCONTINUED | OUTPATIENT
Start: 2018-08-23 | End: 2018-08-24 | Stop reason: HOSPADM

## 2018-08-23 RX ORDER — LIDOCAINE HYDROCHLORIDE 10 MG/ML
INJECTION, SOLUTION EPIDURAL; INFILTRATION; INTRACAUDAL; PERINEURAL
Status: DISCONTINUED
Start: 2018-08-23 | End: 2018-08-23 | Stop reason: WASHOUT

## 2018-08-23 RX ORDER — AMOXICILLIN 250 MG
1 CAPSULE ORAL 2 TIMES DAILY
Status: DISCONTINUED | OUTPATIENT
Start: 2018-08-23 | End: 2018-08-24 | Stop reason: HOSPADM

## 2018-08-23 RX ORDER — HYDROXYZINE HYDROCHLORIDE 50 MG/1
100 TABLET, FILM COATED ORAL ONCE
Status: COMPLETED | OUTPATIENT
Start: 2018-08-23 | End: 2018-08-23

## 2018-08-23 RX ORDER — OXYTOCIN/0.9 % SODIUM CHLORIDE 30/500 ML
PLASTIC BAG, INJECTION (ML) INTRAVENOUS
Status: DISPENSED
Start: 2018-08-23 | End: 2018-08-23

## 2018-08-23 RX ORDER — NALOXONE HYDROCHLORIDE 0.4 MG/ML
.1-.4 INJECTION, SOLUTION INTRAMUSCULAR; INTRAVENOUS; SUBCUTANEOUS
Status: DISCONTINUED | OUTPATIENT
Start: 2018-08-23 | End: 2018-08-23

## 2018-08-23 RX ORDER — MISOPROSTOL 200 UG/1
400 TABLET ORAL
Status: COMPLETED | OUTPATIENT
Start: 2018-08-23 | End: 2018-08-23

## 2018-08-23 RX ORDER — NALOXONE HYDROCHLORIDE 0.4 MG/ML
.1-.4 INJECTION, SOLUTION INTRAMUSCULAR; INTRAVENOUS; SUBCUTANEOUS
Status: DISCONTINUED | OUTPATIENT
Start: 2018-08-23 | End: 2018-08-24 | Stop reason: HOSPADM

## 2018-08-23 RX ORDER — OXYTOCIN 10 [USP'U]/ML
INJECTION, SOLUTION INTRAMUSCULAR; INTRAVENOUS
Status: DISCONTINUED
Start: 2018-08-23 | End: 2018-08-23 | Stop reason: WASHOUT

## 2018-08-23 RX ORDER — HYDROCORTISONE 2.5 %
CREAM (GRAM) TOPICAL 3 TIMES DAILY PRN
Status: DISCONTINUED | OUTPATIENT
Start: 2018-08-23 | End: 2018-08-24 | Stop reason: HOSPADM

## 2018-08-23 RX ORDER — ACETAMINOPHEN 325 MG/1
650 TABLET ORAL EVERY 4 HOURS PRN
Status: DISCONTINUED | OUTPATIENT
Start: 2018-08-23 | End: 2018-08-23

## 2018-08-23 RX ORDER — MISOPROSTOL 200 UG/1
TABLET ORAL
Status: DISCONTINUED
Start: 2018-08-23 | End: 2018-08-23 | Stop reason: WASHOUT

## 2018-08-23 RX ORDER — OXYTOCIN/0.9 % SODIUM CHLORIDE 30/500 ML
100 PLASTIC BAG, INJECTION (ML) INTRAVENOUS CONTINUOUS
Status: DISCONTINUED | OUTPATIENT
Start: 2018-08-23 | End: 2018-08-24 | Stop reason: HOSPADM

## 2018-08-23 RX ORDER — ONDANSETRON 2 MG/ML
4 INJECTION INTRAMUSCULAR; INTRAVENOUS EVERY 6 HOURS PRN
Status: DISCONTINUED | OUTPATIENT
Start: 2018-08-23 | End: 2018-08-24 | Stop reason: HOSPADM

## 2018-08-23 RX ORDER — OXYTOCIN/0.9 % SODIUM CHLORIDE 30/500 ML
100-340 PLASTIC BAG, INJECTION (ML) INTRAVENOUS CONTINUOUS PRN
Status: COMPLETED | OUTPATIENT
Start: 2018-08-23 | End: 2018-08-23

## 2018-08-23 RX ORDER — OXYTOCIN/0.9 % SODIUM CHLORIDE 30/500 ML
340 PLASTIC BAG, INJECTION (ML) INTRAVENOUS CONTINUOUS PRN
Status: DISCONTINUED | OUTPATIENT
Start: 2018-08-23 | End: 2018-08-24 | Stop reason: HOSPADM

## 2018-08-23 RX ORDER — DIPHENOXYLATE HCL/ATROPINE 2.5-.025MG
2 TABLET ORAL ONCE
Status: DISCONTINUED | OUTPATIENT
Start: 2018-08-23 | End: 2018-08-24 | Stop reason: HOSPADM

## 2018-08-23 RX ORDER — LANOLIN 100 %
OINTMENT (GRAM) TOPICAL
Status: DISCONTINUED | OUTPATIENT
Start: 2018-08-23 | End: 2018-08-24 | Stop reason: HOSPADM

## 2018-08-23 RX ORDER — AMOXICILLIN 250 MG
2 CAPSULE ORAL 2 TIMES DAILY
Status: DISCONTINUED | OUTPATIENT
Start: 2018-08-23 | End: 2018-08-24 | Stop reason: HOSPADM

## 2018-08-23 RX ADMIN — SODIUM CHLORIDE, POTASSIUM CHLORIDE, SODIUM LACTATE AND CALCIUM CHLORIDE 1000 ML: 600; 310; 30; 20 INJECTION, SOLUTION INTRAVENOUS at 13:33

## 2018-08-23 RX ADMIN — Medication 340 ML/HR: at 03:43

## 2018-08-23 RX ADMIN — Medication 100 ML/HR: at 07:58

## 2018-08-23 RX ADMIN — MISOPROSTOL 400 MCG: 200 TABLET ORAL at 08:10

## 2018-08-23 RX ADMIN — ACETAMINOPHEN 650 MG: 325 TABLET, FILM COATED ORAL at 08:35

## 2018-08-23 RX ADMIN — ACETAMINOPHEN 650 MG: 325 TABLET, FILM COATED ORAL at 14:13

## 2018-08-23 RX ADMIN — HYDROXYZINE HYDROCHLORIDE 100 MG: 50 TABLET, FILM COATED ORAL at 23:09

## 2018-08-23 RX ADMIN — IBUPROFEN 800 MG: 800 TABLET, FILM COATED ORAL at 03:54

## 2018-08-23 ASSESSMENT — ACTIVITIES OF DAILY LIVING (ADL)
BATHING: 0 - INDEPENDENT
TOILETING: 0-->INDEPENDENT
TRANSFERRING: 0 - INDEPENDENT
AMBULATION: 0 - INDEPENDENT
BATHING: 0-->INDEPENDENT
CURRENT_FUNCTIONAL_LEVEL_COMMENT: WNL
SWALLOWING: 0 - SWALLOWS FOODS/LIQUIDS WITHOUT DIFFICULTY
RETIRED_EATING: 0-->INDEPENDENT
EATING: 0 - INDEPENDENT
COGNITION: 0 - NO COGNITION ISSUES REPORTED
COMMUNICATION: 0 - UNDERSTANDS/COMMUNICATES WITHOUT DIFFICULTY
DRESS: 0-->INDEPENDENT
SWALLOWING: 0-->SWALLOWS FOODS/LIQUIDS WITHOUT DIFFICULTY
DRESS: 0 - INDEPENDENT
TRANSFERRING: 0-->INDEPENDENT
AMBULATION: 0-->INDEPENDENT
FALL_HISTORY_WITHIN_LAST_SIX_MONTHS: NO
TOILETING: 0 - INDEPENDENT
RETIRED_COMMUNICATION: 0-->UNDERSTANDS/COMMUNICATES WITHOUT DIFFICULTY
CHANGE_IN_FUNCTIONAL_STATUS_SINCE_ONSET_OF_CURRENT_ILLNESS/INJURY: NO

## 2018-08-23 NOTE — PROGRESS NOTES
Called to room by RN to assess bleeding. RN reports ~450ml gush with clots when pt was up to use restroom.  Continued bleeding when returned to bed.  An additional bag of Pitocin was started. QBL ~900ml at this time.  Due to elevated blood pressures, pt was oral misoprostol at this time. Fundus palpated, firm but deviated to maternal R.  Attempted to empty bladder with straight cath with no return of urine.  Will continue to monitor at this time.  Discussed need for exam if bleeding continues. Pt agreeable.    Discussed options for genetic testing with placental sample and not fetus. Pt verbally consents, will have patient sign written consent for testing.  Pt has support person bedside, grieving appropriately.  Declines need for additional support at this time. Reviewed CNM availability whenever needed. Social work to see patient later today.    -Justice Cosby, ASHIA CNM

## 2018-08-23 NOTE — PROVIDER NOTIFICATION
08/23/18 0812   Provider Notification   Provider Name/Title ERIC Cosby   Method of Notification At Bedside   Notification Reason Bleeding     CNM called to room to evaluate bleeding. Pt had 400mL of bleeding and clots into hat when up to void. Back to bed with continued gushing/moderate bleeding. IV pitocin started. Oral miso given. CNM to bedside. St cath, with no urine. Bleeding slowed with interventions. Continue to monitor closely.

## 2018-08-23 NOTE — PROGRESS NOTES
IUFD infant examination performed by this writer as pt declined full autopsy for infant.    Infant taken to warmer with pt and family permission and head to assessment performed by this provider.  Head noted intact with full amount of black hair,no molding noted.  Two ears noted, fully formed and not low set.  Two eyes with eyelids easily opened.  Nose with two nares.  Mouth without cleft lip.  Tongue noted intact.  No cleft palate noted.   Shoulder blades noted intact, no crepitus.  Two arms noted with two hands with five well formed fingers on each.  Chest intact, no protruding bones. Abdomen intact.  Noted male genitalia. No tuft of hair or sacral dimple.  Two legs noted with two feet with five well formed toes on each.  No obvious signs of trauma or genetic disorder.  Infant was then weighed for 6mtd9oi and measured 19cm.  Head circumference noted 13.5cm.  ASHIA Martin CNM

## 2018-08-23 NOTE — PROGRESS NOTES
SPIRITUAL HEALTH SERVICES  SPIRITUAL ASSESSMENT Progress Note  [facility] ([facility]) [facility]   ON-CALL VISIT    REFERRAL SOURCE: Hospital  consult order    Consulted with unit RN who stated pt requesting [facility]. Imam was not available. Pt declined visit from other chaplains.     PLAN: Pt will likely discharge today. [facility] will continue to be available to pt/family for emotional/spiritual support.    Jose Mendoza  Chaplain Resident  Pager [phone]

## 2018-08-23 NOTE — PROGRESS NOTES
CTSP re: concerns about fetal heart beat.  Upon arrival, patient lying on her left side, Dr. Myhre and ANGELIC Reza at bedside.  Transabdominal ultrasound confirmed absence of fetal cardiac activity.  Diagnosis explained to patient; patient and partner both teary and still processing news.  MD Alfa

## 2018-08-23 NOTE — H&P
"ADMIT NOTE  =================  39w3d    Pam Syed is a 28 year old female with an Patient's last menstrual period was 2017. and Estimated Date of Delivery: Aug 27, 2018 [facility] at 12:48 AM with IUFD at 39w3d.    HPI  ================  Patient presented to labor and delivery triage with reports of labor symptoms, and possible rupture of membranes.  She states that she noticed increased vaginal discharge today, not continuous.  Unable to report frequency of contractions.  Did not initially report concerns with decreased fetal movement.  She states that she believes she felt movement earlier today, but endorses that she had been busy today and had not been paying close attention.    FOB- is involved, Nilson, at bedside  Other labor support- sisters present    Weight gain- 37lbs  Height- 62\"  BMI- 24  First prenatal visit at 8 weeks, Total visits- 13    PROBLEM LIST  =================  Patient Active Problem List    Diagnosis Date Noted     IUFD at 20 weeks or more of gestation 2018     Priority: Medium     Encounter for triage in pregnant patient 2018     Priority: Medium     Thrombocytopenia (H) 2018     Priority: Medium     2018 - 129.  Continue weekly CBC  18: plts 117  18: 107         Vitamin D deficiency 2018     Priority: Medium     18: Vit D 11, review at next visit       Iron deficiency anemia secondary to inadequate dietary iron intake 05/15/2018     Priority: Medium     18: hbg 9.2    IV iron infusions , , 2018: CBC with plts       Group B Streptococcus urinary tract infection affecting pregnancy in second trimester - >10,000 treat per consensus 2018     Priority: Medium     2018 - Amoxicillin 500mg BID x 5 days.  Needs repeat urine culture after completion of ABX. - repeat WNL     PCN in labor, no re screen for GBS.        History of recurrent , currently pregnant in " second trimester 2018     Priority: Medium     High risk pregnancy, antepartum - [facility] CNM 2018     Priority: Medium     Using progesterone.   2018 - Desires 1st trimester screen which was ordered [ ].                   - Has never had pap, defers until Postpartum [ ]                   - GC/CT neg/neg                   - Will start Vitamin D    2018 - NT normal, declined first trimester blood work.  Offer MSAFP.  Anatomy scan scheduled 3/30/18    IV iron infusions , , 2018: EOB visit  Cbc with plts   GBS+, no rescreen    18: continue weekly cbc with plts d/t thrombocytopenia         HISTORIES  ============  No Known Allergies  Past Medical History:   Diagnosis Date     Recurrent pregnancy loss (CODE)      Past Surgical History:   Procedure Laterality Date     APPENDECTOMY Right      DILATION AND CURETTAGE SUCTION N/A 2017    Procedure: DILATION AND CURETTAGE SUCTION;  Suction Dilation And Curettage ;  Surgeon: Yolanda Samaniego MD;  Location: [facility]     DILATION AND CURETTAGE, HYSTEROSCOPY DIAGNOSTIC, COMBINED N/A 2017    Procedure: COMBINED DILATION AND CURETTAGE, HYSTEROSCOPY DIAGNOSTIC;  Operative Hysteroscopy, Dilation and Curettage ;  Surgeon: Eli Pickard MD;  Location: [facility] OR     GYN SURGERY  2017    suction D&C   .  No family history on file.  Social History   Substance Use Topics     Smoking status: Never Smoker     Smokeless tobacco: Never Used     Alcohol use No     Obstetric History       T1      L1     SAB5   TAB0   Ectopic0   Multiple0   Live Births1       # Outcome Date GA Lbr Michael/2nd Weight Sex Delivery Anes PTL Lv   7 Current            6 SAB 17           5 Term 12/03/15 38w5d 03:45 / 01:16 3.11 kg (6 lb 13.7 oz) M Vag-Spont EPI  RAMILA      Apgar1:  9                Apgar5: 9   4 SAB            3 SAB            2 SAB            1 SAB                    LABS:   ===========  Prenatal Labs:  Rhogam not  indicated   Lab Results   Component Value Date    ABO O 05/02/2018    RH Pos 05/02/2018    AS Neg 05/02/2018    HEPBANG Nonreactive 01/16/2018    TREPAB Negative 01/16/2018    RUQIGG 118 01/16/2018    HGB 12.0 08/17/2018     Rubella immune  Lab Results   Component Value Date    GBS positive 11/18/2015     Other labs:  Results for orders placed or performed during the hospital encounter of 08/22/18 (from the past 24 hour(s))   Rupture of Fetal Membranes by ROM Plus   Result Value Ref Range    Rupture of Fetal Membranes by ROM Plus Negative NEG^Negative       ROS  =========  Pt denies significant respiratory, cardiovacular, GI, or muscular/skeletalcomplaints.    See RN data base ROS.       PHYSICAL EXAM:  ===============  LMP 11/20/2017  General appearance: comfortable  GENERAL APPEARANCE: healthy, alert and no distress  RESP: lungs clear to auscultation - no rales, rhonchi or wheezes  CV: regular rates and rhythm, normal S1 S2, no S3 or S4 and no murmur,and no varicosities  ABDOMEN:  soft, nontender, no epigastric pain  SKIN: no suspicious lesions or rashes  NEURO: Denies headache, blurred vision, other vision changes  PSYCH: mentation appears normal. and affect normal/bright  Legs: No edema     Abdomen: gravid, vertex fetus per Leopold's, non-tender   Cephalic presentation confirmed by BSUS  Dr. Ellison and Dr. Myhre in room to confirm absent cardiac motion on ultrasound.      # Pain Assessment:  Current Pain Score 5/21/2018   Patient currently in pain? denies   Pain location -   Pain descriptors -   Pam mendoza pain level was assessed and she currently denies pain.        ASSESSMENT:  ==============  IUP @ 39w3d admitted for induction of labor following IUFD    PLAN:  ===========  Reviewed options of returning home to process grief or to begin labor process.  Patient and  desire to be admitted for labor induction.    Patient would like amniotomy to be performed, would like to delay Pitocin if labor starts on its  own.    Patient consents to lab work on admission, at this time is not interested in invasive genetic testing on fetus.   Consents to placenta to pathology.    Bereavement support offered to family    ASHIA Martin CNM

## 2018-08-23 NOTE — PROGRESS NOTES
Blood pressure 119/72, temperature 99.2  F (37.3  C), temperature source Oral, resp. rate 20, last menstrual period 11/20/2017, not currently breastfeeding.    Subjective:   Patient coping well, surrounded by family/friends at bedside, praying.   Reported to RN feeling warm. Denies any chills or body aches.   Discussed elevated pr/cr results, but otherwise normal pre-eclampsia labs.  Social work was able to see patient earlier, as was the , see notes for further details.    # Pain Assessment:  Current Pain Score 8/23/2018   Patient currently in pain? yes   Pain location Abdomen   Pain descriptors Cramping   - Pam is experiencing pain due to postpartum cramping. Pain management was discussed and the plan was created in a collaborative fashion.  Pam's response to the current recommendations: engaged  - Pharmacologic adjuvants: NSAIDs and Acetaminophen    Assessment:  IUP @ 39w3d postpartum IUFD  BPs normal to mild range  Proteinuria    Plan:  Continue to provide emotional support as needed.     ASHIA Gipson CNM

## 2018-08-23 NOTE — PROVIDER NOTIFICATION
08/23/18 0542   Provider Notification   Provider Name/Title SUJATA STOLL CNM   Method of Notification In Department   Notification Reason Status Update

## 2018-08-23 NOTE — PLAN OF CARE
Data: Patient presented to [facility] at 2250.   Reason for maternal/fetal assessment per patient is r/o srom, early labor.  Patient is a . Prenatal record reviewed.      Obstetric History       T1      L1     SAB5   TAB0   Ectopic0   Multiple0   Live Births1       # Outcome Date GA Lbr Michael/2nd Weight Sex Delivery Anes PTL Lv   7 Current            6 SAB 17           5 Term 12/03/15 38w5d 03:45 / 01:16 3.11 kg (6 lb 13.7 oz) M Vag-Spont EPI  RAMILA      Apgar1:  9                Apgar5: 9   4 SAB            3 SAB            2 SAB            1 SAB               . Medical history:   Past Medical History:   Diagnosis Date     Recurrent pregnancy loss (CODE)    . Gestational Age 39w3d. VSS. Fetal movement present. Patient denies cramping, backache, vaginal discharge, pelvic pressure, UTI symptoms, GI problems, bloody show, vaginal bleeding, edema, headache, visual disturbances, epigastric or URQ pain, abdominal pain, rupture of membranes. Support person, LEX present.  Action: Verbal consent for EFM. Triage assessment completed. EFM applied, Uterine assessment SOFT, NONTENDER. Fetal assessment: NO FETAL ACTIVITY FOUND, U/S CONFIRMS IUFD Response:PT ASSESSED PER SUJATA STOLL, U/S PER OB. Plan per provider is IOL FOR IUFD. Patient verbalized agreement with plan. Patient transferred to room 462 ambulatory, oriented to room and call light.

## 2018-08-23 NOTE — PROVIDER NOTIFICATION
08/23/18 1320   Provider Notification   Provider Name/Title ERIC Cosby   Method of Notification In Department   Notification Reason Lab/Diagnostic Study     Pr/cr ratio from st. Cath 1.47.   St cath yielded only 50mL urine.   Per CNM, will do IVF bolus.

## 2018-08-23 NOTE — CONSULTS
[facility]  MATERNAL CHILD HEALTH   SOCIAL WORK PROGRESS NOTE    DATA:     Pt, Pam Syed ( 1990), is a 29 y/o  female admitted to the birthplace on 2018 with IUFD at 39w3d.    Family identified their heidi as central to their coping at this time. They shared that they have access support during this hospitalization from their family and community, who are assisting with coordinating burial and intend to meet family at their home when pt is ready to discharge.     Family declined interest in memory making options for baby. Family were amenable to SW support. They hope to also meet with Spiritual Health, if available during her admission.     Pt was receptive to SW bereavement resources. Family was open to education about postpartum mood and anxiety disorders. Pt denies a significant mental health history. Pt expressed feeling comforted by the bedside support of her spouse. Pt hopes to rely on her heidi as she fely with the loss of her son.    Family has chosen burial for their baby. Family chose burial at the [facility] in [place] (Contact: Norm, [phone] / [phone]).    INTERVENTION:       Chart review.     SW met with family to provide support and guidance through loss of their son.    SW provided education about postpartum mood and anxiety disorders.     SW shared information about hospital and community bereavement resources.     SW provide emotional support and active listening.     Family aware of ongoing  supportive services, have this SW contact information.    ASSESSMENT:     Pt and her family at bedside were open and engaged with SW, appreciative of bedside visit. SW discussed pt's pregnancy journey and the grief of this pregnancy loss. They appeared to be comforted by the support of their family and support network. They are relying on their heidi as they face the grief of this late-term pregnancy loss.    PLAN:     SW will  continue to follow to assess needs and provide ongoing psychosocial support and access to appropriate resources; family provided SW contact information should they have ongoing service needs. SW will continue to collaborate with the multidisciplinary team.    MARIAM Díaz, [facility]  Clinical   Maternal Child Health  [facility]  Phone:   [phone]  Pager:    [phone]

## 2018-08-23 NOTE — PROVIDER NOTIFICATION
08/22/18 2226   Provider Notification   Provider Name/Title Dr Ellison   Method of Notification At Bedside     Dr. Ellison at bedside. Confirmed with bedside US no fetal heartbeat.

## 2018-08-23 NOTE — PLAN OF CARE
demised baby boy @ 0338.   Afebrile, FFU/1, small flow, ice to intact perineum. BP slightly elevated and CNM aware, to continue to observe.   Parents viewed son briefly then he was placed in warmer(not turned on) on other side of room curtain. Measurements 6lb1oz, 19 inches long, 13 1/2OFC. Parents inquired about baby's measurements but desire no mementos, molds, or photos. I informed them if they are agreeable, staff could photograph their son and photos would be available in her chart until discharge.  [facility] informed of demise. Intake per Jose L, confirmation #[id number]  Placenta to lab

## 2018-08-23 NOTE — PROVIDER NOTIFICATION
18 0955   Provider Notification   Provider Name/Title ERIC Cosby   Method of Notification At Bedside   Notification Reason Status Update     Updated on pt updated QBL and VS with some elevated BP still.     Per CNM, st cath for urine sample for updated pr/cr ratio for next void. And repeat preeclampsia labs at that time. Continue to monitor bleeding closely.     Pt and family grieving appropriately. Do not want mementos. Have made  arrangements. Would like to [employer] [facility]  if available. Social work notified.

## 2018-08-23 NOTE — L&D DELIVERY NOTE
DELIVERY NOTE:  Brief Labor Course: Patient presented to triage with reports of contractions and possible rupture of membranes.  Upon arrival to unit, RN unable find FHT's with EFM.  Bedside US performed by ANGELIC and Dr. Myhre, fetal cardiac activity absent.  Dr. Ellison in room to confirm.  Patient and  given time to grieve, options were reviewed and they desired to proceed with induction of labor.   Patient desired amniotomy as induction method.    Delivery Note:   Patient was moved to labor room where admission process completed along with lab work.  Amniotomy was performed at 0202 for clear, non-malodorous fluid.  Patient began having regular contractions shortly after rupture of membranes, noted to feel increased pelvic pressure around 0310.  Noted to be complete and +2 station at 0318, patient began spontaneous pushing efforts.  Male infant born to bed without breathing efforts or cardiac activity.  Infant delivered MOA and restituted to LOT, anterior shoulder delivered without difficulty.  Loose cord noted around right ankle, reduced after delivery.  No evidence of nuchal cord or knot in cord.  Cord was doubly clamped and cut by father of baby.  Infant was wrapped in a blanket and passed to mother to hold.  Unable to collect cord blood sample, as blood was clotted.  Placenta delivered spontaneously intact via Schultze mechanism without incident.  Noted to be intact and will be sent to pathology.  Perineum inspected, small skin abrasion noted at clitoral rand, hemostatic not needing repair.  No measurable blood loss noted on drape.  Fundus firm at the umbilicus, Pitocin infusing after delivery of placenta.  Patient well supported by  and sister.  Patient's blood pressure noted to be elevated during recovery, pre-eclampsia labs ordered to admission labs.  Close surveillance of blood pressure, none in sustained severe range.   IUP at 39 weeks gestation delivered on 2018.     delivery of  a non-viable Male infant.  Weight : 6 pounds 1 ounces   Apgars of 0 at 1 minute and 0 at 5 minutes.  Labor was induced.  Medications administered  in labor:  Pain Rx None; Antibiotics No  Perineum: Minor laceration - No repair  Placenta-mechanism: spontaneous, intact,  with a 3 vessel cord. Placenta was sent to Pathology. IV oxytocin was given After delivery of placenta  Quantitative Blood Loss was 0cc.   Complications of labor and delivery: IUFD at term without known cause  Anticipated Discharge Date: 2018  ASHIA Martin CN   Delivery Summary - No Weller  Delivery Summary    Pam Syed MRN# [id number]   Age: 28 year old YOB: 1990     Labor Event Times:    Labor Onset Date       Labor Onset Time    Dilation Complete Date    Dilation Complete Time       Start Pushing Date        Start Pushing Time            Labor Length:    1st Stage (hrs/min) 1.00 16.00   2nd Stage (hrs/min) 0.00 20.00   3rd Stage (hrs/min) 0.00 5.00       Labor Events:     Labor No   Rupture Date     Rupture Time     Rupture Type Artificial Rupture of Membranes   Fluid Color     Labor Type     Induction    Induction Indication         Augmentation    Labor Complications     Additional Complications     Management of Labor        Antibiotics     IV Antibiotic Given     Additional Management     Fetal Status Prior to  Delivery     Fetal Status Comments         Cervical Ripening:    Date     Time     Type         Delivery:    Episiotomy None   Local Anesthetic        Lacerations None   Sponge Count Correct       Needle Count Correct     Final Count by:    Sutures     Blood loss (ml) 0   Packing Intentionally Left In     Number     Comments           Information for the patient's :  Baldomero Syed [[id number]]       Delivery  2018 3:38 AM by  Vaginal, Spontaneous Delivery  Sex:  male Gestational Age: 39w3d  Delivery Clinician:     Living?:            APGARS  One minute Five minutes Ten  minutes   Skin color:            Heart rate:            Grimace:            Muscle tone:            Breathing:            Totals:              Presentation/position:           Resuscitation and Interventions: Method:  None  Oxygen Type:     Intubation Time:   # of Attempts:     ETT Size:        Tracheal Suction:     Tracheal returns:      Florence Care at Delivery:           Cord information:     Disposition of cord blood:      Blood gases sent?    Complications:     Placenta: Delivered:           appearance.  Comments: Intact via Schultze mechanism.  Disposition: Pathology   Measurements:  Weight:    Height:    Head circumference:    Chest circumference:     Temperature:     Other providers:       Additional  information:  Forceps:    Verbal Informed Consent Obtained:       Alternative Labor Strategies Discussed:     Emergency Resources Available:       Type:       Accrued Pulling Time:       # of Pulls:      Position:     Fetal Station:       Indications:      Other Indications:     Operative Vaginal Delivery Brief Note Forceps:        Vacuum:    Verbal Informed Consent Obtained:     Alternative Labor Strategies Discussed:     Emergency Resources Available:     Type:      Accrued Pulling Time:       # of Pop-Offs:       # of Pulls:       Position:     Fetal Station:      Indications for Vacuum:       Other Indications:    Operative Vaginal Delivery Brief Note Vacuum:        Shoulder Dystocia Shoulder Dystocia    Shoulder dystocia present?:  No                                            Breech:       : Type:     Indications for Primary:     Indications for Secondary:     Other Indications:        Observed anomalies     Output in Delivery Room:

## 2018-08-24 VITALS
HEART RATE: 75 BPM | DIASTOLIC BLOOD PRESSURE: 83 MMHG | RESPIRATION RATE: 18 BRPM | SYSTOLIC BLOOD PRESSURE: 131 MMHG | TEMPERATURE: 99.2 F

## 2018-08-24 LAB
BACTERIA SPEC CULT: NORMAL
ERYTHROCYTE [DISTWIDTH] IN BLOOD BY AUTOMATED COUNT: 15.6 % (ref 10–15)
HCT VFR BLD AUTO: 26.9 % (ref 35–47)
HGB BLD-MCNC: 9.3 G/DL (ref 11.7–15.7)
LA PPP-IMP: NEGATIVE
Lab: NORMAL
MCH RBC QN AUTO: 29.2 PG (ref 26.5–33)
MCHC RBC AUTO-ENTMCNC: 34.6 G/DL (ref 31.5–36.5)
MCV RBC AUTO: 84 FL (ref 78–100)
PLATELET # BLD AUTO: 105 10E9/L (ref 150–450)
RBC # BLD AUTO: 3.19 10E12/L (ref 3.8–5.2)
SPECIMEN SOURCE: NORMAL
T GONDII IGG SER-ACNC: 21.9 IU/ML
T GONDII IGM SER-ACNC: <3 AU/ML
WBC # BLD AUTO: 9.5 10E9/L (ref 4–11)

## 2018-08-24 PROCEDURE — 36415 COLL VENOUS BLD VENIPUNCTURE: CPT | Performed by: ADVANCED PRACTICE MIDWIFE

## 2018-08-24 PROCEDURE — 85027 COMPLETE CBC AUTOMATED: CPT | Performed by: ADVANCED PRACTICE MIDWIFE

## 2018-08-24 RX ORDER — HYDROXYZINE HYDROCHLORIDE 25 MG/1
25-50 TABLET, FILM COATED ORAL EVERY 6 HOURS PRN
Qty: 30 TABLET | Refills: 1 | Status: SHIPPED | OUTPATIENT
Start: 2018-08-24 | End: 2018-08-31

## 2018-08-24 RX ORDER — OXYTOCIN/0.9 % SODIUM CHLORIDE 30/500 ML
PLASTIC BAG, INJECTION (ML) INTRAVENOUS
Status: DISCONTINUED
Start: 2018-08-24 | End: 2018-08-24 | Stop reason: HOSPADM

## 2018-08-24 RX ORDER — FERROUS SULFATE 325(65) MG
325 TABLET ORAL
Qty: 30 TABLET | Refills: 2 | Status: SHIPPED | OUTPATIENT
Start: 2018-08-24 | End: 2019-02-22

## 2018-08-24 RX ORDER — MORPHINE SULFATE 1 MG/ML
INJECTION, SOLUTION EPIDURAL; INTRATHECAL; INTRAVENOUS
Status: DISCONTINUED
Start: 2018-08-24 | End: 2018-08-24 | Stop reason: HOSPADM

## 2018-08-24 RX ORDER — IBUPROFEN 200 MG
600 TABLET ORAL EVERY 6 HOURS PRN
Qty: 50 TABLET | Refills: 0 | Status: SHIPPED | OUTPATIENT
Start: 2018-08-24 | End: 2019-02-22

## 2018-08-24 NOTE — PROGRESS NOTES
"Doing well this morning.  awake- slept very well. He is appreciative of care provided. Incredibly supportive to Pam.   Pam states that she slept \"very well\" last night after the vistaril. Continues to cope well.  Still interested in appointment with Usha Alvarez or Anne Melara next week at [facility] and appointment with ANGELIC (expressed interest in appointment with myself - ANGELIC Sandoval).   Will call clinic today to assist in scheduling. Pt would prefer Monday appointments if possible so that her  and her can both come.  Pt desires discharge today.    CBC with plts drawn this AM.    Care assumed by ANGELIC Sanford.     Vida Sandoval, ASHIA, ANGELIC  "

## 2018-08-24 NOTE — PLAN OF CARE
Problem: Patient Care Overview  Goal: Plan of Care/Patient Progress Review  Outcome: Improving  Pt states she is feeling well and has had no need for pain medications. Bleeding is minimal. Pt talks of the experience of loss and is appropriately grieving.  present and supportive.

## 2018-08-24 NOTE — PLAN OF CARE
Problem: Patient Care Overview  Goal: Plan of Care/Patient Progress Review  Outcome: Improving  Pt states she is feeling well and has had no need for pain medications. Bleeding is minimal. Sleeping s difficulty after vistaril po.   sleeping in rm.

## 2018-08-24 NOTE — PROGRESS NOTES
"SPIRITUAL HEALTH SERVICES  SPIRITUAL ASSESSMENT Progress Note  [facility]    PRIMARY FOCUS:     Emotional/spiritual/Protestant distress    Support for coping    REFERRAL SOURCE: Staff referral    ILLNESS CIRCUMSTANCES:   Reviewed documentation. Reflective conversation shared with pt Pam and  Nilson, which integrated elements of pregnancy and spiritual narratives.     Context of Serious Illness/Symptom(s) - Pam, , was admitted with IUFD at 39w3d, baby boy Chuck. They have a 2.5 year old at home.     Resources for Support - Family, friends, danelle and community    DISTRESS:     Emotional/Spiritual/Existential Distress - Both parents were tearful during our visit and openly expressed their grief over the loss of their baby. She expressed the possibility of feeling more grief as she spends time with a friend who just had a baby and who is moving into their apartment complex. She appreciated reflecting openly about these complex feelings and about how she will discuss this loss with their son. Offered emotional and spiritual framing and support in the context of their grief.     Alevism Distress - Not discussed.     Social/Cultural/Economic Distress - Not discussed    SPIRITUAL/Judaism COPING:     Gnosticism/Danelle - Gnosticism     Spiritual Practice(s) - Prayer. Listening to Protestant messages from their mosques and danelle leaders. Pam spoke of hearing a message/sermon recently that \"spoke right to me and what I was feeling\" and said it gave her so much comfort. Nilson hopes to go to the [place] for prayer this afternoon. Provided Gnosticism prayer bookmark on grief and healing.    Emotional/Relational/Existential Connections -   o Pam looks forward to visits from her family who lives in [place]. She is eager to see them and feel their support but was also tearful as she thought about how sad she will feel when they go home again.   o Both parents talked about their connection to this baby and his " "personality in the womb. They laughed and cried as they talked about their relationship with him in the womb and holding him after he was born.     GOALS OF CARE:    Goals of Care - Hope to connect to support resources next week.     Meaning/Sense-Making - Pam spoke of how \"God's will\" is part of this experience for them, though they might not be able to understand the reason for it right now.    PLAN: No follow up plan at this time. [facility] remains available for any further needs or requests.     ISIAH Boland.Div  Associate   Pager [phone]    * [facility] remains available 24/7 for emergent requests/referrals, either by having the switchboard page the on-call  or by entering an ASAP/STAT consult in Epic (this will also page the on-call ).*     "

## 2018-08-24 NOTE — PLAN OF CARE
Problem: Patient Care Overview  Goal: Plan of Care/Patient Progress Review  Outcome: Adequate for Discharge Date Met: 08/24/18  Pt grieving appropriately this morning. Requested Willie () to come see, but he is unavailable today. Pt accepting of care from chaplain Loera. Pt is stable postpartum, bleeding is WNL. Pt ready for discharge to home. Scored 15 on [place] Depression Screen. Santiago Sanford CNM updated. Denies any thoughts of self harm. Pt has appointment set up on Monday at [facility] with ANGELIC and psychologist. Discharge instructions provided, pt given breast pads for engorgement period, discharge medications given with instruction. Pt verbalized understanding of instructions and follow up. Discharged to home at 1130.

## 2018-08-24 NOTE — DISCHARGE SUMMARY
Post Partum Note and Discharge Summary  SIGNIFICANT PROBLEMS:  Patient Active Problem List    Diagnosis Date Noted     IUFD at 20 weeks or more of gestation 2018     Priority: High     18- IUFD boy. See delivery summary.  Planning Follow-up with CNM and psychologist in one week____       Iron deficiency anemia secondary to inadequate dietary iron intake 05/15/2018     Priority: High     18: hbg 9.2  IV iron infusions , , 2018: CBC with plts  18- had 1000cc PPH, postpartum HGB 9.3, plts 105- Fe qd       Encounter for triage in pregnant patient 2018     Priority: Medium     Thrombocytopenia (H) 2018     Priority: Medium     2018 - 129.  Continue weekly CBC  18: plts 117  18: 107         Vitamin D deficiency 2018     Priority: Medium     18: Vit D 11, review at next visit       Group B Streptococcus urinary tract infection affecting pregnancy in second trimester - >10,000 treat per consensus 2018     Priority: Medium     2018 - Amoxicillin 500mg BID x 5 days.  Needs repeat urine culture after completion of ABX. - repeat WNL     PCN in labor, no re screen for GBS.        History of recurrent , currently pregnant in second trimester 2018     Priority: Medium     High risk pregnancy, antepartum - [facility] CNM 2018     Priority: Medium     Using progesterone.   2018 - Desires 1st trimester screen which was ordered [ ].                   - Has never had pap, defers until Postpartum [ ]                   - GC/CT neg/neg                   - Will start Vitamin D    2018 - NT normal, declined first trimester blood work.  Offer MSAFP.  Anatomy scan scheduled 3/30/18    IV iron infusions , , 2018: EOB visit  Cbc with plts   GBS+, no rescreen    18: continue weekly cbc with plts d/t thrombocytopenia         ADMISSION DIAGNOSIS:  Labor and Delivery , IUFD  DISCHARGE DIAGNOSIS:   ,  IUFD  HOSPITAL COURSE:  39w3d  Pam Syed is a 28 year old female     Pt was admitted to the Birthplace on 2018 10:39 PM  in early labor.   Brief Labor Course: Patient presented to triage with reports of contractions and possible rupture of membranes.  Upon arrival to unit, RN unable find FHT's with EFM.  Bedside US performed by CNISIAH and Dr. Myhre, fetal cardiac activity absent.  Dr. Ellison in room to confirm.  Patient and  given time to grieve, options were reviewed and they desired to proceed with induction of labor.   Patient desired amniotomy as induction method.    Delivery Note:   Patient was moved to labor room where admission process completed along with lab work.  Amniotomy was performed at 0202 for clear, non-malodorous fluid.  Patient began having regular contractions shortly after rupture of membranes, noted to feel increased pelvic pressure around 0310.  Noted to be complete and +2 station at 0318, patient began spontaneous pushing efforts.  Male infant born to bed without breathing efforts or cardiac activity.  Infant delivered MOA and restituted to LOT, anterior shoulder delivered without difficulty.  Loose cord noted around right ankle, reduced after delivery.  No evidence of nuchal cord or knot in cord.  Cord was doubly clamped and cut by father of baby.  Infant was wrapped in a blanket and passed to mother to hold.  Unable to collect cord blood sample, as blood was clotted.  Placenta delivered spontaneously intact via Schultze mechanism without incident.  Noted to be intact and will be sent to pathology.  Perineum inspected, small skin abrasion noted at clitoral rand, hemostatic not needing repair.  No measurable blood loss noted on drape.  Fundus firm at the umbilicus, Pitocin infusing after delivery of placenta.  Patient well supported by  and sister.  Patient's blood pressure noted to be elevated during recovery, pre-eclampsia labs ordered to admission labs.  Close  surveillance of blood pressure, none in sustained severe range.   Pt had PPH of 1000cc, resolved with IV pitocin and oral misoprostol  IUP at 39 weeks gestation delivered on 2018.     delivery of a non-viable Male infant.  Weight : 6 pounds 1 ounces   Apgars of 0 at 1 minute and 0 at 5 minutes.  Labor was induced.  Medications administered  in labor:  Pain Rx None; Antibiotics No  Perineum: Minor laceration - No repair  Placenta-mechanism: spontaneous, intact,  with a 3 vessel cord. Placenta was sent to Pathology. IV oxytocin was given After delivery of placenta  Quantitative Blood Loss was 0cc.   Complications of labor and delivery: IUFD at term without known cause,PPH  Anticipated Discharge Date: 2018  ASHIA Martin CNM     INTERVAL HISTORY:  /83  Pulse 75  Temp 99.2  F (37.3  C) (Oral)  Resp 18  LMP 2017   Vitals:    18 1757 18 2145 18 0007 18 0759   BP: 130/76 153/80 129/71 131/83   Pulse: 69 75     Resp: 18 16 18 18   Temp: 98.8  F (37.1  C) 98.4  F (36.9  C) 98.2  F (36.8  C) 99.2  F (37.3  C)   TempSrc: Oral Oral Oral Oral     IUFD, baby was buried yesterday according to [employer] preference. Pt unable to attend due to PPH and close observation.  Pt and  grieving and weepy this AM. Pt states she has good support at home with , sister and niece. Elders from her [place] are coming to her home at noon. Planning on follow up visit with CNM and psychologist on   No dizziness/ lightheadedness. No HA/ vision change/ epigastric pain  Breast feeding status:IUFD- discussed breast care for engorgement postpartum  # Discharge Pain Plan:   - During her hospitalization, Pam experienced pain due to cramping after delivery.  The pain plan for discharge was discussed with Pam and her spouse and the plan was created in a collaborative fashion.    - plan ibuprofen and tylenol. Discussed vistaril for sleep    Complications since 2 hours  post delivery: asymptomatic anemia  Patient is tolerating activity well, Voiding without difficulty, cramping is relieved by Ibuprophen, lochia is decreasing and patient denies clots.  Perineal pain is is minimal and is relieved by Ibuprophen.  The perineum is intact    Postpartum hemoglobin   Hemoglobin   Date Value Ref Range Status   08/24/2018 9.3 (L) 11.7 - 15.7 g/dL Final     Results for orders placed or performed during the hospital encounter of 08/22/18 (from the past 24 hour(s))   AST   Result Value Ref Range    AST 24 0 - 45 U/L   ALT   Result Value Ref Range    ALT 11 0 - 50 U/L   CBC with platelets   Result Value Ref Range    WBC 12.4 (H) 4.0 - 11.0 10e9/L    RBC Count 3.69 (L) 3.8 - 5.2 10e12/L    Hemoglobin 10.3 (L) 11.7 - 15.7 g/dL    Hematocrit 31.0 (L) 35.0 - 47.0 %    MCV 84 78 - 100 fl    MCH 27.9 26.5 - 33.0 pg    MCHC 33.2 31.5 - 36.5 g/dL    RDW 15.1 (H) 10.0 - 15.0 %    Platelet Count 119 (L) 150 - 450 10e9/L   Protein  random urine with Creat Ratio   Result Value Ref Range    Protein Random Urine 6.41 g/L    Protein Total Urine g/gr Creatinine 1.47 (H) 0 - 0.2 g/g Cr   Creatinine urine calculation only   Result Value Ref Range    Creatinine Urine 436 mg/dL   CBC with platelets   Result Value Ref Range    WBC 9.5 4.0 - 11.0 10e9/L    RBC Count 3.19 (L) 3.8 - 5.2 10e12/L    Hemoglobin 9.3 (L) 11.7 - 15.7 g/dL    Hematocrit 26.9 (L) 35.0 - 47.0 %    MCV 84 78 - 100 fl    MCH 29.2 26.5 - 33.0 pg    MCHC 34.6 31.5 - 36.5 g/dL    RDW 15.6 (H) 10.0 - 15.0 %    Platelet Count 105 (L) 150 - 450 10e9/L     Prenatal Labs:   Lab Results   Component Value Date    ABO O 08/23/2018    RH Pos 08/23/2018    AS Neg 08/23/2018    HEPBANG Nonreactive 01/16/2018    TREPAB Negative 01/16/2018    RUQIGG 118 01/16/2018     Rubella: immune  History of depression: none. Postpartum depression warning signs reviewed. Plan for pt and  to Follow-up with CNM and psychologist in clinic on  18    ASSESSMENT/PLAN:  Normal postpartum exam , Stable Post-partum day #1 after IUFD  Complications:IUFD- grieving appropriately. Has good support  Elevated BPs in labor with normal labs  PPH with postpartum anemia- treat with oral Fe  Plan d/c home today. Home Visit Ordered- Yes: IUFD, grief  RTC 1 week  Postpartum warning s/s reviewed, including bleeding/clots, fever, mastitis, or depression. Reviewed breast care to suppress lactation.  Continue prenatal vitamins  Birthcontrol planned:not reviewed. Will review in clinic  PROCEDURES:  , IUFD    Current Discharge Medication List      START taking these medications    Details   ferrous sulfate (IRON) 325 (65 Fe) MG tablet Take 1 tablet (325 mg) by mouth daily (with breakfast)  Qty: 30 tablet, Refills: 2    Associated Diagnoses: Iron deficiency anemia secondary to inadequate dietary iron intake; IUFD at 20 weeks or more of gestation      ibuprofen (ADVIL/MOTRIN) 200 MG tablet Take 3 tablets (600 mg) by mouth every 6 hours as needed for other (cramping)  Qty: 50 tablet, Refills: 0    Associated Diagnoses: IUFD at 20 weeks or more of gestation      Vistaril 25-50mg at HS prn   CONTINUE these medications which have NOT CHANGED    Details   ascorbic acid (VITAMIN C) 250 MG CHEW chewable tablet Take 1 tablet (250 mg) by mouth daily  Qty: 90 tablet, Refills: 0    Associated Diagnoses: Iron deficiency anemia secondary to inadequate dietary iron intake      Cyanocobalamin 1000 MCG LOZG Take 1 tablet by mouth daily  Qty: 30 lozenge, Refills: 3    Associated Diagnoses: Iron deficiency anemia secondary to inadequate dietary iron intake      Prenatal Vit-Fe Fumarate-FA (PRENATAL VITAMIN AND MINERAL) 28-0.8 MG TABS Take 1 tablet by mouth daily  Qty: 90 tablet, Refills: 3    Associated Diagnoses: Early stage of pregnancy      sennosides (SENOKOT) 8.6 MG tablet Take 1 tablet by mouth 2 times daily as needed for constipation  Qty: 60 tablet, Refills: 1    Associated  Diagnoses: High-risk pregnancy in first trimester           Amy Bowman, APRN CNM

## 2018-08-24 NOTE — PROGRESS NOTES
"  Pam Syed  MRN# [id number]    Age: 28 year old  YOB: 1990      Date of Admission:  2018     SIGNIFICANT PROBLEMS:  Patient Active Problem List   Diagnosis     High risk pregnancy, antepartum - [facility] CNM     History of recurrent , currently pregnant in second trimester     Group B Streptococcus urinary tract infection affecting pregnancy in second trimester - >10,000 treat per consensus     Iron deficiency anemia secondary to inadequate dietary iron intake     Vitamin D deficiency     Thrombocytopenia (H)     Encounter for triage in pregnant patient     IUFD at 20 weeks or more of gestation        INTERVAL HISTORY:  /71  Pulse 75  Temp 98.2  F (36.8  C) (Oral)  Resp 18  LMP 2017     Patient sitting up in bed.  at bedside, sleeping soundly.   Coping well. Cried with this CNM; appropriate sadness. Pt states that it feels like she is \"in a dream.\" States that she \"knows time will heal\" but will make sure to \"keep his memory.\" Gave patient space to talk through her experience and feelings. Pt repeatedly stated how appreciative she is of the CNM team and the BP staff. Feels incredibly well supported. States that she \"needs to stay strong for her son.\"   's family is in town and has been to see her- his sister watching her son tonight.  Her sister is coming from [place] tomorrow.   Hoping that family or her  will assist in removing the carseat and baby clothes and items- at least for a while. Feels that it will be really hard to see this immediately upon arrival home.   After prolonged discussion, pt agreeable to an appointment with psychologist at [facility] clinic. Would like to have an appointment with her and her . Would also like to come for an appointment with CNM next week. Would be agreeable to more frequent visits with CNM for the next few weeks.   Denies SI/HI, thoughts of self harm.     Would like to try to sleep tonight. Interested in " vistaril.     Complications since 2 hours post delivery: QBL 1000- plan cbc today  Patient is tolerating activity well, Voiding without difficulty, cramping is minimal, lochia is decreasing and patient denies clots.  Perineal pain is is minimal and is relieved by Ibuprofen.  The perineum is intact    Postpartum hemoglobin     Recent Labs  Lab 08/23/18  1036 08/23/18  0729 08/23/18  0135 08/17/18  0937   HGB 10.3* 11.8 12.2 12.0      Blood type   Lab Results   Component Value Date    ABO O 08/23/2018    RH Pos 08/23/2018        ASSESSMENT:    Breasts: exam not performed  Fundus: firm @ U -1, midline, nontender  Abdomen: soft, nttp  Lochia: small rubra, no clots  Perineum: healing well  Legs: trace edema, nontender    # Pain Assessment:  Current Pain Score 8/24/2018   Patient currently in pain? denies   Pain location -   Pain descriptors -   Pam mendoza pain level was assessed and she currently denies pain.       PLAN:    CNM will contact [facility] clinic tomorrow to arrange appointment with Usha Alvarez or Anne Melara next week.  Plan appointment with CNM next week and weekly or prn.   Discussed support, resources, community groups.  Continue to provide emotional support.    Plan CBC with plts in AM d/t blood loss around time of delivery.      Plans to take shower and then sleep.   100mg vistaril ordered.    Reviewed s/s to call/present to clinic/ED.     Plan discharge tomorrow.     ASHIA Rose, ANGELIC

## 2018-08-25 ENCOUNTER — HOSPITAL ENCOUNTER (INPATIENT)
Facility: CLINIC | Age: 28
LOS: 2 days | Discharge: HOME | End: 2018-08-27
Attending: EMERGENCY MEDICINE | Admitting: OBSTETRICS & GYNECOLOGY
Payer: COMMERCIAL

## 2018-08-25 ENCOUNTER — APPOINTMENT (OUTPATIENT)
Dept: ULTRASOUND IMAGING | Facility: CLINIC | Age: 28
End: 2018-08-25
Attending: EMERGENCY MEDICINE
Payer: COMMERCIAL

## 2018-08-25 ENCOUNTER — APPOINTMENT (OUTPATIENT)
Dept: GENERAL RADIOLOGY | Facility: CLINIC | Age: 28
End: 2018-08-25
Attending: EMERGENCY MEDICINE
Payer: COMMERCIAL

## 2018-08-25 DIAGNOSIS — N30.00 ACUTE CYSTITIS WITHOUT HEMATURIA: ICD-10-CM

## 2018-08-25 DIAGNOSIS — M79.89 SWELLING OF LIMB: ICD-10-CM

## 2018-08-25 PROBLEM — B95.1 GROUP B STREPTOCOCCUS URINARY TRACT INFECTION AFFECTING PREGNANCY IN SECOND TRIMESTER: Status: RESOLVED | Noted: 2018-04-17 | Resolved: 2018-08-25

## 2018-08-25 PROBLEM — O09.90 HIGH RISK PREGNANCY, ANTEPARTUM: Status: RESOLVED | Noted: 2018-01-16 | Resolved: 2018-08-25

## 2018-08-25 PROBLEM — O23.42 GROUP B STREPTOCOCCUS URINARY TRACT INFECTION AFFECTING PREGNANCY IN SECOND TRIMESTER: Status: RESOLVED | Noted: 2018-04-17 | Resolved: 2018-08-25

## 2018-08-25 PROBLEM — Z36.89 ENCOUNTER FOR TRIAGE IN PREGNANT PATIENT: Status: RESOLVED | Noted: 2018-08-22 | Resolved: 2018-08-25

## 2018-08-25 LAB
ALBUMIN SERPL-MCNC: 2.3 G/DL (ref 3.4–5)
ALBUMIN UR-MCNC: NEGATIVE MG/DL
ALP SERPL-CCNC: 111 U/L (ref 40–150)
ALT SERPL W P-5'-P-CCNC: 13 U/L (ref 0–50)
ANION GAP SERPL CALCULATED.3IONS-SCNC: 5 MMOL/L (ref 3–14)
APPEARANCE UR: CLEAR
AST SERPL W P-5'-P-CCNC: 22 U/L (ref 0–45)
B19V IGG SER IA-ACNC: 1.87 IV
B19V IGM SER IA-ACNC: 0.35 IV
BASOPHILS # BLD AUTO: 0 10E9/L (ref 0–0.2)
BASOPHILS NFR BLD AUTO: 0.3 %
BILIRUB SERPL-MCNC: 0.4 MG/DL (ref 0.2–1.3)
BILIRUB UR QL STRIP: NEGATIVE
BUN SERPL-MCNC: 12 MG/DL (ref 7–30)
CALCIUM SERPL-MCNC: 7.7 MG/DL (ref 8.5–10.1)
CHLORIDE SERPL-SCNC: 111 MMOL/L (ref 94–109)
CO2 SERPL-SCNC: 26 MMOL/L (ref 20–32)
COLOR UR AUTO: ABNORMAL
CREAT SERPL-MCNC: 0.82 MG/DL (ref 0.52–1.04)
D DIMER PPP FEU-MCNC: 1.8 UG/ML FEU (ref 0–0.5)
DIFFERENTIAL METHOD BLD: ABNORMAL
EOSINOPHIL # BLD AUTO: 0.1 10E9/L (ref 0–0.7)
EOSINOPHIL NFR BLD AUTO: 1.1 %
ERYTHROCYTE [DISTWIDTH] IN BLOOD BY AUTOMATED COUNT: 15.7 % (ref 10–15)
GFR SERPL CREATININE-BSD FRML MDRD: 82 ML/MIN/1.7M2
GLUCOSE SERPL-MCNC: 107 MG/DL (ref 70–99)
GLUCOSE UR STRIP-MCNC: NEGATIVE MG/DL
HCT VFR BLD AUTO: 26.4 % (ref 35–47)
HGB BLD-MCNC: 8.9 G/DL (ref 11.7–15.7)
HGB UR QL STRIP: ABNORMAL
IMM GRANULOCYTES # BLD: 0.2 10E9/L (ref 0–0.4)
IMM GRANULOCYTES NFR BLD: 1.6 %
INR PPP: 0.89 (ref 0.86–1.14)
KETONES UR STRIP-MCNC: NEGATIVE MG/DL
LDH SERPL L TO P-CCNC: 245 U/L (ref 81–234)
LEUKOCYTE ESTERASE UR QL STRIP: ABNORMAL
LYMPHOCYTES # BLD AUTO: 2.7 10E9/L (ref 0.8–5.3)
LYMPHOCYTES NFR BLD AUTO: 22.9 %
MCH RBC QN AUTO: 28.7 PG (ref 26.5–33)
MCHC RBC AUTO-ENTMCNC: 33.7 G/DL (ref 31.5–36.5)
MCV RBC AUTO: 85 FL (ref 78–100)
MONOCYTES # BLD AUTO: 0.6 10E9/L (ref 0–1.3)
MONOCYTES NFR BLD AUTO: 4.8 %
MUCOUS THREADS #/AREA URNS LPF: PRESENT /LPF
NEUTROPHILS # BLD AUTO: 8 10E9/L (ref 1.6–8.3)
NEUTROPHILS NFR BLD AUTO: 69.3 %
NITRATE UR QL: NEGATIVE
NRBC # BLD AUTO: 0 10*3/UL
NRBC BLD AUTO-RTO: 0 /100
PH UR STRIP: 5.5 PH (ref 5–7)
PLATELET # BLD AUTO: 106 10E9/L (ref 150–450)
POTASSIUM SERPL-SCNC: 3.9 MMOL/L (ref 3.4–5.3)
PROT SERPL-MCNC: 5.8 G/DL (ref 6.8–8.8)
RBC # BLD AUTO: 3.1 10E12/L (ref 3.8–5.2)
RBC #/AREA URNS AUTO: 1 /HPF (ref 0–2)
SODIUM SERPL-SCNC: 142 MMOL/L (ref 133–144)
SOURCE: ABNORMAL
SP GR UR STRIP: 1 (ref 1–1.03)
SQUAMOUS #/AREA URNS AUTO: <1 /HPF (ref 0–1)
URATE SERPL-MCNC: 5.6 MG/DL (ref 2.6–6)
UROBILINOGEN UR STRIP-MCNC: NORMAL MG/DL (ref 0–2)
WBC # BLD AUTO: 11.6 10E9/L (ref 4–11)
WBC #/AREA URNS AUTO: 13 /HPF (ref 0–5)

## 2018-08-25 PROCEDURE — 87086 URINE CULTURE/COLONY COUNT: CPT | Performed by: EMERGENCY MEDICINE

## 2018-08-25 PROCEDURE — 96375 TX/PRO/DX INJ NEW DRUG ADDON: CPT | Performed by: EMERGENCY MEDICINE

## 2018-08-25 PROCEDURE — 71045 X-RAY EXAM CHEST 1 VIEW: CPT

## 2018-08-25 PROCEDURE — 25000128 H RX IP 250 OP 636: Performed by: EMERGENCY MEDICINE

## 2018-08-25 PROCEDURE — 81001 URINALYSIS AUTO W/SCOPE: CPT | Performed by: EMERGENCY MEDICINE

## 2018-08-25 PROCEDURE — 85025 COMPLETE CBC W/AUTO DIFF WBC: CPT | Performed by: EMERGENCY MEDICINE

## 2018-08-25 PROCEDURE — 93010 ELECTROCARDIOGRAM REPORT: CPT | Mod: Z6 | Performed by: EMERGENCY MEDICINE

## 2018-08-25 PROCEDURE — 80053 COMPREHEN METABOLIC PANEL: CPT | Performed by: EMERGENCY MEDICINE

## 2018-08-25 PROCEDURE — 25000131 ZZH RX MED GY IP 250 OP 636 PS 637: Performed by: STUDENT IN AN ORGANIZED HEALTH CARE EDUCATION/TRAINING PROGRAM

## 2018-08-25 PROCEDURE — 99285 EMERGENCY DEPT VISIT HI MDM: CPT | Mod: 25 | Performed by: EMERGENCY MEDICINE

## 2018-08-25 PROCEDURE — 96361 HYDRATE IV INFUSION ADD-ON: CPT | Performed by: EMERGENCY MEDICINE

## 2018-08-25 PROCEDURE — 93005 ELECTROCARDIOGRAM TRACING: CPT | Performed by: EMERGENCY MEDICINE

## 2018-08-25 PROCEDURE — 83615 LACTATE (LD) (LDH) ENZYME: CPT | Performed by: EMERGENCY MEDICINE

## 2018-08-25 PROCEDURE — 96376 TX/PRO/DX INJ SAME DRUG ADON: CPT | Performed by: EMERGENCY MEDICINE

## 2018-08-25 PROCEDURE — 25000128 H RX IP 250 OP 636: Performed by: STUDENT IN AN ORGANIZED HEALTH CARE EDUCATION/TRAINING PROGRAM

## 2018-08-25 PROCEDURE — 25000131 ZZH RX MED GY IP 250 OP 636 PS 637: Performed by: EMERGENCY MEDICINE

## 2018-08-25 PROCEDURE — 25000132 ZZH RX MED GY IP 250 OP 250 PS 637: Performed by: EMERGENCY MEDICINE

## 2018-08-25 PROCEDURE — 85610 PROTHROMBIN TIME: CPT | Performed by: EMERGENCY MEDICINE

## 2018-08-25 PROCEDURE — 12000028 ZZH R&B OB UMMC

## 2018-08-25 PROCEDURE — 85379 FIBRIN DEGRADATION QUANT: CPT | Performed by: EMERGENCY MEDICINE

## 2018-08-25 PROCEDURE — 84550 ASSAY OF BLOOD/URIC ACID: CPT | Performed by: EMERGENCY MEDICINE

## 2018-08-25 PROCEDURE — 96365 THER/PROPH/DIAG IV INF INIT: CPT | Performed by: EMERGENCY MEDICINE

## 2018-08-25 PROCEDURE — 93970 EXTREMITY STUDY: CPT

## 2018-08-25 RX ORDER — LABETALOL HYDROCHLORIDE 5 MG/ML
10 INJECTION, SOLUTION INTRAVENOUS ONCE
Status: COMPLETED | OUTPATIENT
Start: 2018-08-25 | End: 2018-08-25

## 2018-08-25 RX ORDER — LABETALOL 100 MG/1
100 TABLET, FILM COATED ORAL 2 TIMES DAILY
Qty: 60 TABLET | Refills: 0 | Status: SHIPPED | OUTPATIENT
Start: 2018-08-25 | End: 2018-08-26

## 2018-08-25 RX ORDER — ACETAMINOPHEN 325 MG/1
650 TABLET ORAL EVERY 6 HOURS PRN
Status: DISCONTINUED | OUTPATIENT
Start: 2018-08-25 | End: 2018-08-27 | Stop reason: HOSPADM

## 2018-08-25 RX ORDER — LABETALOL HYDROCHLORIDE 5 MG/ML
10 INJECTION, SOLUTION INTRAVENOUS ONCE
Status: DISCONTINUED | OUTPATIENT
Start: 2018-08-25 | End: 2018-08-25

## 2018-08-25 RX ORDER — CALCIUM GLUCONATE 94 MG/ML
1 INJECTION, SOLUTION INTRAVENOUS
Status: DISCONTINUED | OUTPATIENT
Start: 2018-08-25 | End: 2018-08-27 | Stop reason: HOSPADM

## 2018-08-25 RX ORDER — LORAZEPAM 2 MG/ML
2 INJECTION INTRAMUSCULAR
Status: DISCONTINUED | OUTPATIENT
Start: 2018-08-25 | End: 2018-08-27 | Stop reason: HOSPADM

## 2018-08-25 RX ORDER — MAGNESIUM SULFATE IN WATER 40 MG/ML
2 INJECTION, SOLUTION INTRAVENOUS CONTINUOUS
Status: DISCONTINUED | OUTPATIENT
Start: 2018-08-25 | End: 2018-08-25

## 2018-08-25 RX ORDER — LABETALOL HYDROCHLORIDE 5 MG/ML
20 INJECTION, SOLUTION INTRAVENOUS ONCE
Status: COMPLETED | OUTPATIENT
Start: 2018-08-25 | End: 2018-08-25

## 2018-08-25 RX ORDER — MAGNESIUM SULFATE HEPTAHYDRATE 40 MG/ML
4 INJECTION, SOLUTION INTRAVENOUS
Status: DISCONTINUED | OUTPATIENT
Start: 2018-08-25 | End: 2018-08-27 | Stop reason: HOSPADM

## 2018-08-25 RX ORDER — MAGNESIUM SULFATE HEPTAHYDRATE 500 MG/ML
4 INJECTION, SOLUTION INTRAMUSCULAR; INTRAVENOUS
Status: DISCONTINUED | OUTPATIENT
Start: 2018-08-25 | End: 2018-08-27 | Stop reason: HOSPADM

## 2018-08-25 RX ORDER — LABETALOL HYDROCHLORIDE 5 MG/ML
20 INJECTION, SOLUTION INTRAVENOUS EVERY 10 MIN PRN
Status: DISCONTINUED | OUTPATIENT
Start: 2018-08-25 | End: 2018-08-27 | Stop reason: HOSPADM

## 2018-08-25 RX ORDER — AMOXICILLIN 250 MG
1-2 CAPSULE ORAL 2 TIMES DAILY PRN
Qty: 20 TABLET | Refills: 1 | Status: SHIPPED | OUTPATIENT
Start: 2018-08-25 | End: 2019-02-22

## 2018-08-25 RX ORDER — MAGNESIUM SULFATE IN WATER 40 MG/ML
2 INJECTION, SOLUTION INTRAVENOUS CONTINUOUS
Status: DISCONTINUED | OUTPATIENT
Start: 2018-08-26 | End: 2018-08-27 | Stop reason: HOSPADM

## 2018-08-25 RX ORDER — LABETALOL HYDROCHLORIDE 5 MG/ML
40 INJECTION, SOLUTION INTRAVENOUS EVERY 10 MIN PRN
Status: DISCONTINUED | OUTPATIENT
Start: 2018-08-25 | End: 2018-08-27 | Stop reason: HOSPADM

## 2018-08-25 RX ORDER — NITROFURANTOIN 25; 75 MG/1; MG/1
100 CAPSULE ORAL 2 TIMES DAILY
Qty: 14 CAPSULE | Refills: 0 | Status: SHIPPED | OUTPATIENT
Start: 2018-08-25 | End: 2018-08-27

## 2018-08-25 RX ORDER — SODIUM CHLORIDE, SODIUM LACTATE, POTASSIUM CHLORIDE, CALCIUM CHLORIDE 600; 310; 30; 20 MG/100ML; MG/100ML; MG/100ML; MG/100ML
10-125 INJECTION, SOLUTION INTRAVENOUS CONTINUOUS
Status: DISCONTINUED | OUTPATIENT
Start: 2018-08-25 | End: 2018-08-27 | Stop reason: HOSPADM

## 2018-08-25 RX ORDER — LABETALOL HYDROCHLORIDE 5 MG/ML
80 INJECTION, SOLUTION INTRAVENOUS EVERY 10 MIN PRN
Status: DISCONTINUED | OUTPATIENT
Start: 2018-08-25 | End: 2018-08-27 | Stop reason: HOSPADM

## 2018-08-25 RX ORDER — MAGNESIUM SULFATE HEPTAHYDRATE 40 MG/ML
4 INJECTION, SOLUTION INTRAVENOUS ONCE
Status: COMPLETED | OUTPATIENT
Start: 2018-08-25 | End: 2018-08-25

## 2018-08-25 RX ORDER — ONDANSETRON 2 MG/ML
4 INJECTION INTRAMUSCULAR; INTRAVENOUS EVERY 6 HOURS PRN
Status: DISCONTINUED | OUTPATIENT
Start: 2018-08-25 | End: 2018-08-27 | Stop reason: HOSPADM

## 2018-08-25 RX ORDER — NIFEDIPINE 10 MG/1
10 CAPSULE ORAL
Status: DISCONTINUED | OUTPATIENT
Start: 2018-08-25 | End: 2018-08-27 | Stop reason: HOSPADM

## 2018-08-25 RX ORDER — SENNOSIDES 8.6 MG
1 TABLET ORAL 2 TIMES DAILY PRN
Status: DISCONTINUED | OUTPATIENT
Start: 2018-08-25 | End: 2018-08-27 | Stop reason: HOSPADM

## 2018-08-25 RX ORDER — IBUPROFEN 800 MG/1
800 TABLET, FILM COATED ORAL ONCE
Status: COMPLETED | OUTPATIENT
Start: 2018-08-25 | End: 2018-08-25

## 2018-08-25 RX ORDER — NITROFURANTOIN 25; 75 MG/1; MG/1
100 CAPSULE ORAL ONCE
Status: COMPLETED | OUTPATIENT
Start: 2018-08-25 | End: 2018-08-25

## 2018-08-25 RX ADMIN — Medication 20 MG: at 18:37

## 2018-08-25 RX ADMIN — NITROFURANTOIN (MONOHYDRATE/MACROCRYSTALS) 100 MG: 75; 25 CAPSULE ORAL at 19:39

## 2018-08-25 RX ADMIN — Medication 10 MG: at 17:18

## 2018-08-25 RX ADMIN — SODIUM CHLORIDE 1000 ML: 9 INJECTION, SOLUTION INTRAVENOUS at 17:18

## 2018-08-25 RX ADMIN — Medication 20 MG: at 23:52

## 2018-08-25 RX ADMIN — MAGNESIUM SULFATE IN WATER 4 G: 40 INJECTION, SOLUTION INTRAVENOUS at 22:10

## 2018-08-25 RX ADMIN — SODIUM CHLORIDE, POTASSIUM CHLORIDE, SODIUM LACTATE AND CALCIUM CHLORIDE 75 ML/HR: 600; 310; 30; 20 INJECTION, SOLUTION INTRAVENOUS at 23:42

## 2018-08-25 RX ADMIN — MAGNESIUM SULFATE IN WATER 2 G/HR: 40 INJECTION, SOLUTION INTRAVENOUS at 23:42

## 2018-08-25 RX ADMIN — IBUPROFEN 800 MG: 800 TABLET, FILM COATED ORAL at 21:15

## 2018-08-25 ASSESSMENT — ENCOUNTER SYMPTOMS
WEAKNESS: 1
SHORTNESS OF BREATH: 1
FATIGUE: 1
BACK PAIN: 1
NUMBNESS: 1

## 2018-08-25 NOTE — ED AVS SNAPSHOT
[facility], Emergency Department    [address]    [place] [address]    Phone:  [phone]    Fax:  [phone]                                       Pam Syed   MRN: [id number]    Department:  [facility], Emergency Department   Date of Visit:  8/25/2018           Patient Information     Date Of Birth          1990        Your diagnoses for this visit were:     Hypertension, postpartum condition or complication        You were seen by Sylvain Mancera MD.        Discharge Instructions       Please make an appointment to follow up with OB/Gyn--[facility] (phone: [phone]) as soon as possible.  Results for orders placed or performed during the hospital encounter of 08/25/18   Chest  XR, 1 view portable    Narrative    CHEST ONE VIEW PORTABLE   8/25/2018 6:10 PM     HISTORY: Shortness of breath in HTN.     COMPARISON: None.      Impression    IMPRESSION: Normal.    VIVIAN LANDIN MD   US Lower Extremity Venous Duplex Bilateral    Narrative    ULTRASOUND VENOUS LOWER EXTREMITY BILATERAL 8/25/2018 8:04 PM     HISTORY: Worsening bilateral leg swelling, left worse than right,  worsening bilateral leg swelling.     COMPARISON: None.    TECHNIQUE: Ultrasound gray scale, Color Doppler flow, and spectral  Doppler waveform analysis performed.    FINDINGS: The bilateral common femoral, superficial femoral, popliteal  and posterior tibial veins are patent and fully compressible and  demonstrate normal venous Doppler flow. The visualized greater  saphenous veins are negative for thrombus.      Impression    IMPRESSION: No DVT demonstrated.    NADIA DELGADILLO MD   Comprehensive metabolic panel   Result Value Ref Range    Sodium 142 133 - 144 mmol/L    Potassium 3.9 3.4 - 5.3 mmol/L    Chloride 111 (H) 94 - 109 mmol/L    Carbon Dioxide 26 20 - 32 mmol/L    Anion Gap 5 3 - 14 mmol/L    Glucose 107 (H) 70 - 99 mg/dL    Urea Nitrogen 12 7 - 30 mg/dL    Creatinine 0.82 0.52 - 1.04  mg/dL    GFR Estimate 82 >60 mL/min/1.7m2    GFR Estimate If Black >90 >60 mL/min/1.7m2    Calcium 7.7 (L) 8.5 - 10.1 mg/dL    Bilirubin Total 0.4 0.2 - 1.3 mg/dL    Albumin 2.3 (L) 3.4 - 5.0 g/dL    Protein Total 5.8 (L) 6.8 - 8.8 g/dL    Alkaline Phosphatase 111 40 - 150 U/L    ALT 13 0 - 50 U/L    AST 22 0 - 45 U/L   CBC with platelets differential   Result Value Ref Range    WBC 11.6 (H) 4.0 - 11.0 10e9/L    RBC Count 3.10 (L) 3.8 - 5.2 10e12/L    Hemoglobin 8.9 (L) 11.7 - 15.7 g/dL    Hematocrit 26.4 (L) 35.0 - 47.0 %    MCV 85 78 - 100 fl    MCH 28.7 26.5 - 33.0 pg    MCHC 33.7 31.5 - 36.5 g/dL    RDW 15.7 (H) 10.0 - 15.0 %    Platelet Count 106 (L) 150 - 450 10e9/L    Diff Method Automated Method     % Neutrophils 69.3 %    % Lymphocytes 22.9 %    % Monocytes 4.8 %    % Eosinophils 1.1 %    % Basophils 0.3 %    % Immature Granulocytes 1.6 %    Nucleated RBCs 0 0 /100    Absolute Neutrophil 8.0 1.6 - 8.3 10e9/L    Absolute Lymphocytes 2.7 0.8 - 5.3 10e9/L    Absolute Monocytes 0.6 0.0 - 1.3 10e9/L    Absolute Eosinophils 0.1 0.0 - 0.7 10e9/L    Absolute Basophils 0.0 0.0 - 0.2 10e9/L    Abs Immature Granulocytes 0.2 0 - 0.4 10e9/L    Absolute Nucleated RBC 0.0    UA with Microscopic reflex to Culture   Result Value Ref Range    Color Urine Light Yellow     Appearance Urine Clear     Glucose Urine Negative NEG^Negative mg/dL    Bilirubin Urine Negative NEG^Negative    Ketones Urine Negative NEG^Negative mg/dL    Specific Gravity Urine 1.004 1.003 - 1.035    Blood Urine Trace (A) NEG^Negative    pH Urine 5.5 5.0 - 7.0 pH    Protein Albumin Urine Negative NEG^Negative mg/dL    Urobilinogen mg/dL Normal 0.0 - 2.0 mg/dL    Nitrite Urine Negative NEG^Negative    Leukocyte Esterase Urine Large (A) NEG^Negative    Source Midstream Urine     WBC Urine 13 (H) 0 - 5 /HPF    RBC Urine 1 0 - 2 /HPF    Squamous Epithelial /HPF Urine <1 0 - 1 /HPF    Mucous Urine Present (A) NEG^Negative /LPF   INR   Result Value Ref Range     INR 0.89 0.86 - 1.14   Uric acid   Result Value Ref Range    Uric Acid 5.6 2.6 - 6.0 mg/dL   D dimer quantitative   Result Value Ref Range    D Dimer 1.8 (H) 0.0 - 0.50 ug/ml FEU   Lactate Dehydrogenase   Result Value Ref Range    Lactate Dehydrogenase 245 (H) 81 - 234 U/L   EKG 12-lead   Result Value Ref Range    Interpretation ECG Click View Image link to view waveform and result            Your next 10 appointments already scheduled     Aug 27, 2018 11:45 AM CDT   RETURN EXTENDED with Vida Sandoval CNM   [facility] ([facility])    [place] [facility] [address]  [address]  [address]  [facility] [id number] [phone]            Aug 27, 2018  1:00 PM CDT   New Patient Visit with Anne Melara, PhD LP   [facility]  ([facility])    [place] [facility] [address]  [address]  [address]  [facility] [id number] [phone]              24 Hour Appointment Hotline       To make an appointment at any [facility], call [phone] ([phone]). If you don't have a family doctor or clinic, we will help you find one. [facility] are conveniently located to serve the needs of you and your family.             Review of your medicines      START taking        Dose / Directions Last dose taken    labetalol 100 MG tablet   Commonly known as:  NORMODYNE   Dose:  100 mg   Quantity:  60 tablet        Take 1 tablet (100 mg) by mouth 2 times daily   Refills:  0        nitroFURantoin (macrocrystal-monohydrate) 100 MG capsule   Commonly known as:  MACROBID   Dose:  100 mg   Quantity:  14 capsule        Take 1 capsule (100 mg) by mouth 2 times daily   Refills:  0        senna-docusate 8.6-50 MG per tablet   Commonly known as:  JAUN-COLACE   Dose:  1-2 tablet   Quantity:  20 tablet        Take 1-2 tablets by mouth 2 times daily as needed for constipation   Refills:  1          Our records show that you are  taking the medicines listed below. If these are incorrect, please call your family doctor or clinic.        Dose / Directions Last dose taken    ascorbic acid 250 MG Chew chewable tablet   Commonly known as:  vitamin C   Dose:  250 mg   Quantity:  90 tablet        Take 1 tablet (250 mg) by mouth daily   Refills:  0        Cyanocobalamin 1000 MCG Lozg   Dose:  1 tablet   Quantity:  30 lozenge        Take 1 tablet by mouth daily   Refills:  3        ferrous sulfate 325 (65 Fe) MG tablet   Commonly known as:  IRON   Dose:  325 mg   Quantity:  30 tablet        Take 1 tablet (325 mg) by mouth daily (with breakfast)   Refills:  2        hydrOXYzine 25 MG tablet   Commonly known as:  ATARAX   Dose:  25-50 mg   Quantity:  30 tablet        Take 1-2 tablets (25-50 mg) by mouth every 6 hours as needed for other (sleep)   Refills:  1        ibuprofen 200 MG tablet   Commonly known as:  ADVIL/MOTRIN   Dose:  600 mg   Quantity:  50 tablet        Take 3 tablets (600 mg) by mouth every 6 hours as needed for other (cramping)   Refills:  0        PRENATAL VITAMIN AND MINERAL 28-0.8 MG Tabs   Dose:  1 tablet   Quantity:  90 tablet        Take 1 tablet by mouth daily   Refills:  3        sennosides 8.6 MG tablet   Commonly known as:  SENOKOT   Dose:  1 tablet   Quantity:  60 tablet        Take 1 tablet by mouth 2 times daily as needed for constipation   Refills:  1                Prescriptions were sent or printed at these locations (3 Prescriptions)                   Other Prescriptions                Printed at Department/Unit printer (3 of 3)         labetalol (NORMODYNE) 100 MG tablet               nitroFURantoin, macrocrystal-monohydrate, (MACROBID) 100 MG capsule               senna-docusate (JAUN-COLACE) 8.6-50 MG per tablet                Procedures and tests performed during your visit     CBC with platelets differential    Chest  XR, 1 view portable    Comprehensive metabolic panel    D dimer quantitative    EKG 12-lead     "INR    Lactate Dehydrogenase    UA with Microscopic reflex to Culture    US Lower Extremity Venous Duplex Bilateral    Uric acid    Urine Culture Aerobic Bacterial      Orders Needing Specimen Collection     None      Pending Results     Date and Time Order Name Status Description    2018 1830 Urine Culture Aerobic Bacterial In process     2018 1707 EKG 12-lead Preliminary             Pending Culture Results     Date and Time Order Name Status Description    2018 1830 Urine Culture Aerobic Bacterial In process             Pending Results Instructions     If you had any lab results that were not finalized at the time of your Discharge, you can call the ED Lab Result RN at [phone]. You will be contacted by this team for any positive Lab results or changes in treatment. The nurses are available 7 days a week from 10A to 6:30P.  You can leave a message 24 hours per day and they will return your call.        Thank you for choosing [facility]       Thank you for choosing [facility] for your care. Our goal is always to provide you with excellent care. Hearing back from our patients is one way we can continue to improve our services. Please take a few minutes to complete the written survey that you may receive in the mail after you visit with us. Thank you!        In[employer] Information     [employer] lets you send messages to your doctor, view your test results, renew your prescriptions, schedule appointments and more. To sign up, go to [email or web address] . Click on \"Log in\" on the left side of the screen, which will take you to the Welcome page. Then click on \"Sign up Now\" on the right side of the page.     You will be asked to enter the access code listed below, as well as some personal information. Please follow the directions to create your username and password.     Your access code is: [email or web address]  Expires: 10/24/2018  9:06 AM     Your access code will  in 90 days. If you need help or a new " code, please call your [facility] clinic or [phone].        Care EveryWhere ID     This is your Care EveryWhere ID. This could be used by other organizations to access your [facility] medical records  [id number]        Equal Access to Services     JAZMIN HARO : Evelia Harrison, waramseyda luqadaha, qaybta kaalmada suleiman, ada crouch. So [facility] [phone].    ATENCIÓN: Si habla español, tiene a kaufman disposición servicios gratuitos de asistencia lingüística. Llame al [phone].    We comply with applicable federal civil rights laws and [place] laws. We do not discriminate on the basis of race, color, national origin, age, disability, sex, sexual orientation, or gender identity.            After Visit Summary       This is your record. Keep this with you and show to your community pharmacist(s) and doctor(s) at your next visit.

## 2018-08-25 NOTE — ED PROVIDER NOTES
[facility] [place] EMERGENCY DEPARTMENT ([facility])     August 25, 2018    History     Chief Complaint   Patient presents with     Generalized Weakness     just delivered a stillborn Thursday morning and lost much blood; feels numb in both hands and feet;      Shortness of Breath     feels SOB intermittently; does not appear in distress at this time but does appear fatigued; denies hx of asthma or heart problems     HPI  Pam Syed is a 28 year old female with history of recurrent pregnancy loss (most recently delivered a stillborn 2 days ago)  and thrombocytopenia who presents the ED with fatigue, generalized weakness, and hypertension.  Patient states while she was in the hospital she had high blood pressures, but at discharge she was normal.  She does not normally check her blood pressures at home.  She states that since delivery she feels fatigued, has generalized weakness, has subjective paresthesias in both of her hands, and intermittent shortness of breath.  She also states that she feels some sharp pains in her lower back when her last bowel movement was 2 days ago.  She also states she has been eating okay.  She otherwise currently denies any abdominal pain.    Per review of her chart, during her last hospitalization 8/22-8/24/18, they noted 2 hours post delivery she had symptomatic anemia, so they started her prescription for iron pills. There is also plan for her to follow up with a  Psychologist on 8/27/18.      PAST MEDICAL HISTORY  Past Medical History:   Diagnosis Date     Recurrent pregnancy loss (CODE)      PAST SURGICAL HISTORY  Past Surgical History:   Procedure Laterality Date     APPENDECTOMY Right      DILATION AND CURETTAGE SUCTION N/A 9/26/2017    Procedure: DILATION AND CURETTAGE SUCTION;  Suction Dilation And Curettage ;  Surgeon: Yolanda Samaniego MD;  Location: [facility] OR     DILATION AND CURETTAGE, HYSTEROSCOPY DIAGNOSTIC, COMBINED N/A 11/8/2017    Procedure: COMBINED DILATION  AND CURETTAGE, HYSTEROSCOPY DIAGNOSTIC;  Operative Hysteroscopy, Dilation and Curettage ;  Surgeon: Eli Pickard MD;  Location: [facility] OR     GYN SURGERY  09/26/2017    suction D&C     FAMILY HISTORY  No family history on file.  SOCIAL HISTORY  Social History   Substance Use Topics     Smoking status: Never Smoker     Smokeless tobacco: Never Used     Alcohol use No     MEDICATIONS  Current Facility-Administered Medications   Medication     acetaminophen (TYLENOL) tablet 650 mg     calcium gluconate 10 % injection 1 g     labetalol (NORMODYNE/TRANDATE) algorithm-medication instruction     labetalol (NORMODYNE/TRANDATE) injection 20 mg     labetalol (NORMODYNE/TRANDATE) injection 40 mg     labetalol (NORMODYNE/TRANDATE) injection 80 mg     lactated ringers infusion     LORazepam (ATIVAN) injection 2 mg     magnesium sulfate 2 g in NS intermittent infusion (PharMEDium or [facility] Cmpd)     magnesium sulfate 4 g in 100 mL sterile water (premade)     magnesium sulfate infusion     magnesium sulfate injection 4 g     NIFEdipine (PROCARDIA) capsule 10 mg     No Tdap Needed - Assessment: Patient does not need Tdap vaccine     ondansetron (ZOFRAN) injection 4 mg     sennosides (SENOKOT) tablet 1 tablet     Facility-Administered Medications Ordered in Other Encounters   Medication     bupivacaine (MARCAINE) 0.125 % injection (diluted from stock concentration by MD or CRNA)     lidocaine-EPINEPHrine 1.5 %-1:[id number] injection     ALLERGIES  No Known Allergies    I have reviewed the Medications, Allergies, Past Medical and Surgical History, and Social History in the Epic system.    Review of Systems   Constitutional: Positive for fatigue.   Respiratory: Positive for shortness of breath.    Musculoskeletal: Positive for back pain (low back pain).   Neurological: Positive for weakness (generalized) and numbness (subjective paresthesias in bilateral hands).   All other systems reviewed and are negative.      Physical Exam   BP:  (!) 180/109 (right arm BP was 164/88)  Pulse: 95  Heart Rate: 84  Temp: 98.9  F (37.2  C)  Resp: 20  SpO2: 100 %      Physical Exam   Constitutional: She is oriented to person, place, and time. She appears well-developed and well-nourished. No distress.   HENT:   Head: Normocephalic and atraumatic.   Eyes: No scleral icterus.   Neck: Normal range of motion. Neck supple.   Cardiovascular: Normal rate.    Pulmonary/Chest: Effort normal. No respiratory distress.   Abdominal: Soft. There is no tenderness.   Neurological: She is alert and oriented to person, place, and time.   Skin: Skin is warm and dry. No rash noted. She is not diaphoretic. No erythema. No pallor.       ED Course     ED Course     Procedures   4:57 PM  The patient was seen and examined by Dr. Mancera in Room 5.                EKG Interpretation:      Interpreted by Sylvain Mancera  Time reviewed: 1714  Symptoms at time of EKG: weakness   Rhythm: normal sinus   Rate: normal  Axis: normal  Ectopy: none  Conduction: normal  ST Segments/ T Waves: No ST-T wave changes  Q Waves: none  Comparison to prior: No old EKG available    Clinical Impression: normal EKG          Critical Care time:  none             Labs Ordered and Resulted from Time of ED Arrival Up to the Time of Departure from the ED   COMPREHENSIVE METABOLIC PANEL - Abnormal; Notable for the following:        Result Value    Chloride 111 (*)     Glucose 107 (*)     Calcium 7.7 (*)     Albumin 2.3 (*)     Protein Total 5.8 (*)     All other components within normal limits   CBC WITH PLATELETS DIFFERENTIAL - Abnormal; Notable for the following:     WBC 11.6 (*)     RBC Count 3.10 (*)     Hemoglobin 8.9 (*)     Hematocrit 26.4 (*)     RDW 15.7 (*)     Platelet Count 106 (*)     All other components within normal limits   ROUTINE UA WITH MICROSCOPIC REFLEX TO CULTURE - Abnormal; Notable for the following:     Blood Urine Trace (*)     Leukocyte Esterase Urine Large (*)     WBC Urine 13 (*)      Mucous Urine Present (*)     All other components within normal limits   D DIMER QUANTITATIVE - Abnormal; Notable for the following:     D Dimer 1.8 (*)     All other components within normal limits   LACTATE DEHYDROGENASE - Abnormal; Notable for the following:     Lactate Dehydrogenase 245 (*)     All other components within normal limits   INR   URIC ACID   NO   URINE CULTURE AEROBIC BACTERIAL     Medications   lactated ringers infusion (75 mL/hr Intravenous New Bag 8/25/18 2342)   magnesium sulfate infusion (2 g/hr Intravenous New Bag 8/25/18 2342)   calcium gluconate 10 % injection 1 g (not administered)   NIFEdipine (PROCARDIA) capsule 10 mg (not administered)   magnesium sulfate 2 g in NS intermittent infusion (PharMEDium or [facility] Cmpd) (not administered)   magnesium sulfate 4 g in 100 mL sterile water (premade) (not administered)   magnesium sulfate injection 4 g (not administered)   LORazepam (ATIVAN) injection 2 mg (not administered)   labetalol (NORMODYNE/TRANDATE) injection 20 mg (20 mg Intravenous Given 8/25/18 2352)   labetalol (NORMODYNE/TRANDATE) injection 40 mg (not administered)   labetalol (NORMODYNE/TRANDATE) injection 80 mg (not administered)   labetalol (NORMODYNE/TRANDATE) algorithm-medication instruction (not administered)   sennosides (SENOKOT) tablet 1 tablet (not administered)   ondansetron (ZOFRAN) injection 4 mg (not administered)   No Tdap Needed - Assessment: Patient does not need Tdap vaccine (not administered)   acetaminophen (TYLENOL) tablet 650 mg (not administered)   labetalol (NORMODYNE/TRANDATE) injection 10 mg (10 mg Intravenous Given 8/25/18 1718)   0.9% sodium chloride BOLUS (0 mLs Intravenous Stopped 8/25/18 1833)   labetalol (NORMODYNE/TRANDATE) injection 20 mg (20 mg Intravenous Given 8/25/18 1837)   nitroFURantoin (macrocrystal-monohydrate) (MACROBID) capsule 100 mg (100 mg Oral Given 8/25/18 1939)   ibuprofen (ADVIL/MOTRIN) tablet 800 mg (800 mg Oral Given 8/25/18 2115)    magnesium sulfate 4 g in 100 mL sterile water (premade) (4 g Intravenous New Bag 8/25/18 3595)            Assessments & Plan (with Medical Decision Making)   This is a 28-year-old female patient presenting to the emergency room postpartum with this.  She has a significant elevation in her blood pressure here in the emergency room.  Her initial impression was that she has preeclampsia and was started on labetalol.  Labs were sent and on assessment her liver function is otherwise normal she has no protein in her urine and her LFTs are within normal limits.  She does have a slight elevation in her d-dimer as well as a slight elevation in her lactic dehydrogenase.  An ultrasound of her lower extremities was also obtained for swelling which was otherwise negative.  Chest x-ray is unremarkable.  EKG shows normal sinus rhythm without signs of ectopy or ischemia.  She does have a mild UTI.  Initially her blood pressure responded well to labetalol.  Our plan was to possibly discharge her however we did consult OB and the recommendation was for admission.  At this time she will be admitted to the OB service for continued care management of elevated blood pressure and postpartum state.    I have reviewed the nursing notes.    I have reviewed the findings, diagnosis, plan and need for follow up with the patient.    Current Discharge Medication List      START taking these medications    Details   labetalol (NORMODYNE) 100 MG tablet Take 1 tablet (100 mg) by mouth 2 times daily  Qty: 60 tablet, Refills: 0      nitroFURantoin, macrocrystal-monohydrate, (MACROBID) 100 MG capsule Take 1 capsule (100 mg) by mouth 2 times daily  Qty: 14 capsule, Refills: 0      senna-docusate (JAUN-COLACE) 8.6-50 MG per tablet Take 1-2 tablets by mouth 2 times daily as needed for constipation  Qty: 20 tablet, Refills: 1             Final diagnoses:   Hypertension, postpartum condition or complication     IDrake, ester serving as a trained  medical scribe to document services personally performed by Sylvain Mancera MD, based on the provider's statements to me.      I, Sylvain Mancera MD, was physically present and have reviewed and verified the accuracy of this note documented by Drake Ellison.     8/25/2018   [facility], [place], EMERGENCY DEPARTMENT     Sylvain Mancera MD  08/26/18 0003

## 2018-08-25 NOTE — ED AVS SNAPSHOT
[facility], [place], Emergency Department    [address]    [facility] [place] [address]    Phone:  [phone]    Fax:  [phone]                                       Pam Syed   MRN: [id number]    Department:  [facility], Emergency Department   Date of Visit:  8/25/2018           After Visit Summary Signature Page     I have received my discharge instructions, and my questions have been answered. I have discussed any challenges I see with this plan with the nurse or doctor.    ..........................................................................................................................................  Patient/Patient Representative Signature      ..........................................................................................................................................  Patient Representative Print Name and Relationship to Patient    ..................................................               ................................................  Date                                            Time    ..........................................................................................................................................  Reviewed by Signature/Title    ...................................................              ..............................................  Date                                                            Time          22EPIC Rev 08/18

## 2018-08-25 NOTE — IP AVS SNAPSHOT
MRN:[id number]                      After Visit Summary   8/25/2018    Pam Syed    MRN: [id number]           Thank you!     Thank you for choosing [facility] for your care. Our goal is always to provide you with excellent care. Hearing back from our patients is one way we can continue to improve our services. Please take a few minutes to complete the written survey that you may receive in the mail after you visit with us. Thank you!        Patient Information     Date Of Birth          1990        Designated Caregiver       Most Recent Value    Caregiver    Will someone help with your care after discharge? no    Name of designated caregiver N/A    Phone number of caregiver n/a      About your hospital stay     You were admitted on:  August 25, 2018 You last received care in the:  [facility]    You were discharged on:  August 27, 2018        Reason for your hospital stay       Pre-eclampsia with severe features                  Who to Call     For medical emergencies, please call 911.  For non-urgent questions about your medical care, please call your primary care provider or clinic, [phone]          Attending Provider     Provider Specialty    Sylvain Mancera MD Emergency Medicine    [facility], Rosanna Dang MD OB/Gyn       Primary Care Provider Office Phone # Fax #    Deandre Patiño [phone]       When to contact your care team       Call [facility] [phone] if you have any of the following:  - Headache that does not go away with tylenol or rest  - Blurry vision or spots in vision  - Chest pain or shortness of breath  - Epigastric or right upper quadrant abdominal pain                  After Care Instructions     Activity       Your activity upon discharge: as tolerated            Diet       Follow this diet upon discharge: regular                  Follow-up Appointments     Follow Up and recommended labs and tests       Call [facility] [phone] to set-up blood  pressure check in 1 week. Call sooner if you are having changes in your vision, headache, chest pain, shortness of breath, or pain in the upper right part of your abdomen.     Please do not hesitate to schedule an appointment with [facility] when you are thinking about trying to get pregnant again. The doctor will be able to review all the information we covered while you were in the hospital. They can review your discharge summary from this hospitalization for more information.     When you are pregnant, please contact the clinic right away so we can schedule an early ultrasound as well as a visit with the high risk OB doctors. Dr. Shields (high risk OB/Maternal fetal medicine) would be happy to see you during your pregnancy, and either Dr. Shields or one of her partners will meet with you to discuss aspirin, progesterone, and lovenox in pregnancy.                  Your next 10 appointments already scheduled     Aug 27, 2018 11:45 AM CDT   RETURN EXTENDED with Vida Sandoval CNM   [facility] ([facility])    [place] [address]  [address]  [address] [place] [id number]   [phone]            Aug 27, 2018  1:00 PM CDT   New Patient Visit with Anne Melara, PhD    [facility]  ([facility])    [facility] [place] [facility]  [address]  [address] [place] [id number]   [phone]              Further instructions from your care team       Preeclampsia   Call your doctor right away if you have any of the following:  - Edema (swelling) in your face or hands  - Rapid weight gain-about 1 pound or more in a day  - Headache  - Abdominal pain on your right side  - Vision problems (flashes or spots)  -           Follow up to day as scheduled with Vida VARGAS CNM  Results for orders placed or performed during the hospital encounter of 08/25/18   Chest  XR, 1 view portable    Narrative    CHEST ONE VIEW PORTABLE    8/25/2018 6:10 PM     HISTORY: Shortness of breath in HTN.     COMPARISON: None.      Impression    IMPRESSION: Normal.    VIVIAN LANDIN MD   US Lower Extremity Venous Duplex Bilateral    Narrative    ULTRASOUND VENOUS LOWER EXTREMITY BILATERAL 8/25/2018 8:04 PM     HISTORY: Worsening bilateral leg swelling, left worse than right,  worsening bilateral leg swelling.     COMPARISON: None.    TECHNIQUE: Ultrasound gray scale, Color Doppler flow, and spectral  Doppler waveform analysis performed.    FINDINGS: The bilateral common femoral, superficial femoral, popliteal  and posterior tibial veins are patent and fully compressible and  demonstrate normal venous Doppler flow. The visualized greater  saphenous veins are negative for thrombus.      Impression    IMPRESSION: No DVT demonstrated.    NADIA DELGADILLO MD   Comprehensive metabolic panel   Result Value Ref Range    Sodium 142 133 - 144 mmol/L    Potassium 3.9 3.4 - 5.3 mmol/L    Chloride 111 (H) 94 - 109 mmol/L    Carbon Dioxide 26 20 - 32 mmol/L    Anion Gap 5 3 - 14 mmol/L    Glucose 107 (H) 70 - 99 mg/dL    Urea Nitrogen 12 7 - 30 mg/dL    Creatinine 0.82 0.52 - 1.04 mg/dL    GFR Estimate 82 >60 mL/min/1.7m2    GFR Estimate If Black >90 >60 mL/min/1.7m2    Calcium 7.7 (L) 8.5 - 10.1 mg/dL    Bilirubin Total 0.4 0.2 - 1.3 mg/dL    Albumin 2.3 (L) 3.4 - 5.0 g/dL    Protein Total 5.8 (L) 6.8 - 8.8 g/dL    Alkaline Phosphatase 111 40 - 150 U/L    ALT 13 0 - 50 U/L    AST 22 0 - 45 U/L   CBC with platelets differential   Result Value Ref Range    WBC 11.6 (H) 4.0 - 11.0 10e9/L    RBC Count 3.10 (L) 3.8 - 5.2 10e12/L    Hemoglobin 8.9 (L) 11.7 - 15.7 g/dL    Hematocrit 26.4 (L) 35.0 - 47.0 %    MCV 85 78 - 100 fl    MCH 28.7 26.5 - 33.0 pg    MCHC 33.7 31.5 - 36.5 g/dL    RDW 15.7 (H) 10.0 - 15.0 %    Platelet Count 106 (L) 150 - 450 10e9/L    Diff Method Automated Method     % Neutrophils 69.3 %    % Lymphocytes 22.9 %    % Monocytes 4.8 %    % Eosinophils 1.1 %    %  Basophils 0.3 %    % Immature Granulocytes 1.6 %    Nucleated RBCs 0 0 /100    Absolute Neutrophil 8.0 1.6 - 8.3 10e9/L    Absolute Lymphocytes 2.7 0.8 - 5.3 10e9/L    Absolute Monocytes 0.6 0.0 - 1.3 10e9/L    Absolute Eosinophils 0.1 0.0 - 0.7 10e9/L    Absolute Basophils 0.0 0.0 - 0.2 10e9/L    Abs Immature Granulocytes 0.2 0 - 0.4 10e9/L    Absolute Nucleated RBC 0.0    UA with Microscopic reflex to Culture   Result Value Ref Range    Color Urine Light Yellow     Appearance Urine Clear     Glucose Urine Negative NEG^Negative mg/dL    Bilirubin Urine Negative NEG^Negative    Ketones Urine Negative NEG^Negative mg/dL    Specific Gravity Urine 1.004 1.003 - 1.035    Blood Urine Trace (A) NEG^Negative    pH Urine 5.5 5.0 - 7.0 pH    Protein Albumin Urine Negative NEG^Negative mg/dL    Urobilinogen mg/dL Normal 0.0 - 2.0 mg/dL    Nitrite Urine Negative NEG^Negative    Leukocyte Esterase Urine Large (A) NEG^Negative    Source Midstream Urine     WBC Urine 13 (H) 0 - 5 /HPF    RBC Urine 1 0 - 2 /HPF    Squamous Epithelial /HPF Urine <1 0 - 1 /HPF    Mucous Urine Present (A) NEG^Negative /LPF   INR   Result Value Ref Range    INR 0.89 0.86 - 1.14   Uric acid   Result Value Ref Range    Uric Acid 5.6 2.6 - 6.0 mg/dL   D dimer quantitative   Result Value Ref Range    D Dimer 1.8 (H) 0.0 - 0.50 ug/ml FEU   Lactate Dehydrogenase   Result Value Ref Range    Lactate Dehydrogenase 245 (H) 81 - 234 U/L   EKG 12-lead   Result Value Ref Range    Interpretation ECG Click View Image link to view waveform and result            Pending Results     Date and Time Order Name Status Description    8/27/2018 0030 HGB Eval Reflex to ELP or RBC Solubility In process             Statement of Approval     Ordered          08/27/18 1021  I have reviewed and agree with all the recommendations and orders detailed in this document.  EFFECTIVE NOW     Approved and electronically signed by:  Gracie Castañeda MD             Admission Information   "   Date & Time Provider Department Dept. Phone    2018 Rosanna Ybarra MD [facility] [phone]      Your Vitals Were     Blood Pressure Pulse Temperature Respirations Pulse Oximetry       138/83 99 98.8  F (37.1  C) (Oral) 18 100%       MyChart Information     [facility] [employer] lets you send messages to your doctor, view your test results, renew your prescriptions, schedule appointments and more. To sign up, go to [email or web address] . Click on \"Log in\" on the left side of the screen, which will take you to the Welcome page. Then click on \"Sign up Now\" on the right side of the page.     You will be asked to enter the access code listed below, as well as some personal information. Please follow the directions to create your username and password.     Your access code is: [email or web address]  Expires: 10/24/2018  9:06 AM     Your access code will  in 90 days. If you need help or a new code, please call your [facility] clinic or [phone].        Care EveryWhere ID     This is your Care EveryWhere ID. This could be used by other organizations to access your [facility] medical records  [id number]        Equal Access to Services     JAZMIN HARO AH: Hadmireya brookso Sotere, waaxda luqadaha, qaybta kaalmada adeegyada, ada crouch. So [facility] [phone].    ATENCIÓN: Si habla español, tiene a kaufman disposición servicios gratuitos de asistencia lingüística. Llame al [phone].    We comply with applicable federal civil rights laws and [place] laws. We do not discriminate on the basis of race, color, national origin, age, disability, sex, sexual orientation, or gender identity.               Review of your medicines      START taking        Dose / Directions    amoxicillin 500 MG capsule   Commonly known as:  AMOXIL   Used for:  Acute cystitis without hematuria        Dose:  500 mg   Take 1 capsule (500 mg) by mouth 2 times daily for 7 days   Quantity:  14 capsule   Refills:  0       " senna-docusate 8.6-50 MG per tablet   Commonly known as:  JAUN-COLACE        Dose:  1-2 tablet   Take 1-2 tablets by mouth 2 times daily as needed for constipation   Quantity:  20 tablet   Refills:  1         CONTINUE these medicines which may have CHANGED, or have new prescriptions. If we are uncertain of the size of tablets/capsules you have at home, strength may be listed as something that might have changed.        Dose / Directions    * ibuprofen 200 MG tablet   Commonly known as:  ADVIL/MOTRIN   This may have changed:  Another medication with the same name was added. Make sure you understand how and when to take each.   Used for:  IUFD at 20 weeks or more of gestation        Dose:  600 mg   Take 3 tablets (600 mg) by mouth every 6 hours as needed for other (cramping)   Quantity:  50 tablet   Refills:  0       * ibuprofen 800 MG tablet   Commonly known as:  ADVIL/MOTRIN   This may have changed:  You were already taking a medication with the same name, and this prescription was added. Make sure you understand how and when to take each.   Used for:   (normal spontaneous vaginal delivery)        Dose:  800 mg   Take 1 tablet (800 mg) by mouth every 8 hours as needed for moderate pain   Quantity:  30 tablet   Refills:  0       * Notice:  This list has 2 medication(s) that are the same as other medications prescribed for you. Read the directions carefully, and ask your doctor or other care provider to review them with you.      CONTINUE these medicines which have NOT CHANGED        Dose / Directions    ascorbic acid 250 MG Chew chewable tablet   Commonly known as:  vitamin C   Used for:  Iron deficiency anemia secondary to inadequate dietary iron intake        Dose:  250 mg   Take 1 tablet (250 mg) by mouth daily   Quantity:  90 tablet   Refills:  0       Cyanocobalamin 1000 MCG Lozg   Used for:  Iron deficiency anemia secondary to inadequate dietary iron intake        Dose:  1 tablet   Take 1 tablet by mouth  daily   Quantity:  30 lozenge   Refills:  3       ferrous sulfate 325 (65 Fe) MG tablet   Commonly known as:  IRON   Used for:  Iron deficiency anemia secondary to inadequate dietary iron intake, IUFD at 20 weeks or more of gestation        Dose:  325 mg   Take 1 tablet (325 mg) by mouth daily (with breakfast)   Quantity:  30 tablet   Refills:  2       hydrOXYzine 25 MG tablet   Commonly known as:  ATARAX   Used for:  IUFD at 20 weeks or more of gestation        Dose:  25-50 mg   Take 1-2 tablets (25-50 mg) by mouth every 6 hours as needed for other (sleep)   Quantity:  30 tablet   Refills:  1       PRENATAL VITAMIN AND MINERAL 28-0.8 MG Tabs   Used for:  Early stage of pregnancy        Dose:  1 tablet   Take 1 tablet by mouth daily   Quantity:  90 tablet   Refills:  3       sennosides 8.6 MG tablet   Commonly known as:  SENOKOT   Used for:  High-risk pregnancy in first trimester        Dose:  1 tablet   Take 1 tablet by mouth 2 times daily as needed for constipation   Quantity:  60 tablet   Refills:  1            Where to get your medicines      These medications were sent to [facility] [address]  [address] [address]     Phone:  [phone]     amoxicillin 500 MG capsule    ibuprofen 800 MG tablet         Some of these will need a paper prescription and others can be bought over the counter. Ask your nurse if you have questions.     Bring a paper prescription for each of these medications     senna-docusate 8.6-50 MG per tablet                Protect others around you: Learn how to safely use, store and throw away your medicines at www.disposemymeds.org.        ANTIBIOTIC INSTRUCTION     You've Been Prescribed an Antibiotic - Now What?  Your healthcare team thinks that you or your loved one might have an infection. Some infections can be treated with antibiotics, which are powerful, life-saving drugs. Like all medications, antibiotics have side  effects and should only be used when necessary. There are some important things you should know about your antibiotic treatment.      Your healthcare team may run tests before you start taking an antibiotic.    Your team may take samples (e.g., from your blood, urine or other areas) to run tests to look for bacteria. These test can be important to determine if you need an antibiotic at all and, if you do, which antibiotic will work best.      Within a few days, your healthcare team might change or even stop your antibiotic.    Your team may start you on an antibiotic while they are working to find out what is making you sick.    Your team might change your antibiotic because test results show that a different antibiotic would be better to treat your infection.    In some cases, once your team has more information, they learn that you do not need an antibiotic at all. They may find out that you don't have an infection, or that the antibiotic you're taking won't work against your infection. For example, an infection caused by a virus can't be treated with antibiotics. Staying on an antibiotic when you don't need it is more likely to be harmful than helpful.      You may experience side effects from your antibiotic.    Like all medications, antibiotics have side effects. Some of these can be serious.    Let you healthcare team know if you have any known allergies when you are admitted to the hospital.    One significant side effect of nearly all antibiotics is the risk of severe and sometimes deadly diarrhea caused by Clostridium difficile (C. Difficile). This occurs when a person takes antibiotics because some good germs are destroyed. Antibiotic use allows C. diificile to take over, putting patients at high risk for this serious infection.    As a patient or caregiver, it is important to understand your or your loved one's antibiotic treatment. It is especially important for caregivers to speak up when patients can't  speak for themselves. Here are some important questions to ask your healthcare team.    What infection is this antibiotic treating and how do you know I have that infection?    What side effects might occur from this antibiotic?    How long will I need to take this antibiotic?    Is it safe to take this antibiotic with other medications or supplements (e.g., vitamins) that I am taking?     Are there any special directions I need to know about taking this antibiotic? For example, should I take it with food?    How will I be monitored to know whether my infection is responding to the antibiotic?    What tests may help to make sure the right antibiotic is prescribed for me?      Information provided by:  www.cdc.gov/getsmart  U.S. Department of Health and Human Services  Centers for disease Control and Prevention  National Center for Emerging and Zoonotic Infectious Diseases  Division of Healthcare Quality Promotion             Medication List: This is a list of all your medications and when to take them. Check marks below indicate your daily home schedule. Keep this list as a reference.      Medications           Morning Afternoon Evening Bedtime As Needed    amoxicillin 500 MG capsule   Commonly known as:  AMOXIL   Take 1 capsule (500 mg) by mouth 2 times daily for 7 days                                ascorbic acid 250 MG Chew chewable tablet   Commonly known as:  vitamin C   Take 1 tablet (250 mg) by mouth daily                                Cyanocobalamin 1000 MCG Lozg   Take 1 tablet by mouth daily                                ferrous sulfate 325 (65 Fe) MG tablet   Commonly known as:  IRON   Take 1 tablet (325 mg) by mouth daily (with breakfast)   Last time this was given:  325 mg on 8/26/2018  9:58 PM                                hydrOXYzine 25 MG tablet   Commonly known as:  ATARAX   Take 1-2 tablets (25-50 mg) by mouth every 6 hours as needed for other (sleep)   Last time this was given:  100 mg on  8/26/2018 10:53 PM                                * ibuprofen 200 MG tablet   Commonly known as:  ADVIL/MOTRIN   Take 3 tablets (600 mg) by mouth every 6 hours as needed for other (cramping)   Last time this was given:  600 mg on 8/27/2018  9:00 AM                                * ibuprofen 800 MG tablet   Commonly known as:  ADVIL/MOTRIN   Take 1 tablet (800 mg) by mouth every 8 hours as needed for moderate pain   Last time this was given:  600 mg on 8/27/2018  9:00 AM                                PRENATAL VITAMIN AND MINERAL 28-0.8 MG Tabs   Take 1 tablet by mouth daily                                senna-docusate 8.6-50 MG per tablet   Commonly known as:  JAUN-COLACE   Take 1-2 tablets by mouth 2 times daily as needed for constipation                                sennosides 8.6 MG tablet   Commonly known as:  SENOKOT   Take 1 tablet by mouth 2 times daily as needed for constipation   Last time this was given:  1 tablet on 8/26/2018  8:54 AM                                * Notice:  This list has 2 medication(s) that are the same as other medications prescribed for you. Read the directions carefully, and ask your doctor or other care provider to review them with you.

## 2018-08-25 NOTE — IP AVS SNAPSHOT
[facility]    [address]    [place] [address]    Phone:  [phone]                                       After Visit Summary   8/25/2018    Pam Syed    MRN: [id number]           After Visit Summary Signature Page     I have received my discharge instructions, and my questions have been answered. I have discussed any challenges I see with this plan with the nurse or doctor.    ..........................................................................................................................................  Patient/Patient Representative Signature      ..........................................................................................................................................  Patient Representative Print Name and Relationship to Patient    ..................................................               ................................................  Date                                            Time    ..........................................................................................................................................  Reviewed by Signature/Title    ...................................................              ..............................................  Date                                                            Time          22EPIC Rev 08/18

## 2018-08-26 LAB
ALT SERPL W P-5'-P-CCNC: 11 U/L (ref 0–50)
AST SERPL W P-5'-P-CCNC: 17 U/L (ref 0–45)
CREAT SERPL-MCNC: 0.7 MG/DL (ref 0.52–1.04)
ERYTHROCYTE [DISTWIDTH] IN BLOOD BY AUTOMATED COUNT: 15.8 % (ref 10–15)
FERRITIN SERPL-MCNC: 48 NG/ML (ref 12–150)
GFR SERPL CREATININE-BSD FRML MDRD: >90 ML/MIN/1.7M2
HCT VFR BLD AUTO: 23.2 % (ref 35–47)
HGB BLD-MCNC: 7.8 G/DL (ref 11.7–15.7)
INTERPRETATION ECG - MUSE: NORMAL
MAGNESIUM SERPL-MCNC: 5.8 MG/DL (ref 1.6–2.3)
MCH RBC QN AUTO: 28.8 PG (ref 26.5–33)
MCHC RBC AUTO-ENTMCNC: 33.6 G/DL (ref 31.5–36.5)
MCV RBC AUTO: 86 FL (ref 78–100)
PLATELET # BLD AUTO: 105 10E9/L (ref 150–450)
RBC # BLD AUTO: 2.71 10E12/L (ref 3.8–5.2)
WBC # BLD AUTO: 9.5 10E9/L (ref 4–11)

## 2018-08-26 PROCEDURE — 82728 ASSAY OF FERRITIN: CPT | Performed by: STUDENT IN AN ORGANIZED HEALTH CARE EDUCATION/TRAINING PROGRAM

## 2018-08-26 PROCEDURE — 82565 ASSAY OF CREATININE: CPT | Performed by: STUDENT IN AN ORGANIZED HEALTH CARE EDUCATION/TRAINING PROGRAM

## 2018-08-26 PROCEDURE — 25000128 H RX IP 250 OP 636: Performed by: STUDENT IN AN ORGANIZED HEALTH CARE EDUCATION/TRAINING PROGRAM

## 2018-08-26 PROCEDURE — 83735 ASSAY OF MAGNESIUM: CPT | Performed by: STUDENT IN AN ORGANIZED HEALTH CARE EDUCATION/TRAINING PROGRAM

## 2018-08-26 PROCEDURE — 84460 ALANINE AMINO (ALT) (SGPT): CPT | Performed by: STUDENT IN AN ORGANIZED HEALTH CARE EDUCATION/TRAINING PROGRAM

## 2018-08-26 PROCEDURE — 84450 TRANSFERASE (AST) (SGOT): CPT | Performed by: STUDENT IN AN ORGANIZED HEALTH CARE EDUCATION/TRAINING PROGRAM

## 2018-08-26 PROCEDURE — 12000028 ZZH R&B OB UMMC

## 2018-08-26 PROCEDURE — 36415 COLL VENOUS BLD VENIPUNCTURE: CPT | Performed by: STUDENT IN AN ORGANIZED HEALTH CARE EDUCATION/TRAINING PROGRAM

## 2018-08-26 PROCEDURE — 25000132 ZZH RX MED GY IP 250 OP 250 PS 637: Performed by: STUDENT IN AN ORGANIZED HEALTH CARE EDUCATION/TRAINING PROGRAM

## 2018-08-26 PROCEDURE — 85027 COMPLETE CBC AUTOMATED: CPT | Performed by: STUDENT IN AN ORGANIZED HEALTH CARE EDUCATION/TRAINING PROGRAM

## 2018-08-26 RX ORDER — FERROUS SULFATE 325(65) MG
325 TABLET ORAL AT BEDTIME
Status: DISCONTINUED | OUTPATIENT
Start: 2018-08-26 | End: 2018-08-27 | Stop reason: HOSPADM

## 2018-08-26 RX ORDER — NITROFURANTOIN 25; 75 MG/1; MG/1
100 CAPSULE ORAL EVERY 12 HOURS SCHEDULED
Status: DISCONTINUED | OUTPATIENT
Start: 2018-08-26 | End: 2018-08-26

## 2018-08-26 RX ORDER — IBUPROFEN 200 MG
600 TABLET ORAL EVERY 6 HOURS PRN
Status: DISCONTINUED | OUTPATIENT
Start: 2018-08-26 | End: 2018-08-27 | Stop reason: HOSPADM

## 2018-08-26 RX ORDER — NITROFURANTOIN 25; 75 MG/1; MG/1
100 CAPSULE ORAL EVERY 12 HOURS SCHEDULED
Status: DISCONTINUED | OUTPATIENT
Start: 2018-08-27 | End: 2018-08-27 | Stop reason: HOSPADM

## 2018-08-26 RX ORDER — HYDROXYZINE HYDROCHLORIDE 50 MG/1
100 TABLET, FILM COATED ORAL
Status: DISCONTINUED | OUTPATIENT
Start: 2018-08-26 | End: 2018-08-27 | Stop reason: HOSPADM

## 2018-08-26 RX ADMIN — SENNOSIDES 1 TABLET: 8.6 TABLET, FILM COATED ORAL at 08:54

## 2018-08-26 RX ADMIN — HYDROXYZINE HYDROCHLORIDE 100 MG: 50 TABLET, FILM COATED ORAL at 22:53

## 2018-08-26 RX ADMIN — MAGNESIUM SULFATE IN WATER 2 G/HR: 40 INJECTION, SOLUTION INTRAVENOUS at 09:49

## 2018-08-26 RX ADMIN — FERROUS SULFATE TAB 325 MG (65 MG ELEMENTAL FE) 325 MG: 325 (65 FE) TAB at 21:58

## 2018-08-26 RX ADMIN — NITROFURANTOIN (MONOHYDRATE/MACROCRYSTALS) 100 MG: 75; 25 CAPSULE ORAL at 20:00

## 2018-08-26 RX ADMIN — MAGNESIUM SULFATE IN WATER 2 G/HR: 40 INJECTION, SOLUTION INTRAVENOUS at 19:07

## 2018-08-26 RX ADMIN — ACETAMINOPHEN 650 MG: 325 TABLET, FILM COATED ORAL at 08:54

## 2018-08-26 RX ADMIN — IBUPROFEN 600 MG: 200 TABLET, FILM COATED ORAL at 10:40

## 2018-08-26 RX ADMIN — NITROFURANTOIN (MONOHYDRATE/MACROCRYSTALS) 100 MG: 75; 25 CAPSULE ORAL at 10:40

## 2018-08-26 RX ADMIN — IBUPROFEN 600 MG: 200 TABLET, FILM COATED ORAL at 16:52

## 2018-08-26 RX ADMIN — SODIUM CHLORIDE, POTASSIUM CHLORIDE, SODIUM LACTATE AND CALCIUM CHLORIDE 75 ML/HR: 600; 310; 30; 20 INJECTION, SOLUTION INTRAVENOUS at 11:50

## 2018-08-26 ASSESSMENT — ACTIVITIES OF DAILY LIVING (ADL)
COGNITION: 0 - NO COGNITION ISSUES REPORTED
TRANSFERRING: 0-->INDEPENDENT
RETIRED_COMMUNICATION: 0-->UNDERSTANDS/COMMUNICATES WITHOUT DIFFICULTY
DRESS: 0-->INDEPENDENT
SWALLOWING: 0-->SWALLOWS FOODS/LIQUIDS WITHOUT DIFFICULTY
BATHING: 0-->INDEPENDENT
TOILETING: 0-->INDEPENDENT
AMBULATION: 0-->INDEPENDENT
RETIRED_EATING: 0-->INDEPENDENT
FALL_HISTORY_WITHIN_LAST_SIX_MONTHS: NO

## 2018-08-26 NOTE — PLAN OF CARE
Data: Patient presented to [facility] at 2315.   Reason for maternal/fetal assessment per patient is High Blood Pressures  Patient is a  with term fetal demise on .  Patient transferred from emergency department to unit for high blood pressures. Magnesium continuous infusion started per orders. IV labetalol given for severe range blood pressures 15 minutes apart. Will continue closely monitor  BP's according to order set and notify provider if subsequent IV labetalol needs to be given. Patient called spouse upon arrival and her  Randall is now at bedside.  Action: Triage assessment completed with Dr. Martinez at bedside throughout. Patient denies any headaches or changes in vision but reported feeling some shortness of breath earlier in the evening. Moderate edema in feet and ankles. She denies any right upper quadrant pain. Brisk reflexes but no clonus.   Response: Plan per provider is to admit to antepartum unit.  Patient verbalized agreement with plan.

## 2018-08-26 NOTE — PLAN OF CARE
Problem: Patient Care Overview  Goal: Plan of Care/Patient Progress Review  Outcome: Improving  BP's currently 120's/70's, other VSS.  Patient denies pain and reports has been sleeping well for past several hours.  Magnesium infusing at 2 grams/hour.  Urine output adequate.  Patient's  Nilson here overnight.  Will continue with current plan of care.

## 2018-08-26 NOTE — H&P
[facility] Labor and Delivery History and Physical    Pam Syed MRN# [id number]   Age: 28 year old YOB: 1990     Date of Admission:  2018    Primary care provider: Deandre Patiño    CC: severe range blood pressures    HPI: Pam Syed is a 28 year old  who is PPD#2 s/p  of term fetal demise with the CNM service. She presented to triage on 18 for rule-out labor and rupture of membranes and was found to have absent fetal cardiac activity on bedside ultrasound. Pregnancy c/b thrombocytopenia diagnosed on  at which time platelet was 129.     She was admitted for induction and was AROM'd with clear, non-malodorous fluid and quickly progressed to complete and delivered the non-viable fetus. In the postpartum period she met criteria for pre-eclampsia without severe features based on mild range blood pressures (140-150s/80s) and elevated UPC 1.47. Other than known thrombocytopenia her HELLP labs were normal. She desired discharge on PPD#1 and met goals at that time.     She presented to the ED due to symptoms of sudden onset of fatigue, numbness and tingling of the extremities and shortness of breath. While in the ED she was found to have sustained severe range blood pressures and subsequently received IV labetalol with good response. Due to delay in transfer to L&D, she received her loading dose of magnesium in the ED.    Upon transfer to L&D, she reports shortness of breath has improved. She denies headache, blurry vision, chest pain, RUQ pain, nausea, vomiting, dysuria, hematuria or extremity edema.    ROS:   Complete 10-point ROS negative except as noted in HPI.      Pregnancy History:  OBSTETRIC HISTORY:    Obstetric History       T2      L1     SAB5   TAB0   Ectopic0   Multiple0   Live Births2       # Outcome Date GA Lbr Michael/2nd Weight Sex Delivery Anes PTL Lv   7 Term 18 39w3d 01:16 / 00:20 2.75 kg (6 lb 1 oz) M  Vag-Spont None N ND      Name: STEPH LIRIANO      Complications: Fetal demise > 22 weeks, delivered, current hospitalization   6 SAB 17           5 Term 12/03/15 38w5d 03:45 / 01:16 3.11 kg (6 lb 13.7 oz) M Vag-Spont EPI  RAMILA      Apgar1:  9                Apgar5: 9   4 SAB            3 SAB            2 SAB            1 SAB                 Active Problem List  Patient Active Problem List   Diagnosis     History of recurrent , currently pregnant in second trimester     Iron deficiency anemia secondary to inadequate dietary iron intake     Vitamin D deficiency     Thrombocytopenia (H)     IUFD at 20 weeks or more of gestation     Severe pre-eclampsia, postpartum condition or complication     Medical History  Past Medical History:   Diagnosis Date     Recurrent pregnancy loss (CODE)      Surgical History  Past Surgical History:   Procedure Laterality Date     APPENDECTOMY Right      DILATION AND CURETTAGE SUCTION N/A 2017    Procedure: DILATION AND CURETTAGE SUCTION;  Suction Dilation And Curettage ;  Surgeon: Yolanda Samaniego MD;  Location: [facility] OR     DILATION AND CURETTAGE, HYSTEROSCOPY DIAGNOSTIC, COMBINED N/A 2017    Procedure: COMBINED DILATION AND CURETTAGE, HYSTEROSCOPY DIAGNOSTIC;  Operative Hysteroscopy, Dilation and Curettage ;  Surgeon: Eli Pickard MD;  Location: [facility] OR     GYN SURGERY  2017    suction D&C     Social History:  Denies tobacco, ETOH or drug use during pregnancy.    Physical Exam:  Vitals:    18 2117 18 2118 18 2119 18 2120   BP: (!) 162/93   (!) 154/96   Pulse:       Resp:       Temp:       TempSrc:       SpO2: 100% 100% 100% 100%     Gen: Well appearing, no apparent distress  CV: RRR, no murmurs  Pulm: CTAB, no wheezes   GI: soft, non-tender, non-distended  Ext: 3+ patellar reflexes b/l, no clonus b/l, 1+ pitting edema b/l    Labs:  Hgb 8.9   (105 on )  Cr 0.82 (0.65 on )  ALT 13  AST  22    Assessment:  Pam Syed is a 28 year old  PPD#1 s/p term fetal demise admitted for postpartum pre-eclampsia with severe features.     Plan:    # Pre-eclampsia with severe features  - Seizure prophylaxis with 24 hours of magnesium. S/p 4g loading dose in ED. Continue 2g/h. Slightly elevated creatinine, plan for magnesium level check with next lab draw in the morning.  - Magnesium check q4h  - Repeat HELLP labs in AM  - Strict I&O  - Daily weights  - Severe range BP: s/p IV labetalol 20 w/ good response. Plan check per protocol and then q2h. IV antihypertensives for sustained severe range BP (systolics >160 or diastolics >110)    # Pain Assessment:  Current Pain Score 2018   Patient currently in pain? denies   Pain location -   Pain descriptors -   Pam mendoza pain level was assessed and she currently denies pain.      Nik Martinez MD  OB/GYN Resident, PGY-3  2018     [facility] staff:  Appreciate note by Dr. Martinez.  I have reviewed, edited, and agree with the note.      Rosanna Ybarra MD, FACOG

## 2018-08-26 NOTE — PLAN OF CARE
Problem: Patient Care Overview  Goal: Plan of Care/Patient Progress Review  Outcome: Improving  Patient is stable, on 2 grams of magnesium, voiding without difficulty, have pain on breasts with engorgement. Taking Ibuprofen and Tylenol for pain. Continue with plan of care.

## 2018-08-26 NOTE — ED NOTES
[facility]   ED Nurse to Floor Handoff     Pam Syed is a 28 year old female who speaks English and lives with a spouse,  in a home  They arrived in the ED by car from home    ED Chief Complaint: Generalized Weakness (just delivered a stillborn Thursday morning and lost much blood; feels numb in both hands and feet; ) and Shortness of Breath (feels SOB intermittently; does not appear in distress at this time but does appear fatigued; denies hx of asthma or heart problems)    ED Dx;   Final diagnoses:   Hypertension, postpartum condition or complication         Needed?: No    Allergies: No Known Allergies.  Past Medical Hx:   Past Medical History:   Diagnosis Date     Recurrent pregnancy loss (CODE)       Baseline Mental status: WDL  Current Mental Status changes: at basesline    Infection present or suspected this encounter: no  Sepsis suspected: No  Isolation type: No active isolations     Activity level - Baseline/Home:  Independent  Activity Level - Current:   Independent    Bariatric equipment needed?: No    In the ED these meds were given:   Medications   labetalol (NORMODYNE/TRANDATE) injection 10 mg (10 mg Intravenous Given 8/25/18 1718)   0.9% sodium chloride BOLUS (0 mLs Intravenous Stopped 8/25/18 1833)   labetalol (NORMODYNE/TRANDATE) injection 20 mg (20 mg Intravenous Given 8/25/18 1837)   nitroFURantoin (macrocrystal-monohydrate) (MACROBID) capsule 100 mg (100 mg Oral Given 8/25/18 1939)   ibuprofen (ADVIL/MOTRIN) tablet 800 mg (800 mg Oral Given 8/25/18 2115)       Drips running?  No    Home pump  No    Current LDAs  Peripheral IV 08/25/18 Right (Active)   Site Assessment WDL 8/25/2018  5:09 PM   Dressing Intervention New dressing  8/25/2018  5:09 PM   Number of days:0       Airway - Adult/Peds (Active)   Number of days:290       Incision/Surgical Site 11/08/17 Bilateral Vagina (Active)   Number of days:290       Labs results:   Labs Ordered and  Resulted from Time of ED Arrival Up to the Time of Departure from the ED   COMPREHENSIVE METABOLIC PANEL - Abnormal; Notable for the following:        Result Value    Chloride 111 (*)     Glucose 107 (*)     Calcium 7.7 (*)     Albumin 2.3 (*)     Protein Total 5.8 (*)     All other components within normal limits   CBC WITH PLATELETS DIFFERENTIAL - Abnormal; Notable for the following:     WBC 11.6 (*)     RBC Count 3.10 (*)     Hemoglobin 8.9 (*)     Hematocrit 26.4 (*)     RDW 15.7 (*)     Platelet Count 106 (*)     All other components within normal limits   ROUTINE UA WITH MICROSCOPIC REFLEX TO CULTURE - Abnormal; Notable for the following:     Blood Urine Trace (*)     Leukocyte Esterase Urine Large (*)     WBC Urine 13 (*)     Mucous Urine Present (*)     All other components within normal limits   D DIMER QUANTITATIVE - Abnormal; Notable for the following:     D Dimer 1.8 (*)     All other components within normal limits   LACTATE DEHYDROGENASE - Abnormal; Notable for the following:     Lactate Dehydrogenase 245 (*)     All other components within normal limits   INR   URIC ACID   URINE CULTURE AEROBIC BACTERIAL       Imaging Studies:   Recent Results (from the past 24 hour(s))   Chest  XR, 1 view portable    Narrative    CHEST ONE VIEW PORTABLE   8/25/2018 6:10 PM     HISTORY: Shortness of breath in HTN.     COMPARISON: None.      Impression    IMPRESSION: Normal.    VIVIAN LANDIN MD   US Lower Extremity Venous Duplex Bilateral    Narrative    ULTRASOUND VENOUS LOWER EXTREMITY BILATERAL 8/25/2018 8:04 PM     HISTORY: Worsening bilateral leg swelling, left worse than right,  worsening bilateral leg swelling.     COMPARISON: None.    TECHNIQUE: Ultrasound gray scale, Color Doppler flow, and spectral  Doppler waveform analysis performed.    FINDINGS: The bilateral common femoral, superficial femoral, popliteal  and posterior tibial veins are patent and fully compressible and  demonstrate normal venous Doppler  flow. The visualized greater  saphenous veins are negative for thrombus.      Impression    IMPRESSION: No DVT demonstrated.    NADIA DELGADILLO MD       Recent vital signs:   BP (!) 154/96  Pulse 99  Temp 99.1  F (37.3  C) (Oral)  Resp 14  LMP 11/20/2017  SpO2 100%    Cardiac Rhythm: Normal Sinus  Pt needs tele? No  Skin/wound Issues: None    Code Status: Full Code    Pain control: good    Nausea control: good    Abnormal labs/tests/findings requiring intervention: elevated D-dimer, hypertensive    Family present during ED course? Yes   Family Comments/Social Situation comments: had stillborn on Thursday     Tasks needing completion: None    Roro Madrid RN  [facility]-- [phone] [facility]  [phone] [facility]

## 2018-08-26 NOTE — PROGRESS NOTES
Magnesium Check Progress Note    S: Doing well.  Headache resolved with tylenol. Still feels flush occasionally. Ibuprofen helped breast pain.   Denies vision changes, CP, upper abdominal pain. Shortness of breath resolved.     O:   Patient Vitals for the past 4 hrs:   BP Resp SpO2   18 1648 - - 98 %   18 1643 (!) 154/92 - 98 %   18 1638 - - 98 %   18 1633 - - 99 %   18 1628 - - 99 %   18 1626 (!) 161/95 18 -     Gen: lying in bed, NAD  Pulm: CTAB, no crackles at b/l bases  Abd: Appropriately tender to palpation, no RUQ or epigastric tenderness  DTRs: 2+ b/l patellar, no beats of devon clonus b/l    Labs  Cr 0.7  ALT 11  AST 17  Hgb 7.8    Magnesium 5.8    UOP: 900 over last 3 hours    A/P: 28 year old is a  PPD#2 s/p  of IUFD admitted for postpartum pre-eclampsia with severe features. Currently on magnesium for seizure prophylaxis.     #PrE w/ SF  - BP: required IV labetalol 20 on initial transfer to L&D. Since starting magnesium, mostly mild range BPs throughout the day, recently one non-sustained SR. IV labetalol for persistent >160 systolic or >110 diastolic  - HELLP labs wnl, last on   - Asymptomatic    - UOP adequate  - Mag 4g load (2210) > 2g/hr> lvl 5.8. Continue x 24hrs (2100). No c/f mag toxicity at this time    # postpartum   - added ibuprofen for breast engorgement     # anemia   - acute on chronic, normal ferritin  - will order electrophoresis      # UTI - culture still pending, will continue macrobid 100mg BID for 7 days     Miriam Sands MD PGY3  18

## 2018-08-26 NOTE — PROGRESS NOTES
Magnesium Check Progress Note    S: Doing well. Has a mild headache but would like to eat before taking tylenol. Denies vision changes, CP, upper abdominal pain. Shortness of breath has improved. Milk came in today so she is working through that. Very tearful about events of in-utero demise.     O:   Patient Vitals for the past 4 hrs:   BP Temp Temp src Resp SpO2   18 0650 146/85 98.5  F (36.9  C) Oral 18 100 %   18 0447 127/70 98.5  F (36.9  C) Oral 18 99 %     Gen: lying in bed, NAD.   Pulm: CTAB, no crackles at b/l bases  Abd: Appropriately tender to palpation, no RUQ or epigastric tenderness  DTRs: 2+ b/l patellar, no beats of ankle clonus b/l    Labs  Cr 0.7  ALT 11  AST 17  Hgb 7.8    Magnesium 5.8    UOP: 800 over last 6 hours    A/P: 28 year old is a  PPD#2 s/p  of IUFD admitted for postpartum pre-eclampsia with severe features. Currently on magnesium for seizure prophylaxis.     # PrE w/ SF  - BP: required IV labetalol 20 on initial transfer to L&D. Since starting magnesium, normotensive to mild range. IV labetalol for persistent >160 systolic or >110 diastolic  - HELLP labs wnl, last on   - Asymptomatic other than subjective feeling of intermittent SOB. Lungs clear and had nl CXR w/o evidence pulmonary edema or effusion; continue to monitor  - UOP adequate  - Mag 4g load (2210) > 2g/hr> lvl 5.8. Continue x 24hrs. No c/f mag toxicity at this time    Nik Martinez MD PGY3  18 8:09 AM    Addendum after rounds (7922):   # postpartum   - added ibuprofen for breast engorgement   -IUFD -  supportive, asking what they can do - encouraged discussion between the two of them, consider therapy if they feel that it might be helpful. Recommend for subsequent pregnancy MFM visit in first trimester, targeted anatomic survey, growth Q4, BPPs at 28 weeks. I also recommend low dose aspirin to start at ~12 weeks for history of preE with severe features postpartum.     # anemia   -  acute on chronic, likely iron deficiency though has never had an electrophoresis.   - will order electrophoresis      # UTI - culture still pending, will continue macrobid 100mg BID for 7 days     Miriam Sands MD PGY3  18      Physician Attestation     I, Arnold Shields MD, saw this patient with the resident and agree with the resident s findings and plan of care as documented in the resident s note.       I personally reviewed vital signs, medications, labs, imaging and EFM.     Key findings: In summary, Pam is a  2 days after unexplained term IUFD Pregnancy also complicated by postpartum preeclampsia.  She meets criteria to continue inpatient expectant management. Will stop magnesium this evening. Will recheck CBC in am.       Arnold Shields MD  Maternal Fetal Medicine  Date of Service (when I saw the patient): 18

## 2018-08-26 NOTE — PROGRESS NOTES
Magnesium Check Progress Note    S: Doing well.  Headache resolved. Feels flushed with magnesium. Ibuprofen helped breast pain.   Denies vision changes, CP, upper abdominal pain. Shortness of breath has improved.     O:   Patient Vitals for the past 4 hrs:   BP Temp Temp src Resp SpO2   18 0900 146/88 - - - 100 %   18 0800 132/89 - - - 100 %   18 0650 146/85 98.5  F (36.9  C) Oral 18 100 %     Gen: lying in bed, NAD  Pulm: CTAB, no crackles at b/l bases  Abd: Appropriately tender to palpation, no RUQ or epigastric tenderness  DTRs: 2+ b/l patellar, no beats of devon clonus b/l    Labs  Cr 0.7  ALT 11  AST 17  Hgb 7.8    Magnesium 5.8    UOP: 1000 over last 6 hours    A/P: 28 year old is a  PPD#2 s/p  of IUFD admitted for postpartum pre-eclampsia with severe features. Currently on magnesium for seizure prophylaxis.     #PrE w/ SF  - BP: required IV labetalol 20 on initial transfer to L&D. Since starting magnesium, normotensive to mild range. IV labetalol for persistent >160 systolic or >110 diastolic  - HELLP labs wnl, last on   - Asymptomatic other than subjective feeling of intermittent SOB. Lungs clear and had nl CXR w/o evidence pulmonary edema or effusion; continue to monitor  - UOP adequate  - Mag 4g load (2210) > 2g/hr> lvl 5.8. Continue x 24hrs (2100). No c/f mag toxicity at this time    # postpartum   - added ibuprofen for breast engorgement     # anemia   - acute on chronic, likely iron deficiency though has never had an electrophoresis.   - will order electrophoresis      # UTI - culture still pending, will continue macrobid 100mg BID for 7 days     Miriam Sands MD PGY3  18

## 2018-08-26 NOTE — ED NOTES
Pt was DC'd and in the lobby when Dr Mancera stated that she should be admitted per OB. Pt has been returned to her room.

## 2018-08-26 NOTE — PROGRESS NOTES
Magnesium Check Progress Note    S: Sleeping. Denies HA, vision changes, CP, upper abdominal pain. Intermittent shortness of breath.    O:   Patient Vitals for the past 4 hrs:   BP Temp Temp src Resp SpO2   18 0152 126/67 98.2  F (36.8  C) Oral 18 98 %   18 0112 147/86 - - - 99 %   18 0102 142/79 - - - 100 %   18 0052 136/76 - - - 100 %   18 0042 149/81 - - - -   18 0041 - - - - 100 %   18 0036 - - - - 100 %   18 0032 156/83 - - - -   18 0031 - - - - 100 %   18 0026 - - - - 100 %   18 0022 154/86 - - - -   18 0021 - - - - 100 %   18 0016 - - - - 100 %   18 0012 149/76 - - - -   18 0011 - - - - 100 %   18 0006 - - - - 99 %   18 0002 156/83 - - - -   18 0001 - - - - 100 %   18 - - - - 100 %   18 - - - - 100 %   18 164/89 98.5  F (36.9  C) Oral 20 -   18 - - - - 100 %   18 - - - - 100 %   18 - - - - 100 %   18 2333 (!) 186/96 - - - -   18 2309 (!) 175/108 - - - -   18 - - - - 100 %   18 - - - - 100 %   18 (!) 173/107 - - - -   18 - - - - 100 %   18 (!) 167 - - - -   08/25/18 2220 (!) 167/99 - - - -     Gen: lying in bed, NAD  Pulm: CTAB, no crackles at b/l bases  Abd: Appropriately tender to palpation, no RUQ or epigastric tenderness  DTRs: 1+ b/l patellar, no beats of devon clonus b/l    A/P: 28 year old is a  PPD#2 s/p  of IUFD admitted for postpartum pre-eclampsia with severe features. Currently on magnesium for seizure prophylaxis.     - BP: required IV labetalol 20 on initial transfer to L&D. Since starting magnesium, normotensive to mild range. IV labetalol for persistent >160 systolic or >110 diastolic  - Cr 0.82 o/w HELLP labs wnl, last on   - asymptomatic other than subjective feeling of intermittent SOB. Lungs clear and had nl CXR w/o evidence pulmonary  edema or effusion; continue to monitor  - UOP adequate  - Mag 4g load (2210) > 2g/hr. Continue x 24hrs. No c/f mag toxicity at this time    Ran MD Juan PGY3  08/26/18 2:15 AM

## 2018-08-26 NOTE — ED NOTES
Pt reported full body ache. Denies SOB. Pt vitally stable with BP running in running in 140s systolic. O2 in high 90s-100 in room air. Pt has nonpitting edema on lower extremities and upper extremities.

## 2018-08-26 NOTE — PLAN OF CARE
Patient transferred to Antepartum unit at 2315 from ED. Patient had magnesium 4 gm load dose running incorrectly as a magnesium replacement over 60 minutes.  Magnesium was set up as a primary without LR.  Protocol for vital signs during mag loading dose was not followed.

## 2018-08-26 NOTE — PLAN OF CARE
Problem: Patient Care Overview  Goal: Plan of Care/Patient Progress Review  Outcome: Improving  Pt. C/O headache this am which was resolved with Tylenol and it has not returned.  She states that the motrin helped with her breast pain d/t engorgement.  She is also using ice packs on her breasts.  She is leaking colostrum onto her gown. She does not want to wear a bra as it feels too tight on her chest.  She continues to deny all pre-eclamptic symptoms but does feel flushed from the magnesium on her right cheek and neck area. Cold pack given to pt. She states she feels drained. Pt encouraged to take a nap.  She has been teary several times about the loss of her baby. Her sister is here from [place] to support her and her spouse.

## 2018-08-27 ENCOUNTER — OFFICE VISIT (OUTPATIENT)
Dept: OBGYN | Facility: CLINIC | Age: 28
End: 2018-08-27
Attending: PSYCHOLOGIST
Payer: COMMERCIAL

## 2018-08-27 VITALS
HEART RATE: 99 BPM | TEMPERATURE: 98.8 F | RESPIRATION RATE: 18 BRPM | DIASTOLIC BLOOD PRESSURE: 83 MMHG | SYSTOLIC BLOOD PRESSURE: 138 MMHG | OXYGEN SATURATION: 100 %

## 2018-08-27 DIAGNOSIS — O36.4XX0 IUFD AT 20 WEEKS OR MORE OF GESTATION: ICD-10-CM

## 2018-08-27 DIAGNOSIS — Z91.89 AT RISK FOR DEPRESSED MOOD DURING POSTPARTUM PERIOD: Primary | ICD-10-CM

## 2018-08-27 LAB
BACTERIA SPEC CULT: ABNORMAL
BACTERIA SPEC CULT: ABNORMAL
ERYTHROCYTE [DISTWIDTH] IN BLOOD BY AUTOMATED COUNT: 16.1 % (ref 10–15)
HCT VFR BLD AUTO: 25.4 % (ref 35–47)
HGB BLD-MCNC: 8.6 G/DL (ref 11.7–15.7)
Lab: ABNORMAL
MCH RBC QN AUTO: 29.3 PG (ref 26.5–33)
MCHC RBC AUTO-ENTMCNC: 33.9 G/DL (ref 31.5–36.5)
MCV RBC AUTO: 86 FL (ref 78–100)
PLATELET # BLD AUTO: 134 10E9/L (ref 150–450)
RBC # BLD AUTO: 2.94 10E12/L (ref 3.8–5.2)
SPECIMEN SOURCE: ABNORMAL
WBC # BLD AUTO: 6.7 10E9/L (ref 4–11)

## 2018-08-27 PROCEDURE — 36415 COLL VENOUS BLD VENIPUNCTURE: CPT | Performed by: STUDENT IN AN ORGANIZED HEALTH CARE EDUCATION/TRAINING PROGRAM

## 2018-08-27 PROCEDURE — 25000132 ZZH RX MED GY IP 250 OP 250 PS 637: Performed by: STUDENT IN AN ORGANIZED HEALTH CARE EDUCATION/TRAINING PROGRAM

## 2018-08-27 PROCEDURE — 83021 HEMOGLOBIN CHROMOTOGRAPHY: CPT | Performed by: STUDENT IN AN ORGANIZED HEALTH CARE EDUCATION/TRAINING PROGRAM

## 2018-08-27 PROCEDURE — 85027 COMPLETE CBC AUTOMATED: CPT | Performed by: STUDENT IN AN ORGANIZED HEALTH CARE EDUCATION/TRAINING PROGRAM

## 2018-08-27 RX ORDER — AMOXICILLIN 500 MG/1
500 CAPSULE ORAL 2 TIMES DAILY
Qty: 14 CAPSULE | Refills: 0 | Status: SHIPPED | OUTPATIENT
Start: 2018-08-27 | End: 2019-02-22

## 2018-08-27 RX ORDER — IBUPROFEN 800 MG/1
800 TABLET, FILM COATED ORAL EVERY 8 HOURS PRN
Qty: 30 TABLET | Refills: 0 | Status: ON HOLD | OUTPATIENT
Start: 2018-08-27 | End: 2019-03-22

## 2018-08-27 RX ADMIN — IBUPROFEN 600 MG: 200 TABLET, FILM COATED ORAL at 09:00

## 2018-08-27 RX ADMIN — NITROFURANTOIN (MONOHYDRATE/MACROCRYSTALS) 100 MG: 75; 25 CAPSULE ORAL at 09:00

## 2018-08-27 NOTE — MR AVS SNAPSHOT
After Visit Summary   2018    Pam Syed    MRN: [id number]           Patient Information     Date Of Birth          1990        Visit Information        Provider Department      2018 12:00 PM Vida Sandoval CNM [facility]        Today's Diagnoses     At risk for depressed mood during postpartum period    -  1    IUFD at 20 weeks or more of gestation           Follow-ups after your visit        Follow-up notes from your care team     Return in about 3 days (around 2018) for bp check.      Who to contact     Please call your clinic at [phone] to:    Ask questions about your health    Make or cancel appointments    Discuss your medicines    Learn about your test results    Speak to your doctor            Additional Information About Your Visit        MyChart Information     [employer] is an electronic gateway that provides easy, online access to your medical records. With [employer], you can request a clinic appointment, read your test results, renew a prescription or communicate with your care team.     To sign up for [employer] visit the website at [email or web address]   You will be asked to enter the access code listed below, as well as some personal information. Please follow the directions to create your username and password.     Your access code is: [email or web address]  Expires: 10/24/2018  9:06 AM     Your access code will  in 90 days. If you need help or a new code, please contact your [facility] or call [phone] for assistance.        Care EveryWhere ID     This is your Care EveryWhere ID. This could be used by other organizations to access your [place] medical records  [id number]         Blood Pressure from Last 3 Encounters:   18 119/81   18 138/83   18 131/83    Weight from Last 3 Encounters:   18 68.2 kg (150 lb 6.4 oz)   18 77.6 kg (171 lb)   18  76.9 kg (169 lb 9.6 oz)              Today, you had the following     No orders found for display       Primary Care Provider Office Phone # Fax #    Deandre Patiño [phone]       [facility] [address] [address]        Equal Access to Services     JAZMIN HARO : Hadii aad ku hadasho Soomaali, waaxda luqadaha, qaybta kaalmada adeegyada, waxay idiin hayaan adejoss lashonaugusta lajack crouch. So [facility] [phone].    ATENCIÓN: Si habla español, tiene a kaufman disposición servicios gratuitos de asistencia lingüística. [facility] [phone].    We comply with applicable federal civil rights laws and [place] laws. We do not discriminate on the basis of race, color, national origin, age, disability, sex, sexual orientation, or gender identity.            Thank you!     Thank you for choosing [facility]  for your care. Our goal is always to provide you with excellent care. Hearing back from our patients is one way we can continue to improve our services. Please take a few minutes to complete the written survey that you may receive in the mail after your visit with us. Thank you!             Your Updated Medication List - Protect others around you: Learn how to safely use, store and throw away your medicines at www.disposemymeds.org.          This list is accurate as of 8/27/18 11:59 PM.  Always use your most recent med list.                   Brand Name Dispense Instructions for use Diagnosis    amoxicillin 500 MG capsule    AMOXIL    14 capsule    Take 1 capsule (500 mg) by mouth 2 times daily for 7 days    Acute cystitis without hematuria       ascorbic acid 250 MG Chew chewable tablet    vitamin C    90 tablet    Take 1 tablet (250 mg) by mouth daily    Iron deficiency anemia secondary to inadequate dietary iron intake       Cyanocobalamin 1000 MCG Lozg     30 lozenge    Take 1 tablet by mouth daily    Iron deficiency anemia secondary to inadequate dietary iron intake        ferrous sulfate 325 (65 Fe) MG tablet    IRON    30 tablet    Take 1 tablet (325 mg) by mouth daily (with breakfast)    Iron deficiency anemia secondary to inadequate dietary iron intake, IUFD at 20 weeks or more of gestation       * ibuprofen 200 MG tablet    ADVIL/MOTRIN    50 tablet    Take 3 tablets (600 mg) by mouth every 6 hours as needed for other (cramping)    IUFD at 20 weeks or more of gestation       * ibuprofen 800 MG tablet    ADVIL/MOTRIN    30 tablet    Take 1 tablet (800 mg) by mouth every 8 hours as needed for moderate pain     (normal spontaneous vaginal delivery)       PRENATAL VITAMIN AND MINERAL 28-0.8 MG Tabs     90 tablet    Take 1 tablet by mouth daily    Early stage of pregnancy       senna-docusate 8.6-50 MG per tablet    JAUN-COLACE    20 tablet    Take 1-2 tablets by mouth 2 times daily as needed for constipation        sennosides 8.6 MG tablet    SENOKOT    60 tablet    Take 1 tablet by mouth 2 times daily as needed for constipation    High-risk pregnancy in first trimester       * Notice:  This list has 2 medication(s) that are the same as other medications prescribed for you. Read the directions carefully, and ask your doctor or other care provider to review them with you.

## 2018-08-27 NOTE — DISCHARGE INSTRUCTIONS
Preeclampsia   Call your doctor right away if you have any of the following:  - Edema (swelling) in your face or hands  - Rapid weight gain-about 1 pound or more in a day  - Headache  - Abdominal pain on your right side  - Vision problems (flashes or spots)  -           Follow up to day as scheduled with Vida VARGAS CNM  Results for orders placed or performed during the hospital encounter of 08/25/18   Chest  XR, 1 view portable    Narrative    CHEST ONE VIEW PORTABLE   8/25/2018 6:10 PM     HISTORY: Shortness of breath in HTN.     COMPARISON: None.      Impression    IMPRESSION: Normal.    VIVIAN LANDIN MD   US Lower Extremity Venous Duplex Bilateral    Narrative    ULTRASOUND VENOUS LOWER EXTREMITY BILATERAL 8/25/2018 8:04 PM     HISTORY: Worsening bilateral leg swelling, left worse than right,  worsening bilateral leg swelling.     COMPARISON: None.    TECHNIQUE: Ultrasound gray scale, Color Doppler flow, and spectral  Doppler waveform analysis performed.    FINDINGS: The bilateral common femoral, superficial femoral, popliteal  and posterior tibial veins are patent and fully compressible and  demonstrate normal venous Doppler flow. The visualized greater  saphenous veins are negative for thrombus.      Impression    IMPRESSION: No DVT demonstrated.    NADIA DELGADILLO MD   Comprehensive metabolic panel   Result Value Ref Range    Sodium 142 133 - 144 mmol/L    Potassium 3.9 3.4 - 5.3 mmol/L    Chloride 111 (H) 94 - 109 mmol/L    Carbon Dioxide 26 20 - 32 mmol/L    Anion Gap 5 3 - 14 mmol/L    Glucose 107 (H) 70 - 99 mg/dL    Urea Nitrogen 12 7 - 30 mg/dL    Creatinine 0.82 0.52 - 1.04 mg/dL    GFR Estimate 82 >60 mL/min/1.7m2    GFR Estimate If Black >90 >60 mL/min/1.7m2    Calcium 7.7 (L) 8.5 - 10.1 mg/dL    Bilirubin Total 0.4 0.2 - 1.3 mg/dL    Albumin 2.3 (L) 3.4 - 5.0 g/dL    Protein Total 5.8 (L) 6.8 - 8.8 g/dL    Alkaline Phosphatase 111 40 - 150 U/L    ALT 13 0 - 50 U/L    AST 22 0 - 45 U/L   CBC with  platelets differential   Result Value Ref Range    WBC 11.6 (H) 4.0 - 11.0 10e9/L    RBC Count 3.10 (L) 3.8 - 5.2 10e12/L    Hemoglobin 8.9 (L) 11.7 - 15.7 g/dL    Hematocrit 26.4 (L) 35.0 - 47.0 %    MCV 85 78 - 100 fl    MCH 28.7 26.5 - 33.0 pg    MCHC 33.7 31.5 - 36.5 g/dL    RDW 15.7 (H) 10.0 - 15.0 %    Platelet Count 106 (L) 150 - 450 10e9/L    Diff Method Automated Method     % Neutrophils 69.3 %    % Lymphocytes 22.9 %    % Monocytes 4.8 %    % Eosinophils 1.1 %    % Basophils 0.3 %    % Immature Granulocytes 1.6 %    Nucleated RBCs 0 0 /100    Absolute Neutrophil 8.0 1.6 - 8.3 10e9/L    Absolute Lymphocytes 2.7 0.8 - 5.3 10e9/L    Absolute Monocytes 0.6 0.0 - 1.3 10e9/L    Absolute Eosinophils 0.1 0.0 - 0.7 10e9/L    Absolute Basophils 0.0 0.0 - 0.2 10e9/L    Abs Immature Granulocytes 0.2 0 - 0.4 10e9/L    Absolute Nucleated RBC 0.0    UA with Microscopic reflex to Culture   Result Value Ref Range    Color Urine Light Yellow     Appearance Urine Clear     Glucose Urine Negative NEG^Negative mg/dL    Bilirubin Urine Negative NEG^Negative    Ketones Urine Negative NEG^Negative mg/dL    Specific Gravity Urine 1.004 1.003 - 1.035    Blood Urine Trace (A) NEG^Negative    pH Urine 5.5 5.0 - 7.0 pH    Protein Albumin Urine Negative NEG^Negative mg/dL    Urobilinogen mg/dL Normal 0.0 - 2.0 mg/dL    Nitrite Urine Negative NEG^Negative    Leukocyte Esterase Urine Large (A) NEG^Negative    Source Midstream Urine     WBC Urine 13 (H) 0 - 5 /HPF    RBC Urine 1 0 - 2 /HPF    Squamous Epithelial /HPF Urine <1 0 - 1 /HPF    Mucous Urine Present (A) NEG^Negative /LPF   INR   Result Value Ref Range    INR 0.89 0.86 - 1.14   Uric acid   Result Value Ref Range    Uric Acid 5.6 2.6 - 6.0 mg/dL   D dimer quantitative   Result Value Ref Range    D Dimer 1.8 (H) 0.0 - 0.50 ug/ml FEU   Lactate Dehydrogenase   Result Value Ref Range    Lactate Dehydrogenase 245 (H) 81 - 234 U/L   EKG 12-lead   Result Value Ref Range     Interpretation ECG Click View Image link to view waveform and result

## 2018-08-27 NOTE — DISCHARGE SUMMARY
[facility]  OBGYN Discharge Summary    Admission Date:  2018  Discharge Date:  18    Admission Attending: Rosanna Ybarra MD  Discharge Attending: Arnold Shields MD    Admission Diagnosis:  - Postpartum day #1 s/p  of term fetal demise  - Postpartum pre-eclampsia with severe features  - Presumed UTI    Discharge Diagnosis:  - Same  - negative work up for IUFD and preE including negative evaluation for antiphospholipid antibodies, TSH, TORCH, glucose    Procedures Performed:  - Magnesium for seizure prophylaxis    Admission History:  Pam Syed is a 28 year old  PPD#1 s/p  of term fetal demise who was admitted for postpartum pre-eclampsia with severe features. She presented to the ED due to symptoms of sudden onset of fatigue, numbness and tingling of the extremities and shortness of breath. While in the ED she was found to have sustained severe range blood pressures and subsequently received IV labetalol with good response. Due to delay in transfer to L&D, she received her loading dose of magnesium in the ED.    Hospital Course:  On initial admission to L&D she had sustained severe range blood pressures which required IV labetalol 20 mg with good response. She received magnesium for 24 hours. Her blood pressures remained normotensive to low mild range after the magnesium was discontinued. HELLP labs remained normal during admission. By HD#3, she was deemed stable for discharge. She was previously diagnosed with a UTI and treated empirically with macrobid. Culture grew GBS and her antibiotics were transitioned to amoxicillin. Hgb electrophoresis pending at time of discharge.     Discharge Plans:  - The patient was discharged to home. She had a follow-up visit with her midwife and a visit with psychiatry scheduled for the day of discharge.   # Activity: Pelvic rest x 6 weeks.     #Postpartum preeclampsia with severe features  - Follow-up for blood pressure  check in 1 weeks  - Call [phone] for any of the following:    - Elevated blood pressure with systolics >150 or diastolics >100   - Development of headache not relieved with tylenol or rest, vision changes, chest pain, shortness of breath, RUQ or epigastric pain   - Pain not controlled by pain medications   - Vaginal bleeding soaking 1 pad per hour for 2 hours in a row    #Postpartum state  - Encouraged ibuprofen and ice to prevent breast engorgement. Discussed signs/symptoms of mastitis.    #Recurrent pregnancy loss  - Meets clinical criteria for antiphospholipid syndrome with 5 late first trimester losses. Lab tests negative. Could consider lovenox prophylaxis in subsequent pregnancies but would recommend follow up with MFM in early pregnancy to review. Definitely recommend daily low dose aspirin 81 mg to start at 10 weeks gestation. .  - Recommend pre-conception counseling prior to next pregnancy to review all of this information. Also recommend waiting to conceive until she has had three normal menstrual periods.   - Recommend early ultrasound and MFM visit with positive pregnancy test. Patient would like to see Dr. Shields if available.  - Previously discussed progesterone supplementation with Dr. Pickard. May initiate in next pregnancy. Deferred to Dr. Pickard for regimen, route and start timing in pregnancy  - Discussed at least 2-3 normal cycles prior to attempting pregnancy again. Discussed biopsychosocial support as needed - she is psychology student,  is very supportive. Discussed continuing prenatal vitamin (or woman's one a day) prior to conception. Discussed MFM consult early in pregnancy as well.     #Discharge medications include:  - Ibuprofen, 800 mg q8h  - Ferrous sulfate, 325 mg daily  - Amoxicillin, 500 mg BID x 7 days    Gracie Castañeda MD  OB/GYN, PGY3  08/27/18    Physician Attestation     I, Arnold Shields MD, saw this patient with the resident and agree with the resident s  findings and plan of care as documented in the resident s note.  I personally examined this patient and determined that she is stable for discharge home.      I personally reviewed vital signs, medications, labs, imaging and plan. I reviewed her discharge with Vida Sandoval, her CNM in the [facility] clinic.      Key findings: In summary, Pam is a  postpartum after term IUFD.  Pregnancy also complicated by postpartum preE with severe features requiring 24 hour of magnesium. History of recurrent late first trimester loss with no serum evidence of antiphospholipid antibodies.  She meets criteria for safe dc home with CNM follow up and psychiatry follow up (support after loss) as noted.       Arnold Shields MD  Maternal Fetal Medicine  Date of Service (when I saw the patient): 18

## 2018-08-27 NOTE — PLAN OF CARE
Problem: Patient Care Overview  Goal: Plan of Care/Patient Progress Review  Outcome: Improving  Afebrile. BPs improved, 130s-140s/70s-80s. Mg off @ 2100. Pt reports feeling better but still tired. Hgb yesterday was 7.8, will redraw this AM. Pt has engorgement but did not request any pain meds, is aware Ibuprofen is available. Plan to continue with inpt management until stable, then discharge to home. Continue to monitor, contact MD with questions/concerns.    Problem: Hypertensive Disorders in Pregnancy (Adult,Obstetrics,Pediatric)  Goal: Signs and Symptoms of Listed Potential Problems Will be Absent, Minimized or Managed (Hypertensive Disorders in Pregnancy)  Signs and symptoms of listed potential problems will be absent, minimized or managed by discharge/transition of care (reference Hypertensive Disorders in Pregnancy (Adult,Obstetrics,Pediatric) CPG).   Outcome: Improving  BPs improving in 130s-140s/70s-80s.

## 2018-08-27 NOTE — PROGRESS NOTES
Pt. Discharged to home.  Instructions discussed about when to RTC or call the MD given.  Pt. And her spouse verbalize understanding. Pt. Escorted to the front door with her spouse.

## 2018-08-27 NOTE — PROGRESS NOTES
[facility] Antepartum Progress Note    Subjective:   Ms. Syed is feeling well this morning. She is continuing to grieve her loss but has been able to start reflecting. She is highly motivated to make it to her psychiatry visit this afternoon and hopes to discharge before then. She denies headache, changes in vision, chest pain, shortness of breath, and abdominal pain.       Objective:  Vitals:    18 2255 18 0340 18 0915 18 0916   BP: 140/76 144/83 138/83    Pulse:       Resp:      Temp: 97.9  F (36.6  C) 98  F (36.7  C)  98.8  F (37.1  C)   TempSrc: Oral Oral  Oral   SpO2:           Gen: Resting comfortably in bed, NAD  CV: Regular rate  Resp: Breathing comfortably on room air    Assessment/Plan:   Pam Syed is a 28 year old  now PPD#3 s/p  of IUFD who was readmitted for postpartum preeclampsia with severe features. She is s/p 24 hours of IV magnesium and has had low mild range blood pressures since stopping the magnesium. She is highly motivated for discharge and is medically stable at this time.     Postpartum preeclampsia with severe features  - BP: Required IV labetalol on arrival to L&D. Now low mild range without any antihypertensives  - HELLP labs wnl  - Asymptomatic  - S/p 24 hours magnesium  - Has follow-up visit with CNM today. Plan for blood pressure check in 1 week.     Anemia  - Acute on chronic. Likely iron deficiency but has never had work-up. Hgb electrophoresis pending.  - Continue daily PO iron    UTI  - Culture pending. Continue macrobid 100mg BID x 7 days.    IUFD  - S/p full workup at time of delivery.  - No infectious cause identified.    Recurrent pregnancy loss  - Meets clinical criteria for antiphospholipid syndrome with 5 late first trimester losses. Lab tests negative. Could consider lovenox prophylaxis in subsequent pregnancies.  - Previously discussed progesterone supplementation with Dr. Pickard. May initiate in next pregnancy.  - Daily ASA  starting at 10 weeks in next pregnancy.  - Recommend pre-conception counseling prior to next pregnancy to review all of this information. Also recommend waiting to conceive until she has had three normal menstrual periods.    Postpartum care  - Ibuprofen and ice for breast engorgement. Reviewed signs/symptoms of mastitis.    Gracie Castañeda MD  OB/GYN Resident, PGY3  2018    Physician Attestation     I, Arnold Shields MD, saw this patient with the resident and agree with the resident s findings and plan of care as documented in the resident s note.       I personally reviewed vital signs, medications, labs and patient status.     Key findings: In summary, Pam is a  with PP preeclampsia with severe features after term IUFD.       Arnold Shields MD  Maternal Fetal Medicine  Date of Service (when I saw the patient): 18

## 2018-08-27 NOTE — LETTER
"2018       RE: Pam Syed  [address]  [place] [address]     Dear Colleague,    Thank you for referring your patient, Pam Syed, to the [facility] at [facility]. Please see a copy of my visit note below.    S: Pam Syed 28 year old  here for postpartum mood check.  Pt is s/p IUFD at 39+2 gestational age. Discharged to home on 18.  Started to have sudden onset of fatigue, sob and numbness and tingling in extremities on 18 and presented to the ED. Diagnosed with postpartum pre-eclampisa and was admitted over the weekend.   Discharged from antepartum this morning and walked over to clinic for appointment.    Feeling well now- reports she is just very tired. States she still feels a little \"off\" from the magnesium she received inpatient. Otherwise, physically feeling well.   Denies HA, vision changes, ruq/epigastric pain.    Occasionally tearful in the visit. Coping very well and appropriately to loss.  present and supportive. He is also coping well. Report that they have a lot of family support. Sad but want to be strong for their son. Misses him and happy she is going home today.    Was not expecting to be in the hospital all weekend- Would like to reschedule psychology appointment so they can go home.       O: Constitutional:   Alert and oriented, well developed, in no acute distress.     Deferred.   Pt had VS and exam at hospital <10 minutes prior to arrival to clinic.    A: (Z91.89) At risk for depressed mood during postpartum period  (primary encounter diagnosis)    (O36.4XX0) IUFD at 20 weeks or more of gestation     P:  - Spoke with Dr. Arnold Shields re: potential future pregnancies.  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 If desire future pregnancy, could consider vaginal progesterone in early pregnancy and lovenox prophylaxis " throughout pregnancy. Also recommended daily low dose aspirin 81mg starting at 14 weeks. Offer early term IOL (37 weeks).    - Pt has appt scheduled with Anne Melara. Patient desires to cancel appointment with psychologist, Anne Melara, today as she was just discharged from the hospital.   Plans to go home to sleep.   - Encouraged pt to reschedule if she desires.    - Offered support. Encouraged pt to return to clinic any time for resources/support or any concerns.     - Reviewed s/s of pre-e and when to call clinic or present to ED.  - Return to clinic on Friday for blood pressure and mood check.    Vida Sandoval, ASHIA, CNM    Total time spent 10 minutes with 100% spent on counseling and coordination of care.

## 2018-08-28 ENCOUNTER — HEALTH MAINTENANCE LETTER (OUTPATIENT)
Age: 28
End: 2018-08-28

## 2018-08-28 LAB
HGB A1 MFR BLD: 96.9 % (ref 95–97.9)
HGB A2 MFR BLD: 2.8 % (ref 2–3.5)
HGB C MFR BLD: 0 % (ref 0–0)
HGB E MFR BLD: 0 % (ref 0–0)
HGB F MFR BLD: 0.3 % (ref 0–2.1)
HGB FRACT BLD ELPH-IMP: NORMAL
HGB OTHER MFR BLD: 0 % (ref 0–0)
HGB S BLD QL SOLY: NORMAL
HGB S MFR BLD: 0 % (ref 0–0)
PATH INTERP BLD-IMP: NORMAL

## 2018-08-31 ENCOUNTER — OFFICE VISIT (OUTPATIENT)
Dept: OBGYN | Facility: CLINIC | Age: 28
End: 2018-08-31
Attending: ADVANCED PRACTICE MIDWIFE
Payer: COMMERCIAL

## 2018-08-31 VITALS
SYSTOLIC BLOOD PRESSURE: 119 MMHG | BODY MASS INDEX: 27.68 KG/M2 | WEIGHT: 150.4 LBS | HEART RATE: 98 BPM | HEIGHT: 62 IN | DIASTOLIC BLOOD PRESSURE: 81 MMHG

## 2018-08-31 PROCEDURE — G0463 HOSPITAL OUTPT CLINIC VISIT: HCPCS | Mod: ZF

## 2018-08-31 ASSESSMENT — ANXIETY QUESTIONNAIRES
7. FEELING AFRAID AS IF SOMETHING AWFUL MIGHT HAPPEN: NOT AT ALL
GAD7 TOTAL SCORE: 0
3. WORRYING TOO MUCH ABOUT DIFFERENT THINGS: NOT AT ALL
6. BECOMING EASILY ANNOYED OR IRRITABLE: NOT AT ALL
1. FEELING NERVOUS, ANXIOUS, OR ON EDGE: NOT AT ALL
5. BEING SO RESTLESS THAT IT IS HARD TO SIT STILL: NOT AT ALL
2. NOT BEING ABLE TO STOP OR CONTROL WORRYING: NOT AT ALL

## 2018-08-31 ASSESSMENT — PAIN SCALES - GENERAL: PAINLEVEL: NO PAIN (0)

## 2018-08-31 ASSESSMENT — PATIENT HEALTH QUESTIONNAIRE - PHQ9: 5. POOR APPETITE OR OVEREATING: NOT AT ALL

## 2018-08-31 NOTE — LETTER
"2018       RE: Pam Syed  [address]  [place] [address]     Dear Colleague,    Thank you for referring your patient, Pam Syed, to the [facility] at [facility]. Please see a copy of my visit note below.    Subjective:  Pam Syed 28 year old year old female, , who presents for follow-up after IUFD and postpartum pre-eclampsia.     Pt tearful during visit, appropriate response and affect to loss.  Reports feeling fine at times, but then encounters reminder of baby. Pt is coping well, has much support from family and community. Sister is currently staying with patient.  Has not met with mental health provider as of yet.    Denies headaches, changes in vision, or RUQ/epigastric pain.    Objective:   /81 (BP Location: Left arm, Patient Position: Chair)  Pulse 98  Ht 1.575 m (5' 2\")  Wt 68.2 kg (150 lb 6.4 oz)  Breastfeeding? No  BMI 27.51 kg/m2  She appears well, afebrile.    Assessment:   Mood check  Blood pressure evaluation    Plan:   Allowed patient space and support for grieving. Encouraged continued self-care and support from family and community.   Plan for appt with Dr. Alvarez in 1 week, pt would like to schedule with .  CNM appointment in 2 weeks  Plan for routine 6 week postpartum visit    Again, thank you for allowing me to participate in the care of your patient.      Sincerely,    ASHIA Gipson CNM      "

## 2018-08-31 NOTE — PROGRESS NOTES
"Subjective:  Pam Syed 28 year old year old female, , who presents for follow-up after IUFD and postpartum pre-eclampsia.     Pt tearful during visit, appropriate response and affect to loss.  Reports feeling fine at times, but then encounters reminder of baby. Pt is coping well, has much support from family and community. Sister is currently staying with patient.  Has not met with mental health provider as of yet.    Denies headaches, changes in vision, or RUQ/epigastric pain.    Objective:   /81 (BP Location: Left arm, Patient Position: Chair)  Pulse 98  Ht 1.575 m (5' 2\")  Wt 68.2 kg (150 lb 6.4 oz)  Breastfeeding? No  BMI 27.51 kg/m2  She appears well, afebrile.    Assessment:   Mood check  Blood pressure evaluation    Plan:   Allowed patient space and support for grieving. Encouraged continued self-care and support from family and community.   Plan for appt with Dr. Alvarez in 1 week, pt would like to schedule with .  CNM appointment in 2 weeks  Plan for routine 6 week postpartum visit  "

## 2018-08-31 NOTE — MR AVS SNAPSHOT
"              After Visit Summary   2018    Pam Syed    MRN: [id number]           Patient Information     Date Of Birth          1990        Visit Information        Provider Department      2018 3:30 PM Justice Cosby APRN CNM [facility]        Today's Diagnoses     Postpartum care following vaginal delivery    -  1       Follow-ups after your visit        Follow-up notes from your care team     Return in about 1 week (around 2018).      Who to contact     Please call your clinic at [phone] to:    Ask questions about your health    Make or cancel appointments    Discuss your medicines    Learn about your test results    Speak to your doctor            Additional Information About Your Visit        MyChart Information     Marco Vascohart is an electronic gateway that provides easy, online access to your medical records. With [employer], you can request a clinic appointment, read your test results, renew a prescription or communicate with your care team.     To sign up for [email or web address] visit the website at [email or web address]   You will be asked to enter the access code listed below, as well as some personal information. Please follow the directions to create your username and password.     Your access code is: [email or web address]  Expires: 10/24/2018  9:06 AM     Your access code will  in 90 days. If you need help or a new code, please contact your [facility] or call [phone] for assistance.        Care EveryWhere ID     This is your Care EveryWhere ID. This could be used by other organizations to access your [place] medical records  [id number]        Your Vitals Were     Pulse Height Breastfeeding? BMI (Body Mass Index)          98 1.575 m (5' 2\") No 27.51 kg/m2         Blood Pressure from Last 3 Encounters:   18 119/81   18 138/83   18 131/83    Weight from Last 3 Encounters:   18 68.2 kg (150 lb 6.4 oz) "   08/21/18 77.6 kg (171 lb)   08/17/18 76.9 kg (169 lb 9.6 oz)              Today, you had the following     No orders found for display       Primary Care Provider Office Phone # Fax #    Deandre Patiño [phone]       [facility] [address] [address]        Equal Access to Services     JAZMIN HARO : Hadii aad ku hadasho Soomaali, waaxda luqadaha, qaybta kaalmada adeegyada, waxay idiin hayaan adeeg kharash la'aan ah. So [facility] [phone].    ATENCIÓN: Si habla español, tiene a kaufman disposición servicios gratuitos de asistencia lingüística. Llame al [phone].    We comply with applicable federal civil rights laws and [place] laws. We do not discriminate on the basis of race, color, national origin, age, disability, sex, sexual orientation, or gender identity.            Thank you!     Thank you for choosing [facility]  for your care. Our goal is always to provide you with excellent care. Hearing back from our patients is one way we can continue to improve our services. Please take a few minutes to complete the written survey that you may receive in the mail after your visit with us. Thank you!             Your Updated Medication List - Protect others around you: Learn how to safely use, store and throw away your medicines at www.disposemymeds.org.          This list is accurate as of 8/31/18 11:59 PM.  Always use your most recent med list.                   Brand Name Dispense Instructions for use Diagnosis    amoxicillin 500 MG capsule    AMOXIL    14 capsule    Take 1 capsule (500 mg) by mouth 2 times daily for 7 days    Acute cystitis without hematuria       ascorbic acid 250 MG Chew chewable tablet    vitamin C    90 tablet    Take 1 tablet (250 mg) by mouth daily    Iron deficiency anemia secondary to inadequate dietary iron intake       Cyanocobalamin 1000 MCG Lozg     30 lozenge    Take 1 tablet by mouth daily    Iron deficiency anemia  secondary to inadequate dietary iron intake       ferrous sulfate 325 (65 Fe) MG tablet    IRON    30 tablet    Take 1 tablet (325 mg) by mouth daily (with breakfast)    Iron deficiency anemia secondary to inadequate dietary iron intake, IUFD at 20 weeks or more of gestation       * ibuprofen 200 MG tablet    ADVIL/MOTRIN    50 tablet    Take 3 tablets (600 mg) by mouth every 6 hours as needed for other (cramping)    IUFD at 20 weeks or more of gestation       * ibuprofen 800 MG tablet    ADVIL/MOTRIN    30 tablet    Take 1 tablet (800 mg) by mouth every 8 hours as needed for moderate pain     (normal spontaneous vaginal delivery)       PRENATAL VITAMIN AND MINERAL 28-0.8 MG Tabs     90 tablet    Take 1 tablet by mouth daily    Early stage of pregnancy       senna-docusate 8.6-50 MG per tablet    JAUN-COLACE    20 tablet    Take 1-2 tablets by mouth 2 times daily as needed for constipation        sennosides 8.6 MG tablet    SENOKOT    60 tablet    Take 1 tablet by mouth 2 times daily as needed for constipation    High-risk pregnancy in first trimester       * Notice:  This list has 2 medication(s) that are the same as other medications prescribed for you. Read the directions carefully, and ask your doctor or other care provider to review them with you.

## 2018-08-31 NOTE — NURSING NOTE
Chief Complaint   Patient presents with     Follow Up For     Follow up 1 week postpartum for mood check       See VALERIE Hoyos 8/31/2018

## 2018-09-01 ASSESSMENT — PATIENT HEALTH QUESTIONNAIRE - PHQ9: SUM OF ALL RESPONSES TO PHQ QUESTIONS 1-9: 5

## 2018-09-01 ASSESSMENT — ANXIETY QUESTIONNAIRES: GAD7 TOTAL SCORE: 0

## 2018-09-02 NOTE — PROGRESS NOTES
"S: Pam Syed 28 year old  here for postpartum mood check.  Pt is s/p IUFD at 39+2 gestational age. Discharged to home on 18.  Started to have sudden onset of fatigue, sob and numbness and tingling in extremities on 18 and presented to the ED. Diagnosed with postpartum pre-eclampisa and was admitted over the weekend.   Discharged from antepartum this morning and walked over to clinic for appointment.    Feeling well now- reports she is just very tired. States she still feels a little \"off\" from the magnesium she received inpatient. Otherwise, physically feeling well.   Denies HA, vision changes, ruq/epigastric pain.    Occasionally tearful in the visit. Coping very well and appropriately to loss.  present and supportive. He is also coping well. Report that they have a lot of family support. Sad but want to be strong for their son. Misses him and happy she is going home today.    Was not expecting to be in the hospital all weekend- Would like to reschedule psychology appointment so they can go home.       O: Constitutional:   Alert and oriented, well developed, in no acute distress.     Deferred.   Pt had VS and exam at hospital <10 minutes prior to arrival to clinic.    A: (Z91.89) At risk for depressed mood during postpartum period  (primary encounter diagnosis)    (O36.4XX0) IUFD at 20 weeks or more of gestation     P:  - Spoke with Dr. Arnold Shields re: potential future pregnancies.  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 If desire future pregnancy, could consider vaginal progesterone in early pregnancy and lovenox prophylaxis throughout pregnancy. Also recommended daily low dose aspirin 81mg starting at 14 weeks. Offer early term IOL (37 weeks).    - Pt has appt scheduled with Anne Melara. Patient desires to cancel appointment with psychologist, Anne Melara, today as she was just discharged from the hospital.   Plans to go home to " sleep.   - Encouraged pt to reschedule if she desires.    - Offered support. Encouraged pt to return to clinic any time for resources/support or any concerns.     - Reviewed s/s of pre-e and when to call clinic or present to ED.  - Return to clinic on Friday for blood pressure and mood check.    ASHIA Rose, CNM    Total time spent 10 minutes with 100% spent on counseling and coordination of care.

## 2018-09-05 LAB — COPATH REPORT: NORMAL

## 2018-09-21 LAB — COPATH REPORT: NORMAL

## 2018-10-11 ENCOUNTER — OFFICE VISIT (OUTPATIENT)
Dept: OBGYN | Facility: CLINIC | Age: 28
End: 2018-10-11
Attending: ADVANCED PRACTICE MIDWIFE

## 2018-10-11 VITALS
BODY MASS INDEX: 27.81 KG/M2 | HEART RATE: 77 BPM | HEIGHT: 62 IN | WEIGHT: 151.1 LBS | DIASTOLIC BLOOD PRESSURE: 78 MMHG | SYSTOLIC BLOOD PRESSURE: 116 MMHG

## 2018-10-11 DIAGNOSIS — Z12.4 CERVICAL CANCER SCREENING: ICD-10-CM

## 2018-10-11 LAB
ERYTHROCYTE [DISTWIDTH] IN BLOOD BY AUTOMATED COUNT: 13.2 % (ref 10–15)
HCT VFR BLD AUTO: 35 % (ref 35–47)
HGB BLD-MCNC: 11.4 G/DL (ref 11.7–15.7)
MCH RBC QN AUTO: 28.4 PG (ref 26.5–33)
MCHC RBC AUTO-ENTMCNC: 32.6 G/DL (ref 31.5–36.5)
MCV RBC AUTO: 87 FL (ref 78–100)
PLATELET # BLD AUTO: 222 10E9/L (ref 150–450)
RBC # BLD AUTO: 4.02 10E12/L (ref 3.8–5.2)
WBC # BLD AUTO: 4.5 10E9/L (ref 4–11)

## 2018-10-11 PROCEDURE — G0145 SCR C/V CYTO,THINLAYER,RESCR: HCPCS | Performed by: ADVANCED PRACTICE MIDWIFE

## 2018-10-11 PROCEDURE — 85027 COMPLETE CBC AUTOMATED: CPT | Performed by: ADVANCED PRACTICE MIDWIFE

## 2018-10-11 PROCEDURE — G0463 HOSPITAL OUTPT CLINIC VISIT: HCPCS | Mod: ZF

## 2018-10-11 PROCEDURE — 87624 HPV HI-RISK TYP POOLED RSLT: CPT | Performed by: ADVANCED PRACTICE MIDWIFE

## 2018-10-11 PROCEDURE — 36415 COLL VENOUS BLD VENIPUNCTURE: CPT | Performed by: ADVANCED PRACTICE MIDWIFE

## 2018-10-11 ASSESSMENT — PAIN SCALES - GENERAL: PAINLEVEL: NO PAIN (0)

## 2018-10-11 ASSESSMENT — PATIENT HEALTH QUESTIONNAIRE - PHQ9: 5. POOR APPETITE OR OVEREATING: NOT AT ALL

## 2018-10-11 ASSESSMENT — ANXIETY QUESTIONNAIRES
5. BEING SO RESTLESS THAT IT IS HARD TO SIT STILL: NOT AT ALL
7. FEELING AFRAID AS IF SOMETHING AWFUL MIGHT HAPPEN: SEVERAL DAYS
3. WORRYING TOO MUCH ABOUT DIFFERENT THINGS: NOT AT ALL
6. BECOMING EASILY ANNOYED OR IRRITABLE: SEVERAL DAYS
1. FEELING NERVOUS, ANXIOUS, OR ON EDGE: NOT AT ALL
2. NOT BEING ABLE TO STOP OR CONTROL WORRYING: NOT AT ALL
GAD7 TOTAL SCORE: 2

## 2018-10-11 NOTE — LETTER
10/11/2018       RE: Pam Syed  [address]  [place] [address]     Dear Colleague,    Thank you for referring your patient, Pam Syed, to the [facility] at [facility]. Please see a copy of my visit note below.    Nursing Notes:   Roxie Fishman CMA  10/11/2018 10:52 AM  Incomplete  Chief Complaint   Patient presents with     Post Partum Exam     6 weeks post partum   Roxie Fishman CMA on 10/11/2018 at 10:46 AM  Subjective:  Pam Syed is here for her 6-week postpartum checkup.     Hx of Abuse:  No  Delivery Date: 2018.    Delivering provider:  Liz Reza CNM.    Type of delivery:  .  Perineum:  in tact.     Delivery complications: IUFD, postpartum pre-eclampsia  Infant gender:  boy  Bleeding:  None.   Menses resumed:  Yes  Bowel/Urinary problems:  N    Contraception Planned:  None -- is she planning pregnancy? yes  No pain during intercourse.    Patient declined pap smear. Roxie Fishman CMA on 10/11/2018 at 10:50 AM  ================================================================  Pam presents to visit today accompanied by her partner, Nilson. Overall, she feels like she has been doing better than her last visit. Reports mood has been stable. Has been attending woman's support group on , which she has found to be very helpful. Reports good support from her partner.  Able to lean on each other when needed. Has occasional moments of sadness and grief.  Desires another pregnancy, but also feels very anxious about pregnancy and worried about her own health during pregnancy.     Reviewed placental pathology (large hematoma and infarct) and normal chromosome results. Discussed postpartum pre-eclampsia. Pt expresses concern about being misdiagnosed since she had increased swelling at the end of pregnancy.  Discussed diagnostic criteria for pre-eclampsia and normal BP prior to delivery. Reviewed  "MFM diagnosis of antiphospholipid syndrome d/t history of RPL. Discussed planning for future pregnancies, baby ASA, progesterone use, increased  surveillance and early IOL.     Denies dysuria or incontinence. Was taking oral iron supplements and prenatal vitamins. Is also taking Senna. Reports regular bowel movements without discomfort.  Denies any pain or lumps in breasts. Denies perineal pain.    Pt does not think she's ever had a pap smear. Agreeable to complete pap today.  Hgb 8.4 and thrombocytopenic upon discharge.  ================================================================    EXAM:  /78 (BP Location: Left arm, Patient Position: Sitting, Cuff Size: Adult Regular)  Pulse 77  Ht 1.575 m (5' 2\")  Wt 68.5 kg (151 lb 1.6 oz)  BMI 27.64 kg/m2    General: healthy, alert and no distress  Psych: anxiety and grief  Last PHQ-9 score on record= No Value  Breasts:  Nipples intact with no lesions, nontender  Abdomen: Benign, Soft, flat, non-tender, No masses, organomegaly and Diastasis less than 1-2 FB  Incision:  None   Vulva:  Normal genitalia and Bartholin's, Urethra, Dewar's normal  Vagina:  normal with good muscle tone  Cervix:  pink, moist, closed, without lesion or CMT.    Uterus:  fully involuted and non-tender    Adnexa:  Within normal limits and No masses, nodularity, tenderness  Recto-vaginal:   anus normal    Assessment:  Encounter Diagnoses   Name Primary?     Routine postpartum follow-up Yes     Cervical cancer screening       Normal postpartum exam after   Pregnancy was complicated by:  Fetal/ death and Preeclampsia.      Plan:  Orders Placed This Encounter   Procedures     Obtaining, preparing and conveyance of cervical or vaginal smear to laboratory.     CBC with Platelets     Pap imaged thin layer screen with HPV - recommended age 30 - 65 years (select HPV order below)     HPV High Risk Types DNA Cervical      Signs and symptoms of postpartum depression/anxiety " discussed and resources offered. Encouraged continued attendance at support groups. Mental health appointment as needed, pt aware of clinic resources.  Discussed calcium intake, vitamins and supplements including Vitamin D  Preconception counseling given.   Pap with HPV cotesting collected today   Follow-up as needed    Again, thank you for allowing me to participate in the care of your patient.      Sincerely,    ASHIA Gipson CNM

## 2018-10-11 NOTE — PROGRESS NOTES
Nursing Notes:   Roxie Fishman CMA  10/11/2018 10:52 AM  Incomplete  Chief Complaint   Patient presents with     Post Partum Exam     6 weeks post partum   Roxie Fismhan CMA on 10/11/2018 at 10:46 AM  Subjective:  Pam Syed is here for her 6-week postpartum checkup.     Hx of Abuse:  No  Delivery Date: 2018.    Delivering provider:  Liz Reza CNM.    Type of delivery:  .  Perineum:  in tact.     Delivery complications: IUFD, postpartum pre-eclampsia  Infant gender:  boy  Bleeding:  None.   Menses resumed:  Yes  Bowel/Urinary problems:  N    Contraception Planned:  None -- is she planning pregnancy? yes  No pain during intercourse.    Patient declined pap smear. Roxie Fishman CMA on 10/11/2018 at 10:50 AM  ================================================================  Pam presents to visit today accompanied by her partner, Nilson. Overall, she feels like she has been doing better than her last visit. Reports mood has been stable. Has been attending woman's support group on , which she has found to be very helpful. Reports good support from her partner.  Able to lean on each other when needed. Has occasional moments of sadness and grief.  Desires another pregnancy, but also feels very anxious about pregnancy and worried about her own health during pregnancy.     Reviewed placental pathology (large hematoma and infarct) and normal chromosome results. Discussed postpartum pre-eclampsia. Pt expresses concern about being misdiagnosed since she had increased swelling at the end of pregnancy.  Discussed diagnostic criteria for pre-eclampsia and normal BP prior to delivery. Reviewed MFM diagnosis of antiphospholipid syndrome d/t history of RPL. Discussed planning for future pregnancies, baby ASA, progesterone use, increased  surveillance and early IOL.     Denies dysuria or incontinence. Was taking oral iron supplements and prenatal vitamins. Is also taking Senna. Reports  "regular bowel movements without discomfort.  Denies any pain or lumps in breasts. Denies perineal pain.    Pt does not think she's ever had a pap smear. Agreeable to complete pap today.  Hgb 8.4 and thrombocytopenic upon discharge.  ================================================================  ROS: 10 point ROS neg other than the symptoms noted above in the HPI.   CONSTITUTIONAL: negative  RESPIRATORY: negative  CARDIOVASCULAR: negative  GI: negative  SKIN: negative  PSYCHIATRIC: negative    EXAM:  /78 (BP Location: Left arm, Patient Position: Sitting, Cuff Size: Adult Regular)  Pulse 77  Ht 1.575 m (5' 2\")  Wt 68.5 kg (151 lb 1.6 oz)  BMI 27.64 kg/m2    General: healthy, alert and no distress  Psych: anxiety and grief  Last PHQ-9 score on record= No Value  Breasts:  Nipples intact with no lesions, nontender  Abdomen: Benign, Soft, flat, non-tender, No masses, organomegaly and Diastasis less than 1-2 FB  Incision:  None   Vulva:  Normal genitalia and Bartholin's, Urethra, [place]'s normal  Vagina:  normal with good muscle tone  Cervix:  pink, moist, closed, without lesion or CMT.    Uterus:  fully involuted and non-tender    Adnexa:  Within normal limits and No masses, nodularity, tenderness  Recto-vaginal:   anus normal    Assessment:  Encounter Diagnoses   Name Primary?     Routine postpartum follow-up Yes     Cervical cancer screening       Normal postpartum exam after   Pregnancy was complicated by:  Fetal/ death and Preeclampsia.      Plan:  Orders Placed This Encounter   Procedures     Obtaining, preparing and conveyance of cervical or vaginal smear to laboratory.     CBC with Platelets     Pap imaged thin layer screen with HPV - recommended age 30 - 65 years (select HPV order below)     HPV High Risk Types DNA Cervical      Signs and symptoms of postpartum depression/anxiety discussed and resources offered. Encouraged continued attendance at support groups. Mental health appointment as " needed, pt aware of clinic resources.  Discussed calcium intake, vitamins and supplements including Vitamin D  Preconception counseling given.   Pap with HPV cotesting collected today   Follow-up as needed

## 2018-10-11 NOTE — NURSING NOTE
Chief Complaint   Patient presents with     Post Partum Exam     6 weeks post partum     Patient is present with  Panda Fishman CMA on 10/11/2018 at 10:46 AM      SUBJECTIVE:   Pam Syed is here for her 6-week postpartum checkup.     PHQ-9 score: 6  Hx of Abuse:  No    Delivery Date: 2018.    Delivering provider:  Liz Reza CNM.    Type of delivery:  .  Perineum:  in tact.     Delivery complications: Stillborn  Infant gender:  boy, weight 6 pounds 1  oz.  Feeding Method: stillborn .  Complications reported with feeding: stillborn  Bleeding:  None.  Duration:  .  Menses resumed:  Yes , No  Bowel/Urinary problems:  No    Contraception Planned:  None -- is she planning pregnancy? none   No pain during intercourse.      Patient declined pap smear. Roxie Fishman CMA on 10/11/2018 at 10:50 AM

## 2018-10-11 NOTE — MR AVS SNAPSHOT
"              After Visit Summary   10/11/2018    Pam Syed    MRN: [id number]           Patient Information     Date Of Birth          1990        Visit Information        Provider Department      10/11/2018 10:30 AM Justice Cosby APRN CNM [facility]        Today's Diagnoses     Routine postpartum follow-up    -  1    Cervical cancer screening           Follow-ups after your visit        Follow-up notes from your care team     Return if symptoms worsen or fail to improve.      Who to contact     Please call your clinic at [phone] to:    Ask questions about your health    Make or cancel appointments    Discuss your medicines    Learn about your test results    Speak to your doctor            Additional Information About Your Visit        MyChart Information     Mir[employer]et is an electronic gateway that provides easy, online access to your medical records. With Akamp[employer], you can request a clinic appointment, read your test results, renew a prescription or communicate with your care team.     To sign up for Mirificet visit the website at [email or web address]   You will be asked to enter the access code listed below, as well as some personal information. Please follow the directions to create your username and password.     Your access code is: [email or web address]  Expires: 10/24/2018  9:06 AM     Your access code will  in 90 days. If you need help or a new code, please contact your [facility] or call [phone] for assistance.        Care EveryWhere ID     This is your Care EveryWhere ID. This could be used by other organizations to access your [place] medical records  [id number]        Your Vitals Were     Pulse Height BMI (Body Mass Index)             77 1.575 m (5' 2\") 27.64 kg/m2          Blood Pressure from Last 3 Encounters:   10/11/18 116/78   18 119/81   18 138/83    Weight from Last 3 Encounters:   10/11/18 68.5 kg (151 lb " 1.6 oz)   08/31/18 68.2 kg (150 lb 6.4 oz)   08/21/18 77.6 kg (171 lb)              We Performed the Following     CBC with Platelets     HPV High Risk Types DNA Cervical     Obtaining, preparing and conveyance of cervical or vaginal smear to laboratory.     Pap imaged thin layer screen with HPV - recommended age 30 - 65 years (select HPV order below)        Primary Care Provider Office Phone # Fax #    Deandre Patiño [phone]       [facility] [address] [address]        Equal Access to Services     JAZMIN HARO : Hadii aad ku hadasho Soomaali, waaxda luqadaha, qaybta kaalmada adeegyacheryl, ada crouch. So [facility] [phone].    ATENCIÓN: Si habla español, tiene a kaufman disposición servicios gratuitos de asistencia lingüística. [facility] [place] [phone].    We comply with applicable federal civil rights laws and [place] laws. We do not discriminate on the basis of race, color, national origin, age, disability, sex, sexual orientation, or gender identity.            Thank you!     Thank you for choosing [facility]  for your care. Our goal is always to provide you with excellent care. Hearing back from our patients is one way we can continue to improve our services. Please take a few minutes to complete the written survey that you may receive in the mail after your visit with us. Thank you!             Your Updated Medication List - Protect others around you: Learn how to safely use, store and throw away your medicines at www.disposemymeds.org.          This list is accurate as of 10/11/18 11:59 PM.  Always use your most recent med list.                   Brand Name Dispense Instructions for use Diagnosis    ascorbic acid 250 MG Chew chewable tablet    vitamin C    90 tablet    Take 1 tablet (250 mg) by mouth daily    Iron deficiency anemia secondary to inadequate dietary iron intake       Cyanocobalamin 1000 MCG Lozg     30 lozenge     Take 1 tablet by mouth daily    Iron deficiency anemia secondary to inadequate dietary iron intake       ferrous sulfate 325 (65 Fe) MG tablet    IRON    30 tablet    Take 1 tablet (325 mg) by mouth daily (with breakfast)    Iron deficiency anemia secondary to inadequate dietary iron intake, IUFD at 20 weeks or more of gestation       * ibuprofen 200 MG tablet    ADVIL/MOTRIN    50 tablet    Take 3 tablets (600 mg) by mouth every 6 hours as needed for other (cramping)    IUFD at 20 weeks or more of gestation       * ibuprofen 800 MG tablet    ADVIL/MOTRIN    30 tablet    Take 1 tablet (800 mg) by mouth every 8 hours as needed for moderate pain     (normal spontaneous vaginal delivery)       PRENATAL VITAMIN AND MINERAL 28-0.8 MG Tabs     90 tablet    Take 1 tablet by mouth daily    Early stage of pregnancy       senna-docusate 8.6-50 MG per tablet    JAUN-COLACE    20 tablet    Take 1-2 tablets by mouth 2 times daily as needed for constipation        sennosides 8.6 MG tablet    SENOKOT    60 tablet    Take 1 tablet by mouth 2 times daily as needed for constipation    High-risk pregnancy in first trimester       * Notice:  This list has 2 medication(s) that are the same as other medications prescribed for you. Read the directions carefully, and ask your doctor or other care provider to review them with you.

## 2018-10-11 NOTE — LETTER
10/19/2018         Ginna Liriano   [address]  [place] [address]        Dear Ms. Liriano:    The results of your recent test(s) listed below were normal.     Results for orders placed or performed in visit on 10/11/18   CBC with Platelets   Result Value Ref Range    WBC 4.5 4.0 - 11.0 10e9/L    RBC Count 4.02 3.8 - 5.2 10e12/L    Hemoglobin 11.4 (L) 11.7 - 15.7 g/dL    Hematocrit 35.0 35.0 - 47.0 %    MCV 87 78 - 100 fl    MCH 28.4 26.5 - 33.0 pg    MCHC 32.6 31.5 - 36.5 g/dL    RDW 13.2 10.0 - 15.0 %    Platelet Count 222 150 - 450 10e9/L   Pap imaged thin layer screen with HPV - recommended age 30 - 65 years (select HPV order below)   Result Value Ref Range    PAP NIL     Copath Report         Patient Name: GINNA LIRIANO  MR#: [id number]  Specimen #: [id number]  Collected: 10/11/2018  Received: 10/11/2018  Reported: 10/15/2018 09:50  Ordering Phy(s): NIGHAT LINTON    For improved result formatting, select 'View Enhanced Report Format' under   Linked Documents section.    SPECIMEN/STAIN PROCESS:  Pap imaged thin layer prep screening (Surepath, FocalPoint with guided   screening)       Pap-Cyto x 1, HPV ordered x 1    SOURCE: Cervical, endocervical  ----------------------------------------------------------------   Pap imaged thin layer prep screening (Surepath, FocalPoint with guided   screening)  SPECIMEN ADEQUACY:  Satisfactory for evaluation.  -Transformation zone component present.    CYTOLOGIC INTERPRETATION:    Negative for intraepithelial lesion or malignancy    Electronically signed out by:  BHANU Fraire (ASCP)    Processed and screened at [facility],   Critical access hospital    CLINICAL HISTORY:    Currently not having desire ods  Post-partum,    Papanicolaou Test Limitations:  Cervical cytology is a screening test with   limited sensitivity; regular  screening is critical for cancer prevention; Pap tests are primarily   effective for the  diagnosis/prevention of  squamous cell carcinoma, not adenocarcinomas or other cancers.    TESTING LAB LOCATION:  [facility], [address]  [address] [phone]    COLLECTION SITE:  Client:  [facility]  [facility]       HPV High Risk Types DNA Cervical   Result Value Ref Range    HPV Source SurePath     HPV 16 DNA Negative NEG^Negative    HPV 18 DNA Negative NEG^Negative    Other HR HPV Negative NEG^Negative    Final Diagnosis This patient's sample is negative for HPV DNA.     Specimen Description Cervical Cells          Please note that test explanations are brief and do not reflect all diagnostic uses.  If you have any questions or concerns, please call the clinic at [phone].      Sincerely,      Jade Boles CNM

## 2018-10-12 ASSESSMENT — ANXIETY QUESTIONNAIRES: GAD7 TOTAL SCORE: 2

## 2018-10-12 ASSESSMENT — PATIENT HEALTH QUESTIONNAIRE - PHQ9: SUM OF ALL RESPONSES TO PHQ QUESTIONS 1-9: 6

## 2018-10-15 LAB
COPATH REPORT: NORMAL
PAP: NORMAL

## 2018-10-16 LAB
FINAL DIAGNOSIS: NORMAL
HPV HR 12 DNA CVX QL NAA+PROBE: NEGATIVE
HPV16 DNA SPEC QL NAA+PROBE: NEGATIVE
HPV18 DNA SPEC QL NAA+PROBE: NEGATIVE
SPECIMEN DESCRIPTION: NORMAL
SPECIMEN SOURCE CVX/VAG CYTO: NORMAL

## 2018-11-23 DIAGNOSIS — O09.91 HIGH-RISK PREGNANCY IN FIRST TRIMESTER: Primary | ICD-10-CM

## 2018-11-28 DIAGNOSIS — N96 HISTORY OF RECURRENT MISCARRIAGES, NOT CURRENTLY PREGNANT: ICD-10-CM

## 2018-11-28 NOTE — TELEPHONE ENCOUNTER
Patient states that her insurance will only cover progesterone capsules not the suppositories. She would like the capsules as she had last time. Rx sent.

## 2019-01-04 ENCOUNTER — ANCILLARY PROCEDURE (OUTPATIENT)
Dept: ULTRASOUND IMAGING | Facility: CLINIC | Age: 29
End: 2019-01-04
Attending: ADVANCED PRACTICE MIDWIFE
Payer: COMMERCIAL

## 2019-01-04 ENCOUNTER — TELEPHONE (OUTPATIENT)
Dept: OBGYN | Facility: CLINIC | Age: 29
End: 2019-01-04

## 2019-01-04 DIAGNOSIS — O26.22 HISTORY OF RECURRENT ABORTION, CURRENTLY PREGNANT IN SECOND TRIMESTER: Primary | ICD-10-CM

## 2019-01-04 DIAGNOSIS — Z34.91 ENCOUNTER FOR SUPERVISION OF NORMAL PREGNANCY IN FIRST TRIMESTER, UNSPECIFIED GRAVIDITY: ICD-10-CM

## 2019-01-04 PROCEDURE — 76801 OB US < 14 WKS SINGLE FETUS: CPT

## 2019-01-04 NOTE — TELEPHONE ENCOUNTER
"Spoke with Pam in clinic who is currently 10 weeks pregnant after suffering a stillborn with last pregnancy. Per MFM patient is to start 81mg of aspirin at 10 weeks with next pregnancy    \"Definitely recommend daily low dose aspirin 81 mg to start at 10 weeks gestation\" - Arnold Shields note at discharge.    Nurse sent Rx. Patient also wanting referral to see MFM in early pregnancy due to recurrent losses. Referral input.       "

## 2019-01-07 ENCOUNTER — PRE VISIT (OUTPATIENT)
Dept: MATERNAL FETAL MEDICINE | Facility: CLINIC | Age: 29
End: 2019-01-07

## 2019-01-07 ENCOUNTER — CARE COORDINATION (OUTPATIENT)
Dept: MATERNAL FETAL MEDICINE | Facility: CLINIC | Age: 29
End: 2019-01-07

## 2019-01-09 ENCOUNTER — PRE VISIT (OUTPATIENT)
Dept: MATERNAL FETAL MEDICINE | Facility: CLINIC | Age: 29
End: 2019-01-09

## 2019-01-09 DIAGNOSIS — O26.20 RECURRENT PREGNANCY LOSS WITH CURRENT PREGNANCY: Primary | ICD-10-CM

## 2019-01-11 ENCOUNTER — OFFICE VISIT (OUTPATIENT)
Dept: OBGYN | Facility: CLINIC | Age: 29
End: 2019-01-11
Attending: ADVANCED PRACTICE MIDWIFE
Payer: COMMERCIAL

## 2019-01-11 VITALS
BODY MASS INDEX: 27.44 KG/M2 | SYSTOLIC BLOOD PRESSURE: 139 MMHG | DIASTOLIC BLOOD PRESSURE: 85 MMHG | WEIGHT: 150 LBS | HEART RATE: 84 BPM

## 2019-01-11 DIAGNOSIS — O09.90 HIGH-RISK PREGNANCY, UNSPECIFIED TRIMESTER: ICD-10-CM

## 2019-01-11 DIAGNOSIS — O09.90 SUPERVISION OF HIGH RISK PREGNANCY, ANTEPARTUM: Primary | ICD-10-CM

## 2019-01-11 DIAGNOSIS — O09.299 HISTORY OF PRE-ECLAMPSIA IN PRIOR PREGNANCY, CURRENTLY PREGNANT: ICD-10-CM

## 2019-01-11 DIAGNOSIS — Z87.59 HISTORY OF SEVERE PRE-ECLAMPSIA: ICD-10-CM

## 2019-01-11 DIAGNOSIS — Z87.59 HISTORY OF IUFD: ICD-10-CM

## 2019-01-11 DIAGNOSIS — N96 HISTORY OF RECURRENT MISCARRIAGES: ICD-10-CM

## 2019-01-11 PROBLEM — D50.8 IRON DEFICIENCY ANEMIA SECONDARY TO INADEQUATE DIETARY IRON INTAKE: Status: RESOLVED | Noted: 2018-05-15 | Resolved: 2019-01-11

## 2019-01-11 PROBLEM — D69.6 THROMBOCYTOPENIA (H): Status: RESOLVED | Noted: 2018-07-26 | Resolved: 2019-01-11

## 2019-01-11 LAB
ABO + RH BLD: NORMAL
ABO + RH BLD: NORMAL
ALT SERPL W P-5'-P-CCNC: 12 U/L (ref 0–50)
ANION GAP SERPL CALCULATED.3IONS-SCNC: 10 MMOL/L (ref 3–14)
AST SERPL W P-5'-P-CCNC: 17 U/L (ref 0–45)
BLD GP AB SCN SERPL QL: NORMAL
BLOOD BANK CMNT PATIENT-IMP: NORMAL
BUN SERPL-MCNC: 9 MG/DL (ref 7–30)
CALCIUM SERPL-MCNC: 8.7 MG/DL (ref 8.5–10.1)
CHLORIDE SERPL-SCNC: 105 MMOL/L (ref 94–109)
CO2 SERPL-SCNC: 23 MMOL/L (ref 20–32)
CREAT SERPL-MCNC: 0.47 MG/DL (ref 0.52–1.04)
CREAT UR-MCNC: 255 MG/DL
ERYTHROCYTE [DISTWIDTH] IN BLOOD BY AUTOMATED COUNT: 14.4 % (ref 10–15)
GFR SERPL CREATININE-BSD FRML MDRD: >90 ML/MIN/{1.73_M2}
GLUCOSE SERPL-MCNC: 80 MG/DL (ref 70–99)
HCT VFR BLD AUTO: 33.9 % (ref 35–47)
HGB BLD-MCNC: 11.7 G/DL (ref 11.7–15.7)
MCH RBC QN AUTO: 29 PG (ref 26.5–33)
MCHC RBC AUTO-ENTMCNC: 34.5 G/DL (ref 31.5–36.5)
MCV RBC AUTO: 84 FL (ref 78–100)
PLATELET # BLD AUTO: 186 10E9/L (ref 150–450)
POTASSIUM SERPL-SCNC: 3.5 MMOL/L (ref 3.4–5.3)
PROT UR-MCNC: 0.21 G/L
PROT/CREAT 24H UR: 0.08 G/G CR (ref 0–0.2)
RBC # BLD AUTO: 4.03 10E12/L (ref 3.8–5.2)
SODIUM SERPL-SCNC: 138 MMOL/L (ref 133–144)
SPECIMEN EXP DATE BLD: NORMAL
URATE SERPL-MCNC: 2 MG/DL (ref 2.6–6)
WBC # BLD AUTO: 6.9 10E9/L (ref 4–11)

## 2019-01-11 PROCEDURE — 82306 VITAMIN D 25 HYDROXY: CPT | Performed by: ADVANCED PRACTICE MIDWIFE

## 2019-01-11 PROCEDURE — 84450 TRANSFERASE (AST) (SGOT): CPT | Performed by: ADVANCED PRACTICE MIDWIFE

## 2019-01-11 PROCEDURE — 87086 URINE CULTURE/COLONY COUNT: CPT | Performed by: ADVANCED PRACTICE MIDWIFE

## 2019-01-11 PROCEDURE — 36415 COLL VENOUS BLD VENIPUNCTURE: CPT | Performed by: ADVANCED PRACTICE MIDWIFE

## 2019-01-11 PROCEDURE — 85027 COMPLETE CBC AUTOMATED: CPT | Performed by: ADVANCED PRACTICE MIDWIFE

## 2019-01-11 PROCEDURE — 84460 ALANINE AMINO (ALT) (SGPT): CPT | Performed by: ADVANCED PRACTICE MIDWIFE

## 2019-01-11 PROCEDURE — G0463 HOSPITAL OUTPT CLINIC VISIT: HCPCS | Mod: ZF

## 2019-01-11 PROCEDURE — 84156 ASSAY OF PROTEIN URINE: CPT | Performed by: ADVANCED PRACTICE MIDWIFE

## 2019-01-11 PROCEDURE — 86850 RBC ANTIBODY SCREEN: CPT | Performed by: ADVANCED PRACTICE MIDWIFE

## 2019-01-11 PROCEDURE — 86900 BLOOD TYPING SEROLOGIC ABO: CPT | Performed by: ADVANCED PRACTICE MIDWIFE

## 2019-01-11 PROCEDURE — 84550 ASSAY OF BLOOD/URIC ACID: CPT | Performed by: ADVANCED PRACTICE MIDWIFE

## 2019-01-11 PROCEDURE — 86901 BLOOD TYPING SEROLOGIC RH(D): CPT | Performed by: ADVANCED PRACTICE MIDWIFE

## 2019-01-11 PROCEDURE — 80048 BASIC METABOLIC PNL TOTAL CA: CPT | Performed by: ADVANCED PRACTICE MIDWIFE

## 2019-01-11 PROCEDURE — 87389 HIV-1 AG W/HIV-1&-2 AB AG IA: CPT | Performed by: ADVANCED PRACTICE MIDWIFE

## 2019-01-11 PROCEDURE — 87340 HEPATITIS B SURFACE AG IA: CPT | Performed by: ADVANCED PRACTICE MIDWIFE

## 2019-01-11 PROCEDURE — 86780 TREPONEMA PALLIDUM: CPT | Performed by: ADVANCED PRACTICE MIDWIFE

## 2019-01-11 PROCEDURE — 86762 RUBELLA ANTIBODY: CPT | Performed by: ADVANCED PRACTICE MIDWIFE

## 2019-01-11 PROCEDURE — 86706 HEP B SURFACE ANTIBODY: CPT | Performed by: ADVANCED PRACTICE MIDWIFE

## 2019-01-11 ASSESSMENT — PAIN SCALES - GENERAL: PAINLEVEL: NO PAIN (0)

## 2019-01-11 NOTE — NURSING NOTE
Chief Complaint   Patient presents with     Intake     OB intake     Health Maintenance Due   Topic Date Due     INFLUENZA VACCINE (1) 09/01/2018     MATERNAL SCREENING  02/05/2019     Roxie Fishman CMA on 1/11/2019 at 11:30 AM

## 2019-01-11 NOTE — PATIENT INSTRUCTIONS

## 2019-01-11 NOTE — LETTER
2019       RE: Pam Syed  [address]  [place] [address]     Dear Colleague,    Thank you for referring your patient, Pam Syed, to the [facility] at [facility]. Please see a copy of my visit note below.    S OB Intake note  Subjective   28 year old woman presents to clinic for initiation of OB care.  Patient's last menstrual period was 10/23/2018.    at 11w3d by Estimated Date of Delivery: 2019 based on US confirms.  Reviewed dating ultrasound. Pregnancy is planned but pt has a lot of anxiety. Pt is well known to this writer from her personal history of multiple miscarriages and recent full term IUFD with then severe postpartum Pre E.  Pt is appropriately tearful during history taking as it is still fresh in her mind every day that she herself could have  and she still is also grieving her baby.  She is very worried about this pregnancy but feels very safe and supported by the [facility] team who have been her providers through some of her most challenging times.  She hopes that even if M decides she needs MD management of this pregnancy that the CNM team can be very involved socially as she feels like she will need all of our support to get through this pregnancy.      Agreeable to plan today to include Pre E baseline labs  Already On baby aspirin  AlreadyOn oral progesterone  Has MFM appt 19    She is not feeling emotionally up to a full OB intake history review but states that nothing new has happened since her postpartum visit in 2018.     Symptoms since LMP include nausea.  Patient has tried these relief measures: diet modification, small frequent meals and increased rest.  Have you traveled during the pregnancy?No  Have your sexual partner(s) travelled during the pregnancy?No          - Current Medications    Current Outpatient Medications   Medication Sig Dispense Refill     ascorbic  acid (VITAMIN C) 250 MG CHEW chewable tablet Take 1 tablet (250 mg) by mouth daily 90 tablet 0     aspirin (ASA) 81 MG tablet Take 1 tablet (81 mg) by mouth daily 90 tablet 3     Cyanocobalamin 1000 MCG LOZG Take 1 tablet by mouth daily 30 lozenge 3     ferrous sulfate (IRON) 325 (65 Fe) MG tablet Take 1 tablet (325 mg) by mouth daily (with breakfast) 30 tablet 2     ibuprofen (ADVIL/MOTRIN) 200 MG tablet Take 3 tablets (600 mg) by mouth every 6 hours as needed for other (cramping) 50 tablet 0     ibuprofen (ADVIL/MOTRIN) 800 MG tablet Take 1 tablet (800 mg) by mouth every 8 hours as needed for moderate pain 30 tablet 0     Prenatal Vit-Fe Fumarate-FA (PRENATAL VITAMIN AND MINERAL) 28-0.8 MG TABS Take 1 tablet by mouth daily 90 tablet 3     progesterone (PROMETRIUM) 200 MG capsule Place 1 capsule (200 mg) vaginally 2 times daily Start using on day 21 of your menstrual cycle (first day of bleeding is day 1) 60 capsule 3     senna-docusate (JAUN-COLACE) 8.6-50 MG per tablet Take 1-2 tablets by mouth 2 times daily as needed for constipation 20 tablet 1     sennosides (SENOKOT) 8.6 MG tablet Take 1 tablet by mouth 2 times daily as needed for constipation 60 tablet 1         - Co-morbids    Past Medical History:   Diagnosis Date     Recurrent pregnancy loss (CODE)      Severe pre-eclampsia, postpartum condition or complication 8/25/2018     - Risk for GDM -  does not Personal history of GDM, BMI>30, h/o prediabetes/glucose intolerance, first degree relative with GDM or DM    WILL NOT have an early GCT and possible Hgb A1C    - The patient does h/o Pre Eclampsia, Current multi fetal gestation, Pre Gestational Diabetes (Type 1 or Type 2), chronic hypertension, renal disease, Autoimmune disease (systematic lupus erythematosus, antiphospholipid syndrome) so WILL start low dose aspirin (81mg) starting between 12 and 28 weeks to prevent preeclampsia.  - Started at 10 weeks per M    - The patient  does not have a history of  spontaneous  birth so  WILL NOT consider progesterone starting at 16-20 weeks and/or serial transvaginal cervical length ultrasounds from 16-24 weeks.     - Pt with personal history of recurrent pregnancy loss - is on daily PO Progesterone  Pt with personal history of term IUFD. Aware she may be recommended to have IOL.       PERSONAL/SOCIAL HISTORY    lives with their family.  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  Additional items: Denies past or present domestic violence, sexual and psychological abuse.    Coping well with grief around recent IUFD and personal history of miscarriages.     Objective  -VS: reviewed and within normal limits   -General appearance: no acute distress, patient is comfortable   NEUROLOGICAL/PSYCHIATRIC   - Orientated x3,   -Mood and affect: : normal     Assessment/Plan      28 year old  11w3d weeks of pregnancy with BEBETO of 2019 by LMP of Patient's last menstrual period was 10/23/2018.. Ultrasound confirms.   Outpatient Encounter Medications as of 2019   Medication Sig Dispense Refill     ascorbic acid (VITAMIN C) 250 MG CHEW chewable tablet Take 1 tablet (250 mg) by mouth daily 90 tablet 0     aspirin (ASA) 81 MG tablet Take 1 tablet (81 mg) by mouth daily 90 tablet 3     Cyanocobalamin 1000 MCG LOZG Take 1 tablet by mouth daily 30 lozenge 3     ferrous sulfate (IRON) 325 (65 Fe) MG tablet Take 1 tablet (325 mg) by mouth daily (with breakfast) 30 tablet 2     ibuprofen (ADVIL/MOTRIN) 200 MG tablet Take 3 tablets (600 mg) by mouth every 6 hours as needed for other (cramping) 50 tablet 0     ibuprofen (ADVIL/MOTRIN) 800 MG tablet Take 1 tablet (800 mg) by mouth every 8 hours as needed for moderate pain 30 tablet 0     Prenatal Vit-Fe Fumarate-FA (PRENATAL VITAMIN AND MINERAL) 28-0.8 MG TABS Take 1 tablet by mouth daily 90 tablet 3     progesterone (PROMETRIUM) 200 MG capsule Place 1 capsule (200 mg) vaginally 2 times daily  Start using on day 21 of your menstrual cycle (first day of bleeding is day 1) 60 capsule 3     senna-docusate (JAUN-COLACE) 8.6-50 MG per tablet Take 1-2 tablets by mouth 2 times daily as needed for constipation 20 tablet 1     sennosides (SENOKOT) 8.6 MG tablet Take 1 tablet by mouth 2 times daily as needed for constipation 60 tablet 1     Facility-Administered Encounter Medications as of 1/11/2019   Medication Dose Route Frequency Provider Last Rate Last Dose     bupivacaine (MARCAINE) 0.125 % injection (diluted from stock concentration by MD or CRNA)   EPIDURAL PRAgustina Pritchard MD   5 mL at 12/03/15 0238     lidocaine-EPINEPHrine 1.5 %-1:270335 injection   EPIDURAL PRAgustina Pritchard MD   2 mL at 12/03/15 0234      Orders Placed This Encounter   Procedures     Hepatitis B surface antigen     CBC with platelets     HIV Antigen Antibody Combo     Rubella Antibody IgG Quantitative     Treponema Abs w Reflex to RPR and Titer     Hepatitis B Surface Antibody     Vitamin D Deficiency     AST     ALT     Basic metabolic panel  (Ca, Cl, CO2, Creat, Gluc, K, Na, BUN)     Uric acid     Protein  random urine with Creat Ratio     Creatinine urine calculation only     ABO/Rh type and screen       - Oriented to Practice, types of care, and how to reach a provider.  Pt prefers CNM cares even if she has to have MD care per M she wants CNM social involvement.  - Patient received 1st trimester new OB education packet complete with aide of [employer] booklet including information on genetic screening test options.  - Patient has MFM appt on 1/24/19 and will decide with M what to do for testing.   - Patient was encouraged to start prenatal vitamins as tolerated.    - Patient was sent to lab for routine OB labs including as above.     - Reviewed recommendation for low dose aspirin daily to prevent pre eclampsia, pt already started at 10 weeks per M., follow up at NOB visit.   - Pregnancy concerns to be  addressed by provider at new OB exam include: history of preeclampsia and history of IUFD.    Pt to RTO for NOB visit in 1-2 weeks and prn if questions or concerns    ASHIA DislaM

## 2019-01-11 NOTE — PROGRESS NOTES
[facility] OB Intake note  Subjective   28 year old woman presents to clinic for initiation of OB care.  Patient's last menstrual period was 10/23/2018.    at 11w3d by Estimated Date of Delivery: 2019 based on US confirms.  Reviewed dating ultrasound. Pregnancy is planned but pt has a lot of anxiety. Pt is well known to this writer from her personal history of multiple miscarriages and recent full term IUFD with then severe postpartum Pre E.  Pt is appropriately tearful during history taking as it is still fresh in her mind every day that she herself could have  and she still is also grieving her baby.  She is very worried about this pregnancy but feels very safe and supported by the [facility] team who have been her providers through some of her most challenging times.  She hopes that even if [facility] decides she needs MD management of this pregnancy that the CNM team can be very involved socially as she feels like she will need all of our support to get through this pregnancy.      Agreeable to plan today to include Pre E baseline labs  Already On baby aspirin  AlreadyOn oral progesterone  Has [facility] appt 19    She is not feeling emotionally up to a full OB intake history review but states that nothing new has happened since her postpartum visit in 2018.     Symptoms since LMP include nausea.  Patient has tried these relief measures: diet modification, small frequent meals and increased rest.  Have you traveled during the pregnancy?No  Have your sexual partner(s) travelled during the pregnancy?No          - Current Medications    Current Outpatient Medications   Medication Sig Dispense Refill     ascorbic acid (VITAMIN C) 250 MG CHEW chewable tablet Take 1 tablet (250 mg) by mouth daily 90 tablet 0     aspirin (ASA) 81 MG tablet Take 1 tablet (81 mg) by mouth daily 90 tablet 3     Cyanocobalamin 1000 MCG LOZG Take 1 tablet by mouth daily 30 lozenge 3     ferrous sulfate (IRON) 325 (65 Fe) MG tablet Take  1 tablet (325 mg) by mouth daily (with breakfast) 30 tablet 2     ibuprofen (ADVIL/MOTRIN) 200 MG tablet Take 3 tablets (600 mg) by mouth every 6 hours as needed for other (cramping) 50 tablet 0     ibuprofen (ADVIL/MOTRIN) 800 MG tablet Take 1 tablet (800 mg) by mouth every 8 hours as needed for moderate pain 30 tablet 0     Prenatal Vit-Fe Fumarate-FA (PRENATAL VITAMIN AND MINERAL) 28-0.8 MG TABS Take 1 tablet by mouth daily 90 tablet 3     progesterone (PROMETRIUM) 200 MG capsule Place 1 capsule (200 mg) vaginally 2 times daily Start using on day 21 of your menstrual cycle (first day of bleeding is day 1) 60 capsule 3     senna-docusate (JAUN-COLACE) 8.6-50 MG per tablet Take 1-2 tablets by mouth 2 times daily as needed for constipation 20 tablet 1     sennosides (SENOKOT) 8.6 MG tablet Take 1 tablet by mouth 2 times daily as needed for constipation 60 tablet 1         - Co-morbids    Past Medical History:   Diagnosis Date     Recurrent pregnancy loss (CODE)      Severe pre-eclampsia, postpartum condition or complication 2018     - Risk for GDM -  does not Personal history of GDM, BMI>30, h/o prediabetes/glucose intolerance, first degree relative with GDM or DM    WILL NOT have an early GCT and possible Hgb A1C    - The patient does h/o Pre Eclampsia, Current multi fetal gestation, Pre Gestational Diabetes (Type 1 or Type 2), chronic hypertension, renal disease, Autoimmune disease (systematic lupus erythematosus, antiphospholipid syndrome) so WILL start low dose aspirin (81mg) starting between 12 and 28 weeks to prevent preeclampsia.  - Started at 10 weeks per [facility]    - The patient  does not have a history of spontaneous  birth so  WILL NOT consider progesterone starting at 16-20 weeks and/or serial transvaginal cervical length ultrasounds from 16-24 weeks.     - Pt with personal history of recurrent pregnancy loss - is on daily PO Progesterone  Pt with personal history of term IUFD. Aware she may be  recommended to have IOL.       PERSONAL/SOCIAL HISTORY    lives with their family.  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  Additional items: Denies past or present domestic violence, sexual and psychological abuse.    Coping well with grief around recent IUFD and personal history of miscarriages.     Objective  -VS: reviewed and within normal limits   -General appearance: no acute distress, patient is comfortable   NEUROLOGICAL/PSYCHIATRIC   - Orientated x3,   -Mood and affect: : normal     Assessment/Plan      28 year old  11w3d weeks of pregnancy with BEBETO of 2019 by LMP of Patient's last menstrual period was 10/23/2018.. Ultrasound confirms.   Outpatient Encounter Medications as of 2019   Medication Sig Dispense Refill     ascorbic acid (VITAMIN C) 250 MG CHEW chewable tablet Take 1 tablet (250 mg) by mouth daily 90 tablet 0     aspirin (ASA) 81 MG tablet Take 1 tablet (81 mg) by mouth daily 90 tablet 3     Cyanocobalamin 1000 MCG LOZG Take 1 tablet by mouth daily 30 lozenge 3     ferrous sulfate (IRON) 325 (65 Fe) MG tablet Take 1 tablet (325 mg) by mouth daily (with breakfast) 30 tablet 2     ibuprofen (ADVIL/MOTRIN) 200 MG tablet Take 3 tablets (600 mg) by mouth every 6 hours as needed for other (cramping) 50 tablet 0     ibuprofen (ADVIL/MOTRIN) 800 MG tablet Take 1 tablet (800 mg) by mouth every 8 hours as needed for moderate pain 30 tablet 0     Prenatal Vit-Fe Fumarate-FA (PRENATAL VITAMIN AND MINERAL) 28-0.8 MG TABS Take 1 tablet by mouth daily 90 tablet 3     progesterone (PROMETRIUM) 200 MG capsule Place 1 capsule (200 mg) vaginally 2 times daily Start using on day 21 of your menstrual cycle (first day of bleeding is day 1) 60 capsule 3     senna-docusate (JAUN-COLACE) 8.6-50 MG per tablet Take 1-2 tablets by mouth 2 times daily as needed for constipation 20 tablet 1     sennosides (SENOKOT) 8.6 MG tablet Take 1 tablet by mouth 2 times daily as  needed for constipation 60 tablet 1     Facility-Administered Encounter Medications as of 1/11/2019   Medication Dose Route Frequency Provider Last Rate Last Dose     bupivacaine (MARCAINE) 0.125 % injection (diluted from stock concentration by MD or CRNA)   EPIDURAL PRN Agustina Salomon MD   5 mL at 12/03/15 0238     lidocaine-EPINEPHrine 1.5 %-1:733865 injection   EPIDURAL PRN Agustina Salomon MD   2 mL at 12/03/15 0234      Orders Placed This Encounter   Procedures     Hepatitis B surface antigen     CBC with platelets     HIV Antigen Antibody Combo     Rubella Antibody IgG Quantitative     Treponema Abs w Reflex to RPR and Titer     Hepatitis B Surface Antibody     Vitamin D Deficiency     AST     ALT     Basic metabolic panel  (Ca, Cl, CO2, Creat, Gluc, K, Na, BUN)     Uric acid     Protein  random urine with Creat Ratio     Creatinine urine calculation only     ABO/Rh type and screen       - Oriented to Practice, types of care, and how to reach a provider.  Pt prefers CNM cares even if she has to have MD care per MFM she wants CNM social involvement.  - Patient received 1st trimester new OB education packet complete with aide of [employer] booklet including information on genetic screening test options.  - Patient has MFM appt on 1/24/19 and will decide with M what to do for testing.   - Patient was encouraged to start prenatal vitamins as tolerated.    - Patient was sent to lab for routine OB labs including as above.     - Reviewed recommendation for low dose aspirin daily to prevent pre eclampsia, pt already started at 10 weeks per MFM., follow up at NOB visit.   - Pregnancy concerns to be addressed by provider at new OB exam include: history of preeclampsia and history of IUFD.    Pt to RTO for NOB visit in 1-2 weeks and prn if questions or concerns    ASHIA DislaM

## 2019-01-12 LAB
BACTERIA SPEC CULT: NORMAL
Lab: NORMAL
RUBV IGG SERPL IA-ACNC: 110 IU/ML
SPECIMEN SOURCE: NORMAL
T PALLIDUM AB SER QL: NONREACTIVE

## 2019-01-13 LAB
DEPRECATED CALCIDIOL+CALCIFEROL SERPL-MC: 14 UG/L (ref 20–75)
HBV SURFACE AB SERPL IA-ACNC: 8.68 M[IU]/ML
HBV SURFACE AG SERPL QL IA: NONREACTIVE
HIV 1+2 AB+HIV1 P24 AG SERPL QL IA: NONREACTIVE

## 2019-01-16 PROBLEM — E55.9 VITAMIN D DEFICIENCY: Status: ACTIVE | Noted: 2018-06-24

## 2019-01-21 ENCOUNTER — PRE VISIT (OUTPATIENT)
Dept: MATERNAL FETAL MEDICINE | Facility: CLINIC | Age: 29
End: 2019-01-21

## 2019-01-21 DIAGNOSIS — O09.90 HIGH-RISK PREGNANCY, UNSPECIFIED TRIMESTER: ICD-10-CM

## 2019-01-24 ENCOUNTER — OFFICE VISIT (OUTPATIENT)
Dept: MATERNAL FETAL MEDICINE | Facility: CLINIC | Age: 29
End: 2019-01-24
Attending: OBSTETRICS & GYNECOLOGY
Payer: COMMERCIAL

## 2019-01-24 ENCOUNTER — HOSPITAL ENCOUNTER (OUTPATIENT)
Dept: ULTRASOUND IMAGING | Facility: CLINIC | Age: 29
Discharge: HOME | End: 2019-01-24
Attending: OBSTETRICS & GYNECOLOGY | Admitting: OBSTETRICS & GYNECOLOGY
Payer: COMMERCIAL

## 2019-01-24 DIAGNOSIS — O26.20 RECURRENT PREGNANCY LOSS WITH CURRENT PREGNANCY: ICD-10-CM

## 2019-01-24 PROCEDURE — 76813 OB US NUCHAL MEAS 1 GEST: CPT

## 2019-01-24 PROCEDURE — 96040 ZZH GENETIC COUNSELING, EACH 30 MINUTES: CPT | Mod: ZF | Performed by: GENETIC COUNSELOR, MS

## 2019-01-24 NOTE — NURSING NOTE
Pam presents to clinic for a 1st trimester US and MFM Consult. Dr. Damon and Dr. Shields met with patient (see notes/reports).   Kacie Ceballos RN

## 2019-01-24 NOTE — PROGRESS NOTES
Maternal-Fetal Medicine Consultation    Referral: Dear Dr. Ybarra,  Thank you for referring Ms. Syed for a Maternal-Fetal Medicine consultation.    She was cared for by me for her inpatient postpartum care earlier this year, when she experienced a term IUFD followed by a postpartum diagnosis of preeclampsia with severe features. We did discuss in detail also at that time, my recommendations to consider for this pregnancy.     I spent 20 minutes with Pam during today's visit with > 50% of that time spent in face to face counseling on IUFD and postpartum preeclampsia now with a new pregnancy.     HPI: Pam is a pleasant 28 year-old female  @ 13w2d  By LMP c/w 10w2d US referred for a Maternal-Fetal Medicine consultation in regards to her OB history of recurrent pregnancy loss and FT fetal demise.  Her pregnancy losses other than the demise were total of 5, all occured in the 1st trimester, several in the later first trimester. Records are available for the loss in  which was at 11 weeks, pathology unremarkable, G-band chromosome was done which showed 46XX, and neg APS w/u. This was followed by FT fetal demise on  @ 39w5d when she presented to r/o labor and ROM, but no fetal cardiac activity was found so she was admitted for delivery which was complicated by PP preeclampsia with severe features based on severe range BP.   Workup showed neg antiphospholipid antibody workup, fetal G-banding chromosome 46XY, and placental pathology that showed normal weight placenta with retroplacental hematoma and acute infarct extending the umbilical cord insertion. It was unclear if this occurred before or after the demise, she does not recall a lot of painful contractions or bleeding before IUFD was diagnosed. Her current baseline preeclampsia labs are normal. She is no longer hypertensive.     Past Medical History: PP preeclampsia, recurrent pregnancy loss  Past Surgical History: D&C x1 for SAB, d&c/hysteroscopy  for endometrial polyp, appendectomy  Obstetric History:   Obstetric History       T2      L1     SAB5   TAB0   Ectopic0   Multiple0   Live Births2       # Outcome Date GA Lbr Michael/2nd Weight Sex Delivery Anes PTL Lv   8 Current            7 Term 18 39w3d 01:16 / 00:20 2.75 kg (6 lb 1 oz) M Vag-Spont None N ND      Name: STEHP LIRIANO      Complications: Fetal demise > 22 weeks, delivered, current hospitalization   6 SAB 17           5 Term 12/03/15 38w5d 03:45 / 01:16 3.11 kg (6 lb 13.7 oz) M Vag-Spont EPI  RAMILA      Apgar1:  9                Apgar5: 9   4 SAB            3 SAB            2 SAB            1 SAB               Obstetric Comments   2018 Severe Pre E Postpartum     Gynecological History: Denies h/o STI, fibroids, or abn pap  Medications: ASA, PNV  NKDA  Social History: denies tobacco, ETOH, or illicit drug use   Family History: Non contributary  Genetic evaluation: Declined screening  Imaging: Please report under imaging tab  Prenatal workup: reviewed, unremarkable  Pre-pregnancy weight/BMI: 150 lb/27    Impression:  At today s visit we discussed with Pam her poor OB history that is complicated with recurrent pregnancy loss, term fetal demise, term preeclampsia with severe features, now with possible underlying mild chronic hypertension, not requiring any medications at this time.     We discussed the common etiologies for IUFD including: intrauterine growth restriction, placental abruption, infection, genetic causes, congenital anomalies, cord accident, hydrops, and antiphospholipid antibody syndrome. I further reviewed that in many cases the cause of the IUFD is unknown.    Through reviewing the circumstances associated with her latest demise, preeclampsia is the most probable cause and the placental changes noted on pathology could be related to that possibly a peripartum abruption. We reviewed that there is not enough evidence supporting the use of heparin products in the  cases of fetal demise with placental infarction especially that she did have negative antiphospholipid workup twice before. However given the history of preeclampsia, we believe that the use of ASA could hopefully prevent such outcome in addition to the main goal of preventing preeclampsia. Given her borderline BP at her initial OB visit, we discussed the importance of frequent BP monitoring and we discussed the signs and symptoms of preeclampsia as well.     I would also recommend close co-surveillance of fetal and maternal wellbeing. I see no contraindication to ongoing [facility] care, which she prefers, and we can collaborate with them, with [facility] US to include anatomic survey, serial growth, and BPPs. Due to her anxiety after the last demise, I agree with her request to have weekly BPPs start at 28 weeks gestation. She is concerned that she may have had some subjective symptoms of preeclampsia in the last 1-2 weeks of her last pregnancy (significant weight gain and fluid retention) that were thought to be due to the time of year and often normal physiologic events in normal term pregnancy.     At the conclusion of our discussion, we have made the following recommendations.     - Frequent monitoring of BP, every 2 weeks after 20 weeks and weekly after 28 weeks  - Continue low dose ASA of 81 mg until delivery. No indication for enoxaparin or other UFH at this time.   - BP is expected to drop in the 1st trimester and early second trimester but will increase again the 3rd trimester.  - Monitor for signs and symptoms of preeclampsia  - Do not recommend starting BP medications during pregnancy unless severe range BP noted (>160/>110), then recommend inpatient evaluation and management.  - Serial fetal growth scans every 4-6 weeks following the fetal survey.  - Antepartum surveillance via weekly BPP starting at 28 weeks based on maternal anxiety and discussion today  - Pregnancy not to be prolonged beyond 39 weeks of  gestation  -  delivery should be reserved for OB indications  - OB care to be managed by a CNM or a an OBGYN physician depending on CNM protocols.  - Discussion consideration for counseling, therapy, etc, if anxiety and trauma of prior birth become significant issues in her daily living. She will let us or her OB team know.     Pam expressed full understanding of the above-mentioned discussion and plan. All questions answered and concerns addressed.    A copy of this consultation will be sent to your office.     Thank you for the opportunity to participate in the care of this patient.  If you have questions, regarding today s evaluation or if we can be of further service, please contact the Maternal[facility]Fetal Medicine Center.    I served as a scribe for Dr. Cornelius Damon MD  Maternal-Fetal Medicine fellow  19    This note, as scribed, accurately reflects the examination, my impressions, and the plan as I discussed it with the patient.   Please contact me with any questions or concerns.     Arnold Shields MD  Maternal Fetal Medicine

## 2019-01-24 NOTE — PROGRESS NOTES
[facility]  Genetic Counseling Consult    Patient: Pam Syed YOB: 1990   Date of Service: 19      Pam Syed was seen at [facility] for genetic consultation to discuss the options for screening and testing for fetal chromosome abnormalities.  The indication for genetic counseling is history of unexplained recurrent miscarriage and recent full term IUFD.        Impression/Plan:   1.  Pam had an ultrasound today, and declined any additional screening for aneuploidy, with the understanding that she may opt to have screening at a later date if desired.  Pam reports that she would likely have NIPT done if an ultrasound abnormality is identified, but will likely declined screening otherwise.      2.  Pam had an M consultation today to discuss pregnancy management recommendations given her history of late term pregnancy loss.  Please see corresponding notes for details.      Pregnancy History:   /Parity:    Age at Delivery: 29 year old  BEBETO: 2019, by Last Menstrual Period  Gestational Age: 13w2d    No significant complications or exposures were reported in the current pregnancy.    Pam s pregnancy history is significant for 1 prior full term delivery with no reported complications, 4 prior first trimester miscarriages with no known cause.  The last 1st trimester miscarriage had genetic testing, which was normal, 46, XX.  Pam's most recent pregnancy ended in a full term IUFD at approximately 39 weeks gestational age.  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  Pam has a consult today with [facility] physician Dr. Jacobs and her team to discuss appropriate management recommendations given this history. Pam's records indicate that she has had a recurrent miscarriage workup, and per her report she has had  her karyotype assessed, which was normal.  (Pam's chromosome analysis is not available for review and is per patient report only).      Medical History:   Pam s reported medical history is not expected to impact pregnancy management or risks to fetal development.       Family History:   A three-generation pedigree was obtained at a previous genetic counseling appointment, and is scanned under the  Media  tab. The family history was reviewed today and Pam reports no significant updates other than the couple's pregnancy loss.    The reported family history is negative for multiple miscarriages, stillbirths, birth defects, cognitive impairment, known genetic conditions, and consanguinity.       Carrier Screening:   The patient reports that she and the father of the pregnancy have  ancestry:      The hemoglobinopathies are a group of genetic blood diseases that occur with increased frequency in individuals of  ancestry and carrier screening for these conditions is available.  Carrier screening for the hemoglobinopathies includes a CBC with red blood cell indices, a ferritin level, and a quantitative hemoglobin electrophoresis or HPLC.  In addition,  screening in the [facility] includes many of the hemoglobinopathies.      Expanded carrier screening for mutations in a large panel of genes associated with autosomal recessive conditions including cystic fibrosis, spinal muscular atrophy, and others, is now available.      The patient has had previous carrier screening for hemoglobinopathies, the results of which were normal.  A copy of the report was available for our review today.       Discussion:     Pam met with genetic counseling early in her last pregnancy to discuss her history of recurrent miscarriage, and much of today's appointment was spent providing emotional support for Pam.  She reports that the experience of being back in our clinic and going through pregnancy again has  impacted her grieving process.  She reports that she has strong support through her  Nilson, and that they are grieving their loss together.  She reports that she also has found support with family members and within their heidi community.  Pam reports that her very strong heidi has helped her as she begins this pregnancy, and that she is prepared to accept whatever happens for her pregnancy, and that she would like to make as detailed plans as possible to help ensure a healthy pregnancy.  Pregnancy management and surveillance recommendations were the primary focus of her appointment with Dr. Jacobs today, please see corresponding documentation for details.      We reviewed the common chromosome abnormalities and that all pregnancies have a small risk for having one of these conditions.  We discussed that based on Pam's history and age, her pregnancy is still considered low risk for the common chromosome abnormalities, but there are routine screening options available to gather more information if desired. Pam reports that for now she would prefer to decline screening beyond ultrasound, but would likely pursue screening if an ultrasound abnormality were identified in her pregnancy.         Testing Options:   We discussed the following options:   First trimester screening    First trimester ultrasound with nuchal translucency and nasal bone assessments, maternal plasma hCG, LA-A, and AFP measurement    Screens for fetal trisomy 21, trisomy 13, and trisomy 18    Cannot screen for open neural tube defects; maternal serum AFP after 15 weeks is recommended       Non-invasive Prenatal Testing (NIPT)    Maternal plasma cell-free DNA testing; first trimester ultrasound with nuchal translucency and nasal bone assessment is recommended, when appropriate    Screens for fetal trisomy 21, trisomy 13, trisomy 18, and sex chromosome aneuploidy    Cannot screen for open neural tube defects; maternal serum AFP after 15  weeks is recommended       Quad screen:     Maternal plasma AFP, hCG, estriol, and inhibin measurement between 15-24 weeks gestation    Provides risk assessment for fetal Down syndrome, trisomy 18, and neural tube defects     Comprehensive (Level II) ultrasound: Detailed ultrasound performed between 18-22 weeks gestation to screen for major birth defects and markers for aneuploidy.        We reviewed the benefits and limitations of this testing.  Screening tests provide a risk assessment specific to the pregnancy for certain fetal chromosome abnormalities, but cannot definitively diagnose or exclude a fetal chromosome abnormality.  Follow-up genetic counseling and consideration of diagnostic testing is recommended with any abnormal screening result.     Diagnostic tests carry inherent risks- including risk of miscarriage- that require careful consideration.  These tests can detect fetal chromosome abnormalities with greater than 99% certainty.  Results can be compromised by maternal cell contamination or mosaicism, and are limited by the resolution of cytogenetic G-banding technology.  There is no screening nor diagnostic test that can detect all forms of birth defects or mental disability.     It was a pleasure to be involved with [facility]. Face-to-face time of the meeting was 25 minutes.      Mario Alberto Anderson MS, [facility]  Licensed Genetic Counselor  Phone: [phone]  Pager: [phone]

## 2019-02-01 ENCOUNTER — OFFICE VISIT (OUTPATIENT)
Dept: OBGYN | Facility: CLINIC | Age: 29
End: 2019-02-01
Attending: MIDWIFE
Payer: COMMERCIAL

## 2019-02-01 VITALS
BODY MASS INDEX: 27.71 KG/M2 | SYSTOLIC BLOOD PRESSURE: 125 MMHG | HEART RATE: 97 BPM | WEIGHT: 151.5 LBS | DIASTOLIC BLOOD PRESSURE: 82 MMHG

## 2019-02-01 DIAGNOSIS — Z87.59 HISTORY OF IUFD: ICD-10-CM

## 2019-02-01 DIAGNOSIS — O09.90 HIGH-RISK PREGNANCY, UNSPECIFIED TRIMESTER: Primary | ICD-10-CM

## 2019-02-01 DIAGNOSIS — E55.9 VITAMIN D DEFICIENCY: ICD-10-CM

## 2019-02-01 LAB
HBA1C MFR BLD: 5.6 % (ref 0–5.6)
TSH SERPL DL<=0.005 MIU/L-ACNC: 1.25 MU/L (ref 0.4–4)

## 2019-02-01 PROCEDURE — 87591 N.GONORRHOEAE DNA AMP PROB: CPT | Performed by: MIDWIFE

## 2019-02-01 PROCEDURE — 83036 HEMOGLOBIN GLYCOSYLATED A1C: CPT | Performed by: MIDWIFE

## 2019-02-01 PROCEDURE — 36415 COLL VENOUS BLD VENIPUNCTURE: CPT | Performed by: MIDWIFE

## 2019-02-01 PROCEDURE — 84443 ASSAY THYROID STIM HORMONE: CPT | Performed by: MIDWIFE

## 2019-02-01 PROCEDURE — G0463 HOSPITAL OUTPT CLINIC VISIT: HCPCS | Mod: ZF

## 2019-02-01 PROCEDURE — 87491 CHLMYD TRACH DNA AMP PROBE: CPT | Performed by: MIDWIFE

## 2019-02-01 ASSESSMENT — PAIN SCALES - GENERAL: PAINLEVEL: NO PAIN (0)

## 2019-02-01 NOTE — NURSING NOTE
Chief Complaint   Patient presents with     Prenatal Care     14 weeks 3 days      Health Maintenance Due   Topic Date Due     INFLUENZA VACCINE (1) 09/01/2018     MATERNAL SCREENING  02/05/2019     Roxie Fishman CMA on 2/1/2019 at 11:09 AM

## 2019-02-01 NOTE — PROGRESS NOTES
SUBJECTIVE:   Pam is a 29 year old female who presents to clinic for a new OB visit.   at 14w3d with Estimated Date of Delivery: 2019 based on US confirms. Feels well. Has  started PNV.  Will start, ASA 81 mg, vit D,     She has not had bleeding since her LMP.   She has had mild nausea. Weight loss has not occurred.   This was a planned pregnancy.  Teary being in clinic triggers memories but is super happy with care, surveillance by MFM, CNMS will stay to have her baby here  She is going to [facility] loss support  Group   FOB is involved,       OTHER CONCERNS:     ===========================================   ROS: 10 point ROS neg other than the symptoms noted above in the HPI.      PSYCHIATRIC:  Denies mood changes  PHQ-9 score:    PHQ-9 SCORE 10/11/2018   PHQ-9 Total Score 6     MACI-7 SCORE 2018 2018 10/11/2018   Total Score 0 0 2         Past History:  Her past medical history   Past Medical History:   Diagnosis Date     Recurrent pregnancy loss (CODE)      Severe pre-eclampsia, postpartum condition or complication 2018   .   She has a history of  preeclampsia and history of term stillbirth   Since her last LMP she denies use of alcohol, tobacco and street drugs.  HISTORY:  No family history on file.  Social History     Socioeconomic History     Marital status:      Spouse name: Nilson     Number of children: None     Years of education: None     Highest education level: None   Social Needs     Financial resource strain: None     Food insecurity - worry: None     Food insecurity - inability: None     Transportation needs - medical: None     Transportation needs - non-medical: None   Occupational History     None   Tobacco Use     Smoking status: Never Smoker     Smokeless tobacco: Never Used   Substance and Sexual Activity     Alcohol use: No     Drug use: No     Sexual activity: Yes     Partners: Male   Other Topics Concern     None   Social History Narrative      None     Current Outpatient Medications   Medication Sig     ascorbic acid (VITAMIN C) 250 MG CHEW chewable tablet Take 1 tablet (250 mg) by mouth daily     aspirin (ASA) 81 MG tablet Take 1 tablet (81 mg) by mouth daily     Cyanocobalamin 1000 MCG LOZG Take 1 tablet by mouth daily     ferrous sulfate (IRON) 325 (65 Fe) MG tablet Take 1 tablet (325 mg) by mouth daily (with breakfast)     ibuprofen (ADVIL/MOTRIN) 200 MG tablet Take 3 tablets (600 mg) by mouth every 6 hours as needed for other (cramping)     ibuprofen (ADVIL/MOTRIN) 800 MG tablet Take 1 tablet (800 mg) by mouth every 8 hours as needed for moderate pain     Prenatal Vit-Fe Fumarate-FA (PRENATAL VITAMIN AND MINERAL) 28-0.8 MG TABS Take 1 tablet by mouth daily     progesterone (PROMETRIUM) 200 MG capsule Place 1 capsule (200 mg) vaginally 2 times daily Start using on day 21 of your menstrual cycle (first day of bleeding is day 1)     senna-docusate (JAUN-COLACE) 8.6-50 MG per tablet Take 1-2 tablets by mouth 2 times daily as needed for constipation     sennosides (SENOKOT) 8.6 MG tablet Take 1 tablet by mouth 2 times daily as needed for constipation     No current facility-administered medications for this visit.      Facility-Administered Medications Ordered in Other Visits   Medication     bupivacaine (MARCAINE) 0.125 % injection (diluted from stock concentration by MD or CRNA)     lidocaine-EPINEPHrine 1.5 %-[id number] injection     No Known Allergies    ============================================  MEDICAL HISTORY  Past Medical History:   Diagnosis Date     Recurrent pregnancy loss (CODE)      Severe pre-eclampsia, postpartum condition or complication 8/25/2018     Past Surgical History:   Procedure Laterality Date     APPENDECTOMY Right      DILATION AND CURETTAGE SUCTION N/A 9/26/2017    Procedure: DILATION AND CURETTAGE SUCTION;  Suction Dilation And Curettage ;  Surgeon: Yolanda Samaniego MD;  Location: [facility] OR     DILATION AND CURETTAGE,  HYSTEROSCOPY DIAGNOSTIC, COMBINED N/A 2017    Procedure: COMBINED DILATION AND CURETTAGE, HYSTEROSCOPY DIAGNOSTIC;  Operative Hysteroscopy, Dilation and Curettage ;  Surgeon: Eli Pickard MD;  Location: [facility] OR     GYN SURGERY  2017    suction D&C       Obstetric History       T2      L1     SAB5   TAB0   Ectopic0   Multiple0   Live Births2       # Outcome Date GA Lbr Mcihael/2nd Weight Sex Delivery Anes PTL Lv   8 Current            7 Term 18 39w3d 01:16 / 00:20 2.75 kg (6 lb 1 oz) M Vag-Spont None N ND      Name: STEPH LIRIANO      Complications: Fetal demise > 22 weeks, delivered, current hospitalization   6 SAB 17           5 Term 12/03/15 38w5d 03:45 / 01:16 3.11 kg (6 lb 13.7 oz) M Vag-Spont EPI  RAMILA      Apgar1:  9                Apgar5: 9   4 SAB            3 SAB            2 SAB            1 SAB               Obstetric Comments   2018 Severe Pre E Postpartum after term IUFD 39 weeks         GYN History-  Abnormal Pap Smears                        Cervical procedures:                         History of STI:     I personally reviewed the past social/family/medical and surgical history on the date of service.   I reviewed lab work done at Intake visit with patient.    EXAM:  /82 (BP Location: Right arm, Patient Position: Sitting, Cuff Size: Adult Regular)   Pulse 97   Wt 68.7 kg (151 lb 8 oz)   LMP 10/23/2018   BMI 27.71 kg/m     EXAM:  GENERAL:  Pleasant pregnant female, alert, cooperative  and well groomed.  SKIN:  Warm and dry, without lesions or rashes  HEAD: Symmetrical features.  NECK:  Thyroid without enlargement and nodules.  Lymph nodes not palpable.   LUNGS:  Clear to auscultation.  BREAST:    No dominant, fixed or suspicious masses are noted.  No skin or nipple changes or axillary nodes.   Nipples everted.      HEART:  RRR without murmur.  ABDOMEN: Soft without masses , tenderness or organomegaly.  No CVA tenderness.  Uterus palpable at size  equal to dates.  . Fetal heart tones present.  MUSCULOSKELETAL:  Full range of motion  EXTREMITIES:  No edema. No significant varicosities.   PELVIC EXAM:deferred pap up to date recent pelvic pt declines     GC/CHLAMYDIA CULTURE OBTAINED:YES    Lab Results   Component Value Date    PAP NIL 10/11/2018          ASSESSMENT:  29 year old , 14w3d weeks of pregnancy with BEBETO of 2019 by US confirms  Intrauterine pregnancy 14w3d size  consistent with dates  Genetic Screening: Level 2 Ultrasound only    ICD-10-CM    1. High-risk pregnancy, unspecified trimester O09.90 Chlamydia by PCR     Gonorrhorea by PCR       PLAN:  - Reviewed use of triage nurse line and contacting the on-call provider after hours for an urgent need such as fever, vagina bleeding, bladder or vaginal infection, rupture of membranes,  or term labor.    - Reviewed best evidence for: weight gain for her weight and height for pregnancy:  Based on pre-pregnancy weight of 150 and Body mass index is 27.71 kg/m . RECOMMENDED WEIGHT GAIN: 25-35 lbs.  -If BMI>=30, weight gain/exercise handout given and reviewed. BMI entered on problem list, to include patient's preferred way to reference obesity during prenatal care, with plan for possible referrals and  testing  -If BMI >=30, and has one of other risk for sleep apnea (snoring, observed apnea or hypertension) refer for sleep study.  - Reviewed healthy diet and foods to avoid; exercise and activity during pregnancy; avoiding exposure to toxoplasmosis; and maintenance of a generally healthy lifestyle.   - Discussed the harms, benefits, side effects and alternative therapies for current prescribed and OTC medications.    - Reviewed high risk for Pre Eclampsia d/t ,  agreeable to starting 81mg aspirin daily after 12 weeks now .    - All pt's  questions discussed and answered.  Pt verbalized understanding of and agreement to plan of care.     - Continue scheduled prenatal care and prn if  questions or concerns    ASHIA NagyM

## 2019-02-01 NOTE — LETTER
RE: Pam Syed  [address]  [place] [id number]     Dear Colleague,    Thank you for referring your patient, Pam Syed, to the [facility] at [facility]. Please see a copy of my visit note below.    SUBJECTIVE:   Pam is a 29 year old female who presents to clinic for a new OB visit.   at 14w3d with Estimated Date of Delivery: 2019 based on US confirms. Feels well. Has  started PNV.  Will start, ASA 81 mg, vit D,     She has not had bleeding since her LMP.   She has had mild nausea. Weight loss has not occurred.   This was a planned pregnancy.  Teary being in clinic triggers memories but is super happy with care, surveillance by MFM, CNMS will stay to have her baby here  She is going to [facility] loss support  [facility]   FOB is involved,       OTHER CONCERNS:   ===========================================  PSYCHIATRIC:  Denies mood changes  PHQ-9 score:    PHQ-9 SCORE 10/11/2018   PHQ-9 Total Score 6     MACI-7 SCORE 2018 2018 10/11/2018   Total Score 0 0 2         Past History:  Her past medical history   Past Medical History:   Diagnosis Date     Recurrent pregnancy loss (CODE)      Severe pre-eclampsia, postpartum condition or complication 2018   .   She has a history of  preeclampsia and history of term stillbirth   Since her last LMP she denies use of alcohol, tobacco and street drugs.  HISTORY:  No family history on file.  Social History     Socioeconomic History     Marital status:      Spouse name: Nilson     Number of children: None     Years of education: None     Highest education level: None   Social Needs     Financial resource strain: None     Food insecurity - worry: None     Food insecurity - inability: None     Transportation needs - medical: None     Transportation needs - non-medical: None   Occupational History     None   Tobacco Use     Smoking status: Never Smoker      Smokeless tobacco: Never Used   Substance and Sexual Activity     Alcohol use: No     Drug use: No     Sexual activity: Yes     Partners: Male   Other Topics Concern     None   Social History Narrative     None     Current Outpatient Medications   Medication Sig     ascorbic acid (VITAMIN C) 250 MG CHEW chewable tablet Take 1 tablet (250 mg) by mouth daily     aspirin (ASA) 81 MG tablet Take 1 tablet (81 mg) by mouth daily     Cyanocobalamin 1000 MCG LOZG Take 1 tablet by mouth daily     ferrous sulfate (IRON) 325 (65 Fe) MG tablet Take 1 tablet (325 mg) by mouth daily (with breakfast)     ibuprofen (ADVIL/MOTRIN) 200 MG tablet Take 3 tablets (600 mg) by mouth every 6 hours as needed for other (cramping)     ibuprofen (ADVIL/MOTRIN) 800 MG tablet Take 1 tablet (800 mg) by mouth every 8 hours as needed for moderate pain     Prenatal Vit-Fe Fumarate-FA (PRENATAL VITAMIN AND MINERAL) 28-0.8 MG TABS Take 1 tablet by mouth daily     progesterone (PROMETRIUM) 200 MG capsule Place 1 capsule (200 mg) vaginally 2 times daily Start using on day 21 of your menstrual cycle (first day of bleeding is day 1)     senna-docusate (JAUN-COLACE) 8.6-50 MG per tablet Take 1-2 tablets by mouth 2 times daily as needed for constipation     sennosides (SENOKOT) 8.6 MG tablet Take 1 tablet by mouth 2 times daily as needed for constipation     No current facility-administered medications for this visit.      Facility-Administered Medications Ordered in Other Visits   Medication     bupivacaine (MARCAINE) 0.125 % injection (diluted from stock concentration by MD or CRNA)     lidocaine-EPINEPHrine 1.5 %-[id number] injection     No Known Allergies    ============================================  MEDICAL HISTORY  Past Medical History:   Diagnosis Date     Recurrent pregnancy loss (CODE)      Severe pre-eclampsia, postpartum condition or complication 8/25/2018     Past Surgical History:   Procedure Laterality Date     APPENDECTOMY Right       DILATION AND CURETTAGE SUCTION N/A 2017    Procedure: DILATION AND CURETTAGE SUCTION;  Suction Dilation And Curettage ;  Surgeon: Yolanda Samaniego MD;  [facility]     DILATION AND CURETTAGE, HYSTEROSCOPY DIAGNOSTIC, COMBINED N/A 2017    Procedure: COMBINED DILATION AND CURETTAGE, HYSTEROSCOPY DIAGNOSTIC;  Operative Hysteroscopy, Dilation and Curettage ;  Surgeon: Eli Pickard MD;  Location: [facility] OR     GYN SURGERY  2017    suction D&C       Obstetric History       T2      L1     SAB5   TAB0   Ectopic0   Multiple0   Live Births2       # Outcome Date GA Lbr Michael/2nd Weight Sex Delivery Anes PTL Lv   8 Current            7 Term 18 39w3d 01:16 / 00:20 2.75 kg (6 lb 1 oz) M Vag-Spont None N ND      Name: STEPH LIRIANO      Complications: Fetal demise > 22 weeks, delivered, current hospitalization   6 SAB 17           5 Term 12/03/15 38w5d 03:45 / 01:16 3.11 kg (6 lb 13.7 oz) M Vag-Spont EPI  RAMILA      Apgar1:  9                Apgar5: 9   4 SAB            3 SAB            2 SAB            1 SAB               Obstetric Comments   2018 Severe Pre E Postpartum after term IUFD 39 weeks     GYN History-  Abnormal Pap Smears                        Cervical procedures:                         History of STI:     I personally reviewed the past social/family/medical and surgical history on the date of service.   I reviewed lab work done at Intake visit with patient.    EXAM:  /82 (BP Location: Right arm, Patient Position: Sitting, Cuff Size: Adult Regular)   Pulse 97   Wt 68.7 kg (151 lb 8 oz)   LMP 10/23/2018   BMI 27.71 kg/m      EXAM:  GENERAL:  Pleasant pregnant female, alert, cooperative  and well groomed.  SKIN:  Warm and dry, without lesions or rashes  HEAD: Symmetrical features.  NECK:  Thyroid without enlargement and nodules.  Lymph nodes not palpable.   LUNGS:  Clear to auscultation.  BREAST:    No dominant, fixed or suspicious masses are noted.   No skin or nipple changes or axillary nodes.   Nipples everted.      HEART:  RRR without murmur.  ABDOMEN: Soft without masses , tenderness or organomegaly.  No CVA tenderness.  Uterus palpable at size equal to dates.  . Fetal heart tones present.  MUSCULOSKELETAL:  Full range of motion  EXTREMITIES:  No edema. No significant varicosities.   PELVIC EXAM:deferred pap up to date recent pelvic pt declines     GC/CHLAMYDIA CULTURE OBTAINED:YES    Lab Results   Component Value Date    PAP NIL 10/11/2018          ASSESSMENT:  29 year old , 14w3d weeks of pregnancy with BEBETO of 2019 by US confirms  Intrauterine pregnancy 14w3d size  consistent with dates  Genetic Screening: Level 2 Ultrasound only    ICD-10-CM    1. High-risk pregnancy, unspecified trimester O09.90 Chlamydia by PCR     Gonorrhorea by PCR       PLAN:  - Reviewed use of triage nurse line and contacting the on-call provider after hours for an urgent need such as fever, vagina bleeding, bladder or vaginal infection, rupture of membranes,  or term labor.    - Reviewed best evidence for: weight gain for her weight and height for pregnancy:  Based on pre-pregnancy weight of 150 and Body mass index is 27.71 kg/m . RECOMMENDED WEIGHT GAIN: 25-35 lbs.  -If BMI>=30, weight gain/exercise handout given and reviewed. BMI entered on problem list, to include patient's preferred way to reference obesity during prenatal care, with plan for possible referrals and  testing  -If BMI >=30, and has one of other risk for sleep apnea (snoring, observed apnea or hypertension) refer for sleep study.  - Reviewed healthy diet and foods to avoid; exercise and activity during pregnancy; avoiding exposure to toxoplasmosis; and maintenance of a generally healthy lifestyle.   - Discussed the harms, benefits, side effects and alternative therapies for current prescribed and OTC medications.    - Reviewed high risk for Pre Eclampsia d/t ,  agreeable to starting  81mg aspirin daily after 12 weeks now .    - All pt's  questions discussed and answered.  Pt verbalized understanding of and agreement to plan of care.     - Continue scheduled prenatal care and prn if questions or concerns    ASHIA Nagy CNM

## 2019-02-01 NOTE — LETTER
2/8/2019         Pam Syed   [address]  [place] [address]        Dear Ms. Syed:    The results of your recent test(s) listed below were normal.     Results for orders placed or performed in visit on 02/01/19   TSH with free T4 reflex   Result Value Ref Range    TSH 1.25 0.40 - 4.00 mU/L   Hgb A1c   Result Value Ref Range    Hemoglobin A1C 5.6 0 - 5.6 %   Chlamydia by PCR   Result Value Ref Range    Specimen Description Urine     Chlamydia Trachomatis PCR Negative NEG^Negative   Gonorrhorea by PCR   Result Value Ref Range    Specimen Descrip Urine     N Gonorrhea PCR Negative NEG^Negative         Please note that test explanations are brief and do not reflect all diagnostic uses.  If you have any questions or concerns, please call the clinic at [phone].      Sincerely,      Jade Boles CNM

## 2019-02-22 ENCOUNTER — OFFICE VISIT (OUTPATIENT)
Dept: OBGYN | Facility: CLINIC | Age: 29
End: 2019-02-22
Attending: ADVANCED PRACTICE MIDWIFE
Payer: COMMERCIAL

## 2019-02-22 VITALS
WEIGHT: 154.2 LBS | SYSTOLIC BLOOD PRESSURE: 123 MMHG | DIASTOLIC BLOOD PRESSURE: 79 MMHG | HEART RATE: 88 BPM | BODY MASS INDEX: 28.37 KG/M2 | HEIGHT: 62 IN

## 2019-02-22 DIAGNOSIS — O09.90 HIGH-RISK PREGNANCY, UNSPECIFIED TRIMESTER: Primary | ICD-10-CM

## 2019-02-22 LAB
ALBUMIN UR-MCNC: NEGATIVE MG/DL
APPEARANCE UR: CLEAR
BILIRUB UR QL STRIP: NEGATIVE
COLOR UR AUTO: YELLOW
GLUCOSE UR STRIP-MCNC: NEGATIVE MG/DL
HGB UR QL STRIP: NEGATIVE
KETONES UR STRIP-MCNC: NEGATIVE MG/DL
LEUKOCYTE ESTERASE UR QL STRIP: NEGATIVE
NITRATE UR QL: NEGATIVE
PH UR STRIP: 5.5 PH (ref 5–7)
SP GR UR STRIP: 1.02 (ref 1–1.03)
UROBILINOGEN UR STRIP-ACNC: 0.2 EU/DL (ref 0.2–1)

## 2019-02-22 PROCEDURE — G0463 HOSPITAL OUTPT CLINIC VISIT: HCPCS

## 2019-02-22 PROCEDURE — 87086 URINE CULTURE/COLONY COUNT: CPT | Performed by: ADVANCED PRACTICE MIDWIFE

## 2019-02-22 PROCEDURE — 81003 URINALYSIS AUTO W/O SCOPE: CPT

## 2019-02-22 RX ORDER — SENNA AND DOCUSATE SODIUM 50; 8.6 MG/1; MG/1
1 TABLET, FILM COATED ORAL AT BEDTIME
Qty: 90 TABLET | Refills: 3 | Status: SHIPPED | OUTPATIENT
Start: 2019-02-22 | End: 2021-10-05

## 2019-02-22 ASSESSMENT — PAIN SCALES - GENERAL: PAINLEVEL: NO PAIN (0)

## 2019-02-22 ASSESSMENT — MIFFLIN-ST. JEOR: SCORE: 1367.83

## 2019-02-22 NOTE — PROGRESS NOTES
"Subjective:     29 year old  at 17w3d presents for a routine prenatal appointment.      Denies vaginal bleeding or leakage of fluid.  Denies contractions or cramping.  + fetal movement.       No HA, visual changes, RUQ or epigastric pain.   Level II US  Scheduled.   The patient presents with the following concerns:   - increased urinary urgency, no dysuria; no discharge or itching   - occ constipation, would like stool softener   - overall feels she is doing well, mood-wise, but does get nervous about pregnancy. Tearful during visit.      Objective:  Vitals:    19 1117   BP: 123/79   Pulse: 88   Weight: 69.9 kg (154 lb 3.2 oz)   Height: 1.575 m (5' 2.01\")   See OB flowsheet    Assessment/Plan:  Encounter Diagnosis   Name Primary?     High-risk pregnancy - [facility] CNM Yes     Orders Placed This Encounter   Procedures     Clinitek Urine Macroscopic POCT     Orders Placed This Encounter   Medications     SENNA-docusate sodium (SENNA S) 8.6-50 MG tablet     Sig: Take 1 tablet by mouth At Bedtime     Dispense:  90 tablet     Refill:  3     - Reviewed total weight gain, encouraged continued healthy diet and exercise.      - Reviewed why/how to contact provider.   - Patient education/orders or handouts today: PTL signs/symptoms, Fetal movement and Level 2 u/s scheduled   - UA/UC collected today  - Level II scheduled next week  - Return to clinic in 3 weeks and prn if questions or concerns.   "

## 2019-02-22 NOTE — LETTER
"2019       RE: Pam Syed  [address]  [facility] [place] [address]     Dear Colleague,    Thank you for referring your patient, Pam Syed, to the [facility] at [facility]. Please see a copy of my visit note below.    Subjective:     29 year old  at 17w3d presents for a routine prenatal appointment.      Denies vaginal bleeding or leakage of fluid.  Denies contractions or cramping.  + fetal movement.       No HA, visual changes, RUQ or epigastric pain.   Level II US  Scheduled.   The patient presents with the following concerns:   - increased urinary urgency, no dysuria; no discharge or itching   - occ constipation, would like stool softener   - overall feels she is doing well, mood-wise, but does get nervous about pregnancy. Tearful during visit.      Objective:  Vitals:    19 1117   BP: 123/79   Pulse: 88   Weight: 69.9 kg (154 lb 3.2 oz)   Height: 1.575 m (5' 2.01\")   See OB flowsheet    Assessment/Plan:  Encounter Diagnosis   Name Primary?     High-risk pregnancy - [facility] CNM Yes     Orders Placed This Encounter   Procedures     Clinitek Urine Macroscopic POCT     Orders Placed This Encounter   Medications     SENNA-docusate sodium (SENNA S) 8.6-50 MG tablet     Sig: Take 1 tablet by mouth At Bedtime     Dispense:  90 tablet     Refill:  3     - Reviewed total weight gain, encouraged continued healthy diet and exercise.      - Reviewed why/how to contact provider.   - Patient education/orders or handouts today: PTL signs/symptoms, Fetal movement and Level 2 u/s scheduled   - UA/UC collected today  - Level II scheduled next week  - Return to clinic in 3 weeks and prn if questions or concerns.     Again, thank you for allowing me to participate in the care of your patient.      Sincerely,    ASHIA Gipson CNM      "

## 2019-02-22 NOTE — NURSING NOTE
Chief Complaint   Patient presents with     Prenatal Care   17.3 weeks  Ob visit for 2 days urianry urgency

## 2019-02-23 LAB
BACTERIA SPEC CULT: NORMAL
Lab: NORMAL
SPECIMEN SOURCE: NORMAL

## 2019-03-01 ENCOUNTER — HOSPITAL ENCOUNTER (OUTPATIENT)
Dept: ULTRASOUND IMAGING | Facility: CLINIC | Age: 29
Discharge: HOME | End: 2019-03-01
Attending: OBSTETRICS & GYNECOLOGY | Admitting: OBSTETRICS & GYNECOLOGY
Payer: COMMERCIAL

## 2019-03-01 ENCOUNTER — OFFICE VISIT (OUTPATIENT)
Dept: MATERNAL FETAL MEDICINE | Facility: CLINIC | Age: 29
End: 2019-03-01
Attending: OBSTETRICS & GYNECOLOGY
Payer: COMMERCIAL

## 2019-03-01 DIAGNOSIS — O26.20 RECURRENT PREGNANCY LOSS WITH CURRENT PREGNANCY: ICD-10-CM

## 2019-03-01 DIAGNOSIS — Z87.59 HISTORY OF IUFD: Primary | ICD-10-CM

## 2019-03-01 DIAGNOSIS — O35.9XX0 SUSPECTED FETAL ANOMALY, ANTEPARTUM, SINGLE OR UNSPECIFIED FETUS: ICD-10-CM

## 2019-03-01 PROCEDURE — 76811 OB US DETAILED SNGL FETUS: CPT

## 2019-03-22 ENCOUNTER — HOSPITAL ENCOUNTER (OUTPATIENT)
Dept: ULTRASOUND IMAGING | Facility: CLINIC | Age: 29
Discharge: HOME | End: 2019-03-22
Attending: OBSTETRICS & GYNECOLOGY | Admitting: OBSTETRICS & GYNECOLOGY
Payer: COMMERCIAL

## 2019-03-22 ENCOUNTER — ANESTHESIA EVENT (OUTPATIENT)
Dept: OBGYN | Facility: CLINIC | Age: 29
DRG: 819 | End: 2019-03-22
Payer: COMMERCIAL

## 2019-03-22 ENCOUNTER — HOSPITAL ENCOUNTER (INPATIENT)
Facility: CLINIC | Age: 29
LOS: 1 days | Discharge: HOME | DRG: 819 | End: 2019-03-23
Attending: OBSTETRICS & GYNECOLOGY | Admitting: OBSTETRICS & GYNECOLOGY
Payer: COMMERCIAL

## 2019-03-22 ENCOUNTER — ANESTHESIA EVENT (OUTPATIENT)
Dept: SURGERY | Facility: CLINIC | Age: 29
DRG: 819 | End: 2019-03-22
Payer: COMMERCIAL

## 2019-03-22 ENCOUNTER — ANESTHESIA (OUTPATIENT)
Dept: OBGYN | Facility: CLINIC | Age: 29
DRG: 819 | End: 2019-03-22
Payer: COMMERCIAL

## 2019-03-22 ENCOUNTER — OFFICE VISIT (OUTPATIENT)
Dept: MATERNAL FETAL MEDICINE | Facility: CLINIC | Age: 29
End: 2019-03-22
Attending: OBSTETRICS & GYNECOLOGY
Payer: COMMERCIAL

## 2019-03-22 ENCOUNTER — ANESTHESIA (OUTPATIENT)
Dept: SURGERY | Facility: CLINIC | Age: 29
DRG: 819 | End: 2019-03-22
Payer: COMMERCIAL

## 2019-03-22 DIAGNOSIS — O35.9XX0 SUSPECTED FETAL ANOMALY, ANTEPARTUM, SINGLE OR UNSPECIFIED FETUS: ICD-10-CM

## 2019-03-22 DIAGNOSIS — O26.872 CERVICAL SHORTENING AFFECTING PREGNANCY IN SECOND TRIMESTER: Primary | ICD-10-CM

## 2019-03-22 PROBLEM — O34.30 CERVICAL CERCLAGE SUTURE PRESENT: Status: ACTIVE | Noted: 2019-03-22

## 2019-03-22 LAB
ABO + RH BLD: NORMAL
ABO + RH BLD: NORMAL
ALBUMIN UR-MCNC: NEGATIVE MG/DL
APPEARANCE UR: CLEAR
BASOPHILS # BLD AUTO: 0 10E9/L (ref 0–0.2)
BASOPHILS NFR BLD AUTO: 0.1 %
BILIRUB UR QL STRIP: NEGATIVE
BLD GP AB SCN SERPL QL: NORMAL
BLOOD BANK CMNT PATIENT-IMP: NORMAL
COLOR UR AUTO: YELLOW
DIFFERENTIAL METHOD BLD: ABNORMAL
EOSINOPHIL # BLD AUTO: 0 10E9/L (ref 0–0.7)
EOSINOPHIL NFR BLD AUTO: 0.4 %
ERYTHROCYTE [DISTWIDTH] IN BLOOD BY AUTOMATED COUNT: 13 % (ref 10–15)
GLUCOSE UR STRIP-MCNC: NEGATIVE MG/DL
HCT VFR BLD AUTO: 30.4 % (ref 35–47)
HGB BLD-MCNC: 10.1 G/DL (ref 11.7–15.7)
HGB UR QL STRIP: NEGATIVE
IMM GRANULOCYTES # BLD: 0 10E9/L (ref 0–0.4)
IMM GRANULOCYTES NFR BLD: 0.2 %
KETONES UR STRIP-MCNC: 40 MG/DL
LEUKOCYTE ESTERASE UR QL STRIP: NEGATIVE
LYMPHOCYTES # BLD AUTO: 1.4 10E9/L (ref 0.8–5.3)
LYMPHOCYTES NFR BLD AUTO: 13.4 %
MCH RBC QN AUTO: 29.2 PG (ref 26.5–33)
MCHC RBC AUTO-ENTMCNC: 33.2 G/DL (ref 31.5–36.5)
MCV RBC AUTO: 88 FL (ref 78–100)
MONOCYTES # BLD AUTO: 0.6 10E9/L (ref 0–1.3)
MONOCYTES NFR BLD AUTO: 5.7 %
MUCOUS THREADS #/AREA URNS LPF: PRESENT /LPF
NEUTROPHILS # BLD AUTO: 8.4 10E9/L (ref 1.6–8.3)
NEUTROPHILS NFR BLD AUTO: 80.2 %
NITRATE UR QL: NEGATIVE
NRBC # BLD AUTO: 0 10*3/UL
NRBC BLD AUTO-RTO: 0 /100
PH UR STRIP: 6.5 PH (ref 5–7)
PLATELET # BLD AUTO: 155 10E9/L (ref 150–450)
RBC # BLD AUTO: 3.46 10E12/L (ref 3.8–5.2)
RBC #/AREA URNS AUTO: 0 /HPF (ref 0–2)
SOURCE: ABNORMAL
SP GR UR STRIP: 1.01 (ref 1–1.03)
SPECIMEN EXP DATE BLD: NORMAL
SPECIMEN SOURCE: NORMAL
SQUAMOUS #/AREA URNS AUTO: 1 /HPF (ref 0–1)
UROBILINOGEN UR STRIP-MCNC: NORMAL MG/DL (ref 0–2)
WBC # BLD AUTO: 10.4 10E9/L (ref 4–11)
WBC #/AREA URNS AUTO: <1 /HPF (ref 0–5)
WET PREP SPEC: NORMAL

## 2019-03-22 PROCEDURE — 0UVC7ZZ RESTRICTION OF CERVIX, VIA NATURAL OR ARTIFICIAL OPENING: ICD-10-PCS | Performed by: OBSTETRICS & GYNECOLOGY

## 2019-03-22 PROCEDURE — 85025 COMPLETE CBC W/AUTO DIFF WBC: CPT | Performed by: STUDENT IN AN ORGANIZED HEALTH CARE EDUCATION/TRAINING PROGRAM

## 2019-03-22 PROCEDURE — 25800030 ZZH RX IP 258 OP 636: Performed by: STUDENT IN AN ORGANIZED HEALTH CARE EDUCATION/TRAINING PROGRAM

## 2019-03-22 PROCEDURE — 76817 TRANSVAGINAL US OBSTETRIC: CPT | Performed by: OBSTETRICS & GYNECOLOGY

## 2019-03-22 PROCEDURE — 37000008 ZZH ANESTHESIA TECHNICAL FEE, 1ST 30 MIN: Performed by: OBSTETRICS & GYNECOLOGY

## 2019-03-22 PROCEDURE — 36000051 ZZH SURGERY LEVEL 2 1ST 30 MIN - UMMC: Performed by: OBSTETRICS & GYNECOLOGY

## 2019-03-22 PROCEDURE — 86900 BLOOD TYPING SEROLOGIC ABO: CPT | Performed by: STUDENT IN AN ORGANIZED HEALTH CARE EDUCATION/TRAINING PROGRAM

## 2019-03-22 PROCEDURE — 25800030 ZZH RX IP 258 OP 636: Performed by: NURSE ANESTHETIST, CERTIFIED REGISTERED

## 2019-03-22 PROCEDURE — 25000132 ZZH RX MED GY IP 250 OP 250 PS 637: Performed by: INTERNAL MEDICINE

## 2019-03-22 PROCEDURE — 40000068 ZZH STATISTIC EVAL-PTS ADMITTED TO OTHER DEPTS: Mod: ZF

## 2019-03-22 PROCEDURE — 40000170 ZZH STATISTIC PRE-PROCEDURE ASSESSMENT II: Performed by: OBSTETRICS & GYNECOLOGY

## 2019-03-22 PROCEDURE — 12000001 ZZH R&B MED SURG/OB UMMC

## 2019-03-22 PROCEDURE — 25000128 H RX IP 250 OP 636: Performed by: NURSE ANESTHETIST, CERTIFIED REGISTERED

## 2019-03-22 PROCEDURE — 27210794 ZZH OR GENERAL SUPPLY STERILE: Performed by: OBSTETRICS & GYNECOLOGY

## 2019-03-22 PROCEDURE — 25000128 H RX IP 250 OP 636: Performed by: OBSTETRICS & GYNECOLOGY

## 2019-03-22 PROCEDURE — 86850 RBC ANTIBODY SCREEN: CPT | Performed by: STUDENT IN AN ORGANIZED HEALTH CARE EDUCATION/TRAINING PROGRAM

## 2019-03-22 PROCEDURE — 36000053 ZZH SURGERY LEVEL 2 EA 15 ADDTL MIN - UMMC: Performed by: OBSTETRICS & GYNECOLOGY

## 2019-03-22 PROCEDURE — 76816 OB US FOLLOW-UP PER FETUS: CPT

## 2019-03-22 PROCEDURE — 71000014 ZZH RECOVERY PHASE 1 LEVEL 2 FIRST HR: Performed by: OBSTETRICS & GYNECOLOGY

## 2019-03-22 PROCEDURE — 87210 SMEAR WET MOUNT SALINE/INK: CPT | Performed by: OBSTETRICS & GYNECOLOGY

## 2019-03-22 PROCEDURE — 71000015 ZZH RECOVERY PHASE 1 LEVEL 2 EA ADDTL HR: Performed by: OBSTETRICS & GYNECOLOGY

## 2019-03-22 PROCEDURE — 37000009 ZZH ANESTHESIA TECHNICAL FEE, EACH ADDTL 15 MIN: Performed by: OBSTETRICS & GYNECOLOGY

## 2019-03-22 PROCEDURE — 25000128 H RX IP 250 OP 636: Performed by: ANESTHESIOLOGY

## 2019-03-22 PROCEDURE — 81001 URINALYSIS AUTO W/SCOPE: CPT | Performed by: STUDENT IN AN ORGANIZED HEALTH CARE EDUCATION/TRAINING PROGRAM

## 2019-03-22 PROCEDURE — 86901 BLOOD TYPING SEROLOGIC RH(D): CPT | Performed by: STUDENT IN AN ORGANIZED HEALTH CARE EDUCATION/TRAINING PROGRAM

## 2019-03-22 PROCEDURE — 27211024 ZZHC OR SUPPLY OTHER OPNP: Performed by: OBSTETRICS & GYNECOLOGY

## 2019-03-22 PROCEDURE — 25000132 ZZH RX MED GY IP 250 OP 250 PS 637: Performed by: STUDENT IN AN ORGANIZED HEALTH CARE EDUCATION/TRAINING PROGRAM

## 2019-03-22 RX ORDER — POLYETHYLENE GLYCOL 3350 17 G/17G
17 POWDER, FOR SOLUTION ORAL DAILY
Status: DISCONTINUED | OUTPATIENT
Start: 2019-03-22 | End: 2019-03-23 | Stop reason: HOSPADM

## 2019-03-22 RX ORDER — INDOMETHACIN 25 MG/1
25 CAPSULE ORAL EVERY 6 HOURS
Status: DISCONTINUED | OUTPATIENT
Start: 2019-03-23 | End: 2019-03-22

## 2019-03-22 RX ORDER — CEFAZOLIN SODIUM 1 G/3ML
1 INJECTION, POWDER, FOR SOLUTION INTRAMUSCULAR; INTRAVENOUS ONCE
Status: COMPLETED | OUTPATIENT
Start: 2019-03-22 | End: 2019-03-22

## 2019-03-22 RX ORDER — INDOMETHACIN 25 MG/1
25 CAPSULE ORAL EVERY 6 HOURS
Status: DISCONTINUED | OUTPATIENT
Start: 2019-03-23 | End: 2019-03-23 | Stop reason: HOSPADM

## 2019-03-22 RX ORDER — INDOMETHACIN 25 MG/1
50 CAPSULE ORAL ONCE
Status: COMPLETED | OUTPATIENT
Start: 2019-03-22 | End: 2019-03-22

## 2019-03-22 RX ORDER — CITRIC ACID/SODIUM CITRATE 334-500MG
SOLUTION, ORAL ORAL
Status: DISCONTINUED
Start: 2019-03-22 | End: 2019-03-22 | Stop reason: HOSPADM

## 2019-03-22 RX ORDER — ACETAMINOPHEN 325 MG/1
325-650 TABLET ORAL EVERY 4 HOURS PRN
Status: DISCONTINUED | OUTPATIENT
Start: 2019-03-22 | End: 2019-03-23 | Stop reason: HOSPADM

## 2019-03-22 RX ORDER — AMOXICILLIN 250 MG
1-2 CAPSULE ORAL AT BEDTIME
Status: DISCONTINUED | OUTPATIENT
Start: 2019-03-22 | End: 2019-03-23 | Stop reason: HOSPADM

## 2019-03-22 RX ORDER — SODIUM CHLORIDE, SODIUM LACTATE, POTASSIUM CHLORIDE, CALCIUM CHLORIDE 600; 310; 30; 20 MG/100ML; MG/100ML; MG/100ML; MG/100ML
INJECTION, SOLUTION INTRAVENOUS CONTINUOUS
Status: DISCONTINUED | OUTPATIENT
Start: 2019-03-22 | End: 2019-03-23 | Stop reason: HOSPADM

## 2019-03-22 RX ORDER — CHLOROPROCAINE HYDROCHLORIDE 30 MG/ML
INJECTION, SOLUTION EPIDURAL; INFILTRATION; INTRACAUDAL; PERINEURAL PRN
Status: DISCONTINUED | OUTPATIENT
Start: 2019-03-22 | End: 2019-03-22

## 2019-03-22 RX ADMIN — CEFAZOLIN 1 G: 1 INJECTION, POWDER, FOR SOLUTION INTRAMUSCULAR; INTRAVENOUS at 16:23

## 2019-03-22 RX ADMIN — SODIUM CHLORIDE, POTASSIUM CHLORIDE, SODIUM LACTATE AND CALCIUM CHLORIDE: 600; 310; 30; 20 INJECTION, SOLUTION INTRAVENOUS at 14:43

## 2019-03-22 RX ADMIN — PHENYLEPHRINE HYDROCHLORIDE 0.3 MCG/KG/MIN: 10 INJECTION, SOLUTION INTRAMUSCULAR; INTRAVENOUS; SUBCUTANEOUS at 17:19

## 2019-03-22 RX ADMIN — INDOMETHACIN 50 MG: 25 CAPSULE ORAL at 18:43

## 2019-03-22 RX ADMIN — POLYETHYLENE GLYCOL 3350 17 G: 17 POWDER, FOR SOLUTION ORAL at 20:21

## 2019-03-22 RX ADMIN — CHLOROPROCAINE HYDROCHLORIDE 1.6 ML: 30 INJECTION, SOLUTION EPIDURAL; INFILTRATION; INTRACAUDAL; PERINEURAL at 17:17

## 2019-03-22 RX ADMIN — SENNOSIDES AND DOCUSATE SODIUM 2 TABLET: 8.6; 5 TABLET ORAL at 22:20

## 2019-03-22 RX ADMIN — SODIUM CHLORIDE, POTASSIUM CHLORIDE, SODIUM LACTATE AND CALCIUM CHLORIDE: 600; 310; 30; 20 INJECTION, SOLUTION INTRAVENOUS at 17:04

## 2019-03-22 RX ADMIN — ACETAMINOPHEN 650 MG: 325 TABLET, FILM COATED ORAL at 19:38

## 2019-03-22 ASSESSMENT — MIFFLIN-ST. JEOR: SCORE: 1384.94

## 2019-03-22 NOTE — ANESTHESIA PREPROCEDURE EVALUATION
Anesthesia Pre-Procedure Evaluation    Patient: Pam Syed   MRN:     [id number] Gender:   female   Age:    29 year old :      1990        Preoperative Diagnosis: * No pre-op diagnosis entered *   * No procedures listed *     Past Medical History:   Diagnosis Date     Recurrent pregnancy loss (CODE)      Severe pre-eclampsia, postpartum condition or complication 2018      Past Surgical History:   Procedure Laterality Date     APPENDECTOMY Right      DILATION AND CURETTAGE SUCTION N/A 2017    Procedure: DILATION AND CURETTAGE SUCTION;  Suction Dilation And Curettage ;  Surgeon: Yolanda Samaniego MD;  [facility]     DILATION AND CURETTAGE, HYSTEROSCOPY DIAGNOSTIC, COMBINED N/A 2017    Procedure: COMBINED DILATION AND CURETTAGE, HYSTEROSCOPY DIAGNOSTIC;  Operative Hysteroscopy, Dilation and Curettage ;  Surgeon: Eli Pickard MD;  [facility]     GYN SURGERY  2017    suction D&C          Anesthesia Evaluation     . Pt has had prior anesthetic. Type: MAC and Regional    No history of anesthetic complications          ROS/MED HX    ENT/Pulmonary:  - neg pulmonary ROS     Neurologic:  - neg neurologic ROS     Cardiovascular:         METS/Exercise Tolerance:     Hematologic:  - neg hematologic  ROS       Musculoskeletal:  - neg musculoskeletal ROS       GI/Hepatic:     (+) GERD       Renal/Genitourinary:  - ROS Renal section negative       Endo:  - neg endo ROS       Psychiatric:  - neg psychiatric ROS       Infectious Disease:  - neg infectious disease ROS       Malignancy:      - no malignancy   Other:    (+) C-spine cleared: N/A, no H/O Chronic Pain,no other significant disability                    JZG FV AN PHYSICAL EXAM    Lab Results   Component Value Date    WBC 6.9 2019    HGB 11.7 2019    HCT 33.9 (L) 2019     2019     2019    POTASSIUM 3.5 2019    CHLORIDE 105 2019    CO2 23 2019    BUN 9  "01/11/2019    CR 0.47 (L) 01/11/2019    GLC 80 01/11/2019    JHON 8.7 01/11/2019    MAG 5.8 (H) 08/26/2018    ALBUMIN 2.3 (L) 08/25/2018    PROTTOTAL 5.8 (L) 08/25/2018    ALT 12 01/11/2019    AST 17 01/11/2019    ALKPHOS 111 08/25/2018    BILITOTAL 0.4 08/25/2018    PTT 28 08/23/2018    INR 0.89 08/25/2018    FIBR 424 (H) 05/02/2018    TSH 1.25 02/01/2019    HCG Negative 11/08/2017       Preop Vitals  BP Readings from Last 3 Encounters:   02/22/19 123/79   02/01/19 125/82   01/11/19 139/85    Pulse Readings from Last 3 Encounters:   02/22/19 88   02/01/19 97   01/11/19 84      Resp Readings from Last 3 Encounters:   08/27/18 18   08/24/18 18   05/21/18 18    SpO2 Readings from Last 3 Encounters:   08/26/18 100%   05/21/18 100%   05/19/18 98%      Temp Readings from Last 1 Encounters:   08/27/18 37.1  C (98.8  F) (Oral)    Ht Readings from Last 1 Encounters:   02/22/19 1.575 m (5' 2.01\")      Wt Readings from Last 1 Encounters:   02/22/19 69.9 kg (154 lb 3.2 oz)    Estimated body mass index is 28.2 kg/m  as calculated from the following:    Height as of 2/22/19: 1.575 m (5' 2.01\").    Weight as of 2/22/19: 69.9 kg (154 lb 3.2 oz).     LDA:            Assessment:   ASA SCORE: 2    NPO Status: Increased aspiration risk   Documentation: H&P complete; Preop Testing complete; Consents complete   Proceeding: Proceed without further delay  Tobacco Use:  NO Active use of Tobacco/UNKNOWN Tobacco use status     Plan:   Anes. Type:  Regional     RA-Location/Type: Spinal   Pre-Induction: Acetaminophen PO   Induction:  IV (Standard)      Access/Monitoring: PIV   Maintenance: Balanced   Emergence: Procedure Site   Logistics: Same Day Surgery     Postop Pain/Sedation Strategy:  Standard-Options: Opioids PRN     PONV Management:  Adult Risk Factors: Female, Non-Smoker, Postop Opioids                         Imelda Black MD  "

## 2019-03-22 NOTE — ANESTHESIA CARE TRANSFER NOTE
Patient: Pam Syed    Procedure(s):  CERCLAGE CERVICAL    Diagnosis: See H&P  Diagnosis Additional Information: No value filed.    Anesthesia Type:   No value filed.     Note:  Airway :Room Air  Patient transferred to:Labor and Delivery  Handoff Report: Identifed the Patient, Identified the Reponsible Provider, Reviewed the pertinent medical history, Discussed the surgical course, Reviewed Intra-OP anesthesia mangement and issues during anesthesia, Set expectations for post-procedure period and Allowed opportunity for questions and acknowledgement of understanding      Vitals: (Last set prior to Anesthesia Care Transfer)    CRNA VITALS  3/22/2019 1723 - 3/22/2019 1801      3/22/2019             Pulse:  76    Ht Rate:  80    SpO2:  100 %                Electronically Signed By: ASHIA Sterling CRNA  March 22, 2019  6:01 PM

## 2019-03-22 NOTE — ANESTHESIA PREPROCEDURE EVALUATION
Anesthesia Pre-Procedure Evaluation    Patient: Pam Syed   MRN:     [id number] Gender:   female   Age:    29 year old :      1990        Preoperative Diagnosis: See H&P   Procedure(s):  CERCLAGE CERVICAL     Past Medical History:   Diagnosis Date     Recurrent pregnancy loss (CODE)      Severe pre-eclampsia, postpartum condition or complication 2018      Past Surgical History:   Procedure Laterality Date     APPENDECTOMY Right      DILATION AND CURETTAGE SUCTION N/A 2017    Procedure: DILATION AND CURETTAGE SUCTION;  Suction Dilation And Curettage ;  Surgeon: Yolanda Samaniego MD;  [facility]     DILATION AND CURETTAGE, HYSTEROSCOPY DIAGNOSTIC, COMBINED N/A 2017    Procedure: COMBINED DILATION AND CURETTAGE, HYSTEROSCOPY DIAGNOSTIC;  Operative Hysteroscopy, Dilation and Curettage ;  Surgeon: Eli Pickard MD;  [facility]     GYN SURGERY  2017    suction D&C               JZG FV AN PHYSICAL EXAM    Lab Results   Component Value Date    WBC 10.4 2019    HGB 10.1 (L) 2019    HCT 30.4 (L) 2019     2019     2019    POTASSIUM 3.5 2019    CHLORIDE 105 2019    CO2 23 2019    BUN 9 2019    CR 0.47 (L) 2019    GLC 80 2019    JHON 8.7 2019    MAG 5.8 (H) 2018    ALBUMIN 2.3 (L) 2018    PROTTOTAL 5.8 (L) 2018    ALT 12 2019    AST 17 2019    ALKPHOS 111 2018    BILITOTAL 0.4 2018    PTT 28 2018    INR 0.89 2018    FIBR 424 (H) 2018    TSH 1.25 2019    HCG Negative 2017       Preop Vitals  BP Readings from Last 3 Encounters:   19 98/62   19 123/79   19 125/82    Pulse Readings from Last 3 Encounters:   19 95   19 88   19 97      Resp Readings from Last 3 Encounters:   19 16   18 18   18 18    SpO2 Readings from Last 3 Encounters:   18 100%   18 100%  "  05/19/18 98%      Temp Readings from Last 1 Encounters:   03/22/19 36.9  C (98.5  F) (Oral)    Ht Readings from Last 1 Encounters:   03/22/19 1.6 m (5' 3\")      Wt Readings from Last 1 Encounters:   03/22/19 70.1 kg (154 lb 8 oz)    Estimated body mass index is 27.37 kg/m  as calculated from the following:    Height as of this encounter: 1.6 m (5' 3\").    Weight as of this encounter: 70.1 kg (154 lb 8 oz).     LDA:  Peripheral IV Anterior;Left Hand (Active)   Site Assessment WDL 3/22/2019  3:25 PM   Line Status Infusing 3/22/2019  3:25 PM   Phlebitis Scale 0-->no symptoms 3/22/2019  3:25 PM   Infiltration Scale 0 3/22/2019  3:25 PM   Number of days:             Assessment:   ASA SCORE: 2    NPO Status: > 6 hours since completed Solid [employer]   Documentation: H&P complete; Preop Testing complete; Consents complete   Proceeding: Proceed without further delay  Tobacco Use:  NO Active use of Tobacco/UNKNOWN Tobacco use status     Plan:   Anes. Type:  Regional; MAC     RA Details:  Labor/OB Procedure; SS     RA-Location/Type: Spinal   Pre-Induction: None   Induction:  Not applicable   Airway: Native Airway   Access/Monitoring: PIV   Maintenance: N/a   Emergence: N/a   Logistics: Observation/Admission     Postop Pain/Sedation Strategy:  Standard-Options: Block SS     PONV Management:  Adult Risk Factors: Female, Non-Smoker     CONSENT: Direct conversation   Plan and risks discussed with: Patient   Blood Products: Consented (ALL Blood Products)                         Ignacia House MD  "

## 2019-03-22 NOTE — OP NOTE
Maternal-Fetal Medicine Cerclage Operative Note:         Pre-Op Diagnosis:   1) Single intrauterine pregnancy at 21w3d by LMP (c/w 1st tri ultrasound)  2) Cervical insufficiency noted on exam today         Post-Op Diagnosis:   1) Same         Procedure:   1) bond cervical cerclage         Surgeons:   Attending: Dr. Colleen Devries  Fellow: Gracie Paiz MD         Anesthesia:     Spinal          Estimated Blood Loss:     10cc         Findings:     1) cervix 1cm dilated prior to the procedure, closed following the procedure  2) fetal heart tones confirmed by doptone following the procedure         Specimens:     1) none         Complications:     None apparent          History:     Pam Syed is a 29 year old  at 21w3d by LMP c/w 1st trimester ultrasound. She was seen for a follow up ultrasound today where a funneled cervix was noted and was found to be open on physical exam.  After counseling regarding these findings, the patient has decided opted to proceed with Bond cerclage.         Details of Procedure:     After administration of spinal anesthesia the patient was placed in the dorsal lithotomy position and prepped and draped in the usual fashion.  A weighted speculum was placed into the vagina and right angle retractors were used to visualize the cervix. The vagina and cervix were copiously cleansed with betadine solution.    The anterior lip of the cervix was grasped with a ring forcep. A circumferential suture of #2 Ethilon was placed in the usual fashion at the cervicovaginal reflection with knot tied at 12:00.  The sutures was then securely tied down and digital exam confirmed that the cervix was closed.    The bladder was drained of clear urine and a rectal exam confirmed that no sutures were present in the rectum.    The cervix and vaginal vault were inspected and noted to be free of injury and hemostatic.    The instruments were removed from the vagina,and the patient was  transferred to the recovery room in satisfactory condition.  Sponge and needle counts were correct at the close of the case x 2.  Dr. Devries was present and scrubbed for the entire procedure.     Gracie ALFRED NOTE:  I was present and scrubbed for the entire procedure.     Colleen Devries DO FACOG  Maternal Fetal Medicine Specialist  Pager: [phone]  Mobile: [phone]

## 2019-03-22 NOTE — OR NURSING
Pt to PACU via cart.  VSS, temp 98.3.LR infusing by gravity. denies pain and nausea, FHT doptoned.  I)IV to pump, compression to pneumoboots re-started.  A) Stable P) pt to inform RN if she experiences pain or nausea.  Give 50mg Indocin, Post op cares.   Anticipate transfer to PSCU in 1 hour post op.

## 2019-03-22 NOTE — ANESTHESIA POSTPROCEDURE EVALUATION
Anesthesia POST Procedure Evaluation    Patient: Pam Syed   MRN:     [id number] Gender:   female   Age:    29 year old :      1990        Preoperative Diagnosis: See H&P   Procedure(s):  CERCLAGE CERVICAL   Postop Comments: No value filed.       Anesthesia Type:  Regional, MAC    Reportable Event: NO     PAIN: Uncomplicated   Sign Out status: Comfortable, Well controlled pain     PONV: No PONV   Sign Out status:  No Nausea or Vomiting     Neuro/Psych: Uneventful perioperative course   Sign Out Status: Preoperative baseline; Age appropriate mentation     Airway/Resp.: Uneventful perioperative course   Sign Out Status: Non labored breathing, age appropriate RR; Resp. Status within EXPECTED Parameters     CV: Uneventful perioperative course   Sign Out status: Appropriate BP and perfusion indices; Appropriate HR/Rhythm     Disposition:   Sign Out in:  PACU  Disposition:  Floor  Recovery Course: Uneventful  Follow-Up: Not required           Last Anesthesia Record Vitals:  CRNA VITALS  3/22/2019 1723 - 3/22/2019 1823      3/22/2019             Pulse:  76    Ht Rate:  80    SpO2:  100 %          Last PACU/Preop Vitals:  Vitals:    19 1335 19 1525 19 1805   BP:  107/64 98/62   Pulse:   95   Resp: 18 16 16   Temp: 36.9  C (98.4  F) 36.9  C (98.5  F)          Electronically Signed By: Ignacia House MD, 2019, 6:29 PM

## 2019-03-22 NOTE — H&P
[facility]   Maternal Fetal Medicine H&P    Pam Liriano MRN# [id number]   Age: 29 year old YOB: 1990     Date of Admission:  3/22/2019    Primary care provider: Deandre Patiño         HPI:   Pam Liriano is a 29 year old  at 21w3d by LMP c/w 10w2d US who presents from an ultrasound in clinic today with a dilated cervix. Denies vaginal bleeding, cramping or loss of fluid. Option of cerclage placement was discussed in clinic and the patient was agreeable. Denies fever, chills, dysuria, abnormal vaginal discharge.     Pregnancy notable for:  1. Recurrent pregnancy loss  2. History of still-born  3. History of preeclampsia           Pregnancy history:     OBSTETRIC HISTORY:    Obstetric History       T2      L1     SAB5   TAB0   Ectopic0   Multiple0   Live Births2       # Outcome Date GA Lbr Michael/2nd Weight Sex Delivery Anes PTL Lv   8 Current            7 Term 18 39w3d 01:16 / 00:20 2.75 kg (6 lb 1 oz) M Vag-Spont None N ND      Name: STEPH LIRIANO      Complications: Fetal demise > 22 weeks, delivered, current hospitalization   6 SAB 17           5 Term 12/03/15 38w5d 03:45 / 01:16 3.11 kg (6 lb 13.7 oz) M Vag-Spont EPI  RAMILA      Apgar1:  9                Apgar5: 9   4 SAB            3 SAB            2 SAB            1 SAB               Obstetric Comments   2018 Severe Pre E Postpartum after term IUFD 39 weeks     Prenatal Labs:   Lab Results   Component Value Date    ABO O 2019    RH Pos 2019    AS Neg 2019    HEPBANG Nonreactive 2019    CHPCRT Negative 2019    GCPCRT Negative 2019    TREPAB Negative 2018    HGB 11.7 2019   Declined genetic screening    Medication Prior to Admission  Medications Prior to Admission   Medication Sig Dispense Refill Last Dose     aspirin (ASA) 81 MG tablet Take 1 tablet (81 mg) by mouth daily 90 tablet 3 3/21/2019 at 2100     Prenatal Vit-Fe  "Fumarate-FA (PRENATAL VITAMIN AND MINERAL) 28-0.8 MG TABS Take 1 tablet by mouth daily 90 tablet 3 Past Month at Unknown time     ibuprofen (ADVIL/MOTRIN) 800 MG tablet Take 1 tablet (800 mg) by mouth every 8 hours as needed for moderate pain 30 tablet 0 Taking     SENNA-docusate sodium (SENNA S) 8.6-50 MG tablet Take 1 tablet by mouth At Bedtime 90 tablet 3         Maternal Past Medical History:     Past Medical History:   Diagnosis Date     Recurrent pregnancy loss (CODE)      Severe pre-eclampsia, postpartum condition or complication 8/25/2018        Maternal Past Surgical History:     Past Surgical History:   Procedure Laterality Date     APPENDECTOMY Right      DILATION AND CURETTAGE SUCTION N/A 9/26/2017    Procedure: DILATION AND CURETTAGE SUCTION;  Suction Dilation And Curettage ;  Surgeon: Yolanda Samaniego MD;  Location: [facility] OR     DILATION AND CURETTAGE, HYSTEROSCOPY DIAGNOSTIC, COMBINED N/A 11/8/2017    Procedure: COMBINED DILATION AND CURETTAGE, HYSTEROSCOPY DIAGNOSTIC;  Operative Hysteroscopy, Dilation and Curettage ;  Surgeon: Eli Pickard MD;  Location: [facility] OR     GYN SURGERY  09/26/2017    suction D&C              Social History:   Denies tobacco, ETOH or drug use during pregnancy.         Physical Exam:     Vitals:    03/22/19 1332 03/22/19 1335 03/22/19 1525   BP: 115/64  107/64   Resp:  18 16   Temp:  98.4  F (36.9  C) 98.5  F (36.9  C)   TempSrc:  Oral Oral   Weight:  70.1 kg (154 lb 8 oz)    Height:  1.6 m (5' 3\")      Gen: Anxious appearing  Cardio: Regular rate  Resp: Breathing comfortably on room air  Cervix: deferred. Exam in clinic today revealed external os fingertip dilated with BBOW just above the external os.     Fetal Heart Rate: 150s  Tocometer: no contractions    Imaging:    Dating Ultrasound   GA: 10w2d      Single IUP, +Fetal cardiac activity   Last US   GA: 21w3d      Single IUP, Fetal Anatomy WNL, 3 VC, Placenta: anterior, dynamic cervix with funneling to external " os, no measurable closed cervix     Labs:   UA with 40 ketones o/w WNL  WBC 10.4, Hgb 10.1, plt 155  O pos, taye neg  Wet prep neg        Assessment:   Pam Syed is a 29 year old  at 21w3d by LMP c/w 10w2d US admitted for a cerclage placement due to cervical insufficiency. No evidence of infection on exam or lab tests. Risks, benefits and alternatives to cerclage placement were discussed. Informed consent was signed.        Plan:     #Cervical insufficiency  - Risks, benefits and alternatives to a cerclage were discussed. Informed consent was signed.   - UCx pending, UA with no evidence of infection  - No leukocytosis   - Plan for Dumont cervical cerclage once OR is available.   - 2g ancef ordered periop  - indocin 50 mg once PO after followed by 25 mg q 6 hrs until reeval in am.     # PNC  - Rh pos, Rubella immune  - Other prenatal labs wnl    # FWB: FHR appropriate for gestational age    Lucy Ureña MD  OB/GYN PGY3  3/22/2019 2:35 PM    Physician Attestation   IColleen DO, saw and evaluated Pam Syed with the resident.  I have reviewed and discussed with Dr. Ureña their history, physical and plan.    I personally reviewed the vital signs, medications, labs and imaging.    My key history or physical exam findings:   I saw patient in clinic today who was noted to have cervical funneling to the external os with no measurable cervix.  No risk factors aside from 1 D&C.  Denies cramping, contractions, pressure or discharge.      Key management decisions made by me:   Work up for PTL and infection wnl.  Plan to proceed with cerclage.  Discussed R/B/A to cerclage.  Consent signed.     Colleen Devries DO  Date of Service (when I saw the patient): 19    Time Spent on this Encounter   IColleen DO, spent a total of 30 minutes face to face or coordinating care of Pam Syed.  Over 50% of my time on the unit was spent counseling the patient  and/or coordinating care regarding cerclage.

## 2019-03-22 NOTE — NURSING NOTE
Pt at [facility] for follow up ultrasound.  Ultrasound showed no measurable cervix.  Exam by Dr. Devries, see her documentation.  [facility] to [facility] for [facility]age.  Pt transported via w/c by writer.  Report given to charge RN and MIRIAM Herrera.

## 2019-03-22 NOTE — ANESTHESIA PROCEDURE NOTES
Spinal/LP Procedure Note    Spinal Block  Staff:     Anesthesiologist:  Ignacia House MD  Location: OB  Procedure Start/Stop Times:     patient identified, IV checked, site marked, risks and benefits discussed, informed consent, monitors and equipment checked, pre-op evaluation, at physician/surgeon's request and post-op pain management      Correct Patient: Yes      Correct Position: Yes      Correct Site: Yes      Correct Procedure: Yes      Correct Laterality:  Yes    Site Marked:  Yes  Procedure:     Procedure:  Intrathecal    Position:  Sitting    Sterile Prep: chloraprep, mask and sterile gloves      Insertion site:  L3-4    Approach:  Midline    Needle Type:  Colt    Needle gauge (G):  25    Local Skin Infiltration:  1% lidocaine    amount (ml):  3    Needle Length (in):  3.5    Introducer used: Yes      Introducer gauge:  20 G    Attempts:  1    CSF:  Clear    Paresthesias:  No

## 2019-03-22 NOTE — PROGRESS NOTES
"Please see \"Imaging\" tab under \"Chart Review\" for details of today's US.    Colleen Devries, DO    "

## 2019-03-22 NOTE — PLAN OF CARE
Patient is admitted for cerclage. VSS, afebrile, no complains of pain. No contraction noted on monitor. Patient denied feeling contractions. Comfortably patient is waiting for her cerclage.

## 2019-03-23 VITALS
WEIGHT: 154.5 LBS | DIASTOLIC BLOOD PRESSURE: 60 MMHG | HEART RATE: 82 BPM | HEIGHT: 63 IN | RESPIRATION RATE: 20 BRPM | BODY MASS INDEX: 27.38 KG/M2 | OXYGEN SATURATION: 100 % | SYSTOLIC BLOOD PRESSURE: 110 MMHG | TEMPERATURE: 99.1 F

## 2019-03-23 PROCEDURE — 25000132 ZZH RX MED GY IP 250 OP 250 PS 637: Performed by: STUDENT IN AN ORGANIZED HEALTH CARE EDUCATION/TRAINING PROGRAM

## 2019-03-23 PROCEDURE — 25000132 ZZH RX MED GY IP 250 OP 250 PS 637: Performed by: INTERNAL MEDICINE

## 2019-03-23 RX ADMIN — ACETAMINOPHEN 650 MG: 325 TABLET, FILM COATED ORAL at 08:28

## 2019-03-23 RX ADMIN — INDOMETHACIN 25 MG: 25 CAPSULE ORAL at 00:14

## 2019-03-23 RX ADMIN — POLYETHYLENE GLYCOL 3350 17 G: 17 POWDER, FOR SOLUTION ORAL at 08:25

## 2019-03-23 RX ADMIN — INDOMETHACIN 25 MG: 25 CAPSULE ORAL at 06:02

## 2019-03-23 NOTE — PLAN OF CARE
Received from PACU via cart, VSS, IV infusing.  Will continue to monitor and will notify provider if there is a change in status.

## 2019-03-23 NOTE — DISCHARGE INSTRUCTIONS
Discharge Instruction for Undelivered Patients      You were seen for: Cerclage Placement  We Consulted: M team  You had (Test or Medicine):Cerclage     Diet:   You may eat meals and snacks.     Activity:  Rest the pelvic area. No sex. Do not stimulate breasts or nipples.     Call your provider if you notice:  Swelling in your face or increased swelling in your hands or legs.  Headaches that are not relieved by Tylenol (acetaminophen).  Changes in your vision (blurring: seeing spots or stars.)  Nausea (sick to your stomach) and vomiting (throwing up).   Weight gain of 5 pounds or more per week.  Heartburn that doesn't go away.  Signs of bladder infection: pain when you urinate (use the toilet), need to go more often and more urgently.  The bag of cavanaugh (rupture of membranes) breaks, or you notice leaking in your underwear.  Bright red blood in your underwear.  Abdominal (lower belly) or stomach pain.  Second (plus) baby: Contractions (tightening) less than 10 minutes apart and getting stronger.  *If less than 34 weeks: Contractions (tightenings) more than 6 times in one hour.  Increase or change in vaginal discharge (note the color and amount)    Follow-up:  As scheduled in the clinic

## 2019-03-23 NOTE — DISCHARGE SUMMARY
[facility] Discharge Summary    Pam Syed MRN# [id number]   Age: 29 year old YOB: 1990     Date of Admission:  3/22/2019  Date of Discharge::  3/23/2019  Admitting Physician:  Colleen Devries DO  Discharge Physician:  Colleen Devries DO          Admission Diagnoses:   1. Intrauterine pregnancy at 21w3d   2. Cervical Insufficiecy          Discharge Diagnosis:   1. Intrauterine pregnancy at 21w4d   2. Cervical Insufficiency s/p cerclage placement          Procedures:   Procedure(s):  Dumont Cerclage Placement, spinal anesthesia               Medications Prior to Admission:     Medications Prior to Admission   Medication Sig Dispense Refill Last Dose     aspirin (ASA) 81 MG tablet Take 1 tablet (81 mg) by mouth daily 90 tablet 3 3/21/2019 at 2100     Prenatal Vit-Fe Fumarate-FA (PRENATAL VITAMIN AND MINERAL) 28-0.8 MG TABS Take 1 tablet by mouth daily 90 tablet 3 Past Month at Unknown time     SENNA-docusate sodium (SENNA S) 8.6-50 MG tablet Take 1 tablet by mouth At Bedtime 90 tablet 3              Discharge Medications:   No medications were prescribed on discharge          Consultations:   No consultations were requested during this admission          Brief History of Illness:   Pam Syed is a 29 year old  at 21w3d by LMP c/w 10w2d US who presents from an ultrasound in clinic today with a dilated cervix. Denies vaginal bleeding, cramping or loss of fluid. Options of management were discussed and she opted for cerclage placement. Informed consent was signed.      Pregnancy notable for:  1. Recurrent pregnancy loss  2. History of still-born  3. History of preeclampsia     Operative Course:  Cerclage placement was uncomplicated. One suture of #2 Ethilon was placed and tied at 12:00.  EBL 10 cc.  Cervix was 1 cm dilated prior to the procedure and closed following placement of the cerclage.            Hospital Course:    Pam was observed overnight  following cerclage placement. She received 2 g ancef perioperatively, and indocin (50 mg postop and 25 mg q6h overnight). She remained afebrile and had no evidence of infection on exam or lab tests. FHR was appropriate for gestational age pre-and postoperatively.  At the time of discharge on postop day #1 she was ambulating and voiding without difficulty and pain was well controlled.          Discharge Instructions and Follow-Up:   Discharge diet: Regular   Discharge activity: Activity as tolerated.  Nothing in the vagina for the rest of pregnancy   Discharge follow-up:  Follow-up with Liz Reza on 3/29 as scheduled.[facility] will call to schedule a follow-up growth and anatomy scan for the patient in approximately 3 weeks           Discharge Disposition:   Discharged to home      Lucy Ureña MD  Ob/Gyn, PGY3  Pager [phone]         FACULTY DISCHARGE NOTE:  I saw and examined the patient today.  See findings as documented in today's progress note. I reviewed the discharge plan with the resident, and agree with the final assessment and plan for discharge as noted in the resident's discharge summary. I personally spent 20 minutes today on discharge activities for this patient.    Colleen Devries DO [facility]  Maternal Fetal Medicine Specialist  Pager: [phone]  Mobile: [phone]

## 2019-03-23 NOTE — PLAN OF CARE
Pt is post-cerclage from yesterday evening. VSS. Pt is afebrile. Pt denies cramping, pain, discharge or bleeding. Patient reports feeling fetal movement. Pt reports feeling like she may be developing a cold, reporting a productive cough (LS CTA), and mild congestion and sore throat. Pt remains afebrile and patient declined taking any medications for cold management. Pt voiding independently with out difficulty. Pt wearing SCD's over night for DVT prophylaxis.

## 2019-03-23 NOTE — PROGRESS NOTES
MFM Antepartum Progress Note    SUBJECTIVE  29 year old old  21w4d    Pam Syed is a 29 year old  at 21w4d by LMP c/w 10w2d US admitted for a cerclage placement due to cervical insufficiency. Doing well this morning. Feels FM. No UCx. Has mild abdominal discomfort. Had small amount of pink tinged mucus on liner. No bright red vaginal bleeding. No fevers. Has a sore throat and a headache this morning relieved by tylenol. otherwise denies systemic complaints. Voiding adequately.     OBJECTIVE     Vital signs:    Vitals:    19 0010 19 0600 19 0800   BP: 114/64 110/63 115/68 110/60   Pulse:    82   Resp:  16 16 20   Temp: 98.7  F (37.1  C) 98.4  F (36.9  C) 97.9  F (36.6  C) 99.1  F (37.3  C)   TempSrc: Oral Oral Oral Oral   SpO2: 100%      Weight:       Height:           I/O last 3 completed shifts:  In: 100 [I.V.:100]  Out: 5 [Blood:5]    Intake/Output Summary (Last 24 hours) at 3/23/2019 0828  Last data filed at 3/22/2019 1752  Gross per 24 hour   Intake 100 ml   Output 5 ml   Net 95 ml       General: Alert and pleasant  Abd: Gravid, nontender        LAB  Results for orders placed or performed during the hospital encounter of 19 (from the past 24 hour(s))   CBC with platelets differential   Result Value Ref Range    WBC 10.4 4.0 - 11.0 10e9/L    RBC Count 3.46 (L) 3.8 - 5.2 10e12/L    Hemoglobin 10.1 (L) 11.7 - 15.7 g/dL    Hematocrit 30.4 (L) 35.0 - 47.0 %    MCV 88 78 - 100 fl    MCH 29.2 26.5 - 33.0 pg    MCHC 33.2 31.5 - 36.5 g/dL    RDW 13.0 10.0 - 15.0 %    Platelet Count 155 150 - 450 10e9/L    Diff Method Automated Method     % Neutrophils 80.2 %    % Lymphocytes 13.4 %    % Monocytes 5.7 %    % Eosinophils 0.4 %    % Basophils 0.1 %    % Immature Granulocytes 0.2 %    Nucleated RBCs 0 0 /100    Absolute Neutrophil 8.4 (H) 1.6 - 8.3 10e9/L    Absolute Lymphocytes 1.4 0.8 - 5.3 10e9/L    Absolute Monocytes 0.6 0.0 - 1.3 10e9/L    Absolute Eosinophils  0.0 0.0 - 0.7 10e9/L    Absolute Basophils 0.0 0.0 - 0.2 10e9/L    Abs Immature Granulocytes 0.0 0 - 0.4 10e9/L    Absolute Nucleated RBC 0.0    ABO/Rh type and screen   Result Value Ref Range    ABO O     RH(D) Pos     Antibody Screen Neg     Test Valid Only At          [facility]    Specimen Expires 2019    UA with Microscopic reflex to Culture   Result Value Ref Range    Color Urine Yellow     Appearance Urine Clear     Glucose Urine Negative NEG^Negative mg/dL    Bilirubin Urine Negative NEG^Negative    Ketones Urine 40 (A) NEG^Negative mg/dL    Specific Gravity Urine 1.013 1.003 - 1.035    Blood Urine Negative NEG^Negative    pH Urine 6.5 5.0 - 7.0 pH    Protein Albumin Urine Negative NEG^Negative mg/dL    Urobilinogen mg/dL Normal 0.0 - 2.0 mg/dL    Nitrite Urine Negative NEG^Negative    Leukocyte Esterase Urine Negative NEG^Negative    Source Midstream Urine     WBC Urine <1 0 - 5 /HPF    RBC Urine 0 0 - 2 /HPF    Squamous Epithelial /HPF Urine 1 0 - 1 /HPF    Mucous Urine Present (A) NEG^Negative /LPF       ASSESSMENT AND PLAN  Pam Syed is a 29 year old  at 21w4d is POD#1 for cerclage placement due to cervical insufficiency. No evidence of infection on exam or lab tests.     #Cervical insufficiency  - POD#1 from Duomnt cerclage  - UCx pending, UA with no evidence of infection  - No leukocytosis   - s/p ancef 1g preop  - s/p indocin (50 mg once, 25 mg q6h)    #Constipation  - senna BID     # PNC  - Rh pos, Rubella immune  - Other prenatal labs wnl     # FWB: FHR measured on 3/22 appropriate for gestational age     Discharge home today.   Reviewed precautions including pelvic rest, no heavy lifting or strenuous exercise.  Discussed warning signs of  labor, infection and ROM.  Follow up with CNM on Friday as scheduled.  MFM US in 2-3 weeks for follow up.     Amina Gibson, MS4  P: [phone]    Scribe Disclosure:   Amina NUÑEZ  Arthur, am serving as a scribe; to document services personally performed by Dr. Devries based on data collection and the provider's statements to me.       Attestation:   The documentation recorded by the scribe accurately reflects the services I personally performed and the decisions made by me.   Colleen Devries DO  Maternal Fetal Medicine Specialist           Time Spent on this Encounter   I, Colleen Devries DO, spent a total of 15 minutes face to face or coordinating care of Pam Syed.  Over 50% of my time on the unit was spent counseling the patient and/or coordinating care regarding post op cares s/p cerclage.

## 2019-03-23 NOTE — PLAN OF CARE
Data: Today is final hospital day  Maternal status stable following her cerclage placement yesterday. VSS, afebrile. Pt denies pain at this time. Fetal assessment is appropriate for gestational age.   Action: Continue with plan of care, which is Discharge home, undelivered. Returns to midwives as scheduled on Friday and follow up with [facility] clinic in the next couple weeks.. Patient encouraged to move extremities while in bed.  Response: Patient excited to go home to  and son.

## 2019-03-23 NOTE — PLAN OF CARE
Patient is tolerating food and drinking fluids, ambulating to bathroom and voiding.  Medicated for pain, see MAR.  Denies bleeding or LOF.  Will continue to monitor and will notify provider if there is a change in status.

## 2019-03-29 ENCOUNTER — OFFICE VISIT (OUTPATIENT)
Dept: OBGYN | Facility: CLINIC | Age: 29
End: 2019-03-29
Attending: ADVANCED PRACTICE MIDWIFE
Payer: COMMERCIAL

## 2019-03-29 VITALS
SYSTOLIC BLOOD PRESSURE: 114 MMHG | HEIGHT: 63 IN | DIASTOLIC BLOOD PRESSURE: 76 MMHG | BODY MASS INDEX: 27.68 KG/M2 | WEIGHT: 156.2 LBS | HEART RATE: 87 BPM

## 2019-03-29 DIAGNOSIS — O34.30 CERVICAL CERCLAGE SUTURE PRESENT, ANTEPARTUM: ICD-10-CM

## 2019-03-29 DIAGNOSIS — O09.92 HRP (HIGH RISK PREGNANCY), SECOND TRIMESTER: Primary | ICD-10-CM

## 2019-03-29 PROBLEM — O09.90 HIGH-RISK PREGNANCY, UNSPECIFIED TRIMESTER: Chronic | Status: ACTIVE | Noted: 2019-01-11

## 2019-03-29 ASSESSMENT — PAIN SCALES - GENERAL: PAINLEVEL: NO PAIN (0)

## 2019-03-29 ASSESSMENT — MIFFLIN-ST. JEOR: SCORE: 1392.52

## 2019-03-29 NOTE — PROGRESS NOTES
"Subjective:     Pam Syed is a 29 y.o.  at 22w3d by LMP c/w 10w2d US who presents for a routine prenatal appointment.        She was recently admitted for cerclage placement (3/22-23) secondary to incidentally found unmeasurable cervix and prior history of early pregnancy loss. She is generally feeling well but is having anxiety related to prior pregnancy losses and recent need for cerclage placement. She notes 1 episode of pink-tinged vaginal discharge 2 days ago but otherwise has had no notable spotting.  No vaginal bleeding or leakage of fluid. No contractions or cramping. Having active fetal movement. No HA, visual changes, RUQ or epigastric pain. She is having intermittent constipation and is now taking a stool softener 2x daily to prevent straining, which she has been avoiding d/t cerclage placement. Also having periodic reflux controlled with eating small, frequent meals and avoidance of acidic foods.    Level II US 3/ notable for unmeasurable cervix, anterior placenta, breech presentation.       Objective:  Vitals:    19 1133   BP: 114/76   Pulse: 87   Weight: 70.9 kg (156 lb 3.2 oz)   Height: 1.6 m (5' 2.99\")   See OB flowsheet    Assessment/Plan  Pam Syed is a 29 y.o.  at 22w3d by LMP c/w 10w2d US with OBHx significant for recurrent SAB, IUFD, s/p cerclage placement 3/22/19 who presents for a routine prenatal appointment. Pregnancy is progressing well.       - Reviewed why/how to contact provider.    Patient education/orders or handouts today:  PTL signs/symptoms, fetal movement and Plan for EOB visit w labs  - Transfer of care to [facility] for management of complex pregnancy s/p cerclage. Anticipate transition back to midwife service after removal of cerclage.  - F/u with MFM as previously scheduled    Maria Del Rosario NUÑEZ, am serving as a scribe; to document services personally performed by Liz Reza based on data collection and the provider's statements to me.     Maria Del Rosario STOREY. " Arron  The encounter was performed by me and scribed by the SNM. The scribed note accurately reflects my personal services and decisions made by me. ASHIA MartinM

## 2019-04-11 ENCOUNTER — OFFICE VISIT (OUTPATIENT)
Dept: MATERNAL FETAL MEDICINE | Facility: CLINIC | Age: 29
End: 2019-04-11
Attending: OBSTETRICS & GYNECOLOGY
Payer: COMMERCIAL

## 2019-04-11 ENCOUNTER — HOSPITAL ENCOUNTER (OUTPATIENT)
Dept: ULTRASOUND IMAGING | Facility: CLINIC | Age: 29
Discharge: HOME | End: 2019-04-11
Attending: OBSTETRICS & GYNECOLOGY | Admitting: OBSTETRICS & GYNECOLOGY
Payer: COMMERCIAL

## 2019-04-11 DIAGNOSIS — O26.22 RECURRENT PREGNANCY LOSS IN PREGNANT PATIENT IN SECOND TRIMESTER, ANTEPARTUM: ICD-10-CM

## 2019-04-11 DIAGNOSIS — O26.872 CERVICAL SHORTENING AFFECTING PREGNANCY IN SECOND TRIMESTER: Primary | ICD-10-CM

## 2019-04-11 DIAGNOSIS — O35.9XX0 SUSPECTED FETAL ANOMALY, ANTEPARTUM, SINGLE OR UNSPECIFIED FETUS: ICD-10-CM

## 2019-04-11 PROCEDURE — 76816 OB US FOLLOW-UP PER FETUS: CPT

## 2019-04-17 ENCOUNTER — OFFICE VISIT (OUTPATIENT)
Dept: OBGYN | Facility: CLINIC | Age: 29
End: 2019-04-17
Payer: COMMERCIAL

## 2019-04-17 VITALS
DIASTOLIC BLOOD PRESSURE: 78 MMHG | HEART RATE: 93 BPM | SYSTOLIC BLOOD PRESSURE: 114 MMHG | WEIGHT: 159 LBS | BODY MASS INDEX: 28.17 KG/M2

## 2019-04-17 DIAGNOSIS — R30.0 DYSURIA: Primary | ICD-10-CM

## 2019-04-17 LAB
ALBUMIN UR-MCNC: 10 MG/DL
APPEARANCE UR: ABNORMAL
BILIRUB UR QL STRIP: NEGATIVE
COLOR UR AUTO: YELLOW
GLUCOSE UR STRIP-MCNC: NEGATIVE MG/DL
HGB UR QL STRIP: NEGATIVE
KETONES UR STRIP-MCNC: NEGATIVE MG/DL
LEUKOCYTE ESTERASE UR QL STRIP: NEGATIVE
MUCOUS THREADS #/AREA URNS LPF: PRESENT /LPF
NITRATE UR QL: NEGATIVE
PH UR STRIP: 5.5 PH (ref 5–7)
RBC #/AREA URNS AUTO: 0 /HPF (ref 0–2)
SOURCE: ABNORMAL
SP GR UR STRIP: 1.03 (ref 1–1.03)
SQUAMOUS #/AREA URNS AUTO: 3 /HPF (ref 0–1)
UROBILINOGEN UR STRIP-MCNC: NORMAL MG/DL (ref 0–2)
WBC #/AREA URNS AUTO: 0 /HPF (ref 0–5)

## 2019-04-17 PROCEDURE — 81001 URINALYSIS AUTO W/SCOPE: CPT | Performed by: STUDENT IN AN ORGANIZED HEALTH CARE EDUCATION/TRAINING PROGRAM

## 2019-04-17 PROCEDURE — G0463 HOSPITAL OUTPT CLINIC VISIT: HCPCS | Mod: ZF

## 2019-04-17 ASSESSMENT — PAIN SCALES - GENERAL: PAINLEVEL: NO PAIN (0)

## 2019-04-17 NOTE — PATIENT INSTRUCTIONS
It was good to see you today.     We will call you if your urine results show infection.     Please schedule extended OB visit in 3 weeks

## 2019-04-17 NOTE — PROGRESS NOTES
Subjective:     Pam Syed, a 28yo  at 25w1d by LMP c/w 10w2d US presents for routine OB care. Pam complains of mild dysuria which started today. She is appropriately tearful regarding her previous pregnancy complicated by IUFD and postpartum preeclampsia. She is hopeful for a better outcome this pregnancy and is reassured by active fetal movement. She is taking aspirin daily. She denies HA, changes in vision, N/V, loss of fluid, vaginal bleeding, changes in vaginal discharge and vaginal burning/pain. She has questions regarding possibility for post partum hemorrhage and development of preeclampsia following delivery.     Pregnancy complicated by:  -Cervical length shortening, s/p cerclage placement  -History of IUFD at term  -History of preeclampsia  -History of recurrent pregnancy loss    Objective:  Vitals:    19 1521   BP: 114/78   Pulse: 93   Weight: 72.1 kg (159 lb)    See OB flowsheet    Assessment/Plan  28yo  at 25w1d by LMP c/w 10w2d US presents for routine OB care.     #Dysuria  -Urinalysis w/ reflex to culture pending  -Will call patient if results show infection     # PNC  - Rh pos, Rubella immune  - Other prenatal labs wnl  - Reviewed 28w extended OB visit including GCT, CBC, and Vitamin D level. Patient demonstrates understanding and will schedule visit in 3 weeks.   - Reviewed postpartum monitoring including monitoring for hemorrhage in the immediate post-delivery period. Reassured patient that she will be monitored closely for signs and symptoms of preeclampsia given history. All of the patients questions were addressed.  - Reviewed why/how to contact provider.       Return to clinic in 3 weeks and prn if questions or concerns.     Patient seen with Dr. Hoffman Alicia Myhre, DO  Obstetrics and Gyncology, PGY-2  2019 , 5:40 PM       The Patient was seen in Resident Continuity Clinic by MYHRE, ALICIA.  I reviewed the history & exam. Assessment and plan were  jointly made.    Eli Pickard MD

## 2019-05-09 NOTE — PROGRESS NOTES
"Subjective:     Pam Syed, a 28yo  at 28w3d by LMP c/w 10w2d US presents for extended OB care. Pam reports she is doing well and trying to stay positive.  She reports very mild cramping occasionally but reports constipation and typically has a bowel movement every other day. Regular fetal movement.  She denies HA, changes in vision, N/V, loss of fluid, vaginal bleeding, changes in vaginal discharge and vaginal burning/pain.  Denies dysuria.  Denies leaking of fluid, contractions or pelvic pressure.     Pregnancy complicated by:  -Shortened cervix, s/p cerclage placement  -History of IUFD at term  -History of preeclampsia  -History of recurrent pregnancy loss    Objective:  Vitals:    05/10/19 1509   BP: 131/83   Pulse: 103   Weight: 73.6 kg (162 lb 4.8 oz)   Height: 1.6 m (5' 2.99\")    See OB flowsheet    FH: 27 cm  FHT: 130-140s bpm    Assessment/Plan  28yo  at 28w3d by LMP c/w 10w2d US presents for routine OB care    #Dysuria, resolved  -Urinalysis w/ reflex to culture negative  - no symptoms today    History of pre-eclampsia  - On ASA   - weekly blood pressure monitoring, per MFM recommendatinos  - no signs or symptoms of pre-eclampsia at this time, bp is trending upward but not elevated today  - growth Q4-6 weeks  - HELLP labs nl 2019, UPC 0.08, repeat as clinically indicated    History of intrauterine fetal demise, concern for abruption 2/2 pre-eclampsia  - Initiate weekly BPPs at 28 weeks, ordered     # PNC  - Rh pos, Rubella immune  - Other prenatal labs wnl  - Reviewed 28w extended OB visit including GCT, CBC, T&S, RPR.   - Tdap due today. Pt was undecided and wanted to discuss with   - plan for delivery by 39 weeks      Return to clinic in 2 weeks for BPP and CAS.  US with MFM next week, bp check at all visits.    Discussed with Dr. Ellison.    Amy Schumer, MD  Obstetrics and Gynecology, PGY-2  Pager: [phone]  05/10/19    Patient was seen by the resident in Continuity of " Care Clinic.  I reviewed the history & exam.  The patient's assessment and plan were made jointly.    Amy Ellison MD MPH

## 2019-05-10 ENCOUNTER — OFFICE VISIT (OUTPATIENT)
Dept: OBGYN | Facility: CLINIC | Age: 29
End: 2019-05-10
Payer: COMMERCIAL

## 2019-05-10 VITALS
SYSTOLIC BLOOD PRESSURE: 131 MMHG | HEART RATE: 103 BPM | WEIGHT: 162.3 LBS | BODY MASS INDEX: 28.76 KG/M2 | HEIGHT: 63 IN | DIASTOLIC BLOOD PRESSURE: 83 MMHG

## 2019-05-10 DIAGNOSIS — Z3A.28 PREGNANCY WITH 28 COMPLETED WEEKS GESTATION: Primary | ICD-10-CM

## 2019-05-10 LAB
ABO + RH BLD: NORMAL
ABO + RH BLD: NORMAL
BLD GP AB SCN SERPL QL: NORMAL
BLOOD BANK CMNT PATIENT-IMP: NORMAL
ERYTHROCYTE [DISTWIDTH] IN BLOOD BY AUTOMATED COUNT: 12.5 % (ref 10–15)
GLUCOSE 1H P 50 G GLC PO SERPL-MCNC: 100 MG/DL (ref 60–129)
HCT VFR BLD AUTO: 28.3 % (ref 35–47)
HGB BLD-MCNC: 9.4 G/DL (ref 11.7–15.7)
MCH RBC QN AUTO: 28.3 PG (ref 26.5–33)
MCHC RBC AUTO-ENTMCNC: 33.2 G/DL (ref 31.5–36.5)
MCV RBC AUTO: 85 FL (ref 78–100)
PLATELET # BLD AUTO: 179 10E9/L (ref 150–450)
RBC # BLD AUTO: 3.32 10E12/L (ref 3.8–5.2)
SPECIMEN EXP DATE BLD: NORMAL
WBC # BLD AUTO: 9.6 10E9/L (ref 4–11)

## 2019-05-10 PROCEDURE — 85027 COMPLETE CBC AUTOMATED: CPT | Performed by: OBSTETRICS & GYNECOLOGY

## 2019-05-10 PROCEDURE — G0463 HOSPITAL OUTPT CLINIC VISIT: HCPCS | Mod: 25

## 2019-05-10 PROCEDURE — 86850 RBC ANTIBODY SCREEN: CPT | Performed by: OBSTETRICS & GYNECOLOGY

## 2019-05-10 PROCEDURE — 86901 BLOOD TYPING SEROLOGIC RH(D): CPT | Performed by: OBSTETRICS & GYNECOLOGY

## 2019-05-10 PROCEDURE — 25000128 H RX IP 250 OP 636: Mod: ZF

## 2019-05-10 PROCEDURE — 86900 BLOOD TYPING SEROLOGIC ABO: CPT | Performed by: OBSTETRICS & GYNECOLOGY

## 2019-05-10 PROCEDURE — 82950 GLUCOSE TEST: CPT | Performed by: OBSTETRICS & GYNECOLOGY

## 2019-05-10 PROCEDURE — 90471 IMMUNIZATION ADMIN: CPT

## 2019-05-10 PROCEDURE — 90715 TDAP VACCINE 7 YRS/> IM: CPT | Mod: ZF

## 2019-05-10 PROCEDURE — 86780 TREPONEMA PALLIDUM: CPT | Performed by: OBSTETRICS & GYNECOLOGY

## 2019-05-10 PROCEDURE — 36415 COLL VENOUS BLD VENIPUNCTURE: CPT | Performed by: OBSTETRICS & GYNECOLOGY

## 2019-05-10 ASSESSMENT — MIFFLIN-ST. JEOR: SCORE: 1420.16

## 2019-05-10 ASSESSMENT — ANXIETY QUESTIONNAIRES
2. NOT BEING ABLE TO STOP OR CONTROL WORRYING: SEVERAL DAYS
6. BECOMING EASILY ANNOYED OR IRRITABLE: NOT AT ALL
3. WORRYING TOO MUCH ABOUT DIFFERENT THINGS: SEVERAL DAYS
7. FEELING AFRAID AS IF SOMETHING AWFUL MIGHT HAPPEN: SEVERAL DAYS
1. FEELING NERVOUS, ANXIOUS, OR ON EDGE: NOT AT ALL
GAD7 TOTAL SCORE: 3
5. BEING SO RESTLESS THAT IT IS HARD TO SIT STILL: NOT AT ALL

## 2019-05-10 ASSESSMENT — PATIENT HEALTH QUESTIONNAIRE - PHQ9
SUM OF ALL RESPONSES TO PHQ QUESTIONS 1-9: 2
5. POOR APPETITE OR OVEREATING: NOT AT ALL

## 2019-05-11 LAB — T PALLIDUM AB SER QL: NONREACTIVE

## 2019-05-11 ASSESSMENT — ANXIETY QUESTIONNAIRES: GAD7 TOTAL SCORE: 3

## 2019-05-14 ENCOUNTER — HOSPITAL ENCOUNTER (OUTPATIENT)
Dept: ULTRASOUND IMAGING | Facility: CLINIC | Age: 29
Discharge: HOME | End: 2019-05-14
Attending: OBSTETRICS & GYNECOLOGY | Admitting: OBSTETRICS & GYNECOLOGY
Payer: COMMERCIAL

## 2019-05-14 ENCOUNTER — OFFICE VISIT (OUTPATIENT)
Dept: MATERNAL FETAL MEDICINE | Facility: CLINIC | Age: 29
End: 2019-05-14
Attending: OBSTETRICS & GYNECOLOGY
Payer: COMMERCIAL

## 2019-05-14 VITALS — HEART RATE: 90 BPM | DIASTOLIC BLOOD PRESSURE: 71 MMHG | SYSTOLIC BLOOD PRESSURE: 114 MMHG

## 2019-05-14 DIAGNOSIS — O26.872 CERVICAL SHORTENING AFFECTING PREGNANCY IN SECOND TRIMESTER: ICD-10-CM

## 2019-05-14 DIAGNOSIS — Z87.59 HISTORY OF IUFD: ICD-10-CM

## 2019-05-14 DIAGNOSIS — O26.22 RECURRENT PREGNANCY LOSS IN PREGNANT PATIENT IN SECOND TRIMESTER, ANTEPARTUM: Primary | ICD-10-CM

## 2019-05-14 DIAGNOSIS — O26.22 RECURRENT PREGNANCY LOSS IN PREGNANT PATIENT IN SECOND TRIMESTER, ANTEPARTUM: ICD-10-CM

## 2019-05-14 PROCEDURE — 76816 OB US FOLLOW-UP PER FETUS: CPT

## 2019-05-14 PROCEDURE — 76819 FETAL BIOPHYS PROFIL W/O NST: CPT | Performed by: OBSTETRICS & GYNECOLOGY

## 2019-05-14 NOTE — RESULT ENCOUNTER NOTE
Continued anemia, recommended PO iron otherwise normal 28 week labs.  LVM to call back clinic if questions. Amy Schumer, MD PGY-2

## 2019-05-21 ENCOUNTER — TELEPHONE (OUTPATIENT)
Dept: OBGYN | Facility: CLINIC | Age: 29
End: 2019-05-21

## 2019-05-21 DIAGNOSIS — D50.8 IRON DEFICIENCY ANEMIA SECONDARY TO INADEQUATE DIETARY IRON INTAKE: Primary | ICD-10-CM

## 2019-05-21 RX ORDER — FERROUS SULFATE 325(65) MG
325 TABLET, DELAYED RELEASE (ENTERIC COATED) ORAL DAILY
Qty: 100 TABLET | Refills: 1 | Status: SHIPPED | OUTPATIENT
Start: 2019-05-21 | End: 2021-05-17

## 2019-05-21 NOTE — TELEPHONE ENCOUNTER
Patient calling to request orders for iron supplement be sent to pharmacy. Sent to pt preferred pharmacy

## 2019-05-24 ENCOUNTER — HOSPITAL ENCOUNTER (OUTPATIENT)
Dept: ULTRASOUND IMAGING | Facility: CLINIC | Age: 29
Discharge: HOME | End: 2019-05-24
Attending: OBSTETRICS & GYNECOLOGY | Admitting: OBSTETRICS & GYNECOLOGY
Payer: COMMERCIAL

## 2019-05-24 DIAGNOSIS — Z3A.28 PREGNANCY WITH 28 COMPLETED WEEKS GESTATION: ICD-10-CM

## 2019-05-24 PROCEDURE — 76819 FETAL BIOPHYS PROFIL W/O NST: CPT

## 2019-05-30 ENCOUNTER — OFFICE VISIT (OUTPATIENT)
Dept: OBGYN | Facility: CLINIC | Age: 29
End: 2019-05-30
Payer: COMMERCIAL

## 2019-05-30 ENCOUNTER — ANCILLARY PROCEDURE (OUTPATIENT)
Dept: ULTRASOUND IMAGING | Facility: CLINIC | Age: 29
End: 2019-05-30
Attending: ADVANCED PRACTICE MIDWIFE
Payer: COMMERCIAL

## 2019-05-30 VITALS
WEIGHT: 165 LBS | DIASTOLIC BLOOD PRESSURE: 74 MMHG | SYSTOLIC BLOOD PRESSURE: 112 MMHG | BODY MASS INDEX: 29.24 KG/M2 | HEART RATE: 91 BPM

## 2019-05-30 DIAGNOSIS — Z87.59 HISTORY OF IUFD: ICD-10-CM

## 2019-05-30 DIAGNOSIS — R35.0 URINARY FREQUENCY: ICD-10-CM

## 2019-05-30 DIAGNOSIS — O09.893 SUPERVISION OF OTHER HIGH RISK PREGNANCIES, THIRD TRIMESTER: Primary | ICD-10-CM

## 2019-05-30 DIAGNOSIS — Z87.59 HISTORY OF SEVERE PRE-ECLAMPSIA: ICD-10-CM

## 2019-05-30 DIAGNOSIS — O09.92 HIGH-RISK PREGNANCY IN SECOND TRIMESTER: ICD-10-CM

## 2019-05-30 LAB
ALBUMIN UR-MCNC: 30 MG/DL
APPEARANCE UR: CLEAR
BILIRUB UR QL STRIP: ABNORMAL
COLOR UR AUTO: YELLOW
GLUCOSE UR STRIP-MCNC: NEGATIVE MG/DL
HGB UR QL STRIP: NEGATIVE
KETONES UR STRIP-MCNC: ABNORMAL MG/DL
LEUKOCYTE ESTERASE UR QL STRIP: ABNORMAL
NITRATE UR QL: NEGATIVE
PH UR STRIP: 5.5 PH (ref 5–7)
SP GR UR STRIP: >1.03 (ref 1–1.03)
UROBILINOGEN UR STRIP-ACNC: 1 EU/DL (ref 0.2–1)

## 2019-05-30 PROCEDURE — 76819 FETAL BIOPHYS PROFIL W/O NST: CPT

## 2019-05-30 PROCEDURE — G0463 HOSPITAL OUTPT CLINIC VISIT: HCPCS | Mod: 25,ZF

## 2019-05-30 PROCEDURE — 81003 URINALYSIS AUTO W/O SCOPE: CPT

## 2019-05-30 ASSESSMENT — PAIN SCALES - GENERAL: PAINLEVEL: NO PAIN (0)

## 2019-05-30 NOTE — PROGRESS NOTES
"Subjective:     Pam Syed, a 30yo  at 31w2d by LMP c/w 10w2d US presents for return OB care. Patient says overall anxiety has been improving she gets further along in pregnancy. She is still very emotional and nervous something is going to happen. She feels like she has good support at home, and is happy with the number of times she comes to clinic. She feels like the baby has \"dropped\" and is now lower in her pelvis. She denies any signs or symptoms of  labor. She does note increased frequency in urination with only small amount, concerned that she may have UTI. Regular fetal movement.  She denies HA, changes in vision, N/V, loss of fluid, vaginal bleeding, changes in vaginal discharge and vaginal burning/pain.  Denies dysuria.  Denies leaking of fluid, contractions or pelvic pressure.      Pregnancy complicated by:  -Shortened cervix, s/p cerclage placement  -History of IUFD at term  -History of preeclampsia  -History of recurrent pregnancy loss     Objective:  /74   Pulse 91   Wt 74.8 kg (165 lb)   LMP 10/23/2018   Breastfeeding? No   BMI 29.24 kg/m    Gen: NAD  Resp: no difficulty breathing  Abd: gravid, non tender, cephalic on leopold, head not engaged in pelvis     FH: 31 cm    BPP 8/8, normal FHR     Assessment/Plan  30yo  at 31w2d by LMP c/w 10w2d US presents for routine OB care      #History of pre-eclampsia  - No signs or symptoms of pre-eclampsia at this time, normotensive   - Continue ASA 81 mg   - Weekly blood pressure monitoring, per MFM recommendations  - Continue growth ultrasounds every 4 weeks. Next due   - HELLP labs nl 2019, UPC 0.08, repeat as clinically indicated     #History of intrauterine fetal demise, concern for abruption 2/2 pre-eclampsia  - S/p MFM consult  - Continue weekly BPPs     #Iron deficiency anemia  - Hgb 9.4 (5/10/19), patient just started taking supplemental PO iron two weeks ago. Tolerating well. Will plan to recheck CBC at next " visit. If not improving will consider IV iron     #Increased urinary frequency  - POCT UA no signs of infection      # PNC  - Rh pos, Rubella immune  - Hepatitis B indeterminate, will vaccinate PP    - Other prenatal labs wnl  - Last growth US: EFW 1250 45%ile, cephalic, anterior placenta, 3VC.  - GCT passed  - S/p Tdap on 5/10/19  - plan for delivery by 39 weeks      Return to clinic in 2 weeks for CAS. Continue weekly BPPs. BP checks weekly     Patient staffed with Dr. Alvarez Mcnair MD   OB/GYN Resident PGY-2  5/30/2019 9:14 AM    The patient was seen in resident continuity clinic by Dr. Mcnair.  I have reviewed the history and exam, the assessment and plan were jointly made.     Zeenat Pino MD, FACOG

## 2019-05-30 NOTE — NURSING NOTE
Chief Complaint   Patient presents with     Prenatal Care     CAS 31 weeks and 2 days   Olga Milian LPN

## 2019-06-06 ENCOUNTER — TELEPHONE (OUTPATIENT)
Dept: OBGYN | Facility: CLINIC | Age: 29
End: 2019-06-06

## 2019-06-06 ENCOUNTER — HOSPITAL ENCOUNTER (OUTPATIENT)
Dept: ULTRASOUND IMAGING | Facility: CLINIC | Age: 29
Discharge: HOME | End: 2019-06-06
Attending: ADVANCED PRACTICE MIDWIFE | Admitting: ADVANCED PRACTICE MIDWIFE
Payer: COMMERCIAL

## 2019-06-06 ENCOUNTER — OFFICE VISIT (OUTPATIENT)
Dept: OBGYN | Facility: CLINIC | Age: 29
End: 2019-06-06
Payer: COMMERCIAL

## 2019-06-06 VITALS
BODY MASS INDEX: 29.43 KG/M2 | DIASTOLIC BLOOD PRESSURE: 78 MMHG | WEIGHT: 166.1 LBS | SYSTOLIC BLOOD PRESSURE: 117 MMHG | HEART RATE: 84 BPM | HEIGHT: 63 IN

## 2019-06-06 DIAGNOSIS — O36.4XX0 IUFD AT 20 WEEKS OR MORE OF GESTATION: ICD-10-CM

## 2019-06-06 DIAGNOSIS — Z87.59 HISTORY OF IUFD: ICD-10-CM

## 2019-06-06 DIAGNOSIS — D50.8 OTHER IRON DEFICIENCY ANEMIA: Primary | ICD-10-CM

## 2019-06-06 DIAGNOSIS — O09.90 HIGH-RISK PREGNANCY, UNSPECIFIED TRIMESTER: ICD-10-CM

## 2019-06-06 DIAGNOSIS — O09.92 HIGH-RISK PREGNANCY IN SECOND TRIMESTER: Primary | ICD-10-CM

## 2019-06-06 DIAGNOSIS — O09.92 HIGH-RISK PREGNANCY IN SECOND TRIMESTER: ICD-10-CM

## 2019-06-06 DIAGNOSIS — O34.33 CERVICAL CERCLAGE SUTURE PRESENT IN THIRD TRIMESTER: ICD-10-CM

## 2019-06-06 DIAGNOSIS — Z87.59 HISTORY OF SEVERE PRE-ECLAMPSIA: ICD-10-CM

## 2019-06-06 LAB
ERYTHROCYTE [DISTWIDTH] IN BLOOD BY AUTOMATED COUNT: 15.1 % (ref 10–15)
FERRITIN SERPL-MCNC: 10 NG/ML (ref 12–150)
HCT VFR BLD AUTO: 30.8 % (ref 35–47)
HGB BLD-MCNC: 10 G/DL (ref 11.7–15.7)
IRON SATN MFR SERPL: 16 % (ref 15–46)
IRON SERPL-MCNC: 73 UG/DL (ref 35–180)
MCH RBC QN AUTO: 27.9 PG (ref 26.5–33)
MCHC RBC AUTO-ENTMCNC: 32.5 G/DL (ref 31.5–36.5)
MCV RBC AUTO: 86 FL (ref 78–100)
PLATELET # BLD AUTO: 159 10E9/L (ref 150–450)
RBC # BLD AUTO: 3.58 10E12/L (ref 3.8–5.2)
TIBC SERPL-MCNC: 454 UG/DL (ref 240–430)
WBC # BLD AUTO: 10.9 10E9/L (ref 4–11)

## 2019-06-06 PROCEDURE — 83550 IRON BINDING TEST: CPT | Performed by: STUDENT IN AN ORGANIZED HEALTH CARE EDUCATION/TRAINING PROGRAM

## 2019-06-06 PROCEDURE — G0463 HOSPITAL OUTPT CLINIC VISIT: HCPCS | Mod: 25

## 2019-06-06 PROCEDURE — 82728 ASSAY OF FERRITIN: CPT | Performed by: STUDENT IN AN ORGANIZED HEALTH CARE EDUCATION/TRAINING PROGRAM

## 2019-06-06 PROCEDURE — 76819 FETAL BIOPHYS PROFIL W/O NST: CPT

## 2019-06-06 PROCEDURE — 83540 ASSAY OF IRON: CPT | Performed by: STUDENT IN AN ORGANIZED HEALTH CARE EDUCATION/TRAINING PROGRAM

## 2019-06-06 PROCEDURE — 85027 COMPLETE CBC AUTOMATED: CPT | Performed by: STUDENT IN AN ORGANIZED HEALTH CARE EDUCATION/TRAINING PROGRAM

## 2019-06-06 PROCEDURE — 36415 COLL VENOUS BLD VENIPUNCTURE: CPT | Performed by: STUDENT IN AN ORGANIZED HEALTH CARE EDUCATION/TRAINING PROGRAM

## 2019-06-06 ASSESSMENT — MIFFLIN-ST. JEOR: SCORE: 1437.39

## 2019-06-06 NOTE — PROGRESS NOTES
"Subjective:     Pam Syed, a 28yo  at 32w2d by LMP c/w 10w2d US presents for return OB care.  She reports feeling well overall, just a little tired with a little bit of low back pain.  The urinary frequency she noticed at her last visit resolved spontaneously.  Regular fetal movement.  She denies contractions, loss of fluid, vaginal bleeding, changes in vaginal discharge and vaginal burning/pain.  Denies dysuria.      Pregnancy complicated by:  -Shortened cervix, s/p cerclage placement  -History of term IUFD  -History of preeclampsia  -History of recurrent pregnancy loss     Objective:  /78   Pulse 84   Ht 1.6 m (5' 2.99\")   Wt 75.3 kg (166 lb 1.6 oz)   LMP 10/23/2018   BMI 29.43 kg/m    Gen: NAD  Resp: no difficulty breathing  Abd: gravid, non tender, cephalic on leopold, head not engaged in pelvis     FH: 33cm    US 19 - BPP 8/8, MVP 5.3,      Assessment/Plan  28yo  at 32w2d by LMP c/w 10w2d US presents for routine OB care      #History of pre-eclampsia  - No signs or symptoms of pre-eclampsia at this time, normotensive   - Continue ASA 81 mg   - Weekly blood pressure monitoring, per MFM recommendations  - Continue growth ultrasounds every 4 weeks. Next due , will schedule today  - HELLP labs nl 2019, UPC 0.08, repeat as clinically indicated     #History of intrauterine fetal demise, concern for abruption 2/2 pre-eclampsia  - S/p MFM consult  - Continue weekly BPPs     #Iron deficiency anemia  - Normal Hgb ELP in 2018  - Hgb 9.4 (5/10/19), patient just started taking supplemental PO iron two weeks ago. Tolerating well. Recheck CBC with iron studies today, c/w TYSON - called pt and directed to cont PO iron  - Called pt with CBC results, Hgb improved to 10, ferritin low so recommend continnuing PO iron supplementation which she was agreeable to     # PNC  - Rh pos, Rubella immune  - Hepatitis B indeterminate, will vaccinate PP    - Other prenatal labs wnl  - Last " growth US: EFW 1250g 45%ile, cephalic, anterior placenta, 3VC.  - GCT passed  - S/p Tdap on 5/10/19  - Plan for delivery by 39 weeks given prior IUFD      Return to clinic in 1 week for BP check and and in 2 weeks for CAS. Continue weekly BPPs (growth next week)    Patient staffed with Dr. Terry Moore MD   OB/GYN Resident PGY-3  6/6/2019 2:14 PM    The Patient was seen in Resident Continuity Clinic by AYANNA MOORE.  I reviewed the history & exam. Assessment and plan were jointly made.    Cortney Stewart MD

## 2019-06-10 ENCOUNTER — ALLIED HEALTH/NURSE VISIT (OUTPATIENT)
Dept: OBGYN | Facility: CLINIC | Age: 29
End: 2019-06-10
Payer: COMMERCIAL

## 2019-06-10 ENCOUNTER — HOSPITAL ENCOUNTER (OUTPATIENT)
Dept: ULTRASOUND IMAGING | Facility: CLINIC | Age: 29
Discharge: HOME | End: 2019-06-10
Attending: OBSTETRICS & GYNECOLOGY | Admitting: OBSTETRICS & GYNECOLOGY
Payer: COMMERCIAL

## 2019-06-10 ENCOUNTER — OFFICE VISIT (OUTPATIENT)
Dept: MATERNAL FETAL MEDICINE | Facility: CLINIC | Age: 29
End: 2019-06-10
Attending: OBSTETRICS & GYNECOLOGY
Payer: COMMERCIAL

## 2019-06-10 VITALS — DIASTOLIC BLOOD PRESSURE: 73 MMHG | HEART RATE: 96 BPM | SYSTOLIC BLOOD PRESSURE: 105 MMHG

## 2019-06-10 DIAGNOSIS — Z87.59 HISTORY OF SEVERE PRE-ECLAMPSIA: Primary | ICD-10-CM

## 2019-06-10 DIAGNOSIS — Z98.890 HISTORY OF CERCLAGE, CURRENTLY PREGNANT, THIRD TRIMESTER: ICD-10-CM

## 2019-06-10 DIAGNOSIS — Z87.59 HISTORY OF IUFD: Primary | ICD-10-CM

## 2019-06-10 DIAGNOSIS — O09.293 HISTORY OF CERCLAGE, CURRENTLY PREGNANT, THIRD TRIMESTER: ICD-10-CM

## 2019-06-10 DIAGNOSIS — O26.90 PREGNANCY RELATED CONDITION, ANTEPARTUM: ICD-10-CM

## 2019-06-10 PROCEDURE — 76819 FETAL BIOPHYS PROFIL W/O NST: CPT | Performed by: OBSTETRICS & GYNECOLOGY

## 2019-06-10 PROCEDURE — 76816 OB US FOLLOW-UP PER FETUS: CPT

## 2019-06-10 PROCEDURE — 40000269 ZZH STATISTIC NO CHARGE FACILITY FEE: Mod: ZF

## 2019-06-10 NOTE — NURSING NOTE
Chief Complaint   Patient presents with     Allied Health Visit     BP check       See VALERIE Hoyos 6/10/2019    Pt here for BP check. Pt put on BP protocol. Her readings were 106/74, 104/73, 105/73, with average of 3 at 105/73.

## 2019-06-10 NOTE — PROGRESS NOTES
Please refer to ultrasound report under 'Imaging' Studies of 'Chart Review' tabs.    Aaron Glass M.D.

## 2019-06-20 ENCOUNTER — ANCILLARY PROCEDURE (OUTPATIENT)
Dept: ULTRASOUND IMAGING | Facility: CLINIC | Age: 29
End: 2019-06-20
Attending: STUDENT IN AN ORGANIZED HEALTH CARE EDUCATION/TRAINING PROGRAM
Payer: COMMERCIAL

## 2019-06-20 ENCOUNTER — OFFICE VISIT (OUTPATIENT)
Dept: OBGYN | Facility: CLINIC | Age: 29
End: 2019-06-20
Attending: OBSTETRICS & GYNECOLOGY
Payer: COMMERCIAL

## 2019-06-20 VITALS
SYSTOLIC BLOOD PRESSURE: 116 MMHG | DIASTOLIC BLOOD PRESSURE: 84 MMHG | BODY MASS INDEX: 29.59 KG/M2 | WEIGHT: 167 LBS | HEART RATE: 109 BPM

## 2019-06-20 DIAGNOSIS — Z87.59 HISTORY OF SEVERE PRE-ECLAMPSIA: ICD-10-CM

## 2019-06-20 DIAGNOSIS — O09.90 HIGH-RISK PREGNANCY, UNSPECIFIED TRIMESTER: ICD-10-CM

## 2019-06-20 DIAGNOSIS — O34.33 CERVICAL CERCLAGE SUTURE PRESENT IN THIRD TRIMESTER: ICD-10-CM

## 2019-06-20 DIAGNOSIS — Z87.59 HISTORY OF IUFD: ICD-10-CM

## 2019-06-20 DIAGNOSIS — O09.893 SUPERVISION OF OTHER HIGH RISK PREGNANCIES, THIRD TRIMESTER: Primary | ICD-10-CM

## 2019-06-20 PROCEDURE — 76819 FETAL BIOPHYS PROFIL W/O NST: CPT

## 2019-06-20 PROCEDURE — G0463 HOSPITAL OUTPT CLINIC VISIT: HCPCS | Mod: ZF

## 2019-06-20 ASSESSMENT — PAIN SCALES - GENERAL: PAINLEVEL: NO PAIN (0)

## 2019-06-20 NOTE — NURSING NOTE
Chief Complaint   Patient presents with     Prenatal Care     CAS 34 weeks and 2 days   Olga Milian LPN

## 2019-06-20 NOTE — LETTER
2019       RE: Pam Syed  [address]  [place] [address]     Dear Colleague,    Thank you for referring your patient, Pam Syed, to the [facility] at [facility]. Please see a copy of my visit note below.    S:  Overall feeling well, some mild cramping, good fetal movement.  Very anxious about having another IUFD as her due date gets closer.  Hoping to have her cerclage removed on , would like IOL as soon after that as possible.      O: see flow    A: 28 y/o  at 34+2 weeks, overall doing well.  Pregnancy complicated by h/o IUFD thought to be secondary to abruption in the setting of pre-eclampsia.      P: Continue weekly BPPs, serial growth assessment as previously planned.  She will call M for cerclage removal appt.  RTC 1 week, GBS then.  Will schedule IOL at 38 weeks if no spontaneous labor by then.    Zeenat Pino MD, FACOG

## 2019-06-20 NOTE — PROGRESS NOTES
S:  Overall feeling well, some mild cramping, good fetal movement.  Very anxious about having another IUFD as her due date gets closer.  Hoping to have her cerclage removed on , would like IOL as soon after that as possible.      O: see flow    A: 28 y/o  at 34+2 weeks, overall doing well.  Pregnancy complicated by h/o IUFD thought to be secondary to abruption in the setting of pre-eclampsia.      P: Continue weekly BPPs, serial growth assessment as previously planned.  She will call M for cerclage removal appt.  RTC 1 week, GBS then.  Will schedule IOL at 38 weeks if no spontaneous labor by then.    Zeenat Pino MD, FACOG

## 2019-06-26 ENCOUNTER — HOSPITAL ENCOUNTER (OUTPATIENT)
Dept: ULTRASOUND IMAGING | Facility: CLINIC | Age: 29
Discharge: HOME | End: 2019-06-26
Attending: OBSTETRICS & GYNECOLOGY | Admitting: OBSTETRICS & GYNECOLOGY
Payer: COMMERCIAL

## 2019-06-26 ENCOUNTER — OFFICE VISIT (OUTPATIENT)
Dept: MATERNAL FETAL MEDICINE | Facility: CLINIC | Age: 29
End: 2019-06-26
Attending: OBSTETRICS & GYNECOLOGY
Payer: COMMERCIAL

## 2019-06-26 VITALS — SYSTOLIC BLOOD PRESSURE: 114 MMHG | DIASTOLIC BLOOD PRESSURE: 69 MMHG

## 2019-06-26 DIAGNOSIS — O09.293 HISTORY OF CERCLAGE, CURRENTLY PREGNANT, THIRD TRIMESTER: Primary | ICD-10-CM

## 2019-06-26 DIAGNOSIS — Z87.59 HISTORY OF IUFD: ICD-10-CM

## 2019-06-26 DIAGNOSIS — Z98.890 HISTORY OF CERCLAGE, CURRENTLY PREGNANT, THIRD TRIMESTER: Primary | ICD-10-CM

## 2019-06-26 PROCEDURE — 76819 FETAL BIOPHYS PROFIL W/O NST: CPT

## 2019-06-27 ENCOUNTER — OFFICE VISIT (OUTPATIENT)
Dept: OBGYN | Facility: CLINIC | Age: 29
End: 2019-06-27
Attending: OBSTETRICS & GYNECOLOGY
Payer: COMMERCIAL

## 2019-06-27 VITALS
SYSTOLIC BLOOD PRESSURE: 126 MMHG | WEIGHT: 167.5 LBS | BODY MASS INDEX: 29.68 KG/M2 | HEART RATE: 92 BPM | HEIGHT: 63 IN | DIASTOLIC BLOOD PRESSURE: 83 MMHG

## 2019-06-27 DIAGNOSIS — Z87.59 HISTORY OF IUFD: ICD-10-CM

## 2019-06-27 DIAGNOSIS — O09.893 SUPERVISION OF OTHER HIGH RISK PREGNANCIES, THIRD TRIMESTER: Primary | ICD-10-CM

## 2019-06-27 DIAGNOSIS — O34.33 CERVICAL CERCLAGE SUTURE PRESENT IN THIRD TRIMESTER: ICD-10-CM

## 2019-06-27 PROCEDURE — G0463 HOSPITAL OUTPT CLINIC VISIT: HCPCS | Mod: ZF

## 2019-06-27 PROCEDURE — 87653 STREP B DNA AMP PROBE: CPT | Performed by: OBSTETRICS & GYNECOLOGY

## 2019-06-27 ASSESSMENT — MIFFLIN-ST. JEOR: SCORE: 1443.75

## 2019-06-27 NOTE — PROGRESS NOTES
S:  Doing well, good FM, no contractions or increased pressure.  Has cerclage removal scheduled for 19.  Planning to stop work on -knows that she may have to start her FMLA then and have less time off after the baby is born.  Feeling less anxious this week, getting nervous about labor.    O: see flow  GBS collected  cx cl/50/-2, well developed LONNIE, no tension on the cerclage with the knots/ends easily palpated at 12:00    A: 28 y/o  at 35+2 weeks, doing well.  Pregnancy complicated by h/o IUFD though to be secondary to abruption in the setting of pre-eclampsia, cervical insufficiency with cerclage in place.    P:  GBS collected today.  Continue weekly BPPs, cerclage removal on 19  as planned.  IOL scheduled at 38 weeks on 19-call at 7, arrive at 8.  RTC 1 week.     Zeenat Pino MD, FACOG

## 2019-06-27 NOTE — LETTER
2019       RE: Pam Syed  [address]  [place] [address]     Dear Colleague,    Thank you for referring your patient, Pam Syed, to the [facility] at [facility]. Please see a copy of my visit note below.    S:  Doing well, good FM, no contractions or increased pressure.  Has cerclage removal scheduled for 19.  Planning to stop work on -knows that she may have to start her FMLA then and have less time off after the baby is born.  Feeling less anxious this week, getting nervous about labor.    O: see flow  GBS collected  cx cl/50/-2, well developed LONNIE, no tension on the cerclage with the knots/ends easily palpated at 12:00    A: 30 y/o  at 35+2 weeks, doing well.  Pregnancy complicated by h/o IUFD though to be secondary to abruption in the setting of pre-eclampsia, cervical insufficiency with cerclage in place.    P:  GBS collected today.  Continue weekly BPPs, cerclage removal on 19  as planned.  IOL scheduled at 38 weeks on 19-call at 7, arrive at 8.  RTC 1 week.     Zeenat Pino MD, FACOG

## 2019-06-28 LAB
GP B STREP DNA SPEC QL NAA+PROBE: NEGATIVE
SPECIMEN SOURCE: NORMAL

## 2019-07-03 ENCOUNTER — HOSPITAL ENCOUNTER (OUTPATIENT)
Dept: ULTRASOUND IMAGING | Facility: CLINIC | Age: 29
Discharge: HOME | End: 2019-07-03
Attending: OBSTETRICS & GYNECOLOGY | Admitting: OBSTETRICS & GYNECOLOGY
Payer: COMMERCIAL

## 2019-07-03 ENCOUNTER — HOSPITAL ENCOUNTER (OUTPATIENT)
Facility: CLINIC | Age: 29
Discharge: HOME | End: 2019-07-03
Attending: OBSTETRICS & GYNECOLOGY | Admitting: OBSTETRICS & GYNECOLOGY
Payer: COMMERCIAL

## 2019-07-03 ENCOUNTER — OFFICE VISIT (OUTPATIENT)
Dept: MATERNAL FETAL MEDICINE | Facility: CLINIC | Age: 29
End: 2019-07-03
Attending: OBSTETRICS & GYNECOLOGY
Payer: COMMERCIAL

## 2019-07-03 VITALS
RESPIRATION RATE: 18 BRPM | DIASTOLIC BLOOD PRESSURE: 77 MMHG | TEMPERATURE: 98.3 F | SYSTOLIC BLOOD PRESSURE: 116 MMHG | HEART RATE: 93 BPM

## 2019-07-03 DIAGNOSIS — Z98.890 HISTORY OF CERCLAGE, CURRENTLY PREGNANT, THIRD TRIMESTER: Primary | ICD-10-CM

## 2019-07-03 DIAGNOSIS — Z87.59 HISTORY OF IUFD: ICD-10-CM

## 2019-07-03 DIAGNOSIS — O09.293 HISTORY OF CERCLAGE, CURRENTLY PREGNANT, THIRD TRIMESTER: Primary | ICD-10-CM

## 2019-07-03 DIAGNOSIS — O36.8390 ANTEPARTUM FETAL TACHYCARDIA AFFECTING CARE OF MOTHER: ICD-10-CM

## 2019-07-03 PROBLEM — Z36.89 ENCOUNTER FOR TRIAGE IN PREGNANT PATIENT: Status: ACTIVE | Noted: 2019-07-03

## 2019-07-03 PROCEDURE — 76818 FETAL BIOPHYS PROFILE W/NST: CPT | Mod: XE

## 2019-07-03 PROCEDURE — 59025 FETAL NON-STRESS TEST: CPT

## 2019-07-03 PROCEDURE — G0463 HOSPITAL OUTPT CLINIC VISIT: HCPCS

## 2019-07-03 PROCEDURE — 59025 FETAL NON-STRESS TEST: CPT | Mod: ZF | Performed by: OBSTETRICS & GYNECOLOGY

## 2019-07-03 NOTE — PROGRESS NOTES
"Procedure/Surgery Information   Procedure: Cerclage removal   Surgeon(s): Jacky Aguilar MD, ISIAH Lovelace   Specimens: None       After informed consent was obtained and Time Out completed, the patient was placed in stirrups on the exam table.  A Graves speculum was used to visualize the cerclage.  The cerclage was grasped and elevated with a ring forceps and was cut easily removed from the cervix.  The cervix was examined and found to be 1.5/75/-2 vtx at the end of the procedure.  No complications were noted.  NST was performed at the conclusion of the cerclage removal.     (Please see \"Imaging\" tab under \"Chart Review\" for details of today's US/NST).    Jacky Aguilar      "

## 2019-07-03 NOTE — PLAN OF CARE
Data: [facility] to the [facility] at 1121.   Reason for maternal/fetal assessment per patient is Non Stress Test  . Patient is a . Prenatal record reviewed.      OB History    Para Term  AB Living   8 2 2 0 5 1   SAB TAB Ectopic Multiple Live Births   5 0 0 0 2      # Outcome Date GA Lbr Michael/2nd Weight Sex Delivery Anes PTL Lv   8 Current            7 Term 18 39w3d 01:16 / 00:20 2.75 kg (6 lb 1 oz) M Vag-Spont None N ND      Complications: Fetal demise > 22 weeks, delivered, current hospitalization      Name: STEPH LIRIANO   6 SAB 17           5 Term 12/03/15 38w5d 03:45 / 01:16 3.11 kg (6 lb 13.7 oz) M Vag-Spont EPI  RAMILA      Apgar1: 9  Apgar5: 9   4 SAB            3 SAB            2 SAB            1 SAB               Obstetric Comments   2018 Severe Pre E Postpartum after term IUFD 39 weeks      Medical History:   Past Medical History:   Diagnosis Date    Recurrent pregnancy loss (CODE)     Severe pre-eclampsia, postpartum condition or complication 2018   . Gestational Age 36w1d. VSS. Cervix: not examined.  Fetal movement present. Patient denies cramping, backache, vaginal discharge, pelvic pressure, UTI symptoms, GI problems, bloody show, vaginal bleeding, edema, headache, visual disturbances, epigastric or URQ pain, abdominal pain, rupture of membranes. Support persons spouse present.  Action: Verbal consent for EFM. Triage assessment completed. EFM applied for fetal assessment. Uterine assessment by toco. Fetal assessment: Presumed adequate fetal oxygenation documented (see flow record). Patient education pamphlets given on fetal movement counts and when to call provider. Patient instructed to report change in fetal movement, vaginal leaking of fluid or bleeding, abdominal pain, or any concerns related to the pregnancy to her nurse/physician.   Response: Dr. Ybarra and Dr. Ring informed of patient's arrival. Plan per provider is to discharge to home after  reactive NST. Patient verbalized understanding of education and verbalized agreement with plan. Discharged ambulatory at 1255.

## 2019-07-03 NOTE — DISCHARGE INSTRUCTIONS
[facility]   Reason for maternal/fetal assessment per patient is Non Stress Test  . Patient is a . Prenatal record reviewed.      OB History    Para Term  AB Living   8 2 2 0 5 1   SAB TAB Ectopic Multiple Live Births   5 0 0 0 2      # Outcome Date GA Lbr Michael/2nd Weight Sex Delivery Anes PTL Lv   8 Current            7 Term 18 39w3d 01:16 / 00:20 2.75 kg (6 lb 1 oz) M Vag-Spont None N ND      Complications: Fetal demise > 22 weeks, delivered, current hospitalization      Name: STEPH LIRIANO   6 SAB 17           5 Term 12/03/15 38w5d 03:45 / 01:16 3.11 kg (6 lb 13.7 oz) M Vag-Spont EPI  RAMILA      Apgar1: 9  Apgar5: 9   4 SAB            3 SAB            2 SAB            1 SAB               Obstetric Comments   2018 Severe Pre E Postpartum after term IUFD 39 weeks      Medical History:   Past Medical History:   Diagnosis Date     Recurrent pregnancy loss (CODE)      Severe pre-eclampsia, postpartum condition or complication 2018   . Gestational Age 36w1d. VSS. Cervix: not examined.  Fetal movement present. Patient denies cramping, backache, vaginal discharge, pelvic pressure, UTI symptoms, GI problems, bloody show, vaginal bleeding, edema, headache, visual disturbances, epigastric or URQ pain, abdominal pain, rupture of membranes. Support persons spouse present.  Action: Verbal consent for EFM. Triage assessment completed. EFM applied for fetal assessment. Uterine assessment by toco. Fetal assessment: Presumed adequate fetal oxygenation documented (see flow record). Patient education pamphlets given on fetal movement counts and when to call provider. Patient instructed to report change in fetal movement, vaginal leaking of fluid or bleeding, abdominal pain, or any concerns related to the pregnancy to her nurse/physician.   Response: Dr. Ybarra and Dr. Ring informed of patient's arrival. Plan per provider is to discharge to home  after reactive NST. Patient verbalized understanding of education and verbalized agreement with plan. Discharged ambulatory at 1255.

## 2019-07-03 NOTE — PROGRESS NOTES
[facility]  OB Triage Note    CC: Non-reactive NST    HPI: Ms. Pam Syed is a 29 year old  at 36w1d by LMP c/w 10w2d US, who presents to L&D after BPP and NST today. BPP in clinic was 8/8, however fetal heart rate was found to be elevated to the 160s and therefore an NST was performed. NST showed minimal to moderate variability, without accels or decels. She was then sent to L&D for prolonged monitoring. Patient states she feels fine, nothing out of the ordinary. She has felt slight cramping since her cerclage removal today, but this improved after she urinated. Her cervix was checked after cerclage removal and found to be 1.5 cm. She denies leaking fluid, vaginal bleeding.  + Good fetal movement.    Pregnancy complications:  - Shortened cervix s/p cerclage placement   - H/o postpartum pre-eclampsia with SF  - H/o fetal demise at 39w in 2018    O:  Patient Vitals for the past 24 hrs:   BP Temp Temp src Pulse Resp   19 1136 116/77 98.3  F (36.8  C) Oral 93 18     Gen: Well-appearing, NAD  CV: Well perfused  Pulm: Non-labored respirations   Abd: Soft, gravid  Ext: No LE edema b/l    FHT: Baseline 145, moderate variability, present accelerations, no decelerations  Pinas: Quiet      A/P:  Ms. Pam Syed is a 29 year old  at 36w1d by LMP c/w 10w2d US here for continued monitoring after a BPP of 8/8 but nonreactive NST in clinic. FHT here demonstrates 35 minutes of Cat I FHT with accelerations present. Patient reassured and feels comfortable with discharge. Has next appointment scheduled for 19 with CNM and 7/10/19 with MFM.      Leatha Park MD  OB/GYN, PGY-2  7/3/2019, 12:29 PM    [facility] staff:  Appreciate note by Dr. Park.  I have seen and examined the patient without the resident. I have reviewed, edited, and agree with the note.      Rosanna Ybarra MD, FACOG

## 2019-07-03 NOTE — NURSING NOTE
Pt at [facility] for BPP and cerclage removal.  See procedure note by Dr. Aguilar.  BPP 8/8 but FHR in 160s.  NST completed per Dr. Aguilar's order.  NST- baseline 150, minimal to moderate variability, no accelerations or decelerations.  [facility] to [facility] for further monitoring due to non-reactive NST.  Pt and partner ambulated to [facility].  Writer spoke with charge RN and MD on call paged to Dr. Aguilar's number.

## 2019-07-05 ENCOUNTER — OFFICE VISIT (OUTPATIENT)
Dept: OBGYN | Facility: CLINIC | Age: 29
End: 2019-07-05
Attending: ADVANCED PRACTICE MIDWIFE
Payer: COMMERCIAL

## 2019-07-05 VITALS
BODY MASS INDEX: 29.75 KG/M2 | SYSTOLIC BLOOD PRESSURE: 112 MMHG | HEIGHT: 63 IN | DIASTOLIC BLOOD PRESSURE: 78 MMHG | HEART RATE: 92 BPM | WEIGHT: 167.9 LBS

## 2019-07-05 DIAGNOSIS — O09.93 HRP (HIGH RISK PREGNANCY), THIRD TRIMESTER: Primary | ICD-10-CM

## 2019-07-05 LAB
ERYTHROCYTE [DISTWIDTH] IN BLOOD BY AUTOMATED COUNT: 17 % (ref 10–15)
FERRITIN SERPL-MCNC: 11 NG/ML (ref 12–150)
HCT VFR BLD AUTO: 32.8 % (ref 35–47)
HGB BLD-MCNC: 10.9 G/DL (ref 11.7–15.7)
IRON SERPL-MCNC: 75 UG/DL (ref 35–180)
MCH RBC QN AUTO: 27.8 PG (ref 26.5–33)
MCHC RBC AUTO-ENTMCNC: 33.2 G/DL (ref 31.5–36.5)
MCV RBC AUTO: 84 FL (ref 78–100)
PLATELET # BLD AUTO: 153 10E9/L (ref 150–450)
RBC # BLD AUTO: 3.92 10E12/L (ref 3.8–5.2)
WBC # BLD AUTO: 8.9 10E9/L (ref 4–11)

## 2019-07-05 PROCEDURE — 82728 ASSAY OF FERRITIN: CPT | Performed by: ADVANCED PRACTICE MIDWIFE

## 2019-07-05 PROCEDURE — 36415 COLL VENOUS BLD VENIPUNCTURE: CPT | Performed by: ADVANCED PRACTICE MIDWIFE

## 2019-07-05 PROCEDURE — 85027 COMPLETE CBC AUTOMATED: CPT | Performed by: ADVANCED PRACTICE MIDWIFE

## 2019-07-05 PROCEDURE — 83540 ASSAY OF IRON: CPT | Performed by: ADVANCED PRACTICE MIDWIFE

## 2019-07-05 ASSESSMENT — PAIN SCALES - GENERAL: PAINLEVEL: NO PAIN (0)

## 2019-07-05 ASSESSMENT — MIFFLIN-ST. JEOR: SCORE: 1445.59

## 2019-07-05 NOTE — LETTER
"2019       RE: Pam Syed  [address]  [place] [id number]     Dear Colleague,    Thank you for referring your patient, Pam Syed, to the [facility] at [facility]. Please see a copy of my visit note below.    Subjective:      29 year old  at 36w3d presents for a routine prenatal appointment.  Excited about labor and holding her baby.           Denies vaginal bleeding,  leakage of fluid, or change in vaginal discharge.  Some irregular contractions.  Reports regular fetal movement.     No HA, visual changes, RUQ or epigastric pain.   Patient concerns: Emotional about feelings around birth and bringing back memories from loss last summer.  Feeling well overall.   Objective:  Vitals:    19 1426   BP: 112/78   Pulse: 92   Weight: 76.2 kg (167 lb 14.4 oz)   Height: 1.6 m (5' 2.99\")    See OB flowsheet    Assessment/Plan     Encounter Diagnosis   Name Primary?     HRP (high risk pregnancy), third trimester Yes     Orders Placed This Encounter   Procedures     CBC with platelets     Ferritin     Iron         PHQ-9 SCORE 2018 10/11/2018 5/10/2019   PHQ-9 Total Score 5 6 2     PHQ-9 SCORE 2018 10/11/2018 5/10/2019   PHQ-9 Total Score 5 6 2     GBS screening: Negative  CBC today with iron and ferritin  Birth preferences reviewed: Open to pain medication, hoping for spontaneous labor  Labor signs discussed. Reinforced daily fetal movement counts.  Reviewed why/how to contact provider if headache/visual changes/RUQ or epigastric pain, decreased fetal movement, vaginal bleeding, leakage of fluid.   Return to clinic in 1 week and prn if questions or concerns.     ASHIA Martin CNM  "

## 2019-07-05 NOTE — NURSING NOTE
Chief Complaint   Patient presents with     Prenatal Care   36.3 weeks ob visit cerclage was removed this past Wednesday.  Some light cramping.

## 2019-07-05 NOTE — PROGRESS NOTES
"Subjective:      29 year old  at 36w3d presents for a routine prenatal appointment.  Excited about labor and holding her baby.           Denies vaginal bleeding,  leakage of fluid, or change in vaginal discharge.  Some irregular contractions.  Reports regular fetal movement.     No HA, visual changes, RUQ or epigastric pain.   Patient concerns: Emotional about feelings around birth and bringing back memories from loss last summer.  Feeling well overall.   Objective:  Vitals:    19 1426   BP: 112/78   Pulse: 92   Weight: 76.2 kg (167 lb 14.4 oz)   Height: 1.6 m (5' 2.99\")    See OB flowsheet    Assessment/Plan     Encounter Diagnosis   Name Primary?     HRP (high risk pregnancy), third trimester Yes     Orders Placed This Encounter   Procedures     CBC with platelets     Ferritin     Iron         PHQ-9 SCORE 2018 10/11/2018 5/10/2019   PHQ-9 Total Score 5 6 2     PHQ-9 SCORE 2018 10/11/2018 5/10/2019   PHQ-9 Total Score 5 6 2     GBS screening: Negative  CBC today with iron and ferritin  Birth preferences reviewed: Open to pain medication, hoping for spontaneous labor  Labor signs discussed. Reinforced daily fetal movement counts.  Reviewed why/how to contact provider if headache/visual changes/RUQ or epigastric pain, decreased fetal movement, vaginal bleeding, leakage of fluid.   Return to clinic in 1 week and prn if questions or concerns.     ASHIA Martin CNM  "

## 2019-07-09 ENCOUNTER — OFFICE VISIT (OUTPATIENT)
Dept: OBGYN | Facility: CLINIC | Age: 29
End: 2019-07-09
Attending: ADVANCED PRACTICE MIDWIFE
Payer: COMMERCIAL

## 2019-07-09 VITALS
SYSTOLIC BLOOD PRESSURE: 114 MMHG | HEART RATE: 91 BPM | DIASTOLIC BLOOD PRESSURE: 77 MMHG | BODY MASS INDEX: 30.64 KG/M2 | WEIGHT: 166.5 LBS | HEIGHT: 62 IN

## 2019-07-09 DIAGNOSIS — O09.90 HIGH-RISK PREGNANCY, UNSPECIFIED TRIMESTER: Primary | ICD-10-CM

## 2019-07-09 PROBLEM — Z36.89 ENCOUNTER FOR TRIAGE IN PREGNANT PATIENT: Status: RESOLVED | Noted: 2019-07-03 | Resolved: 2019-07-09

## 2019-07-09 PROBLEM — O34.30 CERVICAL CERCLAGE SUTURE PRESENT: Status: RESOLVED | Noted: 2019-03-22 | Resolved: 2019-07-09

## 2019-07-09 PROCEDURE — G0463 HOSPITAL OUTPT CLINIC VISIT: HCPCS | Mod: ZF

## 2019-07-09 ASSESSMENT — MIFFLIN-ST. JEOR: SCORE: 1423.49

## 2019-07-09 NOTE — PROGRESS NOTES
"Subjective:     29 year old  at 37w0d presents for routine prenatal visit.   Denies vaginal bleeding or leakage of fluid.  BH contractions.  + fetal movement.       No HA, visual changes, RUQ or epigastric pain.   Patient concerns: Feeling well overall.   - Some hip pain, mostly when laying   - Ready for labor, desires SVE    Objective:  Vitals:    19 0818   BP: 114/77   BP Location: Left arm   Patient Position: Chair   Pulse: 91   Weight: 75.5 kg (166 lb 8 oz)   Height: 1.575 m (5' 2\")   See OB flowsheet    SVE: 3-/-1, soft, mid-anterior    Assessment/Plan:  Encounter Diagnosis   Name Primary?     High-risk pregnancy - [facility] CNM Yes     - Reviewed why/how to contact provider if headache/visual changes/RUQ or epigastric pain, decreased fetal movement, vaginal bleeding, leakage of fluid or strong/regular contractions.  - Patient education/orders or handouts today: Sign/symptoms of labor, When to call for labor or other concerns and Induction of labor   - IOL scheduled for   - Return to clinic in 1 week and prn if questions or concerns.   "

## 2019-07-09 NOTE — LETTER
"2019       RE: Pam Syed  [address]  [place] [address]     Dear Colleague,    Thank you for referring your patient, Pam Syed, to the [facility] at [facility]. Please see a copy of my visit note below.    Subjective:     29 year old  at 37w0d presents for routine prenatal visit.   Denies vaginal bleeding or leakage of fluid.  BH contractions.  + fetal movement.       No HA, visual changes, RUQ or epigastric pain.   Patient concerns: Feeling well overall.   - Some hip pain, mostly when laying   - Ready for labor, desires SVE    Objective:  Vitals:    19 0818   BP: 114/77   BP Location: Left arm   Patient Position: Chair   Pulse: 91   Weight: 75.5 kg (166 lb 8 oz)   Height: 1.575 m (5' 2\")   See OB flowsheet    SVE: 3-/-1, soft, mid-anterior    Assessment/Plan:  Encounter Diagnosis   Name Primary?     High-risk pregnancy - [facility] CNM Yes     - Reviewed why/how to contact provider if headache/visual changes/RUQ or epigastric pain, decreased fetal movement, vaginal bleeding, leakage of fluid or strong/regular contractions.  - Patient education/orders or handouts today: Sign/symptoms of labor, When to call for labor or other concerns and Induction of labor   - IOL scheduled for   - Return to clinic in 1 week and prn if questions or concerns.     Again, thank you for allowing me to participate in the care of your patient.      Sincerely,    ASHIA Gipson CNM      "

## 2019-07-10 ENCOUNTER — OFFICE VISIT (OUTPATIENT)
Dept: MATERNAL FETAL MEDICINE | Facility: CLINIC | Age: 29
End: 2019-07-10
Attending: OBSTETRICS & GYNECOLOGY
Payer: COMMERCIAL

## 2019-07-10 ENCOUNTER — HOSPITAL ENCOUNTER (OUTPATIENT)
Dept: ULTRASOUND IMAGING | Facility: CLINIC | Age: 29
Discharge: HOME | End: 2019-07-10
Attending: OBSTETRICS & GYNECOLOGY | Admitting: OBSTETRICS & GYNECOLOGY
Payer: COMMERCIAL

## 2019-07-10 DIAGNOSIS — Z87.59 HISTORY OF IUFD: ICD-10-CM

## 2019-07-10 DIAGNOSIS — Z87.59 HISTORY OF IUFD: Primary | ICD-10-CM

## 2019-07-10 PROCEDURE — 76816 OB US FOLLOW-UP PER FETUS: CPT

## 2019-07-10 PROCEDURE — 76819 FETAL BIOPHYS PROFIL W/O NST: CPT | Performed by: OBSTETRICS & GYNECOLOGY

## 2019-07-11 ENCOUNTER — TELEPHONE (OUTPATIENT)
Dept: OBGYN | Facility: CLINIC | Age: 29
End: 2019-07-11

## 2019-07-11 ENCOUNTER — HOSPITAL ENCOUNTER (OUTPATIENT)
Facility: CLINIC | Age: 29
Discharge: HOME | End: 2019-07-11
Attending: ADVANCED PRACTICE MIDWIFE | Admitting: ADVANCED PRACTICE MIDWIFE
Payer: COMMERCIAL

## 2019-07-11 VITALS — RESPIRATION RATE: 16 BRPM | SYSTOLIC BLOOD PRESSURE: 115 MMHG | DIASTOLIC BLOOD PRESSURE: 71 MMHG | TEMPERATURE: 97.4 F

## 2019-07-11 PROCEDURE — 59025 FETAL NON-STRESS TEST: CPT

## 2019-07-11 PROCEDURE — G0463 HOSPITAL OUTPT CLINIC VISIT: HCPCS | Mod: 25

## 2019-07-11 RX ORDER — ACETAMINOPHEN 325 MG/1
650-975 TABLET ORAL EVERY 4 HOURS PRN
Status: DISCONTINUED | OUTPATIENT
Start: 2019-07-11 | End: 2019-07-11 | Stop reason: HOSPADM

## 2019-07-11 RX ORDER — ONDANSETRON 2 MG/ML
4 INJECTION INTRAMUSCULAR; INTRAVENOUS EVERY 6 HOURS PRN
Status: DISCONTINUED | OUTPATIENT
Start: 2019-07-11 | End: 2019-07-11 | Stop reason: HOSPADM

## 2019-07-11 RX ORDER — HYDROXYZINE HYDROCHLORIDE 50 MG/1
50-100 TABLET, FILM COATED ORAL EVERY 6 HOURS PRN
Status: DISCONTINUED | OUTPATIENT
Start: 2019-07-11 | End: 2019-07-11 | Stop reason: HOSPADM

## 2019-07-11 NOTE — H&P
HOSPITAL TRIAGE NOTE  ===================    CHIEF COMPLAINT  ========================  Pam Syed is a 29 year old patient presenting today at 37w2d for evaluation of uterine contractions.    Patient's last menstrual period was 10/23/2018.  Estimated Date of Delivery: 2019     HPI  ==================   Had her cerclage out last week and was 4cm dilated in clinic. Started feeling cramping this morning. No bleeding, baby active. Has had increased mucous today, denies watery fluid.  Hx of IUFD at term with postpartum preeclampsia with severe features.  Prenatal record and labs reviewed from [facility], through [employer] EMR.    CONTRACTIONS: irreg and mild  ABDOMINAL PAIN: none  FETAL MOVEMENT: active    VAGINAL BLEEDING: none  RUPTURE OF MEMBRANES: no  PELVIC PAIN: none    PREGNANCY COMPLICATIONS: hx of multiple SAB, hx of IUFD, hx of preeclampsia with severe features, cerclage in 22-36 weeks  OTHER:     # Pain Assessment:  Current Pain Score 7/3/2019   Patient currently in pain? denies   Pain location -   Pain descriptors -   Pam mendoza pain level was assessed and she currently denies pain.        REVIEW OF SYSTEMS  =====================  C: NEGATIVE for fever, chills  I: NEGATIVE for worrisome rashes, moles or lesions  E: NEGATIVE for vision changes or irritation  R: NEGATIVE for significant cough or SOB  CV: NEGATIVE for chest pain, palpitations or varicosities  GI: NEGATIVE for nausea, abdominal pain, heartburn, or change in bowel habits  : NEGATIVE for frequency, dysuria, or hematuria  M: NEGATIVE for significant arthralgias or myalgia  N: NEGATIVE for headache, weakness, dizziness or paresthesias  P: NEGATIVE for changes in mood or affect    PROBLEM LIST  ===============  Patient Active Problem List    Diagnosis Date Noted     IUFD at 20 weeks or more of gestation 2018     Priority: High     18- IUFD boy. See delivery summary.  Toxoplasmosis IgG positive, IgM  negative,   Parvo IgG pos, IgM neg, other labs pending___  Planning Follow-up with CNM and psychologist in one week____    18: placenta pathology reviewed. Hematoma infarct noted, see formal report.        Labor and delivery, indication for care 2019     Priority: Medium     High-risk pregnancy - [facility] CNM 2019     Priority: Medium      MFM consultation Dr. Shields:  At the conclusion of our discussion, we have made the following recommendations.      - Frequent monitoring of BP, every 2 weeks after 20 weeks and weekly after 28 weeks  - Continue low dose ASA of 81 mg until delivery. No indication for enoxaparin or other UFH at this time.   - BP is expected to drop in the 1st trimester and early second trimester but will increase again the 3rd trimester.  - Monitor for signs and symptoms of preeclampsia  - Do not recommend starting BP medications during pregnancy unless severe range BP noted (>160/>110), then recommend inpatient evaluation and management.  - Serial fetal growth scans every 4-6 weeks following the fetal survey.  - Antepartum surveillance via weekly BPP starting at 28 weeks based on maternal anxiety and discussion today  - Pregnancy not to be prolonged beyond 39 weeks of gestation  -  delivery should be reserved for OB indications  - OB care to be managed by a CNM or a an OBGYN physician depending on CNM protocols.           History of severe pre-eclampsia 2018     Priority: Medium     Vitamin D deficiency 2018     Priority: Medium     19: 14disc supp.        History of recurrent  2018     Priority: Medium       HISTORIES  ==============  ALLERGIES:    No Known Allergies  PAST MEDICAL HISTORY  Past Medical History:   Diagnosis Date     Recurrent pregnancy loss (CODE)      Severe pre-eclampsia, postpartum condition or complication 2018     SOCIAL HISTORY  Social History     Socioeconomic History     Marital status:      Spouse name: Nilson      Number of children: Not on file     Years of education: Not on file     Highest education level: Not on file   Occupational History     Not on file   Social Needs     Financial resource strain: Not on file     Food insecurity:     Worry: Not on file     Inability: Not on file     Transportation needs:     Medical: Not on file     Non-medical: Not on file   Tobacco Use     Smoking status: Never Smoker     Smokeless tobacco: Never Used   Substance and Sexual Activity     Alcohol use: No     Drug use: No     Sexual activity: Yes     Partners: Male   Lifestyle     Physical activity:     Days per week: Not on file     Minutes per session: Not on file     Stress: Not on file   Relationships     Social connections:     Talks on phone: Not on file     Gets together: Not on file     Attends [employer] service: Not on file     Active member of club or organization: Not on file     Attends meetings of clubs or organizations: Not on file     Relationship status: Not on file     Intimate partner violence:     Fear of current or ex partner: Not on file     Emotionally abused: Not on file     Physically abused: Not on file     Forced sexual activity: Not on file   Other Topics Concern     Not on file   Social History Narrative     Not on file     PARTNER:  here  EMPLOYMENT:  Not reviewed  FAMILY HISTORY  No family history on file.  OB HISTORY  OB History    Para Term  AB Living   8 2 2 0 5 1   SAB TAB Ectopic Multiple Live Births   5 0 0 0 2      # Outcome Date GA Lbr Michael/2nd Weight Sex Delivery Anes PTL Lv   8 Current            7 Term 18 39w3d 01:16 / 00:20 2.75 kg (6 lb 1 oz) M Vag-Spont None N ND      Complications: Fetal demise > 22 weeks, delivered, current hospitalization      Name: STEPH LIRIANO GINNA   6 SAB 17           5 Term 12/03/15 38w5d 03:45 / 01:16 3.11 kg (6 lb 13.7 oz) M Vag-Spont EPI  RAMILA      Apgar1: 9  Apgar5: 9   4 SAB            3 SAB            2 SAB            1 SAB                Obstetric Comments   2018 Severe Pre E Postpartum after term IUFD 39 weeks     Prenatal Labs:   Lab Results   Component Value Date    ABO O 05/10/2019    RH Pos 05/10/2019    AS Neg 05/10/2019    HEPBANG Nonreactive 2019    TREPAB Negative 2018    RUQIGG 110 2019    HGB 10.9 (L) 2019     Rubella- immune    ULTRASOUND(s) reviewed: 7/10/19- EFW 5-15, 21% growth, BPP 8/8, ROSALIA wnl    EXAM  ============  /71   Temp 97.4  F (36.3  C) (Oral)   Resp 16   LMP 10/23/2018   GENERAL APPEARANCE: healthy, alert and no distress  RESP: lungs clear to auscultation - no rales, rhonchi or wheezes  CV: regular rates and rhythm, normal S1 S2, no S3 or S4 and no murmur,and no varicosities  ABDOMEN:  soft, nontender, no epigastric pain  SKIN: no suspicious lesions or rashes  NEURO: Denies headache, blurred vision, other vision changes  PSYCH: mentation appears normal. and affect normal/bright  MS/ LEGS: Reflexes normal bilaterally, No clonus bilaterally and No edema    CONTRACTIONS: irreg, mild and every 4-10 minutes   FETAL HEART TONES: continuous EFM- baseline 145 with moderate variability and positive accelerations. No decelerations.  NST: REACTIVE  EFW:6lb    PELVIC EXAM: 4/80/0/post/soft  JACOB SCORE: 9  PRESENTATION: VERTEX  BLOOD: no  DISCHARGE: none    ROM: no  ROMPLUS: not done    LABS: none  Lab results reviewed- na  DIAGNOSIS  ============  37w2d [facility] on the [facility] Triage rule out labor  Hx of IUFD  HX of pre-E with severe features  NST: REACTIVE  Fetal Heart rate tracing:category one  Patient Active Problem List   Diagnosis     History of recurrent      Vitamin D deficiency     IUFD at 20 weeks or more of gestation     History of severe pre-eclampsia     High-risk pregnancy - [facility] CNM     Labor and delivery, indication for care       PLAN  ============  Observation and amulation and reevaluate in 2 hours  Has induction planned for 18  Amy Bowman, APRN  CNM   ADDENDUM: ambulate x2 hours without increase in contr intensity. Irreg, mild contr on EFM.  with moderate variability and no decelerations. No bleeding. Home undelivered. Pt and  agreeable to plan. Has induction scheduled. ASHIA MendozaM

## 2019-07-11 NOTE — TELEPHONE ENCOUNTER
"Patient called to report contractions every 10 minutes for past half hour. Mild abdominal pain, contractions describes as \"tightening\". Pt states she was advised to report to birthplace with any labor signs. Pt had cerclage removed one week ago. Hx IUFD at term. IOL scheduled 7/17. Dilated to 4cm at last office visit on 7/9/19. Pt denies bleeding, leaking fluids. Endorses + FM.    Advised pt to present to birthplace. Called L&D to report pt coming from home. Text to Liz Reza per request.  "

## 2019-07-11 NOTE — DISCHARGE INSTRUCTIONS
You were seen for: Labor Assessment  We Consulted: Santiago Sanford CNM  You had (Test or Medicine): fetal monitoring     Diet:   Drink 8 to 12 glasses of liquids (milk, juice, water) every day.  You may eat meals and snacks.     Activity:  Count fetal kicks everyday (see handout)  Call your doctor or nurse midwife if your baby is moving less than usual.     Call your provider if you notice:    Signs of bladder infection: pain when you urinate (use the toilet), need to go more often and more urgently.  The bag of cavanaugh (rupture of membranes) breaks, or you notice leaking in your underwear.  Bright red blood in your underwear.  Abdominal (lower belly) or stomach pain.  Second (plus) baby: Contractions (tightening) less than 10 minutes apart and getting stronger.  *If less than 34 weeks: Contractions (tightenings) more than 6 times in one hour.  Increase or change in vaginal discharge (note the color and amount)    Follow-up:  As scheduled in the clinic

## 2019-07-11 NOTE — PLAN OF CARE
Data: Patient presented to [facility] at 1321.   Reason for maternal/fetal assessment per patient is No chief complaint on file.  .  Patient is a . Prenatal record reviewed.      OB History    Para Term  AB Living   8 2 2 0 5 1   SAB TAB Ectopic Multiple Live Births   5 0 0 0 2      # Outcome Date GA Lbr Michael/2nd Weight Sex Delivery Anes PTL Lv   8 Current            7 Term 18 39w3d 01:16 / 00:20 2.75 kg (6 lb 1 oz) M Vag-Spont None N ND      Complications: Fetal demise > 22 weeks, delivered, current hospitalization      Name: STEPH LIRIANO GINNA   6 SAB 17           5 Term 12/03/15 38w5d 03:45 / 01:16 3.11 kg (6 lb 13.7 oz) M Vag-Spont EPI  RAMILA      Apgar1: 9  Apgar5: 9   4 SAB            3 SAB            2 SAB            1 SAB               Obstetric Comments   2018 Severe Pre E Postpartum after term IUFD 39 weeks   . Medical history:   Past Medical History:   Diagnosis Date     Recurrent pregnancy loss (CODE)      Severe pre-eclampsia, postpartum condition or complication 2018   . Gestational Age 37w2d. VSS. Fetal movement present. Patient denies cramping, backache, vaginal discharge, pelvic pressure, abdominal pain, rupture of membranes.   Action: Verbal consent for EFM. Triage assessment completed. EFM applied for labor eval. Fetal assessment: Presumed adequate fetal oxygenation documented (see flow record).   Response: Santiago Sanford CNM informed of patient arrival. Plan per provider is discharge home with laborinstructions. Patient verbalized agreement with plan and understanding of discharge instructions. Patient discharged ambulatory at 1705.

## 2019-07-12 ENCOUNTER — OFFICE VISIT (OUTPATIENT)
Dept: OBGYN | Facility: CLINIC | Age: 29
End: 2019-07-12
Attending: ADVANCED PRACTICE MIDWIFE
Payer: COMMERCIAL

## 2019-07-12 VITALS
SYSTOLIC BLOOD PRESSURE: 107 MMHG | HEIGHT: 62 IN | WEIGHT: 166 LBS | BODY MASS INDEX: 30.55 KG/M2 | DIASTOLIC BLOOD PRESSURE: 77 MMHG | HEART RATE: 94 BPM

## 2019-07-12 DIAGNOSIS — O09.90 HIGH-RISK PREGNANCY, UNSPECIFIED TRIMESTER: Primary | ICD-10-CM

## 2019-07-12 ASSESSMENT — MIFFLIN-ST. JEOR: SCORE: 1421.22

## 2019-07-12 NOTE — PROGRESS NOTES
"Subjective:     29 year old  at 37w3d presents for routine prenatal visit.   Denies vaginal bleeding or leakage of fluid.  Irregular contractions.  + fetal movement.       No HA, visual changes, RUQ or epigastric pain.   Patient concerns: feeling well overall.   - Seen in triage yesterday for r/o labor   - Mother is coming on     Objective:  Vitals:    19 0816   BP: 107/77   Pulse: 94   Weight: 75.3 kg (166 lb)   Height: 1.575 m (5' 2\")   See OB flowsheet    SVE: 4-0,     Assessment/Plan  Encounter Diagnosis   Name Primary?     High-risk pregnancy - [facility] CNM Yes     - Reviewed why/how to contact provider if headache/visual changes/RUQ or epigastric pain, decreased fetal movement, vaginal bleeding, leakage of fluid or strong/regular contractions.  - Patient education/orders or handouts today: Sign/symptoms of labor, When to call for labor or other concerns and Induction of labor   - Reviewed r/b/a of membrane sweep. Pt declines today, but may consider for Monday.   - Discussed potential of IOL with Pitocin or AROM.   - RTC on Monday if desired. IOL scheduled for   "

## 2019-07-15 ENCOUNTER — HOSPITAL ENCOUNTER (INPATIENT)
Facility: CLINIC | Age: 29
LOS: 2 days | Discharge: HOME HEALTH | End: 2019-07-17
Attending: ADVANCED PRACTICE MIDWIFE | Admitting: ADVANCED PRACTICE MIDWIFE
Payer: COMMERCIAL

## 2019-07-15 ENCOUNTER — OFFICE VISIT (OUTPATIENT)
Dept: OBGYN | Facility: CLINIC | Age: 29
End: 2019-07-15
Attending: ADVANCED PRACTICE MIDWIFE
Payer: COMMERCIAL

## 2019-07-15 VITALS
HEART RATE: 90 BPM | HEIGHT: 62 IN | WEIGHT: 166 LBS | SYSTOLIC BLOOD PRESSURE: 119 MMHG | DIASTOLIC BLOOD PRESSURE: 81 MMHG | BODY MASS INDEX: 30.55 KG/M2

## 2019-07-15 DIAGNOSIS — O09.93 HRP (HIGH RISK PREGNANCY), THIRD TRIMESTER: Primary | ICD-10-CM

## 2019-07-15 LAB
ABO + RH BLD: NORMAL
ABO + RH BLD: NORMAL
BASOPHILS # BLD AUTO: 0 10E9/L (ref 0–0.2)
BASOPHILS NFR BLD AUTO: 0.1 %
BLD GP AB SCN SERPL QL: NORMAL
BLOOD BANK CMNT PATIENT-IMP: NORMAL
DIFFERENTIAL METHOD BLD: ABNORMAL
EOSINOPHIL # BLD AUTO: 0.1 10E9/L (ref 0–0.7)
EOSINOPHIL NFR BLD AUTO: 0.8 %
ERYTHROCYTE [DISTWIDTH] IN BLOOD BY AUTOMATED COUNT: 16.6 % (ref 10–15)
ERYTHROCYTE [DISTWIDTH] IN BLOOD BY AUTOMATED COUNT: 17.1 % (ref 10–15)
FIBRINOGEN PPP-MCNC: 398 MG/DL (ref 200–420)
HCT VFR BLD AUTO: 34 % (ref 35–47)
HCT VFR BLD AUTO: 34.4 % (ref 35–47)
HGB BLD-MCNC: 11.4 G/DL (ref 11.7–15.7)
HGB BLD-MCNC: 11.5 G/DL (ref 11.7–15.7)
IMM GRANULOCYTES # BLD: 0 10E9/L (ref 0–0.4)
IMM GRANULOCYTES NFR BLD: 0.5 %
INR PPP: 1.06 (ref 0.86–1.14)
LYMPHOCYTES # BLD AUTO: 1.3 10E9/L (ref 0.8–5.3)
LYMPHOCYTES NFR BLD AUTO: 17.2 %
MCH RBC QN AUTO: 27.7 PG (ref 26.5–33)
MCH RBC QN AUTO: 28.1 PG (ref 26.5–33)
MCHC RBC AUTO-ENTMCNC: 33.1 G/DL (ref 31.5–36.5)
MCHC RBC AUTO-ENTMCNC: 33.8 G/DL (ref 31.5–36.5)
MCV RBC AUTO: 83 FL (ref 78–100)
MCV RBC AUTO: 84 FL (ref 78–100)
MONOCYTES # BLD AUTO: 0.5 10E9/L (ref 0–1.3)
MONOCYTES NFR BLD AUTO: 6.7 %
NEUTROPHILS # BLD AUTO: 5.8 10E9/L (ref 1.6–8.3)
NEUTROPHILS NFR BLD AUTO: 74.7 %
NRBC # BLD AUTO: 0 10*3/UL
NRBC BLD AUTO-RTO: 0 /100
PLATELET # BLD AUTO: 137 10E9/L (ref 150–450)
PLATELET # BLD AUTO: 149 10E9/L (ref 150–450)
RBC # BLD AUTO: 4.09 10E12/L (ref 3.8–5.2)
RBC # BLD AUTO: 4.12 10E12/L (ref 3.8–5.2)
SPECIMEN EXP DATE BLD: NORMAL
WBC # BLD AUTO: 17.9 10E9/L (ref 4–11)
WBC # BLD AUTO: 7.7 10E9/L (ref 4–11)

## 2019-07-15 PROCEDURE — 25000132 ZZH RX MED GY IP 250 OP 250 PS 637: Performed by: ADVANCED PRACTICE MIDWIFE

## 2019-07-15 PROCEDURE — 25000128 H RX IP 250 OP 636: Performed by: ADVANCED PRACTICE MIDWIFE

## 2019-07-15 PROCEDURE — 25000128 H RX IP 250 OP 636

## 2019-07-15 PROCEDURE — 85610 PROTHROMBIN TIME: CPT | Performed by: ADVANCED PRACTICE MIDWIFE

## 2019-07-15 PROCEDURE — 3E033VJ INTRODUCTION OF OTHER HORMONE INTO PERIPHERAL VEIN, PERCUTANEOUS APPROACH: ICD-10-PCS | Performed by: ADVANCED PRACTICE MIDWIFE

## 2019-07-15 PROCEDURE — 10907ZC DRAINAGE OF AMNIOTIC FLUID, THERAPEUTIC FROM PRODUCTS OF CONCEPTION, VIA NATURAL OR ARTIFICIAL OPENING: ICD-10-PCS | Performed by: ADVANCED PRACTICE MIDWIFE

## 2019-07-15 PROCEDURE — 25000132 ZZH RX MED GY IP 250 OP 250 PS 637

## 2019-07-15 PROCEDURE — 86850 RBC ANTIBODY SCREEN: CPT | Performed by: ADVANCED PRACTICE MIDWIFE

## 2019-07-15 PROCEDURE — 25000125 ZZHC RX 250

## 2019-07-15 PROCEDURE — 86901 BLOOD TYPING SEROLOGIC RH(D): CPT | Performed by: ADVANCED PRACTICE MIDWIFE

## 2019-07-15 PROCEDURE — 85384 FIBRINOGEN ACTIVITY: CPT | Performed by: ADVANCED PRACTICE MIDWIFE

## 2019-07-15 PROCEDURE — 72200001 ZZH LABOR CARE VAGINAL DELIVERY SINGLE

## 2019-07-15 PROCEDURE — 36415 COLL VENOUS BLD VENIPUNCTURE: CPT | Performed by: ADVANCED PRACTICE MIDWIFE

## 2019-07-15 PROCEDURE — 86780 TREPONEMA PALLIDUM: CPT | Performed by: ADVANCED PRACTICE MIDWIFE

## 2019-07-15 PROCEDURE — 85025 COMPLETE CBC W/AUTO DIFF WBC: CPT | Performed by: ADVANCED PRACTICE MIDWIFE

## 2019-07-15 PROCEDURE — 85027 COMPLETE CBC AUTOMATED: CPT | Performed by: ADVANCED PRACTICE MIDWIFE

## 2019-07-15 PROCEDURE — G0463 HOSPITAL OUTPT CLINIC VISIT: HCPCS

## 2019-07-15 PROCEDURE — 12000001 ZZH R&B MED SURG/OB UMMC

## 2019-07-15 PROCEDURE — 86900 BLOOD TYPING SEROLOGIC ABO: CPT | Performed by: ADVANCED PRACTICE MIDWIFE

## 2019-07-15 RX ORDER — ACETAMINOPHEN 325 MG/1
650 TABLET ORAL EVERY 4 HOURS PRN
Status: DISCONTINUED | OUTPATIENT
Start: 2019-07-15 | End: 2019-07-17 | Stop reason: HOSPADM

## 2019-07-15 RX ORDER — CEFAZOLIN SODIUM 2 G/100ML
2 INJECTION, SOLUTION INTRAVENOUS ONCE
Status: COMPLETED | OUTPATIENT
Start: 2019-07-15 | End: 2019-07-15

## 2019-07-15 RX ORDER — OXYTOCIN 10 [USP'U]/ML
10 INJECTION, SOLUTION INTRAMUSCULAR; INTRAVENOUS
Status: DISCONTINUED | OUTPATIENT
Start: 2019-07-15 | End: 2019-07-17 | Stop reason: HOSPADM

## 2019-07-15 RX ORDER — IBUPROFEN 800 MG/1
800 TABLET, FILM COATED ORAL
Status: COMPLETED | OUTPATIENT
Start: 2019-07-15 | End: 2019-07-15

## 2019-07-15 RX ORDER — FENTANYL CITRATE 50 UG/ML
50-100 INJECTION, SOLUTION INTRAMUSCULAR; INTRAVENOUS
Status: DISCONTINUED | OUTPATIENT
Start: 2019-07-15 | End: 2019-07-17 | Stop reason: HOSPADM

## 2019-07-15 RX ORDER — ACETAMINOPHEN 325 MG/1
650 TABLET ORAL EVERY 4 HOURS PRN
Status: DISCONTINUED | OUTPATIENT
Start: 2019-07-15 | End: 2019-07-15

## 2019-07-15 RX ORDER — OXYTOCIN/0.9 % SODIUM CHLORIDE 30/500 ML
340 PLASTIC BAG, INJECTION (ML) INTRAVENOUS CONTINUOUS PRN
Status: DISCONTINUED | OUTPATIENT
Start: 2019-07-15 | End: 2019-07-17 | Stop reason: HOSPADM

## 2019-07-15 RX ORDER — NALOXONE HYDROCHLORIDE 0.4 MG/ML
.1-.4 INJECTION, SOLUTION INTRAMUSCULAR; INTRAVENOUS; SUBCUTANEOUS
Status: DISCONTINUED | OUTPATIENT
Start: 2019-07-15 | End: 2019-07-17 | Stop reason: HOSPADM

## 2019-07-15 RX ORDER — NALOXONE HYDROCHLORIDE 0.4 MG/ML
.1-.4 INJECTION, SOLUTION INTRAMUSCULAR; INTRAVENOUS; SUBCUTANEOUS
Status: DISCONTINUED | OUTPATIENT
Start: 2019-07-15 | End: 2019-07-15

## 2019-07-15 RX ORDER — OXYTOCIN 10 [USP'U]/ML
INJECTION, SOLUTION INTRAMUSCULAR; INTRAVENOUS
Status: COMPLETED
Start: 2019-07-15 | End: 2019-07-15

## 2019-07-15 RX ORDER — OXYTOCIN/0.9 % SODIUM CHLORIDE 30/500 ML
100 PLASTIC BAG, INJECTION (ML) INTRAVENOUS CONTINUOUS
Status: DISCONTINUED | OUTPATIENT
Start: 2019-07-15 | End: 2019-07-17 | Stop reason: HOSPADM

## 2019-07-15 RX ORDER — CARBOPROST TROMETHAMINE 250 UG/ML
INJECTION, SOLUTION INTRAMUSCULAR
Status: COMPLETED
Start: 2019-07-15 | End: 2019-07-15

## 2019-07-15 RX ORDER — OXYTOCIN 10 [USP'U]/ML
10 INJECTION, SOLUTION INTRAMUSCULAR; INTRAVENOUS
Status: COMPLETED | OUTPATIENT
Start: 2019-07-15 | End: 2019-07-15

## 2019-07-15 RX ORDER — METHYLERGONOVINE MALEATE 0.2 MG/ML
200 INJECTION INTRAVENOUS
Status: DISCONTINUED | OUTPATIENT
Start: 2019-07-15 | End: 2019-07-17 | Stop reason: HOSPADM

## 2019-07-15 RX ORDER — MISOPROSTOL 200 UG/1
400 TABLET ORAL
Status: DISCONTINUED | OUTPATIENT
Start: 2019-07-15 | End: 2019-07-17 | Stop reason: HOSPADM

## 2019-07-15 RX ORDER — CARBOPROST TROMETHAMINE 250 UG/ML
250 INJECTION, SOLUTION INTRAMUSCULAR
Status: COMPLETED | OUTPATIENT
Start: 2019-07-15 | End: 2019-07-15

## 2019-07-15 RX ORDER — IBUPROFEN 800 MG/1
800 TABLET, FILM COATED ORAL EVERY 6 HOURS PRN
Status: DISCONTINUED | OUTPATIENT
Start: 2019-07-15 | End: 2019-07-17 | Stop reason: HOSPADM

## 2019-07-15 RX ORDER — SODIUM CHLORIDE, SODIUM LACTATE, POTASSIUM CHLORIDE, CALCIUM CHLORIDE 600; 310; 30; 20 MG/100ML; MG/100ML; MG/100ML; MG/100ML
INJECTION, SOLUTION INTRAVENOUS CONTINUOUS
Status: DISCONTINUED | OUTPATIENT
Start: 2019-07-15 | End: 2019-07-15

## 2019-07-15 RX ORDER — OXYCODONE AND ACETAMINOPHEN 5; 325 MG/1; MG/1
1 TABLET ORAL
Status: DISCONTINUED | OUTPATIENT
Start: 2019-07-15 | End: 2019-07-15

## 2019-07-15 RX ORDER — METHYLERGONOVINE MALEATE 0.2 MG/ML
200 INJECTION INTRAVENOUS
Status: COMPLETED | OUTPATIENT
Start: 2019-07-15 | End: 2019-07-15

## 2019-07-15 RX ORDER — ONDANSETRON 2 MG/ML
4 INJECTION INTRAMUSCULAR; INTRAVENOUS EVERY 6 HOURS PRN
Status: DISCONTINUED | OUTPATIENT
Start: 2019-07-15 | End: 2019-07-17 | Stop reason: HOSPADM

## 2019-07-15 RX ORDER — MISOPROSTOL 200 UG/1
TABLET ORAL
Status: COMPLETED
Start: 2019-07-15 | End: 2019-07-15

## 2019-07-15 RX ORDER — BISACODYL 10 MG
10 SUPPOSITORY, RECTAL RECTAL DAILY PRN
Status: DISCONTINUED | OUTPATIENT
Start: 2019-07-17 | End: 2019-07-17 | Stop reason: HOSPADM

## 2019-07-15 RX ORDER — AMOXICILLIN 250 MG
2 CAPSULE ORAL 2 TIMES DAILY
Status: DISCONTINUED | OUTPATIENT
Start: 2019-07-15 | End: 2019-07-17 | Stop reason: HOSPADM

## 2019-07-15 RX ORDER — AMOXICILLIN 250 MG
1 CAPSULE ORAL 2 TIMES DAILY
Status: DISCONTINUED | OUTPATIENT
Start: 2019-07-15 | End: 2019-07-17 | Stop reason: HOSPADM

## 2019-07-15 RX ORDER — LIDOCAINE HYDROCHLORIDE 10 MG/ML
INJECTION, SOLUTION EPIDURAL; INFILTRATION; INTRACAUDAL; PERINEURAL
Status: DISCONTINUED
Start: 2019-07-15 | End: 2019-07-16 | Stop reason: HOSPADM

## 2019-07-15 RX ORDER — CARBOPROST TROMETHAMINE 250 UG/ML
250 INJECTION, SOLUTION INTRAMUSCULAR
Status: DISCONTINUED | OUTPATIENT
Start: 2019-07-15 | End: 2019-07-15

## 2019-07-15 RX ORDER — OXYTOCIN/0.9 % SODIUM CHLORIDE 30/500 ML
PLASTIC BAG, INJECTION (ML) INTRAVENOUS
Status: COMPLETED
Start: 2019-07-15 | End: 2019-07-15

## 2019-07-15 RX ORDER — LANOLIN 100 %
OINTMENT (GRAM) TOPICAL
Status: DISCONTINUED | OUTPATIENT
Start: 2019-07-15 | End: 2019-07-17 | Stop reason: HOSPADM

## 2019-07-15 RX ORDER — OXYCODONE HYDROCHLORIDE 5 MG/1
5-10 TABLET ORAL EVERY 4 HOURS PRN
Status: DISCONTINUED | OUTPATIENT
Start: 2019-07-15 | End: 2019-07-17 | Stop reason: HOSPADM

## 2019-07-15 RX ORDER — HYDROCORTISONE 2.5 %
CREAM (GRAM) TOPICAL 3 TIMES DAILY PRN
Status: DISCONTINUED | OUTPATIENT
Start: 2019-07-15 | End: 2019-07-17 | Stop reason: HOSPADM

## 2019-07-15 RX ORDER — OXYTOCIN/0.9 % SODIUM CHLORIDE 30/500 ML
100-340 PLASTIC BAG, INJECTION (ML) INTRAVENOUS CONTINUOUS PRN
Status: COMPLETED | OUTPATIENT
Start: 2019-07-15 | End: 2019-07-15

## 2019-07-15 RX ADMIN — OXYTOCIN 10 UNITS: 10 INJECTION, SOLUTION INTRAMUSCULAR; INTRAVENOUS at 19:59

## 2019-07-15 RX ADMIN — MISOPROSTOL 400 MCG: 200 TABLET ORAL at 20:03

## 2019-07-15 RX ADMIN — ACETAMINOPHEN 650 MG: 325 TABLET, FILM COATED ORAL at 22:09

## 2019-07-15 RX ADMIN — METHYLERGONOVINE MALEATE 200 MCG: 0.2 INJECTION INTRAVENOUS at 20:32

## 2019-07-15 RX ADMIN — IBUPROFEN 800 MG: 800 TABLET, FILM COATED ORAL at 20:18

## 2019-07-15 RX ADMIN — FENTANYL CITRATE 100 MCG: 50 INJECTION INTRAMUSCULAR; INTRAVENOUS at 21:22

## 2019-07-15 RX ADMIN — CEFAZOLIN SODIUM 2 G: 2 INJECTION, SOLUTION INTRAVENOUS at 22:15

## 2019-07-15 RX ADMIN — OXYCODONE HYDROCHLORIDE 5 MG: 5 TABLET ORAL at 22:09

## 2019-07-15 RX ADMIN — CARBOPROST TROMETHAMINE 250 MCG: 250 INJECTION, SOLUTION INTRAMUSCULAR at 20:51

## 2019-07-15 RX ADMIN — ONDANSETRON 4 MG: 2 INJECTION INTRAMUSCULAR; INTRAVENOUS at 21:18

## 2019-07-15 RX ADMIN — Medication 340 ML/HR: at 20:53

## 2019-07-15 ASSESSMENT — PAIN SCALES - GENERAL: PAINLEVEL: NO PAIN (0)

## 2019-07-15 ASSESSMENT — MIFFLIN-ST. JEOR: SCORE: 1421.35

## 2019-07-15 NOTE — NURSING NOTE
Chief Complaint   Patient presents with     Prenatal Care   37.6 weeks ob visit induction set for tomorrow-   Abdominal discomfort.

## 2019-07-15 NOTE — PLAN OF CARE
Pt arrived for evaluation following a clinic visit with SVE 6cm.     EFM and [employer] [place] applied.   Pt admission assessment completed.     , Nilson, at bedside and providing support.     CNM Justice Cosby notified of pt arrival and status.

## 2019-07-15 NOTE — LETTER
"7/15/2019       RE: Pam Seyd  [address]  [place] [address]     Dear Colleague,    Thank you for referring your patient, Pam Syed, to the [facility] at [facility]. Please see a copy of my visit note below.    Subjective:     29 year old  at 37w6d presents for routine prenatal visit. Requesting membrane sweeping today to avoid induction tomorrow.             Denies vaginal bleeding or leakage of fluid.  Reports irregular contractions.  Reports regular fetal movement.        No HA, visual changes, RUQ or epigastric pain.   Patient concerns: Feeling emotional about labor and induction given loss last year.  Worried about hemorrhage and pre-eclampsia.    Objective:  Vitals:    07/15/19 0920   BP: 119/81   Pulse: 90   Weight: 75.3 kg (166 lb)   Height: 1.575 m (5' 2.01\")    See OB flowsheet  Assessment/Plan     Encounter Diagnosis   Name Primary?     HRP (high risk pregnancy), third trimester Yes       - Reviewed why/how to contact provider if headache/visual changes/RUQ or epigastric pain, decreased fetal movement, vaginal bleeding, leakage of fluid or strong/regular contractions.   Patient education/orders or handouts today:  Sign/symptoms of labor and When to call for labor or other concerns  Return to clinic in 1 week and prn if questions or concerns.   ASHIA Martin CNM            "

## 2019-07-15 NOTE — H&P
ADMIT NOTE  =================  37w6d    Pam Syed is a 29 year old female with an Patient's last menstrual period was 10/23/2018. and Estimated Date of Delivery: 2019 is [facility] on 7/15/2019 at 12:08 PM in early labor.     HPI  ================  Pt kandi irregularly for past week, reports contractions feeling slightly more uncomfortable and lasting longer. Denies leaking of fluid or vaginal bleeding. Reports + fetal movement.     Contractions- mild  Fetal movement- active  ROM- no   Vaginal bleeding- none  GBS- negative  FOB- is involved, at bedside  Other labor support- none at this moment    Weight gain- 150 - 166 lbs, Total weight gain- 16 lbs  Height- 62 in  BMI- 27  First prenatal visit at 11 weeks, Total visits- 15    PROBLEM LIST  =================  Patient Active Problem List    Diagnosis Date Noted     Labor and delivery, indication for care 2019     Priority: Medium     High-risk pregnancy - [facility] CNM 2019     Priority: Medium      MFM consultation Dr. Shields:  At the conclusion of our discussion, we have made the following recommendations.      - Frequent monitoring of BP, every 2 weeks after 20 weeks and weekly after 28 weeks  - Continue low dose ASA of 81 mg until delivery. No indication for enoxaparin or other UFH at this time.   - BP is expected to drop in the 1st trimester and early second trimester but will increase again the 3rd trimester.  - Monitor for signs and symptoms of preeclampsia  - Do not recommend starting BP medications during pregnancy unless severe range BP noted (>160/>110), then recommend inpatient evaluation and management.  - Serial fetal growth scans every 4-6 weeks following the fetal survey.  - Antepartum surveillance via weekly BPP starting at 28 weeks based on maternal anxiety and discussion today  - Pregnancy not to be prolonged beyond 39 weeks of gestation  -  delivery should be reserved for OB indications  -  OB care to be managed by a CNM or a an OBGYN physician depending on CNM protocols.           History of severe pre-eclampsia 2018     Priority: Medium     IUFD at 20 weeks or more of gestation 2018     Priority: Medium     18- IUFD boy. See delivery summary.  Toxoplasmosis IgG positive, IgM negative,   Parvo IgG pos, IgM neg, other labs pending___  Planning Follow-up with CNM and psychologist in one week____    18: placenta pathology reviewed. Hematoma infarct noted, see formal report.        Vitamin D deficiency 2018     Priority: Medium     19: 14disc supp.        History of recurrent  2018     Priority: Medium     HISTORIES  ============  No Known Allergies  Past Medical History:   Diagnosis Date     Recurrent pregnancy loss (CODE)      Severe pre-eclampsia, postpartum condition or complication 2018     Past Surgical History:   Procedure Laterality Date     APPENDECTOMY Right      CERCLAGE CERVICAL N/A 3/22/2019    Procedure: CERCLAGE CERVICAL;  Surgeon: Liz Lima MD;  Location: [facility] L+D     CERCLAGE CERVICAL N/A 3/22/2019    Procedure: CERCLAGE CERVICAL;  Surgeon: Liz Lima MD;  Location: [facility] OR     DILATION AND CURETTAGE SUCTION N/A 2017    Procedure: DILATION AND CURETTAGE SUCTION;  Suction Dilation And Curettage ;  Surgeon: Yolanda Samaniego MD;  Location: [facility] OR     DILATION AND CURETTAGE, HYSTEROSCOPY DIAGNOSTIC, COMBINED N/A 2017    Procedure: COMBINED DILATION AND CURETTAGE, HYSTEROSCOPY DIAGNOSTIC;  Operative Hysteroscopy, Dilation and Curettage ;  Surgeon: Eli Pickard MD;  Location: [facility] OR     GYN SURGERY  2017    suction D&C   .  History reviewed. No pertinent family history.  Social History     Tobacco Use     Smoking status: Never Smoker     Smokeless tobacco: Never Used   Substance Use Topics     Alcohol use: No     OB History    Para Term  AB Living   8 2 2 0 5 1   SAB TAB Ectopic Multiple Live Births    5 0 0 0 2      # Outcome Date GA Lbr Michael/2nd Weight Sex Delivery Anes PTL Lv   8 Current            7 Term 08/23/18 39w3d 01:16 / 00:20 2.75 kg (6 lb 1 oz) M Vag-Spont None N ND      Complications: Fetal demise > 22 weeks, delivered, current hospitalization      Name: STEPH LIRIANO   6 SAB 09/26/17           5 Term 12/03/15 38w5d 03:45 / 01:16 3.11 kg (6 lb 13.7 oz) M Vag-Spont EPI  RAMILA      Apgar1: 9  Apgar5: 9   4 SAB            3 SAB            2 SAB            1 SAB               Obstetric Comments   2018 Severe Pre E Postpartum after term IUFD 39 weeks      LABS:   ===========  Prenatal Labs:   Rhogam not indicated   Lab Results   Component Value Date    ABO O 05/10/2019    RH Pos 05/10/2019    AS Neg 05/10/2019    HEPBANG Nonreactive 01/11/2019    TREPAB Negative 01/16/2018    HGB 10.9 (L) 07/05/2019     Rubella Immune  Lab Results   Component Value Date    GBS Negative 06/27/2019     Other labs:  No results found for this or any previous visit (from the past 24 hour(s)).    ROS  =========  Pt denies significant respiratory, cardiovacular, GI, or muscular/skeletalcomplaints.    See RN data base ROS.     PHYSICAL EXAM:  ===============  /79   Temp 98.4  F (36.9  C) (Oral)   Resp 18   LMP 10/23/2018   General appearance: comfortable  GENERAL APPEARANCE: healthy, alert and no distress  RESP: lungs clear to auscultation - no rales, rhonchi or wheezes  CV: regular rates and rhythm, normal S1 S2, no S3 or S4 and no murmur,and no varicosities  ABDOMEN:  Gravid, soft, nontender, no epigastric pain  SKIN: no suspicious lesions or rashes  NEURO: Denies headache, blurred vision, other vision changes  PSYCH: mentation appears normal. and affect normal/bright  Legs: Reflexes normal bilaterally and No edema     Abdomen: gravid, vertex fetus per Leopold's, non-tender between contractions.   EFW-  6.5 lbs.   CONTRACTIONS: every 2-7 minutes   FETAL HEART TONES: continuous EFM- baseline 150 with moderate  variability and positive accelerations. No decelerations.  PELVIC EXAM: 6/ 80%/0 from SVE in clinic this AM  BLOODY SHOW: no   ROM:no    ASSESSMENT:  ==============  IUP @ 37w6d admitted in early labor   NST REACTIVE  Fetal Heart Rate - category one  GBS- negative    Patient Active Problem List   Diagnosis     History of recurrent      Vitamin D deficiency     IUFD at 20 weeks or more of gestation     History of severe pre-eclampsia     High-risk pregnancy - [facility] CNM     Labor and delivery, indication for care     PLAN:  ===========  Admit - see IP orders  Pain medication options reviewed. Pt is considering epidural.  Ambulation, hydration, position changes to facilitate labor reviewed with pt .  Reviewed plan of care with patient and partner. Encouraged ambulation and position changes, plan to re-evaluate at [id number]. Discussed r/b/a of AROM at that time. Pt and partner verbalized understanding and agreement.     I, AMELIA Campos, am serving as a scribe to document services personally performed by CNM based on the provider's statements to me. - AMELIA Campos     The encounter was performed by me and scribed by the SNM. The scribed note accurately reflects my personal services and decisions made by me. ASHIA Gipson CNM

## 2019-07-15 NOTE — PROGRESS NOTES
Blood pressure 125/80, temperature 98.4  F (36.9  C), temperature source Oral, resp. rate 16, last menstrual period 10/23/2018, not currently breastfeeding.    General appearance: feeling more uncomfortable with contractions, feeling increased pressure  CONTRACTIONS: moderate and every 2.5-7 minutes  Pitocin- none,  Antibiotics- none  FETAL HEART TONES: continuous EFM- baseline 155 with moderate variability and no accelerations. No decelerations.  ROM: clear fluid, ROM x 3 hours  PELVIC EXAM: 8/ 85%/ 0    ASSESSMENT:  ==============  IUP @ 37w6d in active labor   Fetal Heart Rate Tracing category one  GBS- negative    PLAN:  ===========  Continue position changes, ambulation, and non-pharmacologic methods of pain relief.  Reevaluate in 2-4 hours prn   Anticipate     ASHIA Gipson CNM

## 2019-07-15 NOTE — PROGRESS NOTES
Blood pressure 118/78, temperature 98.4  F (36.9  C), temperature source Oral, resp. rate 18, last menstrual period 10/23/2018, not currently breastfeeding.    General appearance: comfortable, reports contractions feeling closer together and lasting longer. More intense than previously. Friend/ bedside.  CONTRACTIONS: mild to moderate, irregular  Pitocin- none,  Antibiotics- none  FETAL HEART TONES: continuous EFM- baseline 145 with moderate variability and positive accelerations. No decelerations.  ROM: AROM for clear fluid  PELVIC EXAM: 6.5/ 75%/ 0     ASSESSMENT:  ==============  IUP @ 37w6d in active labor   Fetal Heart Rate Tracing category one  GBS- negative    PLAN:  ===========  Ambulation, hydration, position changes, birthing ball/sling and tub options to facilitate labor.  Reevaluate in 2-4 hours prn  Labor augmentation with AROM vs Pitocin reviewed with pt. Pt desires AROM at this time.     ASHIA Gipson CNM

## 2019-07-15 NOTE — PROGRESS NOTES
"Subjective:     29 year old  at 37w6d presents for routine prenatal visit. Requesting membrane sweeping today to avoid induction tomorrow.             Denies vaginal bleeding or leakage of fluid.  Reports irregular contractions.  Reports regular fetal movement.        No HA, visual changes, RUQ or epigastric pain.   Patient concerns: Feeling emotional about labor and induction given loss last year.  Worried about hemorrhage and pre-eclampsia.    Objective:  Vitals:    07/15/19 0920   BP: 119/81   Pulse: 90   Weight: 75.3 kg (166 lb)   Height: 1.575 m (5' 2.01\")    See OB flowsheet  Assessment/Plan     Encounter Diagnosis   Name Primary?     HRP (high risk pregnancy), third trimester Yes       - Reviewed why/how to contact provider if headache/visual changes/RUQ or epigastric pain, decreased fetal movement, vaginal bleeding, leakage of fluid or strong/regular contractions.   Patient education/orders or handouts today:  Sign/symptoms of labor and When to call for labor or other concerns  Return to clinic in 1 week and prn if questions or concerns.   Liz Reza, ASHIA SIMMONSM    "

## 2019-07-16 PROBLEM — O09.90 HIGH-RISK PREGNANCY, UNSPECIFIED TRIMESTER: Status: RESOLVED | Noted: 2019-01-11 | Resolved: 2019-07-16

## 2019-07-16 LAB
APTT PPP: 30 SEC (ref 22–37)
ERYTHROCYTE [DISTWIDTH] IN BLOOD BY AUTOMATED COUNT: 16.5 % (ref 10–15)
FIBRINOGEN PPP-MCNC: 352 MG/DL (ref 200–420)
HCT VFR BLD AUTO: 26.9 % (ref 35–47)
HGB BLD-MCNC: 9 G/DL (ref 11.7–15.7)
MCH RBC QN AUTO: 27.9 PG (ref 26.5–33)
MCHC RBC AUTO-ENTMCNC: 33.5 G/DL (ref 31.5–36.5)
MCV RBC AUTO: 83 FL (ref 78–100)
PLATELET # BLD AUTO: 128 10E9/L (ref 150–450)
RBC # BLD AUTO: 3.23 10E12/L (ref 3.8–5.2)
T PALLIDUM AB SER QL: NONREACTIVE
WBC # BLD AUTO: 16 10E9/L (ref 4–11)

## 2019-07-16 PROCEDURE — 85027 COMPLETE CBC AUTOMATED: CPT | Performed by: ADVANCED PRACTICE MIDWIFE

## 2019-07-16 PROCEDURE — 25800030 ZZH RX IP 258 OP 636: Performed by: ADVANCED PRACTICE MIDWIFE

## 2019-07-16 PROCEDURE — 85730 THROMBOPLASTIN TIME PARTIAL: CPT | Performed by: ADVANCED PRACTICE MIDWIFE

## 2019-07-16 PROCEDURE — 12000001 ZZH R&B MED SURG/OB UMMC

## 2019-07-16 PROCEDURE — 25000128 H RX IP 250 OP 636: Performed by: ADVANCED PRACTICE MIDWIFE

## 2019-07-16 PROCEDURE — 36415 COLL VENOUS BLD VENIPUNCTURE: CPT | Performed by: ADVANCED PRACTICE MIDWIFE

## 2019-07-16 PROCEDURE — 85384 FIBRINOGEN ACTIVITY: CPT | Performed by: ADVANCED PRACTICE MIDWIFE

## 2019-07-16 PROCEDURE — 25000132 ZZH RX MED GY IP 250 OP 250 PS 637: Performed by: ADVANCED PRACTICE MIDWIFE

## 2019-07-16 RX ORDER — IBUPROFEN 800 MG/1
800 TABLET, FILM COATED ORAL EVERY 6 HOURS PRN
COMMUNITY
Start: 2019-07-16 | End: 2019-07-17

## 2019-07-16 RX ORDER — ACETAMINOPHEN 325 MG/1
650 TABLET ORAL EVERY 4 HOURS PRN
COMMUNITY
Start: 2019-07-16 | End: 2019-07-17

## 2019-07-16 RX ORDER — METHYLPREDNISOLONE SODIUM SUCCINATE 125 MG/2ML
125 INJECTION, POWDER, LYOPHILIZED, FOR SOLUTION INTRAMUSCULAR; INTRAVENOUS
Status: DISCONTINUED | OUTPATIENT
Start: 2019-07-16 | End: 2019-07-17 | Stop reason: HOSPADM

## 2019-07-16 RX ORDER — DIPHENHYDRAMINE HYDROCHLORIDE 50 MG/ML
50 INJECTION INTRAMUSCULAR; INTRAVENOUS
Status: DISCONTINUED | OUTPATIENT
Start: 2019-07-16 | End: 2019-07-17 | Stop reason: HOSPADM

## 2019-07-16 RX ADMIN — IRON SUCROSE 200 MG: 20 INJECTION, SOLUTION INTRAVENOUS at 14:20

## 2019-07-16 RX ADMIN — IBUPROFEN 800 MG: 800 TABLET, FILM COATED ORAL at 05:44

## 2019-07-16 RX ADMIN — ACETAMINOPHEN 650 MG: 325 TABLET, FILM COATED ORAL at 18:34

## 2019-07-16 RX ADMIN — IBUPROFEN 800 MG: 800 TABLET, FILM COATED ORAL at 20:21

## 2019-07-16 RX ADMIN — IBUPROFEN 800 MG: 800 TABLET, FILM COATED ORAL at 14:19

## 2019-07-16 RX ADMIN — SENNOSIDES AND DOCUSATE SODIUM 2 TABLET: 8.6; 5 TABLET ORAL at 20:21

## 2019-07-16 RX ADMIN — SENNOSIDES AND DOCUSATE SODIUM 2 TABLET: 8.6; 5 TABLET ORAL at 09:01

## 2019-07-16 NOTE — PROGRESS NOTES
S - Pt continued to have increased lochia despite interventions.  Margarito GONZALEZ in OR with STAT Cs/ection.  Andrea GONZALEZ avaiable on unit, to bedside to assess pt for need for D&C vs conitnued surveillance.   2nd IV started.  Lab en route to draw STAT coags and CBC.  Pt coping well overall - nausea with emesis, some uterine cramping discomfort.  Agreeable to assessment by MD.  Partner continues to watch baby.          A - 29 year old  now s/p  at 1953  - PPH s/p IM pitocin (original IV infiltrated), Buccal Miso, IM Methergine, IV pitocin, IM hemabate.      O - manual sweep by Andrea GONZALEZ evacuated small clots, fundus firm, lower uterine segment firm per Andrea GONZALEZ.  Peripad changed out, added to QBL    P - Bleeding overall stabilized.      - Labs in process      - Close surveillance if labs stable continue to assess.          - IV Ancef 2gm x1 d/t bimanual/sweeps x2.          - Andrea GONZALEZ will update Margarito GONZALEZ in OR.      Kacie ANG CNM

## 2019-07-16 NOTE — PROGRESS NOTES
PostPartum Note  Postpartum Day #1  SIGNIFICANT PROBLEMS:  Patient Active Problem List    Diagnosis Date Noted     Vaginal delivery 07/15/2019     Priority: Medium     Labor and delivery, indication for care 2019     Priority: Medium     History of severe pre-eclampsia 2018     Priority: Medium     Vitamin D deficiency 2018     Priority: Medium     19: 14disc supp.        History of recurrent  2018     Priority: Medium     INTERVAL HISTORY:  /61 (BP Location: Left arm)   Temp 98.2  F (36.8  C)   Resp 16   LMP 10/23/2018   SpO2 97%   Breastfeeding? Unknown   Pt stable, baby is rooming in.   Breast feeding status: initiated and well established  Complications since 2 hours post delivery: Has had one episode of dizziness with getting up pp.   Patient is tolerating activity well. Voiding without difficulty. Cramping is minimal, lochia is decreasing and patient denies clots.  Perineal pain is none.  The perineum is intact.    Postpartum hemoglobin   Hemoglobin   Date Value Ref Range Status   2019 9.0 (L) 11.7 - 15.7 g/dL Final     Blood type   Lab Results   Component Value Date    ABO O 07/15/2019       Lab Results   Component Value Date    RH Pos 07/15/2019     Rubella status   Lab Results   Component Value Date    RUQIGG 110 2019     Rubella: Immune  History of depression: No. Postpartum depression warning signs reviewed.    ASSESSMENT/PLAN:  Normal postpartum exam, stable Post-partum day #1  Complications:anemic, plan for IV iron supplementation today and oral thereafter  Plan d/c home tomorrow. Home Visit Ordered- Yes  RTC 2 days after discharge for BP check d/t hx of PP pre-e w/SF  Postpartum warning s/s reviewed, including bleeding/clots, fever, mastitis, or depression  Continue prenatal vitamins  Birthcontrol planned:IUD Mirena, plan to insert at 6-8 wks postpartum    Current Discharge Medication List      CONTINUE these medications which have NOT CHANGED     Details   aspirin (ASA) 81 MG tablet Take 1 tablet (81 mg) by mouth daily  Qty: 90 tablet, Refills: 3    Associated Diagnoses: History of recurrent , currently pregnant in second trimester      cholecalciferol (VITAMIN D3) 5000 units (125 mcg) capsule Take 5,000 Units by mouth daily      ferrous sulfate (FE TABS) 325 (65 Fe) MG EC tablet Take 1 tablet (325 mg) by mouth daily  Qty: 100 tablet, Refills: 1    Associated Diagnoses: Iron deficiency anemia secondary to inadequate dietary iron intake      Prenatal Vit-Fe Fumarate-FA (PRENATAL VITAMIN AND MINERAL) 28-0.8 MG TABS Take 1 tablet by mouth daily  Qty: 90 tablet, Refills: 3    Associated Diagnoses: Early stage of pregnancy      SENNA-docusate sodium (SENNA S) 8.6-50 MG tablet Take 1 tablet by mouth At Bedtime  Qty: 90 tablet, Refills: 3    Associated Diagnoses: High-risk pregnancy, unspecified trimester      !! order for DME Maternity Belt order  Qty: 1 each, Refills: 0    Associated Diagnoses: High-risk pregnancy, unspecified trimester      !! order for DME Maternity Belt order  Qty: 1 each, Refills: 0    Associated Diagnoses: High-risk pregnancy, unspecified trimester       !! - Potential duplicate medications found. Please discuss with provider.        I, AMELIA Campos, am serving as a scribe to document services personally performed by CNM based on the provider's statements to me. - Nae Scott    The encounter was performed by me and scribed by the SNM. The scribed note accurately reflects my personal services and decisions made by me.     I, AMELIA Campos, completed the PFSH and ROS. I then acted as a scribe for CNM for the remainder of the visit. - Nae Scott     I agree with the PFSH and ROS as completed by the SNM, except for changes made by me. The remainder of the encounter was performed by me and scribed by the SNM. The scribed note accurately reflects my personal services and decisions made by me.    Nae Jewell,  CNM

## 2019-07-16 NOTE — PLAN OF CARE
Dr. Reyna at bedside to assess patient bleeding, stable at this time with firm uterus at U and scant flow. VIRGILIO Obrien CNM updated on patient status, plan is to continue to monitor in L & D for 1 more hour, start Ancef, continue IV pit, repeat CBC at 0400. If patient remains stable plan is to move to post-partum at 2300 pending lab results.

## 2019-07-16 NOTE — PLAN OF CARE
[phone]  Patient had no issues, hgb this morning was 9, IV iron started and transfusing currently. Patient denies issues. Independently with self and infant cares. Will continue with current plan of care.

## 2019-07-16 NOTE — PLAN OF CARE
of viable Female with VIRGILIO Obrien CNM in attendance. Nursery RN Lindsay present for 1 minute apgars. Infant to mothers abdomen, dried and stimulated to cry. Apgars 1 minute: 8, 5 minute: 9.  Placenta delivered without complications. IM pitocin given due to infiltrated IV. No laceration requiring repair. Mary cares provided and ice applied to perineum. Baby in stable condition. Maternal postpartum bleeding greater than expected and fundal checks with clots, oral miso and methergine given. VIRGILIO Obrien CNM to bedside and assessed lower uterus for clots with sweep. Continued bleeding occurred, 2 new IVs started, IV pitocin going, hemabate given and Dr. Mendez to room to assess patient. Assessment completed by Dr. Mendez and will continue to monitor bleeding with possible need to go to OR if bleeding does not stabilize with interventions. Will continue to closely monitor.

## 2019-07-16 NOTE — PLAN OF CARE
VSS. Pain improving with tylenol and oxycodone. Bleeding stable with light to scant flow with q15 minute fundal checks. Ancef given. Labs stable and will be repeated at 0400. Patient okay to move to postpartum unit per VIRGILIO Obrien CNM.

## 2019-07-16 NOTE — PLAN OF CARE
Data: Patient transferred to postpartum unit at 0030 and ID band check was completed with L&D nurse, Pam. Vital signs within normal limits. Postpartum checks within normal limits - see flow record. Patient eating and drinking normally. Patient able to empty bladder independently and is up ambulating. No apparent signs of infection. Patient performing self cares and is able to care for infant.  Action: Patient medicated during the shift for cramping. See MAR. Patient reassessed within 1 hour after each medication and pain was improved - patient stated she was comfortable. Patient education done about room orientation, treatment plan, breastfeeding,  screens, and postpartum depression. See flow record.  Response: Positive attachment behaviors observed with infant. Support person present.   Plan: Will continue plan of care.

## 2019-07-16 NOTE — PLAN OF CARE
Data: Pam Syed transferred to 7120 via wheelchair at 0010. Baby transferred via parent's arms.  Action: Receiving unit notified of transfer: Yes. Patient and family notified of room change. Report given to Elva PINEDA at 0010. Belongings sent to receiving unit. Accompanied by Registered Nurse. Oriented patient to surroundings. Call light within reach. ID bands double-checked with receiving RN.  Response: Patient tolerated transfer and is stable.

## 2019-07-16 NOTE — L&D DELIVERY NOTE
Delivery Summary    Pam Syed MRN# [id number]   Age: 29 year old YOB: 1990     ASSESSMENT & PLAN:     Pam Syed is a 29 year old   at  37w6d presented to labor floor with c/o labor, was found to be 6cm in clinic visit.  Pt has history of IUFD at term so was scheduled for IOL on 19. Consented to AROM for clear fluid at 1540 Progressed to C/C/+1 at 1944 .  Pushed effectively with CNM coaching. FHR with Vriable decels with pushing effort, moderate variability throughout. Head delivered OA and restituted to LOT . No nuchal cord.  Compound presentation with L hand. Shoulders easily delivered under maternal effort.  Live female  delivered at 1953 over an intact perineum under no anesthesia.  Spontaneous breath, vigorous cry, well flexed, HR>100. Infant directly to maternal abdomen, skin to skin. Delayed cord clamping for 5 minutes then clamped x2 and cut by FOB.   Initial IV infiltrated so 10 units of IM pitocin given  after baby.  Cord blood obtained for typing.  Cord segment for gases was held but not sent. Intact placenta spontaneously delivered via Escalera at .   3 vessel cord. Fundus firm @ firm after massage.   Vagina, perineum, and rectum inspected, found to be intact.  Mother and infant stable, continued skin to skin.   Buccal Misoprostol given for shena bleeding of moderate lochia despite fundal massage.  IM Methergine given.  IV restarted and Pitocin infusion initiated.  Bimanual compression with evacuation of clots from lower uterine segment. IM Hemabate given.  MD consult, second bimanual and clots evacuated from lower uterine segment.  2nd IV started.  Coags and CBC drawn.  IV Fentanyl and IV Zofran for discomfort.  Pt was being prepped for OR for possible D&C but bleeding stabilized, labs were stable and vitals signs WNL.  Pt tolerated well with updates from ANGELIC, RN, and MD throughout.     Apgars 8/9.  Weight [id number].     QBL 883ml        Delivery Note  IUP  at 37 weeks gestation delivered on 2019.     delivery of a viable Female infant.  Weight : 5 pounds 10 ounces   Apgars of 8 at 1 minute and 9 at 5 minutes.  Labor was augmented.  Medications administered  in labor:  Pain Rx none; Antibiotics No; Other None  Perineum: Intact  Placenta-mechanism: spontaneous, intact,  with a 3 vessel cord. . IM oxytocin was given as first IV infiltrated.. Buccal misoprostol was given.  IM Methergine.  IM Hemabate. IV oxytocin was given.     QBL 883ml  Complications of pregnancy, labor and delivery: Hemorrhage > 500 cc  Birth attendants  Kacie ANG CNM      See separate note for PP Hemorrhage management.              Labor Event Times    Labor onset date:  7/15/19 Onset time:   3:40 PM   Dilation complete date:  7/15/19 Complete time:   7:44 PM   Start pushing date/time:  7/15/2019 194      Labor Length    1st Stage (hrs):  4 (min):  4   2nd Stage (hrs):  0 (min):  9   3rd Stage (hrs):  0 (min):  8      Labor Events     labor?:  No   steroids:  None  Labor Type:  AROM, Induction  Predominate monitoring during 1st stage:  continuous electronic fetal monitoring     Antibiotics received during labor?:  No     Rupture identifier:  Sac 1  Rupture date/time: 7/15/19 1540   Rupture type:  Artificial Rupture of Membranes  Fluid color:  Clear  Fluid odor:  Normal     Induction:  AROM  Induction date/time:     Cervical ripening date/time:     Indications for induction:  Elective     1:1 continuous labor support provided by?:   Labor partogram used?:  no      Delivery/Placenta Date and Time    Delivery Date:  7/15/19 Delivery Time:   7:53 PM   Placenta Date/Time:  7/15/2019  8:01 PM  Oxytocin given at the time of delivery:  after delivery of baby     Vaginal Counts     Initial count performed by 2 team members:   Two Team Members   MIRIAM Hall CNM       Needles Suture [place] Sponges Instruments   Initial counts 2 0 5    Added to count 0 0  "0    Final counts 2 0 5    Placed during labor Accounted for at the end of labor   NA    NA    NA     Final count performed by 2 team members:   Two Team Members   CONCHA ScottRN   VIRGILIO Obrien CNm      Final count correct?:  Yes     Apgars    Living status:  Living   1 Minute 5 Minute 10 Minute 15 Minute 20 Minute   Skin color: 1  1       Heart rate: 2  2       Reflex irritability: 1  2       Muscle tone: 2  2       Respiratory effort: 2  2       Total: 8  9       Apgars assigned by:  EMERALDRCARISSA     Cord     Complications:  None   Cord Blood Disposition:  Lab Gases Sent?:  No      [place] Resuscitation    Methods:  None   Care at Delivery:  Infant to mom's chest. Dried, stimulated. Infant vigorous.      [place] Measurements    Weight:  5 lb 10 oz Length:  1' 6.25\"   Head circumference:  33.7 cm       Skin to Skin and Feeding Plan    Skin to skin initiation date/time: 1841    Skin to skin with:  Mother  Skin to skin end date/time: 1841    Breastfeeding initiated date/time:  7/15/2019 2020  How do you plan to feed your baby:  Breastfeeding     Labor Events and Shoulder Dystocia    Fetal Tracing Prior to Delivery:  Category 2  Fetal Tracing Comments:  intermittent variables, moderate variability  Shoulder dystocia present?:  Neg     Delivery (Maternal) (Provider to Complete) (999980)    Episiotomy:  None  Perineal lacerations:  None    Vaginal laceration?:  No    Cervical laceration?:  No       Blood Loss  Mother: Pam Syed #[id number]   Start of Mother's Information    IO Blood Loss  07/15/19 1540 - 19 0241    Total QBL Blood Loss (mL) Hospital Encounter 883 mL    Total  883 mL         End of Mother's Information  Mother: Pam Syed #[id number]         Delivery - Provider to Complete (186173)    Delivering clinician:  Kacie Obrien, ASHIA CNM  CNM Care:  Exclusive CNM care in labor  Attempted Delivery Types (Choose all that apply):  Spontaneous Vaginal Delivery  Delivery Type (Choose " the 1 that will go to the Birth History):  Vaginal, Spontaneous   Other personnel:   Provider Role   Nae Scott Student   Lorena Scott, RN Delivery Nurse         Placenta    Delayed Cord Clamping:  Done  Date/Time:  7/15/2019  8:01 PM  Removal:  Spontaneous  Comments:  3VC, via ching Escalera  Disposition:  Hospital disposal     Anesthesia    Method:  None          Presentation and Position    Presentation:  Compound  Position:  Left  Anterior           ASHIA Disla CNM

## 2019-07-17 VITALS
RESPIRATION RATE: 18 BRPM | OXYGEN SATURATION: 97 % | DIASTOLIC BLOOD PRESSURE: 73 MMHG | SYSTOLIC BLOOD PRESSURE: 103 MMHG | TEMPERATURE: 98.2 F | HEART RATE: 82 BPM

## 2019-07-17 LAB — HGB BLD-MCNC: 8.5 G/DL (ref 11.7–15.7)

## 2019-07-17 PROCEDURE — 36415 COLL VENOUS BLD VENIPUNCTURE: CPT | Performed by: ADVANCED PRACTICE MIDWIFE

## 2019-07-17 PROCEDURE — 85018 HEMOGLOBIN: CPT | Performed by: ADVANCED PRACTICE MIDWIFE

## 2019-07-17 PROCEDURE — 25800030 ZZH RX IP 258 OP 636: Performed by: ADVANCED PRACTICE MIDWIFE

## 2019-07-17 PROCEDURE — 25000128 H RX IP 250 OP 636: Performed by: ADVANCED PRACTICE MIDWIFE

## 2019-07-17 PROCEDURE — 25000132 ZZH RX MED GY IP 250 OP 250 PS 637: Performed by: ADVANCED PRACTICE MIDWIFE

## 2019-07-17 RX ORDER — AMOXICILLIN 250 MG
1 CAPSULE ORAL DAILY
Qty: 100 TABLET | Refills: 0 | Status: SHIPPED | OUTPATIENT
Start: 2019-07-17 | End: 2019-09-09

## 2019-07-17 RX ORDER — ASCORBIC ACID 250 MG
250 TABLET,CHEWABLE ORAL DAILY
Qty: 100 TABLET | Refills: 1 | Status: SHIPPED | OUTPATIENT
Start: 2019-07-17 | End: 2021-05-17

## 2019-07-17 RX ORDER — ACETAMINOPHEN 325 MG/1
650 TABLET ORAL EVERY 6 HOURS PRN
Qty: 100 TABLET | Refills: 0 | Status: SHIPPED | OUTPATIENT
Start: 2019-07-17 | End: 2021-05-17

## 2019-07-17 RX ORDER — IBUPROFEN 600 MG/1
600 TABLET, FILM COATED ORAL EVERY 6 HOURS PRN
Qty: 60 TABLET | Refills: 0 | Status: SHIPPED | OUTPATIENT
Start: 2019-07-17 | End: 2021-05-17

## 2019-07-17 RX ADMIN — SENNOSIDES AND DOCUSATE SODIUM 2 TABLET: 8.6; 5 TABLET ORAL at 12:29

## 2019-07-17 RX ADMIN — IBUPROFEN 800 MG: 800 TABLET, FILM COATED ORAL at 12:29

## 2019-07-17 RX ADMIN — IRON SUCROSE 200 MG: 20 INJECTION, SOLUTION INTRAVENOUS at 09:54

## 2019-07-17 NOTE — DISCHARGE SUMMARY
[facility] Discharge Summary    Pam Syed MRN# [id number]   Age: 29 year old YOB: 1990     Date of Admission:  7/15/2019  Date of Discharge::  7/17/2019  Admitting Physician:  ASHIA Ruiz CNM  Discharge Physician:  ASHIA Wall CNM        Home clinic: [facility]          Admission Diagnoses:   Maternity*latrell:  7/16/19/labor  Labor and delivery, indication for care  Vaginal delivery          Discharge Diagnosis:   Normal spontaneous vaginal delivery          Procedures:   Procedure(s): No additional procedures performed       No procedures performed during this admission           Medications Prior to Admission:     No medications prior to admission.             Discharge Medications:     Current Discharge Medication List      START taking these medications    Details   acetaminophen (TYLENOL) 325 MG tablet Take 2 tablets (650 mg) by mouth every 4 hours as needed for mild pain or fever (greater than or equal to 38  C /100.4  F (oral) or 38.5  C/ 101.4  F (core).)    Associated Diagnoses: Vaginal delivery      ibuprofen (ADVIL/MOTRIN) 800 MG tablet Take 1 tablet (800 mg) by mouth every 6 hours as needed for other (cramping)    Associated Diagnoses: Vaginal delivery         CONTINUE these medications which have NOT CHANGED    Details   cholecalciferol (VITAMIN D3) 5000 units (125 mcg) capsule Take 5,000 Units by mouth daily      ferrous sulfate (FE TABS) 325 (65 Fe) MG EC tablet Take 1 tablet (325 mg) by mouth daily  Qty: 100 tablet, Refills: 1    Associated Diagnoses: Iron deficiency anemia secondary to inadequate dietary iron intake      Prenatal Vit-Fe Fumarate-FA (PRENATAL VITAMIN AND MINERAL) 28-0.8 MG TABS Take 1 tablet by mouth daily  Qty: 90 tablet, Refills: 3    Associated Diagnoses: Early stage of pregnancy      SENNA-docusate sodium (SENNA S) 8.6-50 MG tablet Take 1 tablet by mouth At Bedtime  Qty: 90 tablet, Refills: 3    Associated Diagnoses: High-risk pregnancy,  unspecified trimester      !! order for DME Maternity Belt order  Qty: 1 each, Refills: 0    Associated Diagnoses: High-risk pregnancy, unspecified trimester      !! order for DME Maternity Belt order  Qty: 1 each, Refills: 0    Associated Diagnoses: High-risk pregnancy, unspecified trimester       !! - Potential duplicate medications found. Please discuss with provider.      STOP taking these medications       aspirin (ASA) 81 MG tablet Comments:   Reason for Stopping:                     Consultations:   No consultations were requested during this admission          Brief History of Labor:   Pam Syed is a 29 year old   at  37w6d presented to labor floor with c/o labor, was found to be 6cm in clinic visit.  Pt has history of IUFD at term so was scheduled for IOL on 19. Consented to AROM for clear fluid at 1540 Progressed to C/C/+1 at 1944 .  Pushed effectively with CNM coaching. FHR with Vriable decels with pushing effort, moderate variability throughout. Head delivered OA and restituted to LOT . No nuchal cord.  Compound presentation with L hand. Shoulders easily delivered under maternal effort.  Live female  delivered at 1953 over an intact perineum under no anesthesia.  Spontaneous breath, vigorous cry, well flexed, HR>100. Infant directly to maternal abdomen, skin to skin. Delayed cord clamping for 5 minutes then clamped x2 and cut by FOB.   Initial IV infiltrated so 10 units of IM pitocin given  after baby.  Cord blood obtained for typing.  Cord segment for gases was held but not sent. Intact placenta spontaneously delivered via Escalera at .   3 vessel cord. Fundus firm @ firm after massage.   Vagina, perineum, and rectum inspected, found to be intact.  Mother and infant stable, continued skin to skin.   Buccal Misoprostol given for shena bleeding of moderate lochia despite fundal massage.  IM Methergine given.  IV restarted and Pitocin infusion initiated.  Bimanual compression  "with evacuation of clots from lower uterine segment. IM Hemabate given.  MD consult, second bimanual and clots evacuated from lower uterine segment.  2nd IV started.  Coags and CBC drawn.  IV Fentanyl and IV Zofran for discomfort.  Pt was being prepped for OR for possible D&C but bleeding stabilized, labs were stable and vitals signs WNL.  Pt tolerated well with updates from CNM, RN, and MD throughout.      Apgars 8/9.  Weight [id number].     QBL 883ml      Delivery Note  IUP at 37 weeks gestation delivered on 2019.     delivery of a viable Female infant.  Weight : 5 pounds 10 ounces   Apgars of 8 at 1 minute and 9 at 5 minutes.  Labor was augmented.  Medications administered  in labor:  Pain Rx none; Antibiotics No; Other None  Perineum: Intact  Placenta-mechanism: spontaneous, intact,  with a 3 vessel cord. . IM oxytocin was given as first IV infiltrated.. Buccal misoprostol was given.  IM Methergine.  IM Hemabate. IV oxytocin was given.     QBL 883ml  Complications of pregnancy, labor and delivery: Hemorrhage > 500 cc  Birth attendants  Kacie ANG CNM      See separate note for PP Hemorrhage management.      Assessment Day of Discharge    Vital signs:  Temp: 98.2  F (36.8  C) Temp src: Oral BP: 103/73 Pulse: 82 Heart Rate: 75 Resp: 18            Estimated body mass index is 30.35 kg/m  as calculated from the following:    Height as of an earlier encounter on 7/15/19: 1.575 m (5' 2.01\").    Weight as of an earlier encounter on 7/15/19: 75.3 kg (166 lb).    Breasts: Soft, filling  Nipples: Intact, Non-tender  Abdomen: Soft, Non-tender    Diastatis Recti:  1-2 FB  Uterus: Fundus Firm, Non-tender, located 2 below the umbilicus   Lochia: Light rubra    Perineum:  No edema  Lower Extremities: No Edema Bilateral, Negative Homans Sign           Hospital Course:   The patient's hospital course was unremarkable.  On discharge, her pain was well controlled. Plans to continue Tylenol and ibuprofen PRN. " Vaginal bleeding is lighter than peak menstrual flow.  Voiding without difficulty.  No bowel movement. Ambulating well and tolerating a normal diet.  No fever.  Breastfeeding well.  Infant is stable.  She was discharged on post-partum day #2.    Post-partum hemoglobin:   Hemoglobin   Date Value Ref Range Status   07/17/2019 8.5 (L) 11.7 - 15.7 g/dL Final     Hgb: Continue taking PO iron, start taking with Vit C, continue PNV  Rh: Pos  Rubella status: Immune   Plan for contraception: Mirena. Would like to discuss in more detail at 2 weeks visit, and have placed at 6 week PP.   Reviewed postpartum warning signs, activity level, avoiding IC for 6 weeks, sitz baths BID, gentle abdominal and pelvic floor exercises exercises, breast care,  postpartum depression/anxiety, s/s pre-eclampsia. Pt verbalized understanding with teach back.          Discharge Instructions and Follow-Up:   Discharge diet: Regular   Discharge activity: Activity as tolerated   Discharge follow-up: Follow up BP check tomorrow during home visit. If it continues to be low, follow-up with [facility] in 2 weeks and again in 6 weeks   Wound care: NA           Discharge Disposition:   Discharged to home      I, AMELIA Aranda am serving as a scribe; to document services personally performed by  Kim Patel CNM based on data collection and the provider's statements to me.     AMELIA Aranda     I agree with the PFSH and ROS as completed by the student, except for changes made by me. The remainder of the encounter was performed by me and scribed by the student. The scribed note accurately reflects my personal services and decisions made by me.  Kim Shaffer DNP, ANGELIC, APRN

## 2019-07-17 NOTE — PLAN OF CARE
Data: Vital signs stable, assessments within normal limits. No signs of infection noted. Patient discharge information  and  instructed of signs/symptoms to look for and report per discharge instructions. Discharge outcomes on care plan met. No apparent pain.Breast pump given.  Action: Review of care plan, teach back, and discharge instructions done with patient. Infant identification with ID bands done,  verification with signature obtained.   Response:  Patient states understanding and comfort with self cares. All questions about self care addressed. patient discharged with infant at 1240.

## 2019-07-17 NOTE — PLAN OF CARE
[id number]:  VSS and postpartum assessments WDL.  Up ad dante with steady gait.  Independent with cares.  Bonding well with infant.  Breastfeeding on cue with some assist for latching.  Pain managed with ibuprofen per MAR.  PIV saline locked after iron infusion.  , Nilson present and supportive.  Will continue with postpartum cares and education per plan of care.

## 2019-07-17 NOTE — PLAN OF CARE
Data: Vital signs within normal limits. Postpartum checks within normal limits - see flow record. Patient eating and drinking normally. Patient able to empty bladder independently and is up ambulating. No apparent signs of infection. perineum  healing well. Patient performing self cares and is able to care for infant.  Action: Patient medicated during the shift for cramping. See MAR. Patient reassessed within 1 hour after each medication and pain was improved - patient stated she was comfortable. Patient education done about self cares, medication management. See flow record.  Response: Positive attachment behaviors observed with infant. Support persons  present.   Plan: Anticipate discharge on 7/17.

## 2019-07-17 NOTE — PLAN OF CARE
Patients vitals have been stable. Postpartum assessment WDL. States that she is voiding without difficulties. Declines headache, dizziness, and blurred vision. Has declined pain and pain medication overnight. Knows that she has tylenol and ibuprofen if needed. Will continue to monitor.

## 2019-09-09 ENCOUNTER — OFFICE VISIT (OUTPATIENT)
Dept: OBGYN | Facility: CLINIC | Age: 29
End: 2019-09-09
Attending: ADVANCED PRACTICE MIDWIFE
Payer: COMMERCIAL

## 2019-09-09 RX ORDER — ACETAMINOPHEN AND CODEINE PHOSPHATE 120; 12 MG/5ML; MG/5ML
0.35 SOLUTION ORAL DAILY
Qty: 90 TABLET | Refills: 3 | Status: SHIPPED | OUTPATIENT
Start: 2019-09-09 | End: 2021-05-17

## 2019-09-09 ASSESSMENT — ANXIETY QUESTIONNAIRES
3. WORRYING TOO MUCH ABOUT DIFFERENT THINGS: NOT AT ALL
2. NOT BEING ABLE TO STOP OR CONTROL WORRYING: NOT AT ALL
7. FEELING AFRAID AS IF SOMETHING AWFUL MIGHT HAPPEN: NOT AT ALL
1. FEELING NERVOUS, ANXIOUS, OR ON EDGE: SEVERAL DAYS
6. BECOMING EASILY ANNOYED OR IRRITABLE: SEVERAL DAYS
GAD7 TOTAL SCORE: 3
5. BEING SO RESTLESS THAT IT IS HARD TO SIT STILL: NOT AT ALL

## 2019-09-09 ASSESSMENT — PATIENT HEALTH QUESTIONNAIRE - PHQ9
5. POOR APPETITE OR OVEREATING: SEVERAL DAYS
SUM OF ALL RESPONSES TO PHQ QUESTIONS 1-9: 1

## 2019-09-09 NOTE — NURSING NOTE
Chief Complaint   Patient presents with     Postpartum Care      7/15/2019   Ogla Milian LPN      SUBJECTIVE:   Pam Syed is here for her 6-week postpartum checkup.     PHQ-9 score:   Hx of Abuse:  No    Delivery Date: 7/15/2019.    Delivering provider:  Kacie Obrien MD.    Type of delivery:  .  Perineum:  in tact.     Delivery complications: None  Infant gender:  girl, weight 5 pounds 10 oz.  Feeding Method:  .  Complications reported with feeding:  none, infant thriving .    Bleeding:  None.  Duration:    .  Menses resumed:  No  Bowel/Urinary problems:  Yes     Contraception Planned:  oral contraceptive  She  has had intercourse since delivery and experienced  No discomfort.  .

## 2019-09-09 NOTE — PROGRESS NOTES
Nursing Notes:   Olga Milian LPN  2019  2:44 PM  Incomplete  Chief Complaint   Patient presents with     Postpartum Care      7/15/2019   Olga Milian LPN      SUBJECTIVE:   Pam Syed is here for her 6-week postpartum checkup.     PHQ-9 score:   Hx of Abuse:  No    Delivery Date: 7/15/2019.    Delivering provider:  Kacie Obrien MD.    Type of delivery:  .  Perineum:  in tact.     Delivery complications: None  Infant gender:  girl, weight 5 pounds 10 oz.  Feeding Method:  .  Complications reported with feeding:  none, infant thriving .    Bleeding:  None.  Duration:    .  Menses resumed:  No  Bowel/Urinary problems:  Yes     Contraception Planned:  oral contraceptive  She  has had intercourse since delivery and experienced  No discomfort.  .     Very happy with new daughter PP going well baby nursing frequently no formula  Pt is home full time enjoying daughter some sleep deprivation but coping well  Will start POP now  Had PPH but no heavy bleeding PP has been taking po FE supp intermittently will have blood drawn later  Not today  Ordered here with son and spouse and baby mood is good.    pt was noted to be 6cm in clinic not in labor  IOL by AROM  2 hr labor -birth  Went well but PPH managed by med no issues PP       ================================================================  ROS: 10 point ROS neg other than the symptoms noted above in the HPI.   CONSTITUTIONAL: negative  RESPIRATORY: negative  CARDIOVASCULAR: negative  GI: negative  : negative  MUSCULO-SKELETAL: negative  SKIN: negative  HEMATOLOGIC: negative  ALLERGY/IMMUN: negative  PSYCHIATRIC: negative    EXAM:  There were no vitals taken for this visit.    General: healthy, alert and no distress  Psych: NEGATIVE  Last PHQ-9 score on record= No Value exists for the : HP#PHQ9  Breasts:  Lactating, Nipples intact with no lesions, Non-tender and No S/S of yeast or mastitis  Abdomen: Benign, Soft, flat,  non-tender, No masses, organomegaly and Diastasis less than 1-2 FB  Vulva:  Normal genitalia and Bartholin's, Urethra, [place]'s normal  Vagina:  normal with good muscle tone and without discharge  Cervix:  Multiparous,, no lesions and pink, moist, closed, without lesion or CMT.    Uterus:  fully involuted and non-tender and anteflexed, small, smooth, firm, mobile w/o pain    Adnexa:  Within normal limits and No masses, nodularity, tenderness  Recto-vaginal:   anus normal    ASSESSMENT:   Encounter Diagnosis   Name Primary?     Routine postpartum follow-up Yes      Normal postpartum exam after   Hx prior IUFD  And severe preeclampsia   Pregnancy was complicated by:  Hx prior IUFD hx severe preeclampsia .      PLAN:  Orders Placed This Encounter   Procedures     hCG qual urine POCT      Orders Placed This Encounter   Medications     norethindrone (MICRONOR) 0.35 MG tablet     Sig: Take 1 tablet (0.35 mg) by mouth daily     Dispense:  90 tablet     Refill:  3        Risks and benefits of prescribed medications discussed.  Medication instructions reviewed.  Discussed calcium intake, vitamins and supplements including Vitamin D  Exercise encouraged  Follow up in 1 year  Jade Hargrove CNM APRN

## 2019-09-10 ASSESSMENT — ANXIETY QUESTIONNAIRES: GAD7 TOTAL SCORE: 3

## 2020-10-25 NOTE — LETTER
"  RE: Pam Syed  [address]  [place] [address]     Dear Colleague,    Thank you for referring your patient, Pam Syed, to the [facility] at [facility]. Please see a copy of my visit note below.    Subjective:     Pam Syed is a 29 y.o.  at 22w3d by LMP c/w 10w2d US who presents for a routine prenatal appointment.        She was recently admitted for cerclage placement (3/22-23) secondary to incidentally found unmeasurable cervix and prior history of early pregnancy loss. She is generally feeling well but is having anxiety related to prior pregnancy losses and recent need for cerclage placement. She notes 1 episode of pink-tinged vaginal discharge 2 days ago but otherwise has had no notable spotting.  No vaginal bleeding or leakage of fluid. No contractions or cramping. Having active fetal movement. No HA, visual changes, RUQ or epigastric pain. She is having intermittent constipation and is now taking a stool softener 2x daily to prevent straining, which she has been avoiding d/t cerclage placement. Also having periodic reflux controlled with eating small, frequent meals and avoidance of acidic foods.    Level II US 3/22- notable for unmeasurable cervix, anterior placenta, breech presentation.       Objective:  Vitals:    19 1133   BP: 114/76   Pulse: 87   Weight: 70.9 kg (156 lb 3.2 oz)   Height: 1.6 m (5' 2.99\")   See OB flowsheet    Assessment/Plan  Pam Syed is a 29 y.o.  at 22w3d by LMP c/w 10w2d US with OBHx significant for recurrent SAB, IUFD, s/p cerclage placement 3/22/19 who presents for a routine prenatal appointment. Pregnancy is progressing well.       - Reviewed why/how to contact provider.    Patient education/orders or handouts today:  PTL signs/symptoms, fetal movement and Plan for EOB visit w labs  - Transfer of care to [facility] OB for management of complex pregnancy s/p cerclage. " Anticipate transition back to midwife service after removal of cerclage.  - F/u with MFM as previously scheduled    I, Maria Del Rosario Heller, am serving as a scribe; to document services personally performed by Liz Reza based on data collection and the provider's statements to me.     Maria Del Rosario Heller  The encounter was performed by me and scribed by the SNM. The scribed note accurately reflects my personal services and decisions made by me. ASHIA Martin CNM   22-Oct-2020

## 2020-12-20 ENCOUNTER — HEALTH MAINTENANCE LETTER (OUTPATIENT)
Age: 30
End: 2020-12-20

## 2021-04-16 ENCOUNTER — TELEPHONE (OUTPATIENT)
Dept: OBGYN | Facility: CLINIC | Age: 31
End: 2021-04-16

## 2021-04-16 DIAGNOSIS — O09.91 HIGH-RISK PREGNANCY IN FIRST TRIMESTER: ICD-10-CM

## 2021-04-16 DIAGNOSIS — N96 HISTORY OF RECURRENT MISCARRIAGES, NOT CURRENTLY PREGNANT: ICD-10-CM

## 2021-04-16 RX ORDER — PROGESTERONE 200 MG/1
CAPSULE ORAL
Qty: 60 CAPSULE | Refills: 1 | Status: SHIPPED | OUTPATIENT
Start: 2021-04-16 | End: 2021-05-17

## 2021-04-16 NOTE — TELEPHONE ENCOUNTER
Message received from patient requesting a medication she gets put on every pregnancy. Pt has not been seen since 09/2019.    Called patient. She reports having a positive pregnancy test yesterday. States she is pleased, but pregnancy was not planned. Is unsure of LMP, believes it may have been 3/8-3/12, making her around 4-5 weeks pregnant. Pt states she is usually started on progesterone 200mg BID once she finds out she is pregnant and is wanting to be prescribed progesterone again for this pregnancy. Advised I would reach out to the on call CNM to review and prescribe if appropriate. Offered to transfer call to schedule OB intake and dating US. She accepted, denied concerns/questions. Call transferred as requested.    Routed to Justice Cosby CNM

## 2021-04-16 NOTE — TELEPHONE ENCOUNTER
Message received from Justice Cosby that rx was sent.     Foldees message sent to patient to inform her this was done.

## 2021-04-16 NOTE — TELEPHONE ENCOUNTER
Patient called in stating progesterone medication called in for her today not covered.  Changed from vaginal suppository Rx to Prometrium and sent to requested pharmacy.  Dr. Pimentel

## 2021-05-17 ENCOUNTER — ANCILLARY PROCEDURE (OUTPATIENT)
Dept: ULTRASOUND IMAGING | Facility: CLINIC | Age: 31
End: 2021-05-17
Attending: OBSTETRICS & GYNECOLOGY
Payer: COMMERCIAL

## 2021-05-17 ENCOUNTER — OFFICE VISIT (OUTPATIENT)
Dept: OBGYN | Facility: CLINIC | Age: 31
End: 2021-05-17
Attending: ADVANCED PRACTICE MIDWIFE
Payer: COMMERCIAL

## 2021-05-17 VITALS
HEART RATE: 70 BPM | DIASTOLIC BLOOD PRESSURE: 68 MMHG | BODY MASS INDEX: 25.98 KG/M2 | WEIGHT: 141.2 LBS | SYSTOLIC BLOOD PRESSURE: 101 MMHG | HEIGHT: 62 IN

## 2021-05-17 DIAGNOSIS — O36.4XX0 IUFD AT 20 WEEKS OR MORE OF GESTATION: ICD-10-CM

## 2021-05-17 DIAGNOSIS — Z34.91 ENCOUNTER FOR SUPERVISION OF NORMAL PREGNANCY IN FIRST TRIMESTER, UNSPECIFIED GRAVIDITY: ICD-10-CM

## 2021-05-17 DIAGNOSIS — O26.22 HISTORY OF RECURRENT ABORTION, CURRENTLY PREGNANT IN SECOND TRIMESTER: Chronic | ICD-10-CM

## 2021-05-17 DIAGNOSIS — Z87.59 HISTORY OF SEVERE PRE-ECLAMPSIA: Chronic | ICD-10-CM

## 2021-05-17 DIAGNOSIS — E55.9 VITAMIN D DEFICIENCY: Chronic | ICD-10-CM

## 2021-05-17 DIAGNOSIS — Z78.9 NONIMMUNE TO HEPATITIS B VIRUS: ICD-10-CM

## 2021-05-17 DIAGNOSIS — Z98.890 HISTORY OF CERVICAL CERCLAGE: ICD-10-CM

## 2021-05-17 DIAGNOSIS — Z87.59 HISTORY OF IUFD: ICD-10-CM

## 2021-05-17 DIAGNOSIS — O09.91 SUPERVISION OF HIGH RISK PREGNANCY, UNSPECIFIED, FIRST TRIMESTER: Primary | ICD-10-CM

## 2021-05-17 PROBLEM — O26.20 PREGNANCY CARE FOR PATIENT WITH RECURRENT PREGNANCY LOSS: Status: ACTIVE | Noted: 2017-06-26

## 2021-05-17 LAB
ABO + RH BLD: NORMAL
ABO + RH BLD: NORMAL
ALT SERPL W P-5'-P-CCNC: 19 U/L (ref 0–50)
AST SERPL W P-5'-P-CCNC: 15 U/L (ref 0–45)
BLD GP AB SCN SERPL QL: NORMAL
BLOOD BANK CMNT PATIENT-IMP: NORMAL
CREAT UR-MCNC: 150 MG/DL
ERYTHROCYTE [DISTWIDTH] IN BLOOD BY AUTOMATED COUNT: 13.4 % (ref 10–15)
HCT VFR BLD AUTO: 34.2 % (ref 35–47)
HGB BLD-MCNC: 11.5 G/DL (ref 11.7–15.7)
MCH RBC QN AUTO: 29.2 PG (ref 26.5–33)
MCHC RBC AUTO-ENTMCNC: 33.6 G/DL (ref 31.5–36.5)
MCV RBC AUTO: 87 FL (ref 78–100)
PLATELET # BLD AUTO: 203 10E9/L (ref 150–450)
PROT UR-MCNC: 0.09 G/L
PROT/CREAT 24H UR: 0.06 G/G CR (ref 0–0.2)
RBC # BLD AUTO: 3.94 10E12/L (ref 3.8–5.2)
SPECIMEN EXP DATE BLD: NORMAL
T PALLIDUM AB SER QL: NONREACTIVE
TSH SERPL DL<=0.005 MIU/L-ACNC: 0.88 MU/L (ref 0.4–4)
URATE SERPL-MCNC: 2.2 MG/DL (ref 2.6–6)
WBC # BLD AUTO: 7.3 10E9/L (ref 4–11)

## 2021-05-17 PROCEDURE — 86901 BLOOD TYPING SEROLOGIC RH(D): CPT | Performed by: ADVANCED PRACTICE MIDWIFE

## 2021-05-17 PROCEDURE — 84550 ASSAY OF BLOOD/URIC ACID: CPT | Performed by: ADVANCED PRACTICE MIDWIFE

## 2021-05-17 PROCEDURE — 87340 HEPATITIS B SURFACE AG IA: CPT | Performed by: ADVANCED PRACTICE MIDWIFE

## 2021-05-17 PROCEDURE — 86803 HEPATITIS C AB TEST: CPT | Performed by: ADVANCED PRACTICE MIDWIFE

## 2021-05-17 PROCEDURE — 85027 COMPLETE CBC AUTOMATED: CPT | Performed by: ADVANCED PRACTICE MIDWIFE

## 2021-05-17 PROCEDURE — 82306 VITAMIN D 25 HYDROXY: CPT | Performed by: ADVANCED PRACTICE MIDWIFE

## 2021-05-17 PROCEDURE — 36415 COLL VENOUS BLD VENIPUNCTURE: CPT | Performed by: ADVANCED PRACTICE MIDWIFE

## 2021-05-17 PROCEDURE — 84156 ASSAY OF PROTEIN URINE: CPT | Performed by: ADVANCED PRACTICE MIDWIFE

## 2021-05-17 PROCEDURE — 84460 ALANINE AMINO (ALT) (SGPT): CPT | Performed by: ADVANCED PRACTICE MIDWIFE

## 2021-05-17 PROCEDURE — 86706 HEP B SURFACE ANTIBODY: CPT | Performed by: ADVANCED PRACTICE MIDWIFE

## 2021-05-17 PROCEDURE — 86762 RUBELLA ANTIBODY: CPT | Performed by: ADVANCED PRACTICE MIDWIFE

## 2021-05-17 PROCEDURE — 86900 BLOOD TYPING SEROLOGIC ABO: CPT | Performed by: ADVANCED PRACTICE MIDWIFE

## 2021-05-17 PROCEDURE — 87389 HIV-1 AG W/HIV-1&-2 AB AG IA: CPT | Performed by: ADVANCED PRACTICE MIDWIFE

## 2021-05-17 PROCEDURE — 76817 TRANSVAGINAL US OBSTETRIC: CPT | Mod: 26 | Performed by: OBSTETRICS & GYNECOLOGY

## 2021-05-17 PROCEDURE — 84450 TRANSFERASE (AST) (SGOT): CPT | Performed by: ADVANCED PRACTICE MIDWIFE

## 2021-05-17 PROCEDURE — 87086 URINE CULTURE/COLONY COUNT: CPT | Performed by: ADVANCED PRACTICE MIDWIFE

## 2021-05-17 PROCEDURE — 86780 TREPONEMA PALLIDUM: CPT | Performed by: ADVANCED PRACTICE MIDWIFE

## 2021-05-17 PROCEDURE — 99214 OFFICE O/P EST MOD 30 MIN: CPT | Mod: 25 | Performed by: ADVANCED PRACTICE MIDWIFE

## 2021-05-17 PROCEDURE — 86850 RBC ANTIBODY SCREEN: CPT | Performed by: ADVANCED PRACTICE MIDWIFE

## 2021-05-17 PROCEDURE — 84443 ASSAY THYROID STIM HORMONE: CPT | Performed by: ADVANCED PRACTICE MIDWIFE

## 2021-05-17 PROCEDURE — G0463 HOSPITAL OUTPT CLINIC VISIT: HCPCS | Mod: 25

## 2021-05-17 PROCEDURE — 76817 TRANSVAGINAL US OBSTETRIC: CPT

## 2021-05-17 SDOH — ECONOMIC STABILITY: INCOME INSECURITY: HOW HARD IS IT FOR YOU TO PAY FOR THE VERY BASICS LIKE FOOD, HOUSING, MEDICAL CARE, AND HEATING?: NOT HARD AT ALL

## 2021-05-17 SDOH — HEALTH STABILITY: PHYSICAL HEALTH: ON AVERAGE, HOW MANY DAYS PER WEEK DO YOU ENGAGE IN MODERATE TO STRENUOUS EXERCISE (LIKE A BRISK WALK)?: 0 DAYS

## 2021-05-17 SDOH — HEALTH STABILITY: MENTAL HEALTH
STRESS IS WHEN SOMEONE FEELS TENSE, NERVOUS, ANXIOUS, OR CAN'T SLEEP AT NIGHT BECAUSE THEIR MIND IS TROUBLED. HOW STRESSED ARE YOU?: ONLY A LITTLE

## 2021-05-17 SDOH — ECONOMIC STABILITY: TRANSPORTATION INSECURITY
IN THE PAST 12 MONTHS, HAS LACK OF TRANSPORTATION KEPT YOU FROM MEETINGS, WORK, OR FROM GETTING THINGS NEEDED FOR DAILY LIVING?: NO

## 2021-05-17 SDOH — ECONOMIC STABILITY: FOOD INSECURITY: WITHIN THE PAST 12 MONTHS, THE FOOD YOU BOUGHT JUST DIDN'T LAST AND YOU DIDN'T HAVE MONEY TO GET MORE.: NEVER TRUE

## 2021-05-17 SDOH — HEALTH STABILITY: PHYSICAL HEALTH: ON AVERAGE, HOW MANY MINUTES DO YOU ENGAGE IN EXERCISE AT THIS LEVEL?: 0 MIN

## 2021-05-17 SDOH — ECONOMIC STABILITY: TRANSPORTATION INSECURITY
IN THE PAST 12 MONTHS, HAS THE LACK OF TRANSPORTATION KEPT YOU FROM MEDICAL APPOINTMENTS OR FROM GETTING MEDICATIONS?: NO

## 2021-05-17 SDOH — ECONOMIC STABILITY: FOOD INSECURITY: WITHIN THE PAST 12 MONTHS, YOU WORRIED THAT YOUR FOOD WOULD RUN OUT BEFORE YOU GOT MONEY TO BUY MORE.: NEVER TRUE

## 2021-05-17 ASSESSMENT — PAIN SCALES - GENERAL: PAINLEVEL: NO PAIN (0)

## 2021-05-17 ASSESSMENT — MIFFLIN-ST. JEOR: SCORE: 1298.73

## 2021-05-17 NOTE — LETTER
2021       RE: Pam Syed  [address]  [place] [address]     Dear Colleague,    Thank you for referring your patient, Pam Syed, to the [facility] [place] at [facility]. Please see a copy of my visit note below.    [facility] OB Intake note  Subjective   31 year old femalepresents to clinic for initiation of OB care. No LMP recorded. Patient is pregnant.  at 8w0d by Estimated Date of Delivery: Data Unavailable based on US confirms. Reviewed dating ultrasound. Pregnancy is unplanned but welcomed.    Partner name - Nilson.       Symptoms since LMP include breast tenderness and fatigue. Patient has tried these relief measures: increased rest.    - Genetic/Infection questionnaire completed, risks include recurrent pregnancy loss. Pt  does not have a recent known exposure to Parvo or CMV so IgG/IgM testing WILL NOT be ordered.   Have you traveled during the pregnancy?No  Have your sexual partner(s) travelled during the pregnancy?No    -Plans to travel to [place]  for one month with family.     - Current Medications    Current Outpatient Medications   Medication Sig Dispense Refill     cholecalciferol (VITAMIN D3) 125 mcg (5000 units) capsule Take 1 capsule (125 mcg) by mouth daily Take one capsule daily. 90 capsule 3     Prenatal Vit-Fe Fumarate-FA (PRENATAL VITAMIN AND MINERAL) 28-0.8 MG TABS Take 1 tablet by mouth daily (Patient not taking: Reported on 2021) 90 tablet 3     progesterone (PROMETRIUM) 200 MG capsule Insert one capsule in the vagina twice daily 60 capsule 1         - Co-morbids    Past Medical History:   Diagnosis Date     Recurrent pregnancy loss (CODE)      Severe pre-eclampsia, postpartum condition or complication 2018     - Risk for GDM : No known risk factors for GDMso  WILL NOT have an early GCT and Hgb A1C    - High risk factors for Pre E-  History of Pre Eclampsia       -  Moderate risk factor for Pre E Sociodemographic characteristics (Racism, Less access given lower SES) and Personal history factors (e.g., low birthweight or small for gestational age, previous adverse pregnancy outcome, >10-year pregnancy interval)   Meets one high risk factors or two or more of the moderate risk facrtors  so WILL consider starting low dose aspirin (81mg) starting between 12 and 28 weeks to prevent early onset preeclampsia    - The patient  does have a history of spontaneous  birth and has already started progesterone and/or serial transvaginal cervical length ultrasounds from 16-24 weeks.     -The patient does not have a history of immunosuppresion or HIV so Toxoplasma IgG/IgM WILL NOT be ordered.           PERSONAL/SOCIAL HISTORY    lives with their spouse.  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  Her partner works as a .   History of anxiety or depression  Anxiety after stillborn baby - if Yes, therapy/medication/hospitalization - no, controled with family support  Additional items: None    Objective  -VS: reviewed and within normal limits   -General appearance: no acute distress, patient is comfortable   NEUROLOGICAL/PSYCHIATRIC   - Orientated x3,   -Mood and affect: : normal     Assessment/Plan  Pam was seen today for prenatal care.    Diagnoses and all orders for this visit:    Supervision of high risk pregnancy, unspecified, first trimester  -     ABO/Rh type and screen  -     Hepatitis B surface antigen  -     CBC with platelets  -     HIV Antigen Antibody Combo  -     Rubella Antibody IgG Quantitative  -     Treponema Abs w Reflex to RPR and Titer  -     Urine Culture Aerobic Bacterial  -     Hepatitis C antibody  -     [facility] Referral-Genetic Screening  Pregnancy  -     Hepatitis B Surface Antibody  -     Vitamin D Deficiency  -     AST  -     ALT  -     Uric acid  -     Protein  random urine with Creat Ratio  -      TSH with free T4 reflex  -     Creatinine urine calculation only  -     [facility] FETAL [facility] REFERRAL-PREGNANCY  -     cholecalciferol (VITAMIN D3) 125 mcg (5000 units) capsule; Take 1 capsule (125 mcg) by mouth daily Take one capsule daily.    History of severe pre-eclampsia    History of recurrent   -     [facility]ETAL [facility] REFERRAL-PREGNANCY    History of cervical cerclage  -     [facility] FETAL [facility] REFERRAL-PREGNANCY    IUFD at 20 weeks or more of gestation    History of IUFD    Vitamin D deficiency  -     cholecalciferol (VITAMIN D3) 125 mcg (5000 units) capsule; Take 1 capsule (125 mcg) by mouth daily Take one capsule daily.    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 Patient is pregnant.. Ultrasound confirms.   Outpatient Encounter Medications as of 2021   Medication Sig Dispense Refill     cholecalciferol (VITAMIN D3) 125 mcg (5000 units) capsule Take 1 capsule (125 mcg) by mouth daily Take one capsule daily. 90 capsule 3     [DISCONTINUED] progesterone (FIRST-PROGESTERONE ) 200 MG VA SUPP Place 1 suppository (200 mg) vaginally 2 times daily 60 suppository 3     Prenatal Vit-Fe Fumarate-FA (PRENATAL VITAMIN AND MINERAL) 28-0.8 MG TABS Take 1 tablet by mouth daily (Patient not taking: Reported on 2021) 90 tablet 3     [DISCONTINUED] acetaminophen (TYLENOL) 325 MG tablet Take 2 tablets (650 mg) by mouth every 6 hours as needed for mild pain Start after Delivery. 100 tablet 0     [DISCONTINUED] ascorbic acid (VITAMIN C) 250 MG CHEW chewable tablet Take 1 tablet (250 mg) by mouth daily 100 tablet 1     [DISCONTINUED] cholecalciferol (VITAMIN D3) 5000 units (125 mcg) capsule Take 5,000 Units by mouth daily       [DISCONTINUED] ferrous sulfate (FE TABS) 325 (65 Fe) MG EC tablet Take 1 tablet (325 mg) by mouth daily 100 tablet 1     [DISCONTINUED] ibuprofen (ADVIL/MOTRIN) 600 MG tablet Take 1 tablet  (600 mg) by mouth every 6 hours as needed for moderate pain Start after delivery 60 tablet 0     [DISCONTINUED] norethindrone (MICRONOR) 0.35 MG tablet Take 1 tablet (0.35 mg) by mouth daily 90 tablet 3     [DISCONTINUED] order for [facility] order 1 each 0     [DISCONTINUED] progesterone (PROMETRIUM) 200 MG capsule Place 1 capsule vaginally twice daily until 10 weeks of pregnancy 60 capsule 1     No facility-administered encounter medications on file as of 5/17/2021.       Orders Placed This Encounter   Procedures     Hepatitis B surface antigen     CBC with platelets     HIV Antigen Antibody Combo     Rubella Antibody IgG Quantitative     Treponema Abs w Reflex to RPR and Titer     Hepatitis C antibody     Hepatitis B Surface Antibody     Vitamin D Deficiency     AST     ALT     Uric acid     Protein  random urine with Creat Ratio     TSH with free T4 reflex     Creatinine urine calculation only     [facility] Referral-Genetic Screening  Pregnancy     [facility] REFERRAL-PREGNANCY     ABO/Rh type and screen                 Orders Placed This Encounter   Procedures     Hepatitis B surface antigen     CBC with platelets     HIV Antigen Antibody Combo     Rubella Antibody IgG Quantitative     Treponema Abs w Reflex to RPR and Titer     Hepatitis C antibody     Hepatitis B Surface Antibody     Vitamin D Deficiency     AST     ALT     Uric acid     Protein  random urine with Creat Ratio     TSH with free T4 reflex     Creatinine urine calculation only     MFM Referral-Genetic Screening  Pregnancy     [facility] CTR REFERRAL-PREGNANCY     ABO/Rh type and screen       - Oriented to Practice, types of care, and how to reach a provider.  Pt prefers CNM team  - Patient received 1st trimester new OB education packet complete with aide of [employer] [facility] [employer] booklet including information on genetic screening test options.  - Patient would like first trimester screening which were ordered.  - Educational handout on  the prevention of infections diseases during pregnancy provided.  - Patient was encouraged to start prenatal vitamins as tolerated.    - Patient was sent to lab for routine OB labs including TSH.   - Reviewed recommendation for 17P, pt has already started taking.     - MFM consult placed for cervical length assessment plan and cerclage  - Reviewed recommendation for low dose aspirin daily to prevent pre eclampsia, pt agrees, follow up at NOB visit.   - Pregnancy concerns to be addressed by provider at new OB exam include: history of preeclampsia, history of stillbirth at 39 weeks, multiple early pregnancy losses, and history of cerclage with last pregnancy .    Pt to RTO for NOB visit in 2-3 weeks and prn if questions or concerns    I, AMELIA Navarrete am serving as a scribe; to document services personally performed by  Kim Shaffer DNP, ASHIA, ANGELIC, FACNM   based on data collection and the provider's statements to me.     AMELIA Navarrete  I agree with the PFSH and ROS as completed by the student, except for changes made by me. The remainder of the encounter was performed by me and scribed by the student. The scribed note accurately reflects my personal services and decisions made by me.  Kim Shaffer DNP, ANGELIC, APRN

## 2021-05-17 NOTE — H&P (VIEW-ONLY)
[facility] OB Intake note  Subjective   31 year old femalepresents to clinic for initiation of OB care. No LMP recorded. Patient is pregnant.  at 8w0d by Estimated Date of Delivery: Data Unavailable based on US confirms. Reviewed dating ultrasound. Pregnancy is unplanned but welcomed.    Partner name - Nilson.       Symptoms since LMP include breast tenderness and fatigue. Patient has tried these relief measures: increased rest.    - Genetic/Infection questionnaire completed, risks include recurrent pregnancy loss. Pt  does not have a recent known exposure to Parvo or CMV so IgG/IgM testing WILL NOT be ordered.   Have you traveled during the pregnancy?No  Have your sexual partner(s) travelled during the pregnancy?No    -Plans to travel to [place]  for one month with family.     - Current Medications    Current Outpatient Medications   Medication Sig Dispense Refill     cholecalciferol (VITAMIN D3) 125 mcg (5000 units) capsule Take 1 capsule (125 mcg) by mouth daily Take one capsule daily. 90 capsule 3     Prenatal Vit-Fe Fumarate-FA (PRENATAL VITAMIN AND MINERAL) 28-0.8 MG TABS Take 1 tablet by mouth daily (Patient not taking: Reported on 2021) 90 tablet 3     progesterone (PROMETRIUM) 200 MG capsule Insert one capsule in the vagina twice daily 60 capsule 1         - Co-morbids    Past Medical History:   Diagnosis Date     Recurrent pregnancy loss (CODE)      Severe pre-eclampsia, postpartum condition or complication 2018     - Risk for GDM : No known risk factors for GDMso  WILL NOT have an early GCT and Hgb A1C    - High risk factors for Pre E-  History of Pre Eclampsia       - Moderate risk factor for Pre E Sociodemographic characteristics (Racism, Less access given lower SES) and Personal history factors (e.g., low birthweight or small for gestational age, previous adverse pregnancy outcome, >10-year pregnancy interval)   Meets one high risk factors or two or more of the moderate risk  facrtors  so WILL consider starting low dose aspirin (81mg) starting between 12 and 28 weeks to prevent early onset preeclampsia    - The patient  does have a history of spontaneous  birth and has already started progesterone and/or serial transvaginal cervical length ultrasounds from 16-24 weeks.     -The patient does not have a history of immunosuppresion or HIV so Toxoplasma IgG/IgM WILL NOT be ordered.           PERSONAL/SOCIAL HISTORY    lives with their spouse.  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  Her partner works as a .   History of anxiety or depression  Anxiety after stillborn baby - if Yes, therapy/medication/hospitalization - no, controled with family support  Additional items: None    Objective  -VS: reviewed and within normal limits   -General appearance: no acute distress, patient is comfortable   NEUROLOGICAL/PSYCHIATRIC   - Orientated x3,   -Mood and affect: : normal     Assessment/Plan  Pam was seen today for prenatal care.    Diagnoses and all orders for this visit:    Supervision of high risk pregnancy, unspecified, first trimester  -     ABO/Rh type and screen  -     Hepatitis B surface antigen  -     CBC with platelets  -     HIV Antigen Antibody Combo  -     Rubella Antibody IgG Quantitative  -     Treponema Abs w Reflex to RPR and Titer  -     Urine Culture Aerobic Bacterial  -     Hepatitis C antibody  -     MFM Referral-Genetic Screening  Pregnancy  -     Hepatitis B Surface Antibody  -     Vitamin D Deficiency  -     AST  -     ALT  -     Uric acid  -     Protein  random urine with Creat Ratio  -     TSH with free T4 reflex  -     Creatinine urine calculation only  -     MAT FETAL MED CTR REFERRAL-PREGNANCY  -     cholecalciferol (VITAMIN D3) 125 mcg (5000 units) capsule; Take 1 capsule (125 mcg) by mouth daily Take one capsule daily.    History of severe pre-eclampsia    History of recurrent   -     MAT  FETAL MED CTR REFERRAL-PREGNANCY    History of cervical cerclage  -     MAT FETAL MED CTR REFERRAL-PREGNANCY    IUFD at 20 weeks or more of gestation    History of IUFD    Vitamin D deficiency  -     cholecalciferol (VITAMIN D3) 125 mcg (5000 units) capsule; Take 1 capsule (125 mcg) by mouth daily Take one capsule daily.    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 Patient is pregnant.. Ultrasound confirms.   Outpatient Encounter Medications as of 2021   Medication Sig Dispense Refill     cholecalciferol (VITAMIN D3) 125 mcg (5000 units) capsule Take 1 capsule (125 mcg) by mouth daily Take one capsule daily. 90 capsule 3     [DISCONTINUED] progesterone (FIRST-PROGESTERONE ) 200 MG VA SUPP Place 1 suppository (200 mg) vaginally 2 times daily 60 suppository 3     Prenatal Vit-Fe Fumarate-FA (PRENATAL VITAMIN AND MINERAL) 28-0.8 MG TABS Take 1 tablet by mouth daily (Patient not taking: Reported on 2021) 90 tablet 3     [DISCONTINUED] acetaminophen (TYLENOL) 325 MG tablet Take 2 tablets (650 mg) by mouth every 6 hours as needed for mild pain Start after Delivery. 100 tablet 0     [DISCONTINUED] ascorbic acid (VITAMIN C) 250 MG CHEW chewable tablet Take 1 tablet (250 mg) by mouth daily 100 tablet 1     [DISCONTINUED] cholecalciferol (VITAMIN D3) 5000 units (125 mcg) capsule Take 5,000 Units by mouth daily       [DISCONTINUED] ferrous sulfate (FE TABS) 325 (65 Fe) MG EC tablet Take 1 tablet (325 mg) by mouth daily 100 tablet 1     [DISCONTINUED] ibuprofen (ADVIL/MOTRIN) 600 MG tablet Take 1 tablet (600 mg) by mouth every 6 hours as needed for moderate pain Start after delivery 60 tablet 0     [DISCONTINUED] norethindrone (MICRONOR) 0.35 MG tablet Take 1 tablet (0.35 mg) by mouth daily 90 tablet 3     [DISCONTINUED] order for [facility] Maternity Belt order 1 each 0     [DISCONTINUED] progesterone (PROMETRIUM) 200 MG  capsule Place 1 capsule vaginally twice daily until 10 weeks of pregnancy 60 capsule 1     No facility-administered encounter medications on file as of 5/17/2021.       Orders Placed This Encounter   Procedures     Hepatitis B surface antigen     CBC with platelets     HIV Antigen Antibody Combo     Rubella Antibody IgG Quantitative     Treponema Abs w Reflex to RPR and Titer     Hepatitis C antibody     Hepatitis B Surface Antibody     Vitamin D Deficiency     AST     ALT     Uric acid     Protein  random urine with Creat Ratio     TSH with free T4 reflex     Creatinine urine calculation only     MFM Referral-Genetic Screening  Pregnancy     MAT FETAL MED CTR REFERRAL-PREGNANCY     ABO/Rh type and screen                 Orders Placed This Encounter   Procedures     Hepatitis B surface antigen     CBC with platelets     HIV Antigen Antibody Combo     Rubella Antibody IgG Quantitative     Treponema Abs w Reflex to RPR and Titer     Hepatitis C antibody     Hepatitis B Surface Antibody     Vitamin D Deficiency     AST     ALT     Uric acid     Protein  random urine with Creat Ratio     TSH with free T4 reflex     Creatinine urine calculation only     MFM Referral-Genetic Screening  Pregnancy     MAT FETAL MED CTR REFERRAL-PREGNANCY     ABO/Rh type and screen       - Oriented to Practice, types of care, and how to reach a provider.  Pt prefers CNM team  - Patient received 1st trimester new OB education packet complete with aide of [employer] Expectant Family booklet including information on genetic screening test options.  - Patient would like first trimester screening which were ordered.  - Educational handout on the prevention of infections diseases during pregnancy provided.  - Patient was encouraged to start prenatal vitamins as tolerated.    - Patient was sent to lab for routine OB labs including TSH.   - Reviewed recommendation for 17P, pt has already started taking.     - MFM consult placed for cervical length  assessment plan and cerclage  - Reviewed recommendation for low dose aspirin daily to prevent pre eclampsia, pt agrees, follow up at NOB visit.   - Pregnancy concerns to be addressed by provider at new OB exam include: history of preeclampsia, history of stillbirth at 39 weeks, multiple early pregnancy losses, and history of cerclage with last pregnancy .    Pt to RTO for NOB visit in 2-3 weeks and prn if questions or concerns    I, AMELIA Navarrete am serving as a scribe; to document services personally performed by  Kim Shaffer DNP, ASHIA, ANGELIC, FACNM   based on data collection and the provider's statements to me.     AMELIA Navarrete  I agree with the PFSH and ROS as completed by the student, except for changes made by me. The remainder of the encounter was performed by me and scribed by the student. The scribed note accurately reflects my personal services and decisions made by me.  Kim Shaffer DNP, ANGELIC, APRN

## 2021-05-17 NOTE — PROGRESS NOTES
[facility] OB Intake note  Subjective   31 year old femalepresents to clinic for initiation of OB care. No LMP recorded. Patient is pregnant.  at 8w0d by Estimated Date of Delivery: Data Unavailable based on US confirms. Reviewed dating ultrasound. Pregnancy is unplanned but welcomed.    Partner name - Nilson.       Symptoms since LMP include breast tenderness and fatigue. Patient has tried these relief measures: increased rest.    - Genetic/Infection questionnaire completed, risks include recurrent pregnancy loss. Pt  does not have a recent known exposure to Parvo or CMV so IgG/IgM testing WILL NOT be ordered.   Have you traveled during the pregnancy?No  Have your sexual partner(s) travelled during the pregnancy?No    -Plans to travel to [place]  for one month with family.     - Current Medications    Current Outpatient Medications   Medication Sig Dispense Refill     cholecalciferol (VITAMIN D3) 125 mcg (5000 units) capsule Take 1 capsule (125 mcg) by mouth daily Take one capsule daily. 90 capsule 3     Prenatal Vit-Fe Fumarate-FA (PRENATAL VITAMIN AND MINERAL) 28-0.8 MG TABS Take 1 tablet by mouth daily (Patient not taking: Reported on 2021) 90 tablet 3     progesterone (PROMETRIUM) 200 MG capsule Insert one capsule in the vagina twice daily 60 capsule 1         - Co-morbids    Past Medical History:   Diagnosis Date     Recurrent pregnancy loss (CODE)      Severe pre-eclampsia, postpartum condition or complication 2018     - Risk for GDM : No known risk factors for GDMso  WILL NOT have an early GCT and Hgb A1C    - High risk factors for Pre E-  History of Pre Eclampsia       - Moderate risk factor for Pre E Sociodemographic characteristics (Racism, Less access given lower SES) and Personal history factors (e.g., low birthweight or small for gestational age, previous adverse pregnancy outcome, >10-year pregnancy interval)   Meets one high risk factors or two or more of the moderate risk  facrtors  so WILL consider starting low dose aspirin (81mg) starting between 12 and 28 weeks to prevent early onset preeclampsia    - The patient  does have a history of spontaneous  birth and has already started progesterone and/or serial transvaginal cervical length ultrasounds from 16-24 weeks.     -The patient does not have a history of immunosuppresion or HIV so Toxoplasma IgG/IgM WILL NOT be ordered.           PERSONAL/SOCIAL HISTORY    lives with their spouse.  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  Her partner works as a .   History of anxiety or depression  Anxiety after stillborn baby - if Yes, therapy/medication/hospitalization - no, controled with family support  Additional items: None    Objective  -VS: reviewed and within normal limits   -General appearance: no acute distress, patient is comfortable   NEUROLOGICAL/PSYCHIATRIC   - Orientated x3,   -Mood and affect: : normal     Assessment/Plan  Pam was seen today for prenatal care.    Diagnoses and all orders for this visit:    Supervision of high risk pregnancy, unspecified, first trimester  -     ABO/Rh type and screen  -     Hepatitis B surface antigen  -     CBC with platelets  -     HIV Antigen Antibody Combo  -     Rubella Antibody IgG Quantitative  -     Treponema Abs w Reflex to RPR and Titer  -     Urine Culture Aerobic Bacterial  -     Hepatitis C antibody  -     MFM Referral-Genetic Screening  Pregnancy  -     Hepatitis B Surface Antibody  -     Vitamin D Deficiency  -     AST  -     ALT  -     Uric acid  -     Protein  random urine with Creat Ratio  -     TSH with free T4 reflex  -     Creatinine urine calculation only  -     MAT FETAL MED CTR REFERRAL-PREGNANCY  -     cholecalciferol (VITAMIN D3) 125 mcg (5000 units) capsule; Take 1 capsule (125 mcg) by mouth daily Take one capsule daily.    History of severe pre-eclampsia    History of recurrent   -     MAT  FETAL MED CTR REFERRAL-PREGNANCY    History of cervical cerclage  -     MAT FETAL MED CTR REFERRAL-PREGNANCY    IUFD at 20 weeks or more of gestation    History of IUFD    Vitamin D deficiency  -     cholecalciferol (VITAMIN D3) 125 mcg (5000 units) capsule; Take 1 capsule (125 mcg) by mouth daily Take one capsule daily.    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 Patient is pregnant.. Ultrasound confirms.   Outpatient Encounter Medications as of 2021   Medication Sig Dispense Refill     cholecalciferol (VITAMIN D3) 125 mcg (5000 units) capsule Take 1 capsule (125 mcg) by mouth daily Take one capsule daily. 90 capsule 3     [DISCONTINUED] progesterone (FIRST-PROGESTERONE ) 200 MG VA SUPP Place 1 suppository (200 mg) vaginally 2 times daily 60 suppository 3     Prenatal Vit-Fe Fumarate-FA (PRENATAL VITAMIN AND MINERAL) 28-0.8 MG TABS Take 1 tablet by mouth daily (Patient not taking: Reported on 2021) 90 tablet 3     [DISCONTINUED] acetaminophen (TYLENOL) 325 MG tablet Take 2 tablets (650 mg) by mouth every 6 hours as needed for mild pain Start after Delivery. 100 tablet 0     [DISCONTINUED] ascorbic acid (VITAMIN C) 250 MG CHEW chewable tablet Take 1 tablet (250 mg) by mouth daily 100 tablet 1     [DISCONTINUED] cholecalciferol (VITAMIN D3) 5000 units (125 mcg) capsule Take 5,000 Units by mouth daily       [DISCONTINUED] ferrous sulfate (FE TABS) 325 (65 Fe) MG EC tablet Take 1 tablet (325 mg) by mouth daily 100 tablet 1     [DISCONTINUED] ibuprofen (ADVIL/MOTRIN) 600 MG tablet Take 1 tablet (600 mg) by mouth every 6 hours as needed for moderate pain Start after delivery 60 tablet 0     [DISCONTINUED] norethindrone (MICRONOR) 0.35 MG tablet Take 1 tablet (0.35 mg) by mouth daily 90 tablet 3     [DISCONTINUED] order for [facility] Maternity Belt order 1 each 0     [DISCONTINUED] progesterone (PROMETRIUM) 200 MG  capsule Place 1 capsule vaginally twice daily until 10 weeks of pregnancy 60 capsule 1     No facility-administered encounter medications on file as of 5/17/2021.       Orders Placed This Encounter   Procedures     Hepatitis B surface antigen     CBC with platelets     HIV Antigen Antibody Combo     Rubella Antibody IgG Quantitative     Treponema Abs w Reflex to RPR and Titer     Hepatitis C antibody     Hepatitis B Surface Antibody     Vitamin D Deficiency     AST     ALT     Uric acid     Protein  random urine with Creat Ratio     TSH with free T4 reflex     Creatinine urine calculation only     MFM Referral-Genetic Screening  Pregnancy     MAT FETAL MED CTR REFERRAL-PREGNANCY     ABO/Rh type and screen                 Orders Placed This Encounter   Procedures     Hepatitis B surface antigen     CBC with platelets     HIV Antigen Antibody Combo     Rubella Antibody IgG Quantitative     Treponema Abs w Reflex to RPR and Titer     Hepatitis C antibody     Hepatitis B Surface Antibody     Vitamin D Deficiency     AST     ALT     Uric acid     Protein  random urine with Creat Ratio     TSH with free T4 reflex     Creatinine urine calculation only     MFM Referral-Genetic Screening  Pregnancy     MAT FETAL MED CTR REFERRAL-PREGNANCY     ABO/Rh type and screen       - Oriented to Practice, types of care, and how to reach a provider.  Pt prefers CNM team  - Patient received 1st trimester new OB education packet complete with aide of The Expectant Family booklet including information on genetic screening test options.  - Patient would like first trimester screening which were ordered.  - Educational handout on the prevention of infections diseases during pregnancy provided.  - Patient was encouraged to start prenatal vitamins as tolerated.    - Patient was sent to lab for routine OB labs including TSH.   - Reviewed recommendation for 17P, pt has already started taking.     - MFM consult placed for cervical length  assessment plan and cerclage  - Reviewed recommendation for low dose aspirin daily to prevent pre eclampsia, pt agrees, follow up at NOB visit.   - Pregnancy concerns to be addressed by provider at new OB exam include: history of preeclampsia, history of stillbirth at 39 weeks, multiple early pregnancy losses, and history of cerclage with last pregnancy .    Pt to RTO for NOB visit in 2-3 weeks and prn if questions or concerns    I, AMELIA Navarrete am serving as a scribe; to document services personally performed by  Kim Shaffer DNP, ASHIA, ANGELIC, FACNM   based on data collection and the provider's statements to me.     AMELIA Navarrete  I agree with the PFSH and ROS as completed by the student, except for changes made by me. The remainder of the encounter was performed by me and scribed by the student. The scribed note accurately reflects my personal services and decisions made by me.  Kim Shaffer DNP, ANGELIC, APRN

## 2021-05-18 ENCOUNTER — TELEPHONE (OUTPATIENT)
Dept: OBGYN | Facility: CLINIC | Age: 31
End: 2021-05-18

## 2021-05-18 DIAGNOSIS — O26.22 HISTORY OF RECURRENT ABORTION, CURRENTLY PREGNANT IN SECOND TRIMESTER: Primary | ICD-10-CM

## 2021-05-18 PROBLEM — Z78.9 NONIMMUNE TO HEPATITIS B VIRUS: Status: ACTIVE | Noted: 2021-05-18

## 2021-05-18 PROBLEM — O36.4XX0 IUFD AT 20 WEEKS OR MORE OF GESTATION: Status: RESOLVED | Noted: 2018-08-23 | Resolved: 2019-07-16

## 2021-05-18 PROBLEM — E55.9 VITAMIN D DEFICIENCY: Chronic | Status: ACTIVE | Noted: 2018-06-24

## 2021-05-18 LAB
BACTERIA SPEC CULT: NORMAL
DEPRECATED CALCIDIOL+CALCIFEROL SERPL-MC: 11 UG/L (ref 20–75)
HBV SURFACE AB SERPL IA-ACNC: 5.12 M[IU]/ML
HBV SURFACE AG SERPL QL IA: NONREACTIVE
HCV AB SERPL QL IA: NONREACTIVE
HIV 1+2 AB+HIV1 P24 AG SERPL QL IA: NONREACTIVE
Lab: NORMAL
RUBV IGG SERPL IA-ACNC: 80 IU/ML
SPECIMEN SOURCE: NORMAL

## 2021-05-18 RX ORDER — PROGESTERONE 200 MG/1
CAPSULE ORAL
Qty: 60 CAPSULE | Refills: 1 | Status: SHIPPED | OUTPATIENT
Start: 2021-05-18 | End: 2021-10-05

## 2021-05-18 NOTE — TELEPHONE ENCOUNTER
Discussed with Kim.  Pt should be on a form of progesterone depending on what is covered by her insurance.  Per Dr Pimentel's note on 4/16, it was changed to prometrium, though no record of that.    Called pharmacy and gave verbal order for prometrium, and called patient to advise.   98.1

## 2021-05-18 NOTE — TELEPHONE ENCOUNTER
----- Message from Gloria Paul sent at 5/18/2021 10:16 AM CDT -----  Regarding: medication request  Contact: [phone]  Pt is calling stating she went to her pharmacy to  progesterone (FIRST-PROGESTERONE ) 200 MG VA SUPP, but apparently the rx was cancelled, please call pt and [facility]  [id number], [place] - [address] AT [facility] [address], thank you

## 2021-05-25 ENCOUNTER — TRANSCRIBE ORDERS (OUTPATIENT)
Dept: MATERNAL FETAL MEDICINE | Facility: CLINIC | Age: 31
End: 2021-05-25

## 2021-05-25 DIAGNOSIS — O26.90 PREGNANCY RELATED CONDITION, ANTEPARTUM: Primary | ICD-10-CM

## 2021-05-26 ENCOUNTER — PRE VISIT (OUTPATIENT)
Dept: MATERNAL FETAL MEDICINE | Facility: CLINIC | Age: 31
End: 2021-05-26

## 2021-06-01 ENCOUNTER — OFFICE VISIT (OUTPATIENT)
Dept: MATERNAL FETAL MEDICINE | Facility: CLINIC | Age: 31
End: 2021-06-01
Attending: ADVANCED PRACTICE MIDWIFE
Payer: COMMERCIAL

## 2021-06-01 ENCOUNTER — PREP FOR PROCEDURE (OUTPATIENT)
Dept: OBGYN | Facility: CLINIC | Age: 31
End: 2021-06-01

## 2021-06-01 ENCOUNTER — HOSPITAL ENCOUNTER (OUTPATIENT)
Dept: ULTRASOUND IMAGING | Facility: CLINIC | Age: 31
End: 2021-06-01
Attending: ADVANCED PRACTICE MIDWIFE
Payer: COMMERCIAL

## 2021-06-01 DIAGNOSIS — O02.1 MISSED ABORTION: ICD-10-CM

## 2021-06-01 DIAGNOSIS — Z31.5 ENCOUNTER FOR PROCREATIVE GENETIC COUNSELING: Primary | ICD-10-CM

## 2021-06-01 DIAGNOSIS — O26.90 PREGNANCY RELATED CONDITION, ANTEPARTUM: ICD-10-CM

## 2021-06-01 DIAGNOSIS — O02.1 MISSED ABORTION: Primary | ICD-10-CM

## 2021-06-01 DIAGNOSIS — N96 HISTORY OF RECURRENT MISCARRIAGES: ICD-10-CM

## 2021-06-01 PROCEDURE — 76801 OB US < 14 WKS SINGLE FETUS: CPT | Mod: 26 | Performed by: OBSTETRICS & GYNECOLOGY

## 2021-06-01 PROCEDURE — 99213 OFFICE O/P EST LOW 20 MIN: CPT | Mod: 25 | Performed by: OBSTETRICS & GYNECOLOGY

## 2021-06-01 PROCEDURE — 999N000069 HC STATISTIC GENETIC COUNSELING, < 16 MIN: Performed by: GENETIC COUNSELOR, MS

## 2021-06-01 PROCEDURE — 76801 OB US < 14 WKS SINGLE FETUS: CPT

## 2021-06-01 RX ORDER — ACETAMINOPHEN 325 MG/1
975 TABLET ORAL ONCE
Status: CANCELLED | OUTPATIENT
Start: 2021-06-01 | End: 2021-06-01

## 2021-06-01 RX ORDER — DOXYCYCLINE 100 MG/1
200 CAPSULE ORAL ONCE
Status: CANCELLED | OUTPATIENT
Start: 2021-06-01 | End: 2021-06-01

## 2021-06-01 NOTE — NURSING NOTE
Pam presents to [facility] for GC/US/Cons due to history of multiple losses. No FHT's found on today's US. Emotional support given. Dr Teresa and Dr Winslow met with patient.     Katarzyna Montiel RN

## 2021-06-01 NOTE — PROGRESS NOTES
[facility] [place]  Genetic Counseling Consult    Patient: Pam Syed YOB: 1990   Date of Service: 6/01/21      Pam Syed was seen at [facility] for genetic consultation to discuss the options for screening and testing for fetal chromosome abnormalities.  The indication for genetic counseling is complex OB history and routine screening options.        Impression/Plan:   1.  Pam was seen in maternal fetal medicine for a genetic counseling appointment and MFM consult.  Unfortunately, today's ultrasound noted no fetal heart tones.  Please see corresponding documentation for complete details.     2.  Pam has met previously with genetic counseling and our conversation was kept brief.  Please see prior  documentation for family history and discussion her Pam's OB history.         Family History:   A three-generation pedigree was obtained in a prior genetic counseling appointment, and is scanned under the  Media  tab.   The ffamily history was briefly reviewed today and Pam reports no significant updates other than the birth of her daughter, who is 2 years old, healthy and developmentally on track.      It was a pleasure to be involved with Pam s care. Face-to-face time of the meeting was 15 minutes.      Mario Alberto Anderson MS, [facility]  Licensed Genetic Counselor  Phone: [phone]  Pager: [phone]

## 2021-06-01 NOTE — H&P (VIEW-ONLY)
Please see the imaging tab for details of the ultrasound performed today.    Vita Teresa MD  Specialist in Maternal-Fetal Medicine

## 2021-06-02 ENCOUNTER — TELEPHONE (OUTPATIENT)
Dept: OBGYN | Facility: CLINIC | Age: 31
End: 2021-06-02

## 2021-06-02 DIAGNOSIS — O02.1 MISSED ABORTION: Primary | ICD-10-CM

## 2021-06-02 DIAGNOSIS — O02.1 MISSED ABORTION: ICD-10-CM

## 2021-06-02 LAB
LABORATORY COMMENT REPORT: NORMAL
SARS-COV-2 RNA RESP QL NAA+PROBE: NEGATIVE
SARS-COV-2 RNA RESP QL NAA+PROBE: NORMAL
SPECIMEN SOURCE: NORMAL
SPECIMEN SOURCE: NORMAL

## 2021-06-02 PROCEDURE — U0005 INFEC AGEN DETEC AMPLI PROBE: HCPCS | Performed by: OBSTETRICS & GYNECOLOGY

## 2021-06-02 PROCEDURE — U0003 INFECTIOUS AGENT DETECTION BY NUCLEIC ACID (DNA OR RNA); SEVERE ACUTE RESPIRATORY SYNDROME CORONAVIRUS 2 (SARS-COV-2) (CORONAVIRUS DISEASE [COVID-19]), AMPLIFIED PROBE TECHNIQUE, MAKING USE OF HIGH THROUGHPUT TECHNOLOGIES AS DESCRIBED BY CMS-2020-01-R: HCPCS | Performed by: OBSTETRICS & GYNECOLOGY

## 2021-06-02 NOTE — TELEPHONE ENCOUNTER
Confirmed surgery date / time / location with patient. Arrival time at 2:00p.m. . Nothing to eat 8 hours prior to surgery and clear liquids up 2 hours prior. Shower night before and morning of surgery with antiseptic soap.    Pt informed that COVID test will be ordered and should expect call from schedulers to complete this within 72 hours of scheduled procedure.     Gestational Age on date of surgery:8+6    Laminaria (Needed if 16 weeks GA or greater): YES/NO     If YES: Patient informed of 2 day procedure: Day one laminaria placement in clinic. Instructed to premedicate with 800 mg ibuprofen one hour prior to appointment. May bring support person to clinic.    If NO: Patient will need Pre Op appt.      CHECKLIST    Medical records received and reviewed by MD: Y    Insurance Verified (confirm if completed by ISIAH,, otherwise email [email or web address]): Y/N    24 hour consent completed by provider or referring clinic, scanned to chart: Y/N (or N/A -- only for IUFD)    Google Calendar: Y    Patient notified and pre op information discussed: Y/N    COVID test ordered and scheduled: Y    Date/Time Pre-Op (if applicable):day of procedure    Date/Time Laminaria placement:  (Procedure room and MD scheduled 60 minutes; before 12:00 on the day prior to surgery)    Date/Time Surgery:6/4/21 @ 4:00p.m with Dr. Rosanna Ybarra.    Comments:

## 2021-06-02 NOTE — TELEPHONE ENCOUNTER
Message received from patient regarding wanting to have blood test to confirm pregnancy loss.    Discussed with Dr. Stewart, recommends if patient would like confirmation, should have US done prior to D&C.     Called and spoke with patient. Advised that blood work would not likely give a clear picture/indication and if she would like confirmation, could schedule an US prior to D&C. Pt stated she would prefer US. Advised we would work on getting her scheduled. She verbalized understanding and agreement, denied further questions/concerns.    Notified SAM Syed to schedule US prior to scheduled D&C

## 2021-06-03 ENCOUNTER — ANCILLARY PROCEDURE (OUTPATIENT)
Dept: ULTRASOUND IMAGING | Facility: CLINIC | Age: 31
End: 2021-06-03
Attending: OBSTETRICS & GYNECOLOGY
Payer: COMMERCIAL

## 2021-06-03 DIAGNOSIS — O02.1 MISSED ABORTION: ICD-10-CM

## 2021-06-03 PROCEDURE — 76815 OB US LIMITED FETUS(S): CPT | Performed by: OBSTETRICS & GYNECOLOGY

## 2021-06-03 PROCEDURE — 76817 TRANSVAGINAL US OBSTETRIC: CPT | Performed by: OBSTETRICS & GYNECOLOGY

## 2021-06-04 ENCOUNTER — ANESTHESIA (OUTPATIENT)
Dept: SURGERY | Facility: CLINIC | Age: 31
End: 2021-06-04
Payer: COMMERCIAL

## 2021-06-04 ENCOUNTER — ANESTHESIA EVENT (OUTPATIENT)
Dept: SURGERY | Facility: CLINIC | Age: 31
End: 2021-06-04
Payer: COMMERCIAL

## 2021-06-04 ENCOUNTER — HOSPITAL ENCOUNTER (OUTPATIENT)
Facility: CLINIC | Age: 31
Discharge: HOME | End: 2021-06-04
Attending: OBSTETRICS & GYNECOLOGY | Admitting: OBSTETRICS & GYNECOLOGY
Payer: COMMERCIAL

## 2021-06-04 VITALS
WEIGHT: 141.76 LBS | HEIGHT: 63 IN | RESPIRATION RATE: 18 BRPM | BODY MASS INDEX: 25.12 KG/M2 | OXYGEN SATURATION: 100 % | TEMPERATURE: 97.6 F | SYSTOLIC BLOOD PRESSURE: 112 MMHG | DIASTOLIC BLOOD PRESSURE: 71 MMHG | HEART RATE: 80 BPM

## 2021-06-04 DIAGNOSIS — O02.1 MISSED ABORTION: ICD-10-CM

## 2021-06-04 LAB
ABO + RH BLD: NORMAL
ABO + RH BLD: NORMAL
BLD GP AB SCN SERPL QL: NORMAL
BLOOD BANK CMNT PATIENT-IMP: NORMAL
GLUCOSE BLDC GLUCOMTR-MCNC: 75 MG/DL (ref 70–99)
HGB BLD-MCNC: 11.6 G/DL (ref 11.7–15.7)
SPECIMEN EXP DATE BLD: NORMAL

## 2021-06-04 PROCEDURE — 710N000012 HC RECOVERY PHASE 2, PER MINUTE: Performed by: OBSTETRICS & GYNECOLOGY

## 2021-06-04 PROCEDURE — 250N000009 HC RX 250: Performed by: NURSE ANESTHETIST, CERTIFIED REGISTERED

## 2021-06-04 PROCEDURE — 272N000001 HC OR GENERAL SUPPLY STERILE: Performed by: OBSTETRICS & GYNECOLOGY

## 2021-06-04 PROCEDURE — 258N000003 HC RX IP 258 OP 636: Performed by: ANESTHESIOLOGY

## 2021-06-04 PROCEDURE — 250N000013 HC RX MED GY IP 250 OP 250 PS 637: Performed by: OBSTETRICS & GYNECOLOGY

## 2021-06-04 PROCEDURE — 999N000141 HC STATISTIC PRE-PROCEDURE NURSING ASSESSMENT: Performed by: OBSTETRICS & GYNECOLOGY

## 2021-06-04 PROCEDURE — 88305 TISSUE EXAM BY PATHOLOGIST: CPT | Mod: TC | Performed by: OBSTETRICS & GYNECOLOGY

## 2021-06-04 PROCEDURE — 250N000011 HC RX IP 250 OP 636: Performed by: NURSE ANESTHETIST, CERTIFIED REGISTERED

## 2021-06-04 PROCEDURE — 85018 HEMOGLOBIN: CPT | Performed by: OBSTETRICS & GYNECOLOGY

## 2021-06-04 PROCEDURE — 360N000075 HC SURGERY LEVEL 2, PER MIN: Performed by: OBSTETRICS & GYNECOLOGY

## 2021-06-04 PROCEDURE — 370N000017 HC ANESTHESIA TECHNICAL FEE, PER MIN: Performed by: OBSTETRICS & GYNECOLOGY

## 2021-06-04 PROCEDURE — 82962 GLUCOSE BLOOD TEST: CPT

## 2021-06-04 PROCEDURE — 86901 BLOOD TYPING SEROLOGIC RH(D): CPT | Performed by: OBSTETRICS & GYNECOLOGY

## 2021-06-04 PROCEDURE — 86900 BLOOD TYPING SEROLOGIC ABO: CPT | Performed by: OBSTETRICS & GYNECOLOGY

## 2021-06-04 PROCEDURE — 36415 COLL VENOUS BLD VENIPUNCTURE: CPT | Performed by: OBSTETRICS & GYNECOLOGY

## 2021-06-04 PROCEDURE — 250N000009 HC RX 250: Performed by: OBSTETRICS & GYNECOLOGY

## 2021-06-04 PROCEDURE — 88305 TISSUE EXAM BY PATHOLOGIST: CPT | Mod: 26 | Performed by: PATHOLOGY

## 2021-06-04 PROCEDURE — 999N001020 HC STATISTIC H-SEND OUTS PREP: Performed by: OBSTETRICS & GYNECOLOGY

## 2021-06-04 PROCEDURE — 86850 RBC ANTIBODY SCREEN: CPT | Performed by: OBSTETRICS & GYNECOLOGY

## 2021-06-04 RX ORDER — KETOROLAC TROMETHAMINE 30 MG/ML
INJECTION, SOLUTION INTRAMUSCULAR; INTRAVENOUS PRN
Status: DISCONTINUED | OUTPATIENT
Start: 2021-06-04 | End: 2021-06-04

## 2021-06-04 RX ORDER — LIDOCAINE HYDROCHLORIDE 10 MG/ML
INJECTION, SOLUTION INFILTRATION; PERINEURAL PRN
Status: DISCONTINUED | OUTPATIENT
Start: 2021-06-04 | End: 2021-06-04 | Stop reason: HOSPADM

## 2021-06-04 RX ORDER — SODIUM CHLORIDE, SODIUM LACTATE, POTASSIUM CHLORIDE, CALCIUM CHLORIDE 600; 310; 30; 20 MG/100ML; MG/100ML; MG/100ML; MG/100ML
INJECTION, SOLUTION INTRAVENOUS CONTINUOUS
Status: DISCONTINUED | OUTPATIENT
Start: 2021-06-04 | End: 2021-06-04 | Stop reason: HOSPADM

## 2021-06-04 RX ORDER — ACETAMINOPHEN 325 MG/1
975 TABLET ORAL ONCE
Status: DISCONTINUED | OUTPATIENT
Start: 2021-06-04 | End: 2021-06-04 | Stop reason: HOSPADM

## 2021-06-04 RX ORDER — ACETAMINOPHEN 325 MG/1
975 TABLET ORAL ONCE
Status: COMPLETED | OUTPATIENT
Start: 2021-06-04 | End: 2021-06-04

## 2021-06-04 RX ORDER — PROPOFOL 10 MG/ML
INJECTION, EMULSION INTRAVENOUS CONTINUOUS PRN
Status: DISCONTINUED | OUTPATIENT
Start: 2021-06-04 | End: 2021-06-04

## 2021-06-04 RX ORDER — FENTANYL CITRATE 50 UG/ML
INJECTION, SOLUTION INTRAMUSCULAR; INTRAVENOUS PRN
Status: DISCONTINUED | OUTPATIENT
Start: 2021-06-04 | End: 2021-06-04

## 2021-06-04 RX ORDER — PROPOFOL 10 MG/ML
INJECTION, EMULSION INTRAVENOUS PRN
Status: DISCONTINUED | OUTPATIENT
Start: 2021-06-04 | End: 2021-06-04

## 2021-06-04 RX ORDER — ACETAMINOPHEN 325 MG/1
975 TABLET ORAL ONCE
Status: CANCELLED | OUTPATIENT
Start: 2021-06-04 | End: 2021-06-04

## 2021-06-04 RX ORDER — MAGNESIUM HYDROXIDE 1200 MG/15ML
LIQUID ORAL PRN
Status: DISCONTINUED | OUTPATIENT
Start: 2021-06-04 | End: 2021-06-04 | Stop reason: HOSPADM

## 2021-06-04 RX ORDER — DOXYCYCLINE 100 MG/1
200 CAPSULE ORAL ONCE
Status: COMPLETED | OUTPATIENT
Start: 2021-06-04 | End: 2021-06-04

## 2021-06-04 RX ORDER — ONDANSETRON 2 MG/ML
INJECTION INTRAMUSCULAR; INTRAVENOUS PRN
Status: DISCONTINUED | OUTPATIENT
Start: 2021-06-04 | End: 2021-06-04

## 2021-06-04 RX ORDER — DEXAMETHASONE SODIUM PHOSPHATE 4 MG/ML
INJECTION, SOLUTION INTRA-ARTICULAR; INTRALESIONAL; INTRAMUSCULAR; INTRAVENOUS; SOFT TISSUE PRN
Status: DISCONTINUED | OUTPATIENT
Start: 2021-06-04 | End: 2021-06-04

## 2021-06-04 RX ORDER — LIDOCAINE HYDROCHLORIDE 20 MG/ML
INJECTION, SOLUTION INFILTRATION; PERINEURAL PRN
Status: DISCONTINUED | OUTPATIENT
Start: 2021-06-04 | End: 2021-06-04

## 2021-06-04 RX ADMIN — ACETAMINOPHEN 975 MG: 325 TABLET, FILM COATED ORAL at 14:47

## 2021-06-04 RX ADMIN — PROPOFOL 50 MG: 10 INJECTION, EMULSION INTRAVENOUS at 16:22

## 2021-06-04 RX ADMIN — ONDANSETRON 4 MG: 2 INJECTION INTRAMUSCULAR; INTRAVENOUS at 16:22

## 2021-06-04 RX ADMIN — FENTANYL CITRATE 25 MCG: 50 INJECTION, SOLUTION INTRAMUSCULAR; INTRAVENOUS at 16:32

## 2021-06-04 RX ADMIN — MIDAZOLAM 2 MG: 1 INJECTION INTRAMUSCULAR; INTRAVENOUS at 16:15

## 2021-06-04 RX ADMIN — FENTANYL CITRATE 25 MCG: 50 INJECTION, SOLUTION INTRAMUSCULAR; INTRAVENOUS at 16:42

## 2021-06-04 RX ADMIN — DOXYCYCLINE 200 MG: 100 CAPSULE ORAL at 14:47

## 2021-06-04 RX ADMIN — DEXAMETHASONE SODIUM PHOSPHATE 6 MG: 4 INJECTION, SOLUTION INTRAMUSCULAR; INTRAVENOUS at 16:22

## 2021-06-04 RX ADMIN — KETOROLAC TROMETHAMINE 30 MG: 30 INJECTION, SOLUTION INTRAMUSCULAR at 16:42

## 2021-06-04 RX ADMIN — LIDOCAINE HYDROCHLORIDE 50 MG: 20 INJECTION, SOLUTION INFILTRATION; PERINEURAL at 16:22

## 2021-06-04 RX ADMIN — FENTANYL CITRATE 25 MCG: 50 INJECTION, SOLUTION INTRAMUSCULAR; INTRAVENOUS at 16:34

## 2021-06-04 RX ADMIN — FENTANYL CITRATE 25 MCG: 50 INJECTION, SOLUTION INTRAMUSCULAR; INTRAVENOUS at 16:22

## 2021-06-04 RX ADMIN — SODIUM CHLORIDE, POTASSIUM CHLORIDE, SODIUM LACTATE AND CALCIUM CHLORIDE: 600; 310; 30; 20 INJECTION, SOLUTION INTRAVENOUS at 16:15

## 2021-06-04 RX ADMIN — PROPOFOL 30 MG: 10 INJECTION, EMULSION INTRAVENOUS at 16:34

## 2021-06-04 RX ADMIN — PROPOFOL 150 MCG/KG/MIN: 10 INJECTION, EMULSION INTRAVENOUS at 16:22

## 2021-06-04 ASSESSMENT — MIFFLIN-ST. JEOR: SCORE: 1317.13

## 2021-06-04 ASSESSMENT — ENCOUNTER SYMPTOMS
ROS SKIN COMMENTS: NO ACUTE ISSUES
APNEA: 0

## 2021-06-04 NOTE — H&P
[facility]  Gyn History and Physical      Ginna Liriano MRN# [id number]   Age: 31 year old YOB: 1990     CC:  Missed     HPI:  Ginna Liriano is a 31 year old  who presents for dilation and curettage for missed . She feels well.    Prenatal Labs:   Lab Results   Component Value Date    ABO O 2021    RH Pos 2021    AS Neg 2021    HEPBANG Nonreactive 2021    CHPCRT Negative 2019    GCPCRT Negative 2019    TREPAB Negative 2018    HGB 11.6 (L) 2021     OB History  OB History    Para Term  AB Living   9 3 3 0 5 2   SAB TAB Ectopic Multiple Live Births   5 0 0 0 3      # Outcome Date GA Lbr Michael/2nd Weight Sex Delivery Anes PTL Lv   9 Current            8 Term 07/15/19 37w6d 04:04 / 00:09 2.55 kg (5 lb 10 oz) F Vag-Spont None N RAMILA      Name: DANAE LIRIANO-GINNA      Apgar1: 8  Apgar5: 9   7 Term 18 39w3d 01:16 / 00:20 2.75 kg (6 lb 1 oz) M Vag-Spont None N ND      Complications: Fetal demise > 22 weeks, delivered, current hospitalization      Name: STEPH LIRIANO   6 SAB 17           5 Term 12/03/15 38w5d 03:45 / 01:16 3.11 kg (6 lb 13.7 oz) M Vag-Spont EPI  RAMILA      Apgar1: 9  Apgar5: 9   4 SAB            3 SAB            2 SAB            1 SAB               Obstetric Comments   2018 Severe Pre E Postpartum after term IUFD 39 weeks       PMHx:   Past Medical History:   Diagnosis Date     Recurrent pregnancy loss (CODE)      Severe pre-eclampsia, postpartum condition or complication 2018     PSHx:   Past Surgical History:   Procedure Laterality Date     APPENDECTOMY Right      CERCLAGE CERVICAL N/A 3/22/2019    Procedure: CERCLAGE CERVICAL;  Surgeon: Liz Lima MD;  [facility]     CERCLAGE CERVICAL N/A 3/22/2019    Procedure: CERCLAGE CERVICAL;  Surgeon: Liz Lima MD;  [facility] OR     DILATION AND CURETTAGE SUCTION N/A 2017    Procedure: DILATION AND CURETTAGE  "SUCTION;  Suction Dilation And Curettage ;  Surgeon: Yolanda Samaniego MD;  [facility] OR     DILATION AND CURETTAGE, HYSTEROSCOPY DIAGNOSTIC, COMBINED N/A 2017    Procedure: COMBINED DILATION AND CURETTAGE, HYSTEROSCOPY DIAGNOSTIC;  Operative Hysteroscopy, Dilation and Curettage ;  Surgeon: Eli Pickard MD;  Location[facility] OR     GYN SURGERY  2017    suction D&C     Meds:   No current outpatient medications on file.      Allergies:  No Known Allergies   FmHx:   Family History   Problem Relation Age of Onset     Asthma Mother      Hypertension Father      Cancer Father         pancreatic         ROS:   Complete 10-point ROS negative except as noted in HPI.She desnies headache, blurry vision, chest pain, shortness of breath, RUQ pain, nausea, vomiting, dysuria, hematuria or extremity edema.    PE:  Vit:   Patient Vitals for the past 4 hrs:   BP Temp Temp src Pulse Resp SpO2 Height Weight   21 1423 121/76 98.2  F (36.8  C) Oral 88 20 100 % 1.6 m (5' 3\") 64.3 kg (141 lb 12.1 oz)      Gen: Well-appearing, NAD, comfortable   CV: Regular rate and rhythm, no murmurs, rubs, gallops  Pulm: CTAB  Abd: Soft, gravid, non-tender  Ext: No LE edema b/l    TVUS 2021:  IMPRESSION  ---------------------------------------------------------------------------------------------------------  1) Missed AB at 10w 1d gestational age with the CRL measuring 8 6/7 weeks gestation.  2) No fetal cardiac activity was visualized.      Assessment:  Pam Syed is a 31 year old  who presents with missed  for dilation and curettage.    Plan  - D&C  - Discharge to home after procedure    The patient was discussed with Dr. Ybarra who is in agreement with the treatment plan.    Justice Tam MD  Obstetrics, Gynecology, and Women's Health  PGY1    [facility] staff:  Appreciate note by Dr. Tam.  I have seen and examined the patient without the resident. I have reviewed, " edited, and agree with the note.      Rosanna Ybarra MD, FACOG

## 2021-06-04 NOTE — ANESTHESIA POSTPROCEDURE EVALUATION
Patient: Pam Syed    Procedure(s):  DILATION AND CURETTAGE, UTERUS, USING SUCTION    Diagnosis:Missed  [O02.1]  Diagnosis Additional Information: No value filed.    Anesthesia Type:  MAC    Note:  Disposition: Outpatient   Postop Pain Control: Uneventful            Sign Out: Well controlled pain   PONV: No   Neuro/Psych: Uneventful            Sign Out: Acceptable/Baseline neuro status   Airway/Respiratory: Uneventful            Sign Out: Acceptable/Baseline resp. status   CV/Hemodynamics: Uneventful            Sign Out: Acceptable CV status; No obvious hypovolemia; No obvious fluid overload   Other NRE: NONE   DID A NON-ROUTINE EVENT OCCUR? No           Last vitals:  Vitals:    21 1649 21 1700 21 1715   BP: 116/76 119/81 123/78   Pulse: 85 95 86   Resp: 18 16 16   Temp: 36.6  C (97.8  F)     SpO2: 98% 99% 100%       Last vitals prior to Anesthesia Care Transfer:  CRNA VITALS  2021 1616 - 2021 1716      2021             Temp:  36.5  C (97.7  F)    SpO2:  100 %    EKG:  Sinus rhythm          Electronically Signed By: Ciro Jackson MD  2021  5:31 PM

## 2021-06-04 NOTE — ANESTHESIA PREPROCEDURE EVALUATION
"Anesthesia Pre-Procedure Evaluation    Patient: Pam Syed   MRN:     [id number] Gender:   female   Age:    31 year old :      1990        Preoperative Diagnosis: Missed  [O02.1]   Procedure(s):  DILATION AND CURETTAGE, UTERUS, USING SUCTION     LABS:  CBC:   Lab Results   Component Value Date    WBC 7.3 2021    WBC 16.0 (H) 2019    HGB 11.5 (L) 2021    HGB 8.5 (L) 2019    HCT 34.2 (L) 2021    HCT 26.9 (L) 2019     2021     (L) 2019     BMP:   Lab Results   Component Value Date     2019     2018    POTASSIUM 3.5 2019    POTASSIUM 3.9 2018    CHLORIDE 105 2019    CHLORIDE 111 (H) 2018    CO2 23 2019    CO2 26 2018    BUN 9 2019    BUN 12 2018    CR 0.47 (L) 2019    CR 0.70 2018    GLC 80 2019     (H) 2018     COAGS:   Lab Results   Component Value Date    PTT 30 2019    INR 1.06 07/15/2019    FIBR 352 2019     POC:   Lab Results   Component Value Date    BGM 84 2017    HCG Negative 2017     OTHER:   Lab Results   Component Value Date    A1C 5.6 2019    JHON 8.7 2019    MAG 5.8 (H) 2018    ALBUMIN 2.3 (L) 2018    PROTTOTAL 5.8 (L) 2018    ALT 19 2021    AST 15 2021    ALKPHOS 111 2018    BILITOTAL 0.4 2018    TSH 0.88 2021        Preop Vitals    BP Readings from Last 3 Encounters:   21 101/68   19 103/73   07/15/19 119/81    Pulse Readings from Last 3 Encounters:   21 70   19 82   07/15/19 90      Resp Readings from Last 3 Encounters:   19 18   19 16   19 18    SpO2 Readings from Last 3 Encounters:   07/15/19 97%   19 100%   18 100%      Temp Readings from Last 1 Encounters:   19 36.8  C (98.2  F) (Oral)    Ht Readings from Last 1 Encounters:   21 1.575 m (5' 2\")      Wt Readings from Last " "1 Encounters:   21 64 kg (141 lb 3.2 oz)    Estimated body mass index is 25.83 kg/m  as calculated from the following:    Height as of 21: 1.575 m (5' 2\").    Weight as of 21: 64 kg (141 lb 3.2 oz).     LDA:        Past Medical History:   Diagnosis Date     Recurrent pregnancy loss (CODE)      Severe pre-eclampsia, postpartum condition or complication 2018      Past Surgical History:   Procedure Laterality Date     APPENDECTOMY Right      CERCLAGE CERVICAL N/A 3/22/2019    Procedure: CERCLAGE CERVICAL;  Surgeon: Liz Lima MD;  [facility]     CERCLAGE CERVICAL N/A 3/22/2019    Procedure: CERCLAGE CERVICAL;  Surgeon: Liz Lima MD;  [facility]     DILATION AND CURETTAGE SUCTION N/A 2017    Procedure: DILATION AND CURETTAGE SUCTION;  Suction Dilation And Curettage ;  Surgeon: Yolanda Samaniego MD;  [facility]     DILATION AND CURETTAGE, HYSTEROSCOPY DIAGNOSTIC, COMBINED N/A 2017    Procedure: COMBINED DILATION AND CURETTAGE, HYSTEROSCOPY DIAGNOSTIC;  Operative Hysteroscopy, Dilation and Curettage ;  Surgeon: Eli Pickard MD;  [facility]     GYN SURGERY  2017    suction D&C      No Known Allergies     Anesthesia Evaluation    ROS/Med Hx    No history of anesthetic complications  (-) malignant hyperthermia and tuberculosis  Comments: Pam has a missed  and now for D&C    Her problem list is as follows:  History of recurrent   Vitamin D deficiency  History of severe pre-eclampsia  Pregnancy care for patient with recurrent pregnancy loss  History of cervical cerclage  History of IUFD  Nonimmune to hepatitis B virus  Missed      Met with Pam who is NPO and is here with her spouse. She denies problems with anesthesia.     Cardiovascular Findings   (+) hypertension,   Comments: Has been stable.     Neuro Findings   Comments: No acute issues    Pulmonary Findings   (-) asthma and apnea    HENT Findings - negative HENT " ROS    Skin Findings   Comments: No acute issues      GI/Hepatic/Renal Findings   (-) GERD    Endocrine/Metabolic Findings - negative ROS      Genetic/Syndrome Findings - negative genetics/syndromes ROS    Hematology/Oncology Findings   Comments: Dysfunctional uterine bleeding as noted    Additional Notes  Allergies:  No Known Allergies       Current Outpatient Medications:  cholecalciferol (VITAMIN D3) 125 mcg (5000 units) capsule  Prenatal Vit-Fe Fumarate-FA (PRENATAL VITAMIN AND MINERAL) 28-0.8 MG TABS  progesterone (PROMETRIUM) 200 MG capsule            PHYSICAL EXAM:   Mental Status/Neuro: A/A/O   Airway: Facies: Feasible  Mallampati: I  Mouth/Opening: Full  TM distance: > 6 cm  Neck ROM: Full   Respiratory: Auscultation: CTAB     Resp. Rate: Normal     Resp. Effort: Normal      CV: Rhythm: Regular  Rate: Age appropriate  Heart: Normal Sounds  Edema: None   Comments: Dentition - no complaints or acute issues                     Anesthesia Plan    ASA Status:  2   NPO Status:  NPO Appropriate    Anesthesia Type: MAC.     - Reason for MAC: straight local not clinically adequate (she requests sedation)   Induction: Propofol, Intravenous.   Maintenance: TIVA.        Consents    Anesthesia Plan(s) and associated risks, benefits, and realistic alternatives discussed. Questions answered and patient/representative(s) expressed understanding.     - Discussed with:  Patient      - Extended Intubation/Ventilatory Support Discussed: No.      - Patient is DNR/DNI Status: No    Use of blood products discussed: No .     Postoperative Care    Pain management: IV analgesics, Oral pain medications.   PONV prophylaxis: Ondansetron (or other 5HT-3), Dexamethasone or Solumedrol     Comments:    She requests anesthesia. Procedures and risks explained. She understood and consented. Qs answered.          Shine Wang MD

## 2021-06-04 NOTE — ANESTHESIA CARE TRANSFER NOTE
Patient: Pam Syed    Procedure(s):  DILATION AND CURETTAGE, UTERUS, USING SUCTION    Diagnosis: Missed  [O02.1]  Diagnosis Additional Information: No value filed.    Anesthesia Type:   MAC     Note:      Level of Consciousness: awake  Oxygen Supplementation: room air    Independent Airway: airway patency satisfactory and stable  Dentition: dentition unchanged  Vital Signs Stable: post-procedure vital signs reviewed and stable  Report to RN Given: handoff report given  Patient transferred to: Phase II    Handoff Report: Identifed the Patient, Identified the Reponsible Provider, Reviewed the pertinent medical history, Discussed the surgical course, Reviewed Intra-OP anesthesia mangement and issues during anesthesia, Set expectations for post-procedure period and Allowed opportunity for questions and acknowledgement of understanding      Vitals: (Last set prior to Anesthesia Care Transfer)  CRNA VITALS  2021 1616 - 2021 1653      2021             Temp:  36.5  C (97.7  F)    SpO2:  100 %    EKG:  Sinus rhythm        Electronically Signed By: ASHIA Ferreira CRNA  2021  4:53 PM

## 2021-06-04 NOTE — BRIEF OP NOTE
[facility] Brief Operative Note    Pre-operative diagnosis: Missed  [O02.1]   Post-operative diagnosis Same   Procedure: Procedure(s):  DILATION AND CURETTAGE, UTERUS, USING SUCTION   Surgeon(s): Surgeon(s) and Role:     * Rosanna Ybarra MD - Primary   Estimated blood loss: 15 mL   Specimens: POC   Findings: Normal external female genitalia. Uterus 9 week size. Normal parous cervix. Uterine contents consistent with 9 week fetus.  IVF: 400 mL      Justice Tam MD  Obstetrics, Gynecology, and Women's Health  PGY1

## 2021-06-04 NOTE — DISCHARGE INSTRUCTIONS
Same-Day Surgery   Adult Discharge Orders & Instructions     For 24 hours after surgery:  1. Get plenty of rest.  A responsible adult must stay with you for at least 24 hours after you leave the hospital.   2. Pain medication can slow your reflexes. Do not drive or use heavy equipment.  If you have weakness or tingling, don't drive or use heavy equipment until this feeling goes away.  3. Mixing alcohol and pain medication can cause dizziness and slow your breathing. It can even be fatal. Do not drink alcohol while taking pain medication.  4. Avoid strenuous or risky activities.  Ask for help when climbing stairs.   5. You may feel lightheaded.  If so, sit for a few minutes before standing.  Have someone help you get up.   6. If you have nausea (feel sick to your stomach), drink only clear liquids such as apple juice, ginger ale, broth or 7-Up.  Rest may also help.  Be sure to drink enough fluids.  Move to a regular diet as you feel able. Take pain medications with a small amount of solid food, such as toast or crackers, to avoid nausea.   7. A slight fever is normal. Call the doctor if your fever is over 100 F (37.7 C) (taken under the tongue) or lasts longer than 24 hours.  8. You may have a dry mouth, muscle aches, trouble sleeping or a sore throat.  These symptoms should go away after 24 hours.  9. Do not make important or legal decisions.   Pain Management:      1. Take pain medication (if prescribed) for pain as directed by your physician.        2. WARNING: If the pain medication you have been prescribed contains Tylenol  (acetaminophen), DO NOT take additional doses of Tylenol (acetaminophen).     Call your doctor for any of the followin.  Signs of infection (fever, growing tenderness at the surgery site, severe pain, a large amount of drainage or bleeding, foul-smelling drainage, redness, swelling).    2.  It has been over 8 to 10 hours since surgery and you are still not able to urinate (pee).    3.   Headache for over 24 hours.    4.  Numbness, tingling or weakness the day after surgery (if you had spinal anesthesia).  To contact a doctor, call _____________________________________ or:      [phone] and ask for the Resident On Call for:          __________________________________________ (answered 24 hours a day)      Emergency Department:  [place] [facility]: [phone]  [place] [facility]: [phone]               Rev. 10/2014

## 2021-06-04 NOTE — OP NOTE
[facility] Gynecology Operative Note    Date of Surgery: 2021  Patient: Pam Syed  MRN: [id number]    Preop Dx:  - Missed     Postop Dx:   - Same s/p procedure     Procedure:   - Suction Dilation and curettage     Surgeon: Rosanna Ybarra MD     Assistants: Justice Tam MD, PGY-1     Anesthesia:   - MAC  - Paracervical block     EBL: 15 cc     IVF: 400 cc     UOP: Not measured     Drains: None     Specimen: Intrauterine contents      Complications: None apparent     Findings: On bimanual exam, slightly enlarged uterus in the anteverted position. Cervix closed. No adnexal masses palpable. Upon speculum placement cervix visualized. Cervix appeared normal without discharge or blood in vaginal vault.     Indications: Ms. Pam Syed is a 31 year old year old  who presented with a missed  diagnosed by ultrasound. Management options discussed with patient and she elected to proceed with surgical management. The risks, benefits, and alternatives to the suction dilation and curettage were discussed and patient would like to proceed with the procedure.    Procedure: The patient was taken to the OR where MAC anesthesia was administered without difficulty. She was placed in the dorsal lithotomy position with yellow-fin stirrups. Exam under anesthesia revealed the above findings. The patient was prepped and draped in usual sterile fashion.    A speculum was introduced into the vagina and used to visualize the cervix. A single-toothed tenaculum was used to grasp the anterior lip of the cervix. A paracervical block was performed with 1% lidocaine plain injected at the four and eight o'clock positions of the cervico-vaginal junction, for a total of 10 cc. Hegar dilators were then used to dilate the cervix to 9 mm. A 9 mm curved suction curette was used to empty the contents of the uterus. A sharp curettage was then performed with minimal amount of tissue obtained. This was sent to  pathology. Instruments were then removed with good hemostasis noted at the tenaculum sites.    The patient tolerated the procedure well. The instrument and sponge counts were correct x 2. The patient went to the recovery room in stable condition.     Dr. Ybarra was present and scrubbed for the entire procedure.    Justice Tam MD  OB/GYN Resident PGY-1  06/04/21 8:33 AM    Staff:  I was scrubbed and present for entire case and agree w/ above note.    Rosanna Ybarra MD

## 2021-06-10 LAB — COPATH REPORT: NORMAL

## 2021-10-03 ENCOUNTER — HEALTH MAINTENANCE LETTER (OUTPATIENT)
Age: 31
End: 2021-10-03

## 2021-10-05 ENCOUNTER — TELEPHONE (OUTPATIENT)
Dept: OBGYN | Facility: CLINIC | Age: 31
End: 2021-10-05

## 2021-10-05 ENCOUNTER — MYC MEDICAL ADVICE (OUTPATIENT)
Dept: OBGYN | Facility: CLINIC | Age: 31
End: 2021-10-05

## 2021-10-05 ENCOUNTER — OFFICE VISIT (OUTPATIENT)
Dept: OBGYN | Facility: CLINIC | Age: 31
End: 2021-10-05
Attending: ADVANCED PRACTICE MIDWIFE
Payer: COMMERCIAL

## 2021-10-05 ENCOUNTER — APPOINTMENT (OUTPATIENT)
Dept: LAB | Facility: CLINIC | Age: 31
End: 2021-10-05
Attending: ADVANCED PRACTICE MIDWIFE
Payer: COMMERCIAL

## 2021-10-05 VITALS
SYSTOLIC BLOOD PRESSURE: 123 MMHG | BODY MASS INDEX: 24.5 KG/M2 | HEART RATE: 84 BPM | DIASTOLIC BLOOD PRESSURE: 84 MMHG | WEIGHT: 138.3 LBS

## 2021-10-05 DIAGNOSIS — O21.9 NAUSEA/VOMITING IN PREGNANCY: Primary | ICD-10-CM

## 2021-10-05 DIAGNOSIS — R30.0 DYSURIA: ICD-10-CM

## 2021-10-05 DIAGNOSIS — O26.22 HISTORY OF RECURRENT ABORTION, CURRENTLY PREGNANT IN SECOND TRIMESTER: ICD-10-CM

## 2021-10-05 LAB
ALBUMIN UR-MCNC: ABNORMAL MG/DL
ANION GAP SERPL CALCULATED.3IONS-SCNC: 3 MMOL/L (ref 3–14)
APPEARANCE UR: CLEAR
BILIRUB UR QL STRIP: ABNORMAL
BUN SERPL-MCNC: 9 MG/DL (ref 7–30)
CALCIUM SERPL-MCNC: 9.2 MG/DL (ref 8.5–10.1)
CHLORIDE BLD-SCNC: 109 MMOL/L (ref 94–109)
CO2 SERPL-SCNC: 25 MMOL/L (ref 20–32)
COLOR UR AUTO: ABNORMAL
CREAT SERPL-MCNC: 0.6 MG/DL (ref 0.52–1.04)
GFR SERPL CREATININE-BSD FRML MDRD: >90 ML/MIN/1.73M2
GLUCOSE BLD-MCNC: 87 MG/DL (ref 70–99)
GLUCOSE UR STRIP-MCNC: NEGATIVE MG/DL
HGB UR QL STRIP: NEGATIVE
KETONES UR STRIP-MCNC: NEGATIVE MG/DL
LEUKOCYTE ESTERASE UR QL STRIP: NEGATIVE
NITRATE UR QL: NEGATIVE
PH UR STRIP: 5.5 [PH] (ref 5–8)
POTASSIUM BLD-SCNC: 3.6 MMOL/L (ref 3.4–5.3)
SODIUM SERPL-SCNC: 137 MMOL/L (ref 133–144)
SP GR UR STRIP: >1.03 (ref 1–1.03)
UROBILINOGEN UR STRIP-ACNC: 1 E.U./DL

## 2021-10-05 PROCEDURE — 80048 BASIC METABOLIC PNL TOTAL CA: CPT | Performed by: ADVANCED PRACTICE MIDWIFE

## 2021-10-05 PROCEDURE — 36415 COLL VENOUS BLD VENIPUNCTURE: CPT | Performed by: ADVANCED PRACTICE MIDWIFE

## 2021-10-05 PROCEDURE — 99213 OFFICE O/P EST LOW 20 MIN: CPT | Performed by: ADVANCED PRACTICE MIDWIFE

## 2021-10-05 PROCEDURE — 87086 URINE CULTURE/COLONY COUNT: CPT | Performed by: ADVANCED PRACTICE MIDWIFE

## 2021-10-05 PROCEDURE — 81003 URINALYSIS AUTO W/O SCOPE: CPT | Performed by: ADVANCED PRACTICE MIDWIFE

## 2021-10-05 PROCEDURE — G0463 HOSPITAL OUTPT CLINIC VISIT: HCPCS

## 2021-10-05 RX ORDER — ONDANSETRON 2 MG/ML
4 INJECTION INTRAMUSCULAR; INTRAVENOUS ONCE
Status: CANCELLED | OUTPATIENT
Start: 2021-10-05 | End: 2021-10-05

## 2021-10-05 RX ORDER — HEPARIN SODIUM,PORCINE 10 UNIT/ML
5 VIAL (ML) INTRAVENOUS
Status: CANCELLED | OUTPATIENT
Start: 2021-10-05

## 2021-10-05 RX ORDER — PROGESTERONE 200 MG/1
CAPSULE ORAL
Qty: 60 CAPSULE | Refills: 1 | Status: SHIPPED | OUTPATIENT
Start: 2021-10-05 | End: 2022-02-09

## 2021-10-05 RX ORDER — SENNA AND DOCUSATE SODIUM 50; 8.6 MG/1; MG/1
1 TABLET, FILM COATED ORAL AT BEDTIME
Qty: 90 TABLET | Refills: 3 | Status: SHIPPED | OUTPATIENT
Start: 2021-10-05 | End: 2024-05-12

## 2021-10-05 RX ORDER — ONDANSETRON 4 MG/1
4 TABLET, ORALLY DISINTEGRATING ORAL EVERY 8 HOURS PRN
Qty: 60 TABLET | Refills: 2 | Status: SHIPPED | OUTPATIENT
Start: 2021-10-05 | End: 2022-02-09

## 2021-10-05 RX ORDER — HEPARIN SODIUM (PORCINE) LOCK FLUSH IV SOLN 100 UNIT/ML 100 UNIT/ML
5 SOLUTION INTRAVENOUS
Status: CANCELLED | OUTPATIENT
Start: 2021-10-05

## 2021-10-05 NOTE — LETTER
10/5/2021       RE: Pam Syed  [address]  [place] [address]     Dear Colleague,    Thank you for referring your patient, Pam Syed, to the [facility] [place] at [facility]. Please see a copy of my visit note below.    Subjective:  Pam Syed 31 year old year old female, , who presents for early pregnancy visit.     Pt reports LMP in early August, unsure of exact date.  Took UPT in early September, which was positive.  Has been experiencing increased nausea and vomiting since. This is not typical for her past pregnancies. Has not tried any OTC remedies or medications.  Thinks she might have tried Vit B6 and unisom in a past pregnancy, but did not help.    Pt reports difficulty keeping any fluids down.  Sometimes able to eat watermelon or drink buttermilk. Decreased appetite, reports vomiting 2-3x/day.    Denies bleeding or cramping.  Has started taking vaginal progesterone d/t her history of recurrent loss.     Has also noticed dysuria for past 4-5 days. Denies urgency or frequency.  Small amount upon voiding.     Objective:   /84   Pulse 84   Wt 62.7 kg (138 lb 4.8 oz)   LMP  (LMP Unknown)   Breastfeeding Unknown   BMI 24.50 kg/m    She appears well, afebrile.    Pelvic Exam: Deferred    POCT UA: small bili, trace protein, > 1.030 spec gravity, beti color; negative leuk or nitrites  POCT UPT: not done    Assessment:   Nausea and vomiting in early pregnancy  Dysuria  Hx of recurrent pregnancy loss    Plan:   Orders Placed This Encounter   Procedures     Clinitek Urine Macroscopic POCT     Basic metabolic panel     UA/UC collected. Reviewed no signs of infection from UA.   Discussed IV fluids in ED if any electrolyte abnormalities today, otherwise plan for IV fluids at infusion center this week or next.   Rx sent for Zofran.  Answered all patient and partner's questions and  concerns  Return to clinic in 1 week for intake with ultrasound    ASHIA GipsonM

## 2021-10-05 NOTE — PROGRESS NOTES
Subjective:  Pam Syed 31 year old year old female, , who presents for early pregnancy visit.     Pt reports LMP in early August, unsure of exact date.  Took UPT in early September, which was positive.  Has been experiencing increased nausea and vomiting since. This is not typical for her past pregnancies. Has not tried any OTC remedies or medications.  Thinks she might have tried Vit B6 and unisom in a past pregnancy, but did not help.    Pt reports difficulty keeping any fluids down.  Sometimes able to eat watermelon or drink buttermilk. Decreased appetite, reports vomiting 2-3x/day.    Denies bleeding or cramping.  Has started taking vaginal progesterone d/t her history of recurrent loss.     Has also noticed dysuria for past 4-5 days. Denies urgency or frequency.  Small amount upon voiding.     Objective:   /84   Pulse 84   Wt 62.7 kg (138 lb 4.8 oz)   LMP  (LMP Unknown)   Breastfeeding Unknown   BMI 24.50 kg/m    She appears well, afebrile.    Pelvic Exam: Deferred    POCT UA: small bili, trace protein, > 1.030 spec gravity, beti color; negative leuk or nitrites  POCT UPT: not done    Assessment:   Nausea and vomiting in early pregnancy  Dysuria  Hx of recurrent pregnancy loss    Plan:   Orders Placed This Encounter   Procedures     Clinitek Urine Macroscopic POCT     Basic metabolic panel     UA/UC collected. Reviewed no signs of infection from UA.   Discussed IV fluids in ED if any electrolyte abnormalities today, otherwise plan for IV fluids at infusion center this week or next.   Rx sent for Zofran.  Answered all patient and partner's questions and concerns  Return to clinic in 1 week for intake with ultrasound    ASHIA Gipson CNM

## 2021-10-05 NOTE — TELEPHONE ENCOUNTER
----- Message from Sharmin Shields RN sent at 10/5/2021  9:48 AM CDT -----  Regarding: Requesting call from nurse - no details left

## 2021-10-05 NOTE — TELEPHONE ENCOUNTER
Having terrible nausea, feels dehydrated, unable to keep down even water for the past few days.  She is urinating, but less than normal.  Having pain with urination.    She will come for office visit today at 1140.  Aware that this is just to address her current concerns, will schedule NOB when she comes in

## 2021-10-07 ENCOUNTER — INFUSION THERAPY VISIT (OUTPATIENT)
Dept: INFUSION THERAPY | Facility: CLINIC | Age: 31
End: 2021-10-07
Attending: ADVANCED PRACTICE MIDWIFE
Payer: COMMERCIAL

## 2021-10-07 ENCOUNTER — ANCILLARY PROCEDURE (OUTPATIENT)
Dept: ULTRASOUND IMAGING | Facility: CLINIC | Age: 31
End: 2021-10-07
Attending: OBSTETRICS & GYNECOLOGY
Payer: COMMERCIAL

## 2021-10-07 VITALS
HEART RATE: 77 BPM | DIASTOLIC BLOOD PRESSURE: 62 MMHG | OXYGEN SATURATION: 100 % | TEMPERATURE: 98.2 F | SYSTOLIC BLOOD PRESSURE: 110 MMHG

## 2021-10-07 DIAGNOSIS — O21.9 NAUSEA/VOMITING IN PREGNANCY: Primary | ICD-10-CM

## 2021-10-07 DIAGNOSIS — O36.71X0 DELIVERY OF VIABLE FETUS DURING ABDOMINAL PREGNANCY IN FIRST TRIMESTER, SINGLE OR UNSPECIFIED FETUS: ICD-10-CM

## 2021-10-07 LAB — BACTERIA UR CULT: NO GROWTH

## 2021-10-07 PROCEDURE — 76801 OB US < 14 WKS SINGLE FETUS: CPT

## 2021-10-07 PROCEDURE — 96360 HYDRATION IV INFUSION INIT: CPT

## 2021-10-07 PROCEDURE — 258N000003 HC RX IP 258 OP 636: Performed by: ADVANCED PRACTICE MIDWIFE

## 2021-10-07 PROCEDURE — 76801 OB US < 14 WKS SINGLE FETUS: CPT | Mod: 26 | Performed by: OBSTETRICS & GYNECOLOGY

## 2021-10-07 RX ORDER — ONDANSETRON 2 MG/ML
4 INJECTION INTRAMUSCULAR; INTRAVENOUS ONCE
Status: CANCELLED | OUTPATIENT
Start: 2021-10-07 | End: 2021-10-07

## 2021-10-07 RX ORDER — HEPARIN SODIUM (PORCINE) LOCK FLUSH IV SOLN 100 UNIT/ML 100 UNIT/ML
5 SOLUTION INTRAVENOUS
Status: CANCELLED | OUTPATIENT
Start: 2021-10-07

## 2021-10-07 RX ORDER — HEPARIN SODIUM,PORCINE 10 UNIT/ML
5 VIAL (ML) INTRAVENOUS
Status: CANCELLED | OUTPATIENT
Start: 2021-10-07

## 2021-10-07 RX ADMIN — SODIUM CHLORIDE, POTASSIUM CHLORIDE, SODIUM LACTATE AND CALCIUM CHLORIDE 1000 ML: 600; 310; 30; 20 INJECTION, SOLUTION INTRAVENOUS at 09:50

## 2021-10-07 ASSESSMENT — PAIN SCALES - GENERAL
PAINLEVEL: NO PAIN (0)
PAINLEVEL: NO PAIN (0)

## 2021-10-07 NOTE — PROGRESS NOTES
Infusion Nursing Note:  Pam Syed presents today for IVF.    Patient seen by provider today: No   present during visit today: Not Applicable.    Note: Declined zofran.      Intravenous Access:  Peripheral IV placed.    Treatment Conditions:  Not Applicable.      Post Infusion Assessment:  Patient tolerated infusion without incident.  Blood return noted pre and post infusion.  Site patent and intact, free from redness, edema or discomfort.  No evidence of extravasations.  Access discontinued per protocol.       Discharge Plan:   Discharge instructions reviewed with: Patient.  Patient discharged in stable condition accompanied by:  and child.  Departure Mode: Ambulatory.  No further infusions planned at this time.      Esme Soria

## 2021-10-27 ENCOUNTER — OFFICE VISIT (OUTPATIENT)
Dept: OBGYN | Facility: CLINIC | Age: 31
End: 2021-10-27
Attending: ADVANCED PRACTICE MIDWIFE
Payer: COMMERCIAL

## 2021-10-27 ENCOUNTER — TELEPHONE (OUTPATIENT)
Dept: OBGYN | Facility: CLINIC | Age: 31
End: 2021-10-27

## 2021-10-27 ENCOUNTER — LAB (OUTPATIENT)
Dept: LAB | Facility: CLINIC | Age: 31
End: 2021-10-27
Attending: ADVANCED PRACTICE MIDWIFE
Payer: COMMERCIAL

## 2021-10-27 VITALS
HEART RATE: 101 BPM | HEIGHT: 63 IN | BODY MASS INDEX: 25.16 KG/M2 | DIASTOLIC BLOOD PRESSURE: 68 MMHG | SYSTOLIC BLOOD PRESSURE: 103 MMHG | WEIGHT: 142 LBS

## 2021-10-27 DIAGNOSIS — O26.21 PREGNANCY CARE OF HABITUAL ABORTER IN FIRST TRIMESTER: Primary | ICD-10-CM

## 2021-10-27 DIAGNOSIS — O21.9 NAUSEA/VOMITING IN PREGNANCY: ICD-10-CM

## 2021-10-27 DIAGNOSIS — Z87.59 HISTORY OF IUFD: ICD-10-CM

## 2021-10-27 DIAGNOSIS — O09.91 SUPERVISION OF HIGH RISK PREGNANCY IN FIRST TRIMESTER: Primary | ICD-10-CM

## 2021-10-27 DIAGNOSIS — Z87.59 HISTORY OF SEVERE PRE-ECLAMPSIA: ICD-10-CM

## 2021-10-27 DIAGNOSIS — O09.91 SUPERVISION OF HIGH RISK PREGNANCY IN FIRST TRIMESTER: ICD-10-CM

## 2021-10-27 LAB
ABO/RH(D): NORMAL
ALBUMIN UR-MCNC: 70 MG/DL
ANTIBODY SCREEN: NEGATIVE
APPEARANCE UR: ABNORMAL
BILIRUB UR QL STRIP: ABNORMAL
COLOR UR AUTO: YELLOW
DEPRECATED CALCIDIOL+CALCIFEROL SERPL-MC: 41 UG/L (ref 20–75)
ERYTHROCYTE [DISTWIDTH] IN BLOOD BY AUTOMATED COUNT: 13.6 % (ref 10–15)
GLUCOSE UR STRIP-MCNC: NEGATIVE MG/DL
HBV SURFACE AB SERPL IA-ACNC: 5.61 M[IU]/ML
HBV SURFACE AG SERPL QL IA: NONREACTIVE
HCT VFR BLD AUTO: 35.8 % (ref 35–47)
HCV AB SERPL QL IA: NONREACTIVE
HGB BLD-MCNC: 12.4 G/DL (ref 11.7–15.7)
HGB UR QL STRIP: NEGATIVE
HIV 1+2 AB+HIV1 P24 AG SERPL QL IA: NONREACTIVE
KETONES UR STRIP-MCNC: ABNORMAL MG/DL
LEUKOCYTE ESTERASE UR QL STRIP: ABNORMAL
MCH RBC QN AUTO: 29.5 PG (ref 26.5–33)
MCHC RBC AUTO-ENTMCNC: 34.6 G/DL (ref 31.5–36.5)
MCV RBC AUTO: 85 FL (ref 78–100)
MUCOUS THREADS #/AREA URNS LPF: PRESENT /LPF
NITRATE UR QL: NEGATIVE
PH UR STRIP: 5.5 [PH] (ref 5–7)
PLATELET # BLD AUTO: 189 10E3/UL (ref 150–450)
RBC # BLD AUTO: 4.21 10E6/UL (ref 3.8–5.2)
RBC URINE: 9 /HPF
SP GR UR STRIP: 1.05 (ref 1–1.03)
SPECIMEN EXPIRATION DATE: NORMAL
SQUAMOUS EPITHELIAL: 3 /HPF
T PALLIDUM AB SER QL: NONREACTIVE
UROBILINOGEN UR STRIP-MCNC: 3 MG/DL
WBC # BLD AUTO: 4.2 10E3/UL (ref 4–11)
WBC URINE: 5 /HPF

## 2021-10-27 PROCEDURE — 99207 PR PRENATAL VISIT: CPT | Performed by: ADVANCED PRACTICE MIDWIFE

## 2021-10-27 PROCEDURE — 85014 HEMATOCRIT: CPT | Performed by: ADVANCED PRACTICE MIDWIFE

## 2021-10-27 PROCEDURE — 82306 VITAMIN D 25 HYDROXY: CPT | Performed by: ADVANCED PRACTICE MIDWIFE

## 2021-10-27 PROCEDURE — 86900 BLOOD TYPING SEROLOGIC ABO: CPT | Performed by: ADVANCED PRACTICE MIDWIFE

## 2021-10-27 PROCEDURE — 86762 RUBELLA ANTIBODY: CPT | Performed by: ADVANCED PRACTICE MIDWIFE

## 2021-10-27 PROCEDURE — 87086 URINE CULTURE/COLONY COUNT: CPT | Performed by: ADVANCED PRACTICE MIDWIFE

## 2021-10-27 PROCEDURE — G0463 HOSPITAL OUTPT CLINIC VISIT: HCPCS | Mod: 25

## 2021-10-27 PROCEDURE — 81001 URINALYSIS AUTO W/SCOPE: CPT | Performed by: ADVANCED PRACTICE MIDWIFE

## 2021-10-27 PROCEDURE — 86780 TREPONEMA PALLIDUM: CPT | Performed by: ADVANCED PRACTICE MIDWIFE

## 2021-10-27 PROCEDURE — 87340 HEPATITIS B SURFACE AG IA: CPT | Performed by: ADVANCED PRACTICE MIDWIFE

## 2021-10-27 PROCEDURE — 36415 COLL VENOUS BLD VENIPUNCTURE: CPT | Performed by: ADVANCED PRACTICE MIDWIFE

## 2021-10-27 PROCEDURE — 86706 HEP B SURFACE ANTIBODY: CPT | Performed by: ADVANCED PRACTICE MIDWIFE

## 2021-10-27 PROCEDURE — 86803 HEPATITIS C AB TEST: CPT | Performed by: ADVANCED PRACTICE MIDWIFE

## 2021-10-27 PROCEDURE — 87389 HIV-1 AG W/HIV-1&-2 AB AG IA: CPT | Performed by: ADVANCED PRACTICE MIDWIFE

## 2021-10-27 PROCEDURE — 86787 VARICELLA-ZOSTER ANTIBODY: CPT | Performed by: ADVANCED PRACTICE MIDWIFE

## 2021-10-27 RX ORDER — HEPARIN SODIUM (PORCINE) LOCK FLUSH IV SOLN 100 UNIT/ML 100 UNIT/ML
5 SOLUTION INTRAVENOUS
Status: CANCELLED | OUTPATIENT
Start: 2021-10-27

## 2021-10-27 RX ORDER — HEPARIN SODIUM,PORCINE 10 UNIT/ML
5 VIAL (ML) INTRAVENOUS
Status: CANCELLED | OUTPATIENT
Start: 2021-10-27

## 2021-10-27 RX ORDER — MULTIVITAMIN WITH IRON
1 TABLET ORAL DAILY
Qty: 60 TABLET | Refills: 1 | Status: SHIPPED | OUTPATIENT
Start: 2021-10-27 | End: 2022-02-09

## 2021-10-27 RX ORDER — LANOLIN ALCOHOL/MO/W.PET/CERES
100 CREAM (GRAM) TOPICAL
Qty: 90 TABLET | Refills: 1 | Status: ON HOLD | OUTPATIENT
Start: 2021-10-27 | End: 2021-11-17

## 2021-10-27 ASSESSMENT — MIFFLIN-ST. JEOR: SCORE: 1318.24

## 2021-10-27 NOTE — PATIENT INSTRUCTIONS
It was a pleasure to meet you today!    For optimal nutrition/health we recommend that you:  -Take a prenatal vitamin daily  -Take 1,000mg Fish Oil or 500mg DHA daily  -Take 2,000 IU vitamin D daily  -150 mcg of Iodine daily    -Try to eat small frequent meals with a focus on protein, ideally consuming 80-100g of protein/daily.   -Aim for 1,200mg Calcium daily     -Exercise more days out of the week than not with getting your heart rate up for at least 30 minutes.     -The recommended weight gain for your pregnancy is: 15-25    Recommendations to reduce Nausea of pregnancy:    -small frequent meals, focus on protein 80-100g/day. Avoid heavy, greasy meals  -fresh air/exercise more days out of the week than not, 30 min  -Sharron or peppermint tea, lozenges, gum  -Seabands  -vitamin B6 50mg three times a day, with 50mg of Unisom at night (unisom makes you sleepy)    For constipation try Magnesium Oxide 350mg daily, probiotics (with bifudus), increase intake of fibrous foods (you can try smoothies or fiber supplements), water intake and exercise (try to exercise more days out of the week than not).   If you haven't had a bowel movement in 3 days you can use Milk of Magnesia, Doculax, or Lactulose sparingly

## 2021-10-27 NOTE — LETTER
"10/27/2021       RE: Pam Syed  [address]  [place] [address]     Dear Colleague,    Thank you for referring your patient, Pam Syed, to the [facility] [place] at [facility]. Please see a copy of my visit note below.    [facility] OB Intake note  Subjective   31 year old female presents to clinic with Nilson for initiation of OB care. No LMP recorded (lmp unknown). Patient is pregnant.  at 12/3 weeksby Estimated Date of Delivery: May 8, 2022 based on US confirms. Reviewed dating ultrasound.  Dating ultrasound: 10/7/21 9w4d  Pam is tearful today as she has a history of recurrent pregnancy loss and IUFD. Anxious to hear the fetal heart ton  Pregnancy is planned.    Partner name - Nilsno.   Symptoms since LMP include nausea. Patient has tried these relief measures: Zofran. Feels she is developing a tolerance to Zofran and is getting constipated. Interested in trying vitamin B6 and Unisom. She is not able to drink or eat much due to her nausea and is noticing that she is not urinating often and that it is dark. She has received one bag of IV fluids, and is requesting another session with the IV infusion center for IV fluids.     Reviewed birth history:     Pam and Nilson are very excited and nervous to be pregnant. If it's a boy they will name him Shruthi Munoz flu this visit, she is feeling \"too nervous\" will consider next visit    #1: 3/2014: SAB, 5 weeks, SAB no complications  #2: 2014: SAB, 5 weeks, SAB no complications  #3: 12/03/15: NSVB 38/5, PROM, spontaneous labor, 5 hour labor, epidural, 2nd degree, no PPH, Male \"Yaquob\", 6lbs 13oz  #4: 2017: SAB, 5 weeks, SAB, no complications  #5: 17: SAB, 11 weeks, baby stopped growing at 11 weeks, D & C at 15 weeks, no complications  #6: 18: 39/3 weeks, IUFD, no cardiac activity on admission prior to delivery, NSVB, intact, no PPH \"Jay\", Male, 6lbs " "1oz, Admitted postpartum for preeclampsia with severe features. She presented to the ED with sudden onset of fatigue, numbness and tingling and shortness of breath.  #7: 7/15/19: 37/6 weeks, had shortened cervix, cerclage placed, was 6 cm in clinic and sent to L & D. AROM with rapid progression, L compound hand, intact perineum. Postpartum hemorrhage: Buccal misoprostol, Methergine, hemabate given for bleeding, 2nd IV started, , no tear, 5lbs 10oz, Female \"Beatrice\"   #8: 6/2021: MAB, baby stopped growing at 8 weeks, D & C at 11 wks  #9 Current    Hx of PPH, no PPD other than grieving,  without difficulties    - Genetic/Infection questionnaire completed, risks include recurrent pregnancy loss of unknown etiology. Pt  does not have a recent known exposure to Parvo or CMV so IgG/IgM testing WILL NOT be ordered.   Recommended Flu Vaccine.  Patient declined, will consider next visit   COVID vaccine    - Current Medications    Current Outpatient Medications   Medication Sig Dispense Refill     aspirin (ASA) 81 MG EC tablet Take 1 tablet (81 mg) by mouth daily 90 tablet 2     doxylamine (UNISOM SLEEPTABS) 25 MG TABS tablet Take 1 tablet (25 mg) by mouth At Bedtime 60 tablet 1     magnesium 250 MG tablet Take 1 tablet (250 mg) by mouth daily 60 tablet 1     Prenatal Vit-Fe Fumarate-FA (PRENATAL VITAMIN AND MINERAL) 28-0.8 MG TABS Take 1 tablet by mouth daily 90 tablet 3     progesterone (PROMETRIUM) 200 MG capsule Insert one capsule in the vagina twice daily 60 capsule 1     vitamin B6 (PYRIDOXINE) 50 MG TABS Take 2 tablets (100 mg) by mouth 3 times daily 90 tablet 1     ondansetron (ZOFRAN-ODT) 4 MG ODT tab Take 1 tablet (4 mg) by mouth every 8 hours as needed for nausea (Patient not taking: Reported on 10/27/2021) 60 tablet 2     SENNA-docusate sodium (SENNA S) 8.6-50 MG tablet Take 1 tablet by mouth At Bedtime 90 tablet 3         - Co-morbids    Past Medical History:   Diagnosis Date     Depressive disorder "     PPD following fetal loss     Recurrent pregnancy loss (CODE)      Severe pre-eclampsia, postpartum condition or complication 2018     Varicella      - Risk for GDM : No known risk factors for GDMso  WILL NOT have an early GCT and Hgb A1C    - High risk factors for Pre E-  History of Pre Eclampsia WILL start low dose aspirin (81mg) starting between 12 and 28 weeks to prevent early onset preeclampsia    - The patient  does not have a history of spontaneous  birth, but she does have a history of shortened cervix with cervical cerclage.     -The patient does not have a history of immunosuppresion or HIV so Toxoplasma IgG/IgM WILL NOT be ordered.           PERSONAL/SOCIAL HISTORY    Lives with spouse and two children.  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    Job involves light activity .  Her partner works as a  for [employer], mostly from home.    History of anxiety or depression  Anxiety, yes, she is able to manage this with talking with her  and family. No therapy or medications needed      Objective  -VS: reviewed and within normal limits   -General appearance: no acute distress, patient is comfortable   NEUROLOGICAL/PSYCHIATRIC   - Orientated x3,   -Mood and affect: : normal     Assessment/Plan  Pam was seen today for prenatal care.    Diagnoses and all orders for this visit:    Supervision of high risk pregnancy in first trimester  -     ABO/Rh type and screen  -     CBC with platelets  -     HIV Antigen Antibody Combo  -     Hepatitis B surface antigen  -     Hepatitis B Surface Antibody  -     Hepatitis C antibody  -     Rubella Antibody IgG  -     Treponema Abs w Reflex to RPR and Titer  -     Vitamin D Deficiency  -     Urine Culture  -     Varicella Zoster Virus Antibody IgG  -     vitamin B6 (PYRIDOXINE) 50 MG TABS; Take 2 tablets (100 mg) by mouth 3 times daily  -     doxylamine (UNISOM SLEEPTABS) 25 MG  TABS tablet; Take 1 tablet (25 mg) by mouth At Bedtime  -     Routine UA with Microscopic  -     aspirin (ASA) 81 MG EC tablet; Take 1 tablet (81 mg) by mouth daily  -     [facility] Referral - Pregnancy; Future  -     magnesium 250 MG tablet; Take 1 tablet (250 mg) by mouth daily  -     Asymptomatic COVID-19 Virus (Coronavirus) by PCR; Future    Nausea/vomiting in pregnancy        31 year old  12/3 weeks of pregnancy with BEBETO of May 8, 2022 by LMP of No LMP recorded (lmp unknown). Patient is pregnant.. Ultrasound confirms.   Hx of shortened cervix with placement of cervical cerclage   Hx of IUFD  Hx of recurrent pregnancy loss  Hx of postpartum preeclampsia  Outpatient Encounter Medications as of 10/27/2021   Medication Sig Dispense Refill     aspirin (ASA) 81 MG EC tablet Take 1 tablet (81 mg) by mouth daily 90 tablet 2     doxylamine (UNISOM SLEEPTABS) 25 MG TABS tablet Take 1 tablet (25 mg) by mouth At Bedtime 60 tablet 1     magnesium 250 MG tablet Take 1 tablet (250 mg) by mouth daily 60 tablet 1     Prenatal Vit-Fe Fumarate-FA (PRENATAL VITAMIN AND MINERAL) 28-0.8 MG TABS Take 1 tablet by mouth daily 90 tablet 3     progesterone (PROMETRIUM) 200 MG capsule Insert one capsule in the vagina twice daily 60 capsule 1     vitamin B6 (PYRIDOXINE) 50 MG TABS Take 2 tablets (100 mg) by mouth 3 times daily 90 tablet 1     ondansetron (ZOFRAN-ODT) 4 MG ODT tab Take 1 tablet (4 mg) by mouth every 8 hours as needed for nausea (Patient not taking: Reported on 10/27/2021) 60 tablet 2     SENNA-docusate sodium (SENNA S) 8.6-50 MG tablet Take 1 tablet by mouth At Bedtime 90 tablet 3     [DISCONTINUED] cholecalciferol (VITAMIN D3) 125 mcg (5000 units) capsule Take 1 capsule (125 mcg) by mouth daily Take one capsule daily. (Patient not taking: Reported on 10/5/2021) 90 capsule 3     No facility-administered encounter medications on file as of 10/27/2021.      Orders Placed This Encounter   Procedures     CBC with  platelets     HIV Antigen Antibody Combo     Hepatitis B surface antigen     Hepatitis B Surface Antibody     Hepatitis C antibody     Rubella Antibody IgG     Treponema Abs w Reflex to RPR and Titer     Vitamin D Deficiency     Varicella Zoster Virus Antibody IgG     Routine UA with Microscopic     Asymptomatic COVID-19 Virus (Coronavirus) by PCR     [facility] Referral - Pregnancy     Adult Type and Screen                 Orders Placed This Encounter   Procedures     CBC with platelets     HIV Antigen Antibody Combo     Hepatitis B surface antigen     Hepatitis B Surface Antibody     Hepatitis C antibody     Rubella Antibody IgG     Treponema Abs w Reflex to RPR and Titer     Vitamin D Deficiency     Varicella Zoster Virus Antibody IgG     Routine UA with Microscopic     Asymptomatic COVID-19 Virus (Coronavirus) by PCR     [facility] Referral - Pregnancy     Adult Type and Screen       -Nausea: Will try Vitamin B6 50mg TID and Unisom at night.  -IV hydration orders placed per pt request  - Oriented to Practice, types of care, and how to reach a provider.  Pt prefers CNM team  - Patient received 1st trimester new OB education packet complete with aide of [employer] Expect[employer] Family booklet including information on genetic screening test options.  - Patient declines all genetic screening and desires level II ultrasound which was ordered.  - Educational handout on the prevention of infections diseases during pregnancy provided.  - Patient was encouraged to start prenatal vitamins as tolerated.    - Patient was sent to lab for routine OB labs including varicella.   - Referred to [facility] for consult regarding her history of shortened cervix, recurrent pregnancy loss, IUFD.   -Referred to Dr. Alvarez and Dr. Melara for mental health therapy, PTSD  -Offered for Pam to schedule a f/u visit at anytime for fetal heart tone check to help manage her anxiety re concern that this will be a non-viable pregnancy  -HELLP panel  at [facility]  - No indication for early GDM screening   - Reviewed recommendation for low dose aspirin daily to prevent pre eclampsia, pt agrees sent to pharmacy will start immediately   - Pregnancy concerns to be addressed by provider at new OB exam include: MFM referral recommendations  -10/2018 pap NIL, prefers repeat after birth  -Reviewed strategies to manage constipation: magnesium, increase fiber, Milk of Magnesia, Doculax, Lactulose  -Pt to RTO for [facility] visit in 4 weeks and prn if questions or concerns    Lupe Buchanan, ANGELIC, APRN

## 2021-10-27 NOTE — PROGRESS NOTES
"[facility] OB Intake note  Subjective   31 year old female presents to clinic with Nilson for initiation of OB care. No LMP recorded (lmp unknown). Patient is pregnant.  at 12/3 weeksby Estimated Date of Delivery: May 8, 2022 based on US confirms. Reviewed dating ultrasound.  Dating ultrasound: 10/7/21 9w4d  Pam is tearful today as she has a history of recurrent pregnancy loss and IUFD. Anxious to hear the fetal heart ton  Pregnancy is planned.    Partner name - Nilson.   Symptoms since LMP include nausea. Patient has tried these relief measures: Zofran. Feels she is developing a tolerance to Zofran and is getting constipated. Interested in trying vitamin B6 and Unisom. She is not able to drink or eat much due to her nausea and is noticing that she is not urinating often and that it is dark. She has received one bag of IV fluids, and is requesting another session with the IV infusion center for IV fluids.     Reviewed birth history:     Pam and Nilson are very excited and nervous to be pregnant. If it's a boy they will name him Shruthi Munoz flu this visit, she is feeling \"too nervous\" will consider next visit    #1: 3/2014: SAB, 5 weeks, SAB no complications  #2: 2014: SAB, 5 weeks, SAB no complications  #3: 12/03/15: NSVB 38/5, PROM, spontaneous labor, 5 hour labor, epidural, 2nd degree, no PPH, Male \"Yaquob\", 6lbs 13oz  #4: 2017: SAB, 5 weeks, SAB, no complications  #5: 17: SAB, 11 weeks, baby stopped growing at 11 weeks, D & C at 15 weeks, no complications  #6: /: 39/3 weeks, IUFD, no cardiac activity on admission prior to delivery, NSVB, intact, no PPH \"Jay\", Male, 6lbs 1oz, Admitted postpartum for preeclampsia with severe features. She presented to the ED with sudden onset of fatigue, numbness and tingling and shortness of breath.  #7: 7/15/19: 37/6 weeks, had shortened cervix, cerclage placed, was 6 cm in clinic and sent to L & D. AROM with rapid progression, L compound hand, intact " "perineum. Postpartum hemorrhage: Buccal misoprostol, Methergine, hemabate given for bleeding, 2nd IV started, , no tear, 5lbs 10oz, Female \"Beatrice\"   #8: 6/2021: MAB, baby stopped growing at 8 weeks, D & C at 11 wks  #9 Current    Hx of PPH, no PPD other than grieving,  without difficulties    - Genetic/Infection questionnaire completed, risks include recurrent pregnancy loss of unknown etiology. Pt  does not have a recent known exposure to Parvo or CMV so IgG/IgM testing WILL NOT be ordered.   Recommended Flu Vaccine.  Patient declined, will consider next visit   COVID vaccine    - Current Medications    Current Outpatient Medications   Medication Sig Dispense Refill     aspirin (ASA) 81 MG EC tablet Take 1 tablet (81 mg) by mouth daily 90 tablet 2     doxylamine (UNISOM SLEEPTABS) 25 MG TABS tablet Take 1 tablet (25 mg) by mouth At Bedtime 60 tablet 1     magnesium 250 MG tablet Take 1 tablet (250 mg) by mouth daily 60 tablet 1     Prenatal Vit-Fe Fumarate-FA (PRENATAL VITAMIN AND MINERAL) 28-0.8 MG TABS Take 1 tablet by mouth daily 90 tablet 3     progesterone (PROMETRIUM) 200 MG capsule Insert one capsule in the vagina twice daily 60 capsule 1     vitamin B6 (PYRIDOXINE) 50 MG TABS Take 2 tablets (100 mg) by mouth 3 times daily 90 tablet 1     ondansetron (ZOFRAN-ODT) 4 MG ODT tab Take 1 tablet (4 mg) by mouth every 8 hours as needed for nausea (Patient not taking: Reported on 10/27/2021) 60 tablet 2     SENNA-docusate sodium (SENNA S) 8.6-50 MG tablet Take 1 tablet by mouth At Bedtime 90 tablet 3         - Co-morbids    Past Medical History:   Diagnosis Date     Depressive disorder     PPD following fetal loss     Recurrent pregnancy loss (CODE)      Severe pre-eclampsia, postpartum condition or complication 8/25/2018     Varicella      - Risk for GDM : No known risk factors for GDMso  WILL NOT have an early GCT and Hgb A1C    - High risk factors for Pre E-  History of Pre Eclampsia WILL start " low dose aspirin (81mg) starting between 12 and 28 weeks to prevent early onset preeclampsia    - The patient  does not have a history of spontaneous  birth, but she does have a history of shortened cervix with cervical cerclage.     -The patient does not have a history of immunosuppresion or HIV so Toxoplasma IgG/IgM WILL NOT be ordered.           PERSONAL/SOCIAL HISTORY    Lives with spouse and two children.  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    Job involves light activity .  Her partner works as a  for [employer], mostly from home.    History of anxiety or depression  Anxiety, yes, she is able to manage this with talking with her  and family. No therapy or medications needed      Objective  -VS: reviewed and within normal limits   -General appearance: no acute distress, patient is comfortable   NEUROLOGICAL/PSYCHIATRIC   - Orientated x3,   -Mood and affect: : normal     Assessment/Plan  Pam was seen today for prenatal care.    Diagnoses and all orders for this visit:    Supervision of high risk pregnancy in first trimester  -     ABO/Rh type and screen  -     CBC with platelets  -     HIV Antigen Antibody Combo  -     Hepatitis B surface antigen  -     Hepatitis B Surface Antibody  -     Hepatitis C antibody  -     Rubella Antibody IgG  -     Treponema Abs w Reflex to RPR and Titer  -     Vitamin D Deficiency  -     Urine Culture  -     Varicella Zoster Virus Antibody IgG  -     vitamin B6 (PYRIDOXINE) 50 MG TABS; Take 2 tablets (100 mg) by mouth 3 times daily  -     doxylamine (UNISOM SLEEPTABS) 25 MG TABS tablet; Take 1 tablet (25 mg) by mouth At Bedtime  -     Routine UA with Microscopic  -     aspirin (ASA) 81 MG EC tablet; Take 1 tablet (81 mg) by mouth daily  -     [facility] Referral - Pregnancy; Future  -     magnesium 250 MG tablet; Take 1 tablet (250 mg) by mouth daily  -     Asymptomatic COVID-19  Virus (Coronavirus) by PCR; Future    Nausea/vomiting in pregnancy        31 year old  12/3 weeks of pregnancy with BEBETO of May 8, 2022 by LMP of No LMP recorded (lmp unknown). Patient is pregnant.. Ultrasound confirms.   Hx of shortened cervix with placement of cervical cerclage   Hx of IUFD  Hx of recurrent pregnancy loss  Hx of postpartum preeclampsia  Outpatient Encounter Medications as of 10/27/2021   Medication Sig Dispense Refill     aspirin (ASA) 81 MG EC tablet Take 1 tablet (81 mg) by mouth daily 90 tablet 2     doxylamine (UNISOM SLEEPTABS) 25 MG TABS tablet Take 1 tablet (25 mg) by mouth At Bedtime 60 tablet 1     magnesium 250 MG tablet Take 1 tablet (250 mg) by mouth daily 60 tablet 1     Prenatal Vit-Fe Fumarate-FA (PRENATAL VITAMIN AND MINERAL) 28-0.8 MG TABS Take 1 tablet by mouth daily 90 tablet 3     progesterone (PROMETRIUM) 200 MG capsule Insert one capsule in the vagina twice daily 60 capsule 1     vitamin B6 (PYRIDOXINE) 50 MG TABS Take 2 tablets (100 mg) by mouth 3 times daily 90 tablet 1     ondansetron (ZOFRAN-ODT) 4 MG ODT tab Take 1 tablet (4 mg) by mouth every 8 hours as needed for nausea (Patient not taking: Reported on 10/27/2021) 60 tablet 2     SENNA-docusate sodium (SENNA S) 8.6-50 MG tablet Take 1 tablet by mouth At Bedtime 90 tablet 3     [DISCONTINUED] cholecalciferol (VITAMIN D3) 125 mcg (5000 units) capsule Take 1 capsule (125 mcg) by mouth daily Take one capsule daily. (Patient not taking: Reported on 10/5/2021) 90 capsule 3     No facility-administered encounter medications on file as of 10/27/2021.      Orders Placed This Encounter   Procedures     CBC with platelets     HIV Antigen Antibody Combo     Hepatitis B surface antigen     Hepatitis B Surface Antibody     Hepatitis C antibody     Rubella Antibody IgG     Treponema Abs w Reflex to RPR and Titer     Vitamin D Deficiency     Varicella Zoster Virus Antibody IgG     Routine UA with Microscopic     Asymptomatic  COVID-19 Virus (Coronavirus) by PCR     [facility] Referral - Pregnancy     Adult Type and Screen                 Orders Placed This Encounter   Procedures     CBC with platelets     HIV Antigen Antibody Combo     Hepatitis B surface antigen     Hepatitis B Surface Antibody     Hepatitis C antibody     Rubella Antibody IgG     Treponema Abs w Reflex to RPR and Titer     Vitamin D Deficiency     Varicella Zoster Virus Antibody IgG     Routine UA with Microscopic     Asymptomatic COVID-19 Virus (Coronavirus) by PCR     [facility] Referral - Pregnancy     Adult Type and Screen       -Nausea: Will try Vitamin B6 50mg TID and Unisom at night.  -IV hydration orders placed per pt request  - Oriented to Practice, types of care, and how to reach a provider.  Pt prefers CNM team  - Patient received 1st trimester new OB education packet complete with aide of [employer] Expectant Family booklet including information on genetic screening test options.  - Patient declines all genetic screening and desires level II ultrasound which was ordered.  - Educational handout on the prevention of infections diseases during pregnancy provided.  - Patient was encouraged to start prenatal vitamins as tolerated.    - Patient was sent to lab for routine OB labs including varicella.   - Referred to [facility] for consult regarding her history of shortened cervix, recurrent pregnancy loss, IUFD.   -Referred to Dr. Alvarez and Dr. Melara for mental health therapy, PTSD  -Offered for Pam to schedule a f/u visit at anytime for fetal heart tone check to help manage her anxiety re concern that this will be a non-viable pregnancy  -HELLP panel at [facility]  - No indication for early GDM screening   - Reviewed recommendation for low dose aspirin daily to prevent pre eclampsia, pt agrees sent to pharmacy will start immediately   - Pregnancy concerns to be addressed by provider at new OB exam include: MFM referral recommendations  -10/2018 pap NIL, prefers  repeat after birth  -Reviewed strategies to manage constipation: magnesium, increase fiber, Milk of Magnesia, Doculax, Lactulose  -Pt to RTO for [facility] visit in 4 weeks and prn if questions or concerns    Lupe Buchanan, ANGELIC, APRN

## 2021-10-27 NOTE — TELEPHONE ENCOUNTER
----- Message from Lupe Buchanan CNM sent at 10/27/2021 10:25 AM CDT -----  Regarding: IV hydration assistance  Hello!     I have placed IV hydration orders for Pam as well as a COVID test. Do you know how that works, my guess is that she will have to have a negative COVID prior to getting her IV infusion.     Can you please explain that to her and give her the number to call for the IV infusion?      If you have time can you send me the number to call so I can have it?      Thanks!    ASHIA Dubon, ANGELIC

## 2021-10-27 NOTE — TELEPHONE ENCOUNTER
Called Pam and provided phone numbers for infusion scheduling both at [place] and [facility].  Also provided phone number for covid test scheduling.

## 2021-10-28 ENCOUNTER — INFUSION THERAPY VISIT (OUTPATIENT)
Dept: INFUSION THERAPY | Facility: CLINIC | Age: 31
End: 2021-10-28
Attending: ADVANCED PRACTICE MIDWIFE
Payer: COMMERCIAL

## 2021-10-28 VITALS
RESPIRATION RATE: 14 BRPM | OXYGEN SATURATION: 98 % | DIASTOLIC BLOOD PRESSURE: 69 MMHG | TEMPERATURE: 98 F | SYSTOLIC BLOOD PRESSURE: 112 MMHG | HEART RATE: 92 BPM

## 2021-10-28 DIAGNOSIS — O21.9 NAUSEA/VOMITING IN PREGNANCY: Primary | ICD-10-CM

## 2021-10-28 PROBLEM — O26.20 PREGNANCY CARE FOR PATIENT WITH RECURRENT PREGNANCY LOSS: Status: RESOLVED | Noted: 2017-06-26 | Resolved: 2021-10-28

## 2021-10-28 PROBLEM — O02.1 MISSED ABORTION: Status: RESOLVED | Noted: 2021-06-02 | Resolved: 2021-10-28

## 2021-10-28 LAB
BACTERIA UR CULT: NORMAL
RUBV IGG SERPL QL IA: 20.2 INDEX
RUBV IGG SERPL QL IA: POSITIVE
VZV IGG SER QL IA: 688.3 INDEX
VZV IGG SER QL IA: POSITIVE

## 2021-10-28 PROCEDURE — 258N000003 HC RX IP 258 OP 636: Performed by: ADVANCED PRACTICE MIDWIFE

## 2021-10-28 PROCEDURE — 96360 HYDRATION IV INFUSION INIT: CPT

## 2021-10-28 RX ORDER — HEPARIN SODIUM,PORCINE 10 UNIT/ML
5 VIAL (ML) INTRAVENOUS
Status: CANCELLED | OUTPATIENT
Start: 2021-10-28

## 2021-10-28 RX ORDER — HEPARIN SODIUM (PORCINE) LOCK FLUSH IV SOLN 100 UNIT/ML 100 UNIT/ML
5 SOLUTION INTRAVENOUS
Status: CANCELLED | OUTPATIENT
Start: 2021-10-28

## 2021-10-28 RX ADMIN — SODIUM CHLORIDE, POTASSIUM CHLORIDE, SODIUM LACTATE AND CALCIUM CHLORIDE 1000 ML: 600; 310; 30; 20 INJECTION, SOLUTION INTRAVENOUS at 11:28

## 2021-10-28 ASSESSMENT — PAIN SCALES - GENERAL: PAINLEVEL: NO PAIN (0)

## 2021-10-28 NOTE — PROGRESS NOTES
Infusion Nursing Note:  Pam Syed presents today for IV hydration.    Patient seen by provider today: No   present during visit today: Not Applicable.    Note: N/A.      Intravenous Access:  Peripheral IV placed.    Treatment Conditions:  Not Applicable.      Post Infusion Assessment:  Patient tolerated infusion without incident.  No evidence of extravasations.  Access discontinued per protocol.       Discharge Plan:   Patient discharged in stable condition accompanied by: self.  Departure Mode: Ambulatory.      Jm Roland RN

## 2021-10-29 ENCOUNTER — TELEPHONE (OUTPATIENT)
Dept: OBGYN | Facility: CLINIC | Age: 31
End: 2021-10-29

## 2021-10-29 NOTE — TELEPHONE ENCOUNTER
Left message for patient that appt was made for telephone message .   [phone]   With Usha Alavrez MD

## 2021-11-08 ENCOUNTER — PRE VISIT (OUTPATIENT)
Dept: MATERNAL FETAL MEDICINE | Facility: CLINIC | Age: 31
End: 2021-11-08
Payer: COMMERCIAL

## 2021-11-09 NOTE — PROGRESS NOTES
Maternal-Fetal Medicine Consultation    Ginna Syed  : 1990  MRN: [id number]    REFERRAL:  Ginna Syed is a 31 year old sent by Lupe Buchanan CNM from [facility] for MFM consultation.    HPI:  Ginna Syed is a 31 year old  at 14w4d by 9w4d US here for MFM consultation regarding history of pregnancy loss, IUFD, short cervix with history of cerclage, and postpartum preeclampsia.    She is here with her  and daughter.    Patient has a history of five prior spontaneous abortions and one term IUFD in 2018. She has had two successful term deliveries. Most recent term delivery was after placement of rescue cerclage at 21w3d when she was found to have funneled cervix on US and dilated to 1cm on digital exam.    Regarding patient's IUFD, patient was subsequently readmitted for postpartum preeclampsia. Chromosome analysis normal male. Placenta path with retroplacental hematoma with infarct. Demise likely result of abruption.    Patient is excited to be pregnant, understandably nervous about the possible outcomes of this pregnancy. Her nausea and vomiting is improving, she believes she will be able to re-start a prenatal vitamin.     Prenatal Care:  Primary OB care this pregnancy has been with Lupe Buchanan CNM  from [facility] clinic.    Obstetrics History:  OB History    Para Term  AB Living   9 3 3 0 5 2   SAB IAB Ectopic Multiple Live Births   5 0 0 0 3      # Outcome Date GA Lbr Michael/2nd Weight Sex Delivery Anes PTL Lv   9 Current            8 SAB 2021 10w0d    AB, MISSED         Birth Comments: MAB, baby stopped growing 8 weeks, D & C 11 weeks   7 Term 07/15/19 37w6d 04:04 / 00:09 2.55 kg (5 lb 10 oz) F Vag-Spont None N RAMILA      Birth Comments: PROM, spontaneous labor, 5 hour labor, epidural, 2nd degree, no PPH      Name: DEANDRAFEMALE-GINNA      Apgar1: 8  Apgar5: 9   6 Term 18 39w3d 01:16 / 00:20 2.75 kg (6 lb 1 oz) M Vag-Spont None N ND      Birth  Comments: IUFD, no cardiac activity on admission prior to delivery, NSVB, intact no PPH, Admitted postpartum for preeclampsia with severe features. She presented to the ED with sudden onset of fatigue, numbness and tingling and shortness of breath.      Complications: Fetal demise > 22 weeks, delivered, current hospitalization      Name: Jay Baker SAB 09/26/17 11w0d    AB, MISSED         Birth Comments: BAby stopped growing 11 weeks, D & C at 15 weeks, NL blood loss   4 SAB 04/2017 5w0d             Birth Comments: SAB. no complication   3 Term 12/03/15 38w5d 03:45 / 01:16 3.11 kg (6 lb 13.7 oz) M Vag-Spont EPI  RAMILA      Birth Comments: PROM, spontaneous labor, 5 hour labor, epidural, 2nd degree, no PPH      Name: Lee Ann      Apgar1: 9  Apgar5: 9   2 SAB 07/2014 5w0d             Birth Comments: SAB no complications   1 SAB 03/2014 5w0d             Birth Comments: SAB, no complications      Obstetric Comments   2018 Severe Pre E Postpartum after term IUFD 39 weeks   2019: shortened cervix with cerclage, delivered at 37/6 weeks, PPH   Recurrent pregnancy loss     Past Medical History:  Past Medical History:   Diagnosis Date     Depressive disorder     PPD following fetal loss     Recurrent pregnancy loss (CODE)      Severe pre-eclampsia, postpartum condition or complication 8/25/2018     Varicella        Past Surgical History:  Past Surgical History:   Procedure Laterality Date     APPENDECTOMY Right      CERCLAGE CERVICAL N/A 3/22/2019    Procedure: CERCLAGE CERVICAL;  Surgeon: Liz Lima MD;  [facility]     CERCLAGE CERVICAL N/A 3/22/2019    Procedure: CERCLAGE CERVICAL;  Surgeon: Liz Lima MD;  [facility] OR     DILATION AND CURETTAGE SUCTION N/A 9/26/2017    Procedure: DILATION AND CURETTAGE SUCTION;  Suction Dilation And Curettage ;  Surgeon: Yolanda Samaniego MD;  [facility]     DILATION AND CURETTAGE SUCTION N/A 6/4/2021    Procedure: DILATION AND CURETTAGE, UTERUS, USING SUCTION;  Surgeon:  Rosanna Ybarra MD;  [facility]     DILATION AND CURETTAGE, HYSTEROSCOPY DIAGNOSTIC, COMBINED N/A 2017    Procedure: COMBINED DILATION AND CURETTAGE, HYSTEROSCOPY DIAGNOSTIC;  Operative Hysteroscopy, Dilation and Curettage ;  Surgeon: Eli Pickard MD;  [facility] OR     GYN SURGERY  2017    suction D&C       Current Medications:  Prior to Admission medications    Medication Sig Last Dose Taking? Auth Provider   aspirin (ASA) 81 MG EC tablet Take 1 tablet (81 mg) by mouth daily   Lupe Buchanan CNM   doxylamine (UNISOM SLEEPTABS) 25 MG TABS tablet Take 1 tablet (25 mg) by mouth At Bedtime   Lupe Buchanan CNM   magnesium 250 MG tablet Take 1 tablet (250 mg) by mouth daily   Lupe Buchanna CNM   ondansetron (ZOFRAN-ODT) 4 MG ODT tab Take 1 tablet (4 mg) by mouth every 8 hours as needed for nausea  Patient not taking: Reported on 10/27/2021   Justice Cosby APRN CNM   Prenatal Vit-Fe Fumarate-FA (PRENATAL VITAMIN AND MINERAL) 28-0.8 MG TABS Take 1 tablet by mouth daily   Eli Pickard MD   progesterone (PROMETRIUM) 200 MG capsule Insert one capsule in the vagina twice daily   Justice Cosby APRN CNM   SENNA-docusate sodium (SENNA S) 8.6-50 MG tablet Take 1 tablet by mouth At Bedtime   Justice Cosby APRN CNM   vitamin B6 (PYRIDOXINE) 50 MG TABS Take 2 tablets (100 mg) by mouth 3 times daily   Lupe Buchanan CNM       Allergies:  Patient has no known allergies.    ROS:  10-point ROS negative except as in HPI     PHYSICAL EXAM:  Deferred     Other Imaging:   US 2021:  IMPRESSION    Sonographic biometry agrees with gestational age predicted by assigned BEBETO. The anatomic survey is limited due to early gestational age.    ASSESSMENT/PLAN:  Pam Syed is a 31 year old  at 14w4d by 9w4d US here for [facility] consultation regarding:     #History of Cervical Insufficiency  - We discussed the concept of cervical  insufficiency which is another cause of  birth and is classically described as painless cervical dilation.  Most women with cervical insufficiency which is either undiagnosed or those undergoing expectant management will eventually develop symptoms, which may be pressure, back pain, cramping, leakage of fluid, vaginal bleeding and ultimately contractions.  Mid-trimester cervical dilation increases the risk of intra-amniotic infection which can then cause contractions and labor.    - We discussed the role of an elective, prophylactic, history-indicated cerclage.  A prophylactic cerclage is typically placed at the end of the first trimester (12 to 14 weeks of gestation) to prevent early  delivery. Most case series quote a viable birth rate of 70 to 90 percent after elective cerclage, as compared with 10 to 30 percent prior to the procedure.  I also reviewed a systematic review, comparing prophylactic versus urgent (ultrasound-indicated) cerclage, which revealed a decrease in cerclage rates with similar pregnancy outcomes in utilizing an ultrasound-indicated approach.     Recommendations:  - Prophylactic cerclage at as soon as available, plan for next week  - Comprehensive ultrasound at 18-20 weeks and serial growth ultrasonography every 4 weeks  - Administration of  corticosteroids if the patient is deemed to be at increased risk of imminent  delivery.  - Urine culture every trimester with aggressive treatment of bacteriuria.  - Clinical evaluation of  labor symptoms.     #History of term IUFD with placental abruption  - Discussed that patient is at higher risk of having an abruption again, but that there is no prophylactic treatment  - Weekly BPPs after 32 weeks  - Deliver at 38 weeks    #History of postpartum preeclampsia  - Discussed risks of  History of hypertension in pregnancy including preeclampsia, fetal growth restriction, placental abruption and stillbirth. There is also  a risk of maternal stroke.     Recommendations:  - Continue taking prophylactic aspirin                                   - Recommend baseline HELLP labs                        - Recommend taking prenatal vitamin                        - Patient can discontinue progesterone since she has maintained pregnancy to the second trimester already                -  testing with weekly BPP should occur starting at 32 weeks.      #Recurrent Pregnancy Loss  - Negative for antiphospholipid antibodies, normal TSH, glucose at time of admission for postpartum preeclampsia after term IUFD.     Recommendations:  - Comprehensive (Level 2) anatomy US at 18-22 weeks  - Serial growth US every four weeks following comprehensive US    The patient was seen and evaluated with Dr. Riojas.     Thank you for allowing us to participate in the care of your patient. Please do not hesitate to contact us if you have further questions regarding the management of your patient.     Justice Tam MD  Obstetrics, Gynecology, and Women's Health  PGY2  11:39 AM 2021     Physician Attestation   I, Asa Riojas MD, saw this patient and agree with the findings and plan of care as documented in the note.      Items personally reviewed/procedural attestation: History, ultrasound and plan of care/management. Total time spent in face-to-face counseling was 20 minutes (>50% for medical management discussion and plan of care). A copy of this ultrasound was sent to your office.    Asa Riojas MD

## 2021-11-11 ENCOUNTER — HOSPITAL ENCOUNTER (OUTPATIENT)
Dept: ULTRASOUND IMAGING | Facility: CLINIC | Age: 31
End: 2021-11-11
Attending: OBSTETRICS & GYNECOLOGY
Payer: COMMERCIAL

## 2021-11-11 ENCOUNTER — OFFICE VISIT (OUTPATIENT)
Dept: MATERNAL FETAL MEDICINE | Facility: CLINIC | Age: 31
End: 2021-11-11
Attending: ADVANCED PRACTICE MIDWIFE
Payer: COMMERCIAL

## 2021-11-11 ENCOUNTER — PREP FOR PROCEDURE (OUTPATIENT)
Dept: MATERNAL FETAL MEDICINE | Facility: CLINIC | Age: 31
End: 2021-11-11

## 2021-11-11 DIAGNOSIS — O09.299 HX OF CERVICAL INCOMPETENCE IN PREGNANCY, CURRENTLY PREGNANT: Primary | ICD-10-CM

## 2021-11-11 DIAGNOSIS — O34.32 INCOMPETENT CERVIX IN PREGNANCY, ANTEPARTUM, SECOND TRIMESTER: Primary | ICD-10-CM

## 2021-11-11 DIAGNOSIS — O26.21 PREGNANCY CARE OF HABITUAL ABORTER IN FIRST TRIMESTER: ICD-10-CM

## 2021-11-11 DIAGNOSIS — Z87.59 HISTORY OF IUFD: ICD-10-CM

## 2021-11-11 PROCEDURE — 76805 OB US >/= 14 WKS SNGL FETUS: CPT | Mod: 26 | Performed by: OBSTETRICS & GYNECOLOGY

## 2021-11-11 PROCEDURE — 99242 OFF/OP CONSLTJ NEW/EST SF 20: CPT | Mod: 25 | Performed by: OBSTETRICS & GYNECOLOGY

## 2021-11-11 PROCEDURE — 76805 OB US >/= 14 WKS SNGL FETUS: CPT

## 2021-11-11 NOTE — PROGRESS NOTES
Pt presents to [facility] for assessment and evaluation of her pregnancy due to hx of incompetent cervix. Pt states she is overall doing well. States no issues with pregnancy at this time. Us done and reviewed by Dr. Riojas. See epic for today's findings. Pt met with Dr. Tam and Dr. Riojas for consult today. See note in epic for today's consult discussion and recommendations. At this time pt would like to proceed with cerclage. Will schedule for next week on 11/17 at 8:30 with Dr. Orellana. Pt given information and states understanding. Has numbers to call if she has further questions. Will return at 18 weeks for L2. Aware she will also need to have her Covid test done. No further questions at this time. Discharged stable. Mi Mcknight RN

## 2021-11-14 ENCOUNTER — LAB (OUTPATIENT)
Dept: URGENT CARE | Facility: URGENT CARE | Age: 31
End: 2021-11-14
Attending: OBSTETRICS & GYNECOLOGY
Payer: COMMERCIAL

## 2021-11-14 DIAGNOSIS — O34.32 INCOMPETENT CERVIX IN PREGNANCY, ANTEPARTUM, SECOND TRIMESTER: ICD-10-CM

## 2021-11-14 PROCEDURE — U0003 INFECTIOUS AGENT DETECTION BY NUCLEIC ACID (DNA OR RNA); SEVERE ACUTE RESPIRATORY SYNDROME CORONAVIRUS 2 (SARS-COV-2) (CORONAVIRUS DISEASE [COVID-19]), AMPLIFIED PROBE TECHNIQUE, MAKING USE OF HIGH THROUGHPUT TECHNOLOGIES AS DESCRIBED BY CMS-2020-01-R: HCPCS

## 2021-11-14 PROCEDURE — U0005 INFEC AGEN DETEC AMPLI PROBE: HCPCS

## 2021-11-15 LAB — SARS-COV-2 RNA RESP QL NAA+PROBE: NEGATIVE

## 2021-11-16 ENCOUNTER — VIRTUAL VISIT (OUTPATIENT)
Dept: PSYCHOLOGY | Facility: CLINIC | Age: 31
End: 2021-11-16
Payer: COMMERCIAL

## 2021-11-16 ENCOUNTER — ANESTHESIA EVENT (OUTPATIENT)
Dept: OBGYN | Facility: CLINIC | Age: 31
End: 2021-11-16
Payer: COMMERCIAL

## 2021-11-16 DIAGNOSIS — F41.9 ANXIETY DISORDER, UNSPECIFIED TYPE: Primary | ICD-10-CM

## 2021-11-16 PROCEDURE — 90791 PSYCH DIAGNOSTIC EVALUATION: CPT | Mod: TEL

## 2021-11-16 NOTE — PROGRESS NOTES
"  Name:  Pam Syed  Mrn: [id number]  Date of visit: 2021    PSYCHOLOGY OUTPATIENT VISIT NOTE       Referral Source: [facility].      Identifying Information: Pam Syed is a 30yo  pregnant woman of [place] parents.  She presenting with anxiety.  due May 8, scheduled birth at 38wks (end )    PRESENTING PROBLEMS/SYMPTOMS:  \"Every time go to hospital or see providers it's a flashback.\"  Have a 1yo girl ( 2019.)  Very grateful.  Memories of pregnancy losses, (crying).  Wondering what would have been like if other babies had lived.  Talk with  when have these memories and emotions, he is supportive:  reminds her to be grateful for children they have and that other babies are in their hearts.  Worried about current pregnancy, with \"every ultrasound I'm freaking out.\"  Previous stillbirth at 29wks.  Anxiety: Will stillbirth happen again?  Memories of Senthil's birth, anxiety in L&D worried she was dead.  Miscarriage in 2020.  Anxiety attacks, can't breathe, want to take off bra, feel like I'm going to die, hot burning stomach, heart skips beats, can't stop crying.  Sometimes triggered and sometimes out of blue; more often with pregnancy with Beatrice.  Most recent 2mos ago.      Due May 8th, but will give birth at end , 38wks.  All my energy is channeled to baby.    (daughter = Beatrice, 1yo; Iveth, son, 6yo;   = Nilson)    Social History:  Born in [place] of [place] parents.   also of [place] descent and immigrated to .  Whole family has dual citizenship.  Her family is living in [place], dad is .  4 sisters (mom had 10kids and 2 ).  Miss them a lot.  Sis in law in [place].    Orthodoxy = Gnosticist   = [place]son.    Beatrice, \"ease\" 1yo, daughter    Iveth, son, 6yo, will turn 6 in Dec.      Living in [place] now and in Dec moving to [place], [place].      Social support - 2-3 close friends who " "know story and support her.     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  Sometimes work at home.  Job not stressful    Health Behaviors  Alcohol - none  Tobacco - none  Caffeine - none in pregnancy  Drugs - denied    Mental Health:  Went to support group for \"people who've had hardships\" following stillbirth.  Faciliated through [employer].      Psychological Symptoms  Depression - sadness and grief thinking about stillbirth.  Crying every 3 wks.    Anhedonia - denied (sometimes can't do things due to low energy, but want to)  Eating - nausea and vomiting with pregnancy, able to eat small meals.    Sleeping - well  Self esteem - give myself credit for trying.  God cannot give me anything I can't handle.    Anxiety - high focused on baby, high anticipatory anxiety around visits, triggers being in healthcare environment.    SI - denied  HI - denied    ASSESSMENT: Future oriented, engaged in visit.  Complicated grief with losses.  Mental Status Assessment:  Appearance:   unknown  Eye Contact:   n/a  Psychomotor Behavior: unknown  Attitude:   Cooperative   Orientation:   All  Speech   Rate / Production: Normal    Volume:  Normal   Mood:    Anxious  Sad   Thought Content:  Clear   Thought Form:  Coherent  Logical   Insight:    Fair     DIAGNOSIS:   Axis I:  Unspecified anxiety disorder, r/o PTSD; r/o depression  Axis II:  deferred  Axis III:  Pregnant (hx of miscarriage and stillbirth)  Axis IV: moving home next month  Axis V:   GAF current = 51-60     Impressions and Plan:   Pam Syed is a 30yo  pregnant woman of [place] parents.  She presenting with anxiety.  Due May 8, scheduled birth at 38wks (end of April).  Stillbirth and miscarriage history with current anxiety high around baby, most recent PA in Oct 2021.  Anxiety heightened in medical setting, she is aware of triggers including getting ultrasound or going to clinic.  She is known to this provider as she was seen once in " hospital following stillbirth.    Education provided re benefit of engaging in therapy to decrease anxiety and to receive support.  She expressed interest and understands how to schedule follow up visits.        PLAN:    Patient to schedule followup visit.       Total time spent equals 55 minutes, psychological assessment.  telephone  Start: 2:02

## 2021-11-16 NOTE — ANESTHESIA PREPROCEDURE EVALUATION
Anesthesia Pre-Procedure Evaluation    Patient: aPm Syed   MRN: [id number] : 1990        Preoperative Diagnosis: Hx of cervical incompetence in pregnancy, currently pregnant [O09.299]    Procedure : Procedure(s):  CERCLAGE, CERVIX, VAGINAL APPROACH          Past Medical History:   Diagnosis Date     Depressive disorder     PPD following fetal loss     Recurrent pregnancy loss (CODE)      Severe pre-eclampsia, postpartum condition or complication 2018     Varicella       Past Surgical History:   Procedure Laterality Date     APPENDECTOMY Right      CERCLAGE CERVICAL N/A 3/22/2019    Procedure: CERCLAGE CERVICAL;  Surgeon: Liz Lima MD;  [facility]     CERCLAGE CERVICAL N/A 3/22/2019    Procedure: CERCLAGE CERVICAL;  Surgeon: Liz Lima MD;  [facility]     DILATION AND CURETTAGE SUCTION N/A 2017    Procedure: DILATION AND CURETTAGE SUCTION;  Suction Dilation And Curettage ;  Surgeon: Yolanda Samaniego MD;  [facility]     DILATION AND CURETTAGE SUCTION N/A 2021    Procedure: DILATION AND CURETTAGE, UTERUS, USING SUCTION;  Surgeon: Rosanna Ybarra MD;  [facility]     DILATION AND CURETTAGE, HYSTEROSCOPY DIAGNOSTIC, COMBINED N/A 2017    Procedure: COMBINED DILATION AND CURETTAGE, HYSTEROSCOPY DIAGNOSTIC;  Operative Hysteroscopy, Dilation and Curettage ;  Surgeon: Eli Pickard MD;  [facility] OR     GYN SURGERY  2017    suction D&C      No Known Allergies   Social History     Tobacco Use     Smoking status: Never Smoker     Smokeless tobacco: Never Used   Substance Use Topics     Alcohol use: No      Wt Readings from Last 1 Encounters:   10/27/21 64.4 kg (142 lb)        Anesthesia Evaluation   Pt has had prior anesthetic. Type: Regional.        ROS/MED HX  ENT/Pulmonary:  - neg pulmonary ROS     Neurologic:  - neg neurologic ROS     Cardiovascular: Comment: History of postpartum preeclampsia.     (+) hypertension-----     METS/Exercise Tolerance:     Hematologic:  - neg hematologic  ROS     Musculoskeletal:  - neg musculoskeletal ROS     GI/Hepatic:  - neg GI/hepatic ROS     Renal/Genitourinary:  - neg Renal ROS     Endo:  - neg endo ROS     Psychiatric/Substance Use:     (+) psychiatric history depression     Infectious Disease:  - neg infectious disease ROS     Malignancy:       Other: Comment:  at 15W2d at 21 by 9w4d US.  Has a history of five prior spontaneous abortions and one term IUFD in 2018.  Most recent term delivery was after placement of rescue cerclage at 21w3d when she was found to have funneled cervix on US and dilated to 1cm on digital exam.           Physical Exam    Airway        Mallampati: II       Respiratory Devices and Support         Dental  no notable dental history         Cardiovascular   cardiovascular exam normal          Pulmonary   pulmonary exam normal                OUTSIDE LABS:  CBC:   Lab Results   Component Value Date    WBC 4.2 10/27/2021    WBC 7.3 2021    HGB 12.4 10/27/2021    HGB 11.6 (L) 2021    HCT 35.8 10/27/2021    HCT 34.2 (L) 2021     10/27/2021     2021     BMP:   Lab Results   Component Value Date     10/05/2021     2019    POTASSIUM 3.6 10/05/2021    POTASSIUM 3.5 2019    CHLORIDE 109 10/05/2021    CHLORIDE 105 2019    CO2 25 10/05/2021    CO2 23 2019    BUN 9 10/05/2021    BUN 9 2019    CR 0.60 10/05/2021    CR 0.47 (L) 2019    GLC 87 10/05/2021    GLC 80 2019     COAGS:   Lab Results   Component Value Date    PTT 30 2019    INR 1.06 07/15/2019    FIBR 352 2019     POC:   Lab Results   Component Value Date    BGM 75 2021    HCG Negative 2017     HEPATIC:   Lab Results   Component Value Date    ALBUMIN 2.3 (L) 2018    PROTTOTAL 5.8 (L) 2018    ALT 19 2021    AST 15 2021    ALKPHOS 111 2018    BILITOTAL 0.4 2018      OTHER:   Lab Results   Component Value Date    A1C 5.6 02/01/2019    JHON 9.2 10/05/2021    MAG 5.8 (H) 08/26/2018    TSH 0.88 05/17/2021       Anesthesia Plan    ASA Status:  2      Anesthesia Type: Spinal.              Consents    Anesthesia Plan(s) and associated risks, benefits, and realistic alternatives discussed. Questions answered and patient/representative(s) expressed understanding.    - Discussed:     - Discussed with:  Patient         Postoperative Care    Pain management: IV analgesics, Multi-modal analgesia.   PONV prophylaxis: Ondansetron (or other 5HT-3)     Comments:           H&P reviewed: Unable to attach H&P to encounter due to EHR limitations. H&P Update: appropriate H&P reviewed, patient examined. No interval changes since H&P (within 30 days).         Vitor Daugherty Junior, MD

## 2021-11-17 ENCOUNTER — ANESTHESIA (OUTPATIENT)
Dept: OBGYN | Facility: CLINIC | Age: 31
End: 2021-11-17
Payer: COMMERCIAL

## 2021-11-17 ENCOUNTER — HOSPITAL ENCOUNTER (OUTPATIENT)
Facility: CLINIC | Age: 31
Discharge: HOME | End: 2021-11-17
Attending: OBSTETRICS & GYNECOLOGY | Admitting: OBSTETRICS & GYNECOLOGY
Payer: COMMERCIAL

## 2021-11-17 VITALS
WEIGHT: 141 LBS | BODY MASS INDEX: 24.98 KG/M2 | DIASTOLIC BLOOD PRESSURE: 66 MMHG | HEART RATE: 81 BPM | SYSTOLIC BLOOD PRESSURE: 112 MMHG | RESPIRATION RATE: 16 BRPM | TEMPERATURE: 98.1 F | HEIGHT: 63 IN | OXYGEN SATURATION: 100 %

## 2021-11-17 DIAGNOSIS — O09.299 HX OF CERVICAL INCOMPETENCE IN PREGNANCY, CURRENTLY PREGNANT: ICD-10-CM

## 2021-11-17 LAB
BASOPHILS # BLD AUTO: 0 10E3/UL (ref 0–0.2)
BASOPHILS NFR BLD AUTO: 0 %
CLUE CELLS: ABNORMAL
EOSINOPHIL # BLD AUTO: 0.1 10E3/UL (ref 0–0.7)
EOSINOPHIL NFR BLD AUTO: 1 %
ERYTHROCYTE [DISTWIDTH] IN BLOOD BY AUTOMATED COUNT: 13.5 % (ref 10–15)
HCT VFR BLD AUTO: 33.3 % (ref 35–47)
HGB BLD-MCNC: 11.5 G/DL (ref 11.7–15.7)
IMM GRANULOCYTES # BLD: 0.1 10E3/UL
IMM GRANULOCYTES NFR BLD: 1 %
LYMPHOCYTES # BLD AUTO: 1.4 10E3/UL (ref 0.8–5.3)
LYMPHOCYTES NFR BLD AUTO: 20 %
MCH RBC QN AUTO: 29.6 PG (ref 26.5–33)
MCHC RBC AUTO-ENTMCNC: 34.5 G/DL (ref 31.5–36.5)
MCV RBC AUTO: 86 FL (ref 78–100)
MONOCYTES # BLD AUTO: 0.6 10E3/UL (ref 0–1.3)
MONOCYTES NFR BLD AUTO: 8 %
NEUTROPHILS # BLD AUTO: 5 10E3/UL (ref 1.6–8.3)
NEUTROPHILS NFR BLD AUTO: 70 %
NRBC # BLD AUTO: 0 10E3/UL
NRBC BLD AUTO-RTO: 0 /100
PLATELET # BLD AUTO: 173 10E3/UL (ref 150–450)
RBC # BLD AUTO: 3.89 10E6/UL (ref 3.8–5.2)
TRICHOMONAS, WET PREP: ABNORMAL
WBC # BLD AUTO: 7.1 10E3/UL (ref 4–11)
WBC'S/HIGH POWER FIELD, WET PREP: ABNORMAL
YEAST, WET PREP: ABNORMAL

## 2021-11-17 PROCEDURE — 710N000010 HC RECOVERY PHASE 1, LEVEL 2, PER MIN: Performed by: OBSTETRICS & GYNECOLOGY

## 2021-11-17 PROCEDURE — 250N000011 HC RX IP 250 OP 636

## 2021-11-17 PROCEDURE — 272N000001 HC OR GENERAL SUPPLY STERILE: Performed by: OBSTETRICS & GYNECOLOGY

## 2021-11-17 PROCEDURE — 999N000141 HC STATISTIC PRE-PROCEDURE NURSING ASSESSMENT: Performed by: OBSTETRICS & GYNECOLOGY

## 2021-11-17 PROCEDURE — 85004 AUTOMATED DIFF WBC COUNT: CPT | Performed by: STUDENT IN AN ORGANIZED HEALTH CARE EDUCATION/TRAINING PROGRAM

## 2021-11-17 PROCEDURE — 258N000003 HC RX IP 258 OP 636: Performed by: STUDENT IN AN ORGANIZED HEALTH CARE EDUCATION/TRAINING PROGRAM

## 2021-11-17 PROCEDURE — 87210 SMEAR WET MOUNT SALINE/INK: CPT | Performed by: STUDENT IN AN ORGANIZED HEALTH CARE EDUCATION/TRAINING PROGRAM

## 2021-11-17 PROCEDURE — 370N000017 HC ANESTHESIA TECHNICAL FEE, PER MIN: Performed by: OBSTETRICS & GYNECOLOGY

## 2021-11-17 PROCEDURE — 250N000013 HC RX MED GY IP 250 OP 250 PS 637: Performed by: STUDENT IN AN ORGANIZED HEALTH CARE EDUCATION/TRAINING PROGRAM

## 2021-11-17 PROCEDURE — 87086 URINE CULTURE/COLONY COUNT: CPT | Performed by: STUDENT IN AN ORGANIZED HEALTH CARE EDUCATION/TRAINING PROGRAM

## 2021-11-17 PROCEDURE — 258N000003 HC RX IP 258 OP 636

## 2021-11-17 PROCEDURE — 250N000011 HC RX IP 250 OP 636: Performed by: STUDENT IN AN ORGANIZED HEALTH CARE EDUCATION/TRAINING PROGRAM

## 2021-11-17 PROCEDURE — 360N000074 HC SURGERY LEVEL 1, PER MIN: Performed by: OBSTETRICS & GYNECOLOGY

## 2021-11-17 PROCEDURE — 59320 REVISION OF CERVIX: CPT | Mod: GC | Performed by: OBSTETRICS & GYNECOLOGY

## 2021-11-17 RX ORDER — ACETAMINOPHEN 325 MG/1
650 TABLET ORAL
Status: DISCONTINUED | OUTPATIENT
Start: 2021-11-17 | End: 2021-11-17 | Stop reason: HOSPADM

## 2021-11-17 RX ORDER — NALOXONE HYDROCHLORIDE 0.4 MG/ML
0.2 INJECTION, SOLUTION INTRAMUSCULAR; INTRAVENOUS; SUBCUTANEOUS
Status: DISCONTINUED | OUTPATIENT
Start: 2021-11-17 | End: 2021-11-17

## 2021-11-17 RX ORDER — MEPERIDINE HYDROCHLORIDE 25 MG/ML
12.5 INJECTION INTRAMUSCULAR; INTRAVENOUS; SUBCUTANEOUS
Status: DISCONTINUED | OUTPATIENT
Start: 2021-11-17 | End: 2021-11-17 | Stop reason: HOSPADM

## 2021-11-17 RX ORDER — NALOXONE HYDROCHLORIDE 0.4 MG/ML
0.4 INJECTION, SOLUTION INTRAMUSCULAR; INTRAVENOUS; SUBCUTANEOUS
Status: DISCONTINUED | OUTPATIENT
Start: 2021-11-17 | End: 2021-11-17

## 2021-11-17 RX ORDER — ONDANSETRON 2 MG/ML
4 INJECTION INTRAMUSCULAR; INTRAVENOUS EVERY 30 MIN PRN
Status: DISCONTINUED | OUTPATIENT
Start: 2021-11-17 | End: 2021-11-17 | Stop reason: HOSPADM

## 2021-11-17 RX ORDER — FENTANYL CITRATE-0.9 % NACL/PF 10 MCG/ML
100 PLASTIC BAG, INJECTION (ML) INTRAVENOUS EVERY 5 MIN PRN
Status: DISCONTINUED | OUTPATIENT
Start: 2021-11-17 | End: 2021-11-17

## 2021-11-17 RX ORDER — SODIUM CHLORIDE, SODIUM LACTATE, POTASSIUM CHLORIDE, CALCIUM CHLORIDE 600; 310; 30; 20 MG/100ML; MG/100ML; MG/100ML; MG/100ML
INJECTION, SOLUTION INTRAVENOUS
Status: COMPLETED
Start: 2021-11-17 | End: 2021-11-17

## 2021-11-17 RX ORDER — NALBUPHINE HYDROCHLORIDE 10 MG/ML
2.5-5 INJECTION, SOLUTION INTRAMUSCULAR; INTRAVENOUS; SUBCUTANEOUS EVERY 6 HOURS PRN
Status: DISCONTINUED | OUTPATIENT
Start: 2021-11-17 | End: 2021-11-17

## 2021-11-17 RX ORDER — ONDANSETRON 4 MG/1
4 TABLET, ORALLY DISINTEGRATING ORAL EVERY 30 MIN PRN
Status: DISCONTINUED | OUTPATIENT
Start: 2021-11-17 | End: 2021-11-17 | Stop reason: HOSPADM

## 2021-11-17 RX ORDER — CITRIC ACID/SODIUM CITRATE 334-500MG
30 SOLUTION, ORAL ORAL ONCE
Status: COMPLETED | OUTPATIENT
Start: 2021-11-17 | End: 2021-11-17

## 2021-11-17 RX ORDER — SODIUM CHLORIDE, SODIUM LACTATE, POTASSIUM CHLORIDE, CALCIUM CHLORIDE 600; 310; 30; 20 MG/100ML; MG/100ML; MG/100ML; MG/100ML
INJECTION, SOLUTION INTRAVENOUS CONTINUOUS
Status: DISCONTINUED | OUTPATIENT
Start: 2021-11-17 | End: 2021-11-17 | Stop reason: HOSPADM

## 2021-11-17 RX ORDER — ACETAMINOPHEN 325 MG/1
975 TABLET ORAL ONCE
Status: COMPLETED | OUTPATIENT
Start: 2021-11-17 | End: 2021-11-17

## 2021-11-17 RX ORDER — BUPIVACAINE HYDROCHLORIDE 7.5 MG/ML
INJECTION, SOLUTION INTRASPINAL
Status: COMPLETED | OUTPATIENT
Start: 2021-11-17 | End: 2021-11-17

## 2021-11-17 RX ORDER — CEFAZOLIN SODIUM 1 G/3ML
INJECTION, POWDER, FOR SOLUTION INTRAMUSCULAR; INTRAVENOUS PRN
Status: DISCONTINUED | OUTPATIENT
Start: 2021-11-17 | End: 2021-11-17

## 2021-11-17 RX ORDER — SODIUM CHLORIDE, SODIUM LACTATE, POTASSIUM CHLORIDE, CALCIUM CHLORIDE 600; 310; 30; 20 MG/100ML; MG/100ML; MG/100ML; MG/100ML
INJECTION, SOLUTION INTRAVENOUS CONTINUOUS
Status: DISCONTINUED | OUTPATIENT
Start: 2021-11-17 | End: 2021-11-17

## 2021-11-17 RX ORDER — ONDANSETRON 4 MG/1
4 TABLET, ORALLY DISINTEGRATING ORAL
Status: DISCONTINUED | OUTPATIENT
Start: 2021-11-17 | End: 2021-11-17 | Stop reason: HOSPADM

## 2021-11-17 RX ADMIN — ACETAMINOPHEN 975 MG: 325 TABLET, FILM COATED ORAL at 08:34

## 2021-11-17 RX ADMIN — CEFAZOLIN 2 G: 1 INJECTION, POWDER, FOR SOLUTION INTRAMUSCULAR; INTRAVENOUS at 09:48

## 2021-11-17 RX ADMIN — SODIUM CHLORIDE, POTASSIUM CHLORIDE, SODIUM LACTATE AND CALCIUM CHLORIDE 250 ML: 600; 310; 30; 20 INJECTION, SOLUTION INTRAVENOUS at 08:42

## 2021-11-17 RX ADMIN — SODIUM CHLORIDE, POTASSIUM CHLORIDE, SODIUM LACTATE AND CALCIUM CHLORIDE: 600; 310; 30; 20 INJECTION, SOLUTION INTRAVENOUS at 08:13

## 2021-11-17 RX ADMIN — SODIUM CHLORIDE, POTASSIUM CHLORIDE, SODIUM LACTATE AND CALCIUM CHLORIDE: 600; 310; 30; 20 INJECTION, SOLUTION INTRAVENOUS at 09:00

## 2021-11-17 RX ADMIN — ONDANSETRON 4 MG: 2 INJECTION INTRAMUSCULAR; INTRAVENOUS at 10:17

## 2021-11-17 RX ADMIN — SODIUM CITRATE AND CITRIC ACID MONOHYDRATE 30 ML: 500; 334 SOLUTION ORAL at 08:34

## 2021-11-17 RX ADMIN — BUPIVACAINE HYDROCHLORIDE IN DEXTROSE 1.6 ML: 7.5 INJECTION, SOLUTION SUBARACHNOID at 09:17

## 2021-11-17 ASSESSMENT — MIFFLIN-ST. JEOR: SCORE: 1313.7

## 2021-11-17 NOTE — PLAN OF CARE
Patient has eaten and tolerated lunch. Reports she is doing well and offers no complaints. She would like to wait until 1330 for a bathroom attempt. Plan to discharge home after ambulating and voiding.

## 2021-11-17 NOTE — H&P
[facility]  OB History and Physical      Pam Syed MRN# [id number]   Age: 31 year old YOB: 1990     HPI:  Ms. Pam Syed is a 31 year old  at 15w3d by 9w4d U/S who presents today for a scheduled history-indicated cerclage placement. She has a h/o of rescue cerclage placement at 21 weeks. She reports she is feeling well today. Only concern is that she thinks she may have a yeast infection. Has been experiencing vaginal pruritis and was prescribed a cream prior that she said she used for 1 day and felt better but the symptoms have returned. Denies any abnormal discharge, dysuria, vaginal bleeding, contractions, abdominal pain, fevers/chills. Has felt some flutters of fetal movement. Does take daily ASA for h/o preeclampsia, last took this yesterday.     Pregnancy Complications:  - H/o rescue cerclage at 21 weeks with shortened cervix  - H/o IUFD at 39 weeks  - H/o recurrent pregnancy loss  - H/o post-partum preeclampsia  - H/o post-partum depression     Prenatal Labs:   Lab Results   Component Value Date    ABO O 2021    RH Pos 2021    AS Negative 10/27/2021    HEPBANG Nonreactive 10/27/2021    CHPCRT Negative 2019    GCPCRT Negative 2019    TREPAB Negative 2018    HGB 12.4 10/27/2021     Ultrasounds:     [facility] U/S OB Comprehensive (21):   Cardiac activity present.  bpm.  Fetal movements visualized.  Presentation breech.  Placenta posterior, no previa > 2 cm from internal os .  Umbilical cord 3 vessel cord.  Amniotic fluid Amount of AF: normal amount. MVP 4.6 cm.  EFW 51%ile, 0 lb 4 oz / 107 g  Cervix: appears closed, cervical length 35.5 mm     OB History  # Outcome Date GA Lbr Michael/2nd Weight Sex Delivery Anes PTL Lv   9 Current            8 SAB 2021 10w0d    AB, MISSED         Birth Comments: MAB, baby stopped growing 8 weeks, D & C 11 weeks   7 Term 07/15/19 37w6d 04:04 / 00:09 2.55 kg (5 lb 10 oz) F  Vag-Spont None N RAMILA      Birth Comments: PROM, spontaneous labor, 5 hour labor, epidural, 2nd degree, no PPH      Name: DORITA LIRIANO      Apgar1: 8  Apgar5: 9   6 Term 08/23/18 39w3d 01:16 / 00:20 2.75 kg (6 lb 1 oz) M Vag-Spont None N ND      Birth Comments: IUFD, no cardiac activity on admission prior to delivery, NSVB, intact no PPH, Admitted postpartum for preeclampsia with severe features. She presented to the ED with sudden onset of fatigue, numbness and tingling and shortness of breath.      Complications: Fetal demise > 22 weeks, delivered, current hospitalization      Name: Jay   5 SAB 09/26/17 11w0d    AB, MISSED         Birth Comments: BAby stopped growing 11 weeks, D & C at 15 weeks, NL blood loss   4 SAB 04/2017 5w0d             Birth Comments: SAB. no complication   3 Term 12/03/15 38w5d 03:45 / 01:16 3.11 kg (6 lb 13.7 oz) M Vag-Spont EPI  RAMILA      Birth Comments: PROM, spontaneous labor, 5 hour labor, epidural, 2nd degree, no PPH      Name: Lee Ann      Apgar1: 9  Apgar5: 9   2 SAB 07/2014 5w0d             Birth Comments: SAB no complications   1 SAB 03/2014 5w0d             Birth Comments: SAB, no complications      Obstetric Comments   2018 Severe Pre E Postpartum after term IUFD 39 weeks   2019: shortened cervix with cerclage, delivered at 37/6 weeks, PPH   Recurrent pregnancy loss     PMHx:   Past Medical History:   Diagnosis Date     Depressive disorder     PPD following fetal loss     Recurrent pregnancy loss (CODE)      Severe pre-eclampsia, postpartum condition or complication 8/25/2018     Varicella      PSHx:   Past Surgical History:   Procedure Laterality Date     APPENDECTOMY Right      CERCLAGE CERVICAL N/A 3/22/2019    Procedure: CERCLAGE CERVICAL;  Surgeon: Liz Lima MD;  [facility] L[facility]D     CERCLAGE CERVICAL N/A 3/22/2019    Procedure: CERCLAGE CERVICAL;  Surgeon: Liz Lima MD;  [facility]: [facility] OR     DILATION AND CURETTAGE SUCTION N/A 9/26/2017    Procedure: DILATION AND  "CURETTAGE SUCTION;  Suction Dilation And Curettage ;  Surgeon: Yolanda Samaniego MD;  Location: [facility] OR     DILATION AND CURETTAGE SUCTION N/A 6/4/2021    Procedure: DILATION AND CURETTAGE, UTERUS, USING SUCTION;  Surgeon: Rosanna Ybarra MD;  Location: [facility] OR     DILATION AND CURETTAGE, HYSTEROSCOPY DIAGNOSTIC, COMBINED N/A 11/8/2017    Procedure: COMBINED DILATION AND CURETTAGE, HYSTEROSCOPY DIAGNOSTIC;  Operative Hysteroscopy, Dilation and Curettage ;  Surgeon: Eli Pickard MD;  Location: [facility] OR     GYN SURGERY  09/26/2017    suction D&C     Meds:   Medications Prior to Admission   Medication Sig Dispense Refill Last Dose     aspirin (ASA) 81 MG EC tablet Take 1 tablet (81 mg) by mouth daily 90 tablet 2 11/16/2021 at Unknown time     magnesium 250 MG tablet Take 1 tablet (250 mg) by mouth daily 60 tablet 1 Past Month at Unknown time     progesterone (PROMETRIUM) 200 MG capsule Insert one capsule in the vagina twice daily 60 capsule 1 Past Week at Unknown time     SENNA-docusate sodium (SENNA S) 8.6-50 MG tablet Take 1 tablet by mouth At Bedtime 90 tablet 3 11/16/2021 at Unknown time     ondansetron (ZOFRAN-ODT) 4 MG ODT tab Take 1 tablet (4 mg) by mouth every 8 hours as needed for nausea (Patient not taking: Reported on 10/27/2021) 60 tablet 2      Prenatal Vit-Fe Fumarate-FA (PRENATAL VITAMIN AND MINERAL) 28-0.8 MG TABS Take 1 tablet by mouth daily 90 tablet 3      Allergies:  No Known Allergies     SocHx: She has not used any tobacco, alcohol, or other drug use during this pregnancy.    ROS:   Complete 10-point ROS negative except as noted in HPI.    PE:  Vit:   Patient Vitals for the past 4 hrs:   BP Temp Temp src Pulse Resp Height Weight   11/17/21 0645 120/69 98.4  F (36.9  C) Oral 93 16 1.6 m (5' 3\") 64 kg (141 lb)      Gen: Well-appearing, NAD, comfortable   CV: Regular rate  Pulm: Nonlabored breathing on room air  Abd: Soft, gravid, non-tender  Ext: No LE edema b/l    Assessment and " "Plan:  Ms. Pam Syed is a 31 year old , at 15w3d by 9w4d U/S here today for history-indicated cerclage placement. Pregnancy otherwise complicated by h/o rescue cerclage at 21 weeks with shortened cervix, h/o IUFD at 39 weeks, h/o recurrent pregnancy lost , h/o post-partum preeclampsia, and h/o post-partum depression.    Cerclage Placement   - History-indicated, h/o rescue cerclage at 21 w with shortened cervix   - Pain: Spinal anesthesia  - Pre-op Hgb 11.5   - Type & Screen ordered  - Pt consented for procedure & blood transfusion     Vaginal pruritis:   - wet prep collected, pending     PNC  - Rh + / Ab neg, Rubella immune, GBS unknown  - RPR, Hep B Sag, Hep C Ab, HIV Ab NR     FWB:   - FHT AGA  - Continuous Fetal Monitoring  - Intrauterine resuscitative measures prn    Dispo: Will monitor patient post-operatively, if stable likely discharge home later today.     The patient was discussed with Dr. Orellana who is in agreement with the treatment plan.    Karlee Raphael, MS4  8:11 AM 2021      Maternal-Fetal Medicine Attending Addendum    Late entry, patient seen several hours ago.  Note delayed due to clinical care.    I have discussed the care of Ms. Syed with the medical student/resident/fellow.  Patient seen & examined by me.  Agree with above, I wish to note the following:      HPI: Pt is a 31 year old  15w3d presenting for scheduled cerclage due to history of exam indicated cerclage in prior pregnancy.      O: /66   Pulse 81   Temp 98.1  F (36.7  C) (Oral)   Resp 16   Ht 1.6 m (5' 3\")   Wt 64 kg (141 lb)   SpO2 100%   BMI 24.98 kg/m    GEN:  NAD  ABD: gravid, NT  FHT:  150s    Results for orders placed or performed during the hospital encounter of 21 (from the past 24 hour(s))   CBC with platelets and differential   Result Value Ref Range    WBC Count 7.1 4.0 - 11.0 10e3/uL    RBC Count 3.89 3.80 - 5.20 10e6/uL    Hemoglobin 11.5 (L) 11.7 - 15.7 g/dL    Hematocrit " 33.3 (L) 35.0 - 47.0 %    MCV 86 78 - 100 fL    MCH 29.6 26.5 - 33.0 pg    MCHC 34.5 31.5 - 36.5 g/dL    RDW 13.5 10.0 - 15.0 %    Platelet Count 173 150 - 450 10e3/uL    % Neutrophils 70 %    % Lymphocytes 20 %    % Monocytes 8 %    % Eosinophils 1 %    % Basophils 0 %    % Immature Granulocytes 1 %    NRBCs per 100 WBC 0 <1 /100    Absolute Neutrophils 5.0 1.6 - 8.3 10e3/uL    Absolute Lymphocytes 1.4 0.8 - 5.3 10e3/uL    Absolute Monocytes 0.6 0.0 - 1.3 10e3/uL    Absolute Eosinophils 0.1 0.0 - 0.7 10e3/uL    Absolute Basophils 0.0 0.0 - 0.2 10e3/uL    Absolute Immature Granulocytes 0.1 (H) <=0.0 10e3/uL    Absolute NRBCs 0.0 10e3/uL   Wet prep    Specimen: Vagina; Swab   Result Value Ref Range    Trichomonas Absent Absent    Yeast Absent Absent    Clue Cells Absent Absent    WBCs/high power field 2+ (A) None     A/P: Pt is a 31 year old  15w3d presenting for prophylactic cervical cerclage.  Reviewed risks of prophylactic cerclage including bleeding, infection, damage to surrounding structures (bladder, bowel), damage to the cervix itself, possible higher risk of  delivery, failure to prevent previable, desire-viable or  birth, suture migration, PPROM.  We discussed alternatives and the patient wished to proceed.   Informed consent was obtained.  To OR when available.     Stella Orellana MD  , OB/GYN  Maternal-Fetal Medicine  [email or web address]  [phone] (Main [facility] Office)  [phone] or [phone] (for 24 hour [facility] questions)  [phone] (Pager)      Time Spent on this Encounter   I spent a total 30 minutes on the encounter with Pam Syed today   More than 50% of my time was spent on counseling and/or coordination of care   - Counseling the patient and/or family regarding: diagnosis, diagnostic results, prognosis and risks and benefits of management options  - Coordination of care with the: nurse and patient and her partner    Date of service (when I saw the  patient): November 17, 2021

## 2021-11-17 NOTE — PROGRESS NOTES
CNM Courtesy Visit.   Pt and partner doing well. Feels relieved after cerclage placement and knowledge that baby is okay.  Had therapy visit yesterday, which she found helpful.  Neither her nor partner have questions or any unmet needs at this time.   Scheduled for OB follow-up in clinic.     ASHIA Gipson CNM

## 2021-11-17 NOTE — ANESTHESIA CARE TRANSFER NOTE
Patient: Pam Syed    Procedure: Procedure(s):  CERCLAGE, CERVIX, VAGINAL APPROACH       Diagnosis: Hx of cervical incompetence in pregnancy, currently pregnant [O09.299]  Diagnosis Additional Information: No value filed.    Anesthesia Type:   Spinal     Note:    Oropharynx: oropharynx clear of all foreign objects  Level of Consciousness: awake  Oxygen Supplementation: room air    Independent Airway: airway patency satisfactory and stable  Dentition: dentition unchanged    Report to RN Given: handoff report given  Patient transferred to: Labor and Delivery  Comments: VSS. Denies pain. All questions answered to RN.   Handoff Report: Identifed the Patient, Identified the Reponsible Provider, Reviewed the pertinent medical history, Discussed the surgical course, Reviewed Intra-OP anesthesia mangement and issues during anesthesia, Set expectations for post-procedure period and Allowed opportunity for questions and acknowledgement of understanding      Vitals:  Vitals Value Taken Time   BP     Temp     Pulse     Resp     SpO2         Electronically Signed By: Ray Ca MD  November 17, 2021  10:07 AM

## 2021-11-17 NOTE — PLAN OF CARE
Data: Pt to OB PACU at 1001 via cart. PIV infusing without complications, pt denies any pain, reports some nausea.  Interventions: IV to pump, monitors and alarms on, SCD on.  Response: stable.  Plan: Patient instructed to notify RN for pain or nausea, routine post op cares.

## 2021-11-17 NOTE — OP NOTE
Operative Note: Cervical Cerclage         Pre-Op Diagnosis:     1) Single intrauterine pregnancy at 15w3d  2) History of cervical insufficiency         Post-Op Diagnosis:     Same         Procedure:     Forrest cervical cerclage, 1 suture (knot at 12 o'clock position)         Surgeons:     Attending: Stella Orellana MD  Fellow: JOSE Lovelace Fellow  Resident: Jesus Timmons, PGY-3  Medical Student: Karlee Raphael MS4         Anesthesia:     Spinal          Estimated Blood Loss:     <5cc         Findings:     1) Visibly short multiparous cervix with internal os dilated to fingertip before procedure  2) Cervix closed after placement of cerclage         Specimens:     Urine for culture via straight cath         Complications:     None apparent          History:     Ms. Pam Syed is a 31 year old  at 15w3d by 9w4d U/S who is admitted a scheduled history-indicated cerclage placement. Her pregnancy is complicated by previous history of pregnancy requiring rescue cerclage at 21w3d gestation and resulting in subsequent term delivery. After counseling regarding her history and the risks and benefits of different management strategies, the patient has decided to proceed with history indicated cerclage.         Details of Procedure:     After informed consent was obtained, the patient was brought to the operating room, where adequate spinal anesthesia was administered. She was placed in the dorsal lithotomy position and prepped and draped in the usual sterile fashion. Surgical time out was performed.      After a weighted speculum was placed the cervix and vagina were again prepped with betadine under direct visualization.  The cervix was noted to be relatively short visibly short and dilated to fingertip on digital examination. Two grams of Ancef was therefore given for surgical prophylaxis. Previous cerclage scars were readily visible on the anterior and posterior aspects of the cervix with a small  laceration at 6 o'clock.    The bladder was drained of clear yellow urine.  Next, the anterior lip of the cervix was grasped with a ring forcep.  A circumferential suture of 5 mm Mersilene was placed in the usual fashion at the cervicovaginal reflection with knot tied at 12 o'clock position.   The suture was securely tied down and digital exam confirmed that the cervix was closed.    A rectal exam confirmed that no sutures were present in the rectum. The cervix and vaginal vault were inspected and noted to be free of injury and hemostatic.      The instruments were removed from the vagina. She tolerated her spinal anesthesia well without incident. She was subsequently transferred to the recovery room in satisfactory condition.    Sponge and needle counts were correct at the close of the case.    Angelica Jordan MD  Maternal Fetal Medicine Fellow  11/17/2021 10:05 AM      Attending Addendum    I was present and scrubbed and performed with the above described surgery with Dr. Jordan.  I agree with the above documentation.  The surgery was indicated and there were no apparent complications.        Stella Orellana MD  , OB/GYN  Maternal-Fetal Medicine  [email or web address]  [phone] (Academic office)  [phone] (Pager)

## 2021-11-17 NOTE — PLAN OF CARE
Pam Syed presents for scheduled cerclage. Gestational Age: 15w3d  . Patient denies contractions, bleeding or LOF. NPO since 2330 on .    Dr. Jordan and Dr. Timmons notified of patient's arrival and condition.    FHT: 150 bpm per doptones.     Plan:  -Cerclage at 0830.

## 2021-11-17 NOTE — PROGRESS NOTES
MFM Progress Note    Subjective:   Resting comfortably in bed. Tolerating diet without difficulty.  Ambulating. Voiding. Denies any cramping/contraction, leakage of fluid, vaginal bleeding.      Objective:  Vitals:    21 1100 21 1133 21 1134 21 1230   BP: 109/66  106/65 112/66   BP Location:       Pulse: 81      Resp: 16  16 16   Temp: 98.1  F (36.7  C)      TempSrc: Oral      SpO2:  100% 100% 100%   Weight:       Height:           I/O last 3 completed shifts:  In: 1250 [P.O.:250; I.V.:1000]  Out: 500 [Urine:500]    Gen: Resting comfortably in bed, NAD  CV: RRR, no murmurs  Resp: CTAB, no wheezes, no crackles  Abd: Gravid, non-tender, non-distended  Ext: non-tender, no edema    Doptones: 140s    Assessment/Plan:   Pam Syed is a 31 year old  at 15w3d here for history indicated cerclage.    - Tolerated procedure without incident.  - No concerns for complication.  - Meeting post-operative goals.  - Discharge to home.    Angelica Jordan MD  Maternal Fetal Medicine Fellow  2021 3:54 PM

## 2021-11-17 NOTE — ANESTHESIA PROCEDURE NOTES
Intrathecal injection Procedure Note    Pre-Procedure   Staff -        Anesthesiologist:  Imelda Black MD       Resident/Fellow: Ray Ca MD       Performed By: resident       Location: OB       Procedure Start/Stop Times: 11/17/2021 9:17 AM       Pre-Anesthestic Checklist: patient identified, IV checked, risks and benefits discussed, informed consent, monitors and equipment checked, pre-op evaluation, at physician/surgeon's request and post-op pain management  Timeout:       Correct Patient: Yes        Correct Procedure: Yes        Correct Site: Yes        Correct Position: Yes   Procedure Documentation  Procedure: intrathecal injection       Patient Position: sitting       Skin prep: Chloraprep       Insertion Site: L3-4. (midline approach).       Needle Gauge: 25.        Spinal Needle Type: Pencan       Introducer used    Assessment/Narrative         Paresthesias: No.       CSF fluid: clear.    Medication(s) Administered   0.75% Hyperbaric Bupivacaine (Intrathecal), 1.6 mL

## 2021-11-17 NOTE — DISCHARGE INSTRUCTIONS
Discharge Instruction for Undelivered Patients      You were seen for: cervical cerclage  We Consulted: Dr Jordan and Dr Orellana  You had (Test or Medicine): cerclage placement, urine culture, wet prep     Diet:   Drink 8 to 12 glasses of liquids (milk, juice, water) every day.  You may eat meals and snacks.     Activity:  Rest the pelvic area. No sex. Do not stimulate breasts or nipples.     Call your provider if you notice:    Signs of bladder infection: pain when you urinate (use the toilet), need to go more often and more urgently.  The bag of cavanaugh (rupture of membranes) breaks, or you notice leaking in your underwear.  Bright red blood in your underwear.  Abdominal (lower belly) or stomach pain.  Increase or change in vaginal discharge (note the color and amount)    Follow-up:  As scheduled in the clinic

## 2021-11-17 NOTE — ANESTHESIA POSTPROCEDURE EVALUATION
Patient: Pam Syed    Procedure: Procedure(s):  CERCLAGE, CERVIX, VAGINAL APPROACH       Diagnosis:Hx of cervical incompetence in pregnancy, currently pregnant [O09.299]  Diagnosis Additional Information: No value filed.    Anesthesia Type:  Spinal    Note:  Disposition: Inpatient   Postop Pain Control: Uneventful            Sign Out: Well controlled pain   PONV: No   Neuro/Psych: Uneventful            Sign Out: Acceptable/Baseline neuro status   Airway/Respiratory: Uneventful            Sign Out: Acceptable/Baseline resp. status   CV/Hemodynamics: Uneventful            Sign Out: Acceptable CV status; No obvious hypovolemia; No obvious fluid overload   Other NRE: NONE   DID A NON-ROUTINE EVENT OCCUR? No           Last vitals:  Vitals Value Taken Time   /66 11/17/21 1100   Temp 36.7  C (98.1  F) 11/17/21 1100   Pulse 81 11/17/21 1100   Resp 16 11/17/21 1100   SpO2 100 % 11/17/21 1048   Vitals shown include unvalidated device data.    Electronically Signed By: Imelda Black MD  November 17, 2021  11:36 AM

## 2021-11-19 LAB — BACTERIA UR CULT: NO GROWTH

## 2021-12-03 ENCOUNTER — HOSPITAL ENCOUNTER (OUTPATIENT)
Facility: CLINIC | Age: 31
End: 2021-12-03
Admitting: OBSTETRICS & GYNECOLOGY
Payer: COMMERCIAL

## 2021-12-03 ENCOUNTER — HOSPITAL ENCOUNTER (OUTPATIENT)
Facility: CLINIC | Age: 31
Discharge: HOME | End: 2021-12-03
Attending: ADVANCED PRACTICE MIDWIFE | Admitting: OBSTETRICS & GYNECOLOGY
Payer: COMMERCIAL

## 2021-12-03 ENCOUNTER — MYC MEDICAL ADVICE (OUTPATIENT)
Dept: OBGYN | Facility: CLINIC | Age: 31
End: 2021-12-03
Payer: COMMERCIAL

## 2021-12-03 VITALS
RESPIRATION RATE: 18 BRPM | WEIGHT: 138 LBS | SYSTOLIC BLOOD PRESSURE: 118 MMHG | BODY MASS INDEX: 23.56 KG/M2 | HEIGHT: 64 IN | DIASTOLIC BLOOD PRESSURE: 81 MMHG | TEMPERATURE: 97.9 F

## 2021-12-03 DIAGNOSIS — B37.31 YEAST INFECTION OF THE VAGINA: Primary | ICD-10-CM

## 2021-12-03 PROBLEM — R10.2 PELVIC PRESSURE IN FEMALE: Status: ACTIVE | Noted: 2021-12-03

## 2021-12-03 LAB
ALBUMIN UR-MCNC: 10 MG/DL
APPEARANCE UR: CLEAR
BILIRUB UR QL STRIP: NEGATIVE
CLUE CELLS: ABNORMAL
COLOR UR AUTO: YELLOW
GLUCOSE UR STRIP-MCNC: NEGATIVE MG/DL
HGB UR QL STRIP: NEGATIVE
KETONES UR STRIP-MCNC: ABNORMAL MG/DL
LEUKOCYTE ESTERASE UR QL STRIP: NEGATIVE
MUCOUS THREADS #/AREA URNS LPF: PRESENT /LPF
NITRATE UR QL: NEGATIVE
PH UR STRIP: 5.5 [PH] (ref 5–7)
RBC URINE: 1 /HPF
SP GR UR STRIP: 1.03 (ref 1–1.03)
SQUAMOUS EPITHELIAL: 4 /HPF
TRICHOMONAS, WET PREP: ABNORMAL
UROBILINOGEN UR STRIP-MCNC: 2 MG/DL
WBC URINE: 1 /HPF
WBC'S/HIGH POWER FIELD, WET PREP: ABNORMAL
YEAST, WET PREP: PRESENT

## 2021-12-03 PROCEDURE — 87210 SMEAR WET MOUNT SALINE/INK: CPT | Performed by: ADVANCED PRACTICE MIDWIFE

## 2021-12-03 PROCEDURE — 87491 CHLMYD TRACH DNA AMP PROBE: CPT | Performed by: ADVANCED PRACTICE MIDWIFE

## 2021-12-03 PROCEDURE — G0463 HOSPITAL OUTPT CLINIC VISIT: HCPCS

## 2021-12-03 PROCEDURE — 87591 N.GONORRHOEAE DNA AMP PROB: CPT | Performed by: ADVANCED PRACTICE MIDWIFE

## 2021-12-03 PROCEDURE — 87086 URINE CULTURE/COLONY COUNT: CPT | Performed by: STUDENT IN AN ORGANIZED HEALTH CARE EDUCATION/TRAINING PROGRAM

## 2021-12-03 PROCEDURE — 99214 OFFICE O/P EST MOD 30 MIN: CPT | Performed by: ADVANCED PRACTICE MIDWIFE

## 2021-12-03 PROCEDURE — 81001 URINALYSIS AUTO W/SCOPE: CPT | Performed by: STUDENT IN AN ORGANIZED HEALTH CARE EDUCATION/TRAINING PROGRAM

## 2021-12-03 RX ORDER — ONDANSETRON 4 MG/1
4 TABLET, ORALLY DISINTEGRATING ORAL EVERY 6 HOURS PRN
Status: DISCONTINUED | OUTPATIENT
Start: 2021-12-03 | End: 2021-12-03 | Stop reason: HOSPADM

## 2021-12-03 RX ORDER — SWAB
1 SWAB, NON-MEDICATED MISCELLANEOUS DAILY
COMMUNITY
End: 2024-05-12

## 2021-12-03 RX ORDER — PROCHLORPERAZINE MALEATE 10 MG
10 TABLET ORAL EVERY 6 HOURS PRN
Status: DISCONTINUED | OUTPATIENT
Start: 2021-12-03 | End: 2021-12-03 | Stop reason: HOSPADM

## 2021-12-03 RX ORDER — METOCLOPRAMIDE 10 MG/1
10 TABLET ORAL EVERY 6 HOURS PRN
Status: DISCONTINUED | OUTPATIENT
Start: 2021-12-03 | End: 2021-12-03 | Stop reason: HOSPADM

## 2021-12-03 RX ORDER — PROCHLORPERAZINE 25 MG
25 SUPPOSITORY, RECTAL RECTAL EVERY 12 HOURS PRN
Status: DISCONTINUED | OUTPATIENT
Start: 2021-12-03 | End: 2021-12-03 | Stop reason: HOSPADM

## 2021-12-03 RX ORDER — ACETAMINOPHEN 325 MG/1
650 TABLET ORAL EVERY 4 HOURS PRN
Status: DISCONTINUED | OUTPATIENT
Start: 2021-12-03 | End: 2021-12-03 | Stop reason: HOSPADM

## 2021-12-03 RX ORDER — ONDANSETRON 2 MG/ML
4 INJECTION INTRAMUSCULAR; INTRAVENOUS EVERY 6 HOURS PRN
Status: DISCONTINUED | OUTPATIENT
Start: 2021-12-03 | End: 2021-12-03 | Stop reason: HOSPADM

## 2021-12-03 RX ORDER — METOCLOPRAMIDE HYDROCHLORIDE 5 MG/ML
10 INJECTION INTRAMUSCULAR; INTRAVENOUS EVERY 6 HOURS PRN
Status: DISCONTINUED | OUTPATIENT
Start: 2021-12-03 | End: 2021-12-03 | Stop reason: HOSPADM

## 2021-12-03 ASSESSMENT — MIFFLIN-ST. JEOR: SCORE: 1315.96

## 2021-12-03 NOTE — TELEPHONE ENCOUNTER
"Patient states that she was doing some \"heavier\" activity a couple of days ago, and started having increased vaginal pressure.  This continued yesterday, improving with rest.  She reports that the pressure is still present today if she is on her feet for long, but is much better than it was yesterday.    [facility] with [facility] and [facility] nurse.  Paged CNM, but no response yet.    Advised Pam to go to [facility] to evaluate the cerclage/cervix.  Pt indicated understanding and agreed with plan.    "

## 2021-12-04 LAB
BACTERIA UR CULT: NORMAL
C TRACH DNA SPEC QL NAA+PROBE: NEGATIVE
N GONORRHOEA DNA SPEC QL NAA+PROBE: NEGATIVE

## 2021-12-04 NOTE — PROVIDER NOTIFICATION
12/03/21 1805   Provider Notification   Provider Name/Title G3   Method of Notification Electronic Page   Request Evaluate in Person   Notification Reason Patient Arrived   17.5. Pt had cerclage on 11/17. Reports pelvic pressure. Also reporting Urinary frequency and urgency, and feeling like she still has to pee after bladder is emptied.

## 2021-12-04 NOTE — DISCHARGE INSTRUCTIONS
Discharge Instruction for Undelivered Patients      You were seen for: Labor Assessment  We Consulted: Justice Cosby CNM  You had (Test or Medicine): Labs, speculum exam     Diet:   Drink 8 to 12 glasses of liquids (milk, juice, water) every day.      Activity:  Call your doctor or nurse midwife if your baby is moving less than usual.     Call your provider if you notice:  Swelling in your face or increased swelling in your hands or legs.  Headaches that are not relieved by Tylenol (acetaminophen).  Changes in your vision (blurring: seeing spots or stars.)  Nausea (sick to your stomach) and vomiting (throwing up).   Weight gain of 5 pounds or more per week.  Heartburn that doesn't go away.  Signs of bladder infection: pain when you urinate (use the toilet), need to go more often and more urgently.  The bag of cavanaugh (rupture of membranes) breaks, or you notice leaking in your underwear.  Bright red blood in your underwear.  Abdominal (lower belly) or stomach pain.  *If less than 34 weeks: Contractions (tightening) more than 6 times in one hour.  Increase or change in vaginal discharge (note the color and amount)  Other: If your feelings of uterine pressure worsen.    Follow-up:  As scheduled in the clinic

## 2021-12-04 NOTE — PLAN OF CARE
Data: [facility] at 1752.   Reason for maternal/fetal assessment per patient is Pelvic Pain (pressure)  . Patient is a . Prenatal record reviewed.      OB History    Para Term  AB Living   9 3 3 0 5 2   SAB IAB Ectopic Multiple Live Births   5 0 0 0 3      # Outcome Date GA Lbr Michael/2nd Weight Sex Delivery Anes PTL Lv   9 Current            8 SAB 2021 10w0d    AB, MISSED         Birth Comments: MAB, baby stopped growing 8 weeks, D & C 11 weeks   7 Term 07/15/19 37w6d 04:04 / 00:09 2.55 kg (5 lb 10 oz) F Vag-Spont None N RAMILA      Birth Comments: PROM, spontaneous labor, 5 hour labor, epidural, 2nd degree, no PPH      Name: DORITA LIRIANO      Apgar1: 8  Apgar5: 9   6 Term 18 39w3d 01:16 / 00:20 2.75 kg (6 lb 1 oz) M Vag-Spont None N ND      Birth Comments: IUFD, no cardiac activity on admission prior to delivery, NSVB, intact no PPH, Admitted postpartum for preeclampsia with severe features. She presented to the ED with sudden onset of fatigue, numbness and tingling and shortness of breath.      Complications: Fetal demise > 22 weeks, delivered, current hospitalization      Name: Jay Baker SAB 17 11w0d    AB, MISSED         Birth Comments: BAby stopped growing 11 weeks, D & C at 15 weeks, NL blood loss   4 SAB 2017 5w0d             Birth Comments: SAB. no complication   3 Term 12/03/15 38w5d 03:45 / 01:16 3.11 kg (6 lb 13.7 oz) M Vag-Spont EPI  RAMILA      Birth Comments: PROM, spontaneous labor, 5 hour labor, epidural, 2nd degree, no PPH      Name: Lee Ann      Apgar1: 9  Apgar5: 9   2 2014 5w0d             Birth Comments: SAB no complications   1 2014 5w0d             Birth Comments: SAB, no complications      Obstetric Comments   2018 Severe Pre E Postpartum after term IUFD 39 weeks   2019: shortened cervix with cerclage, delivered at 37/6 weeks, PPH   Recurrent pregnancy loss      Medical History:   Past Medical History:   Diagnosis Date      Depressive disorder     PPD following fetal loss     Recurrent pregnancy loss (CODE)      Severe pre-eclampsia, postpartum condition or complication 8/25/2018     Varicella    . Gestational Age 17w5d. VSS. Cervix: closed per CNM.  Fetal movement present. Patient denies cramping, backache, vaginal discharge, UTI symptoms, GI problems, bloody show, vaginal bleeding, edema, headache, visual disturbances, epigastric or URQ pain, abdominal pain, rupture of membranes. Support persons Nilson present.  Complains of lower abdominal pressure when she's standing, lower back pain.  Worse over last couple of days.  Action: Triage assessment completed. Dopptones auscultated by Donna WATKINS RN. Uterine assessment per toco.  Patient education pamphlets given on general undelivered patient precautions. Patient instructed to report change in fetal movement, vaginal leaking of fluid or bleeding, abdominal pain, or any concerns related to the pregnancy to her nurse/physician.   Response: MARY Cosby CNM informed of patient arrival and complaints. Plan per provider is discharge to home. Patient verbalized understanding of education and verbalized agreement with plan. Discharged ambulatory at 2042.

## 2021-12-04 NOTE — PROGRESS NOTES
HOSPITAL TRIAGE NOTE  ===================    CHIEF COMPLAINT  ========================  Pam Syed is a 31 year old patient presenting today at 17w5d for evaluation of pelvic pressure.    No LMP recorded (lmp unknown). Patient is pregnant.  Estimated Date of Delivery: May 8, 2022     HPI  ==================   Pt reports increased pelvic pressure over past couple days when standing, walking, or on feet.  Feels she was a little more active than normal. Pressure resolves when at rest.  Denies abdominal pain. Denies cramping or contractions. Denies leaking of fluid or vaginal bleeding. Denies abnormal discharge, itching, irritation or odor. Has felt normal fetal movement.     Prenatal record and labs reviewed from [facility], through [email or web address] EMR.    CONTRACTIONS: none  ABDOMINAL PAIN: none  FETAL MOVEMENT: active    VAGINAL BLEEDING: none  RUPTURE OF MEMBRANES: no  PELVIC PAIN: pressure    PREGNANCY COMPLICATIONS: hx of IUFD, postpartum pre-e, shortened cervix with cerclage in place  OTHER: recurrent loss    REVIEW OF SYSTEMS  =====================  C: NEGATIVE for fever, chills  I: NEGATIVE for worrisome rashes, moles or lesions  E: NEGATIVE for vision changes or irritation  R: NEGATIVE for significant cough or SOB  CV: NEGATIVE for chest pain, palpitations or varicosities  GI: NEGATIVE for nausea, abdominal pain, heartburn, or change in bowel habits  : NEGATIVE for frequency, dysuria, or hematuria  M: NEGATIVE for significant arthralgias or myalgia  N: NEGATIVE for headache, weakness, dizziness or paresthesias  P: NEGATIVE for changes in mood or affect    PROBLEM LIST  ===============  Patient Active Problem List    Diagnosis Date Noted     Pelvic pressure in female 2021     Priority: Medium     Hx of cervical incompetence in pregnancy, currently pregnant 2021     Priority: Medium     Supervision of high risk pregnancy in first trimester 10/26/2021     Priority: Medium      [facility] CNM pt  Partner's name: Nilson  '[address] [place], ' Jay (IUFD), ' Beatrice  Recurrent pregnancy loss, IUFD, hx of shortened cervix and cerclage placement  [x] NOB folder  [x] Dating  [x] First tri screen; declined  [x] QS/AFP declined  [x] Fetal anatomy US ordered  [X] Rubella immune  [X] Varicella immune   [X] Hep B nonimmune  [ ] Pap 10/2018 pap NIL, prefers repeat after birth  [x] Started ASA   [x] NO utox in labor   [ ] COVID vaccine completed  _____________________________________  [ ] EOB folder  [ ] PP Contraception plan: If tubal,consent date:  [ ] Labor plans:  [ ] :  [ ] Infant feeding plan  [ ] FLU shot   [ ] TDAP given  [ ] Rhogam if needed, date:  [ ] TOLAC consent done  [ ] Waterbirth declines, consent done  [ ] GCT, passed  ________________________________________  [ ] OTC PP meds sent  [ ] Planning CS-ERAS pkt         Nausea/vomiting in pregnancy 10/05/2021     Priority: Medium     IV hydration orders placed  Zofran  B6/Unisom       History of IUFD 2021     Priority: Medium     18- IUFD boy from placental abruption.  18: placenta pathology reviewed. Hematoma infarct noted, see formal report.    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  - Urine culture every trimester with aggressive treatment of bacteriuria.       History of severe pre-eclampsia 2018     Priority: Medium     : baseline labs  Recommendations:  -  Continue taking prophylactic aspirin                                        - Recommend baseline HELLP labs                                           - Recommend taking prenatal vitamin                                         - Patient can discontinue progesterone since she has maintained pregnancy to the second trimester already                -  testing with weekly BPP should occur starting at 32 weeks.            Vitamin D deficiency 2018     Priority: Medium     21: 11, pls discuss at next visit, start 5,000 international unit(s) daily--Rx sent       History of recurrent  2018     Priority: Medium     X6. Taking prometrium         HISTORIES  ==============  ALLERGIES:    No Known Allergies  PAST MEDICAL HISTORY  Past Medical History:   Diagnosis Date     Depressive disorder     PPD following fetal loss     Recurrent pregnancy loss (CODE)      Severe pre-eclampsia, postpartum condition or complication 2018     Varicella      SOCIAL HISTORY  Social History     Socioeconomic History     Marital status:      Spouse name: Nlison     Number of children: 3     Years of education: Not on file     Highest education level: Not on file   Occupational History     Occupation: group home, admin, part time   Tobacco Use     Smoking status: Never Smoker     Smokeless tobacco: Never Used   Substance and Sexual Activity     Alcohol use: No     Drug use: No     Sexual activity: Yes     Partners: Male   Other Topics Concern     Not on file   Social History Narrative    How much exercise per week? Active with kids    How much calcium per day? Some foods       How much caffeine per day? none    How much vitamin D per day? none    Do you/your family wear seatbelts?  Yes    Do you/your family use safety helmets? Yes    Do you/your family use sunscreen? Yes    Do you/your family keep firearms in the home? No    Do you/your family have a smoke detector(s)? Yes        May 17, 2021 Nile Huertas  LPN         Social Determinants of Health     Financial Resource Strain: Low Risk      Difficulty of Paying Living Expenses: Not hard at all   Food Insecurity: No Food Insecurity     Worried About Running Out of Food in the Last Year: Never true     Ran Out of Food in the Last Year: Never true   Transportation Needs: No Transportation Needs     Lack of Transportation (Medical): No     Lack of Transportation (Non-Medical): No   Physical Activity: Inactive     Days of Exercise per Week: 0 days     Minutes of Exercise per Session: 0 min   Stress: No Stress Concern Present     Feeling of Stress : Only a little   Social Connections: Not on file   Intimate Partner Violence: Not on file   Housing Stability: Not on file     PARTNER: Nilson    FAMILY HISTORY  Family History   Problem Relation Age of Onset     Asthma Mother      Hypertension Father      Cancer Father         pancreatic     Diabetes No family hx of      Breast Cancer No family hx of      Colon Cancer No family hx of      OB HISTORY  OB History    Para Term  AB Living   9 3 3 0 5 2   SAB IAB Ectopic Multiple Live Births   5 0 0 0 3      # Outcome Date GA Lbr Michael/2nd Weight Sex Delivery Anes PTL Lv   9 Current            8 SAB 2021 10w0d    AB, MISSED         Birth Comments: MAB, baby stopped growing 8 weeks, D & C 11 weeks   7 Term 07/15/19 37w6d 04:04 / 00:09 2.55 kg (5 lb 10 oz) F Vag-Spont None N RAMILA      Birth Comments: PROM, spontaneous labor, 5 hour labor, epidural, 2nd degree, no PPH      Name: DEANDRAFEMALEMARCO      Apgar1: 8  Apgar5: 9   6 Term 18 39w3d 01:16 / 00:20 2.75 kg (6 lb 1 oz) M Vag-Spont None N ND      Birth Comments: IUFD, no cardiac activity on admission prior to delivery, NSVB, intact no PPH, Admitted postpartum for preeclampsia with severe features. She presented to the ED with sudden onset of fatigue, numbness and tingling and shortness of breath.      Complications: Fetal demise > 22 weeks, delivered, current  "hospitalization      Name: Jay   5 SAB 09/26/17 11w0d    AB, MISSED         Birth Comments: BAby stopped growing 11 weeks, D & C at 15 weeks, NL blood loss   4 SAB 04/2017 5w0d             Birth Comments: SAB. no complication   3 Term 12/03/15 38w5d 03:45 / 01:16 3.11 kg (6 lb 13.7 oz) M Vag-Spont EPI  RAMILA      Birth Comments: PROM, spontaneous labor, 5 hour labor, epidural, 2nd degree, no PPH      Name: Lee Ann      Apgar1: 9  Apgar5: 9   2 SAB 07/2014 5w0d             Birth Comments: SAB no complications   1 SAB 03/2014 5w0d             Birth Comments: SAB, no complications      Obstetric Comments   2018 Severe Pre E Postpartum after term IUFD 39 weeks   2019: shortened cervix with cerclage, delivered at 37/6 weeks, PPH   Recurrent pregnancy loss     Prenatal Labs:   Lab Results   Component Value Date    ABO O 06/04/2021    RH Pos 06/04/2021    AS Negative 10/27/2021    HEPBANG Nonreactive 10/27/2021    TREPAB Negative 01/16/2018    RUQIGG Positive (A) 10/27/2021    HGB 11.5 (L) 11/17/2021     Rubella- immune  ULTRASOUND(s) reviewed: wnl    EXAM  ============  /81   Temp 97.9  F (36.6  C) (Oral)   Resp 18   Ht 1.626 m (5' 4\")   Wt 62.6 kg (138 lb)   LMP  (LMP Unknown)   BMI 23.69 kg/m    GENERAL APPEARANCE: healthy, alert and no distress  RESP: normal respiratory effort  CV: regular rates and rhythm  ABDOMEN:  soft, nontender, no epigastric pain  SKIN: no suspicious lesions or rashes  NEURO: Denies headache, blurred vision, other vision changes  PSYCH: mentation appears normal. and affect normal/bright  MS/ LEGS: Reflexes normal bilaterally    CONTRACTIONS: none   FETAL HEART TONES: + FHTs with doppler  NST: NOT DONE    SSE: cervix appears closed, knot at 12 o'clock  PELVIC EXAM: cervix closed    PRESENTATION: did not assess  BLOOD: no  DISCHARGE: clear to white discharge    ROM: no  ROMPLUS: not done    LABS: UA, UC, Wet prep and GC/ Chlamydia  Lab results reviewed- UA wnl, wet prep + yeast; UC and " GC/CT pending    DIAGNOSIS  ============  17w5d [facility] Triage not in  labor  NST: NOT DONE    Patient Active Problem List   Diagnosis     History of recurrent      Vitamin D deficiency     History of severe pre-eclampsia     History of cervical cerclage     History of IUFD     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 Pt verbalized understanding and agreement.   Discussed physiologic changes in pregnancy and potential other causes of increased pressure in pregnancy.  Reviewed relief measures, including maternity belt.  Discharge to home with PTL instructions per discharge instruction form  Call or return to the [facility] with contractions, cramping, abdominal or pelvic pain, vaginal bleeding, leaking fluid or decreased fetal movement.  Follow up at your next clinic visit- 21    ASHIA Gipson CNM

## 2021-12-07 ENCOUNTER — OFFICE VISIT (OUTPATIENT)
Dept: OBGYN | Facility: CLINIC | Age: 31
End: 2021-12-07
Attending: ADVANCED PRACTICE MIDWIFE
Payer: COMMERCIAL

## 2021-12-07 VITALS
WEIGHT: 150 LBS | DIASTOLIC BLOOD PRESSURE: 75 MMHG | SYSTOLIC BLOOD PRESSURE: 112 MMHG | HEIGHT: 64 IN | HEART RATE: 94 BPM | BODY MASS INDEX: 25.61 KG/M2

## 2021-12-07 DIAGNOSIS — O09.92 HRP (HIGH RISK PREGNANCY), SECOND TRIMESTER: Primary | ICD-10-CM

## 2021-12-07 DIAGNOSIS — Z87.59 HISTORY OF SEVERE PRE-ECLAMPSIA: ICD-10-CM

## 2021-12-07 DIAGNOSIS — O09.299 HX OF CERVICAL INCOMPETENCE IN PREGNANCY, CURRENTLY PREGNANT: ICD-10-CM

## 2021-12-07 LAB
HBV SURFACE AG SERPL QL IA: NONREACTIVE
HCV AB SERPL QL IA: NONREACTIVE
HIV 1+2 AB+HIV1 P24 AG SERPL QL IA: NONREACTIVE
T PALLIDUM AB SER QL: NONREACTIVE

## 2021-12-07 PROCEDURE — 36415 COLL VENOUS BLD VENIPUNCTURE: CPT | Performed by: ADVANCED PRACTICE MIDWIFE

## 2021-12-07 PROCEDURE — 86803 HEPATITIS C AB TEST: CPT | Performed by: ADVANCED PRACTICE MIDWIFE

## 2021-12-07 PROCEDURE — 87491 CHLMYD TRACH DNA AMP PROBE: CPT | Performed by: ADVANCED PRACTICE MIDWIFE

## 2021-12-07 PROCEDURE — 99207 PR PRENATAL VISIT: CPT | Performed by: ADVANCED PRACTICE MIDWIFE

## 2021-12-07 PROCEDURE — 86780 TREPONEMA PALLIDUM: CPT | Performed by: ADVANCED PRACTICE MIDWIFE

## 2021-12-07 PROCEDURE — 87389 HIV-1 AG W/HIV-1&-2 AB AG IA: CPT | Performed by: ADVANCED PRACTICE MIDWIFE

## 2021-12-07 PROCEDURE — 87340 HEPATITIS B SURFACE AG IA: CPT | Performed by: ADVANCED PRACTICE MIDWIFE

## 2021-12-07 PROCEDURE — G0463 HOSPITAL OUTPT CLINIC VISIT: HCPCS

## 2021-12-07 PROCEDURE — 87591 N.GONORRHOEAE DNA AMP PROB: CPT | Performed by: ADVANCED PRACTICE MIDWIFE

## 2021-12-07 ASSESSMENT — MIFFLIN-ST. JEOR: SCORE: 1370.4

## 2021-12-07 NOTE — PROGRESS NOTES
SUBJECTIVE:    31 year old, female, , 18w2d,  who presents to the clinic today for a new ob visit.  Feels well. Has not started PNV, due to nausea will start taking today.   Estimated Date of Delivery: May 8, 2022   May 8, 2022 is calculated from No LMP recorded (lmp unknown). Patient is pregnant. Ultrasound confirmed.   She has not had bleeding since her LMP.   She has had nausea. Weight loss has not occurred.   This was a planned pregnancy.   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 Urine sample obtained     Has been taking aspirin, vitamin D supplement, and senna daily.     Went to triage on Friday 12/3 for pelvic pressure and back pain. Reports that these symptoms have resolved. Still continues to have vaginal itching. Has not vaginal suppository for yeast infection, planning on picking up prescriptions.     Report difficulty drinking water and clear liquids due nausea, has been drinking jeffrey diluted with water. Encouraged to try drinking more water and limiting sugar intake.     Has anatomy ultrasound scheduled on .   ===========================================  ROS  PSYCHIATRIC:  Denies mood changes, difficulty concentrating, depression, anxiety, panic attacks or post partum depression  PHQ9: Last PHQ-9 score on record= No Value exists for the : HP#PHQ9  Social History     Tobacco Use     Smoking status: Never Smoker     Smokeless tobacco: Never Used   Substance Use Topics     Alcohol use: No     History   Drug Use No     History   Smoking Status     Never Smoker   Smokeless Tobacco     Never Used     Social History    Substance and Sexual Activity      Alcohol use: No    Family History   Problem Relation Age of Onset     Asthma Mother      Hypertension Father      Cancer Father         pancreatic     Diabetes No family hx of      Breast  Cancer No family hx of      Colon Cancer No family hx of      ============================================  MEDICAL HISTORY   No Known Allergies    [unfilled]      Current Outpatient Medications:      aspirin (ASA) 81 MG EC tablet, Take 1 tablet (81 mg) by mouth daily, Disp: 90 tablet, Rfl: 2     SENNA-docusate sodium (SENNA S) 8.6-50 MG tablet, Take 1 tablet by mouth At Bedtime, Disp: 90 tablet, Rfl: 3     vitamin D3 (CHOLECALCIFEROL) 10 MCG (400 UNIT) capsule, Take 1 capsule by mouth daily, Disp: , Rfl:      magnesium 250 MG tablet, Take 1 tablet (250 mg) by mouth daily (Patient not taking: Reported on 12/7/2021), Disp: 60 tablet, Rfl: 1     miconazole (MICONAZOLE 7) 100 MG vaginal suppository, Place 1 suppository (100 mg) vaginally At Bedtime for 7 days (Patient not taking: Reported on 12/7/2021), Disp: 7 suppository, Rfl: 0     ondansetron (ZOFRAN-ODT) 4 MG ODT tab, Take 1 tablet (4 mg) by mouth every 8 hours as needed for nausea (Patient not taking: Reported on 10/27/2021), Disp: 60 tablet, Rfl: 2     Prenatal Vit-Fe Fumarate-FA (PRENATAL VITAMIN AND MINERAL) 28-0.8 MG TABS, Take 1 tablet by mouth daily (Patient not taking: Reported on 12/7/2021), Disp: 90 tablet, Rfl: 3     progesterone (PROMETRIUM) 200 MG capsule, Insert one capsule in the vagina twice daily (Patient not taking: Reported on 12/7/2021), Disp: 60 capsule, Rfl: 1    Past Medical History:   Diagnosis Date     Depressive disorder     PPD following fetal loss     Recurrent pregnancy loss (CODE)      Severe pre-eclampsia, postpartum condition or complication 8/25/2018     Varicella        Past Surgical History:   Procedure Laterality Date     APPENDECTOMY Right      CERCLAGE CERVICAL N/A 3/22/2019    Procedure: CERCLAGE CERVICAL;  Surgeon: Liz Lima MD;  [facility]: [facility]     CERCLAGE CERVICAL N/A 3/22/2019    Procedure: CERCLAGE CERVICAL;  Surgeon: Liz Lima MD;  Location: [facility]     CERCLAGE CERVICAL N/A 11/17/2021    Procedure: CERCLAGE,  CERVIX, VAGINAL APPROACH;  Surgeon: Stella Orellana;  [facility]: [facility]     DILATION AND CURETTAGE SUCTION N/A 2017    Procedure: DILATION AND CURETTAGE SUCTION;  Suction Dilation And Curettage ;  Surgeon: Yolanda Samaniego MD;  Location: [facility]     DILATION AND CURETTAGE SUCTION N/A 2021    Procedure: DILATION AND CURETTAGE, UTERUS, USING SUCTION;  Surgeon: Rosanna Ybarra MD;  Location: [facility]     DILATION AND CURETTAGE, HYSTEROSCOPY DIAGNOSTIC, COMBINED N/A 2017    Procedure: COMBINED DILATION AND CURETTAGE, HYSTEROSCOPY DIAGNOSTIC;  Operative Hysteroscopy, Dilation and Curettage ;  Surgeon: Eli Pickard MD;  Location: [facility]     GYN SURGERY  2017    suction D&C            OB History    Para Term  AB Living   9 3 3 0 5 2   SAB IAB Ectopic Multiple Live Births   5 0 0 0 3      # Outcome Date GA Lbr Michael/2nd Weight Sex Delivery Anes PTL Lv   9 Current            8 2021 10w0d    AB, MISSED         Birth Comments: MAB, baby stopped growing 8 weeks, D & C 11 weeks   7 Term 07/15/19 37w6d 04:04 / 00:09 2.55 kg (5 lb 10 oz) F Vag-Spont None N RAMILA      Birth Comments: PROM, spontaneous labor, 5 hour labor, epidural, 2nd degree, no PPH      Name: DORITA LIRIANO      Apgar1: 8  Apgar5: 9   6 Term 18 39w3d 01:16 / 00:20 2.75 kg (6 lb 1 oz) M Vag-Spont None N ND      Birth Comments: IUFD, no cardiac activity on admission prior to delivery, NSVB, intact no PPH, Admitted postpartum for preeclampsia with severe features. She presented to the ED with sudden onset of fatigue, numbness and tingling and shortness of breath.      Complications: Fetal demise > 22 weeks, delivered, current hospitalization      Name: Jay   5 SAB 17 11w0d    AB, MISSED         Birth Comments: BAby stopped growing 11 weeks, D & C at 15 weeks, NL blood loss   4 2017 5w0d             Birth Comments: SAB. no complication   3 Term 12/03/15 38w5d 03:45 / 01:16 3.11 kg  "(6 lb 13.7 oz) M Vag-Spont EPI  RAMILA      Birth Comments: PROM, spontaneous labor, 5 hour labor, epidural, 2nd degree, no PPH      Name: Lee Ann      Apgar1: 9  Apgar5: 9   2 SAB 07/2014 5w0d             Birth Comments: SAB no complications   1 SAB 03/2014 5w0d             Birth Comments: SAB, no complications      Obstetric Comments   2018 Severe Pre E Postpartum after term IUFD 39 weeks   2019: shortened cervix with cerclage, delivered at 37/6 weeks, PPH   Recurrent pregnancy loss       GYN History- No  Abnormal Pap Smears                        Cervical procedures: cerclage and previous D&C                         History of STI: none    I personally reviewed the past social/family/medical and surgical history on the date of service.   I reviewed lab work done at Intake visit with patient.    OBJECTIVE:   PHYSICAL EXAM:  /75 (BP Location: Left arm, Patient Position: Chair)   Pulse 94   Ht 1.626 m (5' 4\")   Wt 68 kg (150 lb)   LMP  (LMP Unknown)   BMI 25.75 kg/m    BMI- Body mass index is 25.75 kg/m ., GENERAL:  Pleasant pregnant female, alert, cooperative  and well groomed.  SKIN:  Warm and dry, without lesions or rashes  HEAD: Symmetrical features.  MOUTH:  Buccal mucosa pink, moist without lesions.  Teeth in good repair.    NECK:  Thyroid without enlargement and nodules.  Lymph nodes not palpable.   LUNGS:  Clear to auscultation.  BREAST:  deferred   HEART:  RRR without murmur.  ABDOMEN: Soft without masses , tenderness or organomegaly.  No CVA tenderness.  Uterus palpable at size equal to dates.  No scars noted.. Fetal heart tones present.  MUSCULOSKELETAL:  Full range of motion  EXTREMITIES:  No edema. No significant varicosities.   PELVIC EXAM: Deferred   RECTAL:   Deferred.  WET PREP:Not done  GC/CHLAMYDIA CULTURE OBTAINED:YES    ASSESSMENT:  Intrauterine pregnancy 18w2d size  consistent with dates  Genetic Screening: Level 2 Ultrasound   Encounter Diagnoses   Name Primary?     HRP (high risk " "pregnancy), second trimester Yes     History of severe pre-eclampsia      Hx of cervical incompetence in pregnancy, currently pregnant         PLAN:  Orders Placed This Encounter   Procedures     US OB >14 Weeks Follow Up     Hepatitis B Surface Antigen     HIV Antigen Antibody Combo     Treponema Abs w Reflex to RPR and Titer     Hepatitis C antibody     No orders of the defined types were placed in this encounter.    - Reviewed use of triage nurse line and contacting the on-call provider after hours for an urgent need such as fever, vagina bleeding, bladder or vaginal infection, rupture of membranes,  or term labor.    - Reviewed best evidence for: weight gain for her weight and height for pregnancy:  RECOMMENDED WEIGHT GAIN: 25-35 lbs.   healthy diet and foods to avoid; exercise and activity during pregnancy;avoiding exposure to toxoplasmosis; and maintenance of a generally healthy lifestyle.   - Discussed the harms, benefits, side effects and alternative therapies for current prescribed and OTC medications.    - All pt's and FOB's  questions discussed and answered.  Pt verbalized understanding of and agreement to plan of care.   - Anatomy ultrasound scheduled for .   - Continue scheduled prenatal care and prn if questions or concerns. Return in 4 weeks or PRN.       \"I,am serving as a scribe to document services personally performed by Liz Reza CNM based on the provider's statements to me.\" - Jimmy CISNEROS     I agree with the PFSH and ROS as completed by Jimmy CISNEROS    except for changes made by me. The remainder of the encounter was performed by me and scribed by Jimmy CISNEROS   . The scribed note accurately reflects my personal services and decisions made by me.   ASHIA Martin CNM                    "

## 2021-12-07 NOTE — LETTER
2021       RE: Pam Syed  [address]  [facility] [address]     Dear Colleague,    Thank you for referring your patient, Pam Syed, to the [facility] [place] at [facility]. Please see a copy of my visit note below.    SUBJECTIVE:    31 year old, female, , 18w2d,  who presents to the clinic today for a new ob visit.  Feels well. Has not started PNV, due to nausea will start taking today.   Estimated Date of Delivery: May 8, 2022   May 8, 2022 is calculated from No LMP recorded (lmp unknown). Patient is pregnant. Ultrasound confirmed.   She has not had bleeding since her LMP.   She has had nausea. Weight loss has not occurred.   This was a planned pregnancy.   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 Urine sample obtained     Has been taking aspirin, vitamin D supplement, and senna daily.     Went to triage on Friday 12/3 for pelvic pressure and back pain. Reports that these symptoms have resolved. Still continues to have vaginal itching. Has not vaginal suppository for yeast infection, planning on picking up prescriptions.     Report difficulty drinking water and clear liquids due nausea, has been drinking jeffrey diluted with water. Encouraged to try drinking more water and limiting sugar intake.     Has anatomy ultrasound scheduled on .   ===========================================  ROS  PSYCHIATRIC:  Denies mood changes, difficulty concentrating, depression, anxiety, panic attacks or post partum depression  PHQ9: Last PHQ-9 score on record= No Value exists for the : HP#PHQ9  Social History     Tobacco Use     Smoking status: Never Smoker     Smokeless tobacco: Never Used   Substance Use Topics     Alcohol use: No     History   Drug Use No      History   Smoking Status     Never Smoker   Smokeless Tobacco     Never Used     Social History    Substance and Sexual Activity      Alcohol use: No    Family History   Problem Relation Age of Onset     Asthma Mother      Hypertension Father      Cancer Father         pancreatic     Diabetes No family hx of      Breast Cancer No family hx of      Colon Cancer No family hx of      ============================================  MEDICAL HISTORY   No Known Allergies    [unfilled]      Current Outpatient Medications:      aspirin (ASA) 81 MG EC tablet, Take 1 tablet (81 mg) by mouth daily, Disp: 90 tablet, Rfl: 2     SENNA-docusate sodium (SENNA S) 8.6-50 MG tablet, Take 1 tablet by mouth At Bedtime, Disp: 90 tablet, Rfl: 3     vitamin D3 (CHOLECALCIFEROL) 10 MCG (400 UNIT) capsule, Take 1 capsule by mouth daily, Disp: , Rfl:      magnesium 250 MG tablet, Take 1 tablet (250 mg) by mouth daily (Patient not taking: Reported on 12/7/2021), Disp: 60 tablet, Rfl: 1     miconazole (MICONAZOLE 7) 100 MG vaginal suppository, Place 1 suppository (100 mg) vaginally At Bedtime for 7 days (Patient not taking: Reported on 12/7/2021), Disp: 7 suppository, Rfl: 0     ondansetron (ZOFRAN-ODT) 4 MG ODT tab, Take 1 tablet (4 mg) by mouth every 8 hours as needed for nausea (Patient not taking: Reported on 10/27/2021), Disp: 60 tablet, Rfl: 2     Prenatal Vit-Fe Fumarate-FA (PRENATAL VITAMIN AND MINERAL) 28-0.8 MG TABS, Take 1 tablet by mouth daily (Patient not taking: Reported on 12/7/2021), Disp: 90 tablet, Rfl: 3     progesterone (PROMETRIUM) 200 MG capsule, Insert one capsule in the vagina twice daily (Patient not taking: Reported on 12/7/2021), Disp: 60 capsule, Rfl: 1    Past Medical History:   Diagnosis Date     Depressive disorder     PPD following fetal loss     Recurrent pregnancy loss (CODE)      Severe pre-eclampsia, postpartum condition or complication 8/25/2018     Varicella        Past Surgical History:   Procedure  Laterality Date     APPENDECTOMY Right      CERCLAGE CERVICAL N/A 3/22/2019    Procedure: CERCLAGE CERVICAL;  Surgeon: Liz Lima MD;  [facility]: [facility] L[facility]     CERCLAGE CERVICAL N/A 3/22/2019    Procedure: CERCLAGE CERVICAL;  Surgeon: Liz Lima MD;  [facility]: [facility] OR     CERCLAGE CERVICAL N/A 2021    Procedure: CERCLAGE, CERVIX, VAGINAL APPROACH;  Surgeon: Stella Orellana;  [facility] L[facility]     DILATION AND CURETTAGE SUCTION N/A 2017    Procedure: DILATION AND CURETTAGE SUCTION;  Suction Dilation And Curettage ;  Surgeon: Yolanda Samaniego MD;  [facility]: [facility] OR     DILATION AND CURETTAGE SUCTION N/A 2021    Procedure: DILATION AND CURETTAGE, UTERUS, USING SUCTION;  Surgeon: Rosanna Ybarra MD;  Location: [facility] OR     DILATION AND CURETTAGE, HYSTEROSCOPY DIAGNOSTIC, COMBINED N/A 2017    Procedure: COMBINED DILATION AND CURETTAGE, HYSTEROSCOPY DIAGNOSTIC;  Operative Hysteroscopy, Dilation and Curettage ;  Surgeon: Eli Pickard MD;  Location: [facility] OR     GYN SURGERY  2017    suction D&C            OB History    Para Term  AB Living   9 3 3 0 5 2   SAB IAB Ectopic Multiple Live Births   5 0 0 0 3      # Outcome Date GA Lbr Michael/2nd Weight Sex Delivery Anes PTL Lv   9 Current            8 SAB 2021 10w0d    AB, MISSED         Birth Comments: MAB, baby stopped growing 8 weeks, D & C 11 weeks   7 Term 07/15/19 37w6d 04:04 / 00:09 2.55 kg (5 lb 10 oz) F Vag-Spont None N RAMILA      Birth Comments: PROM, spontaneous labor, 5 hour labor, epidural, 2nd degree, no PPH      Name: DORITA LIRIANO      Apgar1: 8  Apgar5: 9   6 Term 18 39w3d 01:16 / 00:20 2.75 kg (6 lb 1 oz) M Vag-Spont None N ND      Birth Comments: IUFD, no cardiac activity on admission prior to delivery, NSVB, intact no PPH, Admitted postpartum for preeclampsia with severe features. She presented to the ED with sudden onset of fatigue, numbness and tingling and shortness of breath.       "Complications: Fetal demise > 22 weeks, delivered, current hospitalization      Name: Jay   5 SAB 09/26/17 11w0d    AB, MISSED         Birth Comments: BAby stopped growing 11 weeks, D & C at 15 weeks, NL blood loss   4 SAB 04/2017 5w0d             Birth Comments: SAB. no complication   3 Term 12/03/15 38w5d 03:45 / 01:16 3.11 kg (6 lb 13.7 oz) M Vag-Spont EPI  RAMILA      Birth Comments: PROM, spontaneous labor, 5 hour labor, epidural, 2nd degree, no PPH      Name: Lee Ann      Apgar1: 9  Apgar5: 9   2 SAB 07/2014 5w0d             Birth Comments: SAB no complications   1 SAB 03/2014 5w0d             Birth Comments: SAB, no complications      Obstetric Comments   2018 Severe Pre E Postpartum after term IUFD 39 weeks   2019: shortened cervix with cerclage, delivered at 37/6 weeks, PPH   Recurrent pregnancy loss       GYN History- No  Abnormal Pap Smears                        Cervical procedures: cerclage and previous D&C                         History of STI: none    I personally reviewed the past social/family/medical and surgical history on the date of service.   I reviewed lab work done at Intake visit with patient.    OBJECTIVE:   PHYSICAL EXAM:  /75 (BP Location: Left arm, Patient Position: Chair)   Pulse 94   Ht 1.626 m (5' 4\")   Wt 68 kg (150 lb)   LMP  (LMP Unknown)   BMI 25.75 kg/m    BMI- Body mass index is 25.75 kg/m ., GENERAL:  Pleasant pregnant female, alert, cooperative  and well groomed.  SKIN:  Warm and dry, without lesions or rashes  HEAD: Symmetrical features.  MOUTH:  Buccal mucosa pink, moist without lesions.  Teeth in good repair.    NECK:  Thyroid without enlargement and nodules.  Lymph nodes not palpable.   LUNGS:  Clear to auscultation.  BREAST:  deferred   HEART:  RRR without murmur.  ABDOMEN: Soft without masses , tenderness or organomegaly.  No CVA tenderness.  Uterus palpable at size equal to dates.  No scars noted.. Fetal heart tones present.  MUSCULOSKELETAL:  Full range of " "motion  EXTREMITIES:  No edema. No significant varicosities.   PELVIC EXAM: Deferred   RECTAL:   Deferred.  WET PREP:Not done  GC/CHLAMYDIA CULTURE OBTAINED:YES    ASSESSMENT:  Intrauterine pregnancy 18w2d size  consistent with dates  Genetic Screening: Level 2 Ultrasound   Encounter Diagnoses   Name Primary?     HRP (high risk pregnancy), second trimester Yes     History of severe pre-eclampsia      Hx of cervical incompetence in pregnancy, currently pregnant         PLAN:  Orders Placed This Encounter   Procedures     US OB >14 Weeks Follow Up     Hepatitis B Surface Antigen     HIV Antigen Antibody Combo     Treponema Abs w Reflex to RPR and Titer     Hepatitis C antibody     No orders of the defined types were placed in this encounter.    - Reviewed use of triage nurse line and contacting the on-call provider after hours for an urgent need such as fever, vagina bleeding, bladder or vaginal infection, rupture of membranes,  or term labor.    - Reviewed best evidence for: weight gain for her weight and height for pregnancy:  RECOMMENDED WEIGHT GAIN: 25-35 lbs.   healthy diet and foods to avoid; exercise and activity during pregnancy;avoiding exposure to toxoplasmosis; and maintenance of a generally healthy lifestyle.   - Discussed the harms, benefits, side effects and alternative therapies for current prescribed and OTC medications.    - All pt's and FOB's  questions discussed and answered.  Pt verbalized understanding of and agreement to plan of care.   - Anatomy ultrasound scheduled for .   - Continue scheduled prenatal care and prn if questions or concerns. Return in 4 weeks or PRN.       \"I,am serving as a scribe to document services personally performed by Liz Reza CNM based on the provider's statements to me.\" - Jimmy CISNEROS     I agree with the PFSH and ROS as completed by Jimmy CISNEROS    except for changes made by me. The remainder of the encounter was performed by me and scribed " by Jimmy CISNEROS   . The scribed note accurately reflects my personal services and decisions made by me.   ASHIA Martin CNM

## 2021-12-08 LAB
C TRACH DNA SPEC QL NAA+PROBE: NEGATIVE
N GONORRHOEA DNA SPEC QL NAA+PROBE: NEGATIVE

## 2021-12-27 ENCOUNTER — HOSPITAL ENCOUNTER (OUTPATIENT)
Dept: ULTRASOUND IMAGING | Facility: CLINIC | Age: 31
End: 2021-12-27
Attending: OBSTETRICS & GYNECOLOGY
Payer: COMMERCIAL

## 2021-12-27 ENCOUNTER — OFFICE VISIT (OUTPATIENT)
Dept: MATERNAL FETAL MEDICINE | Facility: CLINIC | Age: 31
End: 2021-12-27
Attending: OBSTETRICS & GYNECOLOGY
Payer: COMMERCIAL

## 2021-12-27 DIAGNOSIS — Z87.59 HISTORY OF IUFD: Primary | ICD-10-CM

## 2021-12-27 DIAGNOSIS — O34.32 INCOMPETENT CERVIX IN PREGNANCY, ANTEPARTUM, SECOND TRIMESTER: ICD-10-CM

## 2021-12-27 PROCEDURE — 76811 OB US DETAILED SNGL FETUS: CPT | Mod: 26 | Performed by: OBSTETRICS & GYNECOLOGY

## 2021-12-27 PROCEDURE — 76811 OB US DETAILED SNGL FETUS: CPT

## 2021-12-27 NOTE — PROGRESS NOTES
"Please see \"Imaging\" tab under \"Chart Review\" for details of today's visit.    Stella Jorge MD PhD  Maternal Fetal Medicine     "

## 2022-01-23 ENCOUNTER — HEALTH MAINTENANCE LETTER (OUTPATIENT)
Age: 32
End: 2022-01-23

## 2022-01-28 ENCOUNTER — HOSPITAL ENCOUNTER (OUTPATIENT)
Dept: ULTRASOUND IMAGING | Facility: CLINIC | Age: 32
End: 2022-01-28
Attending: OBSTETRICS & GYNECOLOGY
Payer: COMMERCIAL

## 2022-01-28 ENCOUNTER — OFFICE VISIT (OUTPATIENT)
Dept: MATERNAL FETAL MEDICINE | Facility: CLINIC | Age: 32
End: 2022-01-28
Attending: OBSTETRICS & GYNECOLOGY
Payer: COMMERCIAL

## 2022-01-28 DIAGNOSIS — Z87.59 HISTORY OF IUFD: ICD-10-CM

## 2022-01-28 DIAGNOSIS — O09.299 HX OF CERVICAL INCOMPETENCE IN PREGNANCY, CURRENTLY PREGNANT: Primary | ICD-10-CM

## 2022-01-28 DIAGNOSIS — O10.919 CHRONIC HYPERTENSION IN PREGNANCY: ICD-10-CM

## 2022-01-28 PROCEDURE — 99212 OFFICE O/P EST SF 10 MIN: CPT | Mod: 25 | Performed by: OBSTETRICS & GYNECOLOGY

## 2022-01-28 PROCEDURE — 76816 OB US FOLLOW-UP PER FETUS: CPT | Mod: 26 | Performed by: OBSTETRICS & GYNECOLOGY

## 2022-01-28 PROCEDURE — 76816 OB US FOLLOW-UP PER FETUS: CPT

## 2022-01-28 NOTE — PROGRESS NOTES
The patient was seen for an ultrasound in the M[facility]etal Medicine Center at the [facility] today.  For a detailed report of the ultrasound examination, please see the ultrasound report which can be found under the imaging tab.    Germania Wang MD  , OB/GYN  Maternal-Fetal Medicine  [phone] (Pager)

## 2022-02-09 ENCOUNTER — LAB (OUTPATIENT)
Dept: LAB | Facility: CLINIC | Age: 32
End: 2022-02-09
Attending: ADVANCED PRACTICE MIDWIFE
Payer: COMMERCIAL

## 2022-02-09 ENCOUNTER — PRENATAL OFFICE VISIT (OUTPATIENT)
Dept: OBGYN | Facility: CLINIC | Age: 32
End: 2022-02-09
Attending: ADVANCED PRACTICE MIDWIFE
Payer: COMMERCIAL

## 2022-02-09 VITALS
WEIGHT: 159 LBS | HEART RATE: 125 BPM | SYSTOLIC BLOOD PRESSURE: 116 MMHG | BODY MASS INDEX: 27.29 KG/M2 | DIASTOLIC BLOOD PRESSURE: 75 MMHG

## 2022-02-09 DIAGNOSIS — O09.92 SUPERVISION OF HIGH RISK PREGNANCY IN SECOND TRIMESTER: Primary | ICD-10-CM

## 2022-02-09 DIAGNOSIS — Z87.59 HISTORY OF SEVERE PRE-ECLAMPSIA: ICD-10-CM

## 2022-02-09 DIAGNOSIS — Z87.59 HISTORY OF IUFD: ICD-10-CM

## 2022-02-09 DIAGNOSIS — O09.92 SUPERVISION OF HIGH RISK PREGNANCY IN SECOND TRIMESTER: ICD-10-CM

## 2022-02-09 PROBLEM — D64.9 ANEMIA: Status: ACTIVE | Noted: 2022-02-09

## 2022-02-09 LAB
ALT SERPL W P-5'-P-CCNC: 18 U/L (ref 0–50)
AST SERPL W P-5'-P-CCNC: 10 U/L (ref 0–45)
CREAT SERPL-MCNC: 0.49 MG/DL (ref 0.52–1.04)
CREAT UR-MCNC: 172 MG/DL
DEPRECATED CALCIDIOL+CALCIFEROL SERPL-MC: 28 UG/L (ref 20–75)
ERYTHROCYTE [DISTWIDTH] IN BLOOD BY AUTOMATED COUNT: 13.4 % (ref 10–15)
GFR SERPL CREATININE-BSD FRML MDRD: >90 ML/MIN/1.73M2
GLUCOSE 1H P 50 G GLC PO SERPL-MCNC: 96 MG/DL (ref 70–129)
HCT VFR BLD AUTO: 30.2 % (ref 35–47)
HGB BLD-MCNC: 10.1 G/DL (ref 11.7–15.7)
MCH RBC QN AUTO: 28.5 PG (ref 26.5–33)
MCHC RBC AUTO-ENTMCNC: 33.4 G/DL (ref 31.5–36.5)
MCV RBC AUTO: 85 FL (ref 78–100)
PLATELET # BLD AUTO: 162 10E3/UL (ref 150–450)
PROT UR-MCNC: 0.23 G/L
PROT/CREAT 24H UR: 0.13 G/G CR (ref 0–0.2)
RBC # BLD AUTO: 3.54 10E6/UL (ref 3.8–5.2)
T PALLIDUM AB SER QL: NONREACTIVE
URATE SERPL-MCNC: 2.5 MG/DL (ref 2.6–6)
WBC # BLD AUTO: 8.6 10E3/UL (ref 4–11)

## 2022-02-09 PROCEDURE — 90715 TDAP VACCINE 7 YRS/> IM: CPT

## 2022-02-09 PROCEDURE — 82950 GLUCOSE TEST: CPT

## 2022-02-09 PROCEDURE — 84156 ASSAY OF PROTEIN URINE: CPT

## 2022-02-09 PROCEDURE — 82306 VITAMIN D 25 HYDROXY: CPT

## 2022-02-09 PROCEDURE — 36415 COLL VENOUS BLD VENIPUNCTURE: CPT

## 2022-02-09 PROCEDURE — 99207 PR PRENATAL VISIT: CPT | Performed by: ADVANCED PRACTICE MIDWIFE

## 2022-02-09 PROCEDURE — 84450 TRANSFERASE (AST) (SGOT): CPT

## 2022-02-09 PROCEDURE — 87086 URINE CULTURE/COLONY COUNT: CPT

## 2022-02-09 PROCEDURE — 86780 TREPONEMA PALLIDUM: CPT

## 2022-02-09 PROCEDURE — G0463 HOSPITAL OUTPT CLINIC VISIT: HCPCS

## 2022-02-09 PROCEDURE — 250N000011 HC RX IP 250 OP 636

## 2022-02-09 PROCEDURE — 84550 ASSAY OF BLOOD/URIC ACID: CPT

## 2022-02-09 PROCEDURE — 82565 ASSAY OF CREATININE: CPT

## 2022-02-09 PROCEDURE — 85027 COMPLETE CBC AUTOMATED: CPT

## 2022-02-09 PROCEDURE — 90471 IMMUNIZATION ADMIN: CPT

## 2022-02-09 PROCEDURE — 84460 ALANINE AMINO (ALT) (SGPT): CPT

## 2022-02-09 ASSESSMENT — PAIN SCALES - GENERAL: PAINLEVEL: NO PAIN (0)

## 2022-02-09 NOTE — PROGRESS NOTES
32 year old, , 27w3d, presents for EOB visit- incorrectly scheduled in 20 minute spot.    Patient concerns: Feeling well overall but nervous about the pregnancy and health of baby d/t her history. Reports normal fetal movement.   Denies cramping/contractions, vaginal bleeding, discharge or leakage of fluid. No HA, vision changes, ruq/epigastric pain.      Inquiring about preventing postpartum hemorrhage, monitoring for pre-eclampsia.     Had MFM ultrasound on 22. Will start  surveillance at 32 weeks. Has a cerclage in place which will be removed between 36-37 weeks. Induction of labor will be 38 weeks.    The following labs were ordered today:        GCT, CBC w platelets, Vitamin d,  Anti-treponema, pre-e labs, urine culture    Blood type:   ABO   Date Value Ref Range Status   2021 O  Final     RH(D)   Date Value Ref Range Status   2021 Pos  Final     Antibody Screen   Date Value Ref Range Status   10/27/2021 Negative Negative Final   2021 Neg  Final    Rhogam  was not given.  TDAP  was given.    A/P:  Encounter Diagnoses   Name Primary?     Supervision of high risk pregnancy in second trimester Yes     History of IUFD      Orders Placed This Encounter   Procedures     US OB >14 Weeks Follow Up     TDAP VACCINE (Adacel, Boostrix)  [[id number]]     Glucose 1 Hour     CBC with Platelets     Treponema Abs w Reflex to RPR and Titer     25- OH-Vitamin D     Uric acid     Creatinine     AST     ALT     Protein  random urine     - EOB labs ordered- 1 hour glucose, cbc with plts, anti-trep, vitamin d.  - Pre-eclampsia labs ordered.  - Urine culture ordered.  - Declines flu vaccine and hepatitis B vaccine series.  - Declines COVID booster.  - Tdap administered.  - Discussed AMTSL for PPH prevention.  - Growth q4 weeks, weekly BPPs at 32 weeks, cerclage removal at 36-37 weeks, IOL 38 weeks.  - Order placed for growth US. Due in 3 weeks.  - Needs EOB education.    Continue scheduled  prenatal care, RTC in 3 weeks for EOB education and growth ultrasound and prn if questions or concerns.      ASHIA Grubbs, CNM

## 2022-02-09 NOTE — PROGRESS NOTES
Chief Complaint   Patient presents with     Prenatal Care     CAS 27 weeks and 3 days   Olga Milian LPN

## 2022-02-10 ENCOUNTER — MYC MEDICAL ADVICE (OUTPATIENT)
Dept: OBGYN | Facility: CLINIC | Age: 32
End: 2022-02-10
Payer: COMMERCIAL

## 2022-02-10 DIAGNOSIS — O09.92 SUPERVISION OF HIGH RISK PREGNANCY IN SECOND TRIMESTER: Primary | ICD-10-CM

## 2022-02-10 LAB — BACTERIA UR CULT: NORMAL

## 2022-02-11 RX ORDER — FERROUS SULFATE 325(65) MG
325 TABLET ORAL
Qty: 90 TABLET | Refills: 1 | Status: SHIPPED | OUTPATIENT
Start: 2022-02-11 | End: 2024-05-12

## 2022-02-11 RX ORDER — LANOLIN ALCOHOL/MO/W.PET/CERES
1000 CREAM (GRAM) TOPICAL DAILY
Qty: 90 TABLET | Refills: 1 | Status: SHIPPED | OUTPATIENT
Start: 2022-02-11 | End: 2024-04-30

## 2022-02-11 RX ORDER — MULTIVIT WITH MINERALS/LUTEIN
250 TABLET ORAL DAILY
Qty: 90 TABLET | Refills: 1 | Status: SHIPPED | OUTPATIENT
Start: 2022-02-11 | End: 2024-05-12

## 2022-02-11 NOTE — TELEPHONE ENCOUNTER
Patient requesting supplements to be sent to pharmacy.   Per CNM send if patient prefers.   Iron, Vitamin C, vitamin B12, vitamin D

## 2022-02-22 NOTE — PROGRESS NOTES
" 32 year old, , 29w5d, here with Nilson for her EOB visit    Completed EOB labs 22  GCT: 94 RPR: Nonreactive  CBC Hgb: 10.1, Vitamin D: 28 (encouraged to take 5,000 international unit(s) daily) and Tdap administered  Pam has not received her flu vaccine, is still considering not ready today  Pam has not received her COVID booster, is still considering not ready today  O positive bloodtype  Urine culture: negative.    Growth every 4 weeks, weekly BPP starting at 32 weeks for hx of IUFD  Pam had an ultrasound this mornin22 EFW 55%, normal ROSALIA, cephalic. Feeling reassured     Pam is taking an iron supplement with vitamin C and B12, once daily     Pam presents with the following concerns: heartburn that is worse during the day. She has taken TUMS in last pregnancy and said it was not helpful. Is eating smaller and more frequent meals.    Pam is tearful today discussing her losses. She has a therapist but has yet set up an appointment.     Nilson has noticed that Pam's face appears more swollen. Pam has not noticed any change. She did have some facial swelling in her previous pregnancy. No HA, visual changes, RUQ pain    PHQ-9 SCORE 10/11/2018 5/10/2019 2019   PHQ-9 Total Score 6 2 1     Education completed today includes breast feeding, [facility] hand out, contraception, counting movements, signs of pre-term labor, when to present to birthplace, post partum depression, GBS, getting enough iron, labor induction, nitrous oxide, doulas, vitamin K and hypertension disorders.  Birth preferences reviewed: Would like to try natural with no medications  Labor support:     [place] Feeding plans :    Contraception planned:  Unsure, \"just want to get through this pregnancy\"  The following labs were ordered today: none  Water birth consent form was not given.    Blood type:   ABO   Date Value Ref Range Status   2021 O  Final     RH(D)   Date Value Ref Range Status "   06/04/2021 Pos  Final     Antibody Screen   Date Value Ref Range Status   10/27/2021 Negative Negative Final   06/04/2021 Neg  Final   Rhogam was not given.  TDAP was previously given.    A/P:    Encounter Diagnoses   Name Primary?     Supervision of high risk pregnancy in first trimester Yes     History of IUFD      Orders Placed This Encounter   Procedures     US OB Fetal Biophys Prf wo NonStrs Singls Sgl     Encouraged small frequent meals and sitting up after eating to manage heartburn  Discussed low suspicion for preeclampsia due to normal Pre-eclampsia labs last visit and normal BP today. Directed her to notify clinic if she experiences any burred vison or headaches.   Recommended hydrating, tylenol as needed for mild headaches  Discussed plan for weekly BPP's after 32 weeks, IOL at 38 weeks, and counting fetal movements at home.   Will plan to remove cervical cerclage between 36-37 weeks  She was encouraged to set up an appointment with her mental health therapist.  Return in two weeks, BPP scheduled.     As a student nurse practitioner, I participated in the documented services with direct supervision from the provider below.   GENO OlearyN, RN, DNP student      Lupe Buchanan CNM

## 2022-02-25 ENCOUNTER — ANCILLARY PROCEDURE (OUTPATIENT)
Dept: ULTRASOUND IMAGING | Facility: CLINIC | Age: 32
End: 2022-02-25
Attending: ADVANCED PRACTICE MIDWIFE
Payer: COMMERCIAL

## 2022-02-25 ENCOUNTER — OFFICE VISIT (OUTPATIENT)
Dept: OBGYN | Facility: CLINIC | Age: 32
End: 2022-02-25
Attending: ADVANCED PRACTICE MIDWIFE
Payer: COMMERCIAL

## 2022-02-25 VITALS
HEART RATE: 98 BPM | BODY MASS INDEX: 27.72 KG/M2 | HEIGHT: 64 IN | SYSTOLIC BLOOD PRESSURE: 114 MMHG | WEIGHT: 162.4 LBS | DIASTOLIC BLOOD PRESSURE: 76 MMHG

## 2022-02-25 DIAGNOSIS — Z87.59 HISTORY OF POSTPARTUM HEMORRHAGE: ICD-10-CM

## 2022-02-25 DIAGNOSIS — O09.92 SUPERVISION OF HIGH RISK PREGNANCY IN SECOND TRIMESTER: ICD-10-CM

## 2022-02-25 DIAGNOSIS — O09.91 SUPERVISION OF HIGH RISK PREGNANCY IN FIRST TRIMESTER: Primary | ICD-10-CM

## 2022-02-25 DIAGNOSIS — Z87.59 HISTORY OF IUFD: ICD-10-CM

## 2022-02-25 PROCEDURE — 76816 OB US FOLLOW-UP PER FETUS: CPT | Mod: 26 | Performed by: OBSTETRICS & GYNECOLOGY

## 2022-02-25 PROCEDURE — 99207 PR PRENATAL VISIT: CPT | Performed by: ADVANCED PRACTICE MIDWIFE

## 2022-02-25 PROCEDURE — 76816 OB US FOLLOW-UP PER FETUS: CPT

## 2022-02-25 PROCEDURE — G0463 HOSPITAL OUTPT CLINIC VISIT: HCPCS

## 2022-02-25 NOTE — LETTER
2022       RE: Pam Syed  [address] [facility] [address]     Dear Colleague,    Thank you for referring your patient, Pam Syed, to the [facility] [place] at [facility]. Please see a copy of my visit note below.     32 year old, , 29w5d, here with Nilson for her EOB visit    Completed EOB labs 22  GCT: 94 RPR: Nonreactive  CBC Hgb: 10.1, Vitamin D: 28 (encouraged to take 5,000 international unit(s) daily) and Tdap administered  Pam has not received her flu vaccine, is still considering not ready today  Pam has not received her COVID booster, is still considering not ready today  O positive bloodtype  Urine culture: negative.    Growth every 4 weeks, weekly BPP starting at 32 weeks for hx of IUFD  Pam had an ultrasound this mornin22 EFW 55%, normal ROSALIA, cephalic. Feeling reassured     Pam is taking an iron supplement with vitamin C and B12, once daily     Pam presents with the following concerns: heartburn that is worse during the day. She has taken TUMS in last pregnancy and said it was not helpful. Is eating smaller and more frequent meals.    Pam is tearful today discussing her losses. She has a therapist but has yet set up an appointment.     Nilson has noticed that Pam's face appears more swollen. Pam has not noticed any change. She did have some facial swelling in her previous pregnancy. No HA, visual changes, RUQ pain    PHQ-9 SCORE 10/11/2018 5/10/2019 2019   PHQ-9 Total Score 6 2 1     Education completed today includes breast feeding, [facility] hand out, contraception, counting movements, signs of pre-term labor, when to present to birthplace, post partum depression, GBS, getting enough iron, labor induction, nitrous oxide, doulas, vitamin K and hypertension disorders.  Birth preferences reviewed: Would like to try natural with no medications  Labor support:   "    Feeding plans :    Contraception planned:  Unsure, \"just want to get through this pregnancy\"  The following labs were ordered today: none  Water birth consent form was not given.    Blood type:   ABO   Date Value Ref Range Status   2021 O  Final     RH(D)   Date Value Ref Range Status   2021 Pos  Final     Antibody Screen   Date Value Ref Range Status   10/27/2021 Negative Negative Final   2021 Neg  Final   Rhogam was not given.  TDAP was previously given.    A/P:    Encounter Diagnoses   Name Primary?     Supervision of high risk pregnancy in first trimester Yes     History of IUFD      Orders Placed This Encounter   Procedures     US OB Fetal Biophys Prf wo NonStrs Singls Sgl     Encouraged small frequent meals and sitting up after eating to manage heartburn  Discussed low suspicion for preeclampsia due to normal Pre-eclampsia labs last visit and normal BP today. Directed her to notify clinic if she experiences any burred vison or headaches.   Recommended hydrating, tylenol as needed for mild headaches  Discussed plan for weekly BPP's after 32 weeks, IOL at 38 weeks, and counting fetal movements at home.   Will plan to remove cervical cerclage between 36-37 weeks  She was encouraged to set up an appointment with her mental health therapist.  Return in two weeks, BPP scheduled.     As a student nurse practitioner, I participated in the documented services with direct supervision from the provider below.   GENO OlearyN, RN, DNP student      Lupe Buchanan CNM      "

## 2022-03-14 ENCOUNTER — ANCILLARY PROCEDURE (OUTPATIENT)
Dept: ULTRASOUND IMAGING | Facility: CLINIC | Age: 32
End: 2022-03-14
Attending: ADVANCED PRACTICE MIDWIFE
Payer: COMMERCIAL

## 2022-03-14 DIAGNOSIS — Z87.59 HISTORY OF IUFD: ICD-10-CM

## 2022-03-14 PROCEDURE — 76819 FETAL BIOPHYS PROFIL W/O NST: CPT

## 2022-03-14 PROCEDURE — 76819 FETAL BIOPHYS PROFIL W/O NST: CPT | Mod: 26 | Performed by: OBSTETRICS & GYNECOLOGY

## 2022-03-25 ENCOUNTER — ANCILLARY PROCEDURE (OUTPATIENT)
Dept: ULTRASOUND IMAGING | Facility: CLINIC | Age: 32
End: 2022-03-25
Attending: ADVANCED PRACTICE MIDWIFE
Payer: COMMERCIAL

## 2022-03-25 ENCOUNTER — TELEPHONE (OUTPATIENT)
Dept: OBGYN | Facility: CLINIC | Age: 32
End: 2022-03-25

## 2022-03-25 PROCEDURE — 76819 FETAL BIOPHYS PROFIL W/O NST: CPT

## 2022-03-25 PROCEDURE — 76819 FETAL BIOPHYS PROFIL W/O NST: CPT | Mod: 26 | Performed by: OBSTETRICS & GYNECOLOGY

## 2022-03-25 NOTE — TELEPHONE ENCOUNTER
Pt in clinic for BPP. Reports symptoms of vaginal yeast infection. Reports vaginal/vulvar itching and irritation, mild whitish discharge. Denies s/s of UTI.     Recommended Monistat 2% OTC 7 day treatment per protocol. Pt states she has used this before with good results and symptom relief. Instructed her to call clinic if symptoms persist after treatment. Pt verbalized understanding and agreement.

## 2022-03-31 ENCOUNTER — OFFICE VISIT (OUTPATIENT)
Dept: OBGYN | Facility: CLINIC | Age: 32
End: 2022-03-31
Attending: ADVANCED PRACTICE MIDWIFE
Payer: COMMERCIAL

## 2022-03-31 ENCOUNTER — ANCILLARY PROCEDURE (OUTPATIENT)
Dept: ULTRASOUND IMAGING | Facility: CLINIC | Age: 32
End: 2022-03-31
Attending: ADVANCED PRACTICE MIDWIFE
Payer: COMMERCIAL

## 2022-03-31 VITALS
WEIGHT: 164.7 LBS | SYSTOLIC BLOOD PRESSURE: 122 MMHG | HEIGHT: 64 IN | DIASTOLIC BLOOD PRESSURE: 80 MMHG | BODY MASS INDEX: 28.12 KG/M2 | HEART RATE: 98 BPM

## 2022-03-31 DIAGNOSIS — Z87.59 HISTORY OF IUFD: ICD-10-CM

## 2022-03-31 DIAGNOSIS — O09.93 HIGH-RISK PREGNANCY IN THIRD TRIMESTER: Primary | ICD-10-CM

## 2022-03-31 PROBLEM — R10.2 PELVIC PRESSURE IN FEMALE: Status: RESOLVED | Noted: 2021-12-03 | Resolved: 2022-03-31

## 2022-03-31 PROCEDURE — G0463 HOSPITAL OUTPT CLINIC VISIT: HCPCS | Mod: 25

## 2022-03-31 PROCEDURE — 99207 PR PRENATAL VISIT: CPT | Performed by: ADVANCED PRACTICE MIDWIFE

## 2022-03-31 PROCEDURE — 76819 FETAL BIOPHYS PROFIL W/O NST: CPT

## 2022-03-31 PROCEDURE — 76819 FETAL BIOPHYS PROFIL W/O NST: CPT | Mod: 26 | Performed by: OBSTETRICS & GYNECOLOGY

## 2022-03-31 NOTE — PROGRESS NOTES
"Subjective:     Pam Syed is a 32 year old  at 34w4d presents for a routine prenatal appointment.  Denies vaginal bleeding,  leakage of fluid, or change in vaginal discharge.  Has had [place] contractions.  Positive for fetal movement.       No HA, visual changes, RUQ or epigastric pain.   Patient concerns: Feeling well overall.     Was treated last week for yeast infection with 7 days of monistat. No longer having symptoms.     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 Reports she sometimes has trouble sleeping if she doesn't feel fetal movement for a period of time. Has a therapist but has not made an appointment.   Reviewed BPP  today    Objective:  Vitals:    22 0933   BP: 122/80   BP Location: Left arm   Pulse: 98   Weight: 74.7 kg (164 lb 11.2 oz)   Height: 1.626 m (5' 4\")    See OB flowsheet    BPP 3/3/22: , cephalic, MVP 5.2    Assessment/Plan     Encounter Diagnoses   Name Primary?     High-risk pregnancy in third trimester Yes     History of IUFD      Orders Placed This Encounter   Procedures     US OB Fetal Biophys Prf wo NonStrs Singls Sgl     US OB Fetal Biophys Prf wo NonStrs Singls Sgl     US OB Fetal Biophys Prf wo NonStrs Singls Sgl     US OB Fetal Biophys Prf wo NonStrs Singls Sgl     US OB Follow Up >14 Weeks         PHQ-9 SCORE 10/11/2018 5/10/2019 2019   PHQ-9 Total Score 6 2 1       -Recommended making an appointment with her therapist for support. Also discussed support groups.   -Weekly BPPs, orders placed  -Discussed scheduling cerclage removal to be done at 36-37 weeks  -Plan for induction if no delivery by 38 weeks  -Discussed GBS screening: to be completed at next visit.   -Labor signs discussed. Reinforced daily fetal movement counts.  -Reviewed why/how to contact provider if headache/visual changes/RUQ or epigastric pain, decreased fetal movement, vaginal bleeding, leakage of fluid.    Return to clinic in 1 week and prn if questions or " concerns.     I, Melyssa Norton DNP student, am serving as a scribe; to document services personally performed by  Liz Reza CNM based on data collection and the provider's statements to me.     I agree with the PFSH and ROS as completed by Melyssa Norton DNP student except for changes made by me. The remainder of the encounter was performed by me and scribed by Melyssa Norton DNP student. The scribed note accurately reflects my personal services and decisions made by me.   ASHIA Martin CNM

## 2022-03-31 NOTE — LETTER
"3/31/2022       RE: Pam Syed  [address] [address]     Dear Colleague,    Thank you for referring your patient, Pam Syed, to the [facility] [place] at [facility]. Please see a copy of my visit note below.    Subjective:     Pam Syed is a 32 year old  at 34w4d presents for a routine prenatal appointment.  Denies vaginal bleeding,  leakage of fluid, or change in vaginal discharge.  Has had Cheyenne Kohler contractions.  Positive for fetal movement.       No HA, visual changes, RUQ or epigastric pain.   Patient concerns: Feeling well overall.     Was treated last week for yeast infection with 7 days of monistat. No longer having symptoms.     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 Reports she sometimes has trouble sleeping if she doesn't feel fetal movement for a period of time. Has a therapist but has not made an appointment.   Reviewed BPP  today    Objective:  Vitals:    22 0933   BP: 122/80   BP Location: Left arm   Pulse: 98   Weight: 74.7 kg (164 lb 11.2 oz)   Height: 1.626 m (5' 4\")    See OB flowsheet    BPP 3/3/22: 8, cephalic, MVP 5.2    Assessment/Plan     Encounter Diagnoses   Name Primary?     High-risk pregnancy in third trimester Yes     History of IUFD      Orders Placed This Encounter   Procedures     US OB Fetal Biophys Prf wo NonStrs Singls Sgl     US OB Fetal Biophys Prf wo NonStrs Singls Sgl     US OB Fetal Biophys Prf wo NonStrs Singls Sgl     US OB Fetal Biophys Prf wo NonStrs Singls Sgl     US OB Follow Up >14 Weeks         PHQ-9 SCORE 10/11/2018 5/10/2019 2019   PHQ-9 Total Score 6 2 1       -Recommended making an appointment with her therapist for support. Also discussed support groups.   -Weekly BPPs, orders placed  -Discussed scheduling cerclage removal to be done at 36-37 weeks  -Plan for induction if no delivery by 38 weeks  -Discussed " GBS screening: to be completed at next visit.   -Labor signs discussed. Reinforced daily fetal movement counts.  -Reviewed why/how to contact provider if headache/visual changes/RUQ or epigastric pain, decreased fetal movement, vaginal bleeding, leakage of fluid.    Return to clinic in 1 week and prn if questions or concerns.     I, Melyssa Norton DNP student, am serving as a scribe; to document services personally performed by  Liz Reza CNM based on data collection and the provider's statements to me.     I agree with the PFSH and ROS as completed by Melyssa Norton DNP student except for changes made by me. The remainder of the encounter was performed by me and scribed by Melyssa Norton DNP student. The scribed note accurately reflects my personal services and decisions made by me.   ASHIA Martin CNM

## 2022-04-03 ENCOUNTER — HOSPITAL ENCOUNTER (OUTPATIENT)
Facility: CLINIC | Age: 32
Discharge: HOME | End: 2022-04-03
Attending: OBSTETRICS & GYNECOLOGY | Admitting: ADVANCED PRACTICE MIDWIFE
Payer: COMMERCIAL

## 2022-04-03 ENCOUNTER — HOSPITAL ENCOUNTER (OUTPATIENT)
Facility: CLINIC | Age: 32
End: 2022-04-03
Admitting: ADVANCED PRACTICE MIDWIFE
Payer: COMMERCIAL

## 2022-04-03 VITALS — SYSTOLIC BLOOD PRESSURE: 113 MMHG | DIASTOLIC BLOOD PRESSURE: 70 MMHG

## 2022-04-03 DIAGNOSIS — B96.89 BACTERIAL VAGINOSIS IN PREGNANCY: ICD-10-CM

## 2022-04-03 DIAGNOSIS — Z36.89 ENCOUNTER FOR TRIAGE IN PREGNANT PATIENT: Primary | ICD-10-CM

## 2022-04-03 DIAGNOSIS — O23.599 BACTERIAL VAGINOSIS IN PREGNANCY: ICD-10-CM

## 2022-04-03 LAB
ALBUMIN UR-MCNC: 20 MG/DL
APPEARANCE UR: CLEAR
BACTERIA #/AREA URNS HPF: ABNORMAL /HPF
BILIRUB UR QL STRIP: NEGATIVE
CLUE CELLS: PRESENT
COLOR UR AUTO: YELLOW
GLUCOSE UR STRIP-MCNC: NEGATIVE MG/DL
HGB UR QL STRIP: NEGATIVE
KETONES UR STRIP-MCNC: NEGATIVE MG/DL
LEUKOCYTE ESTERASE UR QL STRIP: NEGATIVE
MUCOUS THREADS #/AREA URNS LPF: PRESENT /LPF
NITRATE UR QL: NEGATIVE
PH UR STRIP: 6 [PH] (ref 5–7)
RBC URINE: 1 /HPF
SP GR UR STRIP: 1.03 (ref 1–1.03)
SQUAMOUS EPITHELIAL: 4 /HPF
TRICHOMONAS, WET PREP: ABNORMAL
UROBILINOGEN UR STRIP-MCNC: NORMAL MG/DL
WBC URINE: 2 /HPF
WBC'S/HIGH POWER FIELD, WET PREP: ABNORMAL
YEAST, WET PREP: ABNORMAL

## 2022-04-03 PROCEDURE — 87086 URINE CULTURE/COLONY COUNT: CPT | Performed by: ADVANCED PRACTICE MIDWIFE

## 2022-04-03 PROCEDURE — 59025 FETAL NON-STRESS TEST: CPT | Mod: 26 | Performed by: ADVANCED PRACTICE MIDWIFE

## 2022-04-03 PROCEDURE — 81003 URINALYSIS AUTO W/O SCOPE: CPT | Performed by: ADVANCED PRACTICE MIDWIFE

## 2022-04-03 PROCEDURE — 87210 SMEAR WET MOUNT SALINE/INK: CPT | Performed by: ADVANCED PRACTICE MIDWIFE

## 2022-04-03 PROCEDURE — G0463 HOSPITAL OUTPT CLINIC VISIT: HCPCS | Mod: 25

## 2022-04-03 PROCEDURE — 59025 FETAL NON-STRESS TEST: CPT

## 2022-04-03 PROCEDURE — 99214 OFFICE O/P EST MOD 30 MIN: CPT | Mod: 25 | Performed by: ADVANCED PRACTICE MIDWIFE

## 2022-04-03 RX ORDER — LIDOCAINE 40 MG/G
CREAM TOPICAL
Status: DISCONTINUED | OUTPATIENT
Start: 2022-04-03 | End: 2022-04-03 | Stop reason: HOSPADM

## 2022-04-03 RX ORDER — METRONIDAZOLE 500 MG/1
500 TABLET ORAL 2 TIMES DAILY
Qty: 14 TABLET | Refills: 0 | Status: SHIPPED | OUTPATIENT
Start: 2022-04-03 | End: 2022-04-10

## 2022-04-03 NOTE — PLAN OF CARE
D: Patient arrived to labor and delivery stating bleeding that she noted this morning on the toilet paper. She has a cerclage in place. No further bleeding noted. Denies ROM, contractions. Admission completed placed on the monitor and Justice Cosby CNM notified of patient arrival. P: Continue to monitor.

## 2022-04-03 NOTE — DISCHARGE INSTRUCTIONS
Discharge Instruction for Undelivered Patients      You were seen for: Bleeding Assessment  We Consulted: Justice Cosby CNM  You had (Test or Medicine):monitoring, cervical exam     Diet:   Drink 8 to 12 glasses of liquids (milk, juice, water) every day.  You may eat meals and snacks.     Activity:  Call your doctor or nurse midwife if your baby is moving less than usual.     Call your provider if you notice:  Swelling in your face or increased swelling in your hands or legs.  Headaches that are not relieved by Tylenol (acetaminophen).  Changes in your vision (blurring: seeing spots or stars.)  Nausea (sick to your stomach) and vomiting (throwing up).   Weight gain of 5 pounds or more per week.  Heartburn that doesn't go away.  Signs of bladder infection: pain when you urinate (use the toilet), need to go more often and more urgently.  The bag of cavanaugh (rupture of membranes) breaks, or you notice leaking in your underwear.  Bright red blood in your underwear.  Abdominal (lower belly) or stomach pain.  For first baby: Contractions (tightening) less than 5 minutes apart for one hour or more.  Second (plus) baby: Contractions (tightening) less than 10 minutes apart and getting stronger.  *If less than 34 weeks: Contractions (tightening) more than 6 times in one hour.  Increase or change in vaginal discharge (note the color and amount)      Follow-up:  As scheduled in the clinic

## 2022-04-03 NOTE — PROGRESS NOTES
HOSPITAL TRIAGE NOTE  ===================    CHIEF COMPLAINT  ========================  Pam Syed is a 32 year old patient presenting today at 35w0d for evaluation of spotting.    No LMP recorded (lmp unknown). Patient is pregnant.  Estimated Date of Delivery: May 8, 2022     HPI  ==================   Pt reports spotting (pinkish-red) this AM when using bathroom, quarter-sized amount on toilet paper.  Pt reports she may have noticed additional spotting in the middle of the night when used bathroom, but unsure.  Denies any painful contractions or cramping, but endorsed abdominal tightening this AM which has since resolved.  Denies any leaking of fluid.  Reports new onset of yellow discharge. Denies itching, odor or dysuria.  Was treated for yeast infection ~1 week ago, symptoms overall resolved.  Reports good fetal movement.      Has history-indicated cerclage in place. Has removal scheduled.     [facility]renatal record and labs reviewed [facility], through [employer] EMR.    CONTRACTIONS: none  ABDOMINAL PAIN: none  FETAL MOVEMENT: active    VAGINAL BLEEDING: spotting  RUPTURE OF MEMBRANES: no  PELVIC PAIN: pressure    PREGNANCY COMPLICATIONS:   OTHER: hx of recurrent pregnancy loss, hx of term IUFD, hx of postpartum pre-eclampsia, hx of PPH    REVIEW OF SYSTEMS  =====================  C: NEGATIVE for fever, chills  I: NEGATIVE for worrisome rashes, moles or lesions  E: NEGATIVE for vision changes or irritation  R: NEGATIVE for significant cough or SOB  CV: NEGATIVE for chest pain, palpitations or varicosities  GI: NEGATIVE for nausea, abdominal pain, heartburn, or change in bowel habits  : NEGATIVE for frequency, dysuria, or hematuria  M: NEGATIVE for significant arthralgias or myalgia  N: NEGATIVE for headache, weakness, dizziness or paresthesias  P: NEGATIVE for changes in mood or affect    PROBLEM LIST  ===============  Patient Active Problem List    Diagnosis Date Noted      Encounter for triage in pregnant patient 2022     Priority: Medium     History of postpartum hemorrhage 2022     Priority: Medium     Plan TXA prior to birth  AMSTL       Anemia 2022     Priority: Medium     Anemia, reviewed supplementation       Hx of cervical incompetence in pregnancy, currently pregnant 2021     Priority: Medium     Supervision of high risk pregnancy in first trimester 10/26/2021     Priority: Medium     [facility] CNM pt  Partner's name: Nilson  '15 [place],  Jay (IUFD),  Beatrice  Recurrent pregnancy loss, IUFD at 39 weeks, hx of shortened cervix and cerclage placement  [x] NOB folder  [x] Dating  [x] First tri screen; declined  [x] QS/AFP declined  [x] Fetal anatomy US ordered  [X] Rubella immune  [X] Varicella immune   [X] Hep B nonimmune  [ ] Pap 10/2018 pap NIL, prefers repeat after birth  [x] Started ASA   [x] NO utox in labor   [x ] COVID vaccine completed ______ booster  _____________________________________  [x ] EOB folder  [ ] PP Contraception plan: If tubal,consent date:  [x] Labor plans: wants to try unmedicated   [x] : will plan Nilson, might consider   [x] Infant feeding plan: breastfeed   [ ] FLU shot   [x] TDAP given   [NA] Rhogam if needed  [NA] TOLAC consent done  [NA] Waterbirth  [x ] GCT, passed  ________________________________________  [ ] OTC PP meds sent  [NA] Planning CS-ERAS pkt         Nausea/vomiting in pregnancy 10/05/2021     Priority: Medium     IV hydration orders placed  Zofran  B6/Unisom       History of IUFD 2021     Priority: Medium     18- IUFD boy from placental abruption.  18: placenta pathology reviewed. Hematoma infarct noted, see formal report.    Per MFM 21  - Weekly BPPs after 32 weeks  - Deliver at 38 weeks  [facility] consultation  recommend serial evaluation of fetal growth every 4 weeks in addition to  weekly  surveillance starting at 32 weeks and delivery at 38 weeks as previously discussed.  She will need cerclage removal between 36-37 weeks.  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  - Urine culture every trimester with aggressive treatment of bacteriuria.  MFM recommend serial evaluation of fetal growth every 4 weeks in addition to  weekly  surveillance starting at 32 weeks and delivery at 38 weeks as previously discussed. She will need cerclage removal between 36-37 weeks.       History of severe pre-eclampsia 2018     Priority: Medium     : baseline labs  Recommendations:  - Continue taking prophylactic aspirin                                        - Recommend baseline HELLP labs                                           - Recommend taking prenatal vitamin                                         - Patient can discontinue progesterone since she has maintained pregnancy to the second trimester already                -  testing with weekly BPP should occur starting at 32 weeks.            Vitamin D deficiency 2018     Priority: Medium     21: 11, pls discuss at next visit, start 5,000 international unit(s) daily--Rx sent       History of recurrent  2018     Priority: Medium     X6. Taking prometrium         HISTORIES  ==============  ALLERGIES:    No Known Allergies  PAST MEDICAL HISTORY  Past Medical History:   Diagnosis Date     Depressive disorder     PPD following fetal loss     Recurrent pregnancy loss (CODE)       Severe pre-eclampsia, postpartum condition or complication 8/25/2018     Varicella      SOCIAL HISTORY  Social History     Socioeconomic History     Marital status:      Spouse name: Nilson     Number of children: 3     Years of education: Not on file     Highest education level: Not on file   Occupational History     Occupation: group home, admin, part time   Tobacco Use     Smoking status: Never Smoker     Smokeless tobacco: Never Used   Substance and Sexual Activity     Alcohol use: No     Drug use: No     Sexual activity: Yes     Partners: Male   Other Topics Concern     Not on file   Social History Narrative    How much exercise per week? Active with kids    How much calcium per day? Some foods       How much caffeine per day? none    How much vitamin D per day? none    Do you/your family wear seatbelts?  Yes    Do you/your family use safety helmets? Yes    Do you/your family use sunscreen? Yes    Do you/your family keep firearms in the home? No    Do you/your family have a smoke detector(s)? Yes        May 17, 2021 Nile Huertas LPN         Social Determinants of Health     Financial Resource Strain: Low Risk      Difficulty of Paying Living Expenses: Not hard at all   Food Insecurity: No Food Insecurity     Worried About Running Out of Food in the Last Year: Never true     Ran Out of Food in the Last Year: Never true   Transportation Needs: No Transportation Needs     Lack of Transportation (Medical): No     Lack of Transportation (Non-Medical): No   Physical Activity: Inactive     Days of Exercise per Week: 0 days     Minutes of Exercise per Session: 0 min   Stress: No Stress Concern Present     Feeling of Stress : Only a little   Social Connections: Not on file   Intimate Partner Violence: Not on file   Housing Stability: Not on file     PARTNER: Nilson    FAMILY HISTORY  Family History   Problem Relation Age of Onset     Asthma Mother      Hypertension Father      Cancer Father         pancreatic      Diabetes No family hx of      Breast Cancer No family hx of      Colon Cancer No family hx of      OB HISTORY  OB History    Para Term  AB Living   9 3 3 0 5 2   SAB IAB Ectopic Multiple Live Births   5 0 0 0 3      # Outcome Date GA Lbr Michael/2nd Weight Sex Delivery Anes PTL Lv   9 Current            8 SAB 2021 10w0d    AB, MISSED         Birth Comments: MAB, baby stopped growing 8 weeks, D & C 11 weeks   7 Term 07/15/19 37w6d 04:04 / 00:09 2.55 kg (5 lb 10 oz) F Vag-Spont None N RAMILA      Birth Comments: PROM, spontaneous labor, 5 hour labor, epidural, 2nd degree, no PPH      Name: DANAE LIRIANO-GINNA      Apgar1: 8  Apgar5: 9   6 Term 18 39w3d 01:16 / 00:20 2.75 kg (6 lb 1 oz) M Vag-Spont None N ND      Birth Comments: IUFD, no cardiac activity on admission prior to delivery, NSVB, intact no PPH, Admitted postpartum for preeclampsia with severe features. She presented to the ED with sudden onset of fatigue, numbness and tingling and shortness of breath.      Complications: Fetal demise > 22 weeks, delivered, current hospitalization      Name: Jay   5 SAB 17 11w0d    AB, MISSED         Birth Comments: BAby stopped growing 11 weeks, D & C at 15 weeks, NL blood loss   4 SAB 2017 5w0d             Birth Comments: SAB. no complication   3 Term 12/03/15 38w5d 03:45 / 01:16 3.11 kg (6 lb 13.7 oz) M Vag-Spont EPI  RAMILA      Birth Comments: PROM, spontaneous labor, 5 hour labor, epidural, 2nd degree, no PPH      Name: Lee Ann      Apgar1: 9  Apgar5: 9   2 SAB 2014 5w0d             Birth Comments: SAB no complications   1 2014 5w0d             Birth Comments: SAB, no complications      Obstetric Comments   2018 Severe Pre E Postpartum after term IUFD 39 weeks   2019: shortened cervix with cerclage, delivered at 37/6 weeks, PPH   Recurrent pregnancy loss     Prenatal Labs:   Lab Results   Component Value Date    ABO O 2021    RH Pos 2021    AS Negative 10/27/2021    HEPBANG  Nonreactive 2021    TREPAB Negative 2018    RUQIGG Positive (A) 10/27/2021    HGB 10.1 (L) 2022     Rubella- immune  ULTRASOUND(s) reviewed: weekly BPPs d/t previous IUFD.   3/31- BPP 8/8, MVP 5.2cm, cephalic    EXAM  ============  /70   LMP  (LMP Unknown)   GENERAL APPEARANCE: healthy, alert and no distress  RESP: normal respiratory effort  CV: regular rates and rhythm  ABDOMEN:  soft, nontender, no epigastric pain  SKIN: no suspicious lesions or rashes  NEURO: Denies headache, blurred vision, other vision changes  PSYCH: mentation appears normal. and affect normal/bright  MS/ LEGS: Reflexes normal bilaterally    CONTRACTIONS: not felt by patient, 1-2 contractions noted on tocometer  FETAL HEART TONES: continuous EFM- baseline 130 with moderate variability and positive accelerations. No decelerations.  NST: REACTIVE    PELVIC EXAM: deferred  SPECULUM EXAM: external os possible 1 cm dilated, no blood in vault but moderate amount of yellow, creamy discharge noted; suture/knot not visible  PRESENTATION: VERTEX  BLOOD: no  DISCHARGE: yellow, creamy    ROM: no  ROMPLUS: not done    LABS: UA, UC and Wet prep  Lab results reviewed- + clue cells and WBCs    DIAGNOSIS  ============  35w0d seen on the Birthplace Triage for spotting with cerclage in place  NST: REACTIVE  Fetal Heart rate tracing:category one  BV in pregnancy     Patient Active Problem List   Diagnosis     History of recurrent      Vitamin D deficiency     History of severe pre-eclampsia     History of cervical cerclage     History of IUFD     Nonimmune to hepatitis B virus     Nausea/vomiting in pregnancy     Supervision of high risk pregnancy in first trimester     Hx of cervical incompetence in pregnancy, currently pregnant     Anemia     History of postpartum hemorrhage     Encounter for triage in pregnant patient     PLAN  ============  Discussed bacterial vaginosis, treatment course, and likely cause of spotting.     Suma consulted, agreed with overall plan of care.  Discussed consideration of betamethasone if symptoms were to continue, worsen, or change- does not recommend at this time.   Discharge to home with PTL instructions per discharge instruction form  Call or return to the [facility] with contractions, cramping, abdominal or pelvic pain, vaginal bleeding, leaking fluid or decreased fetal movement.  Follow up at your next clinic visit- 4/8/2022    ASHIA Gipson CNM     Fetal Non-Stress Test Results    NST Ordered By: ASHIA Gipson CNM       NST Start & Stop Times     NST Results  Fetus A   Baseline Rate: 130  Accelerations: Present  Decelerations: None  Interpretation: reactive

## 2022-04-03 NOTE — PROGRESS NOTES
D: Discharge instructions reviewed with patient and . Understands when to call provider or come to hospital. Patient to follow up as scheduled in the clinic. Discharge to home ambulatory.

## 2022-04-04 LAB — BACTERIA UR CULT: NORMAL

## 2022-04-08 ENCOUNTER — ANCILLARY PROCEDURE (OUTPATIENT)
Dept: ULTRASOUND IMAGING | Facility: CLINIC | Age: 32
End: 2022-04-08
Attending: ADVANCED PRACTICE MIDWIFE
Payer: COMMERCIAL

## 2022-04-08 DIAGNOSIS — O09.93 HIGH-RISK PREGNANCY IN THIRD TRIMESTER: ICD-10-CM

## 2022-04-08 PROCEDURE — 76819 FETAL BIOPHYS PROFIL W/O NST: CPT

## 2022-04-08 PROCEDURE — 76819 FETAL BIOPHYS PROFIL W/O NST: CPT | Mod: 26 | Performed by: OBSTETRICS & GYNECOLOGY

## 2022-04-12 ENCOUNTER — OFFICE VISIT (OUTPATIENT)
Dept: OBGYN | Facility: CLINIC | Age: 32
End: 2022-04-12
Attending: MIDWIFE
Payer: COMMERCIAL

## 2022-04-12 VITALS
DIASTOLIC BLOOD PRESSURE: 77 MMHG | RESPIRATION RATE: 16 BRPM | BODY MASS INDEX: 32.02 KG/M2 | HEART RATE: 98 BPM | WEIGHT: 174 LBS | HEIGHT: 62 IN | SYSTOLIC BLOOD PRESSURE: 115 MMHG

## 2022-04-12 DIAGNOSIS — O09.91 SUPERVISION OF HIGH RISK PREGNANCY IN FIRST TRIMESTER: Primary | ICD-10-CM

## 2022-04-12 DIAGNOSIS — Z87.59 HISTORY OF POSTPARTUM HEMORRHAGE: ICD-10-CM

## 2022-04-12 DIAGNOSIS — E55.9 VITAMIN D DEFICIENCY: Chronic | ICD-10-CM

## 2022-04-12 DIAGNOSIS — Z87.59 HISTORY OF SEVERE PRE-ECLAMPSIA: Chronic | ICD-10-CM

## 2022-04-12 PROCEDURE — 87653 STREP B DNA AMP PROBE: CPT | Performed by: MIDWIFE

## 2022-04-12 PROCEDURE — 99207 PR PRENATAL VISIT: CPT | Performed by: MIDWIFE

## 2022-04-12 PROCEDURE — G0463 HOSPITAL OUTPT CLINIC VISIT: HCPCS

## 2022-04-12 RX ORDER — AMOXICILLIN 250 MG
1 CAPSULE ORAL DAILY
Qty: 100 TABLET | Refills: 0 | Status: SHIPPED | OUTPATIENT
Start: 2022-04-12

## 2022-04-12 RX ORDER — IBUPROFEN 600 MG/1
600 TABLET, FILM COATED ORAL EVERY 6 HOURS PRN
Qty: 60 TABLET | Refills: 0 | Status: ON HOLD | OUTPATIENT
Start: 2022-04-12 | End: 2024-01-19

## 2022-04-12 NOTE — LETTER
"2022       RE: Pam Syed  [address] [place] [address]     Dear Colleague,    Thank you for referring your patient, Pam Syed, to the [facility] [place] at [facility]. Please see a copy of my visit note below.    Subjective:     32 year old  at 36w2d presents for a routine prenatal appointment.  No vaginal bleeding,  leakage of fluid, or change in vaginal discharge. Some Pratik miranda contractions that improve with rest and hydration.   Active fetal movement.       No HA, visual changes, RUQ or epigastric pain.     Patient concerns: Feeling well overall. Left hip pain, she feels like she is having increased hip pain because baby is sitting lower in her pelvis.     - Has Forrest cerclage in place. Scheduled to be removed this week on .   - BV treatment 4/3/22. Symptoms have improved, no symptoms at this time.   - Reviewed GBS swab. Pt consents. Agreeable to CBC at appt later this week on .   - Discussed recommended postpartum medications-  ibuprofen and senna-docusate. Reviewed no use of ibuprofen during pregnancy. [facility]     Objective:  /77   Pulse 98   Resp 16   Ht 1.575 m (5' 2\")   Wt 78.9 kg (174 lb)   LMP  (LMP Unknown)   BMI 31.83 kg/m    See OB flowsheet    Assessment/Plan     Encounter Diagnoses   Name Primary?     Supervision of high risk pregnancy in first trimester Yes     History of postpartum hemorrhage      Vitamin D deficiency      History of severe pre-eclampsia      Orders Placed This Encounter   Procedures     CBC with platelets     Orders Placed This Encounter   Medications     ibuprofen (ADVIL/MOTRIN) 600 MG tablet     Sig: Take 1 tablet (600 mg) by mouth every 6 hours as needed for moderate pain Start after delivery     Dispense:  60 tablet     Refill:  0     senna-docusate (SENOKOT-S/PERICOLACE) 8.6-50 MG tablet     Sig: Take 1 tablet " by mouth daily Start after delivery.     Dispense:  100 tablet     Refill:  0       PHQ-9 SCORE 10/11/2018 5/10/2019 9/9/2019   PHQ-9 Total Score 6 2 1     PHQ 10/11/2018 5/10/2019 9/9/2019   PHQ-9 Total Score 6 2 1   Q9: Thoughts of better off dead/self-harm past 2 weeks Not at all Not at all Not at all     GBS screening: Obtained.    Birth preferences reviewed: Un-Medicated and : Jennifer    Labor signs discussed.   Reinforced daily fetal movement counts.  Reviewed why/how to contact provider if headache/visual changes/RUQ or epigastric pain, decreased fetal movement, vaginal bleeding, leakage of fluid.  - Discussed doing lab work the same day as Forrest cerclage removal on 2/14 pt agreeable to this.   Return for visit in 1 week,   Call prn if questions or concerns.      I, Justice CISNEROS, am serving as a scribe to document services personally performed by CNM based on the provider's statements to me. - Justice CISNEROS     The encounter was performed by me and scribed by the SNM. The scribed note accurately reflects my personal services and decisions made by me.     ASHIA Aranda, ANGELIC

## 2022-04-12 NOTE — PROGRESS NOTES
"Subjective:     32 year old  at 36w2d presents for a routine prenatal appointment.  No vaginal bleeding,  leakage of fluid, or change in vaginal discharge. Some Jerome miranda contractions that improve with rest and hydration.   Active fetal movement.       No HA, visual changes, RUQ or epigastric pain.     Patient concerns: Feeling well overall. Left hip pain, she feels like she is having increased hip pain because baby is sitting lower in her pelvis.     - Has Forrest cerclage in place. Scheduled to be removed this week on .   - BV treatment 4/3/22. Symptoms have improved, no symptoms at this time.   - Reviewed GBS swab. Pt consents. Agreeable to CBC at appt later this week on .   - Discussed recommended postpartum medications-  ibuprofen and senna-docusate. Reviewed no use of ibuprofen during pregnancy. RX sent to [facility] pharmacy     Objective:  /77   Pulse 98   Resp 16   Ht 1.575 m (5' 2\")   Wt 78.9 kg (174 lb)   LMP  (LMP Unknown)   BMI 31.83 kg/m    See OB flowsheet    Assessment/Plan     Encounter Diagnoses   Name Primary?     Supervision of high risk pregnancy in first trimester Yes     History of postpartum hemorrhage      Vitamin D deficiency      History of severe pre-eclampsia      Orders Placed This Encounter   Procedures     CBC with platelets     Orders Placed This Encounter   Medications     ibuprofen (ADVIL/MOTRIN) 600 MG tablet     Sig: Take 1 tablet (600 mg) by mouth every 6 hours as needed for moderate pain Start after delivery     Dispense:  60 tablet     Refill:  0     senna-docusate (SENOKOT-S/PERICOLACE) 8.6-50 MG tablet     Sig: Take 1 tablet by mouth daily Start after delivery.     Dispense:  100 tablet     Refill:  0       PHQ-9 SCORE 10/11/2018 5/10/2019 2019   PHQ-9 Total Score 6 2 1     PHQ 10/11/2018 5/10/2019 2019   PHQ-9 Total Score 6 2 1   Q9: Thoughts of better off dead/self-harm past 2 weeks Not at all Not at all Not at all     GBS screening: " Obtained.    Birth preferences reviewed: Un-Medicated and : Jennifer    Labor signs discussed.   Reinforced daily fetal movement counts.  Reviewed why/how to contact provider if headache/visual changes/RUQ or epigastric pain, decreased fetal movement, vaginal bleeding, leakage of fluid.  - Discussed doing lab work the same day as Forrest cerclage removal on 2/14 pt agreeable to this.   Return for visit in 1 week,   Call prn if questions or concerns.      I, Justice CISNEROS, am serving as a scribe to document services personally performed by CNM based on the provider's statements to me. - Justice CISNEROS     The encounter was performed by me and scribed by the SNM. The scribed note accurately reflects my personal services and decisions made by me.     ASHIA Aranda, ANGELIC

## 2022-04-13 LAB — GP B STREP DNA SPEC QL NAA+PROBE: NEGATIVE

## 2022-04-14 ENCOUNTER — OFFICE VISIT (OUTPATIENT)
Dept: OBGYN | Facility: CLINIC | Age: 32
End: 2022-04-14
Payer: COMMERCIAL

## 2022-04-14 ENCOUNTER — ANCILLARY PROCEDURE (OUTPATIENT)
Dept: ULTRASOUND IMAGING | Facility: CLINIC | Age: 32
End: 2022-04-14
Attending: ADVANCED PRACTICE MIDWIFE
Payer: COMMERCIAL

## 2022-04-14 VITALS
DIASTOLIC BLOOD PRESSURE: 84 MMHG | HEART RATE: 91 BPM | SYSTOLIC BLOOD PRESSURE: 116 MMHG | WEIGHT: 166.2 LBS | BODY MASS INDEX: 30.59 KG/M2 | HEIGHT: 62 IN

## 2022-04-14 DIAGNOSIS — O09.91 SUPERVISION OF HIGH RISK PREGNANCY IN FIRST TRIMESTER: Primary | ICD-10-CM

## 2022-04-14 DIAGNOSIS — O34.33 CERVICAL CERCLAGE SUTURE PRESENT, THIRD TRIMESTER: ICD-10-CM

## 2022-04-14 DIAGNOSIS — O09.93 HIGH-RISK PREGNANCY IN THIRD TRIMESTER: ICD-10-CM

## 2022-04-14 PROCEDURE — 76819 FETAL BIOPHYS PROFIL W/O NST: CPT | Mod: 26 | Performed by: OBSTETRICS & GYNECOLOGY

## 2022-04-14 PROCEDURE — 76819 FETAL BIOPHYS PROFIL W/O NST: CPT

## 2022-04-14 PROCEDURE — G0463 HOSPITAL OUTPT CLINIC VISIT: HCPCS

## 2022-04-14 PROCEDURE — 99207 PR NON-BILLABLE SERV PER CHARTING: CPT | Performed by: STUDENT IN AN ORGANIZED HEALTH CARE EDUCATION/TRAINING PROGRAM

## 2022-04-14 NOTE — PROGRESS NOTES
[facility]    S: Ms. Ginna Syed is a 32 year old  at 36w4d here for cerclage removal.    Patient denies contractions, vaginal bleeding, loss of fluid, decreased fetal movement. Amenable to cerclage removal today.       OB History    Para Term  AB Living   9 3 3 0 5 2   SAB IAB Ectopic Multiple Live Births   5 0 0 0 3      # Outcome Date GA Lbr Michael/2nd Weight Sex Delivery Anes PTL Lv   9 Current            8 SAB 2021 10w0d    AB, MISSED         Birth Comments: MAB, baby stopped growing 8 weeks, D & C 11 weeks   7 Term 07/15/19 37w6d 04:04 / 00:09 2.55 kg (5 lb 10 oz) F Vag-Spont None N RAMILA      Birth Comments: PROM, spontaneous labor, 5 hour labor, epidural, 2nd degree, no PPH      Name: DEANDRA,FEMALE-GINNA      Apgar1: 8  Apgar5: 9   6 Term 18 39w3d 01:16 / 00:20 2.75 kg (6 lb 1 oz) M Vag-Spont None N ND      Birth Comments: IUFD, no cardiac activity on admission prior to delivery, NSVB, intact no PPH, Admitted postpartum for preeclampsia with severe features. She presented to the ED with sudden onset of fatigue, numbness and tingling and shortness of breath.      Complications: Fetal demise > 22 weeks, delivered, current hospitalization      Name: Jay Baker 17 11w0d    AB, MISSED         Birth Comments: BAby stopped growing 11 weeks, D & C at 15 weeks, NL blood loss   4 2017 5w0d             Birth Comments: SAB. no complication   3 Term 12/03/15 38w5d 03:45 / 01:16 3.11 kg (6 lb 13.7 oz) M Vag-Spont EPI  RAMILA      Birth Comments: PROM, spontaneous labor, 5 hour labor, epidural, 2nd degree, no PPH      Name: Claudiastevenson      Apgar1: 9  Apgar5: 9   2 2014 5w0d             Birth Comments: SAB no complications   1 2014 5w0d             Birth Comments: SAB, no complications      Obstetric Comments   2018 Severe Pre E Postpartum after term IUFD 39 weeks   2019: shortened cervix with cerclage, delivered at 37/6 weeks, PPH   Recurrent pregnancy loss       Past  "Medical History:   Diagnosis Date     Depressive disorder     PPD following fetal loss     Recurrent pregnancy loss (CODE)      Severe pre-eclampsia, postpartum condition or complication 2018     Varicella      Past Surgical History:   Procedure Laterality Date     APPENDECTOMY Right      CERCLAGE CERVICAL N/A 3/22/2019    Procedure: CERCLAGE CERVICAL;  Surgeon: Liz Lima MD;  [facility] L[facility]     CERCLAGE CERVICAL N/A 3/22/2019    Procedure: CERCLAGE CERVICAL;  Surgeon: Liz Lima MD;  [facility]: [facility] OR     CERCLAGE CERVICAL N/A 2021    Procedure: CERCLAGE, CERVIX, VAGINAL APPROACH;  Surgeon: Stella Orellana;  [facility] L[facility]D     DILATION AND CURETTAGE SUCTION N/A 2017    Procedure: DILATION AND CURETTAGE SUCTION;  Suction Dilation And Curettage ;  Surgeon: Yolanda Samaniego MD;  [facility] OR     DILATION AND CURETTAGE SUCTION N/A 2021    Procedure: DILATION AND CURETTAGE, UTERUS, USING SUCTION;  Surgeon: Rosanna Ybarra MD;  Location: [facility] OR     DILATION AND CURETTAGE, HYSTEROSCOPY DIAGNOSTIC, COMBINED N/A 2017    Procedure: COMBINED DILATION AND CURETTAGE, HYSTEROSCOPY DIAGNOSTIC;  Operative Hysteroscopy, Dilation and Curettage ;  Surgeon: Eli Pickard MD;  Location: [facility] OR     GYN SURGERY  2017    suction D&C       O:  /84   Pulse 91   Ht 1.575 m (5' 2\")   Wt 75.4 kg (166 lb 3.2 oz)   LMP  (LMP Unknown)   BMI 30.40 kg/m    Gen: Well-appearing, NAD  HEENT: Normocephalic, atraumatic  CV:  Well perfused  Pulm: No increased work of breathing  Abd: Soft, non-tender, non-distended  Ext: No LE edema, extremities warm and well perfused    Pelvic:  Normal appearing external female genitalia. Normal hair distribution. Vagina is without lesions.  discharge. Cervix parous, no lesions, Forrest cerclage in place. Cerclage cut on right side of knot, removed without incident. Minimal bleeding noted.    A/P:  Ms. Pam Syed is a 32 year old  " at 36w4d here for Forrest cerclage removal.    # Forrest Cerclage removal  - History-indicated cerclage with cervical insufficiency  - Cerclage placed 11/17/2021 with Evans Orellana and Julian  - Cerclage removed without issue  - Cervix visually closed after removal  - Return to clinic for routine prenatal visits with CNM weekly.     Justice Tam MD  Obstetrics, Gynecology, and Women's Health  PGY2  3:02 PM 04/14/2022    I was present for the procedure above.  I agree with the note above.

## 2022-04-20 ENCOUNTER — OFFICE VISIT (OUTPATIENT)
Dept: OBGYN | Facility: CLINIC | Age: 32
End: 2022-04-20
Attending: ADVANCED PRACTICE MIDWIFE
Payer: COMMERCIAL

## 2022-04-20 ENCOUNTER — APPOINTMENT (OUTPATIENT)
Dept: LAB | Facility: CLINIC | Age: 32
End: 2022-04-20
Attending: ADVANCED PRACTICE MIDWIFE
Payer: COMMERCIAL

## 2022-04-20 ENCOUNTER — ANCILLARY PROCEDURE (OUTPATIENT)
Dept: ULTRASOUND IMAGING | Facility: CLINIC | Age: 32
End: 2022-04-20
Attending: ADVANCED PRACTICE MIDWIFE
Payer: COMMERCIAL

## 2022-04-20 VITALS
HEART RATE: 94 BPM | SYSTOLIC BLOOD PRESSURE: 114 MMHG | BODY MASS INDEX: 30.86 KG/M2 | DIASTOLIC BLOOD PRESSURE: 80 MMHG | HEIGHT: 62 IN | WEIGHT: 167.7 LBS

## 2022-04-20 DIAGNOSIS — O09.299 HX OF CERVICAL INCOMPETENCE IN PREGNANCY, CURRENTLY PREGNANT: ICD-10-CM

## 2022-04-20 DIAGNOSIS — Z98.890 HISTORY OF CERVICAL CERCLAGE: ICD-10-CM

## 2022-04-20 DIAGNOSIS — Z87.59 HISTORY OF POSTPARTUM HEMORRHAGE: ICD-10-CM

## 2022-04-20 DIAGNOSIS — O09.91 SUPERVISION OF HIGH RISK PREGNANCY IN FIRST TRIMESTER: Primary | ICD-10-CM

## 2022-04-20 DIAGNOSIS — E55.9 VITAMIN D DEFICIENCY: Chronic | ICD-10-CM

## 2022-04-20 DIAGNOSIS — O26.22 HISTORY OF RECURRENT ABORTION, CURRENTLY PREGNANT IN SECOND TRIMESTER: Chronic | ICD-10-CM

## 2022-04-20 DIAGNOSIS — Z78.9 NONIMMUNE TO HEPATITIS B VIRUS: ICD-10-CM

## 2022-04-20 DIAGNOSIS — O21.9 NAUSEA/VOMITING IN PREGNANCY: ICD-10-CM

## 2022-04-20 DIAGNOSIS — D50.8 IRON DEFICIENCY ANEMIA SECONDARY TO INADEQUATE DIETARY IRON INTAKE: ICD-10-CM

## 2022-04-20 DIAGNOSIS — Z87.59 HISTORY OF SEVERE PRE-ECLAMPSIA: Chronic | ICD-10-CM

## 2022-04-20 DIAGNOSIS — O09.93 HIGH-RISK PREGNANCY IN THIRD TRIMESTER: ICD-10-CM

## 2022-04-20 DIAGNOSIS — R35.0 URINARY FREQUENCY: ICD-10-CM

## 2022-04-20 PROBLEM — D69.6 THROMBOCYTOPENIA (H): Status: ACTIVE | Noted: 2022-04-20

## 2022-04-20 LAB
ALBUMIN UR-MCNC: 10 MG/DL
APPEARANCE UR: CLEAR
BILIRUB UR QL STRIP: NEGATIVE
COLOR UR AUTO: YELLOW
ERYTHROCYTE [DISTWIDTH] IN BLOOD BY AUTOMATED COUNT: 16.2 % (ref 10–15)
GLUCOSE UR STRIP-MCNC: NEGATIVE MG/DL
HCT VFR BLD AUTO: 35.7 % (ref 35–47)
HGB BLD-MCNC: 11.8 G/DL (ref 11.7–15.7)
HGB UR QL STRIP: NEGATIVE
KETONES UR STRIP-MCNC: NEGATIVE MG/DL
LEUKOCYTE ESTERASE UR QL STRIP: ABNORMAL
MCH RBC QN AUTO: 28.7 PG (ref 26.5–33)
MCHC RBC AUTO-ENTMCNC: 33.1 G/DL (ref 31.5–36.5)
MCV RBC AUTO: 87 FL (ref 78–100)
MUCOUS THREADS #/AREA URNS LPF: PRESENT /LPF
NITRATE UR QL: NEGATIVE
PH UR STRIP: 5.5 [PH] (ref 5–7)
PLATELET # BLD AUTO: 144 10E3/UL (ref 150–450)
RBC # BLD AUTO: 4.11 10E6/UL (ref 3.8–5.2)
RBC URINE: 1 /HPF
SP GR UR STRIP: 1.03 (ref 1–1.03)
SQUAMOUS EPITHELIAL: 3 /HPF
TRANSITIONAL EPI: <1 /HPF
UROBILINOGEN UR STRIP-MCNC: 3 MG/DL
WBC # BLD AUTO: 7.3 10E3/UL (ref 4–11)
WBC URINE: 2 /HPF

## 2022-04-20 PROCEDURE — 81001 URINALYSIS AUTO W/SCOPE: CPT | Performed by: MIDWIFE

## 2022-04-20 PROCEDURE — 99207 PR PRENATAL VISIT: CPT | Performed by: MIDWIFE

## 2022-04-20 PROCEDURE — 85027 COMPLETE CBC AUTOMATED: CPT | Performed by: MIDWIFE

## 2022-04-20 PROCEDURE — G0463 HOSPITAL OUTPT CLINIC VISIT: HCPCS

## 2022-04-20 PROCEDURE — 76819 FETAL BIOPHYS PROFIL W/O NST: CPT

## 2022-04-20 PROCEDURE — 36415 COLL VENOUS BLD VENIPUNCTURE: CPT | Performed by: MIDWIFE

## 2022-04-20 NOTE — PROGRESS NOTES
"Subjective:     32 year old  at 37w3d presents for a routine prenatal appointment with  and daughter.     More contractions with minor pain in abdomen area. Increased frequency to urinate. Feels like it is harder to walk because baby is low. History of preeclampsia and IUFD at 38 weeks so planning for induction on . Is feeling nauseous but no vomiting.    No vaginal bleeding,  leakage of fluid. Has more vaginal discharge of cream color.  Contractions last for less than 30 seconds. Appropriate fetal movement.         Has HA that is about an hour takes tylenol and it goes away. No  visual changes, RUQ or epigastric pain. Pt reports that her today looked fine.    Patient concerns: Feeling well overall.   -  BPP, normal MVP, cephalic   - Reviewed GBS negative. CBC today, order placed last week.   - Cerclage removed . Pt ready for baby. Has IOL scheduled for  but is hoping for spontaneous labor prior. Requests a membrane sweep today. Reviewed r/b/a.     Objective:  Vitals:    22 0904   BP: 114/80   Pulse: 94   Weight: 76.1 kg (167 lb 11.2 oz)   Height: 1.575 m (5' 2\")    See OB flowsheet    SVE: 1.5cm, 60%, -2, posterior, soft and stretchy. Membrane sweep done at patient's request. tolerated well.     Assessment/Plan     Encounter Diagnoses   Name Primary?     Supervision of high risk pregnancy in first trimester Yes     Nonimmune to hepatitis B virus      History of severe pre-eclampsia      Iron deficiency anemia secondary to inadequate dietary iron intake      Urinary frequency      History of postpartum hemorrhage      History of recurrent       Hx of cervical incompetence in pregnancy, currently pregnant      History of cervical cerclage      Vitamin D deficiency      Nausea/vomiting in pregnancy      Orders Placed This Encounter   Procedures     Routine UA with micro reflex to culture     No orders of the defined types were placed in this encounter.      PHQ-9 SCORE " 10/11/2018 5/10/2019 9/9/2019   PHQ-9 Total Score 6 2 1     PHQ 10/11/2018 5/10/2019 9/9/2019   PHQ-9 Total Score 6 2 1   Q9: Thoughts of better off dead/self-harm past 2 weeks Not at all Not at all Not at all     - GBS screening: Obtained.  - Birth preferences reviewed: Un-Medicated and : Jennifer    - Labor signs discussed.   - Reviewed why/how to contact provider if headache/visual changes/RUQ or epigastric pain, decreased fetal movement, vaginal bleeding, leakage of fluid.  - Will collect UA at lab due to frequency.   - CBC today    Scheduled for induction on 4/24  Plan TXA just prior to birth  Call prn if questions or concerns.     I, Wei Macario, am serving as a scribe; to document services personally performed by  Neida Zamudio based on data collection and the provider's statements to me.     Wei Macario, MS3    The encounter was performed by me and scribed by the SNM. The scribed note accurately reflects my personal services and decisions made by me.     Neida Zaumdio, APRN, CNM

## 2022-04-20 NOTE — LETTER
"2022       RE: Pam Syed  [address] [facility] [address] [facility] [address]     Dear Colleague,    Thank you for referring your patient, Pam Syed, to the [facility] [place] [facility]. Please see a copy of my visit note below.    Subjective:     32 year old  at 37w3d presents for a routine prenatal appointment with  and daughter.     More contractions with minor pain in abdomen area. Increased frequency to urinate. Feels like it is harder to walk because baby is low. History of preeclampsia and IUFD at 38 weeks so planning for induction on . Is feeling nauseous but no vomiting.    No vaginal bleeding,  leakage of fluid. Has more vaginal discharge of cream color.  Contractions last for less than 30 seconds. Appropriate fetal movement.         Has HA that is about an hour takes tylenol and it goes away. No  visual changes, RUQ or epigastric pain. Pt reports that her today looked fine.    Patient concerns: Feeling well overall.   -  BPP, normal MVP, cephalic   - Reviewed GBS negative. CBC today, order placed last week.   - Cerclage removed . Pt ready for baby. Has IOL scheduled for  but is hoping for spontaneous labor prior. Requests a membrane sweep today. Reviewed r/b/a.     Objective:  Vitals:    22 0904   BP: 114/80   Pulse: 94   Weight: 76.1 kg (167 lb 11.2 oz)   Height: 1.575 m (5' 2\")    See OB flowsheet    SVE: 1.5cm, 60%, -2, posterior, soft and stretchy. Membrane sweep done at patient's request. tolerated well.     Assessment/Plan     Encounter Diagnoses   Name Primary?     Supervision of high risk pregnancy in first trimester Yes     Nonimmune to hepatitis B virus      History of severe pre-eclampsia      Iron deficiency anemia secondary to inadequate dietary iron intake      Urinary frequency      History of postpartum hemorrhage      History of recurrent       Hx of " cervical incompetence in pregnancy, currently pregnant      History of cervical cerclage      Vitamin D deficiency      Nausea/vomiting in pregnancy      Orders Placed This Encounter   Procedures     Routine UA with micro reflex to culture     No orders of the defined types were placed in this encounter.      PHQ-9 SCORE 10/11/2018 5/10/2019 9/9/2019   PHQ-9 Total Score 6 2 1     PHQ 10/11/2018 5/10/2019 9/9/2019   PHQ-9 Total Score 6 2 1   Q9: Thoughts of better off dead/self-harm past 2 weeks Not at all Not at all Not at all     - GBS screening: Obtained.  - Birth preferences reviewed: Un-Medicated and : Jennifer    - Labor signs discussed.   - Reviewed why/how to contact provider if headache/visual changes/RUQ or epigastric pain, decreased fetal movement, vaginal bleeding, leakage of fluid.  - Will collect UA at lab due to frequency.   - CBC today    Scheduled for induction on 4/24  Plan TXA just prior to birth  Call prn if questions or concerns.     I, Wei Macario, am serving as a scribe; to document services personally performed by  Neida Zamudio based on data collection and the provider's statements to me.     Wei Macario, MS3    The encounter was performed by me and scribed by the SN. The scribed note accurately reflects my personal services and decisions made by me.     ASHIA Aranda, CNM

## 2022-04-22 ENCOUNTER — HOSPITAL ENCOUNTER (INPATIENT)
Facility: CLINIC | Age: 32
LOS: 1 days | Discharge: HOME | End: 2022-04-24
Attending: MIDWIFE | Admitting: MIDWIFE
Payer: COMMERCIAL

## 2022-04-22 PROCEDURE — G0463 HOSPITAL OUTPT CLINIC VISIT: HCPCS

## 2022-04-23 PROBLEM — Z87.59 HISTORY OF SEVERE PRE-ECLAMPSIA: Chronic | Status: ACTIVE | Noted: 2018-08-25

## 2022-04-23 PROBLEM — Z37.9 NORMAL LABOR: Status: ACTIVE | Noted: 2022-04-23

## 2022-04-23 LAB
ABO/RH(D): NORMAL
ALT SERPL W P-5'-P-CCNC: 15 U/L (ref 0–50)
ANTIBODY SCREEN: NEGATIVE
AST SERPL W P-5'-P-CCNC: 13 U/L (ref 0–45)
CREAT SERPL-MCNC: 0.55 MG/DL (ref 0.52–1.04)
ERYTHROCYTE [DISTWIDTH] IN BLOOD BY AUTOMATED COUNT: 16 % (ref 10–15)
ERYTHROCYTE [DISTWIDTH] IN BLOOD BY AUTOMATED COUNT: 16.3 % (ref 10–15)
GFR SERPL CREATININE-BSD FRML MDRD: >90 ML/MIN/1.73M2
HCT VFR BLD AUTO: 34.5 % (ref 35–47)
HCT VFR BLD AUTO: 35.6 % (ref 35–47)
HGB BLD-MCNC: 11.4 G/DL (ref 11.7–15.7)
HGB BLD-MCNC: 12 G/DL (ref 11.7–15.7)
MCH RBC QN AUTO: 28.8 PG (ref 26.5–33)
MCH RBC QN AUTO: 28.8 PG (ref 26.5–33)
MCHC RBC AUTO-ENTMCNC: 33 G/DL (ref 31.5–36.5)
MCHC RBC AUTO-ENTMCNC: 33.7 G/DL (ref 31.5–36.5)
MCV RBC AUTO: 85 FL (ref 78–100)
MCV RBC AUTO: 87 FL (ref 78–100)
PLATELET # BLD AUTO: 147 10E3/UL (ref 150–450)
PLATELET # BLD AUTO: 147 10E3/UL (ref 150–450)
RBC # BLD AUTO: 3.96 10E6/UL (ref 3.8–5.2)
RBC # BLD AUTO: 4.17 10E6/UL (ref 3.8–5.2)
SARS-COV-2 RNA RESP QL NAA+PROBE: NEGATIVE
SPECIMEN EXPIRATION DATE: NORMAL
T PALLIDUM AB SER QL: NONREACTIVE
WBC # BLD AUTO: 12.8 10E3/UL (ref 4–11)
WBC # BLD AUTO: 8.1 10E3/UL (ref 4–11)

## 2022-04-23 PROCEDURE — 120N000002 HC R&B MED SURG/OB UMMC

## 2022-04-23 PROCEDURE — 0UC97ZZ EXTIRPATION OF MATTER FROM UTERUS, VIA NATURAL OR ARTIFICIAL OPENING: ICD-10-PCS | Performed by: ADVANCED PRACTICE MIDWIFE

## 2022-04-23 PROCEDURE — 86901 BLOOD TYPING SEROLOGIC RH(D): CPT | Performed by: MIDWIFE

## 2022-04-23 PROCEDURE — 88307 TISSUE EXAM BY PATHOLOGIST: CPT | Mod: TC | Performed by: ADVANCED PRACTICE MIDWIFE

## 2022-04-23 PROCEDURE — 82565 ASSAY OF CREATININE: CPT | Performed by: ADVANCED PRACTICE MIDWIFE

## 2022-04-23 PROCEDURE — 250N000011 HC RX IP 250 OP 636: Performed by: MIDWIFE

## 2022-04-23 PROCEDURE — U0005 INFEC AGEN DETEC AMPLI PROBE: HCPCS | Performed by: MIDWIFE

## 2022-04-23 PROCEDURE — 250N000013 HC RX MED GY IP 250 OP 250 PS 637: Performed by: MIDWIFE

## 2022-04-23 PROCEDURE — 86780 TREPONEMA PALLIDUM: CPT | Performed by: MIDWIFE

## 2022-04-23 PROCEDURE — 250N000009 HC RX 250: Performed by: ADVANCED PRACTICE MIDWIFE

## 2022-04-23 PROCEDURE — 84450 TRANSFERASE (AST) (SGOT): CPT | Performed by: ADVANCED PRACTICE MIDWIFE

## 2022-04-23 PROCEDURE — 36415 COLL VENOUS BLD VENIPUNCTURE: CPT | Performed by: ADVANCED PRACTICE MIDWIFE

## 2022-04-23 PROCEDURE — 85027 COMPLETE CBC AUTOMATED: CPT | Performed by: ADVANCED PRACTICE MIDWIFE

## 2022-04-23 PROCEDURE — 250N000013 HC RX MED GY IP 250 OP 250 PS 637: Performed by: ADVANCED PRACTICE MIDWIFE

## 2022-04-23 PROCEDURE — 250N000011 HC RX IP 250 OP 636: Performed by: ADVANCED PRACTICE MIDWIFE

## 2022-04-23 PROCEDURE — 722N000001 HC LABOR CARE VAGINAL DELIVERY SINGLE

## 2022-04-23 PROCEDURE — 59400 OBSTETRICAL CARE: CPT | Performed by: ADVANCED PRACTICE MIDWIFE

## 2022-04-23 PROCEDURE — 250N000009 HC RX 250: Performed by: MIDWIFE

## 2022-04-23 PROCEDURE — 88307 TISSUE EXAM BY PATHOLOGIST: CPT | Mod: 26 | Performed by: PATHOLOGY

## 2022-04-23 PROCEDURE — 84460 ALANINE AMINO (ALT) (SGPT): CPT | Performed by: ADVANCED PRACTICE MIDWIFE

## 2022-04-23 PROCEDURE — 85027 COMPLETE CBC AUTOMATED: CPT | Performed by: MIDWIFE

## 2022-04-23 RX ORDER — OXYTOCIN/0.9 % SODIUM CHLORIDE 30/500 ML
340 PLASTIC BAG, INJECTION (ML) INTRAVENOUS CONTINUOUS PRN
Status: DISCONTINUED | OUTPATIENT
Start: 2022-04-23 | End: 2022-04-23

## 2022-04-23 RX ORDER — LIDOCAINE 40 MG/G
CREAM TOPICAL
Status: DISCONTINUED | OUTPATIENT
Start: 2022-04-23 | End: 2022-04-23 | Stop reason: HOSPADM

## 2022-04-23 RX ORDER — FENTANYL CITRATE 50 UG/ML
INJECTION, SOLUTION INTRAMUSCULAR; INTRAVENOUS
Status: DISCONTINUED
Start: 2022-04-23 | End: 2022-04-23 | Stop reason: HOSPADM

## 2022-04-23 RX ORDER — MODIFIED LANOLIN
OINTMENT (GRAM) TOPICAL
Status: DISCONTINUED | OUTPATIENT
Start: 2022-04-23 | End: 2022-04-24 | Stop reason: HOSPADM

## 2022-04-23 RX ORDER — OXYTOCIN 10 [USP'U]/ML
10 INJECTION, SOLUTION INTRAMUSCULAR; INTRAVENOUS
Status: DISCONTINUED | OUTPATIENT
Start: 2022-04-23 | End: 2022-04-24 | Stop reason: HOSPADM

## 2022-04-23 RX ORDER — NALOXONE HYDROCHLORIDE 0.4 MG/ML
0.2 INJECTION, SOLUTION INTRAMUSCULAR; INTRAVENOUS; SUBCUTANEOUS
Status: DISCONTINUED | OUTPATIENT
Start: 2022-04-23 | End: 2022-04-23

## 2022-04-23 RX ORDER — MISOPROSTOL 200 UG/1
800 TABLET ORAL
Status: DISCONTINUED | OUTPATIENT
Start: 2022-04-23 | End: 2022-04-24 | Stop reason: HOSPADM

## 2022-04-23 RX ORDER — PROCHLORPERAZINE 25 MG
25 SUPPOSITORY, RECTAL RECTAL EVERY 12 HOURS PRN
Status: DISCONTINUED | OUTPATIENT
Start: 2022-04-23 | End: 2022-04-23

## 2022-04-23 RX ORDER — NALOXONE HYDROCHLORIDE 0.4 MG/ML
0.4 INJECTION, SOLUTION INTRAMUSCULAR; INTRAVENOUS; SUBCUTANEOUS
Status: DISCONTINUED | OUTPATIENT
Start: 2022-04-23 | End: 2022-04-23

## 2022-04-23 RX ORDER — ONDANSETRON 2 MG/ML
4 INJECTION INTRAMUSCULAR; INTRAVENOUS EVERY 6 HOURS PRN
Status: DISCONTINUED | OUTPATIENT
Start: 2022-04-23 | End: 2022-04-23

## 2022-04-23 RX ORDER — MISOPROSTOL 200 UG/1
TABLET ORAL
Status: DISCONTINUED
Start: 2022-04-23 | End: 2022-04-23 | Stop reason: HOSPADM

## 2022-04-23 RX ORDER — LIDOCAINE 40 MG/G
CREAM TOPICAL
Status: DISCONTINUED | OUTPATIENT
Start: 2022-04-23 | End: 2022-04-23

## 2022-04-23 RX ORDER — OXYTOCIN 10 [USP'U]/ML
INJECTION, SOLUTION INTRAMUSCULAR; INTRAVENOUS
Status: DISCONTINUED
Start: 2022-04-23 | End: 2022-04-23 | Stop reason: HOSPADM

## 2022-04-23 RX ORDER — CITRIC ACID/SODIUM CITRATE 334-500MG
30 SOLUTION, ORAL ORAL
Status: DISCONTINUED | OUTPATIENT
Start: 2022-04-23 | End: 2022-04-23

## 2022-04-23 RX ORDER — TRANEXAMIC ACID 10 MG/ML
1 INJECTION, SOLUTION INTRAVENOUS EVERY 30 MIN PRN
Status: DISCONTINUED | OUTPATIENT
Start: 2022-04-23 | End: 2022-04-24 | Stop reason: HOSPADM

## 2022-04-23 RX ORDER — MISOPROSTOL 200 UG/1
800 TABLET ORAL
Status: DISCONTINUED | OUTPATIENT
Start: 2022-04-23 | End: 2022-04-23

## 2022-04-23 RX ORDER — IBUPROFEN 800 MG/1
800 TABLET, FILM COATED ORAL EVERY 6 HOURS PRN
Status: DISCONTINUED | OUTPATIENT
Start: 2022-04-23 | End: 2022-04-24 | Stop reason: HOSPADM

## 2022-04-23 RX ORDER — ACETAMINOPHEN 325 MG/1
650 TABLET ORAL EVERY 4 HOURS PRN
Status: DISCONTINUED | OUTPATIENT
Start: 2022-04-23 | End: 2022-04-24 | Stop reason: HOSPADM

## 2022-04-23 RX ORDER — OXYTOCIN/0.9 % SODIUM CHLORIDE 30/500 ML
PLASTIC BAG, INJECTION (ML) INTRAVENOUS
Status: DISCONTINUED
Start: 2022-04-23 | End: 2022-04-23 | Stop reason: HOSPADM

## 2022-04-23 RX ORDER — BISACODYL 10 MG
10 SUPPOSITORY, RECTAL RECTAL DAILY PRN
Status: DISCONTINUED | OUTPATIENT
Start: 2022-04-23 | End: 2022-04-24 | Stop reason: HOSPADM

## 2022-04-23 RX ORDER — ONDANSETRON 4 MG/1
4 TABLET, ORALLY DISINTEGRATING ORAL EVERY 6 HOURS PRN
Status: DISCONTINUED | OUTPATIENT
Start: 2022-04-23 | End: 2022-04-23

## 2022-04-23 RX ORDER — OXYTOCIN 10 [USP'U]/ML
10 INJECTION, SOLUTION INTRAMUSCULAR; INTRAVENOUS
Status: DISCONTINUED | OUTPATIENT
Start: 2022-04-23 | End: 2022-04-23

## 2022-04-23 RX ORDER — HYDROCORTISONE 2.5 %
CREAM (GRAM) TOPICAL 3 TIMES DAILY PRN
Status: DISCONTINUED | OUTPATIENT
Start: 2022-04-23 | End: 2022-04-24 | Stop reason: HOSPADM

## 2022-04-23 RX ORDER — IBUPROFEN 800 MG/1
800 TABLET, FILM COATED ORAL
Status: DISCONTINUED | OUTPATIENT
Start: 2022-04-23 | End: 2022-04-23

## 2022-04-23 RX ORDER — CARBOPROST TROMETHAMINE 250 UG/ML
250 INJECTION, SOLUTION INTRAMUSCULAR
Status: DISCONTINUED | OUTPATIENT
Start: 2022-04-23 | End: 2022-04-24 | Stop reason: HOSPADM

## 2022-04-23 RX ORDER — METHYLERGONOVINE MALEATE 0.2 MG/ML
200 INJECTION INTRAVENOUS
Status: DISCONTINUED | OUTPATIENT
Start: 2022-04-23 | End: 2022-04-24 | Stop reason: HOSPADM

## 2022-04-23 RX ORDER — TRANEXAMIC ACID 10 MG/ML
1 INJECTION, SOLUTION INTRAVENOUS EVERY 30 MIN PRN
Status: DISCONTINUED | OUTPATIENT
Start: 2022-04-23 | End: 2022-04-23

## 2022-04-23 RX ORDER — METOCLOPRAMIDE HYDROCHLORIDE 5 MG/ML
10 INJECTION INTRAMUSCULAR; INTRAVENOUS EVERY 6 HOURS PRN
Status: DISCONTINUED | OUTPATIENT
Start: 2022-04-23 | End: 2022-04-23

## 2022-04-23 RX ORDER — CARBOPROST TROMETHAMINE 250 UG/ML
250 INJECTION, SOLUTION INTRAMUSCULAR
Status: DISCONTINUED | OUTPATIENT
Start: 2022-04-23 | End: 2022-04-23

## 2022-04-23 RX ORDER — MISOPROSTOL 200 UG/1
400 TABLET ORAL
Status: DISCONTINUED | OUTPATIENT
Start: 2022-04-23 | End: 2022-04-24 | Stop reason: HOSPADM

## 2022-04-23 RX ORDER — OXYTOCIN/0.9 % SODIUM CHLORIDE 30/500 ML
100-340 PLASTIC BAG, INJECTION (ML) INTRAVENOUS CONTINUOUS PRN
Status: DISCONTINUED | OUTPATIENT
Start: 2022-04-23 | End: 2022-04-23

## 2022-04-23 RX ORDER — FENTANYL CITRATE 50 UG/ML
100 INJECTION, SOLUTION INTRAMUSCULAR; INTRAVENOUS
Status: COMPLETED | OUTPATIENT
Start: 2022-04-23 | End: 2022-04-23

## 2022-04-23 RX ORDER — KETOROLAC TROMETHAMINE 30 MG/ML
30 INJECTION, SOLUTION INTRAMUSCULAR; INTRAVENOUS
Status: DISCONTINUED | OUTPATIENT
Start: 2022-04-23 | End: 2022-04-23

## 2022-04-23 RX ORDER — MISOPROSTOL 200 UG/1
400 TABLET ORAL
Status: DISCONTINUED | OUTPATIENT
Start: 2022-04-23 | End: 2022-04-23

## 2022-04-23 RX ORDER — FENTANYL CITRATE 50 UG/ML
100 INJECTION, SOLUTION INTRAMUSCULAR; INTRAVENOUS
Status: DISCONTINUED | OUTPATIENT
Start: 2022-04-23 | End: 2022-04-23

## 2022-04-23 RX ORDER — DOCUSATE SODIUM 100 MG/1
100 CAPSULE, LIQUID FILLED ORAL DAILY
Status: DISCONTINUED | OUTPATIENT
Start: 2022-04-23 | End: 2022-04-24 | Stop reason: HOSPADM

## 2022-04-23 RX ORDER — METOCLOPRAMIDE 10 MG/1
10 TABLET ORAL EVERY 6 HOURS PRN
Status: DISCONTINUED | OUTPATIENT
Start: 2022-04-23 | End: 2022-04-23

## 2022-04-23 RX ORDER — METHYLERGONOVINE MALEATE 0.2 MG/ML
200 INJECTION INTRAVENOUS
Status: DISCONTINUED | OUTPATIENT
Start: 2022-04-23 | End: 2022-04-23

## 2022-04-23 RX ORDER — PROCHLORPERAZINE MALEATE 10 MG
10 TABLET ORAL EVERY 6 HOURS PRN
Status: DISCONTINUED | OUTPATIENT
Start: 2022-04-23 | End: 2022-04-23

## 2022-04-23 RX ORDER — OXYTOCIN/0.9 % SODIUM CHLORIDE 30/500 ML
340 PLASTIC BAG, INJECTION (ML) INTRAVENOUS CONTINUOUS PRN
Status: DISCONTINUED | OUTPATIENT
Start: 2022-04-23 | End: 2022-04-24 | Stop reason: HOSPADM

## 2022-04-23 RX ORDER — LIDOCAINE HYDROCHLORIDE 10 MG/ML
INJECTION, SOLUTION EPIDURAL; INFILTRATION; INTRACAUDAL; PERINEURAL
Status: DISCONTINUED
Start: 2022-04-23 | End: 2022-04-23 | Stop reason: WASHOUT

## 2022-04-23 RX ADMIN — Medication 340 ML/HR: at 07:38

## 2022-04-23 RX ADMIN — MISOPROSTOL 400 MCG: 200 TABLET ORAL at 08:00

## 2022-04-23 RX ADMIN — ONDANSETRON 4 MG: 2 INJECTION INTRAMUSCULAR; INTRAVENOUS at 08:18

## 2022-04-23 RX ADMIN — ACETAMINOPHEN 650 MG: 325 TABLET ORAL at 18:11

## 2022-04-23 RX ADMIN — IBUPROFEN 800 MG: 800 TABLET, FILM COATED ORAL at 14:59

## 2022-04-23 RX ADMIN — IBUPROFEN 800 MG: 800 TABLET, FILM COATED ORAL at 21:54

## 2022-04-23 RX ADMIN — ACETAMINOPHEN 650 MG: 325 TABLET ORAL at 12:25

## 2022-04-23 RX ADMIN — TRANEXAMIC ACID 1 G: 10 INJECTION, SOLUTION INTRAVENOUS at 07:34

## 2022-04-23 RX ADMIN — FENTANYL CITRATE 100 MCG: 50 INJECTION INTRAMUSCULAR; INTRAVENOUS at 08:52

## 2022-04-23 RX ADMIN — ACETAMINOPHEN 650 MG: 325 TABLET ORAL at 21:54

## 2022-04-23 RX ADMIN — KETOROLAC TROMETHAMINE 30 MG: 30 INJECTION, SOLUTION INTRAMUSCULAR; INTRAVENOUS at 08:03

## 2022-04-23 RX ADMIN — TRANEXAMIC ACID 1 G: 10 INJECTION, SOLUTION INTRAVENOUS at 09:16

## 2022-04-23 NOTE — PROVIDER NOTIFICATION
04/23/22 0753   Vital Signs   Oximeter Heart Rate 96 bpm   BP (!) 165/96   Santiago CNM notified of elevated BP, repeat non severe, labs ordered per CNM, will continue to monitor closely    Addendum 1045, do not need to send urine per CNM, labs WNL

## 2022-04-23 NOTE — PROGRESS NOTES
Blood pressure 106/55, pulse 81, temperature 98.2  F (36.8  C), temperature source Oral, resp. rate 16, not currently breastfeeding.  Patient Vitals for the past 24 hrs:   BP Temp Temp src Pulse Resp   04/23/22 0536 -- 98.2  F (36.8  C) Oral -- --   04/23/22 0330 106/55 98.4  F (36.9  C) Oral 81 16   04/22/22 2335 120/76 98.3  F (36.8  C) Oral 97 18     General appearance: uncomfortable with contractions  Standing and rocking at bedside. Feeling pressure  CONTACTIONS: every 2-4 minutes, moderate, strong and back pain  Pitocin- none,  Antibiotics- none  FETAL HEART TONES: continuous EFM- baseline 140 with moderate variability and intermittent early decelerations.  ROM: clear fluid  PELVIC EXAM:deferred    # Pain Assessment:  Current Pain Score 4/23/2022   Patient currently in pain? yes   Pain location -   Pain descriptors -   - Pam is experiencing pain due to labor. Pain management was discussed with Pam and her family and the plan was created in a collaborative fashion.  Pam's response to the current recommendations: engaged  - comfort measures. Planning unmedicated birth    Results for orders placed or performed during the hospital encounter of 04/22/22   CBC with platelets     Status: Abnormal   Result Value Ref Range    WBC Count 8.1 4.0 - 11.0 10e3/uL    RBC Count 4.17 3.80 - 5.20 10e6/uL    Hemoglobin 12.0 11.7 - 15.7 g/dL    Hematocrit 35.6 35.0 - 47.0 %    MCV 85 78 - 100 fL    MCH 28.8 26.5 - 33.0 pg    MCHC 33.7 31.5 - 36.5 g/dL    RDW 16.3 (H) 10.0 - 15.0 %    Platelet Count 147 (L) 150 - 450 10e3/uL   Asymptomatic COVID-19 Virus (Coronavirus) by PCR Nasopharyngeal     Status: Normal    Specimen: Nasopharyngeal; Swab   Result Value Ref Range    SARS CoV2 PCR Negative Negative    Narrative    Testing was performed using the martin  SARS-CoV-2 & Influenza A/B Assay on the martin  Asha  System.  This test should be ordered for the detection of SARS-COV-2 in individuals who meet SARS-CoV-2 clinical and/or  epidemiological criteria. Test performance is unknown in asymptomatic patients.  This test is for in vitro diagnostic use under the FDA EUA for laboratories certified under CLIA to perform moderate and/or high complexity testing. This test has not been FDA cleared or approved.  A negative test does not rule out the presence of PCR inhibitors in the specimen or target RNA in concentration below the limit of detection for the assay. The possibility of a false negative should be considered if the patient's recent exposure or clinical presentation suggests COVID-19.  [facility] are certified under the Clinical Laboratory Improvement Amendments of  (CLIA-88) as qualified to perform moderate and/or high complexity laboratory testing.   Adult Type and Screen     Status: None   Result Value Ref Range    ABO/RH(D) O POS     Antibody Screen Negative Negative    SPECIMEN EXPIRATION DATE [id number]    ABO/Rh type and screen     Status: None    Narrative    The following orders were created for panel order ABO/Rh type and screen.  Procedure                               Abnormality         Status                     ---------                               -----------         ------                     Adult Type and Screen[[id number]]                            Final result                 Please view results for these tests on the individual orders.         ASSESSMENT:  ==============  IUP @ 37w6d in active labor   Hx IUFD  Hx of PPH  Hx of Pre-eclampsia  Fetal Heart Rate Tracing category one  GBS- negative  Patient Active Problem List   Diagnosis     History of recurrent      Vitamin D deficiency     History of severe pre-eclampsia     History of cervical cerclage     History of IUFD     Nonimmune to hepatitis B virus     Nausea/vomiting in pregnancy     Supervision of high risk pregnancy in first trimester     Hx of cervical incompetence in pregnancy, currently pregnant     Anemia      History of postpartum hemorrhage     Encounter for triage in pregnant patient     Thrombocytopenia (H)     Normal labor     PLAN:  ===========  Close observation   Plan TXA 30 minutes before birth  Plan close observation of BP postpartum  Offer support services for hx of IUFD  Amy Bowman, APRN CNM

## 2022-04-23 NOTE — PROGRESS NOTES
Blood pressure (!) 130/92, pulse 81, temperature 98.8  F (37.1  C), temperature source Oral, resp. rate 18, not currently breastfeeding.  Patient Vitals for the past 24 hrs:   BP Temp Temp src Pulse Resp   22 0823 (!) 130/92 -- -- -- --   22 0813 131/79 -- -- -- --   22 0808 (!) 145/79 98.8  F (37.1  C) Oral -- 18   22 0753 (!) 165/96 -- -- -- --   22 0745 (!) 148/83 -- -- -- --   22 0738 (!) 153/86 -- -- -- --   22 0630 -- 98.3  F (36.8  C) Oral -- --   22 0536 -- 98.2  F (36.8  C) Oral -- --   22 0330 106/55 98.4  F (36.9  C) Oral 81 16   22 2335 120/76 98.3  F (36.8  C) Oral 97 18     General appearance: still cramping after toradol  No HA/ vision change/ epigastric pain  Called to room for bleeding. FF to massage, small clots and freeflow noted. QBL total now 570cc  .  TXA infused. IV pitocin infusing, SL misoprostol given.    ASSESSMENT:  ============  PPH  Elevated blood pressure postpartum  Patient Active Problem List   Diagnosis     History of recurrent      Vitamin D deficiency     History of severe pre-eclampsia     History of cervical cerclage     History of IUFD     Nonimmune to hepatitis B virus     Nausea/vomiting in pregnancy     Supervision of high risk pregnancy in first trimester     Hx of cervical incompetence in pregnancy, currently pregnant     Anemia     History of postpartum hemorrhage     Encounter for triage in pregnant patient     Thrombocytopenia (H)     Normal labor      (normal spontaneous vaginal delivery)     PLAN:  ===========  Close observation   Continue pitocin  HELLP labs pending  Dr rosa consulted- plan repeat TXA.  Amy Bowman, ASHIA SIMMONSM

## 2022-04-23 NOTE — PLAN OF CARE
Data: Vital signs within normal limits. Postpartum checks within normal limits - see flow record. Patient eating and drinking normally. Patient able to empty bladder independently and is up ambulating. No apparent signs of infection. Patient performing self cares and is able to care for infant.  Action: Patient medicated during the shift for pain and cramping. See MAR. Patient reassessed within 1 hour after each medication and pain was improved - patient stated she was comfortable. Patient education done about room orientation, treatment plan, lochia, breastfeeding, baby temperature,  cares, and self-cares . See flow record.  Response: Positive attachment behaviors observed with infant. Support person present.   Plan: will continue plan of care.

## 2022-04-23 NOTE — PLAN OF CARE
[id number]  VSS and postpartum assessments WDL.  Up ad datne with steady gait. Voiding without difficulty and tolerating regular diet. Bonding well with infant. Breastfeeding on cue with minimal assistance.  Pain managed with tylenol and Ibuprofen. Partner present and supportive.  Will continue with postpartum cares and education per plan of care.

## 2022-04-23 NOTE — CARE PLAN
Pt laboring with spouse attentive at the bedside. VSS. Afebrile. 2 IVs inserted and SL due to hx of PPH.  SROM at 0420 with a moderate amount of clear fluid. Pt aware pain medication available but declines at this time. Assisted pt with change of positions and non pharmacological pain interventions (I.e. heat counter pressure).  SVE via CNM at 0615 6/80/-2. EFM shows cat I tracing. See FS. Plan for TXA ~30 mins before delivery and Pitocin following delivery.

## 2022-04-23 NOTE — L&D DELIVERY NOTE
Delivery Summary  DELIVERY NOTE:  Brief Labor Course: hx of PPH, hx of IUFD, hx of preeclampsia with severe features. GBS neg.   pt arrived in spontaneous labor, progressed steadily to complete while laboring in a variety of positions. SROM clear fluid.  No pain meds. Pushed well x2.  Delivery Note:   TXA started when pt feeling pressure. Completely infused after delivery of baby.   viable male with loose CAN, easily reduced. spont cry and placed on moms abd. IV with pitocin started after delivery of baby. spont abraham placenta delivered intact. Succenturiate lobe noted. Placenta to pathology. Minimal free flow with massage. Small clot expressed 30 minutes after delivery, no free flow. SL misoprostol given. Intact perineum.  Noted to have elevated blood pressures immediately postpartum. One severe range, not repeated. Improved with pain control. HELLP labs ordered  IUP at 37 weeks gestation delivered on 2022.     delivery of a viable Male infant.  Weight : 6 pounds 3 ounces   Apgars of 9 at 1 minute and 9 at 5 minutes.  Labor was spontaneous.  Medications administered  in labor:  Pain Rx None; Antibiotics No; Other   Perineum: Intact  Placenta-mechanism: spontaneous, intact,  with a 3 vessel cord. TXA started before delivery of baby, IV pitocin started after delivery of baby. Misoprostol SL given  Quantitative Blood Loss was pending. EBL 200cc.  Complications of labor and delivery: elevated blood pressure immediately postpartum and Nuchal cord  Anticipated Discharge Date: 22  Birth attendants: ASHIA Clay CNM, CNM MRN# [id number]   Age: 32 year old YOB: 1990     ASSESSMENT & PLAN:        Justen Syed [[id number]]    Labor Event Times    Labor onset date: 22 Onset time:  4:00 AM   Dilation complete date: 22 Complete time:  7:30 AM   Start pushing date/time: 2022 0732      Labor Events      labor?: No   steroids: None  Labor Type: Spontaneous  Predominate monitoring during 1st stage: continuous electronic fetal monitoring     Antibiotics received during labor?: No     Rupture identifier: Sac 1  Rupture date/time: 22   Rupture type: Spontaneous rupture of membranes occuring during spontaneous labor or augmentation  Fluid color: Clear  Fluid odor: Normal     Augmentation: None  1:1 continuous labor support provided by?: RN       Delivery/Placenta Date and Time    Delivery Date: 22 Delivery Time:  7:34 AM   Placenta Date/Time: 2022  7:47 AM  Oxytocin given at the time of delivery: after delivery of baby  Delivering clinician: Nivia Sanford APRN CNM   Other personnel present at delivery:  Provider Role   Nivia Sanford APRN CNM Certified Nurse Midwife   Anne Wei RN Registered Nurse   Shyann Warner RN Registered Nurse         Vaginal Counts     Initial count performed by 2 team members:  Two Team Members   nivia sanford cnm       Needles Suture Needles Sponges (RETIRED) Instruments   Initial counts 2  5    Added to count       Relief counts       Final counts 2  5          Placed during labor Accounted for at the end of labor   FSE NA    IUPC NA    Cervidil NA               Final count performed by 2 team members:  Two Team Members   nivia sanford cnm         Apgars    Living status: Living   1 Minute 5 Minute 10 Minute 15 Minute 20 Minute   Skin color: 1  1       Heart rate: 2  2       Reflex irritability: 2  2       Muscle tone: 2  2       Respiratory effort: 2  2       Total: 9  9       Apgars assigned by: ISIAH WARNER RN     Cord    Cord Complications: Nuchal   Nuchal Intervention: reduced         Nuchal cord description: loose nuchal cord         Cord Blood Disposition: Lab    Gases Sent?: No    Delayed cord clamping?: Yes    Cord Clamping Delay (seconds): >120 seconds    Stem cell collection?: No       [place] Resuscitation   "   Care at Delivery: Spont cry, to mom's abd, dried, pinked up well. Bruised face from rapid descent      Measurements    Weight: 6 lb 3.1 oz Length: 1' 6.5\"   Head circumference: 33 cm       Skin to Skin and Feeding Plan    Skin to skin initiation date/time: 1841    Skin to skin with: Mother  Skin to skin end date/time:        Delivery (Maternal) (Provider to Complete) ([id number])    Episiotomy: None  Perineal lacerations: None    Repair suture: None  Genital tract inspection done: Pos     Blood Loss  Mother: Pam Syed #[id number]   Start of Mother's Information    Delivery Blood Loss  22 0400 - 22 0835    None           End of Mother's Information  Mother: Pam Syed #[id number]          Delivery - Provider to Complete ([id number])    Delivering clinician: Amy Sanford APRN CNM  CNM Care: Exclusive CNM care in labor  Attempted Delivery Types (Choose all that apply): Spontaneous Vaginal Delivery  Delivery Type (Choose the 1 that will go to the Birth History): Vaginal, Spontaneous                   Other personnel:  Provider Role   Amy Sanford APRN CNM Certified Nurse Midwife   Anne Wei RN Registered Nurse   Shyann Ureña RN Registered Nurse                Placenta    Date/Time: 2022  7:47 AM  Removal: Spontaneous  Comments: succenturiate lobe  Disposition: Pathology           Anesthesia    Method: None                Presentation and Position    Presentation: Vertex     Occiput Anterior                 ASHIA Mendoza CNM  "

## 2022-04-23 NOTE — PROGRESS NOTES
Blood pressure 106/55, pulse 81, temperature 98.2  F (36.8  C), temperature source Oral, resp. rate 16, not currently breastfeeding.  Patient Vitals for the past 24 hrs:   BP Temp Temp src Pulse Resp   22 0536 -- 98.2  F (36.8  C) Oral -- --   22 0330 106/55 98.4  F (36.9  C) Oral 81 16   22 2335 120/76 98.3  F (36.8  C) Oral 97 18     General appearance: uncomfortable with contractions. Up walking, using birth ball. Has labor support from  and friend.     CONTRACTIONS: irregular every 3-8 minutes, lasting >120 seconds each, getting stronger   FETAL HEART TONES: continuous EFM- baseline 125 with moderate variability and positive accelerations. Recent early decelerations.  ROM: SROM, clear fluid at 0420  PELVIC EXAM: 6cm, 80%, -2    ASSESSMENT:  ==============  IUP @ 37w6d in active labor   SROM x2 hours, clear  Fetal Heart Rate Tracing category one  GBS- negative  Patient Active Problem List   Diagnosis     History of recurrent      Vitamin D deficiency     History of severe pre-eclampsia     History of cervical cerclage     History of IUFD     Nonimmune to hepatitis B virus     Nausea/vomiting in pregnancy     Supervision of high risk pregnancy in first trimester     Hx of cervical incompetence in pregnancy, currently pregnant     Anemia     History of postpartum hemorrhage     Encounter for triage in pregnant patient     Thrombocytopenia (H)     Normal labor     PLAN:  ===========  Ambulation, hydration, position changes, birthing ball/sling and tub options to facilitate labor.   Pain medication available at patient's request  Plan TXA ~30 minutes prior to delivery and IV Pitocin immediately after. Methergine and miso prn.   Anticipate      ASHIA Aranda CNM

## 2022-04-23 NOTE — PROGRESS NOTES
Blood pressure 106/55, pulse 81, temperature 98.4  F (36.9  C), temperature source Oral, resp. rate 16, not currently breastfeeding.  Patient Vitals for the past 24 hrs:   BP Temp Temp src Pulse Resp   22 0330 106/55 98.4  F (36.9  C) Oral 81 16   22 2335 120/76 98.3  F (36.8  C) Oral 97 18     General appearance: was able to sleep for 1-2 hours. Woke to a gush of fluid. Now more uncomfortable with contractions.    CONTRACTIONS: every 4-8 minutes, lasting >120 seconds each  FETAL HEART TONES: continuous EFM- baseline 135 with moderate variability and positive accelerations. No decelerations.  ROM: SROM, clear fluid at 0420  PELVIC EXAM:deferred    ASSESSMENT:  ==============  IUP @ 37w6d in early labor   SROM since 0420, clear  Fetal Heart Rate Tracing category one  GBS- negative  Patient Active Problem List   Diagnosis     History of recurrent      Vitamin D deficiency     History of severe pre-eclampsia     History of cervical cerclage     History of IUFD     Nonimmune to hepatitis B virus     Nausea/vomiting in pregnancy     Supervision of high risk pregnancy in first trimester     Hx of cervical incompetence in pregnancy, currently pregnant     Anemia     History of postpartum hemorrhage     Encounter for triage in pregnant patient     Thrombocytopenia (H)     Normal labor     PLAN:  ===========  Ambulation, hydration, position changes, birthing ball/sling and tub options to facilitate labor. Pt walking in halls now.  Pain medication available at patient's request  Plan TXA ~30 minutes prior to delivery and IV Pitocin immediately after. Methergine and miso prn.   Anticipate      ASHIA Aranda CNM

## 2022-04-23 NOTE — PLAN OF CARE
0730 Patient feeling pressure, CNM to bedside, revealing patient 10 cm,  of baby boy at 0734, TXA given as precaution for hx of PPH, pitocin given- see MAR, placenta to path

## 2022-04-23 NOTE — PROGRESS NOTES
Blood pressure 117/70, pulse 81, temperature 98.8  F (37.1  C), temperature source Oral, resp. rate 18, unknown if currently breastfeeding.  Patient Vitals for the past 24 hrs:   BP Temp Temp src Pulse Resp   04/23/22 0953 117/70 -- -- -- --   04/23/22 0938 115/72 -- -- -- --   04/23/22 0922 117/75 -- -- -- --   04/23/22 0853 137/83 -- -- -- 18   04/23/22 0838 133/82 -- -- -- 16   04/23/22 0823 (!) 130/92 -- -- -- 18   04/23/22 0813 131/79 -- -- -- 16   04/23/22 0808 (!) 145/79 98.8  F (37.1  C) Oral -- 18   04/23/22 0753 (!) 165/96 -- -- -- 16   04/23/22 0745 (!) 148/83 -- -- -- 18   04/23/22 0738 (!) 153/86 -- -- -- 18   04/23/22 0630 -- 98.3  F (36.8  C) Oral -- --   04/23/22 0536 -- 98.2  F (36.8  C) Oral -- --   04/23/22 0330 106/55 98.4  F (36.9  C) Oral 81 16   04/22/22 2335 120/76 98.3  F (36.8  C) Oral 97 18     General appearance: comfortable  Feeling better. Fundus firm without free flow or clots after second TXA per RN. Going up to postpartum now  HELLP labs wnl    Results for orders placed or performed during the hospital encounter of 04/22/22 (from the past 24 hour(s))   Asymptomatic COVID-19 Virus (Coronavirus) by PCR Nasopharyngeal    Specimen: Nasopharyngeal; Swab   Result Value Ref Range    SARS CoV2 PCR Negative Negative    Narrative    Testing was performed using the martin  SARS-CoV-2 & Influenza A/B Assay on the martin  Asha  System.  This test should be ordered for the detection of SARS-COV-2 in individuals who meet SARS-CoV-2 clinical and/or epidemiological criteria. Test performance is unknown in asymptomatic patients.  This test is for in vitro diagnostic use under the FDA EUA for laboratories certified under CLIA to perform moderate and/or high complexity testing. This test has not been FDA cleared or approved.  A negative test does not rule out the presence of PCR inhibitors in the specimen or target RNA in concentration below the limit of detection for the assay. The possibility of a false  negative should be considered if the patient's recent exposure or clinical presentation suggests COVID-19.  [facility] are certified under the Clinical Laboratory Improvement Amendments of 1988 (CLIA-88) as qualified to perform moderate and/or high complexity laboratory testing.   ABO/Rh type and screen    Narrative    The following orders were created for panel order ABO/Rh type and screen.  Procedure                               Abnormality         Status                     ---------                               -----------         ------                     Adult Type and Screen[[id number]]                            Final result                 Please view results for these tests on the individual orders.   CBC with platelets   Result Value Ref Range    WBC Count 8.1 4.0 - 11.0 10e3/uL    RBC Count 4.17 3.80 - 5.20 10e6/uL    Hemoglobin 12.0 11.7 - 15.7 g/dL    Hematocrit 35.6 35.0 - 47.0 %    MCV 85 78 - 100 fL    MCH 28.8 26.5 - 33.0 pg    MCHC 33.7 31.5 - 36.5 g/dL    RDW 16.3 (H) 10.0 - 15.0 %    Platelet Count 147 (L) 150 - 450 10e3/uL   Adult Type and Screen   Result Value Ref Range    ABO/RH(D) O POS     Antibody Screen Negative Negative    SPECIMEN EXPIRATION DATE [id number]    CBC with platelets   Result Value Ref Range    WBC Count 12.8 (H) 4.0 - 11.0 10e3/uL    RBC Count 3.96 3.80 - 5.20 10e6/uL    Hemoglobin 11.4 (L) 11.7 - 15.7 g/dL    Hematocrit 34.5 (L) 35.0 - 47.0 %    MCV 87 78 - 100 fL    MCH 28.8 26.5 - 33.0 pg    MCHC 33.0 31.5 - 36.5 g/dL    RDW 16.0 (H) 10.0 - 15.0 %    Platelet Count 147 (L) 150 - 450 10e3/uL   ALT   Result Value Ref Range    ALT 15 0 - 50 U/L   AST   Result Value Ref Range    AST 13 0 - 45 U/L   Creatinine   Result Value Ref Range    Creatinine 0.55 0.52 - 1.04 mg/dL    GFR Estimate >90 >60 mL/min/1.73m2       ASSESSMENT:  ==============  Three hours postpartum  PPH   Elevated BPs immediately postpartum resolved. 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  Amy Bowman, ASHIA SIMMONSM

## 2022-04-23 NOTE — PROVIDER NOTIFICATION
Santiago CNM to bedside to assess bleeding, buccal miso given, large clot expressed, fundus firm, txa infused. Will continue to monitor closely and update CNM with concerns. See flowsheets.

## 2022-04-23 NOTE — CARE PLAN
Triage Admit Note  Pam Syed  Gestational Age: 37w5d      Pam Syed presents to L&D with ctx every 4-6 mins. Patient denies, bleeding or ROM. VSS. Afebrile.     ANGELIC Zamudio notified of patient's arrival and condition.   Oriented patient to surroundings. Call light within reach.

## 2022-04-23 NOTE — H&P
"ADMIT NOTE  =================  37w6d    Pam Syed is a 32 year old female with an No LMP recorded (lmp unknown). Patient is pregnant. and Estimated Date of Delivery: May 8, 2022 [facility] at 12:46 AM with in early labor.     HPI  ================  Pam reports mild contractions throughout the day yesterday that became stronger after .     Denies fever, cough, SOB or chest pain. Denies having contact with anyone who is Covid-19 positive. Agreeable to Covid-19 testing.     Contractions- moderate  Fetal movement- active  ROM- no   Vaginal bleeding- none  GBS- negative  FOB- is involved, Nilson  Other labor support- mother     Weight gain- 166 - 141 lbs, Total weight gain- 25 lbs  Height- 62\"  BMI- 25.9  First prenatal visit at 12 weeks, Total visits- 9    PROBLEM LIST  =================  Patient Active Problem List    Diagnosis Date Noted     Normal labor 2022     Priority: Medium     Thrombocytopenia (H) 2022     Priority: Medium     Encounter for triage in pregnant patient 2022     Priority: Medium     History of postpartum hemorrhage 2022     Priority: Medium     Plan TXA prior to birth  AMSTL       Anemia 2022     Priority: Medium     Anemia, reviewed supplementation       Hx of cervical incompetence in pregnancy, currently pregnant 2021     Priority: Medium     Supervision of high risk pregnancy in first trimester 10/26/2021     Priority: Medium     [facility] CNM pt  Partner's name: Nilson  '15 [place], '18 Jay (IUFD), '19 Beatrice  Recurrent pregnancy loss, IUFD at 39 weeks, hx of shortened cervix and cerclage placement  [x] NOB folder  [x] Dating  [x] First tri screen; declined  [x] QS/AFP declined  [x] Fetal anatomy US ordered  [X] Rubella immune  [X] Varicella immune   [X] Hep B nonimmune  [ ] Pap 10/2018 pap NIL, prefers repeat after birth  [x] Started ASA   [x] NO utox in labor   [x ] COVID vaccine completed ______ " booster  _____________________________________  [x ] EOB folder  [ ] PP Contraception plan: If tubal,consent date:  [x] Labor plans: wants to try unmedicated   [x] : will plan Nilson, might consider   [x] Infant feeding plan: breastfeed   [ ] FLU shot   [x] TDAP given   [NA] Rhogam if needed  [NA] TOLAC consent done  [NA] Waterbirth  [x ] GCT, passed  ________________________________________  [ ] OTC PP meds sent  [NA] Planning CS-ERAS pkt         Nausea/vomiting in pregnancy 10/05/2021     Priority: Medium     IV hydration orders placed  Zofran  B6/Unisom       History of IUFD 2021     Priority: Medium     18- IUFD boy from placental abruption.  18: placenta pathology reviewed. Hematoma infarct noted, see formal report.    Per MFM 21  - Weekly BPPs after 32 weeks  - Deliver at 38 weeks  MFM consultation  recommend serial evaluation of fetal growth every 4 weeks in addition to  weekly  surveillance starting at 32 weeks and delivery at 38 weeks as previously discussed. She will need cerclage removal between 36-37 weeks.  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  - Urine culture every trimester with aggressive treatment of bacteriuria.  MFM recommend serial evaluation of fetal growth every 4 weeks in addition to  weekly  surveillance starting at 32 weeks and delivery at 38 weeks as previously discussed. She will need cerclage removal  between 36-37 weeks.       History of severe pre-eclampsia 2018     Priority: Medium     : baseline labs  Recommendations:  - Continue taking prophylactic aspirin                                        - Recommend baseline HELLP labs                                           - Recommend taking prenatal vitamin                                         - Patient can discontinue progesterone since she has maintained pregnancy to the second trimester already                -  testing with weekly BPP should occur starting at 32 weeks.            Vitamin D deficiency 2018     Priority: Medium     /21: 11, pls discuss at next visit, start 5,000 international unit(s) daily--Rx sent       History of recurrent  2018     Priority: Medium     X6. Taking prometrium         HISTORIES  ============  No Known Allergies  Past Medical History:   Diagnosis Date     Depressive disorder     PPD following fetal loss     Recurrent pregnancy loss (CODE)      Severe pre-eclampsia, postpartum condition or complication 2018     Varicella      Past Surgical History:   Procedure Laterality Date     APPENDECTOMY Right      CERCLAGE CERVICAL N/A 3/22/2019    Procedure: CERCLAGE CERVICAL;  Surgeon: Liz Lima MD;  [facility]: [facility]     CERCLAGE CERVICAL N/A 3/22/2019    Procedure: CERCLAGE CERVICAL;  Surgeon: Liz Lima MD;  Location: [facility]     CERCLAGE CERVICAL N/A 2021    Procedure: CERCLAGE, CERVIX, VAGINAL APPROACH;  Surgeon: Stella Orellana;  [facility]: [facility]     DILATION AND CURETTAGE SUCTION N/A 2017    Procedure: DILATION AND CURETTAGE SUCTION;  Suction Dilation And Curettage ;  Surgeon: Yolanda Samaniego MD;  Location[facility]     DILATION AND CURETTAGE SUCTION N/A 2021    Procedure: DILATION AND CURETTAGE, UTERUS, USING SUCTION;  Surgeon: Rosanna Ybarra MD;  Location: [facility]     DILATION AND CURETTAGE, HYSTEROSCOPY DIAGNOSTIC, COMBINED N/A 2017     Procedure: COMBINED DILATION AND CURETTAGE, HYSTEROSCOPY DIAGNOSTIC;  Operative Hysteroscopy, Dilation and Curettage ;  Surgeon: Eli Pickard MD;  Location: [facility] OR     GYN SURGERY  2017    suction D&C   .  Family History   Problem Relation Age of Onset     Asthma Mother      Hypertension Father      Cancer Father         pancreatic     Diabetes No family hx of      Breast Cancer No family hx of      Colon Cancer No family hx of      Social History     Tobacco Use     Smoking status: Never Smoker     Smokeless tobacco: Never Used   Substance Use Topics     Alcohol use: No     OB History    Para Term  AB Living   9 3 3 0 5 2   SAB IAB Ectopic Multiple Live Births   5 0 0 0 3      # Outcome Date GA Lbr Michael/2nd Weight Sex Delivery Anes PTL Lv   9 Current            8 SAB 2021 10w0d    AB, MISSED         Birth Comments: MAB, baby stopped growing 8 weeks, D & C 11 weeks   7 Term 07/15/19 37w6d 04:04 / 00:09 2.55 kg (5 lb 10 oz) F Vag-Spont None N RAMILA      Birth Comments: PROM, spontaneous labor, 5 hour labor, epidural, 2nd degree, no PPH      Name: DANAE LIRIANO-GINNA      Apgar1: 8  Apgar5: 9   6 Term 18 39w3d 01:16 / 00:20 2.75 kg (6 lb 1 oz) M Vag-Spont None N ND      Birth Comments: IUFD, no cardiac activity on admission prior to delivery, NSVB, intact no PPH, Admitted postpartum for preeclampsia with severe features. She presented to the ED with sudden onset of fatigue, numbness and tingling and shortness of breath.      Complications: Fetal demise > 22 weeks, delivered, current hospitalization      Name: Jay   5 SAB 17 11w0d    AB, MISSED         Birth Comments: BAby stopped growing 11 weeks, D & C at 15 weeks, NL blood loss   4 SAB 2017 5w0d             Birth Comments: SAB. no complication   3 Term 12/03/15 38w5d 03:45 / 01:16 3.11 kg (6 lb 13.7 oz) M Vag-Spont EPI  RAMILA      Birth Comments: PROM, spontaneous labor, 5 hour labor, epidural, 2nd degree, no PPH       Name: Lee Ann      Apgar1: 9  Apgar5: 9   2 SAB 07/2014 5w0d             Birth Comments: SAB no complications   1 SAB 03/2014 5w0d             Birth Comments: SAB, no complications      Obstetric Comments   2018 Severe Pre E Postpartum after term IUFD 39 weeks   2019: shortened cervix with cerclage, delivered at 37/6 weeks, PPH   Recurrent pregnancy loss        LABS:   ===========  Prenatal Labs:  Rhogam not indicated   Lab Results   Component Value Date    ABO O 06/04/2021    RH Pos 06/04/2021    AS Negative 10/27/2021    HEPBANG Nonreactive 12/07/2021    TREPAB Negative 01/16/2018    HGB 11.8 04/20/2022     Rubella Immune  GBS negative    Other labs:  No results found for this or any previous visit (from the past 24 hour(s)).    ROS  =========  Pt denies significant respiratory, cardiovacular, GI, or muscular/skeletalcomplaints.    See RN data base ROS.     PHYSICAL EXAM:  ===============  /76 (BP Location: Left arm, Patient Position: Semi-[place]'s, Cuff Size: Adult Regular)   Pulse 97   Temp 98.3  F (36.8  C) (Oral)   Resp 18   LMP  (LMP Unknown)   General appearance: uncomfortable with contractions  GENERAL APPEARANCE: healthy, alert and no distress  RESP: non labored breath  CV: HR 97, warm and pink thorughout  ABDOMEN:  soft, nontender, no epigastric pain  SKIN: no suspicious lesions or rashes  NEURO: Denies headache, blurred vision, other vision changes  PSYCH: mentation appears normal. and affect normal/bright  Legs: No edema     Abdomen: gravid, vertex fetus per Leopold's, non-tender between contractions.   Cephalic presentation confirmed by transverse abdominal BSUS  EFW-  6.75 lbs.   CONTACTIONS: irregular pattern, every 2-8 minutes, palpate mild to moderate with soft resting tone.   FETAL HEART TONES: continuous EFM- baseline 130 with moderate variability and positive accelerations. No decelerations.  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 Discussed admission labs and IV placement. Pt agreeable  Pain medication options of nitrous oxide, fentanyl IV and epidural anesthesia reviewed with pt. Pt wanting to avoid epidural if she can tolerate, but will request it if contractions become unbearable to her.   Pt states she would like AROM augmentation rather than Pitocin if this appears to be a safe option. She is hoping to avoid Pitocin for labor augmentation.   Discussed increased PPH risk. Plan is to have one IV minimum with dose of TXA ~30 minutes prior to birth, then Pitocin with birth of baby. Plan methergine and/or mimsoprostol if additional uterotonic is needed. If Pitocin for labor augmentation, a second IV line should be placed.   Ambulation, hydration, position changes, birthing ball and tub options to facilitate labor reviewed with pt.  Anticipate     ASHIA Aranda CNM    =============

## 2022-04-23 NOTE — PROVIDER NOTIFICATION
04/23/22 0606   Provider Notification   Provider Name/Title ANGELIC Zamudio   Method of Notification Electronic Page   Request Evaluate in Person   Notification Reason Labor Status;Patient Request   Pt feeling constant rectal pressure requesting SVE

## 2022-04-23 NOTE — PLAN OF CARE
Data: Pam Syed transferred to 69 via wheelchair at 1100. Baby transferred via parent's arms.  Action: Receiving unit notified of transfer: Yes. Patient and family notified of room change. Report given to Kerry at 0950. Belongings sent to receiving unit. Accompanied by Registered Nurse. Oriented patient to surroundings. Call light within reach. ID bands double-checked with receiving RN.  Response: Patient tolerated transfer and is stable.

## 2022-04-23 NOTE — PROVIDER NOTIFICATION
04/23/22 0425   Provider Notification   Provider Name/Title ANGELIC Zamudio   Method of Notification Electronic Page   Request Evaluate in Person   Notification Reason Membrane Status   Pt SROM at 0420 feels ctx becoming more intense.

## 2022-04-24 VITALS
OXYGEN SATURATION: 100 % | RESPIRATION RATE: 16 BRPM | TEMPERATURE: 98.3 F | DIASTOLIC BLOOD PRESSURE: 91 MMHG | HEART RATE: 85 BPM | SYSTOLIC BLOOD PRESSURE: 132 MMHG

## 2022-04-24 LAB
ERYTHROCYTE [DISTWIDTH] IN BLOOD BY AUTOMATED COUNT: 16.3 % (ref 10–15)
HCT VFR BLD AUTO: 31.7 % (ref 35–47)
HGB BLD-MCNC: 10.4 G/DL (ref 11.7–15.7)
MCH RBC QN AUTO: 28.9 PG (ref 26.5–33)
MCHC RBC AUTO-ENTMCNC: 32.8 G/DL (ref 31.5–36.5)
MCV RBC AUTO: 88 FL (ref 78–100)
PLATELET # BLD AUTO: 135 10E3/UL (ref 150–450)
RBC # BLD AUTO: 3.6 10E6/UL (ref 3.8–5.2)
WBC # BLD AUTO: 10.2 10E3/UL (ref 4–11)

## 2022-04-24 PROCEDURE — 85027 COMPLETE CBC AUTOMATED: CPT | Performed by: ADVANCED PRACTICE MIDWIFE

## 2022-04-24 PROCEDURE — 36415 COLL VENOUS BLD VENIPUNCTURE: CPT | Performed by: ADVANCED PRACTICE MIDWIFE

## 2022-04-24 PROCEDURE — 250N000013 HC RX MED GY IP 250 OP 250 PS 637: Performed by: ADVANCED PRACTICE MIDWIFE

## 2022-04-24 RX ADMIN — DOCUSATE SODIUM 100 MG: 100 CAPSULE, LIQUID FILLED ORAL at 10:10

## 2022-04-24 RX ADMIN — IBUPROFEN 800 MG: 800 TABLET, FILM COATED ORAL at 10:10

## 2022-04-24 RX ADMIN — IBUPROFEN 800 MG: 800 TABLET, FILM COATED ORAL at 04:09

## 2022-04-24 RX ADMIN — ACETAMINOPHEN 650 MG: 325 TABLET ORAL at 04:09

## 2022-04-24 NOTE — DISCHARGE SUMMARY
[facility] Discharge Summary    Pam Syed MRN# [id number]   Age: 32 year old YOB: 1990     Date of Admission:  2022  Date of Discharge::  2022  Admitting Physician:  ASHIA Saravia CNM  Discharge Physician:  ASHIA Aranda CNM      [facility] clinic: [facility]          Admission Diagnoses:   Encounter for triage in pregnant patient [Z36.89]  Normal labor [O80, Z37.9]   (normal spontaneous vaginal delivery) [O80]          Discharge Diagnosis:     Normal spontaneous vaginal delivery  Intrauterine pregnancy at 37 weeks 6 days gestation          Procedures:     Procedure(s): No additional procedures performed              Medications Prior to Admission:     Medications Prior to Admission   Medication Sig Dispense Refill Last Dose     cholecalciferol (VITAMIN D3) 125 mcg (5000 units) capsule Take 1 capsule (125 mcg) by mouth daily Take one capsule daily. 90 capsule 1 2022 at Unknown time     cyanocobalamin (VITAMIN B-12) 1000 MCG tablet Take 1 tablet (1,000 mcg) by mouth daily 90 tablet 1 Past Week at Unknown time     ferrous sulfate (FEROSUL) 325 (65 Fe) MG tablet Take 1 tablet (325 mg) by mouth daily (with breakfast) 90 tablet 1 2022 at Unknown time     ibuprofen (ADVIL/MOTRIN) 600 MG tablet Take 1 tablet (600 mg) by mouth every 6 hours as needed for moderate pain Start after delivery 60 tablet 0 More than a month at Unknown time     Prenatal Vit-Fe Fumarate-FA (PRENATAL VITAMIN AND MINERAL) 28-0.8 MG TABS Take 1 tablet by mouth daily 90 tablet 3 2022 at Unknown time     senna-docusate (SENOKOT-S/PERICOLACE) 8.6-50 MG tablet Take 1 tablet by mouth daily Start after delivery. 100 tablet 0 Unknown at Unknown time     SENNA-docusate sodium (SENNA S) 8.6-50 MG tablet Take 1 tablet by mouth At Bedtime 90 tablet 3 Unknown at Unknown time     vitamin C (ASCORBIC ACID) 250 MG tablet Take 1 tablet (250 mg) by mouth daily 90 tablet 1 Unknown  at Unknown time     vitamin D3 (CHOLECALCIFEROL) 10 MCG (400 UNIT) capsule Take 1 capsule by mouth daily    4/22/2022 at Unknown time     [DISCONTINUED] aspirin (ASA) 81 MG EC tablet Take 1 tablet (81 mg) by mouth daily 90 tablet 2 4/22/2022 at Unknown time             Discharge Medications:     Current Discharge Medication List      CONTINUE these medications which have NOT CHANGED    Details   !! cholecalciferol (VITAMIN D3) 125 mcg (5000 units) capsule Take 1 capsule (125 mcg) by mouth daily Take one capsule daily.  Qty: 90 capsule, Refills: 1    Associated Diagnoses: Supervision of high risk pregnancy in second trimester      cyanocobalamin (VITAMIN B-12) 1000 MCG tablet Take 1 tablet (1,000 mcg) by mouth daily  Qty: 90 tablet, Refills: 1    Associated Diagnoses: Supervision of high risk pregnancy in second trimester      ferrous sulfate (FEROSUL) 325 (65 Fe) MG tablet Take 1 tablet (325 mg) by mouth daily (with breakfast)  Qty: 90 tablet, Refills: 1    Associated Diagnoses: Supervision of high risk pregnancy in second trimester      ibuprofen (ADVIL/MOTRIN) 600 MG tablet Take 1 tablet (600 mg) by mouth every 6 hours as needed for moderate pain Start after delivery  Qty: 60 tablet, Refills: 0    Associated Diagnoses: Supervision of high risk pregnancy in first trimester      Prenatal Vit-Fe Fumarate-FA (PRENATAL VITAMIN AND MINERAL) 28-0.8 MG TABS Take 1 tablet by mouth daily  Qty: 90 tablet, Refills: 3                     STOP taking these medications       aspirin (ASA) 81 MG EC tablet Comments:   Reason for Stopping:                     Consultations:   No consultations were requested during this admission          Brief History of Labor:   DELIVERY NOTE:  Brief Labor Course: hx of PPH, hx of IUFD, hx of preeclampsia with severe features. GBS neg.   pt arrived in spontaneous labor, progressed steadily to complete while laboring in a variety of positions. SROM clear fluid.  No pain meds. Pushed well  "x2.  Delivery Note:   TXA started when pt feeling pressure. Completely infused after delivery of baby.   viable male with loose CAN, easily reduced. spont cry and placed on moms abd. IV with pitocin started after delivery of baby. spont abraham placenta delivered intact. Succenturiate lobe noted. Placenta to pathology. Minimal free flow with massage. Small clot expressed 30 minutes after delivery, no free flow. SL misoprostol given. Intact perineum.  Noted to have elevated blood pressures immediately postpartum. One severe range, not repeated. Improved with pain control. HELLP labs ordered  IUP at 37 weeks gestation delivered on 2022.     delivery of a viable Male infant- Marcellus.  Weight : 6 pounds 3 ounces   Apgars of 9 at 1 minute and 9 at 5 minutes.  Labor was spontaneous.  Medications administered  in labor:  Pain Rx None; Antibiotics No; Other   Perineum: Intact  Placenta-mechanism: spontaneous, intact,  with a 3 vessel cord. TXA started before delivery of baby, IV pitocin started after delivery of baby. Misoprostol SL given  Quantitative Blood Loss was pending. EBL 200cc.  Complications of labor and delivery: elevated blood pressure immediately postpartum and Nuchal cord  Anticipated Discharge Date: 22  Birth attendants: ASHIA Clay CNM, CNM     Assessment Day of Discharge    Vital signs:  Temp: 98.4  F (36.9  C) Temp src: Oral BP: 121/84 Pulse: 87   Resp: 16 SpO2: 100 % O2 Device: None (Room air)        Estimated body mass index is 30.67 kg/m  as calculated from the following:    Height as of 22: 1.575 m (5' 2\").    Weight as of 22: 76.1 kg (167 lb 11.2 oz).    Baby Boy Yasuzya     Breasts: Soft, filling  Nipples: Intact, Non-tender  Abdomen: Soft, Non-tender    Uterus: Fundus Firm, Non-tender, located 1 fb below the umbilicus   Lochia: Rubra, appropriate amount    Perineum:  Intact   Lower Extremities: No Edema Bilateral, Negative Fidencio's " Sign           Hospital Course:   The patient's hospital course was unremarkable.  On discharge, pain was well controlled. Vaginal bleeding is similar to peak menstrual flow changing pad every 3 hours, denies passing any blood clots.  Voiding without difficulty.  Ambulating well and tolerating a normal diet.  No fever.  Breastfeeding well, feels like baby is getting a deep latch, is not having any pain with breastfeeding.  Infant is stable.  No bowel movement. Mood stable, has  family supports identified.   Pam Syed was discharged on post-partum day #1.    Post-partum hemoglobin:   Hemoglobin   Date Value Ref Range Status   04/23/2022 11.4 (L) 11.7 - 15.7 g/dL Final   06/04/2021 11.6 (L) 11.7 - 15.7 g/dL Final        Rh:Positive, Rhogam Not given   Rubella status:Immune   Plan for contraception: BANKS and condoms   Reviewed warning signs of postpartum, activity level, avoiding IC for 6 weeks, Tub soaks PRN, breast care,  postpartum depression/anxiety.  Reviewed how to establish/maintain milk supply, nipple care, wet/dirty diapers to expect each day, resources prn if questions or concerns.  Pt verbalized understanding with teach back.          Discharge Instructions and Follow-Up:     Discharge diet: Regular, Iron Rich, High Fiber, Minimum 80oz water daily   Discharge activity: Pelvic rest: abstain from intercourse and do not use tampons for 6 week(s)   Discharge follow-up: Follow up with 2 in S weeks  Follow up with 6 in S weeks   Wound care: Perineum Intact  Drink plenty of fluids  Avoid constipation   Sitz bath PRN           Discharge Disposition:     Discharged to home           I, Justice CISNEROS, am serving as a scribe to document services personally performed by CNM based on the provider's statements to me. - Justice CISNEROS     The encounter was performed by me and scribed by the SNM. The scribed note accurately reflects my personal services and decisions made by me.     ASHIA Aranda,  CNM

## 2022-04-24 NOTE — PLAN OF CARE
"  Problem: Plan of Care - These are the overarching goals to be used throughout the patient stay.    Goal: Plan of Care Review/Shift Note  Description: The Plan of Care Review/Shift note should be completed every shift.  The Outcome Evaluation is a brief statement about your assessment that the patient is improving, declining, or no change.  This information will be displayed automatically on your shift note.  4/24/2022 1357 by Melissa Davis RN  Outcome: Met  4/24/2022 1340 by Melissa Davis RN  Outcome: Met  Flowsheets (Taken 4/24/2022 1340)  Plan of Care Reviewed With:   patient   spouse  Overall Patient Progress: improving  Goal: Patient-Specific Goal (Individualized)  Description: You can add care plan individualizations to a care plan. Examples of Individualization might be:  \"Parent requests to be called daily at 9am for status\", \"I have a hard time hearing out of my right ear\", or \"Do not touch me to wake me up as it startles me\".  4/24/2022 1357 by Melissa Davis RN  Outcome: Met  4/24/2022 1340 by Melissa Davis RN  Outcome: Met  Flowsheets (Taken 4/24/2022 1340)  Patient-Specific Goals (Include Timeframe): discharging home on 4/24  Goal: Absence of Hospital-Acquired Illness or Injury  4/24/2022 1357 by Melissa Davis RN  Outcome: Met  4/24/2022 1340 by Melissa Davis RN  Outcome: Met  Intervention: Prevent Skin Injury  Recent Flowsheet Documentation  Taken 4/24/2022 0830 by Melissa Davis RN  Body Position: position changed independently  Intervention: Prevent and Manage VTE (Venous Thromboembolism) Risk  Recent Flowsheet Documentation  Taken 4/24/2022 0830 by Melissa Davis RN  Activity Management: up ad dante  Goal: Optimal Comfort and Wellbeing  4/24/2022 1357 by Melissa Davis RN  Outcome: Met  4/24/2022 1340 by Melissa Davis RN  Outcome: Met  Intervention: Provide Person-Centered Care  Recent Flowsheet Documentation  Taken 4/24/2022 0830 by Melissa Davis RN  Trust " Relationship/Rapport: care explained  Goal: Readiness for Transition of Care  4/24/2022 1357 by Melissa Davis RN  Outcome: Met  4/24/2022 1340 by Melissa Davis RN  Flowsheets (Taken 4/24/2022 1340)  Anticipated Changes Related to Illness: none  Intervention: Mutually Develop Transition Plan  Recent Flowsheet Documentation  Taken 4/24/2022 1340 by Melissa Davis RN  Anticipated Changes Related to Illness: none     Problem: Adjustment to Role Transition (Postpartum Vaginal Delivery)  Goal: Successful Maternal Role Transition  4/24/2022 1357 by Melissa Davis RN  Outcome: Met  4/24/2022 1340 by Melissa Davsi RN  Outcome: Met  Intervention: Support Maternal Role Transition  Recent Flowsheet Documentation  Taken 4/24/2022 0830 by Melissa Davis RN  Supportive Measures: active listening utilized  Parent/Child Attachment Promotion: caring behavior modeled     Problem: Bleeding (Postpartum Vaginal Delivery)  Goal: Hemostasis  4/24/2022 1357 by Melissa Davis RN  Outcome: Met  4/24/2022 1340 by Melissa Davis RN  Outcome: Met     Problem: Infection (Postpartum Vaginal Delivery)  Goal: Absence of Infection Signs/Symptoms  4/24/2022 1357 by Melissa Davis RN  Outcome: Met  4/24/2022 1340 by Melissa Davis RN  Outcome: Met     Problem: Pain (Postpartum Vaginal Delivery)  Goal: Acceptable Pain Control  4/24/2022 1357 by Melissa Davis RN  Outcome: Met  4/24/2022 1340 by Melissa Davis RN  Outcome: Met     Problem: Urinary Retention (Postpartum Vaginal Delivery)  Goal: Effective Urinary Elimination  4/24/2022 1357 by Melissa Davis RN  Outcome: Met  4/24/2022 1340 by Melissa Davis RN  Outcome: Met   Goal Outcome Evaluation:    Plan of Care Reviewed With: patient, spouse     Overall Patient Progress: improving

## 2022-04-24 NOTE — PLAN OF CARE
Goal Outcome Evaluation:    Plan of Care Reviewed With: patient, spouse     Overall Patient Progress: improving  Data: Vital signs stable, postpartum assessments within normal limits.   Eating and drinking without nausea or vomiting.  Up ad dante, and voiding without difficulty. Passing gas/BM.  Feeding baby independently-  breastfeeding  Pain managed with tylenol and ibuprofen. Pt states she is comfortable.  Perineum appears to be healing well, no s/s infection.  Discharge outcomes on care plan met.   Action: Review of care plan, teaching, and discharge instructions done.  Response: Patient states understanding and comfort with her discharge instructions and plan of care. All questions addressed. She will discharge home with baby.

## 2022-04-24 NOTE — PLAN OF CARE
"  Problem: Plan of Care - These are the overarching goals to be used throughout the patient stay.    Goal: Patient-Specific Goal (Individualized)  Description: You can add care plan individualizations to a care plan. Examples of Individualization might be:  \"Parent requests to be called daily at 9am for status\", \"I have a hard time hearing out of my right ear\", or \"Do not touch me to wake me up as it startles me\".  Outcome: Ongoing, Progressing  Flowsheets (Taken 4/24/2022 0333)  Individualized Care Needs: Getting questions answered, reassurance  Anxieties, Fears or Concerns: Concerns about her milk coming in, reassurance and education given  Patient-Specific Goals (Include Timeframe): Hoping to discharge home today, 4/24, to see her kids     Outcome: Ongoing, Progressing     Goal Outcome Evaluation:    Plan of Care Reviewed With: patient, spouse     Overall Patient Progress: improving    Outcome Evaluation: VSS. BP WDL this shift. Denies chest pain, SOB, nausea, dizziness, any pre-e symptoms. Up ad dante and voiding independently. Postpartum assessments WDL. Fundus firm and midline at U/1 with scant bleeding, no clots. Complains of cramping, especially with breastfeeding. Medicated with tylenol and ibuprofen per MAR. Also using hot packs. Pt is independently breastfeeding baby with minimal assistance to help achieve deeper latch. , Nilson, is present and supportive with patient and baby. Continue per POC.      "

## 2022-05-03 ENCOUNTER — TELEPHONE (OUTPATIENT)
Dept: OBGYN | Facility: CLINIC | Age: 32
End: 2022-05-03
Payer: COMMERCIAL

## 2022-05-03 NOTE — TELEPHONE ENCOUNTER
Pt passed a large clot today but has not had any further bleeding.  Discussed with Dr Steawrt - as long as pt does not have any further heavy bleeding or clots she should be ok.  This writer verbalized to the pt and pt verbalized understanding.

## 2022-05-04 LAB
PATH REPORT.COMMENTS IMP SPEC: NORMAL
PATH REPORT.COMMENTS IMP SPEC: NORMAL
PATH REPORT.FINAL DX SPEC: NORMAL
PATH REPORT.GROSS SPEC: NORMAL
PATH REPORT.MICROSCOPIC SPEC OTHER STN: NORMAL
PATH REPORT.RELEVANT HX SPEC: NORMAL
PHOTO IMAGE: NORMAL

## 2022-05-10 ENCOUNTER — VIRTUAL VISIT (OUTPATIENT)
Dept: OBGYN | Facility: CLINIC | Age: 32
End: 2022-05-10
Attending: ADVANCED PRACTICE MIDWIFE
Payer: COMMERCIAL

## 2022-05-10 PROCEDURE — 99207 PR POST PARTUM EXAM: CPT | Mod: TEL | Performed by: ADVANCED PRACTICE MIDWIFE

## 2022-05-10 NOTE — LETTER
"5/10/2022       RE: Pam Syed  [address] [facility] [address]     Dear Colleague,    Thank you for referring your patient, Pam Syed, to the [facility] [place] [facility]. Please see a copy of my visit note below.    The patient has been notified of the following:      \"We have found that certain health care needs can be provided without the need for a face to face visit.  This service lets us provide the care you need with a phone conversation.       I will have full access to your [facility] medical record during this entire phone call.   I will be taking notes for your medical record.      Since this is like an office visit, we will bill your insurance company for this service.       There are potential benefits and risks of telephone visits (e.g. limits to patient confidentiality) that differ from in-person visits.?  Confidentiality still applies for telephone services, and nobody will record the visit.  It is important to be in a quiet, private space that is free of distractions (including cell phone or other devices) during the visit.??      If during the course of the call I believe a telephone visit is not appropriate, you will not be charged for this service\"     Consent has been obtained for this service by care team member: Yes     Subjective:  Pam Syed 32 year old year old female, , who presents for 2 week postpartum follow-up.  Pt had a  of viable boy on 2022.     Breast feeding: exclusively breastfeeding going well, every 1-3 hrs, 8-12 times/24 hours   Abdominal pain: notes mild lower abdominal pain. Passed large clot (tomato-sized), which relieved most of pain.  Still feels muscle soreness.  Denies any heavy bleeding.  Denies any abnormal discharge.   Denies any issues with bladder or bowel.  Denies constipation.    Bleeding since delivery: has been decreasing  Contraception " "choice: condoms  Has been continuing to take prenatal vitamins, iron supplement.  Reports mood is overall well. Does note occasional \"down\" moments, but is still able to find luis with baby and other family members. Has good support from mother and .    Last PAP:   Lab Results   Component Value Date    PAP NIL 10/11/2018     Objective:   Sounds well, not in apparent distress    Assessment:   2 week postpartum   Baby blues, no signs of depression/anxiety    Plan:  Discussed contraceptive plan, planning on condoms.   Reviewed postpartum healing and recovery expectations.  Reviewed relief measures for abdominal pain, including Tylenol, ibuprofen, heating pad, etc.  If pain worsens, to call clinic.  Pap up to date, due 2023  Return to clinic in 4 weeks    ASHIA Gipson CNM    Duration of call: 12.5 minutes      "

## 2022-05-10 NOTE — PROGRESS NOTES
"The patient has been notified of the following:      \"We have found that certain health care needs can be provided without the need for a face to face visit.  This service lets us provide the care you need with a phone conversation.       I will have full access to your [facility] medical record during this entire phone call.   I will be taking notes for your medical record.      Since this is like an office visit, we will bill your insurance company for this service.       There are potential benefits and risks of telephone visits (e.g. limits to patient confidentiality) that differ from in-person visits.?  Confidentiality still applies for telephone services, and nobody will record the visit.  It is important to be in a quiet, private space that is free of distractions (including cell phone or other devices) during the visit.??      If during the course of the call I believe a telephone visit is not appropriate, you will not be charged for this service\"     Consent has been obtained for this service by care team member: Yes     Subjective:  Pam Syed 32 year old year old female, , who presents for 2 week postpartum follow-up.  Pt had a  of viable boy on 2022.     Breast feeding: exclusively breastfeeding going well, every 1-3 hrs, 8-12 times/24 hours   Abdominal pain: notes mild lower abdominal pain. Passed large clot (tomato-sized), which relieved most of pain.  Still feels muscle soreness.  Denies any heavy bleeding.  Denies any abnormal discharge.   Denies any issues with bladder or bowel.  Denies constipation.    Bleeding since delivery: has been decreasing  Contraception choice: condoms  Has been continuing to take prenatal vitamins, iron supplement.  Reports mood is overall well. Does note occasional \"down\" moments, but is still able to find luis with baby and other family members. Has good support from mother and .    Last PAP:   Lab Results   Component Value Date    PAP NIL " 10/11/2018     Objective:   Sounds well, not in apparent distress    Assessment:   2 week postpartum   Baby blues, no signs of depression/anxiety    Plan:  Discussed contraceptive plan, planning on condoms.   Reviewed postpartum healing and recovery expectations.  Reviewed relief measures for abdominal pain, including Tylenol, ibuprofen, heating pad, etc.  If pain worsens, to call clinic.  Pap up to date, due 2023  Return to clinic in 4 weeks    ASHIA Gipson CNM    Duration of call: 12.5 minutes

## 2022-07-13 ENCOUNTER — OFFICE VISIT (OUTPATIENT)
Dept: OBGYN | Facility: CLINIC | Age: 32
End: 2022-07-13
Attending: ADVANCED PRACTICE MIDWIFE
Payer: COMMERCIAL

## 2022-07-13 VITALS
HEART RATE: 72 BPM | WEIGHT: 154.5 LBS | HEIGHT: 62 IN | BODY MASS INDEX: 28.43 KG/M2 | DIASTOLIC BLOOD PRESSURE: 89 MMHG | SYSTOLIC BLOOD PRESSURE: 120 MMHG

## 2022-07-13 DIAGNOSIS — D69.6 THROMBOCYTOPENIA (H): ICD-10-CM

## 2022-07-13 DIAGNOSIS — Z87.59 HISTORY OF POSTPARTUM HEMORRHAGE: ICD-10-CM

## 2022-07-13 DIAGNOSIS — Z78.9 NONIMMUNE TO HEPATITIS B VIRUS: ICD-10-CM

## 2022-07-13 PROCEDURE — G0463 HOSPITAL OUTPT CLINIC VISIT: HCPCS

## 2022-07-13 PROCEDURE — 99207 PR POST PARTUM EXAM: CPT | Performed by: ADVANCED PRACTICE MIDWIFE

## 2022-07-13 ASSESSMENT — ANXIETY QUESTIONNAIRES
5. BEING SO RESTLESS THAT IT IS HARD TO SIT STILL: NOT AT ALL
3. WORRYING TOO MUCH ABOUT DIFFERENT THINGS: SEVERAL DAYS
2. NOT BEING ABLE TO STOP OR CONTROL WORRYING: NOT AT ALL
1. FEELING NERVOUS, ANXIOUS, OR ON EDGE: SEVERAL DAYS
GAD7 TOTAL SCORE: 4
GAD7 TOTAL SCORE: 4
7. FEELING AFRAID AS IF SOMETHING AWFUL MIGHT HAPPEN: SEVERAL DAYS
6. BECOMING EASILY ANNOYED OR IRRITABLE: SEVERAL DAYS

## 2022-07-13 ASSESSMENT — PATIENT HEALTH QUESTIONNAIRE - PHQ9
5. POOR APPETITE OR OVEREATING: NOT AT ALL
SUM OF ALL RESPONSES TO PHQ QUESTIONS 1-9: 2

## 2022-07-13 NOTE — LETTER
"2022       RE: Pam Syed  [address] [facility] [address]     Dear Colleague,    Thank you for referring your patient, Pam Syed, to the [facility] [place] at [facility]. Please see a copy of my visit note below.      Nursing Notes:   Corina Sandoval  2022  2:40 PM  Signed  Chief Complaint   Patient presents with     Prenatal Care     Postpartum Care     6-8 week PP   SUBJECTIVE:   Pam Syed is here for her 6-week postpartum checkup.     PHQ-9 score: 2  Hx of Abuse:  No    Delivery Date: 22   Delivering provider:  Amy Ellison MD.    Type of delivery:  .  Perineum:  in tact.     Delivery complications: None  Infant gender:  boy, weight 6 pounds 3 oz.  Feeding Method:  .  Complications reported with feeding:  none, infant thriving .    Bleeding:  None.  Duration:  5 weeks.  Menses resumed:  Yes   Bowel/Urinary problems:  No    Contraception Planned:  condoms  She  has had intercourse since delivery and experienced  No discomfort.  .         ================================================================  Pam Syed is a 32 year old  is s/p  on 22. Intact perineum laceration, QBL wnl. Baby boy, birthweight 6 lb 3.1 oz. Apgars 9, 9.     Reports she is feeling well. Had resolution of vaginal bleeding postpartum. Had her first period 2 days ago. No perineal or vaginal discomfort. Normal voiding and BM.    Baby boy, \"Yahya,\" is healthy. Now approximately 13lbs. Breastfeeding well. States he \"wants to feed all day long.\" Has also been pumping and incorporating a pacifier. Had cracked nipples at one point but this has resolved. Bought some breast pads that soothe the nipples. Baby has been sleeping well at night- sometimes 3-4 hours.    Reports mood is good. Feels well supported by family. Had here mom and sister here for 2 months and then her sister and " "mother in law. Denies SI/HI or thoughts of self harm. Planning to drive as a family to [place] 8/3-.    Interested in using condoms for contraception.    Last pap 10/11/2018- NIL, negative HPV.    Does not desire a physical exam today.    ROS: 10 point ROS neg other than the symptoms noted above in the HPI.   CONSTITUTIONAL: negative  RESPIRATORY: negative  CARDIOVASCULAR: negative  GI: negative  : negative  MUSCULO-SKELETAL: negative  SKIN: negative  NEUROLOGIC: negative  PSYCHIATRIC: negative    EXAM:  /89   Pulse 72   Ht 1.575 m (5' 2\")   Wt 70.1 kg (154 lb 8 oz)   LMP  (LMP Unknown)   Breastfeeding Yes   BMI 28.26 kg/m      General: healthy, alert and no distress  Psych: negative for sleep disturbance, anxiety, nervous breakdown, depression, thoughts of self-harm, thoughts of hurting someone else, agitation, hallucinations, sexual difficulties, marital problems, abusive relationship, excessive alcohol consumption and illegal drug usage    Physical exam deferred.    ASSESSMENT:   Encounter Diagnoses   Name Primary?     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 Desires to use condoms.   Discussed importance of consistent condom use for pregnancy prevention and use of emergency contraception if needed.  Reviewed recommendations for healthy pregnancy spacing.    - Pap with HPV cotesting due 10/2023.     RTC in 1 year for annual exam.    ASHIA Grubbs, ANGELIC      "

## 2022-07-13 NOTE — PROGRESS NOTES
"  Nursing Notes:   Corina Sandoval  2022  2:40 PM  Signed  Chief Complaint   Patient presents with     Prenatal Care     Postpartum Care     6-8 week PP   SUBJECTIVE:   Pam Syed is here for her 6-week postpartum checkup.     PHQ-9 score: 2  Hx of Abuse:  No    Delivery Date: 22   Delivering provider:  Amy Ellison MD.    Type of delivery:  .  Perineum:  in tact.     Delivery complications: None  Infant gender:  boy, weight 6 pounds 3 oz.  Feeding Method:  .  Complications reported with feeding:  none, infant thriving .    Bleeding:  None.  Duration:  5 weeks.  Menses resumed:  Yes   Bowel/Urinary problems:  No    Contraception Planned:  condoms  She  has had intercourse since delivery and experienced  No discomfort.  .         ================================================================  Pam Syed is a 32 year old  is s/p  on 22. Intact perineum laceration, QBL wnl. Baby boy, birthweight 6 lb 3.1 oz. Apgars 9, 9.     Reports she is feeling well. Had resolution of vaginal bleeding postpartum. Had her first period 2 days ago. No perineal or vaginal discomfort. Normal voiding and BM.    Baby boy, \"Yahya,\" is healthy. Now approximately 13lbs. Breastfeeding well. States he \"wants to feed all day long.\" Has also been pumping and incorporating a pacifier. Had cracked nipples at one point but this has resolved. Bought some breast pads that soothe the nipples. Baby has been sleeping well at night- sometimes 3-4 hours.    Reports mood is good. Feels well supported by family. Had here mom and sister here for 2 months and then her sister and mother in law. Denies SI/HI or thoughts of self harm. Planning to drive as a family to [place] 8/3-.    Interested in using condoms for contraception.    Last pap 10/11/2018- NIL, negative HPV.    Does not desire a physical exam today.    ROS: 10 point ROS neg other than the symptoms noted above in the HPI. " "  CONSTITUTIONAL: negative  RESPIRATORY: negative  CARDIOVASCULAR: negative  GI: negative  : negative  MUSCULO-SKELETAL: negative  SKIN: negative  NEUROLOGIC: negative  PSYCHIATRIC: negative    EXAM:  /89   Pulse 72   Ht 1.575 m (5' 2\")   Wt 70.1 kg (154 lb 8 oz)   LMP  (LMP Unknown)   Breastfeeding Yes   BMI 28.26 kg/m      General: healthy, alert and no distress  Psych: negative for sleep disturbance, anxiety, nervous breakdown, depression, thoughts of self-harm, thoughts of hurting someone else, agitation, hallucinations, sexual difficulties, marital problems, abusive relationship, excessive alcohol consumption and illegal drug usage    Physical exam deferred.    ASSESSMENT:   Encounter Diagnoses   Name Primary?     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 Desires to use condoms.   Discussed importance of consistent condom use for pregnancy prevention and use of emergency contraception if needed.  Reviewed recommendations for healthy pregnancy spacing.    - Pap with HPV cotesting due 10/2023.     RTC in 1 year for annual exam.    ASHIA Grubbs, IRISM    "

## 2022-07-13 NOTE — NURSING NOTE
Chief Complaint   Patient presents with     Prenatal Care     Postpartum Care     6-8 week PP   SUBJECTIVE:   Pam Syed is here for her 6-week postpartum checkup.     PHQ-9 score: 2  Hx of Abuse:  No    Delivery Date: 22   Delivering provider:  Amy Ellison MD.    Type of delivery:  .  Perineum:  in tact.     Delivery complications: None  Infant gender:  boy, weight 6 pounds 3 oz.  Feeding Method:  .  Complications reported with feeding:  none, infant thriving .    Bleeding:  None.  Duration:  5 weeks.  Menses resumed:  Yes   Bowel/Urinary problems:  No    Contraception Planned:  condoms  She  has had intercourse since delivery and experienced  No discomfort.  .

## 2022-07-26 PROBLEM — O09.91 SUPERVISION OF HIGH RISK PREGNANCY IN FIRST TRIMESTER: Status: RESOLVED | Noted: 2021-10-26 | Resolved: 2022-07-26

## 2022-07-26 PROBLEM — Z37.9 NORMAL LABOR: Status: RESOLVED | Noted: 2022-04-23 | Resolved: 2022-07-26

## 2022-07-26 PROBLEM — Z36.89 ENCOUNTER FOR TRIAGE IN PREGNANT PATIENT: Status: RESOLVED | Noted: 2022-04-03 | Resolved: 2022-07-26

## 2022-09-10 ENCOUNTER — HEALTH MAINTENANCE LETTER (OUTPATIENT)
Age: 32
End: 2022-09-10

## 2023-01-09 ENCOUNTER — LAB REQUISITION (OUTPATIENT)
Dept: LAB | Facility: CLINIC | Age: 33
End: 2023-01-09
Payer: COMMERCIAL

## 2023-01-09 DIAGNOSIS — Z00.00 ENCOUNTER FOR GENERAL ADULT MEDICAL EXAMINATION WITHOUT ABNORMAL FINDINGS: ICD-10-CM

## 2023-01-09 LAB
CHOLEST SERPL-MCNC: 170 MG/DL
HDLC SERPL-MCNC: 70 MG/DL
LDLC SERPL CALC-MCNC: 93 MG/DL
NONHDLC SERPL-MCNC: 100 MG/DL
TRIGL SERPL-MCNC: 34 MG/DL

## 2023-01-09 PROCEDURE — 80061 LIPID PANEL: CPT | Mod: ORL | Performed by: INTERNAL MEDICINE

## 2023-02-27 NOTE — PROGRESS NOTES
Pt. And spouse discharging today.  Her blood pressures have been WNL without preeclamptic symptoms.  Her fundus in firm with minimal bleeding.  She denies sob LS CL equal.  Discussed at length with Dr. Shields about future pregnancies and recommendations as she has had several miscarriages and an IUFD.  She and Nilson are agreeable with the plan. She is grieving appropriately and Nilson is very supportive.  She will discharge and follow up with Vida VARGAS CNM and the psychiatrist as well.    Benzoyl Peroxide Pregnancy And Lactation Text: This medication is Pregnancy Category C. It is unknown if benzoyl peroxide is excreted in breast milk.

## 2023-03-02 ENCOUNTER — ANCILLARY PROCEDURE (OUTPATIENT)
Dept: CARDIOLOGY | Facility: CLINIC | Age: 33
End: 2023-03-02
Attending: INTERNAL MEDICINE
Payer: COMMERCIAL

## 2023-03-02 DIAGNOSIS — R01.1 HEART MURMUR: ICD-10-CM

## 2023-03-02 LAB — LVEF ECHO: NORMAL

## 2023-03-02 PROCEDURE — 93306 TTE W/DOPPLER COMPLETE: CPT | Performed by: INTERNAL MEDICINE

## 2023-08-26 ENCOUNTER — OFFICE VISIT (OUTPATIENT)
Dept: URGENT CARE | Facility: URGENT CARE | Age: 33
End: 2023-08-26
Payer: COMMERCIAL

## 2023-08-26 ENCOUNTER — ANCILLARY PROCEDURE (OUTPATIENT)
Dept: GENERAL RADIOLOGY | Facility: CLINIC | Age: 33
End: 2023-08-26
Attending: PHYSICIAN ASSISTANT
Payer: COMMERCIAL

## 2023-08-26 VITALS
OXYGEN SATURATION: 100 % | DIASTOLIC BLOOD PRESSURE: 82 MMHG | BODY MASS INDEX: 27.25 KG/M2 | RESPIRATION RATE: 16 BRPM | SYSTOLIC BLOOD PRESSURE: 139 MMHG | HEART RATE: 78 BPM | TEMPERATURE: 97.6 F | WEIGHT: 149 LBS

## 2023-08-26 DIAGNOSIS — R05.1 ACUTE COUGH: ICD-10-CM

## 2023-08-26 DIAGNOSIS — J20.9 ACUTE BRONCHITIS WITH SYMPTOMS > 10 DAYS: Primary | ICD-10-CM

## 2023-08-26 DIAGNOSIS — Z78.9 BREASTFEEDING (INFANT): ICD-10-CM

## 2023-08-26 DIAGNOSIS — R06.2 WHEEZING: ICD-10-CM

## 2023-08-26 DIAGNOSIS — Z86.2 HISTORY OF THROMBOCYTOPENIA: ICD-10-CM

## 2023-08-26 PROCEDURE — 99204 OFFICE O/P NEW MOD 45 MIN: CPT | Performed by: PHYSICIAN ASSISTANT

## 2023-08-26 PROCEDURE — 71046 X-RAY EXAM CHEST 2 VIEWS: CPT | Mod: TC | Performed by: RADIOLOGY

## 2023-08-26 RX ORDER — AZITHROMYCIN 250 MG/1
TABLET, FILM COATED ORAL
Qty: 6 TABLET | Refills: 0 | Status: SHIPPED | OUTPATIENT
Start: 2023-08-26 | End: 2023-08-31

## 2023-08-26 NOTE — PROGRESS NOTES
Chief Complaint   Patient presents with    Urgent Care     Urgent care visit for cough for 10 days.    Cough     Cough for 10 days that will not resolve. She went to [facility] 3 days ago and was told it was probably bronchitis. She was given Prednisone and told to come back if she does not feel better. She is not feeling better so came in today.       Chest x-ray-I see no acute pneumonia  Results for orders placed or performed in visit on 08/26/23   XR Chest 2 Views     Status: None    Narrative    EXAM: XR CHEST 2 VIEWS  LOCATION: [facility]  DATE: 8/26/2023    INDICATION:  Acute cough, Wheezing  COMPARISON: 08/25/2018      Impression    IMPRESSION: Negative chest.                   ASSESSMENT:     ICD-10-CM    1. Acute bronchitis with symptoms > 10 days  J20.9 azithromycin (ZITHROMAX) 250 MG tablet      2. Wheezing  R06.2 XR Chest 2 Views     azithromycin (ZITHROMAX) 250 MG tablet      3. Acute cough  R05.1 XR Chest 2 Views     azithromycin (ZITHROMAX) 250 MG tablet      4. Breastfeeding (infant)  Z78.9 azithromycin (ZITHROMAX) 250 MG tablet      5. History of thrombocytopenia  Z86.2             PLAN: Z-Jaleel prescribed for acute bronchitis.  Finish prednisone and use albuterol inhaler for the wheezing.  Antibiotic may cause vomiting or diarrhea in the infant.  I have discussed clinical findings with patient.  Side effects of medications discussed.  Symptomatic care is discussed.  I have discussed the possibility of  worsening symptoms and indication to RTC or go to the ER if they occur.  All questions are answered, patient indicates understanding of these issues and is in agreement with plan.   Patient care instructions are discussed/given at the end of visit.   Lots of rest and fluids.      Sharmin Ortega PA-C      SUBJECTIVE:  33-year-old female went to [facility] 3 days ago for persistent clear yellow productive cough.  Was given albuterol inhaler and an 10 days of  prednisone for wheezing.  Told to follow-up in 3 days if not improved.  No history of asthma.  No fever or chills.  No vomiting or diarrhea.  No rash.  History of thrombocytopenia      No Known Allergies    Past Medical History:   Diagnosis Date    Depressive disorder     PPD following fetal loss    Recurrent pregnancy loss (CODE)     Severe pre-eclampsia, postpartum condition or complication 8/25/2018    Varicella        cholecalciferol (VITAMIN D3) 125 mcg (5000 units) capsule, Take 1 capsule (125 mcg) by mouth daily Take one capsule daily.  vitamin D3 (CHOLECALCIFEROL) 10 MCG (400 UNIT) capsule, Take 1 capsule by mouth daily  cyanocobalamin (VITAMIN B-12) 1000 MCG tablet, Take 1 tablet (1,000 mcg) by mouth daily (Patient not taking: Reported on 7/13/2022)  ferrous sulfate (FEROSUL) 325 (65 Fe) MG tablet, Take 1 tablet (325 mg) by mouth daily (with breakfast) (Patient not taking: Reported on 7/13/2022)  ibuprofen (ADVIL/MOTRIN) 600 MG tablet, Take 1 tablet (600 mg) by mouth every 6 hours as needed for moderate pain Start after delivery (Patient not taking: Reported on 7/13/2022)  Prenatal Vit-Fe Fumarate-FA (PRENATAL VITAMIN AND MINERAL) 28-0.8 MG TABS, Take 1 tablet by mouth daily (Patient not taking: Reported on 7/13/2022)  senna-docusate (SENOKOT-S/PERICOLACE) 8.6-50 MG tablet, Take 1 tablet by mouth daily Start after delivery. (Patient not taking: Reported on 7/13/2022)  SENNA-docusate sodium (SENNA S) 8.6-50 MG tablet, Take 1 tablet by mouth At Bedtime (Patient not taking: Reported on 7/13/2022)  vitamin C (ASCORBIC ACID) 250 MG tablet, Take 1 tablet (250 mg) by mouth daily (Patient not taking: Reported on 7/13/2022)    No current facility-administered medications on file prior to visit.      Social History     Tobacco Use    Smoking status: Never     Passive exposure: Never    Smokeless tobacco: Never   Substance Use Topics    Alcohol use: No       ROS:  CONSTITUTIONAL: Negative for fatigue or fever.  EYES:  Negative for eye problems.  ENT: As above.  RESP: As above.  CV: Negative for chest pains.  GI: Negative for vomiting.  MUSCULOSKELETAL:  Negative for significant muscle or joint pains.  NEUROLOGIC: Negative for headaches.  SKIN: Negative for rash.  PSYCH: Normal mentation for age.    OBJECTIVE:  /82 (BP Location: Left arm, Patient Position: Sitting, Cuff Size: Adult Regular)   Pulse 78   Temp 97.6  F (36.4  C) (Tympanic)   Resp 16   Wt 67.6 kg (149 lb)   SpO2 100%   Breastfeeding Yes   BMI 27.25 kg/m    GENERAL APPEARANCE: Healthy, alert and no distress.  EYES:Conjunctiva/sclera clear.  EARS: No cerumen.   Ear canals w/o erythema.  TM's intact w/o erythema.    NOSE/MOUTH: Nose without ulcers, erythema or lesions.  SINUSES: No maxillary sinus tenderness.  THROAT: No erythema w/o tonsillar enlargement . No exudates.  NECK: Supple, nontender, no lymphadenopathy.  RESP: Lungs with expiratory wheezing both bases.    CV: Regular rate and rhythm, normal S1 S2.  NEURO: Awake, alert    SKIN: No rashes        Sharmin Ortega PA-C

## 2023-11-30 ENCOUNTER — OFFICE VISIT (OUTPATIENT)
Dept: URGENT CARE | Facility: URGENT CARE | Age: 33
End: 2023-11-30
Payer: COMMERCIAL

## 2023-11-30 VITALS
HEART RATE: 78 BPM | DIASTOLIC BLOOD PRESSURE: 82 MMHG | WEIGHT: 147.6 LBS | TEMPERATURE: 98.5 F | BODY MASS INDEX: 27 KG/M2 | RESPIRATION RATE: 16 BRPM | OXYGEN SATURATION: 99 % | SYSTOLIC BLOOD PRESSURE: 121 MMHG

## 2023-11-30 DIAGNOSIS — N92.6 MISSED PERIOD: ICD-10-CM

## 2023-11-30 DIAGNOSIS — R05.1 ACUTE COUGH: ICD-10-CM

## 2023-11-30 DIAGNOSIS — J02.0 STREP THROAT: Primary | ICD-10-CM

## 2023-11-30 DIAGNOSIS — Z32.01 PREGNANCY TEST POSITIVE: ICD-10-CM

## 2023-11-30 DIAGNOSIS — R07.0 THROAT PAIN: ICD-10-CM

## 2023-11-30 LAB
DEPRECATED S PYO AG THROAT QL EIA: POSITIVE
FLUAV AG SPEC QL IA: NEGATIVE
FLUBV AG SPEC QL IA: NEGATIVE
HCG UR QL: POSITIVE

## 2023-11-30 PROCEDURE — 87880 STREP A ASSAY W/OPTIC: CPT | Performed by: PHYSICIAN ASSISTANT

## 2023-11-30 PROCEDURE — 81025 URINE PREGNANCY TEST: CPT | Performed by: PHYSICIAN ASSISTANT

## 2023-11-30 PROCEDURE — 99214 OFFICE O/P EST MOD 30 MIN: CPT | Performed by: PHYSICIAN ASSISTANT

## 2023-11-30 PROCEDURE — 87804 INFLUENZA ASSAY W/OPTIC: CPT | Performed by: PHYSICIAN ASSISTANT

## 2023-11-30 RX ORDER — AMOXICILLIN 875 MG
875 TABLET ORAL 2 TIMES DAILY
Qty: 20 TABLET | Refills: 0 | Status: SHIPPED | OUTPATIENT
Start: 2023-11-30 | End: 2023-12-10

## 2023-11-30 ASSESSMENT — ENCOUNTER SYMPTOMS
DIARRHEA: 0
SORE THROAT: 1
VOMITING: 0
WOUND: 0
CARDIOVASCULAR NEGATIVE: 1
MYALGIAS: 1
ARTHRALGIAS: 0
EYES NEGATIVE: 1
BACK PAIN: 0
NECK PAIN: 0
WEAKNESS: 0
CHILLS: 0
SHORTNESS OF BREATH: 0
JOINT SWELLING: 0
RHINORRHEA: 0
NAUSEA: 0
NECK STIFFNESS: 0
ALLERGIC/IMMUNOLOGIC NEGATIVE: 1
DIZZINESS: 0
PALPITATIONS: 0
ENDOCRINE NEGATIVE: 1
HEADACHES: 0
LIGHT-HEADEDNESS: 0
COUGH: 1
FEVER: 0

## 2023-11-30 ASSESSMENT — PAIN SCALES - GENERAL: PAINLEVEL: SEVERE PAIN (6)

## 2023-11-30 NOTE — PROGRESS NOTES
Chief Complaint:     Chief Complaint   Patient presents with    Cold Symptoms     Runny nose, congestion beginning 11/23. Patient states she now has body aches, feels body is hot, chills, cough, sinus pressure/congestion, otalgia, photosensitivity, nausea, sore throat beginning within the last couple of days.  Patient's son had both Strep and influenza last week.     Pregnancy Test     Patient states she would like a pregnancy test due to how terrible nausea is. Patient states she has irregular period but is still breastfeeding. Patient states she has been having spotting. Patient had positive pregnancy test last week and again today. Patient requesting quantitative hcg due to history of miscarriages.        Results for orders placed or performed in visit on 11/30/23   HCG qualitative urine     Status: Abnormal   Result Value Ref Range    hCG Urine Qualitative Positive (A) Negative   Streptococcus A Rapid Screen w/Reflex to PCR - Clinic Collect     Status: Abnormal    Specimen: Throat; Swab   Result Value Ref Range    Group A Strep antigen Positive (A) Negative   Influenza A/B antigen     Status: Normal    Specimen: Nose; Swab   Result Value Ref Range    Influenza A antigen Negative Negative    Influenza B antigen Negative Negative    Narrative    Test results must be correlated with clinical data. If necessary, results should be confirmed by a molecular assay or viral culture.       Medical Decision Making:    Vital signs reviewed by Alin Potter PA-C  /82 (BP Location: Left arm, Patient Position: Sitting, Cuff Size: Adult Regular)   Pulse 78   Temp 98.5  F (36.9  C) (Tympanic)   Resp 16   Wt 67 kg (147 lb 9.6 oz)   SpO2 99%   Breastfeeding Yes   BMI 27.00 kg/m      Differential Diagnosis:  URI Adult/Peds:  Bronchitis-viral, Influenza, Pneumonia, Strep pharyngitis, Tonsilitis, Viral pharyngitis, Viral syndrome, and Viral upper respiratory illness        ASSESSMENT    1. Strep throat    2. Pregnancy  test positive    3. Throat pain    4. Missed period    5. Acute cough        PLAN    Patient is in no acute distress.    Temp is 98.5 in clinic today, lung sounds were clear, and O2 sats at 99% on RA.    Pregnancy test was positive.  RST was positive.  Rx for Amoxicillin sent in as this is safe for pregnancy.  Influenza was negative.  Rest, Push fluids, vaporizer, elevation of head of bed.  Tylenol for any fever or body aches.  NSAIDS contraindicated with pregnancy.  If symptoms worsen, recheck immediately otherwise follow up with your PCP in 1 week if symptoms are not improving.  Worrisome symptoms discussed with instructions to go to the ED.  Patient verbalized understanding and agreed with this plan.    Labs:    Results for orders placed or performed in visit on 11/30/23   HCG qualitative urine     Status: Abnormal   Result Value Ref Range    hCG Urine Qualitative Positive (A) Negative   Streptococcus A Rapid Screen w/Reflex to PCR - Clinic Collect     Status: Abnormal    Specimen: Throat; Swab   Result Value Ref Range    Group A Strep antigen Positive (A) Negative   Influenza A/B antigen     Status: Normal    Specimen: Nose; Swab   Result Value Ref Range    Influenza A antigen Negative Negative    Influenza B antigen Negative Negative    Narrative    Test results must be correlated with clinical data. If necessary, results should be confirmed by a molecular assay or viral culture.        Vital signs reviewed by Alin Potter PA-C  /82 (BP Location: Left arm, Patient Position: Sitting, Cuff Size: Adult Regular)   Pulse 78   Temp 98.5  F (36.9  C) (Tympanic)   Resp 16   Wt 67 kg (147 lb 9.6 oz)   SpO2 99%   Breastfeeding Yes   BMI 27.00 kg/m      Current Meds      Current Outpatient Medications:     amoxicillin (AMOXIL) 875 MG tablet, Take 1 tablet (875 mg) by mouth 2 times daily for 10 days, Disp: 20 tablet, Rfl: 0    cholecalciferol (VITAMIN D3) 125 mcg (5000 units) capsule, Take 1 capsule (125  mcg) by mouth daily Take one capsule daily., Disp: 90 capsule, Rfl: 1    cyanocobalamin (VITAMIN B-12) 1000 MCG tablet, Take 1 tablet (1,000 mcg) by mouth daily (Patient not taking: Reported on 7/13/2022), Disp: 90 tablet, Rfl: 1    ferrous sulfate (FEROSUL) 325 (65 Fe) MG tablet, Take 1 tablet (325 mg) by mouth daily (with breakfast) (Patient not taking: Reported on 7/13/2022), Disp: 90 tablet, Rfl: 1    ibuprofen (ADVIL/MOTRIN) 600 MG tablet, Take 1 tablet (600 mg) by mouth every 6 hours as needed for moderate pain Start after delivery (Patient not taking: Reported on 7/13/2022), Disp: 60 tablet, Rfl: 0    Prenatal Vit-Fe Fumarate-FA (PRENATAL VITAMIN AND MINERAL) 28-0.8 MG TABS, Take 1 tablet by mouth daily (Patient not taking: Reported on 7/13/2022), Disp: 90 tablet, Rfl: 3    senna-docusate (SENOKOT-S/PERICOLACE) 8.6-50 MG tablet, Take 1 tablet by mouth daily Start after delivery. (Patient not taking: Reported on 7/13/2022), Disp: 100 tablet, Rfl: 0    SENNA-docusate sodium (SENNA S) 8.6-50 MG tablet, Take 1 tablet by mouth At Bedtime (Patient not taking: Reported on 7/13/2022), Disp: 90 tablet, Rfl: 3    vitamin C (ASCORBIC ACID) 250 MG tablet, Take 1 tablet (250 mg) by mouth daily (Patient not taking: Reported on 7/13/2022), Disp: 90 tablet, Rfl: 1    vitamin D3 (CHOLECALCIFEROL) 10 MCG (400 UNIT) capsule, Take 1 capsule by mouth daily, Disp: , Rfl:       Respiratory History    occasional episodes of bronchitis      SUBJECTIVE    HPI: Pam Syed is an 33 year old female who presents with aching, chest congestion, cough nonproductive, occasional, nasal congestion, nasal discharge clear, and sore throat.  Symptoms began 1  weeks ago and has unchanged.  There is no shortness of breath, wheezing, and chest pain.  Patient is eating and drinking well.  No fever, nausea, vomiting, or diarrhea.    Patient denies any recent travel or exposure to known COVID positive tested individual.  Patient was exposed to  strep and influenza at home.    Patient is also requesting pregnancy testing.      ROS:     Review of Systems   Constitutional:  Negative for chills and fever.   HENT:  Positive for congestion and sore throat. Negative for ear pain and rhinorrhea.    Eyes: Negative.    Respiratory:  Positive for cough. Negative for shortness of breath.    Cardiovascular: Negative.  Negative for chest pain and palpitations.   Gastrointestinal:  Negative for diarrhea, nausea and vomiting.   Endocrine: Negative.    Genitourinary: Negative.    Musculoskeletal:  Positive for myalgias. Negative for arthralgias, back pain, joint swelling, neck pain and neck stiffness.   Skin: Negative.  Negative for rash and wound.   Allergic/Immunologic: Negative.  Negative for immunocompromised state.   Neurological:  Negative for dizziness, weakness, light-headedness and headaches.         Family History   Family History   Problem Relation Age of Onset    Asthma Mother     Hypertension Father     Cancer Father         pancreatic    Diabetes No family hx of     Breast Cancer No family hx of     Colon Cancer No family hx of         Problem history  Patient Active Problem List   Diagnosis    History of recurrent     Vitamin D deficiency    History of severe pre-eclampsia    History of cervical cerclage    History of IUFD    Nonimmune to hepatitis B virus    Nausea/vomiting in pregnancy    Hx of cervical incompetence in pregnancy    Anemia    History of postpartum hemorrhage    Thrombocytopenia (H24)        Allergies  No Known Allergies     Social History  Social History     Socioeconomic History    Marital status:      Spouse name: Nilson    Number of children: 3    Years of education: Not on file    Highest education level: Not on file   Occupational History    Occupation: group home, admin, part time   Tobacco Use    Smoking status: Never     Passive exposure: Never    Smokeless tobacco: Never   Substance and Sexual Activity    Alcohol use:  No    Drug use: No    Sexual activity: Yes     Partners: Male   Other Topics Concern    Not on file   Social History Narrative    How much exercise per week? Active with kids    How much calcium per day? Some foods       How much caffeine per day? none    How much vitamin D per day? none    Do you/your family wear seatbelts?  Yes    Do you/your family use safety helmets? Yes    Do you/your family use sunscreen? Yes    Do you/your family keep firearms in the home? No    Do you/your family have a smoke detector(s)? Yes        May 17, 2021 Nile Huertas LPN         Social Determinants of Health     Financial Resource Strain: Low Risk  (5/17/2021)    Overall Financial Resource Strain (CARDIA)     Difficulty of Paying Living Expenses: Not hard at all   Food Insecurity: No Food Insecurity (5/17/2021)    Hunger Vital Sign     Worried About Running Out of Food in the Last Year: Never true     Ran Out of Food in the Last Year: Never true   Transportation Needs: No Transportation Needs (5/17/2021)    PRAPARE - Transportation     Lack of Transportation (Medical): No     Lack of Transportation (Non-Medical): No   Physical Activity: Inactive (5/17/2021)    Exercise Vital Sign     Days of Exercise per Week: 0 days     Minutes of Exercise per Session: 0 min   Stress: No Stress Concern Present (5/17/2021)    [employer] [place] [employer] - Occupational Stress Questionnaire     Feeling of Stress : Only a little   Social Connections: Not on file   Interpersonal Safety: Not At Risk (7/13/2022)    Humiliation, Afraid, Rape, and Kick questionnaire     Fear of Current or Ex-Partner: No     Emotionally Abused: No     Physically Abused: No     Sexually Abused: No   Housing Stability: Not on file        OBJECTIVE     Vital signs reviewed by Alin Potter PA-C  /82 (BP Location: Left arm, Patient Position: Sitting, Cuff Size: Adult Regular)   Pulse 78   Temp 98.5  F (36.9  C) (Tympanic)   Resp 16   Wt 67 kg (147 lb 9.6  oz)   SpO2 99%   Breastfeeding Yes   BMI 27.00 kg/m       Physical Exam  Vitals and nursing note reviewed.   Constitutional:       General: She is not in acute distress.     Appearance: She is well-developed. She is not ill-appearing, toxic-appearing or diaphoretic.   HENT:      Head: Normocephalic and atraumatic.      Right Ear: Hearing, tympanic membrane, ear canal and external ear normal. Tympanic membrane is not perforated, erythematous, retracted or bulging.      Left Ear: Hearing, tympanic membrane, ear canal and external ear normal. Tympanic membrane is not perforated, erythematous, retracted or bulging.      Nose: Congestion present. No mucosal edema or rhinorrhea.      Right Sinus: No maxillary sinus tenderness or frontal sinus tenderness.      Left Sinus: No maxillary sinus tenderness or frontal sinus tenderness.      Mouth/Throat:      Pharynx: Posterior oropharyngeal erythema present. No pharyngeal swelling, oropharyngeal exudate or uvula swelling.      Tonsils: No tonsillar exudate or tonsillar abscesses. 0 on the right. 0 on the left.   Eyes:      General:         Right eye: No discharge.         Left eye: No discharge.      Pupils: Pupils are equal, round, and reactive to light.   Cardiovascular:      Rate and Rhythm: Normal rate and regular rhythm.      Heart sounds: Normal heart sounds. No murmur heard.     No friction rub. No gallop.   Pulmonary:      Effort: Pulmonary effort is normal. No respiratory distress.      Breath sounds: Normal breath sounds. No decreased breath sounds, wheezing, rhonchi or rales.   Chest:      Chest wall: No tenderness.   Abdominal:      General: Bowel sounds are normal. There is no distension.      Palpations: Abdomen is soft. There is no mass.      Tenderness: There is no abdominal tenderness. There is no guarding.   Musculoskeletal:      Cervical back: Normal range of motion and neck supple.   Lymphadenopathy:      Head:      Right side of head: No submental,  submandibular, tonsillar, preauricular or posterior auricular adenopathy.      Left side of head: No submental, submandibular, tonsillar, preauricular or posterior auricular adenopathy.      Cervical: No cervical adenopathy.      Right cervical: No superficial or posterior cervical adenopathy.     Left cervical: No superficial or posterior cervical adenopathy.   Skin:     General: Skin is warm and dry.      Findings: No rash.   Neurological:      Mental Status: She is alert and oriented to person, place, and time.      Cranial Nerves: No cranial nerve deficit.      Deep Tendon Reflexes: Reflexes are normal and symmetric.   Psychiatric:         Behavior: Behavior normal. Behavior is cooperative.         Thought Content: Thought content normal.         Judgment: Judgment normal.           Alin Potter PA-C  11/30/2023, 12:11 PM

## 2023-12-01 ENCOUNTER — MYC MEDICAL ADVICE (OUTPATIENT)
Dept: OBGYN | Facility: CLINIC | Age: 33
End: 2023-12-01
Payer: COMMERCIAL

## 2023-12-01 ENCOUNTER — LAB (OUTPATIENT)
Dept: LAB | Facility: CLINIC | Age: 33
End: 2023-12-01
Payer: COMMERCIAL

## 2023-12-01 DIAGNOSIS — O26.859 SPOTTING IN EARLY PREGNANCY: ICD-10-CM

## 2023-12-01 DIAGNOSIS — O26.859 SPOTTING IN EARLY PREGNANCY: Primary | ICD-10-CM

## 2023-12-01 LAB — HCG INTACT+B SERPL-ACNC: ABNORMAL MIU/ML

## 2023-12-01 PROCEDURE — 36415 COLL VENOUS BLD VENIPUNCTURE: CPT

## 2023-12-01 PROCEDURE — 84702 CHORIONIC GONADOTROPIN TEST: CPT

## 2023-12-01 NOTE — TELEPHONE ENCOUNTER
Patient sent a [employer] message stating she went to urgent care for strep throat and was checked for pregnancy and came back positive. Patient stated that she has been having bleeding/spotting and does not know how she can be pregnant still. Inquired about having Bhcg's drawn.    I did put in the orders and also inquired first day of last cycle and what type of bleeding/spotting is she having.

## 2023-12-03 ENCOUNTER — LAB (OUTPATIENT)
Dept: LAB | Facility: CLINIC | Age: 33
End: 2023-12-03
Payer: COMMERCIAL

## 2023-12-03 DIAGNOSIS — O26.859 SPOTTING IN EARLY PREGNANCY: ICD-10-CM

## 2023-12-03 LAB — HCG INTACT+B SERPL-ACNC: ABNORMAL MIU/ML

## 2023-12-03 PROCEDURE — 84702 CHORIONIC GONADOTROPIN TEST: CPT

## 2023-12-03 PROCEDURE — 36415 COLL VENOUS BLD VENIPUNCTURE: CPT

## 2023-12-05 ENCOUNTER — TELEPHONE (OUTPATIENT)
Dept: OBGYN | Facility: CLINIC | Age: 33
End: 2023-12-05
Payer: COMMERCIAL

## 2023-12-05 DIAGNOSIS — O21.0 HYPEREMESIS GRAVIDARUM: ICD-10-CM

## 2023-12-05 DIAGNOSIS — O09.529 HIGH-RISK PREGNANCY, ELDERLY MULTIGRAVIDA, UNSPECIFIED TRIMESTER: Primary | ICD-10-CM

## 2023-12-05 RX ORDER — METOCLOPRAMIDE 10 MG/1
10 TABLET ORAL
Qty: 120 TABLET | Refills: 0 | Status: SHIPPED | OUTPATIENT
Start: 2023-12-05 | End: 2024-01-15

## 2023-12-05 NOTE — TELEPHONE ENCOUNTER
S-(situation): patient called through the call center with extreme nausea and vomiting during this pregnancy    B-(background): patient stated she had 2 Bhcg drawn and they are both elevated and states her last LMP was probably mid October. This would put her between 7-8 weeks pregnant.     A-(assessment): patient stated this happened with her last pregnancy and this pregnancy she cannot keep any food down or water. Her urine is very concentrated and she feels very weak.     R-(recommendations): I did recommend that she be seen in the ER for IV fluids and maybe some IV medication for her severe nausea and vomiting due to she cannot keep any fluids down, her urine is concentrated and she is feeling weak.     Patient inquired if we can send over a medication for nausea and vomiting besides Zofran due to she had this with her last pregnancy and it did not help.     I let her know that I will send a message to the midwives to inquire about this and later this week she should call to schedule her phone OBI call, and visit with the midwives for this pregnancy.    All questions were answered and message routed.

## 2023-12-06 DIAGNOSIS — O26.22 HISTORY OF RECURRENT ABORTION, CURRENTLY PREGNANT IN SECOND TRIMESTER: Chronic | ICD-10-CM

## 2023-12-06 DIAGNOSIS — O09.91 HIGH-RISK PREGNANCY IN FIRST TRIMESTER: ICD-10-CM

## 2023-12-06 DIAGNOSIS — K59.00 CONSTIPATION: Primary | ICD-10-CM

## 2023-12-06 RX ORDER — ASPIRIN 81 MG
100 TABLET, DELAYED RELEASE (ENTERIC COATED) ORAL DAILY
Qty: 90 TABLET | Refills: 1 | Status: SHIPPED | OUTPATIENT
Start: 2023-12-06 | End: 2024-03-27

## 2023-12-06 NOTE — TELEPHONE ENCOUNTER
[facility] Call Center    Phone Message    May a detailed message be left on voicemail: yes     Reason for Call: Other: pt calling in needing her progesterone refilled and something for constipation. Please send the prescriptions to the [facility]'s on [address] Thank you!     Action Taken: Message routed to:  Other: OBGYN    Travel Screening: Not Applicable

## 2023-12-06 NOTE — TELEPHONE ENCOUNTER
Pt. Is scheduled for her phone OBI on 12-13-23 and her US and NOB are scheduled for 12-27-23.     Patient is requesting a prescription for progesterone and something for constipation.     Routing to the midwives for a prescription for progesterone and will pend the colace as well.

## 2023-12-08 RX ORDER — PROGESTERONE 200 MG/1
200 CAPSULE ORAL AT BEDTIME
Qty: 90 CAPSULE | Refills: 2 | Status: ON HOLD | OUTPATIENT
Start: 2023-12-08 | End: 2024-01-24

## 2023-12-08 NOTE — CONFIDENTIAL NOTE
[facility] Call Center    Phone Message    May a detailed message be left on voicemail: no     Reason for Call: Pt stated pharmacy told her they only got the medication for constipation and not for progesterone. Pt very addiment that it's important this one specifically (progesterone) gets sent and she starts it right away    Please follow up with pt once sent  Note: Queta Kemp.    Action Taken: Message routed to:  Other: Women's    Travel Screening: Not Applicable

## 2023-12-13 ENCOUNTER — VIRTUAL VISIT (OUTPATIENT)
Dept: OBGYN | Facility: CLINIC | Age: 33
End: 2023-12-13
Payer: COMMERCIAL

## 2023-12-13 DIAGNOSIS — F32.A DEPRESSIVE DISORDER: Primary | ICD-10-CM

## 2023-12-13 DIAGNOSIS — B01.9 VARICELLA: ICD-10-CM

## 2023-12-13 DIAGNOSIS — N96 RECURRENT PREGNANCY LOSS: ICD-10-CM

## 2023-12-13 PROBLEM — Z78.9 NONIMMUNE TO HEPATITIS B VIRUS: Status: RESOLVED | Noted: 2021-05-18 | Resolved: 2023-12-13

## 2023-12-13 RX ORDER — PREDNISONE 5 MG/1
5 TABLET ORAL DAILY
COMMUNITY
Start: 2023-08-23 | End: 2023-09-02

## 2023-12-13 RX ORDER — POLYETHYLENE GLYCOL 3350 17 G/17G
17 POWDER, FOR SOLUTION ORAL
COMMUNITY
Start: 2023-01-09 | End: 2024-05-12

## 2023-12-13 RX ORDER — ALBUTEROL SULFATE 90 UG/1
AEROSOL, METERED RESPIRATORY (INHALATION)
COMMUNITY
Start: 2023-08-23

## 2023-12-13 RX ORDER — DOCUSATE SODIUM 100 MG/1
CAPSULE, LIQUID FILLED ORAL
COMMUNITY
Start: 2023-12-06 | End: 2024-05-12

## 2023-12-13 NOTE — PATIENT INSTRUCTIONS
Thank you for trusting us with your care!     If you need to contact us for questions about:  Symptoms, Scheduling & Medical Questions; Non-urgent (2-3 day response) Doloresmonetjoe message, Urgent (needing response today) [phone] (if after 3:30pm next day response)   Prescriptions: Please call your Pharmacy   Billing: Hillary [phone] or [facility]:[phone]  Learning About Pregnancy  Your Care Instructions     Your health in the early weeks of your pregnancy is particularly important for your baby's health. Take good care of yourself. Anything you do that harms your body can also harm your baby.  Make sure to go to all of your doctor appointments. Regular checkups will help keep you and your baby healthy.  How can you care for yourself at home?  Diet    Eat a balanced diet. Make sure your diet includes plenty of beans, peas, and leafy green vegetables.     Do not skip meals or go for many hours without eating. If you are nauseated, try to eat a small, healthy snack every 2 to 3 hours.     Do not eat fish that has a high level of mercury, such as shark, swordfish, or mackerel. Do not eat more than one can of tuna each week.     Drink plenty of fluids. If you have kidney, heart, or liver disease and have to limit fluids, talk with your doctor before you increase the amount of fluids you drink.     Cut down on caffeine, such as coffee, tea, and cola.     Do not drink alcohol, such as beer, wine, or hard liquor.     Take a multivitamin that contains at least 400 micrograms (mcg) of folic acid to help prevent birth defects. Fortified cereal and whole wheat bread are good additional sources of folic acid.     Increase the calcium in your diet. Try to drink a quart of skim milk each day. You may also take calcium supplements and choose foods such as cheese and yogurt.   Lifestyle    Make sure you go to your follow-up appointments.     Get plenty of rest. You may be unusually tired while you are pregnant.     Get  at least 30 minutes of exercise on most days of the week. Walking is a good choice. If you have not exercised in the past, start out slowly. Take several short walks each day.     Do not smoke. If you need help quitting, talk to your doctor about stop-smoking programs. These can increase your chances of quitting for good.     Do not touch cat feces or litter boxes. Also, wash your hands after you handle raw meat, and fully cook all meat before you eat it. Wear gloves when you work in the yard or garden, and wash your hands well when you are done. Cat feces, raw or undercooked meat, and contaminated dirt can cause an infection that may harm your baby or lead to a miscarriage.     Avoid things that can make your body too hot and may be harmful to your baby, such as a hot tub or sauna. Or talk with your doctor before doing anything that raises your body temperature. Your doctor can tell you if it's safe.     Avoid chemical fumes, paint fumes, or poisons.     Do not use illegal drugs, marijuana, or alcohol.   Medicines    Review all of your medicines with your doctor. Some of your routine medicines may need to be changed to protect your baby.     Use acetaminophen (Tylenol) to relieve minor problems, such as a mild headache or backache or a mild fever with cold symptoms. Do not use nonsteroidal anti-inflammatory drugs (NSAIDs), such as ibuprofen (Advil, Motrin) or naproxen (Aleve), unless your doctor says it is okay.     Do not take two or more pain medicines at the same time unless the doctor told you to. Many pain medicines have acetaminophen, which is Tylenol. Too much acetaminophen (Tylenol) can be harmful.     Take your medicines exactly as prescribed. Call your doctor if you think you are having a problem with your medicine.   To manage morning sickness    If you feel sick when you first wake up, try eating a small snack (such as crackers) before you get out of bed. Allow some time to digest the snack, and then  "get out of bed slowly.     Do not skip meals or go for long periods without eating. An empty stomach can make nausea worse.     Eat small, frequent meals instead of three large meals each day.     Drink plenty of fluids.     Eat foods that are high in protein but low in fat.     If you are taking iron supplements, ask your doctor if they are necessary. Iron can make nausea worse.     Avoid any smells, such as coffee, that make you feel sick.     Get lots of rest. Morning sickness may be worse when you are tired.   Follow-up care is a key part of your treatment and safety. Be sure to make and go to all appointments, and call your doctor if you are having problems. It's also a good idea to know your test results and keep a list of the medicines you take.  Where can you learn more?  Go to https://www.[email or web address].net/patiented  Enter E868 in the search box to learn more about \"Learning About Pregnancy.\"  Current as of: July 11, 2023               Content Version: 13.8    6556-4052 [employer].   Care instructions adapted under license by your healthcare professional. If you have questions about a medical condition or this instruction, always ask your healthcare professional. [employer] disclaims any warranty or liability for your use of this information.      Weeks 6 to 10 of Your Pregnancy: Care Instructions  During these weeks of pregnancy, your body goes through many changes. You may start to feel different, both in your body and your emotions. Each pregnancy is different, so there's no \"right\" way to feel. These early weeks are a time to make healthy choices for you and your pregnancy.    Take a daily prenatal vitamin. Choose one with folic acid in it.   Avoid alcohol, tobacco, and drugs (including marijuana). If you need help quitting, talk to your doctor.     Drink plenty of liquids.  Be sure to drink enough water. And limit sodas, other sweetened drinks, and caffeine.     Choose foods " "that are good sources of calcium, iron, and folate.  You can try dairy products, dark leafy greens, fortified orange juice and cereals, almonds, broccoli, dried fruit, and beans.     Avoid foods that may be harmful.  Don't eat raw meat, deli meat, raw seafood, or raw eggs. Avoid soft cheese and unpasteurized dairy, like Brie and blue cheese. And don't eat fish that contains a lot of mercury, like shark and swordfish.     Don't touch randall litter or cat poop.  They can cause an infection that could be harmful during pregnancy.     Avoid things that can make your body too hot.  For example, avoid hot tubs and saunas.     Soothe morning sickness.  Try eating 5 or 6 small meals a day, getting some fresh air, or using giovanni to control symptoms.     Ask your doctor about flu and COVID-19 shots.  Getting them can help protect against infection.   Follow-up care is a key part of your treatment and safety. Be sure to make and go to all appointments, and call your doctor if you are having problems. It's also a good idea to know your test results and keep a list of the medicines you take.  Where can you learn more?  Go to https://www.Worklight.net/patiented  Enter G112 in the search box to learn more about \"Weeks 6 to 10 of Your Pregnancy: Care Instructions.\"  Current as of: July 11, 2023               Content Version: 13.8    7720-5921 [employer].   Care instructions adapted under license by your healthcare professional. If you have questions about a medical condition or this instruction, always ask your healthcare professional. [employer] disclaims any warranty or liability for your use of this information.         Managing Morning Sickness (01:55)  Your health professional recommends that you watch this short online health video.  Learn tips for dealing with morning sickness, no matter what time of day you have it.  Purpose:  Gives tips for managing morning sickness, including eating small " low-fat meals and avoiding caffeine and spicy food.  Goal:  The user will learn tips for dealing with morning sickness during pregnancy.     How to watch the video    Scan the QR code   OR Visit the website    [email or web address]   Current as of: July 11, 2023               Content Version: 13.8    6908-2947 [employer].   Care instructions adapted under license by your healthcare professional. If you have questions about a medical condition or this instruction, always ask your healthcare professional. [employer] disclaims any warranty or liability for your use of this information.      Pregnancy and Heartburn: Care Instructions  Overview     Heartburn is a common problem during pregnancy.  Heartburn happens when stomach acid backs up into the tube that carries food to the stomach. This tube is called the esophagus. Early in pregnancy, heartburn is caused by hormone changes that slow down digestion. Later on, it's also caused by the large uterus pushing up on the stomach.  Even though you can't fix the cause, there are things you can do to get relief. Treating heartburn during pregnancy focuses first on making lifestyle changes, like changing what and how you eat, and on taking medicines.  Heartburn usually improves or goes away after childbirth.  Follow-up care is a key part of your treatment and safety. Be sure to make and go to all appointments, and call your doctor if you are having problems. It's also a good idea to know your test results and keep a list of the medicines you take.  How can you care for yourself at home?  Eat small, frequent meals.  Avoid foods that make your symptoms worse, such as chocolate, peppermint, and spicy foods. Avoid drinks with caffeine, such as coffee, tea, and sodas.  Avoid bending over or lying down after meals.  Take a short walk after you eat.  If heartburn is a problem at night, do not eat for 2 hours before bedtime.  Take  "antacids like Mylanta, Maalox, Rolaids, or Tums. Do not take antacids that have sodium bicarbonate, magnesium trisilicate, or aspirin. Be careful when you take over-the-counter antacid medicines. Many of these medicines have aspirin in them. While you are pregnant, do not take aspirin or medicines that contain aspirin unless your doctor says it is okay.  If you're not getting relief, talk to your doctor. You may be able to take a stronger acid-reducing medicine.  When should you call for help?   Call your doctor now or seek immediate medical care if:    You have new or worse belly pain.     You are vomiting.   Watch closely for changes in your health, and be sure to contact your doctor if:    You have new or worse symptoms of reflux.     You are losing weight.     You have trouble or pain swallowing.     You do not get better as expected.   Where can you learn more?  Go to https://www.[email or web address]/patiented  Enter U946 in the search box to learn more about \"Pregnancy and Heartburn: Care Instructions.\"  Current as of: July 11, 2023               Content Version: 13.8    3004-1115 [employer].   Care instructions adapted under license by your healthcare professional. If you have questions about a medical condition or this instruction, always ask your healthcare professional. [employer] disclaims any warranty or liability for your use of this information.      Constipation: Care Instructions  Overview     Constipation means that you have a hard time passing stools (bowel movements). People pass stools from 3 times a day to once every 3 days. What is normal for you may be different. Constipation may occur with pain in the rectum and cramping. The pain may get worse when you try to pass stools. Sometimes there are small amounts of bright red blood on toilet paper or the surface of stools. This is because of enlarged veins near the rectum (hemorrhoids).  A few changes in your diet and " lifestyle may help you avoid ongoing constipation. Your doctor may also prescribe medicine to help loosen your stool.  Some medicines can cause constipation. These include pain medicines and antidepressants. Tell your doctor about all the medicines you take. Your doctor may want to make a medicine change to ease your symptoms.  Follow-up care is a key part of your treatment and safety. Be sure to make and go to all appointments, and call your doctor if you are having problems. It's also a good idea to know your test results and keep a list of the medicines you take.  How can you care for yourself at home?  Drink plenty of fluids. If you have kidney, heart, or liver disease and have to limit fluids, talk with your doctor before you increase the amount of fluids you drink.  Include high-fiber foods in your diet each day. These include fruits, vegetables, beans, and whole grains.  Get at least 30 minutes of exercise on most days of the week. Walking is a good choice. You also may want to do other activities, such as running, swimming, cycling, or playing tennis or team sports.  Take a fiber supplement, such as Citrucel or Metamucil, every day. Read and follow all instructions on the label.  Schedule time each day for a bowel movement. A daily routine may help. Take your time having a bowel movement, but don't sit for more than 10 minutes at a time. And don't strain too much.  Support your feet with a small step stool when you sit on the toilet. This helps flex your hips and places your pelvis in a squatting position.  Your doctor may recommend an over-the-counter laxative to relieve your constipation. Examples are Milk of Magnesia and MiraLax. Read and follow all instructions on the label. Do not use laxatives on a long-term basis.  When should you call for help?   Call your doctor now or seek immediate medical care if:    You have new or worse belly pain.     You have new or worse nausea or vomiting.     You have  "blood in your stools.   Watch closely for changes in your health, and be sure to contact your doctor if:    Your constipation is getting worse.     You do not get better as expected.   Where can you learn more?  Go to https://www.[employer].net/patiented  Enter P343 in the search box to learn more about \"Constipation: Care Instructions.\"  Current as of: March 21, 2023               Content Version: 13.8    6448-7669 [employer].   Care instructions adapted under license by your healthcare professional. If you have questions about a medical condition or this instruction, always ask your healthcare professional. [employer] disclaims any warranty or liability for your use of this information.      Learning About High-Iron Foods  What foods are high in iron?     The foods you eat contain nutrients, such as vitamins and minerals. Iron is a nutrient. Your body needs the right amount to stay healthy and work as it should. You can use the list below to help you make choices about which foods to eat.  Here are some foods that contain iron. They have 1 to 2 milligrams of iron per serving.  Fruits  Figs (dried), 5 figs  Vegetables  Asparagus (canned), 6 lobato  Christiana, beet, Swiss chard, or turnip greens, 1 cup  Dried peas, cooked,   cup  Seaweed, spirulina (dried),   cup  Spinach, (cooked)   cup or (raw) 1 cup  Grains  Cereals, fortified with iron, 1 cup  Grits (instant, cooked), fortified with iron,   cup  Meats and other protein foods  Beans (kidney, lima, navy, white), canned or cooked,   cup  Beef or lamb, 3 oz  Chicken giblets, 3 oz  Chickpeas (garbanzo beans),   cup  Liver of beef, lamb, or pork, 3 oz  Oysters (cooked), 3 oz  Sardines (canned), 3 oz  Soybeans (boiled),   cup  Tofu (firm),   cup  Work with your doctor to find out how much of this nutrient you need. Depending on your health, you may need more or less of it in your diet.  Where can you learn more?  Go to " "https://www.[employer]Castr.net/patiented  Enter R005 in the search box to learn more about \"Learning About High-Iron Foods.\"  Current as of: February 28, 2023               Content Version: 13.8 2006-2023 [employer].   Care instructions adapted under license by your healthcare professional. If you have questions about a medical condition or this instruction, always ask your healthcare professional. [employer] disclaims any warranty or liability for your use of this information.      Rh Antibodies Screening During Pregnancy: About This Test  What is it?     The Rh antibodies screening test is a blood test. It checks your blood for Rh antibodies. If you have Rh-negative blood and have been exposed to Rh-positive blood, your immune system may make antibodies to attack the Rh-positive blood. When a pregnant woman has these antibodies, it is called Rh sensitization.  Why is this test done?  The Rh antibodies screening test is done during pregnancy to find out if your baby is at risk for Rh disease. This can happen if you have Rh-negative blood and your baby has Rh-positive blood. If your Rh-negative blood mixes with Rh-positive blood, your immune system will make antibodies to attack the Rh-positive blood.  During pregnancy, these antibodies could attach to the baby's red blood cells. This can cause your baby to have serious health problems. The results of this test will help your doctor know how to best care for you and your baby during your pregnancy.  How do you prepare for the test?  In general, there's nothing you have to do before this test, unless your doctor tells you to.  How is the test done?  A health professional uses a needle to take a blood sample, usually from the arm.  What happens after the test?  You will probably be able to go home right away. It depends on the reason for the test.  You can go back to your usual activities right away.  Follow-up care is a key part of your " "treatment and safety. Be sure to make and go to all appointments, and call your doctor if you are having problems. It's also a good idea to keep a list of the medicines you take. Ask your doctor when you can expect to have your test results.  Where can you learn more?  Go to https://www.[employer]ktime.net/patiented  Enter P722 in the search box to learn more about \"Rh Antibodies Screening During Pregnancy: About This Test.\"  Current as of: 2023               Content Version: 13.8    0522-1308 [employer].   Care instructions adapted under license by your healthcare professional. If you have questions about a medical condition or this instruction, always ask your healthcare professional. [employer] disclaims any warranty or liability for your use of this information.      Learning About Preventing Rh Disease  What is Rh disease?     Rh disease can be a serious problem in pregnancy. It happens when substances called antibodies in the mother's blood cause red blood cells in her baby's blood to be destroyed. This can occur when the blood types of a mother and her baby do not match.  All blood has an Rh factor. This is what makes a blood type positive or negative. When you are Rh-negative, your baby may be Rh-negative or Rh-positive. If your baby has Rh-positive blood and it mixes with yours, your body will make antibodies. This is called Rh sensitization.  Most of the time, this is not a problem in a first pregnancy. But in future pregnancies, it could cause Rh disease.  A  with Rh disease has mild anemia and may have jaundice. In severe cases, anemia, jaundice, and swelling can be very dangerous or fatal. Some babies need to be delivered early. Some need special care in the NICU. A very sick baby will need a blood transfusion before or after birth.  Fortunately, Rh sensitization is usually easy to prevent.  That's why it's important to get your Rh status checked in your first " "trimester. It doesn't cause any warning signs. A blood test is the only way to know if you are Rh-sensitive or are at risk for it.  How can you prevent Rh disease?  If you are Rh-negative, your doctor gives you an Rh immune globulin shot (such as RhoGAM). It helps prevent your body from making the antibodies that attack your baby's red blood cells.  Timing is important. You need the shot at certain times during your pregnancy. And you need one anytime there is a chance that your baby's blood might mix with yours. That can happen with certain prenatal tests or when you have pregnancy bleeding, such as:  Right after any pregnancy loss, amniocentesis, or CVS testing.  After turning of a breech baby.  Before and maybe after childbirth. Your doctor gives you a shot around week 28. If your  is Rh-positive, you will have another shot.  Follow-up care is a key part of your treatment and safety. Be sure to make and go to all appointments, and call your doctor if you are having problems. It's also a good idea to know your test results and keep a list of the medicines you take.  Where can you learn more?  Go to https://www.[employer]Plus.net/patiented  Enter W177 in the search box to learn more about \"Learning About Preventing Rh Disease.\"  Current as of: 2023               Content Version: 13.8    6820-9046 [employer].   Care instructions adapted under license by your healthcare professional. If you have questions about a medical condition or this instruction, always ask your healthcare professional. [employer] disclaims any warranty or liability for your use of this information.      Learning About Rh Immunoglobulin Shots  Introduction     An Rh immunoglobulin shot is given to pregnant women who have Rh-negative blood.  You may have Rh-negative blood, and your baby may have Rh-positive blood. If the two types of blood mix, your body will make antibodies. This is called Rh " sensitization. Most of the time, this is not a problem the first time you're pregnant. But it could cause problems in future pregnancies.  This shot keeps your body from making the antibodies. You get the shot around 28 weeks of pregnancy. After the birth, your baby's blood is tested. If the blood is Rh positive, you will get another shot. You may also get the shot if you have vaginal bleeding while you are pregnant or if you have a miscarriage. These shots protect future pregnancies.  Women with Rh negative blood will need this shot each time they get pregnant.  Example  Rh immunoglobulin (HypRho-D, MICRhoGAM, and RhoGAM)  Possible side effects  Rare side effects may include:  Some mild pain where you got the shot.  A slight fever.  An allergic reaction.  You may have other side effects not listed here. Check the information that comes with your medicine.  What to know about taking this medicine  You may need more than one shot. You may need the shot again:  After amniocentesis, fetal blood sampling, or chorionic villus sampling tests.  If you have bleeding in your second or third trimester.  After turning of a breech baby.  After an injury to the belly while you are pregnant.  After a miscarriage or an .  Before or right after treatment for an ectopic or a partial molar pregnancy.  Tell your doctor if you have any allergies or have had a bad response to medicines in the past.  If you get this shot within 3 months of getting a live-virus vaccine, the vaccine may not work. Your doctor will tell you if you need more vaccine.  Check with your doctor or pharmacist before you use any other medicines. This includes over-the-counter medicines. Make sure your doctor knows all of the medicines, vitamins, herbs, and supplements you take. Taking some medicines at the same time can cause problems.  Where can you learn more?  Go to https://www.healthwise.net/patiented  Enter V615 in the search box to learn more about  "\"Learning About Rh Immunoglobulin Shots.\"  Current as of: July 11, 2023               Content Version: 13.8    8795-7892 [employer].   Care instructions adapted under license by your healthcare professional. If you have questions about a medical condition or this instruction, always ask your healthcare professional. [employer] disclaims any warranty or liability for your use of this information.      Rubella (French Measles): Care Instructions  Overview  Rubella, also called French measles or 3-day measles, is a disease caused by a virus. It spreads by coughs, sneezes, and close contact. Rubella usually is mild and does not cause long-term problems. But if you are pregnant and get it, you can give the disease to your unborn baby. This can cause serious birth defects.  While you have rubella, you may get a rash and a mild fever, and the lymph glands in your neck may swell. Older children often have a fever, eye pain, a sore throat, and body aches. You can relieve most symptoms with care at home. Avoid being around others, especially pregnant people, until your rash has been gone for at least 4 days. People who have not had this disease before or have not had the vaccine have the greatest chance of getting the virus.  Follow-up care is a key part of your treatment and safety. Be sure to make and go to all appointments, and call your doctor if you are having problems. It's also a good idea to know your test results and keep a list of the medicines you take.  How can you care for yourself at home?  Drink plenty of fluids. If you have kidney, heart, or liver disease and have to limit fluids, talk with your doctor before you increase the amount of fluids you drink.  Get plenty of rest to help your body heal.  Take an over-the-counter pain medicine, such as acetaminophen (Tylenol), ibuprofen (Advil, Motrin), or naproxen (Aleve), to reduce fever and discomfort. Read and follow all instructions on " "the label. Do not give aspirin to anyone younger than 20. It has been linked to Reye syndrome, a serious illness.  Do not take two or more pain medicines at the same time unless the doctor told you to. Many pain medicines have acetaminophen, which is Tylenol. Too much acetaminophen (Tylenol) can be harmful.  Try not to scratch the rash. Put cold, wet cloths on the rash to reduce itching.  Do not smoke. Smoking can make your symptoms worse. If you need help quitting, talk to your doctor about stop-smoking programs and medicines. These can increase your chances of quitting for good.  Avoid contact with people who have never had rubella and who have not been immunized.  When should you call for help?   Call your doctor now or seek immediate medical care if:    You have a fever with a stiff neck or a severe headache.     You are sensitive to light or feel very sleepy or confused.   Watch closely for changes in your health, and be sure to contact your doctor if:    You do not get better as expected.   Where can you learn more?  Go to https://www.[employer].net/patiented  Enter B812 in the search box to learn more about \"Rubella ([place] Measles): Care Instructions.\"  Current as of: June 13, 2023               Content Version: 13.8    0462-2344 [employer].   Care instructions adapted under license by your healthcare professional. If you have questions about a medical condition or this instruction, always ask your healthcare professional. [employer] disclaims any warranty or liability for your use of this information.      Gonorrhea and Chlamydia: About These Tests  What is it?  These tests use a sample of urine or other body fluid to look for the bacteria that cause these sexually transmitted infections (STIs). The fluid sample can come from the cervix, vagina, rectum, throat, or eyes.  Why is this test done?  These tests may be done to:  Find out if symptoms are caused by gonorrhea or " "chlamydia.  Check people who are at high risk of being infected with gonorrhea or chlamydia.  Retest people several months after they have been treated for gonorrhea or chlamydia.  Check for infection in your  if you had a gonorrhea or chlamydia infection at the time of delivery.  How can you prepare for the test?  If you are going to have a urine test, do not urinate for at least 1 hour before the test.  If you think you may have chlamydia or gonorrhea, don't have sexual intercourse until you get your test results. And you may want to have tests for other STIs, such as HIV.  How is the test done?  For a direct sample, a swab is used to collect body fluid from the cervix, vagina, rectum, throat, or eyes. Your doctor may collect the sample. Or you may be given instructions on how to collect your own sample.  For a urine sample, you will collect the urine that comes out when you first start to urinate. Don't wipe the genital area clean before you urinate.  How long does the test take?  The test will take a few minutes.  What happens after the test?  You will be able to go home right away.  You can go back to your usual activities right away.  If you do have an infection, don't have sexual intercourse for 7 days after you start treatment. And your sex partner(s) should also be treated.  Follow-up care is a key part of your treatment and safety. Be sure to make and go to all appointments, and call your doctor if you are having problems. It's also a good idea to keep a list of the medicines you take. Ask your doctor when you can expect to have your test results.  Where can you learn more?  Go to https://www.[employer].net/patiented  Enter K976 in the search box to learn more about \"Gonorrhea and Chlamydia: About These Tests.\"  Current as of: 2023               Content Version: 13.8    4393-7561 [employer].   Care instructions adapted under license by your healthcare professional. If you " have questions about a medical condition or this instruction, always ask your healthcare professional. Health[employer], [facility] disclaims any warranty or liability for your use of this information.      Trichomoniasis: About This Test  What is it?     This test uses a sample of urine or other body fluid to look for the tiny parasite that causes trichomoniasis (also called trich). The fluid sample can come from the vagina, cervix, or urethra. Your doctor may choose to use one or more of many available tests.  Why is it done?  A trich test may be done to:  Find out if symptoms are caused by trich.  Check people who are at high risk for being infected with trich.  Check after treatment to make sure that the infection is gone.  How do you prepare for the test?  If you are going to have a urine test, do not urinate for at least 1 hour before the test.  How is the test done?  For a direct sample, a swab is used to collect body fluid from the cervix, vagina, or urethra. Your doctor may collect the sample. Or you may be given instructions on how to collect your own sample.  For a urine sample, you will collect the urine that comes out when you first start to urinate. Don't wipe the area clean before you urinate.  How long does the test take?  It will take a few minutes to collect a sample.  What happens after the test?  You can go home right away.  You can go back to your usual activities right away.  You may get the test results the same day or several days later. It depends on the test used.  If you do have an infection, don't have sexual intercourse for 7 days after you start treatment. Your sex partner(s) should also be treated.  Follow-up care is a key part of your treatment and safety. Be sure to make and go to all appointments, and call your doctor if you are having problems. Ask your doctor when you can expect to have your test results.  Current as of: April 19, 2023               Content Version: 13.8    5854-7015  "[employer].   Care instructions adapted under license by your healthcare professional. If you have questions about a medical condition or this instruction, always ask your healthcare professional. [employer] disclaims any warranty or liability for your use of this information.      HIV Testing: Care Instructions  Overview  You can get tested for the human immunodeficiency virus (HIV). Most doctors use a blood test to check for HIV antibodies and antigens in your blood. It may also check for the genetic material (RNA) of HIV. Some tests use saliva to check for HIV antibodies. But these aren't as accurate. For example, they may give a false result if you've just been infected.  What do the results mean?    Normal (negative)    No HIV antibodies, antigens, or RNA were found.  You may need more testing. It can make sure your test results are correct.    Uncertain (indeterminate)    Test results didn't clearly show if you have an HIV infection.  HIV antibodies or antigens may not have formed yet.  Some other type of antibody or antigen may have affected the results.  You will need another test to be sure.    Abnormal (positive)    HIV antibodies, antigens, or RNA were found.  If you haven't had an RNA test yet, one will be done. If it's positive, you have HIV.  If your test result is positive, your doctor will talk to you. You will discuss starting treatment.  Follow-up care is a key part of your treatment and safety. Be sure to make and go to all appointments, and call your doctor if you are having problems. It's also a good idea to know your test results and keep a list of the medicines you take.  Where can you learn more?  Go to https://www.[employer].net/patiented  Enter T792 in the search box to learn more about \"HIV Testing: Care Instructions.\"  Current as of: June 13, 2023               Content Version: 13.8 2006-2023 [employer].   Care instructions adapted under " license by your healthcare professional. If you have questions about a medical condition or this instruction, always ask your healthcare professional. [employer] disclaims any warranty or liability for your use of this information.      Hepatitis C Virus Tests: About These Tests  What are they?     Hepatitis C virus tests are blood tests that check for substances in the blood that show whether you have hepatitis C now or had it in the past. The tests can also tell you what type of hepatitis C you have and how severe the disease is. This can help your doctor with treatment.  If the tests show that you have long-term hepatitis C, you need to take steps to prevent spreading the disease.  Why are these tests done?  You may need these tests if:  You have symptoms of hepatitis.  You may have been exposed to the virus. You are more likely to have been exposed to the virus if you inject drugs or are exposed to body fluids (such as if you are a health care worker).  You've had other tests that show you have liver problems.  You are 18 to 79 years old.  You have an HIV infection.  The tests also are done to help your doctor decide about your treatment and see how well it works.  How do you prepare for the test?  In general, there's nothing you have to do before this test, unless your doctor tells you to.  How is the test done?  A health professional uses a needle to take a blood sample, usually from the arm.  What happens after these tests?  You will probably be able to go home right away.  You can go back to your usual activities right away.  Follow-up care is a key part of your treatment and safety. Be sure to make and go to all appointments, and call your doctor if you are having problems. It's also a good idea to keep a list of the medicines you take. Ask your doctor when you can expect to have your test results.  Where can you learn more?  Go to https://www.[employer].net/patiented  Enter W551 in the search  "box to learn more about \"Hepatitis C Virus Tests: About These Tests.\"  Current as of: June 13, 2023               Content Version: 13.8    0846-1708 [employer].   Care instructions adapted under license by your healthcare professional. If you have questions about a medical condition or this instruction, always ask your healthcare professional. [employer] disclaims any warranty or liability for your use of this information.      Learning About Fetal Ultrasound Results  What is a fetal ultrasound?     Fetal ultrasound is a test that lets your doctor see an image of your baby. Your doctor learns information about your baby from this picture. You may find out, for example, if you are having a boy or a girl. But the main reason you have this test is to get information about your baby's health.  (You may hear your baby called a fetus. This is a common medical term for a baby that's growing in the mother's uterus.)  What kind of information can you learn from this test?  The findings of an ultrasound fall into two categories, normal and abnormal.  Normal  The fetus is the right size for its age.  The placenta is the expected size and does not cover the cervix.  There is enough amniotic fluid in the uterus.  No birth defects can be seen.  Abnormal  The fetus is small or large for its age.  The placenta covers the cervix.  There is too much or too little amniotic fluid in the uterus.  The fetus may have a birth defect.  What does an abnormal result mean?  Abnormal seems to imply that something is wrong with your baby. But what it means is that the test has shown something the doctor wants to take a closer look at.  And that's what happens next. Your doctor will talk to you about what further test or tests you may need.  What do the results mean?  Some of the things your doctor may see on an abnormal ultrasound include:  Echogenic bowel.  The bowel looks very bright on the screen. This could mean " that there's blood in the bowel. Or it could mean that something is blocking the small bowel.  Increased nuchal translucency.  The ultrasound measures the thickness at the back of the baby's neck. An increase in thickness is sometimes an early sign of Down syndrome.  Increased or decreased amniotic fluid.  The doctor will look for a reason for the level of amniotic fluid and will watch the pregnancy closely as it progresses.  Large ventricles.  Ventricles in the brain look larger than they should. Your doctor may take a closer look at the brain.  Renal pyelectasis/hydronephrosis.  The ultrasound measures the fluid around the kidney. If there is more fluid than expected, there is a chance of urinary tract or kidney problems.  Short long bones.  The ultrasound measures certain arm and leg bones. A long bone (humerus or femur) that is shorter than average could be a sign of Down syndrome.  Subchorionic hemorrhage.  An ultrasound can show bleeding under one of the membranes that surrounds the fetus. Some women don't have symptoms of bleeding. The ultrasound can find this problem when women are not bleeding from their vagina. Women who have this condition have a slightly higher chance of miscarriage.  What do you do now?  Take a deep breath, and let it out. Keep in mind that an abnormal finding on an ultrasound, after it's coupled with more information, may:  Turn out to be nothing.  Turn out to be something mild that won't affect the baby.  Turn out to be something more serious. But if this happens, early diagnosis helps you and your doctor plan treatment options sooner rather than later.  Your medical team is there for you. So are your family and friends. Ask questions, and get the help and support you need.  Follow-up care is a key part of your treatment and safety. Be sure to make and go to all appointments, and call your doctor if you are having problems. It's also a good idea to know your test results and keep a  "list of the medicines you take.  Where can you learn more?  Go to https://www.[employer].net/patiented  Enter K451 in the search box to learn more about \"Learning About Fetal Ultrasound Results.\"  Current as of: July 11, 2023               Content Version: 13.8    3336-9614 [employer].   Care instructions adapted under license by your healthcare professional. If you have questions about a medical condition or this instruction, always ask your healthcare professional. [employer] disclaims any warranty or liability for your use of this information.      Learning About Prenatal Visits  Overview     Regular prenatal visits are very important during any pregnancy. These quick office visits may seem simple and routine. But they can help you have a safe and healthy pregnancy. Your doctor is watching for problems that can only be found through regular checkups. The visits also give you and your doctor time to build a good relationship.  It's common to see your doctor every 4 weeks until week 28 of pregnancy. Then the visits will happen more often. From weeks 28 to 36, it's common to have visits every 2 to 3 weeks. In the final month of pregnancy, you likely will see your doctor every week. Your doctor may want to see you more or less often, depending on your health, your age, and if you've had a normal, full-term pregnancy before.  At different times in your pregnancy, you will have exams and tests. Some are routine. Others are done only when there is a chance of a problem. Everything healthy you do for your body helps you have a healthy pregnancy. Rest when you need it. Eat well, drink plenty of water, and exercise regularly.  What happens during a prenatal visit?  You will have blood pressure checks, along with urine tests. You also may have blood tests. If you need to go to the bathroom while waiting for the doctor, tell the nurse. You will be given a sample cup so your urine can be " tested.  You will be weighed and have your belly measured.  Your doctor may listen to the fetal heartbeat with a special device.  At about 24 weeks, and possibly earlier in your pregnancy, your doctor will check your blood sugar (glucose tolerance test) for diabetes that can occur during pregnancy. This is gestational diabetes, which can be harmful.  You will have tests to check for infections that could harm your . These include group B streptococcus and hepatitis B.  Your doctor may do ultrasounds to check for problems. This also checks the position of the fetus. An ultrasound uses sound waves to produce a picture of the fetus.  You may get your vaccines updated.  Your doctor may ask you questions to check for signs of anxiety or depression. Tell your doctor if you feel sad, anxious, or hopeless for more than a few days.  You may have other tests at any time during your pregnancy.  Use your visits to discuss with your doctor any concerns you have.  How can you care for yourself at home?  Get plenty of rest.  Try to exercise every day, if your doctor says it is okay. If you have not exercised in the past, start out slowly. For example, you can take short walks each day.  Choose healthy foods, such as fruits, vegetables, whole grains, lean proteins, low-fat dairy, and healthy fats.  Drink plenty of fluids. Cut down on drinks with caffeine, such as coffee, tea, and cola. If you have kidney, heart, or liver disease and have to limit fluids, talk with your doctor before you increase the amount of fluids you drink.  Try to avoid chemical fumes, paint fumes, and poisons.  If you smoke, vape, or use alcohol, marijuana, or other drugs, quit or cut back as much as you can. Talk to your doctor if you need help quitting.  Review all of your medicines, including over-the-counter medicines and supplements, with your doctor. Some of your routine medicines may need to be changed. Do not stop or start taking any medicines  "without talking to your doctor first.  Follow-up care is a key part of your treatment and safety. Be sure to make and go to all appointments, and call your doctor if you are having problems. It's also a good idea to know your test results and keep a list of the medicines you take.  Where can you learn more?  Go to https://www.[employer].net/patiented  Enter J502 in the search box to learn more about \"Learning About Prenatal Visits.\"  Current as of: July 11, 2023               Content Version: 13.8    3092-2971 [employer].   Care instructions adapted under license by your healthcare professional. If you have questions about a medical condition or this instruction, always ask your healthcare professional. [employer] disclaims any warranty or liability for your use of this information.      Intimate Partner Violence: Care Instructions  Overview     If you want to save this information but don't think it is safe to take it home, see if a trusted friend can keep it for you. Plan ahead. Know who you can call for help, and memorize the phone number.   Be careful online too. Your online activity may be seen by others. Do not use your personal computer or device to read about this topic. Use a safe computer, such as one at work, a friend's home, or a library.    Intimate partner violence--a type of domestic abuse--is different from an argument now and then. It is a pattern of abuse that one person may use to control another person's behavior. It may start with threats and name-calling. Then, it may lead to more serious acts, like pushing and slapping. The abuse also may occur in other areas. For example, the abuser may withhold money or spend a partner's money without their knowledge.  Abuse can cause serious harm. You are more likely to have a long-term health problem from the injuries and stress of living in a violent relationship. People who are sexually abused by their partners have more " sexually transmitted infections and unplanned pregnancies. Anyone who is abused also faces emotional pain. Anyone can be abused in relationships. In some relationships, both people use abusive behavior.  If you are pregnant, abuse can cause problems such as poor weight gain, infections, and bleeding. Abuse during this time may increase your baby's risk of low birth weight, premature birth, and death.  Follow-up care is a key part of your treatment and safety. Be sure to make and go to all appointments, and call your doctor if you are having problems. It's also a good idea to know your test results and keep a list of the medicines you take.  How can you care for yourself at home?  If you do not have a safe place to stay, discuss this with your doctor before you leave.  Have a plan for where to go, how to leave your home, and where to stay in case of an emergency. Do not tell your partner about your plan. Contact:  The National Domestic Violence Hotline toll-free at 1-294.359.3757. They can help you find resources in your area.  Your local police department, hospital, or clinic for information about shelters and safe homes near you.  Talk to a trusted friend or neighbor, a counselor, or a heidi leader. Do not feel that you have to hide what happened.  Teach your children how to call for help in an emergency.  Be alert to warning signs, such as threats, heavy alcohol use, or drug use. This can help you avoid danger.  If you can, make sure that there are no guns or other weapons in your home.  When should you call for help?   Call 911 anytime you think you may need emergency care. For example, call if:    You or someone else has just been abused.     You think you or someone else is in danger of being abused.   Watch closely for changes in your health, and be sure to contact your doctor if you have any problems.  Where can you learn more?  Go to https://www.healthwise.net/patiented  Enter G282 in the search box to learn  "more about \"Intimate Partner Violence: Care Instructions.\"  Current as of: June 25, 2023               Content Version: 13.8    5600-2352 [employer].   Care instructions adapted under license by your healthcare professional. If you have questions about a medical condition or this instruction, always ask your healthcare professional. [employer] disclaims any warranty or liability for your use of this information.      Intimate Partner Violence Safety Instructions: Care Instructions  Overview     If you want to save this information but don't think it is safe to take it home, see if a trusted friend can keep it for you. Plan ahead. Know who you can call for help, and memorize the phone number.   Be careful online too. Your online activity may be seen by others. Do not use your personal computer or device to read about this topic. Use a safe computer, such as one at work, a friend's home, or a library.    When you are abused by a spouse or partner, you can take actions to protect yourself and your children.  You can increase your safety whether you decide to stay with your spouse or partner or you decide to leave. You may want to make a safety plan and pack a bag ahead of time. This will help you leave quickly when you decide to. Remember, you cannot change your partner's actions, but you can find help for you and your children. No one deserves to be abused.  Follow-up care is a key part of your treatment and safety. Be sure to make and go to all appointments, and call your doctor if you are having problems. It's also a good idea to know your test results and keep a list of the medicines you take.  How can you care for yourself at home?  Make a plan for your safety   If you decide to stay with your abusive spouse or partner, you can do the following to increase your safety:  Decide what works best to keep you safe in an emergency.  Know who you can call to help you in an emergency.  Decide " if you will call the police if you get hurt again. If you can, agree on a signal with your children or neighbor to call the police for you if you need help. You can flash lights or hang something out of a window.  Choose a safe place to go for a short time if you need to leave home. Memorize the address and phone number.  Learn escape routes out of your home in case you need to leave in a hurry. Teach your children different ways to get out of your home quickly if they need to.  If you can, hide or lock up things that can be used as weapons (guns, knives, hammers).  Learn the number of a domestic violence shelter. Talk to the people there about how they can help.  Find out about other community resources that can help you.  Take pictures of bruises or other injuries if you can. You can also take pictures of things your abuser has broken.  Teach your children that violence is never okay. Tell them that they do not deserve to be hurt.  Pack a bag   Prepare a bag with things you will need if you leave suddenly. Leave it with a friend, a relative, or someone else you trust. You could include the following items in the bag:  A set of keys to your home and car.  Emergency phone numbers and addresses.  Money such as cash or checks. You can also ask a friend, a relative, or someone else you trust to hold money for you.  Copies of legal documents such as house and car titles or rent receipts, birth certificates, Social Security card, voter registration, marriage and 's licenses, and your children's health records.  Personal items you would need for a few days, such as clothes, a toothbrush, toothpaste, and any medicines you or your children need.  A favorite toy or book for your child or children.  Diapers and bottles, if you have very young children.  Pictures that show signs of abuse and violence. You may also add pictures of your abuser.  If you leave   If you decide to leave, you can take the following steps:  Go  "to the emergency room at a hospital if you have been hurt.  Think about asking the police to be with you as you leave. They can protect you as you leave your home.  If you decide to leave secretly, remember that activities can be tracked. Your abuser may still have access to your cell phone, email, and credit cards. It may be possible for these to be traced. Always be aware of your surroundings.  If this is an emergency, do not worry about gathering up anything. Just leave--your safety is most important.  If your abuser moves out, change the locks on the doors. If you have a security system, change the access code.  When should you call for help?   Call 911 anytime you think you may need emergency care. For example, call if:    You or someone else has just been abused.     You think you or someone else is in danger of being abused.   Watch closely for changes in your health, and be sure to contact your doctor if you have any problems.  Where can you learn more?  Go to https://www.Cortera.net/patiented  Enter A752 in the search box to learn more about \"Intimate Partner Violence Safety Instructions: Care Instructions.\"  Current as of: June 25, 2023               Content Version: 13.8    1672-3097 [employer].   Care instructions adapted under license by your healthcare professional. If you have questions about a medical condition or this instruction, always ask your healthcare professional. [employer] disclaims any warranty or liability for your use of this information.      Learning About Intimate Partner Violence  What is intimate partner violence?  Intimate partner violence is a type of domestic abuse. It's threatening, emotionally harmful, or violent behavior in a personal relationship. It can happen between past or current partners or spouses. In some relationships both people abuse each other. One partner may be more abusive. Or the abuse may be equal.  Abuse can affect people of " any ethnic group, race, or Restorationist. It can affect teens, adults, or the elderly. And it can happen to people of any sexual orientation, gender, or social status.  Abusers use fear, bullying, and threats to control their partners. They may control what their partners do. They may control where their partners go or who they see. They may act jealous, controlling, or possessive. These early signs of abuse may happen soon after the start of the relationship. Sometimes it can be hard to notice abuse at first. But after the relationship becomes more serious, the abuse may get worse.  If you are being abused in your relationship, it's important to get help. The abuse is not your fault. You don't have to face it alone.  Be careful  It may not be safe to take home domestic abuse information like this handout. Some people ask a trusted friend to keep it for them. It's also important to plan ahead and to memorize the phone number of places you can go for help. If you are concerned about your safety, do not use your computer, smartphone, or tablet to read about domestic abuse.   What are the types of intimate partner violence?  Abuse can happen in different ways. Each type can happen on its own or in combination with others.  Emotional abuse  Emotional abuse is a pattern of threats, insults, or controlling behavior. It includes verbal abuse. It goes beyond healthy disagreements in a relationship. It's a sign of an unhealthy relationship.  Do you feel threatened, intimidated, or controlled?  Does your partner:  Threaten your children, other family members, or pets?  Use jokes meant to embarrass or shame you?  Call you names?  Tell you that you are a bad parent?  Threaten to take away your children?  Threaten to have you or your family members deported?  Control your access to money or other basic needs?  Control what you do, who you see or talk to, or where you go?  Another form of emotional abuse is denying that it is  happening. Or the abuser may act like the abuse is no big deal or is your fault.  Sexual abuse  With sexual abuse, abusers may try to convince or force you to have sex. They may force you into sex acts you're not comfortable with. Or they may sexually assault you. Sexual abuse can happen even if you are in a committed relationship.  Physical abuse  Physical abuse means that a partner hits, kicks, or does something else to physically hurt you. Physical abuse that starts with a slap might lead to kicking, shoving, and choking over time. The abuser may also threaten to hurt or kill you.  Stalking  Stalking means that an abuser gives you attention that you do not want and that causes you fear. Examples of stalking include:  Following you.  Showing up at places where the abuser isn't invited, such as at your work or school.  Constantly calling or texting you.  What problems can  to?  Intimate partner violence can be very dangerous. It can cause serious, repeated injury. It can even lead to death.  All forms of abuse can cause long-term health problems from the stress of a violent relationship. Verbal abuse can lead to sexual and physical abuse.  Abuse causes:  Emotional pain.  Depression.  Anxiety.  Post-traumatic stress.  Sexual abuse can lead to sexually transmitted infections (such as HIV/AIDS) and unplanned pregnancy.  Pregnancy can be a very dangerous time for people in abusive relationships. Abuse can cause or increase the risk of problems during pregnancy. These include low weight gain, anemia, infections, and bleeding. Abuse may also increase your baby's risk of low birth weight, premature birth, and death.  It can be hard for some victims of abuse to ask for help or to leave their relationship. You may feel scared, stuck, or not sure what steps to take. But it's important not to ignore abuse. Talking to someone you trust could be the first step to ending the abuse and taking care of your own health and  "happiness again. There are resources available that can help keep you safe.  Where can you get help?  Talk to a trusted friend. Find a local advocacy group, or talk to your doctor about the abuse.  Contact the National Domestic Violence Hotline at 1-596-768-CUUY (1-599.260.4484) for more safety tips. They can guide you to groups in your area that can help. Or go to the National Coalition Against Domestic Violence website at www.theLa [employer] qui dit Oui.org to learn more.  Domestic violence groups or a counselor in your area can help you make a safety plan for yourself and your children.  When to call for help  Call 911 anytime you think you may need emergency care. For example, call if:  You think that you or someone you know is in danger of being abused.  You have been hurt and can't have someone safely take you to emergency care.  You have just been abused.  A family member has just been abused.  Where can you learn more?  Go to https://www.Plaxica.net/patiented  Enter S665 in the search box to learn more about \"Learning About Intimate Partner Violence.\"  Current as of: June 25, 2023               Content Version: 13.8    5764-0329 [employer].   Care instructions adapted under license by your healthcare professional. If you have questions about a medical condition or this instruction, always ask your healthcare professional. [employer] disclaims any warranty or liability for your use of this information.      Vaginal Bleeding During Pregnancy: Care Instructions  Overview     It's common to have some vaginal spotting when you are pregnant. In some cases, the bleeding isn't serious. And there aren't any more problems with the pregnancy.  But sometimes bleeding is a sign of a more serious problem. This is more common if the bleeding is heavy or painful. Examples of more serious problems include miscarriage, an ectopic pregnancy, and a problem with the placenta.  You may have to see your doctor again " to be sure everything is okay. You may also need more tests to find the cause of the bleeding.  Home treatment may be all you need. But it depends on what is causing the bleeding. Be sure to tell your doctor if you have any new symptoms or if your symptoms get worse.  The doctor has checked you carefully, but problems can develop later. If you notice any problems or new symptoms, get medical treatment right away.  Follow-up care is a key part of your treatment and safety. Be sure to make and go to all appointments, and call your doctor if you are having problems. It's also a good idea to know your test results and keep a list of the medicines you take.  How can you care for yourself at home?  If your doctor prescribed medicines, take them exactly as directed. Call your doctor if you think you are having a problem with your medicine.  Do not have vaginal sex until your doctor says it's okay.  Do not put anything in your vagina until your doctor says it's okay.  Ask your doctor about other activities you can or can't do.  Get a lot of rest. Being pregnant can make you tired.  Do not use nonsteroidal anti-inflammatory drugs (NSAIDs), such as ibuprofen (Advil, Motrin), naproxen (Aleve), or aspirin, unless your doctor says it is okay.  When should you call for help?   Call 911 anytime you think you may need emergency care. For example, call if:    You passed out (lost consciousness).     You have severe vaginal bleeding. This means you are soaking through a pad each hour for 2 or more hours.     You have sudden, severe pain in your belly or pelvis.   Call your doctor now or seek immediate medical care if:    You have new or worse vaginal bleeding.     You are dizzy or lightheaded, or you feel like you may faint.     You have pain in your belly, pelvis, or lower back.     You think that you are in labor.     You have a sudden release of fluid from your vagina.     You've been having regular contractions for an hour. This  "means that you've had at least 8 contractions within 1 hour or at least 4 contractions within 20 minutes, even after you change your position and drink fluids.     You notice that your baby has stopped moving or is moving much less than normal.   Watch closely for changes in your health, and be sure to contact your doctor if you have any problems.  Where can you learn more?  Go to https://www.Thrillist.com.XDC/patiented  Enter N829 in the search box to learn more about \"Vaginal Bleeding During Pregnancy: Care Instructions.\"  Current as of: July 11, 2023               Content Version: 13.8    7959-7494 [employer].   Care instructions adapted under license by your healthcare professional. If you have questions about a medical condition or this instruction, always ask your healthcare professional. [employer] disclaims any warranty or liability for your use of this information.      Weeks 10 to 14 of Your Pregnancy: Care Instructions  It's now possible to hear the fetus's heartbeat with a special ultrasound device. And the fetus's organs are developing.    Decide about tests to check for birth defects. Think about your age, your chance of passing on a family disease, your need to know about any problems, and what you might do after you have the test results.   It's okay to exercise. Try activities such as walking or swimming. Check with your doctor before starting a new program.     You may feel more tired than usual.  Taking naps during the day may help.     You may feel emotional.  It might help to talk to someone.     You may have headaches.  Try lying down and putting a cool cloth over your forehead.     You can use acetaminophen (Tylenol) for pain relief.  Don't take any anti-inflammatory medicines (such as Advil, Motrin, Aleve), unless your doctor says it's okay.     You may feel a fullness or aching in your lower belly.  This can feel like the kind of cramps you might get before a period. " "A back rub may help.     You may need to urinate more.  Your growing uterus and changing hormones can affect your bladder.     You may feel sick to your stomach (morning sickness).  Try avoiding food and smells that make you feel sick.     Your breasts may feel different.  They may feel tender or get bigger. Your nipples may get darker. Try a bra that gives you good support.     Avoid alcohol, tobacco, and drugs (including marijuana).  If you need help quitting, talk to your doctor.     Take a daily prenatal vitamin.  Choose one with folic acid.   Follow-up care is a key part of your treatment and safety. Be sure to make and go to all appointments, and call your doctor if you are having problems. It's also a good idea to know your test results and keep a list of the medicines you take.  Where can you learn more?  Go to https://www.SnapNames.net/patiented  Enter E090 in the search box to learn more about \"Weeks 10 to 14 of Your Pregnancy: Care Instructions.\"  Current as of: July 11, 2023               Content Version: 13.8    5685-6489 [employer].   Care instructions adapted under license by your healthcare professional. If you have questions about a medical condition or this instruction, always ask your healthcare professional. [employer] disclaims any warranty or liability for your use of this information.      Understanding Alpha and Beta Thalassemia  What is thalassemia?  Thalassemia [[facility] [place] is a lifelong blood disorder. It is caused by mutations (changes) in the genes that make hemoglobin.   Hemoglobin is a protein in red blood cells that carries oxygen. Hemoglobin is made of 4 smaller protein chains: 2 blocks of alpha chains and 2 blocks of beta chains (see Figure 1). Each block has heme (iron) in the center. Oxygen sticks to the heme, allowing your body to carry it through the bloodstream. The oxygen is delivered to your whole body.  When you have alpha thalassemia, " your alpha blocks are smaller or are missing one or both alpha chains. (See Figure 2 for an example.) For beta thalassemia, the beta blocks are smaller or fewer in number.   With either disorder, your body makes smaller hemoglobin and possibly fewer red blood cells to hold hemoglobin (anemia). You may feel very tired or have other problems as a result.  How do you get thalassemia?  There are 4 genes for alpha thalassemia and 2 genes for beta thalassemia. For you to have either alpha or beta thalassemia, both of your parents must carry a gene mutation for the disease and pass it down to you.   It's possible to have more severe or less severe symptoms than your parents. If you get a mutation from only one parent, for example, you won't have symptoms of thalassemia (thalassemia trait)--but you could still pass the mutation to your children.  Types of thalassemias  Some types of thalassemia have fewer symptoms than others. How severe the thalassemia is depends on how many mutated genes you have inherited and how many alpha or beta blocks are affected.   Alpha Thalassemia  Silent carrier (1 alpha mutation): People with this type have no symptoms and often do not know they have thalassemia.  Alpha thalassemia trait (2 alpha mutations): Some people with this trait may have mild anemia, but they often feel well.  Hemoglobin H disease (3 alpha mutations): People with this disorder have anemia more often. They sometimes need blood transfusions, but can have a normal life span.  Hemoglobin Barts (4 alpha mutations):  With this type, no alpha blocks are made. Severe problems occur during the mother's pregnancy and newborns rarely survive.  Beta Thalassemia  Beta thalassemia trait (1 beta mutation): People with this trait (also called beta thalassemia minor) have red blood cells that are small. Mild anemia can occur, but it is rare.  Beta thalassemia intermedia (2 beta mutations): In this type, both beta genes are abnormal, but  these mutations may be mild. More severe types sometimes need blood transfusions to treat anemia and medicine to treat iron buildup. Patients have normal life spans.  Beta thalassemia major (2 severe beta mutations): This is the most severe form of beta thalassemia. Both beta genes are abnormal, causing little to no beta blocks to be made. Patients often need medicine to reduce iron buildup, as well as monthly blood transfusions to stay healthy. The only cure at this time is a bone marrow transplant. More options are still being studied.  Treatment and outcomes  Mild types: People with a mild type of alpha or beta thalassemia rarely need treatment. Some need folic acid to help make more red blood cells. Most have normal life spans.  Moderate to severe types: Patients with these types have a higher risk for reduced growth, early bone thinning and pregnancy problems.  Most severe types: These patients often need regular blood transfusions, even if not sick. It is common to have iron build up in your body, so medicine may be needed to reduce the buildup. If left untreated, too much iron, especially in the liver and heart, can lead to premature death.  Outcomes for patients with alpha or beta thalassemia are usually very good in developed countries like the [place]. Survival rates higher than 90% have been reported for children.   For informational purposes only. Not to replace the advice of your health care provider. Copyright   2021 [facility]. All rights reserved. Clinically reviewed by Ray Guerra MD, Hematology. [employer] [id number] - REV 08/21.    Nutrition During Pregnancy: Care Instructions  Overview     Healthy eating when you are pregnant is important for you and your baby. It can help you feel well and have a successful pregnancy and delivery. During pregnancy your nutrition needs increase. Even if you have excellent eating habits, your doctor may recommend a multivitamin to make  sure you get enough iron and folic acid.  You may wonder how much weight you should gain. In general, if you were at a healthy weight before you became pregnant, then you should gain between 25 and 35 pounds. If you were overweight before pregnancy, then you'll likely be advised to gain 15 to 25 pounds. If you were underweight before pregnancy, then you'll probably be advised to gain 28 to 40 pounds. Your doctor will work with you to set a weight goal that is right for you. Gaining a healthy amount of weight helps you have a healthy baby.  Follow-up care is a key part of your treatment and safety. Be sure to make and go to all appointments, and call your doctor if you are having problems. It's also a good idea to know your test results and keep a list of the medicines you take.  How can you care for yourself at home?  Eat plenty of fruits and vegetables. Include a variety of orange, yellow, and leafy dark-green vegetables every day.  Choose whole-grain bread, cereal, and pasta. Good choices include whole wheat bread, whole wheat pasta, brown rice, and oatmeal.  Get 4 or more servings of milk and milk products each day. Good choices include nonfat or low-fat milk, yogurt, and cheese. If you cannot eat milk products, you can get calcium from calcium-fortified products such as orange juice, soy milk, and tofu. Other non-milk sources of calcium include leafy green vegetables, such as broccoli, kale, mustard greens, turnip greens, bok roxie, and brussels sprouts.  If you eat meat, pick lower-fat types. Good choices include lean cuts of meat and chicken or turkey without the skin.  Do not eat shark, swordfish, nisha mackerel, or tilefish. They have high levels of mercury, which is dangerous to your baby. You can eat up to 12 ounces a week of fish or shellfish that have low mercury levels. Good choices include shrimp, wild salmon, pollock, and catfish. Limit some other types of fish, such as white (albacore) tuna, to 4 oz  "(0.1 kg) a week.  Heat lunch meats (such as turkey, ham, or bologna) to 165 F before you eat them. This reduces your risk of getting sick from a kind of bacteria that can be found in lunch meats.  Do not eat unpasteurized soft cheeses, such as brie, feta, fresh mozzarella, and blue cheese. They have a bacteria that could harm your baby.  Limit caffeine. If you drink coffee or tea, have no more than 1 cup a day. Caffeine is also found in myriam.  Do not drink any alcohol. No amount of alcohol has been found to be safe during pregnancy.  Do not diet or try to lose weight. For example, do not follow a low-carbohydrate diet. If you are overweight at the start of your pregnancy, your doctor will work with you to manage your weight gain.  Tell your doctor about all vitamins and supplements you take.  When should you call for help?  Watch closely for changes in your health, and be sure to contact your doctor if you have any problems.  Where can you learn more?  Go to https://www.Inventorum.net/patiented  Enter Y785 in the search box to learn more about \"Nutrition During Pregnancy: Care Instructions.\"  Current as of: February 28, 2023               Content Version: 13.8    9548-8319 [employer].   Care instructions adapted under license by your healthcare professional. If you have questions about a medical condition or this instruction, always ask your healthcare professional. [employer] disclaims any warranty or liability for your use of this information.      Exercise During Pregnancy: Care Instructions  Overview     Exercise is good for you during a healthy pregnancy. It can relieve back pain, swelling, and other discomforts. It also prepares your muscles for childbirth. And exercise can improve your energy level and help you sleep better.  If your doctor advises it, get more exercise. For example, walking is a good choice. Bit by bit, increase the amount you walk every day. Try for at least 30 " minutes on most days of the week. You could also try a prenatal exercise class. But if you do not already exercise, be sure to talk with your doctor before you start a new exercise program. Doctors do not recommend contact sports during pregnancy.  Follow-up care is a key part of your treatment and safety. Be sure to make and go to all appointments, and call your doctor if you are having problems. It's also a good idea to know your test results and keep a list of the medicines you take.  How can you care for yourself at home?  Talk with your doctor about the right kind of exercise for each stage of pregnancy.  Listen to your body to know if your exercise is at a safe level.  Do not become overheated while you exercise. High body temperature can be harmful. Avoid activities that can make your body too hot.  If you feel tired, take it easy. You might walk instead of run.  If you are used to strenuous exercise, ask your doctor how to know when it's time to slow down.  If you exercised before getting pregnant, you should be able to keep up your routine early in your pregnancy. Later in your pregnancy, you may want to switch to more gentle activities.  Drink plenty of fluids before, during, and after exercise.  Avoid contact sports, such as soccer and basketball. Also avoid risky activities. These include scuba diving, horseback riding, and exercising at a high altitude (above 6,000 feet). If you live in a place with a high altitude, talk to your doctor about how you can exercise safely.  Do not get overtired while you exercise. You should be able to talk while you work out.  After your fourth month of pregnancy, avoid exercises that require you to lie flat on your back on a hard surface. These include sit-ups and some yoga poses.  Get plenty of rest. You may be very tired while you are pregnant.  Where can you learn more?  Go to https://www.healthwise.net/patiented  Enter S801 in the search box to learn more about  "\"Exercise During Pregnancy: Care Instructions.\"  Current as of: July 11, 2023               Content Version: 13.8    7242-9917 [employer].   Care instructions adapted under license by your healthcare professional. If you have questions about a medical condition or this instruction, always ask your healthcare professional. [employer] disclaims any warranty or liability for your use of this information.      Learning About Pregnancy and Obesity  How does your weight affect your pregnancy?     The basics of prenatal care are the same for everyone, regardless of size. You'll get what you need to have a healthy baby.  But your size can make a difference in a few things. You and your doctor will have to watch your pregnancy weight. Your weight may affect your labor and delivery.  You may have some extra doctor visits and tests. And you may have some tests earlier in your pregnancy. You'll need to pay close attention to things like blood pressure and the chance of getting gestational diabetes. (This is a type of diabetes that sometimes happens during pregnancy.) And close attention will be given to your developing baby.  Work with your doctor to get the care you need. Go to all your doctor visits, and follow your doctor's advice about what to do and what to avoid during pregnancy.  How much weight gain is healthy?  If you are very overweight (obese), experts recommend that you gain between 11 and 20 pounds. Your doctor will work with you to set a weight goal that's right for you. In some cases, your doctor may recommend that you not gain any weight.  How much extra food do you need to eat?  Although you may joke that you're \"eating for two\" during pregnancy, you don't need to eat twice as much food. How much you can eat depends on:  Your height.  How much you weigh when you get pregnant.  How active you are.  If you're carrying more than one fetus (multiple pregnancy).  In the first trimester, " "you'll probably need the same amount of calories as you did before you were pregnant. In general, in your second trimester, you need to eat about 340 extra calories a day. In your third trimester, you need to eat about 450 extra calories a day.  What can you do to have a healthy pregnancy?  The best things you can do for you and your baby are to eat healthy foods, get regular exercise, avoid alcohol and smoking, and go to your doctor visits.  Eat a variety of foods from all the food groups. Make sure to get enough calcium and folic acid.  You may want to work with a dietitian to help you plan healthy meals to get the right amount of calories for you.  If you didn't exercise much before you got pregnant, talk to your doctor about how you can slowly get more active. Your doctor may want to set up an exercise program with you.  Where can you learn more?  Go to https://www.[employer].net/patiented  Enter B644 in the search box to learn more about \"Learning About Pregnancy and Obesity.\"  Current as of: July 11, 2023               Content Version: 13.8    4565-8380 [employer].   Care instructions adapted under license by your healthcare professional. If you have questions about a medical condition or this instruction, always ask your healthcare professional. [employer] disclaims any warranty or liability for your use of this information.      You have been provided the CDC Warning Signs in Pregnancy document.    Additional copies can be found here: www.[employer].com/573035.pdf  "

## 2023-12-13 NOTE — PROGRESS NOTES
[facility] RN Prenatal Intake note  Subjective     33 year old female newly pregnant.  LMP: 10/15/23.    approximate  Pregnancy is unplanned but welcomed.    Partner/support person name and relationship - Nilson, .        Symptoms since LMP include bleeding, spotting, nausea, vomiting, bowel changes, and breast tenderness. Patient has tried these relief   measures: Reglan, diet modification and small frequent meals.  -Had spotting/bleeding (pink/brown) in early pregnancy lasted for 2-3 days, serial HCGs drawn, has since stopped, not accompanied by cramping  -C/o constipation, using stool softener prn with relief  -Had some breast tenderness, was breastfeeding 20 month old, has since stopped breastfeeding    OB HISTORY  OB History    Para Term  AB Living   10 4 4 0 5 3   SAB IAB Ectopic Multiple Live Births   5 0 0 0 4      # Outcome Date GA Lbr Michael/2nd Weight Sex Delivery Anes PTL Lv   10 Current            9 Term 22 37w6d 03:30 / 00:04 2.81 kg (6 lb 3.1 oz) M Vag-Spont None N RAMILA      Name: DEANDRAMALE-GINNA      Apgar1: 9  Apgar5: 9   8 SAB 2021 10w0d    AB, MISSED         Birth Comments: MAB, baby stopped growing 8 weeks, D & C 11 weeks   7 Term 07/15/19 37w6d 04:04 / 00:09 2.55 kg (5 lb 10 oz) F Vag-Spont None N RAMILA      Birth Comments: PROM, spontaneous labor, 5 hour labor, epidural, 2nd degree, PPH      Complications: Hemorrhage      Name: DEANDRAFEMALE-GINNA      Apgar1: 8  Apgar5: 9   6 Term 18 39w3d 01:16 / 00:20 2.75 kg (6 lb 1 oz) M Vag-Spont None N ND      Birth Comments: IUFD, no cardiac activity on admission prior to delivery, NSVB, intact no PPH, Admitted postpartum for preeclampsia with severe features. She presented to the ED with sudden onset of fatigue, numbness and tingling and shortness of breath.      Complications: Fetal demise > 22 weeks, delivered, current hospitalization      Name: Jay   5 SAB 17 11w0d    AB, MISSED         Birth Comments: BAby stopped  growing 11 weeks, D & C at 15 weeks, NL blood loss   4 SAB 2017 5w0d             Birth Comments: SAB. no complication   3 Term 12/03/15 38w5d 03:45 / 01:16 3.11 kg (6 lb 13.7 oz) M Vag-Spont EPI  RAMILA      Birth Comments: PROM, spontaneous labor, 5 hour labor, epidural, 2nd degree, no PPH      Name: Lee Ann      Apgar1: 9  Apgar5: 9   2 SAB 2014 5w0d             Birth Comments: SAB no complications   1 2014 5w0d             Birth Comments: SAB, no complications      Obstetric Comments   2018 Severe Pre E Postpartum after term IUFD 39 weeks   2019: shortened cervix with cerclage, delivered at 37/6 weeks, PPH   Recurrent pregnancy loss     OB COMPLICATIONS  First Pregnancy No  GDM No  HTNYes: 2018  Preeclampsia Yes: 2018   labor Yes:     birth No   birth No  PP hemorrhage Yes:    Retained placenta No  PP mood disorder Yes: 2018  Fetal anomaly No  FGR No  Macrosomia  No  3rd or 4th degree laceration  No  Shoulder dystocia No  NICU admit No  -concern for placental abruption 2018  PERSONAL/SOCIAL HISTORY      lives with their family.  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  Job involves sedentary activity.  Her partner works as an .     MENTAL HEALTH HISTORY:  past  mood disorder  -Post partum depression 2018  -Pt reports some anxiety but no official diagnosis     - Current Medications Reviewed   Current Outpatient Medications   Medication Sig Dispense Refill    albuterol (PROAIR HFA/PROVENTIL HFA/VENTOLIN HFA) 108 (90 Base) MCG/ACT inhaler INHALE 1 TO 2 PUFFS EVERY 4 HOURS AS NEEDED FOR WHEEZE FOR UP TO 30 DAYS      docusate sodium (COLACE) 100 MG capsule       polyethylene glycol (MIRALAX) 17 GM/Dose powder Take 17 g by mouth      cholecalciferol (VITAMIN D3) 125 mcg (5000 units) capsule Take 1 capsule (125 mcg) by mouth daily Take one capsule daily. 90 capsule 1    cyanocobalamin (VITAMIN B-12) 1000 MCG tablet Take 1 tablet (1,000 mcg) by mouth daily  (Patient not taking: Reported on 2022) 90 tablet 1    docusate sodium (COLACE) 100 MG tablet Take 1 tablet (100 mg) by mouth daily 90 tablet 1    ferrous sulfate (FEROSUL) 325 (65 Fe) MG tablet Take 1 tablet (325 mg) by mouth daily (with breakfast) (Patient not taking: Reported on 2022) 90 tablet 1    ibuprofen (ADVIL/MOTRIN) 600 MG tablet Take 1 tablet (600 mg) by mouth every 6 hours as needed for moderate pain Start after delivery (Patient not taking: Reported on 2022) 60 tablet 0    metoclopramide (REGLAN) 10 MG tablet Take 1 tablet (10 mg) by mouth 4 times daily (before meals and nightly) for 30 days 120 tablet 0    Prenatal Vit-Fe Fumarate-FA (PRENATAL VITAMIN AND MINERAL) 28-0.8 MG TABS Take 1 tablet by mouth daily (Patient not taking: Reported on 2022) 90 tablet 3    progesterone (PROMETRIUM) 200 MG capsule Place 1 capsule (200 mg) vaginally at bedtime 90 capsule 2    senna-docusate (SENOKOT-S/PERICOLACE) 8.6-50 MG tablet Take 1 tablet by mouth daily Start after delivery. (Patient not taking: Reported on 2022) 100 tablet 0    SENNA-docusate sodium (SENNA S) 8.6-50 MG tablet Take 1 tablet by mouth At Bedtime (Patient not taking: Reported on 2022) 90 tablet 3    vitamin C (ASCORBIC ACID) 250 MG tablet Take 1 tablet (250 mg) by mouth daily (Patient not taking: Reported on 2022) 90 tablet 1    vitamin D3 (CHOLECALCIFEROL) 10 MCG (400 UNIT) capsule Take 1 capsule by mouth daily           - Co-morbids Reviewed   Past Medical History:   Diagnosis Date    Anemia 2022    Depressive disorder     PPD following fetal loss    History of cervical cerclage 2021    3/2019: Placed after fetal anatomy scan at 21 wks (last pregnancy), then delivered at 37.6 wks     21 per MFM:   - Prophylactic cerclage at as soon as available, plan for next week  - Comprehensive ultrasound at 18-20 weeks and serial growth ultrasonography every 4 weeks  - Administration of   corticosteroids if the patient is deemed to be at increased risk of imminent  delive    History of IUFD 2021- IUFD boy from placental abruption.  18: placenta pathology reviewed. Hematoma infarct noted, see formal report.     Per [facility] 21  - Weekly BPPs after 32 weeks  - Deliver at 38 weeks  [facility] consultation  recommend serial evaluation of fetal growth every 4 weeks in addition to  weekly  surveillance starting at 32 weeks and delivery at 38 weeks as previously discussed. She kandis    History of postpartum hemorrhage 2022    Plan TXA prior to birth  AMSTL    History of recurrent  2018    Taking prometrium    History of severe pre-eclampsia 2018    Hx of cervical incompetence in pregnancy 2021    Nausea/vomiting in pregnancy 10/05/2021    Recurrent pregnancy loss 2023    Severe pre-eclampsia, postpartum condition or complication 2018    Thrombocytopenia (H24) 2022    Varicella     Vitamin D deficiency 2018     - Genetic/Infection questionnaire completed, risks include age >35 as of estimated date of delivery, recurrent pregnancy loss. Discussed genetic screening options, patient does not desire first trimester genetic screening  Pt  does not have a recent known exposure to Parvo or CMV so IgG/IgM testing WILL NOT be ordered.   Flu Vaccine.  Patient declined, will consider next visit  COVID Vaccine: Has had COVID vaccine x2  - Discussed expectations for routine prenatal care and scheduling.  -Discussed highlights from [employer] Expectant Family booklet on warning signs, safe pregnancy and prevention of infections diseases, nutrition and exercise.  - Patient was encouraged to start prenatal vitamins as tolerated, if experiencing nausea and vomiting then OK to switch to folic acid only at this time.    -Additional items: None  -Reconciled and reviewed problem list  -Pt scheduled for dating US and NOB on 23    Gracie  MIRIAM Liang

## 2023-12-26 DIAGNOSIS — Z32.01 PREGNANCY TEST POSITIVE: Primary | ICD-10-CM

## 2023-12-26 LAB
ABO/RH(D): NORMAL
ANTIBODY SCREEN: NEGATIVE
SPECIMEN EXPIRATION DATE: NORMAL

## 2023-12-27 ENCOUNTER — APPOINTMENT (OUTPATIENT)
Dept: LAB | Facility: CLINIC | Age: 33
End: 2023-12-27
Attending: MIDWIFE
Payer: COMMERCIAL

## 2023-12-27 ENCOUNTER — ANCILLARY PROCEDURE (OUTPATIENT)
Dept: ULTRASOUND IMAGING | Facility: CLINIC | Age: 33
End: 2023-12-27
Attending: MIDWIFE
Payer: COMMERCIAL

## 2023-12-27 ENCOUNTER — PRENATAL OFFICE VISIT (OUTPATIENT)
Dept: OBGYN | Facility: CLINIC | Age: 33
End: 2023-12-27
Attending: MIDWIFE
Payer: COMMERCIAL

## 2023-12-27 VITALS
BODY MASS INDEX: 27.47 KG/M2 | HEART RATE: 78 BPM | WEIGHT: 149.3 LBS | SYSTOLIC BLOOD PRESSURE: 110 MMHG | HEIGHT: 62 IN | DIASTOLIC BLOOD PRESSURE: 77 MMHG

## 2023-12-27 DIAGNOSIS — Z32.01 PREGNANCY TEST POSITIVE: ICD-10-CM

## 2023-12-27 DIAGNOSIS — Z87.59 HISTORY OF IUFD: ICD-10-CM

## 2023-12-27 DIAGNOSIS — Z98.890 HISTORY OF CERVICAL CERCLAGE: Primary | ICD-10-CM

## 2023-12-27 DIAGNOSIS — O21.9 NAUSEA/VOMITING IN PREGNANCY: ICD-10-CM

## 2023-12-27 DIAGNOSIS — O09.529 HIGH-RISK PREGNANCY, ELDERLY MULTIGRAVIDA, UNSPECIFIED TRIMESTER: ICD-10-CM

## 2023-12-27 DIAGNOSIS — Z87.59 HISTORY OF SEVERE PRE-ECLAMPSIA: Chronic | ICD-10-CM

## 2023-12-27 DIAGNOSIS — O26.22 HISTORY OF RECURRENT ABORTION, CURRENTLY PREGNANT IN SECOND TRIMESTER: Chronic | ICD-10-CM

## 2023-12-27 LAB
BASOPHILS # BLD AUTO: 0 10E3/UL (ref 0–0.2)
BASOPHILS NFR BLD AUTO: 0 %
EOSINOPHIL # BLD AUTO: 0.1 10E3/UL (ref 0–0.7)
EOSINOPHIL NFR BLD AUTO: 2 %
ERYTHROCYTE [DISTWIDTH] IN BLOOD BY AUTOMATED COUNT: 13.9 % (ref 10–15)
HBV SURFACE AG SERPL QL IA: NONREACTIVE
HCT VFR BLD AUTO: 33.9 % (ref 35–47)
HCV AB SERPL QL IA: NONREACTIVE
HGB BLD-MCNC: 11.4 G/DL (ref 11.7–15.7)
HIV 1+2 AB+HIV1 P24 AG SERPL QL IA: NONREACTIVE
IMM GRANULOCYTES # BLD: 0 10E3/UL
IMM GRANULOCYTES NFR BLD: 1 %
LYMPHOCYTES # BLD AUTO: 1.6 10E3/UL (ref 0.8–5.3)
LYMPHOCYTES NFR BLD AUTO: 28 %
MCH RBC QN AUTO: 28.9 PG (ref 26.5–33)
MCHC RBC AUTO-ENTMCNC: 33.6 G/DL (ref 31.5–36.5)
MCV RBC AUTO: 86 FL (ref 78–100)
MONOCYTES # BLD AUTO: 0.4 10E3/UL (ref 0–1.3)
MONOCYTES NFR BLD AUTO: 8 %
NEUTROPHILS # BLD AUTO: 3.7 10E3/UL (ref 1.6–8.3)
NEUTROPHILS NFR BLD AUTO: 61 %
NRBC # BLD AUTO: 0 10E3/UL
NRBC BLD AUTO-RTO: 0 /100
PLATELET # BLD AUTO: 203 10E3/UL (ref 150–450)
RBC # BLD AUTO: 3.94 10E6/UL (ref 3.8–5.2)
RUBV IGG SERPL QL IA: 17.7 INDEX
RUBV IGG SERPL QL IA: POSITIVE
T PALLIDUM AB SER QL: NONREACTIVE
VIT D+METAB SERPL-MCNC: 18 NG/ML (ref 20–50)
VZV IGG SER QL IA: 1469 INDEX
VZV IGG SER QL IA: POSITIVE
WBC # BLD AUTO: 5.9 10E3/UL (ref 4–11)

## 2023-12-27 PROCEDURE — G0010 ADMIN HEPATITIS B VACCINE: HCPCS

## 2023-12-27 PROCEDURE — 87340 HEPATITIS B SURFACE AG IA: CPT | Performed by: MIDWIFE

## 2023-12-27 PROCEDURE — 36415 COLL VENOUS BLD VENIPUNCTURE: CPT | Performed by: MIDWIFE

## 2023-12-27 PROCEDURE — 86901 BLOOD TYPING SEROLOGIC RH(D): CPT | Performed by: MIDWIFE

## 2023-12-27 PROCEDURE — 76801 OB US < 14 WKS SINGLE FETUS: CPT

## 2023-12-27 PROCEDURE — 99207 PR PRENATAL VISIT: CPT | Performed by: MIDWIFE

## 2023-12-27 PROCEDURE — 86787 VARICELLA-ZOSTER ANTIBODY: CPT | Performed by: MIDWIFE

## 2023-12-27 PROCEDURE — 76801 OB US < 14 WKS SINGLE FETUS: CPT | Mod: 26 | Performed by: OBSTETRICS & GYNECOLOGY

## 2023-12-27 PROCEDURE — 86762 RUBELLA ANTIBODY: CPT | Performed by: MIDWIFE

## 2023-12-27 PROCEDURE — 82306 VITAMIN D 25 HYDROXY: CPT | Performed by: MIDWIFE

## 2023-12-27 PROCEDURE — 86850 RBC ANTIBODY SCREEN: CPT | Performed by: MIDWIFE

## 2023-12-27 PROCEDURE — 86780 TREPONEMA PALLIDUM: CPT | Performed by: MIDWIFE

## 2023-12-27 PROCEDURE — 85025 COMPLETE CBC W/AUTO DIFF WBC: CPT | Performed by: MIDWIFE

## 2023-12-27 PROCEDURE — 250N000011 HC RX IP 250 OP 636

## 2023-12-27 PROCEDURE — 90746 HEPB VACCINE 3 DOSE ADULT IM: CPT

## 2023-12-27 PROCEDURE — 86803 HEPATITIS C AB TEST: CPT | Performed by: MIDWIFE

## 2023-12-27 PROCEDURE — 87389 HIV-1 AG W/HIV-1&-2 AB AG IA: CPT | Performed by: MIDWIFE

## 2023-12-27 PROCEDURE — 99213 OFFICE O/P EST LOW 20 MIN: CPT | Mod: 25 | Performed by: MIDWIFE

## 2023-12-27 PROCEDURE — 87086 URINE CULTURE/COLONY COUNT: CPT | Performed by: MIDWIFE

## 2023-12-27 RX ORDER — MEPERIDINE HYDROCHLORIDE 25 MG/ML
25 INJECTION INTRAMUSCULAR; INTRAVENOUS; SUBCUTANEOUS EVERY 30 MIN PRN
Status: CANCELLED | OUTPATIENT
Start: 2023-12-29

## 2023-12-27 RX ORDER — METHYLPREDNISOLONE SODIUM SUCCINATE 125 MG/2ML
125 INJECTION, POWDER, LYOPHILIZED, FOR SOLUTION INTRAMUSCULAR; INTRAVENOUS
Status: CANCELLED
Start: 2023-12-29

## 2023-12-27 RX ORDER — ALBUTEROL SULFATE 90 UG/1
1-2 AEROSOL, METERED RESPIRATORY (INHALATION)
Status: CANCELLED
Start: 2023-12-29

## 2023-12-27 RX ORDER — HEPARIN SODIUM,PORCINE 10 UNIT/ML
5-20 VIAL (ML) INTRAVENOUS DAILY PRN
Status: CANCELLED | OUTPATIENT
Start: 2023-12-29

## 2023-12-27 RX ORDER — PROGESTERONE 200 MG/1
200 CAPSULE ORAL 2 TIMES DAILY
Qty: 120 CAPSULE | Refills: 1 | Status: ON HOLD | OUTPATIENT
Start: 2023-12-27 | End: 2024-01-24

## 2023-12-27 RX ORDER — DIPHENHYDRAMINE HYDROCHLORIDE 50 MG/ML
50 INJECTION INTRAMUSCULAR; INTRAVENOUS
Status: CANCELLED
Start: 2023-12-29

## 2023-12-27 RX ORDER — ALBUTEROL SULFATE 0.83 MG/ML
2.5 SOLUTION RESPIRATORY (INHALATION)
Status: CANCELLED | OUTPATIENT
Start: 2023-12-29

## 2023-12-27 RX ORDER — ASPIRIN 81 MG/1
81 TABLET ORAL DAILY
Qty: 90 TABLET | Refills: 3 | Status: ON HOLD | OUTPATIENT
Start: 2023-12-27 | End: 2024-06-25

## 2023-12-27 RX ORDER — HEPARIN SODIUM (PORCINE) LOCK FLUSH IV SOLN 100 UNIT/ML 100 UNIT/ML
5 SOLUTION INTRAVENOUS
Status: CANCELLED | OUTPATIENT
Start: 2023-12-29

## 2023-12-27 RX ORDER — EPINEPHRINE 1 MG/ML
0.3 INJECTION, SOLUTION, CONCENTRATE INTRAVENOUS EVERY 5 MIN PRN
Status: CANCELLED | OUTPATIENT
Start: 2023-12-29

## 2023-12-27 NOTE — PROGRESS NOTES
[facility] Provider New OB Note    Reviewed RN intake note.    Pam is a 33 year old female who presents to clinic for a new OB visit.   at 11w1d with Estimated Date of Delivery: 2024 based on US today at 11w1d since LMP is approximate.   Has started PNV.   Spotting early pregnancy, now resolved  Hep B #2 today     with vaginal delivery x4  History of recurrent pregnancy loss, term IUFD with subsequent IOLs at term, preE, cerclages    Due for pap but does not want until PP visit    She has had bleeding since her LMP.  A few days of spotting early on but none recently     This was not a planned pregnancy but they are excited. 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 May delay, but plans to complete.   Partner is involved,  Nilson present and supportive.      OTHER CONCERNS:   - Progesterone. was prescribed for daily but wants it renewed as BID as this is what she took with all her successful pregnancies.     - N&V. She has had daily nausea and vomiting. Able to keep food down with Reglan. Will vomit if not taking and she tries to eat. Not able to drink any water, comes back up. Is able to drink small amounts of  watered down juice. Pt would like IV infusions ordered. Did this last pregnancy minimum of weekly and prevented dizziness and helped her have more energy. Reviewed when to use ED, if unable to keep anything down >12 hours or with signs of hypovolemia.     PSYCHIATRIC:  Denies depression or anxiety.      PHQ-9 score:        2022     3:19 PM   PHQ-9 SCORE   PHQ-9 Total Score 2               5/10/2019     3:13 PM 2019     2:44 PM 2022     3:19 PM   MACI-7 SCORE   Total Score 3 3 4         Past History:  Her past medical history   Past Medical History:   Diagnosis Date    Anemia 2022    Depressive disorder     PPD following fetal loss    History of cervical cerclage 2021    3/2019: Placed after fetal anatomy scan at 21 wks (last pregnancy), then delivered at 37.6 wks      21 per [facility]:   - Prophylactic cerclage at as soon as available, plan for next week  - Comprehensive ultrasound at 18-20 weeks and serial growth ultrasonography every 4 weeks  - Administration of  corticosteroids if the patient is deemed to be at increased risk of imminent  delive    History of IUFD 2021- IUFD boy from placental abruption.  18: placenta pathology reviewed. Hematoma infarct noted, see formal report.     Per [facility] 21  - Weekly BPPs after 32 weeks  - Deliver at 38 weeks  [facility] consultation  recommend serial evaluation of fetal growth every 4 weeks in addition to  weekly  surveillance starting at 32 weeks and delivery at 38 weeks as previously discussed. She kandis    History of postpartum hemorrhage 2022    Plan TXA prior to birth  AMSTL    History of recurrent  2018    Taking prometrium    History of severe pre-eclampsia 2018    Hx of cervical incompetence in pregnancy 2021    Nausea/vomiting in pregnancy 10/05/2021    Recurrent pregnancy loss 2023    Severe pre-eclampsia, postpartum condition or complication 2018    Thrombocytopenia (H24) 2022    Varicella     Vitamin D deficiency 2018     History of recurrent pregnancy loss, term IUFD with subsequent IOLs at term, preeclampsia, prophylactic cerclage  Since her last LMP she denies use of alcohol, tobacco and street drugs.    HISTORY:  Family History   Problem Relation Age of Onset    Asthma Mother     Hypertension Father     Cancer Father         pancreatic    No Known Problems Maternal Grandmother     No Known Problems Maternal Grandfather     No Known Problems Paternal Grandmother     No Known Problems Paternal Grandfather     Pneumonia Brother         passed away from pneumonia    No Known Problems Brother     No Known Problems Brother     No Known Problems Brother     No Known Problems Brother     No Known Problems Sister     No Known  Problems Sister     No Known Problems Sister     No Known Problems Sister     No Known Problems Sister     No Known Problems Son     No Known Problems Son     No Known Problems Daughter     Diabetes No family hx of     Breast Cancer No family hx of     Colon Cancer No family hx of      Social History     Socioeconomic History    Marital status:      Spouse name: Nilson    Number of children: 3    Years of education: 16    Highest education level: Bachelor's degree (e.g., BA, AB, BS)   Occupational History    Occupation: group home, admin, part time   Tobacco Use    Smoking status: Never     Passive exposure: Never    Smokeless tobacco: Never   Vaping Use    Vaping Use: Never used   Substance and Sexual Activity    Alcohol use: No    Drug use: No    Sexual activity: Yes     Partners: Male   Social History Narrative    How much exercise per week? Active with kids    How much calcium per day? Some foods       How much caffeine per day? none    How much vitamin D per day? none    Do you/your family wear seatbelts?  Yes    Do you/your family use safety helmets? Yes    Do you/your family use sunscreen? Yes    Do you/your family keep firearms in the home? No    Do you/your family have a smoke detector(s)? Yes        May 17, 2021 Nile Huetras LPN         Social Determinants of Health     Financial Resource Strain: Low Risk  (5/17/2021)    Overall Financial Resource Strain (CARDIA)     Difficulty of Paying Living Expenses: Not hard at all   Food Insecurity: No Food Insecurity (5/17/2021)    Hunger Vital Sign     Worried About Running Out of Food in the Last Year: Never true     Ran Out of Food in the Last Year: Never true   Transportation Needs: No Transportation Needs (5/17/2021)    PRAPARE - Transportation     Lack of Transportation (Medical): No     Lack of Transportation (Non-Medical): No   Physical Activity: Inactive (5/17/2021)    Exercise Vital Sign     Days of Exercise per Week: 0 days     Minutes of Exercise  per Session: 0 min   Stress: No Stress Concern Present (5/17/2021)    [employer] [employer] - Occupational Stress Questionnaire     Feeling of Stress : Only a little   Interpersonal Safety: Not At Risk (7/13/2022)    Humiliation, Afraid, Rape, and Kick questionnaire     Fear of Current or Ex-Partner: No     Emotionally Abused: No     Physically Abused: No     Sexually Abused: No     Current Outpatient Medications   Medication Sig    aspirin 81 MG EC tablet Take 1 tablet (81 mg) by mouth daily    docusate sodium (COLACE) 100 MG tablet Take 1 tablet (100 mg) by mouth daily    metoclopramide (REGLAN) 10 MG tablet Take 1 tablet (10 mg) by mouth 4 times daily (before meals and nightly) for 30 days    progesterone (PROMETRIUM) 200 MG capsule Take 1 capsule (200 mg) by mouth 2 times daily    progesterone (PROMETRIUM) 200 MG capsule Place 1 capsule (200 mg) vaginally at bedtime    SENNA-docusate sodium (SENNA S) 8.6-50 MG tablet Take 1 tablet by mouth At Bedtime    albuterol (PROAIR HFA/PROVENTIL HFA/VENTOLIN HFA) 108 (90 Base) MCG/ACT inhaler INHALE 1 TO 2 PUFFS EVERY 4 HOURS AS NEEDED FOR WHEEZE FOR UP TO 30 DAYS (Patient not taking: Reported on 12/13/2023)    cholecalciferol (VITAMIN D3) 125 mcg (5000 units) capsule Take 1 capsule (125 mcg) by mouth daily Take one capsule daily. (Patient not taking: Reported on 12/13/2023)    cyanocobalamin (VITAMIN B-12) 1000 MCG tablet Take 1 tablet (1,000 mcg) by mouth daily (Patient not taking: Reported on 7/13/2022)    docusate sodium (COLACE) 100 MG capsule  (Patient not taking: Reported on 12/13/2023)    ferrous sulfate (FEROSUL) 325 (65 Fe) MG tablet Take 1 tablet (325 mg) by mouth daily (with breakfast) (Patient not taking: Reported on 7/13/2022)    ibuprofen (ADVIL/MOTRIN) 600 MG tablet Take 1 tablet (600 mg) by mouth every 6 hours as needed for moderate pain Start after delivery (Patient not taking: Reported on 7/13/2022)    polyethylene glycol (MIRALAX) 17  GM/Dose powder Take 17 g by mouth (Patient not taking: Reported on 2023)    Prenatal Vit-Fe Fumarate-FA (PRENATAL VITAMIN AND MINERAL) 28-0.8 MG TABS Take 1 tablet by mouth daily (Patient not taking: Reported on 2023)    senna-docusate (SENOKOT-S/PERICOLACE) 8.6-50 MG tablet Take 1 tablet by mouth daily Start after delivery. (Patient not taking: Reported on 2022)    vitamin C (ASCORBIC ACID) 250 MG tablet Take 1 tablet (250 mg) by mouth daily (Patient not taking: Reported on 2022)    vitamin D3 (CHOLECALCIFEROL) 10 MCG (400 UNIT) capsule Take 1 capsule by mouth daily (Patient not taking: Reported on 2023)     No current facility-administered medications for this visit.     No Known Allergies    ============================================  MEDICAL HISTORY  Past Medical History:   Diagnosis Date    Anemia 2022    Depressive disorder     PPD following fetal loss    History of cervical cerclage 2021    3/2019: Placed after fetal anatomy scan at 21 wks (last pregnancy), then delivered at 37.6 wks     21 per [facility]:   - Prophylactic cerclage at as soon as available, plan for next week  - Comprehensive ultrasound at 18-20 weeks and serial growth ultrasonography every 4 weeks  - Administration of  corticosteroids if the patient is deemed to be at increased risk of imminent  delive    History of IUFD 2021- IUFD boy from placental abruption.  18: placenta pathology reviewed. Hematoma infarct noted, see formal report.     Per [facility] 21  - Weekly BPPs after 32 weeks  - Deliver at 38 weeks  [facility] consultation  recommend serial evaluation of fetal growth every 4 weeks in addition to  weekly  surveillance starting at 32 weeks and delivery at 38 weeks as previously discussed. She kandis    History of postpartum hemorrhage 2022    Plan TXA prior to birth  AMSTL    History of recurrent  2018    Taking prometrium    History of  severe pre-eclampsia 2018    Hx of cervical incompetence in pregnancy 2021    Nausea/vomiting in pregnancy 10/05/2021    Recurrent pregnancy loss 2023    Severe pre-eclampsia, postpartum condition or complication 2018    Thrombocytopenia (H24) 2022    Varicella     Vitamin D deficiency 2018     Past Surgical History:   Procedure Laterality Date    APPENDECTOMY Right     CERCLAGE CERVICAL N/A 3/22/2019    Procedure: CERCLAGE CERVICAL;  Surgeon: Liz Lima MD;  [facility]    CERCLAGE CERVICAL N/A 3/22/2019    Procedure: CERCLAGE CERVICAL;  Surgeon: Liz Lima MD;  [facility]: [facility] OR    CERCLAGE CERVICAL N/A 2021    Procedure: CERCLAGE, CERVIX, VAGINAL APPROACH;  Surgeon: Stella Orellana;  [facility] L[facility]    DILATION AND CURETTAGE SUCTION N/A 2017    Procedure: DILATION AND CURETTAGE SUCTION;  Suction Dilation And Curettage ;  Surgeon: Yolanda Samaniego MD;  [facility]: [facility] OR    DILATION AND CURETTAGE SUCTION N/A 2021    Procedure: DILATION AND CURETTAGE, UTERUS, USING SUCTION;  Surgeon: Rosanna Ybarra MD;  Location: [facility] OR    DILATION AND CURETTAGE, HYSTEROSCOPY DIAGNOSTIC, COMBINED N/A 2017    Procedure: COMBINED DILATION AND CURETTAGE, HYSTEROSCOPY DIAGNOSTIC;  Operative Hysteroscopy, Dilation and Curettage ;  Surgeon: Eli Pickard MD;  Location: [facility] OR    GYN SURGERY  2017    suction D&C       OB History    Para Term  AB Living   10 4 4 0 5 3   SAB IAB Ectopic Multiple Live Births   5 0 0 0 4      # Outcome Date GA Lbr Michael/2nd Weight Sex Delivery Anes PTL Lv   10 Current            9 Term 22 37w6d 03:30 / 00:04 2.81 kg (6 lb 3.1 oz) M Vag-Spont None N RAMILA      Name: ROSIE LIRIANO      Apgar1: 9  Apgar5: 9   8 SAB 2021 10w0d    AB, MISSED         Birth Comments: MAB, baby stopped growing 8 weeks, D & C 11 weeks   7 Term 07/15/19 37w6d 04:04 / 00:09 2.55 kg (5 lb 10 oz) F Vag-Spont None N RAMILA       "Birth Comments: PROM, spontaneous labor, 5 hour labor, epidural, 2nd degree, PPH      Complications: Hemorrhage      Name: DEANDRAFEMALE-GINNA      Apgar1: 8  Apgar5: 9   6 Term 18 39w3d 01:16 / 00:20 2.75 kg (6 lb 1 oz) M Vag-Spont None N ND      Birth Comments: IUFD, no cardiac activity on admission prior to delivery, NSVB, intact no PPH, Admitted postpartum for preeclampsia with severe features. She presented to the ED with sudden onset of fatigue, numbness and tingling and shortness of breath.      Complications: Fetal demise > 22 weeks, delivered, current hospitalization      Name: Jay   5 17 11w0d    AB, MISSED         Birth Comments: BAby stopped growing 11 weeks, D & C at 15 weeks, NL blood loss   4 2017 5w0d             Birth Comments: SAB. no complication   3 Term 12/03/15 38w5d 03:45 / 01:16 3.11 kg (6 lb 13.7 oz) M Vag-Spont EPI  RAMILA      Birth Comments: PROM, spontaneous labor, 5 hour labor, epidural, 2nd degree, no PPH      Name: Lee Ann      Apgar1: 9  Apgar5: 9   2 SAB 2014 5w0d             Birth Comments: SAB no complications   1 2014 5w0d             Birth Comments: SAB, no complications      Obstetric Comments   HTN, PreE,  labor, Postpartum hemorrhage, and  mood disorder   2018 Severe Pre E Postpartum after term IUFD 39 weeks   2019: shortened cervix with cerclage, delivered at 37/6 weeks, PPH   Recurrent pregnancy loss          Reviewed genetic history form       GYN History- Abnormal Pap Smears - none. Due now, but pt would like to wait until postpartum period                        Cervical procedures: none                        History of STI: none    I personally reviewed the past social/family/medical and surgical history on the date of service.     OBJECTIVE:  /77   Pulse 78   Ht 1.575 m (5' 2\")   Wt 67.7 kg (149 lb 4.8 oz)   LMP 10/15/2023 (Approximate)   BMI 27.31 kg/m    ROS: 10 point ROS neg other than the symptoms noted " "above in the HPI.    EXAM:  /77   Pulse 78   Ht 1.575 m (5' 2\")   Wt 67.7 kg (149 lb 4.8 oz)   LMP 10/15/2023 (Approximate)   BMI 27.31 kg/m     EXAM:  GENERAL:  Pleasant pregnant female, alert, cooperative and well groomed.  SKIN:  Warm and dry, without lesions or rashes  HEAD: Symmetrical features.  MOUTH:  Buccal mucosa pink, moist without lesions.  Teeth in good repair.    NECK:  Thyroid without enlargement and nodules.  Lymph nodes not palpable.   LUNGS:  Clear to auscultation.  BREAST:    No dominant, fixed or suspicious masses are noted.  No skin or nipple changes or axillary nodes.   Nipples everted.      HEART:  RRR without murmur.  ABDOMEN: Soft without masses , tenderness or organomegaly.  No CVA tenderness.  Uterus not palpable. No scars noted..   MUSCULOSKELETAL:  Full range of motion  EXTREMITIES:  No edema. No significant varicosities.   PELVIC EXAM: deferred      Lab Results   Component Value Date    PAP NIL 10/11/2018      ASSESSMENT:  33 year old , 11w1d weeks of pregnancy with BEBETO of 2024 by US confirms  Intrauterine pregnancy 11w1d size consistent with dates  Genetic Screening: Declines early screening    ICD-10-CM    1. History of cervical cerclage  Z98.890 [facility]      2. History of severe pre-eclampsia  Z87.59 [facility] Referral - Pregnancy     aspirin 81 MG EC tablet      3. History of recurrent   O26.22 [facility] Referral - Pregnancy     progesterone (PROMETRIUM) 200 MG capsule      4. History of IUFD  Z87.59 [facility] Referral - Pregnancy      5. High-risk pregnancy, elderly multigravida, unspecified trimester  O09.529 Vitamin D Deficiency     ABO/Rh type and screen     CBC with Platelets & Differential     Hepatitis B surface antigen     Hepatitis C antibody     HIV Antigen Antibody Combo Cascade     Rubella Antibody IgG     Treponema Abs w Reflex to RPR and Titer     Varicella Zoster Virus Antibody " IgG     [facility] Referral - Pregnancy     progesterone (PROMETRIUM) 200 MG capsule     aspirin 81 MG EC tablet     Urine Culture     CANCELED: Urine Culture      6. Nausea/vomiting in pregnancy  O21.9           Pam was seen today for prenatal care.    Diagnoses and all orders for this visit:    History of cervical cerclage  -     [facility] Referral - Pregnancy    History of severe pre-eclampsia  -     [facility] Referral - Pregnancy  -     aspirin 81 MG EC tablet; Take 1 tablet (81 mg) by mouth daily    History of recurrent   -     [facility] Referral - Pregnancy  -     progesterone (PROMETRIUM) 200 MG capsule; Take 1 capsule (200 mg) by mouth 2 times daily    History of IUFD  -     [facility] Referral - Pregnancy    High-risk pregnancy, elderly multigravida, unspecified trimester  -     Vitamin D Deficiency  -     ABO/Rh type and screen  -     CBC with Platelets & Differential  -     Hepatitis B surface antigen  -     Hepatitis C antibody  -     HIV Antigen Antibody Combo Cascade  -     Rubella Antibody IgG  -     Treponema Abs w Reflex to RPR and Titer  -     Cancel: Urine Culture  -     Varicella Zoster Virus Antibody IgG  -     [facility] Referral - Pregnancy  -     progesterone (PROMETRIUM) 200 MG capsule; Take 1 capsule (200 mg) by mouth 2 times daily  -     aspirin 81 MG EC tablet; Take 1 tablet (81 mg) by mouth daily  -     Urine Culture    Nausea/vomiting in pregnancy    Other orders  -     HEPATITIS B VACCINE,ADULT,IM  -     sodium chloride 0.9% BOLUS 250 mL  -     sodium chloride (PF) 0.9% PF flush 3-20 mL  -     heparin lock flush 10 UNIT/ML injection 5-20 mL  -     heparin 100 unit/mL injection 5 mL  -     alteplase (CATHFLO ACTIVASE) injection 2 mg  -     diphenhydrAMINE (BENADRYL) injection 50 mg  -     famotidine (PEPCID) injection 20 mg  -     methylPREDNISolone sodium succinate (solu-MEDROL) injection 125 mg  -     EPINEPHrine PF (ADRENALIN)  injection 0.3 mg  -     sodium chloride 0.9% BOLUS 500 mL  -     albuterol (PROVENTIL HFA/VENTOLIN HFA) inhaler  -     albuterol (PROVENTIL) neb solution 2.5 mg  -     meperidine (DEMEROL) injection 25 mg  -     Cancel: HEPATITIS B VACCINE,ADULT,IM          Orders Placed This Encounter   Procedures    HEPATITIS B VACCINE,ADULT,IM    Vitamin D Deficiency    Hepatitis B surface antigen    Hepatitis C antibody    HIV Antigen Antibody Combo Cascade    Rubella Antibody IgG    Treponema Abs w Reflex to RPR and Titer    Varicella Zoster Virus Antibody IgG    CBC with platelets and differential    [facility] Referral - Pregnancy    Adult Type and Screen    ABO/Rh type and screen    CBC with Platelets & Differential        PLAN:  - Reviewed use of triage nurse line and contacting the on-call provider after hours for an urgent need such as fever, vagina bleeding, bladder or vaginal infection, rupture of membranes,  or term labor.    -Ordered routine prenatal lab work.     - Reviewed best evidence for: weight gain for her weight and height for pregnancy:  Based on pre-pregnancy Body mass index is 27.31 kg/m . RECOMMENDED WEIGHT GAIN: 15-25 lbs.    - Reviewed healthy diet and foods to avoid, exercise and activity during pregnancy; avoiding exposure to toxoplasmosis; and maintenance of a generally healthy lifestyle.     - Discussed the harms, benefits, side effects and alternative therapies for current prescribed and OTC medications. Patient was encouraged to start/continue prenatal vitamins as tolerated.    - Oriented to Practice, types of care, and how to reach a provider.  Pt prefers CNM team    - Patient received 1st trimester new OB education packet complete with aide of [facility] booklet including information on genetic screening test options.  - Patient desires level II ultrasound which was ordered, along with MDM consult.    - Reviewed risk for gestational diabetes d/t No known risk factors for  GDM    - Reviewed risk for Pre Eclampsia d/t History of Pre Eclampsia or meets two or more of the moderate risk factors including Age =35 years or older  and ISagreeable to starting 81mg aspirin daily after 12 weeks .    - The patient  does not have a history of spontaneous  birth so  WILL NOT consider serial transvaginal cervical length ultrasounds from 16-24 weeks. Pt does have a history of cervical insufficiency and recurrent pregnancy loss. Had Forrest cerclage in previous pregnancies, MFM consult placed. Currently taking 200mg BID progesterone.     - Placed therapy plan for IV infusion if needed due to N&V     -The patient does not have a history of immunosuppresion or HIV so Toxoplasma IgG/IgM WILL NOT be ordered.    -Assess risk for asymptomatic latent TB (prior infection, recent immigrant from epidemic areas, immunosuppression, living in overcrowded environment):   WILL NOT have PPD skin test or Quantiferon-TB Gold Plus blood draw. *both options valid*      - All pt's and partner's questions discussed and answered.  Pt verbalized understanding of and agreement to plan of care.     - Continue scheduled prenatal care and prn if questions or concerns    ASHIA Aranda CNM

## 2023-12-28 DIAGNOSIS — Z87.59 HISTORY OF IUFD: ICD-10-CM

## 2023-12-28 DIAGNOSIS — O09.299 HX OF CERVICAL INCOMPETENCE IN PREGNANCY, CURRENTLY PREGNANT: Primary | ICD-10-CM

## 2023-12-28 DIAGNOSIS — O09.529 AMA (ADVANCED MATERNAL AGE) MULTIGRAVIDA 35+: ICD-10-CM

## 2023-12-28 PROBLEM — D69.6 THROMBOCYTOPENIA (H): Status: RESOLVED | Noted: 2022-04-20 | Resolved: 2023-12-28

## 2023-12-28 LAB — BACTERIA UR CULT: NO GROWTH

## 2024-01-09 ENCOUNTER — MYC MEDICAL ADVICE (OUTPATIENT)
Dept: OBGYN | Facility: CLINIC | Age: 34
End: 2024-01-09
Payer: COMMERCIAL

## 2024-01-10 ENCOUNTER — PRE VISIT (OUTPATIENT)
Dept: MATERNAL FETAL MEDICINE | Facility: CLINIC | Age: 34
End: 2024-01-10
Payer: COMMERCIAL

## 2024-01-15 ENCOUNTER — OFFICE VISIT (OUTPATIENT)
Dept: MATERNAL FETAL MEDICINE | Facility: CLINIC | Age: 34
End: 2024-01-15
Attending: OBSTETRICS & GYNECOLOGY
Payer: COMMERCIAL

## 2024-01-15 ENCOUNTER — MYC REFILL (OUTPATIENT)
Dept: OBGYN | Facility: CLINIC | Age: 34
End: 2024-01-15

## 2024-01-15 ENCOUNTER — HOSPITAL ENCOUNTER (OUTPATIENT)
Dept: ULTRASOUND IMAGING | Facility: CLINIC | Age: 34
Discharge: HOME | End: 2024-01-15
Attending: OBSTETRICS & GYNECOLOGY
Payer: COMMERCIAL

## 2024-01-15 VITALS — HEART RATE: 97 BPM | DIASTOLIC BLOOD PRESSURE: 78 MMHG | SYSTOLIC BLOOD PRESSURE: 121 MMHG

## 2024-01-15 DIAGNOSIS — O09.529 HIGH-RISK PREGNANCY, ELDERLY MULTIGRAVIDA, UNSPECIFIED TRIMESTER: ICD-10-CM

## 2024-01-15 DIAGNOSIS — O09.299 HX OF CERVICAL INCOMPETENCE IN PREGNANCY, CURRENTLY PREGNANT: ICD-10-CM

## 2024-01-15 DIAGNOSIS — O09.521 MULTIGRAVIDA OF ADVANCED MATERNAL AGE IN FIRST TRIMESTER: ICD-10-CM

## 2024-01-15 DIAGNOSIS — Z87.59 HISTORY OF IUFD: ICD-10-CM

## 2024-01-15 DIAGNOSIS — O09.529 AMA (ADVANCED MATERNAL AGE) MULTIGRAVIDA 35+: ICD-10-CM

## 2024-01-15 DIAGNOSIS — O09.521 MULTIGRAVIDA OF ADVANCED MATERNAL AGE IN FIRST TRIMESTER: Primary | ICD-10-CM

## 2024-01-15 DIAGNOSIS — O21.0 HYPEREMESIS GRAVIDARUM: ICD-10-CM

## 2024-01-15 PROCEDURE — 99214 OFFICE O/P EST MOD 30 MIN: CPT | Mod: 25 | Performed by: OBSTETRICS & GYNECOLOGY

## 2024-01-15 PROCEDURE — 76813 OB US NUCHAL MEAS 1 GEST: CPT | Mod: 26 | Performed by: OBSTETRICS & GYNECOLOGY

## 2024-01-15 PROCEDURE — 96040 HC GENETIC COUNSELING, EACH 30 MINUTES: CPT | Performed by: GENETIC COUNSELOR, MS

## 2024-01-15 PROCEDURE — 99212 OFFICE O/P EST SF 10 MIN: CPT | Mod: 25 | Performed by: OBSTETRICS & GYNECOLOGY

## 2024-01-15 PROCEDURE — 76813 OB US NUCHAL MEAS 1 GEST: CPT

## 2024-01-15 NOTE — NURSING NOTE
Patient reports no pain, no contractions, leaking of fluid, or bleeding.   SBAR given to JOSE GONZALEZ, see their note in Epic.

## 2024-01-15 NOTE — PROGRESS NOTES
MFM Consultation  01/15/24    Ginna Syed is a 33 year old  at [id number] is referred by Neida Zamudio for consultation given history of multiple pregnancy complications.      She notes that she has a history of 5 SAB in the past and has had negative APLAS testing ().     She also has a history of term IUFD in 2018.  Placental pathology demonstrated a significant hematoma and associated infarct were of significant size potentially contributed to the fetal demise.  She had APLAS at the time that returned negative.     In her ,pregnancy she had a finding of short cervix at her detailed anatomy scan and cerclage was placed.   She delivered at 37w6d.      In her , pregnancy she had a 13 week prophylactic cerclage placed and delivered at 37w6d.     In this pregnancy, she is hoping for prophylactic cerclage placement, weekly BPP at 32 weeks and delivery at 38 weeks.           OB History    Para Term  AB Living   10 4 4 0 5 3   SAB IAB Ectopic Multiple Live Births   5 0 0 0 4      # Outcome Date GA Lbr Michael/2nd Weight Sex Delivery Anes PTL Lv   10 Current            9 Term 22 37w6d 03:30 / 00:04 2.81 kg (6 lb 3.1 oz) M Vag-Spont None N RAMILA      Name: DEANDRAMALE-GINNA      Apgar1: 9  Apgar5: 9   8 SAB 2021 10w0d    AB, MISSED         Birth Comments: MAB, baby stopped growing 8 weeks, D & C 11 weeks   7 Term 07/15/19 37w6d 04:04 / 00:09 2.55 kg (5 lb 10 oz) F Vag-Spont None N RAMILA      Birth Comments: PROM, spontaneous labor, 5 hour labor, epidural, 2nd degree, PPH      Complications: Hemorrhage      Name: DEANDRAFEMALE-GINNA      Apgar1: 8  Apgar5: 9   6 Term 18 39w3d 01:16 / 00:20 2.75 kg (6 lb 1 oz) M Vag-Spont None N ND      Birth Comments: IUFD, no cardiac activity on admission prior to delivery, NSVB, intact no PPH, Admitted postpartum for preeclampsia with severe features. She presented to the ED with sudden onset of fatigue, numbness and tingling and shortness of  breath.      Complications: Fetal demise > 22 weeks, delivered, current hospitalization      Name: Jay   5 SAB 17 11w0d    AB, MISSED         Birth Comments: BAby stopped growing 11 weeks, D & C at 15 weeks, NL blood loss   4 2017 5w0d             Birth Comments: SAB. no complication   3 Term 12/03/15 38w5d 03:45 / 01:16 3.11 kg (6 lb 13.7 oz) M Vag-Spont EPI  RAMILA      Birth Comments: PROM, spontaneous labor, 5 hour labor, epidural, 2nd degree, no PPH      Name: Lee Ann      Apgar1: 9  Apgar5: 9   2 2014 5w0d             Birth Comments: SAB no complications   1 2014 5w0d             Birth Comments: SAB, no complications      Obstetric Comments   HTN, PreE,  labor, Postpartum hemorrhage, and  mood disorder   2018 Severe Pre E Postpartum after term IUFD 39 weeks   2019: shortened cervix with cerclage, delivered at 37/6 weeks, PPH   Recurrent pregnancy loss          Consultation/Recommendations   History of PreE:   She remains at risk of PreE in this pregnancy.  She is taking aspirin 81 mg daily to reduce this risk    History of IUFD:  We discussed that she remains at risk of IUFD this pregnancy, though absolute risk of recurrence is low.  We reviewed that we would recommend following fetal growth with US and weekly BPP starting at 32 weeks.  She would like to have IOL at 38 weeks as in her recent pregnancies.     History of short cervix managed with cerclage X2  We reviewed options for serial cervical length monitoring vs cerclage placement.   She would like to have a prophylactic cerclage placed this pregnancy based on her history.  This was scheduled.    ALLI  She met with genetic counseling to discuss options for aneuploidy screening.  She is not interested screening tests for aneuploidy at this time.  NT measured in normal range today.      Thank you for this consultation.  Please do not hesitate to reach out if other questions arrive.    Please see full imaging report  from [facility] under Imaging tab.    Stella Jorge MD PhD  Maternal-Fetal Medicine       I spent a total of 35 minutes on the date of this encounter including preparing to see the patient (reviewing medical records/tests), counseling and discussing the plan of care, documenting the visit in the electronic medical record, and communicating with other health care professionals and/or care coordination.

## 2024-01-15 NOTE — PROGRESS NOTES
[facility]  Genetic Counseling Consult    Patient:  Pam Syed YOB: 1990   Date of Service:  1/15/24   MRN: [id number]    Pam was seen at the [facility] for genetic consultation. The indication for genetic counseling is advanced maternal age. The patient was unaccompanied to this visit.     The session was conducted in English.      IMPRESSION/ PLAN   1. Pam has not had genetic screening in this pregnancy and declines options discussed today.    2. The patient has declined genetic aneuploidy screening. Maternal serum AFP only would be appropriate, ideally between 16-18 weeks gestation, if they want to screen for open neural tube defects.     3. Pam had a nuchal translucency ultrasound today. Please see the ultrasound report for further details.    4. Further recommendations include a fetal anatomy level II ultrasound with M.    PREGNANCY HISTORY   /Parity:       Pam's pregnancy history is significant for:    SABx2   full-term deliver, male, alive and well   SAB   MAB, D&C, normal karyotype   IUFD, normal karyotype. No additional testing performed.   2019 full-term delivery, female, alive and well   MAB, D&C, no testing   full-term delivery, male, alive and well    CURRENT PREGNANCY   Current Age: 33 year old   Age at Delivery: 34 year old      BEBETO: 2024, by Ultrasound  Gestational Age: 13w6d    This pregnancy is a single gestation.     This pregnancy was conceived spontaneously.    MEDICAL HISTORY   Pam has a history of recurrent loss and cervical insufficiency. By report she has had a karyotype herself that was normal. No evidence of this having been performed in her chart. Offered again today, as well as partner testing, which was declined. See MF consult note for further discussion.       FAMILY HISTORY   A three-generation pedigree was obtained  "previously by a genetic counselor and updated today. The original and updated version is scanned under the \"Media\" tab in Epic.  The family history was reported by Pam.    Pam reports no significant updates today.     Otherwise, the reported family history is unremarkable for multiple miscarriages, stillbirths, birth defects, intellectual disabilities, known genetic conditions, and consanguinity.       RISK ASSESSMENT FOR INHERITED CONDITIONS AND CARRIER SCREENING OPTIONS   Expanded carrier screening is available to screen for autosomal recessive conditions and X-linked conditions in a large list of genes. Carrier screening does not test the pregnancy but gives a risk assessment for the pregnancy and future pregnancies to have the condition. Expanded carrier screening is designed to identify carrier status for conditions that are primarily childhood or adolescent onset. Expanded carrier screening does not evaluate for adult-onset conditions such as hereditary cancer syndromes, dementia/ Alzheimer's disease, or cardiovascular disease risk factors. Additionally, expanded carrier screening is not comprehensive for all known genetic diseases or inherited conditions. It does not intentionally screen for autosomal dominant conditions. [place] screening was reviewed. About [place]  Screening    Pam declines all genetic testing, so carrier screening was not further discussed today.     RISK ASSESSMENT FOR CHROMOSOME CONDITIONS   We discussed that the risk for fetal chromosome abnormalities increases with maternal age. We discussed specific features of common chromosome abnormalities, including Down syndrome, trisomy 13, trisomy 18, and sex chromosome trisomies.    At age 34 at midtrimester, the risk to have a baby with Down syndrome is 1 in 216.   At age 34 at midtrimester, the risk to have a baby with any chromosome abnormality is 1 in 105.     Pam has not had genetic screening in this pregnancy and declines options " discussed today.     GENETIC TESTING OPTIONS   Genetic testing during a pregnancy includes screening and diagnostic procedures.      Screening tests are non-invasive which means no risk to the pregnancy and includes ultrasounds and blood work. The benefits and limitations of screening were reviewed. Screening tests provide a risk assessment (chance) specific to the pregnancy for certain fetal chromosome abnormalities but cannot definitively diagnose or exclude a fetal chromosome abnormality. Follow-up genetic counseling and consideration of diagnostic testing is recommended with any abnormal screening result. Diagnostic testing during a pregnancy is more certain and can test for more conditions. However, the tests do have a risk of miscarriage that requires careful consideration. These tests can detect fetal chromosome abnormalities with greater than 99% certainty. Results can be compromised by maternal cell contamination or mosaicism and are limited by the resolution of current genetic testing technology.     There is no screening or diagnostic test that detects all forms of birth defects or intellectual disability.     We discussed the following screening options:     Non-invasive prenatal testing (NIPT)  Also called cell-free DNA screening because it detect chromosome fragments from the placenta in the pregnant person's blood.  Can be done any time after 10 weeks gestation  Standard recommendation for NIPT screens for trisomy 21, trisomy 18, trisomy 13, with the option of adding sex chromosome aneuploidies, without or without predicted sex, as well as 22q11.2 deletion syndrome.  Cannot screen for open neural tube defects, maternal serum AFP after 15 weeks is recommended  New NIPT options include screening for other trisomies, microdeletion syndromes, genome-wide chromosome differences, certain single gene conditions with a high de marisel rate, certain autosomal recessive conditions, and in some cases fetal blood  antigens. Guidelines do not recommend these conditions be included in standard screening. These options have limitations and should be discussed with a genetic counselor.       We discussed the following ultrasound options:    Nuchal translucency (NT) ultrasound  Ultrasound between 47o4h-14o7e that includes nuchal translucency measurement and nasal bone assessments  Nuchal translucency refers to the space at the back of the neck where fluid builds up. This is a normal finding and there is only concern if there is more fluid than expected  Nasal bone refers to the small bone in the nose. There is concern for conditions like Down syndrome if the bone cannot be seen at all  This ultrasound can be done as part of first trimester screening, at the same time as another screen (NIPT), at the same time as a CVS, or if the patients does not want serum screening.  Markers on ultrasound detects about 70% of pregnancies with aneuploidy  Increased NT measurements can also increase the risk for a birth defect, like a heart defect    Comprehensive level II ultrasound (Fetal Anatomy Ultrasound)  Ultrasound done between 18-20 weeks gestation  Screens for major birth defects and markers for aneuploidy (like trisomy 21 and trisomy 18)  Includes assessment of the fetal growth, internal organs, placenta, and amniotic fluid    We discussed the following diagnostic options:     Chorionic villus sampling (CVS)  Invasive diagnostic procedure done between 10w0d and 13w6d  The procedure collects a small sample from the placenta for the purpose of chromosomal testing and/or other genetic testing  Diagnostic result; more than 99% sensitivity for fetal chromosome abnormalities  Cannot screen for open neural tube defects, maternal serum AFP after 15 weeks is recommended    Amniocentesis  Invasive diagnostic procedure done after 15 weeks gestation  The procedure collects a small sample of amniotic fluid for the purpose of chromosomal testing  and/or other genetic testing  Diagnostic result; more than 99% sensitivity for fetal chromosome abnormalities  Testing for AFP in the amniotic fluid can test for open neural tube defects      It was a pleasure to be involved with Pam s care. Face-to-face time of the meeting was 15 minutes.    Valerie Brandon GC, MS, [facility]  Board Certified and [place] Licensed Genetic Counselor   [facility]  Maternal Fetal Medicine  Office: [phone]  [facility]: [phone]   Fax: [phone]  [facility]

## 2024-01-16 PROBLEM — O09.299 HX OF CERVICAL INCOMPETENCE IN PREGNANCY, CURRENTLY PREGNANT: Status: ACTIVE | Noted: 2021-11-17

## 2024-01-16 RX ORDER — METOCLOPRAMIDE 10 MG/1
10 TABLET ORAL
Qty: 120 TABLET | Refills: 0 | Status: SHIPPED | OUTPATIENT
Start: 2024-01-16 | End: 2024-05-12

## 2024-01-18 ENCOUNTER — ANESTHESIA EVENT (OUTPATIENT)
Dept: OBGYN | Facility: CLINIC | Age: 34
End: 2024-01-18
Payer: COMMERCIAL

## 2024-01-18 RX ORDER — SODIUM CHLORIDE, SODIUM LACTATE, POTASSIUM CHLORIDE, CALCIUM CHLORIDE 600; 310; 30; 20 MG/100ML; MG/100ML; MG/100ML; MG/100ML
INJECTION, SOLUTION INTRAVENOUS CONTINUOUS
Status: CANCELLED | OUTPATIENT
Start: 2024-01-18

## 2024-01-18 RX ORDER — FENTANYL CITRATE 50 UG/ML
15 INJECTION, SOLUTION INTRAMUSCULAR; INTRAVENOUS ONCE
Status: CANCELLED | OUTPATIENT
Start: 2024-01-18 | End: 2024-01-18

## 2024-01-18 RX ORDER — BUPIVACAINE HYDROCHLORIDE 7.5 MG/ML
1.6 INJECTION, SOLUTION EPIDURAL; RETROBULBAR ONCE
Status: CANCELLED | OUTPATIENT
Start: 2024-01-18 | End: 2024-01-18

## 2024-01-18 RX ORDER — NALBUPHINE HYDROCHLORIDE 20 MG/ML
2.5-5 INJECTION, SOLUTION INTRAMUSCULAR; INTRAVENOUS; SUBCUTANEOUS EVERY 6 HOURS PRN
Status: CANCELLED | OUTPATIENT
Start: 2024-01-18

## 2024-01-18 RX ORDER — MORPHINE SULFATE 1 MG/ML
150 INJECTION, SOLUTION EPIDURAL; INTRATHECAL; INTRAVENOUS ONCE
Status: CANCELLED | OUTPATIENT
Start: 2024-01-18 | End: 2024-01-18

## 2024-01-18 RX ORDER — ONDANSETRON 4 MG/1
4 TABLET, ORALLY DISINTEGRATING ORAL EVERY 30 MIN PRN
Status: CANCELLED | OUTPATIENT
Start: 2024-01-18

## 2024-01-18 RX ORDER — FENTANYL CITRATE-0.9 % NACL/PF 10 MCG/ML
100 PLASTIC BAG, INJECTION (ML) INTRAVENOUS EVERY 5 MIN PRN
Status: CANCELLED | OUTPATIENT
Start: 2024-01-18

## 2024-01-18 RX ORDER — ONDANSETRON 2 MG/ML
4 INJECTION INTRAMUSCULAR; INTRAVENOUS EVERY 30 MIN PRN
Status: CANCELLED | OUTPATIENT
Start: 2024-01-18

## 2024-01-18 NOTE — ANESTHESIA PREPROCEDURE EVALUATION
Anesthesia Pre-Procedure Evaluation    Patient: Pam Syed   MRN: [id number] : 1990        Procedure : Procedure(s):  CERCLAGE, CERVIX, VAGINAL APPROACH          Past Medical History:   Diagnosis Date    Anemia 2022    Depressive disorder     PPD following fetal loss    History of cervical cerclage 2021    3/2019: Placed after fetal anatomy scan at 21 wks (last pregnancy), then delivered at 37.6 wks     21 per [facility]:   - Prophylactic cerclage at as soon as available, plan for next week  - Comprehensive ultrasound at 18-20 weeks and serial growth ultrasonography every 4 weeks  - Administration of  corticosteroids if the patient is deemed to be at increased risk of imminent  delive    History of IUFD 2021- IUFD boy from placental abruption.  18: placenta pathology reviewed. Hematoma infarct noted, see formal report.     Per MFM 21  - Weekly BPPs after 32 weeks  - Deliver at 38 weeks  [facility] consultation  recommend serial evaluation of fetal growth every 4 weeks in addition to  weekly  surveillance starting at 32 weeks and delivery at 38 weeks as previously discussed. She kandis    History of postpartum hemorrhage 2022    Plan TXA prior to birth  AMSTL    History of recurrent  2018    Taking prometrium    History of severe pre-eclampsia 2018    Hx of cervical incompetence in pregnancy 2021    Nausea/vomiting in pregnancy 10/05/2021    Recurrent pregnancy loss 2023    Severe pre-eclampsia, postpartum condition or complication 2018    Thrombocytopenia (H24) 2022    Varicella     Vitamin D deficiency 2018      Past Surgical History:   Procedure Laterality Date    APPENDECTOMY Right     CERCLAGE CERVICAL N/A 3/22/2019    Procedure: CERCLAGE CERVICAL;  Surgeon: Liz Lima MD;  Location: [facility] L[facility]    CERCLAGE CERVICAL N/A 3/22/2019    Procedure: CERCLAGE CERVICAL;  Surgeon: Liz Lima MD;   Location: [facility] OR    CERCLAGE CERVICAL N/A 2021    Procedure: CERCLAGE, CERVIX, VAGINAL APPROACH;  Surgeon: Stella Orellana;  Location: [facility] L[facility]    DILATION AND CURETTAGE SUCTION N/A 2017    Procedure: DILATION AND CURETTAGE SUCTION;  Suction Dilation And Curettage ;  Surgeon: Yolanda Samaniego MD;  Location: [facility] OR    DILATION AND CURETTAGE SUCTION N/A 2021    Procedure: DILATION AND CURETTAGE, UTERUS, USING SUCTION;  Surgeon: Rosanna Ybarra MD;  Location: [facility] OR    DILATION AND CURETTAGE, HYSTEROSCOPY DIAGNOSTIC, COMBINED N/A 2017    Procedure: COMBINED DILATION AND CURETTAGE, HYSTEROSCOPY DIAGNOSTIC;  Operative Hysteroscopy, Dilation and Curettage ;  Surgeon: Eli Pickard MD;  Location: [facility] OR    GYN SURGERY  2017    suction D&C      No Known Allergies   Social History     Tobacco Use    Smoking status: Never     Passive exposure: Never    Smokeless tobacco: Never   Substance Use Topics    Alcohol use: No      Wt Readings from Last 1 Encounters:   23 67.7 kg (149 lb 4.8 oz)        Anesthesia Evaluation   Pt has had prior anesthetic. Type: Regional.    No history of anesthetic complications       ROS/MED HX  ENT/Pulmonary:  - neg pulmonary ROS     Neurologic:  - neg neurologic ROS     Cardiovascular: Comment: History of postpartum preeclampsia.     (+)  hypertension- -   -  - -                                      METS/Exercise Tolerance:     Hematologic:  - neg hematologic  ROS     Musculoskeletal:  - neg musculoskeletal ROS     GI/Hepatic:  - neg GI/hepatic ROS     Renal/Genitourinary:  - neg Renal ROS     Endo:  - neg endo ROS     Psychiatric/Substance Use:     (+) psychiatric history depression       Infectious Disease:  - neg infectious disease ROS     Malignancy:       Other: Comment:  at 14w2d, cervical incompetence - prior cerclage x2           Physical Exam    Airway        Mallampati: II   TM distance: > 3 FB   Neck ROM: full   Mouth  opening: > 3 cm    Respiratory Devices and Support         Dental  no notable dental history         Cardiovascular   cardiovascular exam normal          Pulmonary   pulmonary exam normal                OUTSIDE LABS:  CBC:   Lab Results   Component Value Date    WBC 5.9 12/27/2023    WBC 10.2 04/24/2022    HGB 11.4 (L) 12/27/2023    HGB 10.4 (L) 04/24/2022    HCT 33.9 (L) 12/27/2023    HCT 31.7 (L) 04/24/2022     12/27/2023     (L) 04/24/2022     BMP:   Lab Results   Component Value Date     10/05/2021     01/11/2019    POTASSIUM 3.6 10/05/2021    POTASSIUM 3.5 01/11/2019    CHLORIDE 109 10/05/2021    CHLORIDE 105 01/11/2019    CO2 25 10/05/2021    CO2 23 01/11/2019    BUN 9 10/05/2021    BUN 9 01/11/2019    CR 0.55 04/23/2022    CR 0.49 (L) 02/09/2022    GLC 87 10/05/2021    GLC 80 01/11/2019     COAGS:   Lab Results   Component Value Date    PTT 30 07/16/2019    INR 1.06 07/15/2019    FIBR 352 07/16/2019     POC:   Lab Results   Component Value Date    BGM 75 06/04/2021    HCG Positive (A) 11/30/2023     HEPATIC:   Lab Results   Component Value Date    ALBUMIN 2.3 (L) 08/25/2018    PROTTOTAL 5.8 (L) 08/25/2018    ALT 15 04/23/2022    AST 13 04/23/2022    ALKPHOS 111 08/25/2018    BILITOTAL 0.4 08/25/2018     OTHER:   Lab Results   Component Value Date    A1C 5.6 02/01/2019    JHON 9.2 10/05/2021    MAG 5.8 (H) 08/26/2018    TSH 0.88 05/17/2021       Anesthesia Plan    ASA Status:  3       Anesthesia Type: Spinal.              Consents            Postoperative Care    Pain management: Neuraxial analgesia.        Comments:               Nina Vázquez MD    I have reviewed the pertinent notes and labs in the chart from the past 30 days and (re)examined the patient.  Any updates or changes from those notes are reflected in this note.

## 2024-01-19 ENCOUNTER — ANESTHESIA (OUTPATIENT)
Dept: OBGYN | Facility: CLINIC | Age: 34
End: 2024-01-19
Payer: COMMERCIAL

## 2024-01-19 ENCOUNTER — HOSPITAL ENCOUNTER (OUTPATIENT)
Facility: CLINIC | Age: 34
Discharge: HOME | End: 2024-01-19
Attending: OBSTETRICS & GYNECOLOGY | Admitting: OBSTETRICS & GYNECOLOGY
Payer: COMMERCIAL

## 2024-01-19 VITALS
TEMPERATURE: 97.5 F | WEIGHT: 148 LBS | BODY MASS INDEX: 26.22 KG/M2 | DIASTOLIC BLOOD PRESSURE: 63 MMHG | SYSTOLIC BLOOD PRESSURE: 112 MMHG | HEIGHT: 63 IN

## 2024-01-19 PROBLEM — O34.32 CERVICAL INSUFFICIENCY DURING PREGNANCY IN SECOND TRIMESTER, ANTEPARTUM: Status: ACTIVE | Noted: 2024-01-19

## 2024-01-19 LAB
ABO/RH(D): NORMAL
ANTIBODY SCREEN: NEGATIVE
BASOPHILS # BLD AUTO: 0 10E3/UL (ref 0–0.2)
BASOPHILS NFR BLD AUTO: 0 %
EOSINOPHIL # BLD AUTO: 0 10E3/UL (ref 0–0.7)
EOSINOPHIL NFR BLD AUTO: 1 %
ERYTHROCYTE [DISTWIDTH] IN BLOOD BY AUTOMATED COUNT: 13.5 % (ref 10–15)
HCT VFR BLD AUTO: 30.5 % (ref 35–47)
HGB BLD-MCNC: 10.2 G/DL (ref 11.7–15.7)
IMM GRANULOCYTES # BLD: 0.1 10E3/UL
IMM GRANULOCYTES NFR BLD: 1 %
LYMPHOCYTES # BLD AUTO: 1.3 10E3/UL (ref 0.8–5.3)
LYMPHOCYTES NFR BLD AUTO: 16 %
MCH RBC QN AUTO: 28.8 PG (ref 26.5–33)
MCHC RBC AUTO-ENTMCNC: 33.4 G/DL (ref 31.5–36.5)
MCV RBC AUTO: 86 FL (ref 78–100)
MONOCYTES # BLD AUTO: 0.5 10E3/UL (ref 0–1.3)
MONOCYTES NFR BLD AUTO: 6 %
NEUTROPHILS # BLD AUTO: 6 10E3/UL (ref 1.6–8.3)
NEUTROPHILS NFR BLD AUTO: 76 %
NRBC # BLD AUTO: 0 10E3/UL
NRBC BLD AUTO-RTO: 0 /100
PLATELET # BLD AUTO: 278 10E3/UL (ref 150–450)
RBC # BLD AUTO: 3.54 10E6/UL (ref 3.8–5.2)
SARS-COV-2 RNA RESP QL NAA+PROBE: NEGATIVE
SPECIMEN EXPIRATION DATE: NORMAL
WBC # BLD AUTO: 7.9 10E3/UL (ref 4–11)

## 2024-01-19 PROCEDURE — 86900 BLOOD TYPING SEROLOGIC ABO: CPT | Performed by: STUDENT IN AN ORGANIZED HEALTH CARE EDUCATION/TRAINING PROGRAM

## 2024-01-19 PROCEDURE — 36415 COLL VENOUS BLD VENIPUNCTURE: CPT | Performed by: STUDENT IN AN ORGANIZED HEALTH CARE EDUCATION/TRAINING PROGRAM

## 2024-01-19 PROCEDURE — 99214 OFFICE O/P EST MOD 30 MIN: CPT | Mod: GC | Performed by: OBSTETRICS & GYNECOLOGY

## 2024-01-19 PROCEDURE — 999N000127 HC STATISTIC PERIPHERAL IV START W US GUIDANCE

## 2024-01-19 PROCEDURE — 87635 SARS-COV-2 COVID-19 AMP PRB: CPT | Performed by: OBSTETRICS & GYNECOLOGY

## 2024-01-19 PROCEDURE — 85048 AUTOMATED LEUKOCYTE COUNT: CPT | Performed by: STUDENT IN AN ORGANIZED HEALTH CARE EDUCATION/TRAINING PROGRAM

## 2024-01-19 PROCEDURE — 999N000001 HC CANCELLED SURGERY UP TO 15 MINS: Performed by: OBSTETRICS & GYNECOLOGY

## 2024-01-19 PROCEDURE — 999N000141 HC STATISTIC PRE-PROCEDURE NURSING ASSESSMENT: Performed by: OBSTETRICS & GYNECOLOGY

## 2024-01-19 PROCEDURE — 250N000013 HC RX MED GY IP 250 OP 250 PS 637: Performed by: STUDENT IN AN ORGANIZED HEALTH CARE EDUCATION/TRAINING PROGRAM

## 2024-01-19 RX ORDER — SODIUM CHLORIDE, SODIUM LACTATE, POTASSIUM CHLORIDE, CALCIUM CHLORIDE 600; 310; 30; 20 MG/100ML; MG/100ML; MG/100ML; MG/100ML
INJECTION, SOLUTION INTRAVENOUS CONTINUOUS
Status: DISCONTINUED | OUTPATIENT
Start: 2024-01-19 | End: 2024-01-19 | Stop reason: HOSPADM

## 2024-01-19 RX ORDER — LIDOCAINE 40 MG/G
CREAM TOPICAL
Status: DISCONTINUED | OUTPATIENT
Start: 2024-01-19 | End: 2024-01-19 | Stop reason: HOSPADM

## 2024-01-19 RX ORDER — ACETAMINOPHEN 325 MG/1
975 TABLET ORAL ONCE
Status: COMPLETED | OUTPATIENT
Start: 2024-01-19 | End: 2024-01-19

## 2024-01-19 RX ORDER — CITRIC ACID/SODIUM CITRATE 334-500MG
30 SOLUTION, ORAL ORAL ONCE
Status: DISCONTINUED | OUTPATIENT
Start: 2024-01-19 | End: 2024-01-19 | Stop reason: HOSPADM

## 2024-01-19 RX ADMIN — ACETAMINOPHEN 975 MG: 325 TABLET, FILM COATED ORAL at 09:29

## 2024-01-19 ASSESSMENT — ACTIVITIES OF DAILY LIVING (ADL)
ADLS_ACUITY_SCORE: 20
ADLS_ACUITY_SCORE: 35

## 2024-01-19 NOTE — H&P
[facility]  OB History and Physical      Ginna Syed MRN# [id number]   Age: 33 year old YOB: 1990     CC:  cerclage    HPI:  Ms. Ginna Syed is a 33 year old  at 14w3d by 11w1d sono who presents for scheduled cerclage.  She was see in [facility] clinic for consult on 1/15/24 by Dr. Jorge, please see that note for full details of history.  In brief, patient with history of 5 SAB and negative APLS testing, term IUFD in 2018, and 3 full term vaginal deliveries with cerclages in 2 pregnancies.  The cerclage was initially placed in her 2019 pregnancy due to a finding of a short cervix at the time of anatomy US but she subsequently had a full term delivery.  In her last pregnancy in  she had a history indicated cerclage.    She denies vaginal bleeding, LOF, pain, contractions.  She does report ~1 week of congestion, cough, and headache, thus COVID testing was ordered and was negative.  She denies fever, chills, shortness of breath, chest pain, sick contacts.  Has not taken any medications for her symptoms.    Ultrasounds  1/15/24   IMPRESSION  ---------------------------------------------------------------------------------------------------------  1) Knight intrauterine pregnancy at 13w6d gestational age.  2) The nuchal translucency measurement is within the normal range.  3) The nasal bone was visualized.  4) Measurements consistent with established dates.  5) Visualized anatomy appears normal for early gestational age.    OB History  OB History    Para Term  AB Living   10 4 4 0 5 3   SAB IAB Ectopic Multiple Live Births   5 0 0 0 4      # Outcome Date GA Lbr Michael/2nd Weight Sex Delivery Anes PTL Lv   10 Current            9 Term 22 37w6d 03:30 / 00:04 2.81 kg (6 lb 3.1 oz) M Vag-Spont None N RAMILA      Name: DEANDRA,CAROL-GINNA      Apgar1: 9  Apgar5: 9   8 SAB 2021 10w0d    AB, MISSED         Birth Comments: MAB, baby stopped growing 8  weeks, D & C 11 weeks   7 Term 07/15/19 37w6d 04:04 / 00:09 2.55 kg (5 lb 10 oz) F Vag-Spont None N RAMILA      Birth Comments: PROM, spontaneous labor, 5 hour labor, epidural, 2nd degree, PPH      Complications: Hemorrhage      Name: DANAE LIRIANO-GINNA      Apgar1: 8  Apgar5: 9   6 Term 18 39w3d 01:16 / 00:20 2.75 kg (6 lb 1 oz) M Vag-Spont None N ND      Birth Comments: IUFD, no cardiac activity on admission prior to delivery, NSVB, intact no PPH, Admitted postpartum for preeclampsia with severe features. She presented to the ED with sudden onset of fatigue, numbness and tingling and shortness of breath.      Complications: Fetal demise > 22 weeks, delivered, current hospitalization      Name: Jay   5 SAB 17 11w0d    AB, MISSED         Birth Comments: BAby stopped growing 11 weeks, D & C at 15 weeks, NL blood loss   4 SAB 2017 5w0d             Birth Comments: SAB. no complication   3 Term 12/03/15 38w5d 03:45 / 01:16 3.11 kg (6 lb 13.7 oz) M Vag-Spont EPI  RAMILA      Birth Comments: PROM, spontaneous labor, 5 hour labor, epidural, 2nd degree, no PPH      Name: Lee Ann      Apgar1: 9  Apgar5: 9   2 SAB 2014 5w0d             Birth Comments: SAB no complications   1 2014 5w0d             Birth Comments: SAB, no complications      Obstetric Comments   HTN, PreE,  labor, Postpartum hemorrhage, and  mood disorder   2018 Severe Pre E Postpartum after term IUFD 39 weeks   2019: shortened cervix with cerclage, delivered at 37/6 weeks, PPH   Recurrent pregnancy loss          PMHx:   Past Medical History:   Diagnosis Date    Anemia 2022    Depressive disorder     PPD following fetal loss    History of cervical cerclage 2021    3/2019: Placed after fetal anatomy scan at 21 wks (last pregnancy), then delivered at 37.6 wks     21 per [facility]:   - Prophylactic cerclage at as soon as available, plan for next week  - Comprehensive ultrasound at 18-20 weeks and serial growth  ultrasonography every 4 weeks  - Administration of  corticosteroids if the patient is deemed to be at increased risk of imminent  delive    History of IUFD 2021- IUFD boy from placental abruption.  18: placenta pathology reviewed. Hematoma infarct noted, see formal report.     Per [facility] 21  - Weekly BPPs after 32 weeks  - Deliver at 38 weeks  [facility] consultation  recommend serial evaluation of fetal growth every 4 weeks in addition to  weekly  surveillance starting at 32 weeks and delivery at 38 weeks as previously discussed. She kandis    History of postpartum hemorrhage 2022    Plan TXA prior to birth  AMSTL    History of recurrent  2018    Taking prometrium    History of severe pre-eclampsia 2018    Hx of cervical incompetence in pregnancy 2021    Nausea/vomiting in pregnancy 10/05/2021    Recurrent pregnancy loss 2023    Severe pre-eclampsia, postpartum condition or complication 2018    Thrombocytopenia (H24) 2022    Varicella     Vitamin D deficiency 2018     PSHx:   Past Surgical History:   Procedure Laterality Date    APPENDECTOMY Right     CERCLAGE CERVICAL N/A 3/22/2019    Procedure: CERCLAGE CERVICAL;  Surgeon: Liz Lima MD;  [facility] L[facility]    CERCLAGE CERVICAL N/A 3/22/2019    Procedure: CERCLAGE CERVICAL;  Surgeon: Liz Lima MD;  [facility]: [facility] OR    CERCLAGE CERVICAL N/A 2021    Procedure: CERCLAGE, CERVIX, VAGINAL APPROACH;  Surgeon: Stella Orellana;  [facility] L[facility]    DILATION AND CURETTAGE SUCTION N/A 2017    Procedure: DILATION AND CURETTAGE SUCTION;  Suction Dilation And Curettage ;  Surgeon: Yolanda Samaniego MD;  [facility] OR    DILATION AND CURETTAGE SUCTION N/A 2021    Procedure: DILATION AND CURETTAGE, UTERUS, USING SUCTION;  Surgeon: Rosanna Ybarra MD;  Location: [facility] OR    DILATION AND CURETTAGE, HYSTEROSCOPY DIAGNOSTIC, COMBINED N/A 2017     Procedure: COMBINED DILATION AND CURETTAGE, HYSTEROSCOPY DIAGNOSTIC;  Operative Hysteroscopy, Dilation and Curettage ;  Surgeon: Eli Pickard MD;  Location: [facility] OR    GYN SURGERY  09/26/2017    suction D&C     Meds:   Current Outpatient Medications   Medication Instructions    albuterol (PROAIR HFA/PROVENTIL HFA/VENTOLIN HFA) 108 (90 Base) MCG/ACT inhaler INHALE 1 TO 2 PUFFS EVERY 4 HOURS AS NEEDED FOR WHEEZE FOR UP TO 30 DAYS    aspirin 81 mg, Oral, DAILY    cholecalciferol (VITAMIN D3) 125 mcg, Oral, DAILY, Take one capsule daily.    cyanocobalamin (VITAMIN B-12) 1,000 mcg, Oral, DAILY    docusate sodium (COLACE) 100 MG capsule     docusate sodium (COLACE) 100 mg, Oral, DAILY    ferrous sulfate (FEROSUL) 325 mg, Oral, DAILY WITH BREAKFAST    metoclopramide (REGLAN) 10 mg, Oral, 4 TIMES DAILY BEFORE MEALS & NIGHTLY    polyethylene glycol (MIRALAX) 17 g    Prenatal Vit-Fe Fumarate-FA (PRENATAL VITAMIN AND MINERAL) 28-0.8 MG TABS 1 tablet, Oral, DAILY    progesterone (PROMETRIUM) 200 mg, Vaginal, AT BEDTIME    progesterone (PROMETRIUM) 200 mg, Oral, 2 TIMES DAILY    senna-docusate (SENOKOT-S/PERICOLACE) 8.6-50 MG tablet 1 tablet, Oral, DAILY, Start after delivery.    SENNA-docusate sodium (SENNA S) 8.6-50 MG tablet 1 tablet, Oral, AT BEDTIME    vitamin C (ASCORBIC ACID) 250 mg, Oral, DAILY    vitamin D3 (CHOLECALCIFEROL) 10 MCG (400 UNIT) capsule 1 capsule, Oral, DAILY       Allergies:  No Known Allergies     FmHx:   Family History   Problem Relation Age of Onset    Asthma Mother     Hypertension Father     Cancer Father         pancreatic    No Known Problems Maternal Grandmother     No Known Problems Maternal Grandfather     No Known Problems Paternal Grandmother     No Known Problems Paternal Grandfather     Pneumonia Brother         passed away from pneumonia    No Known Problems Brother     No Known Problems Brother     No Known Problems Brother     No Known Problems Brother     No Known Problems  "Sister     No Known Problems Sister     No Known Problems Sister     No Known Problems Sister     No Known Problems Sister     No Known Problems Son     No Known Problems Son     No Known Problems Daughter     Diabetes No family hx of     Breast Cancer No family hx of     Colon Cancer No family hx of      SocHx:  She denies any tobacco, alcohol, or other drug use during this pregnancy.    ROS:   As per HPI, otherwise negative.    PE:  Vit: Patient Vitals for the past 4 hrs:   BP Temp Temp src Height Weight   24 0909 -- -- -- 1.6 m (5' 3\") 67.1 kg (148 lb)   24 0900 112/63 97.5  F (36.4  C) Oral -- --      Gen: Alert, oriented, appears comfortable  CV: RRR  Pulm: Clear to auscultation bilateral fields, non-labored  Abd: Soft, gravid, non-tender  Ext: No edema  Cx: Deferred    Dop tones:  140    Assessment/Plan  Ms. Pam Syed is a 33 year old  at 14w3d by 11w1d sono who presents for scheduled cerclage.  Unfortunately she has an active respiratory viral illness.  Covid negative.  In discussion with anesthesia, they have recommended to defer elective scheduled procedure until she has recovered from a respiratory standpoint unless an urgent or emergent procedure.  We reviewed safe medications in pregnancy for symptomatic relief.  Precautions given for fever, chills, nausea, vomiting.  Low suspicion for influenza, suspect viral URI.  Encouraged hydration.  Return precautions given.  Cerclage to be scheduled for next week once she has recovered.     History of suspected cervical insufficiency  - T&S, CBC ordered and pending  - Previously discussed various etiologies for cervical shortening including  labor, infection,  rupture of membranes, and cervical insufficiency.  She has had cerclage x 2 and desires history indicated cerclage in this pregnancy.  - Doptones before and after the procedure  - Surgical consent signed.  - NPO since 0000  - Anesthesia alerted. Abx not " indicated.    2. Viral URI symptoms  - See above, defer elective procedure until recovered    The patient was discussed with Dr. Glass who is in agreement with the treatment plan.    Gracie Rubio MD   Maternal-Fetal Medicine Fellow, PGY7  1/19/2024 11:02 AM

## 2024-01-19 NOTE — DISCHARGE SUMMARY
Plan per anesthesia team and Dr Glass is to cancel surgery for today due to patients chest congestion and cold. Covid negative. Plan is to reschedule surgery for this Weds. Dr Glass spoke with patient about the plan. Pam agreed to plan. IV removed.   provided a list of medications that hte patient could take OTC that were safe for colds in pregnancy. Pt discharge to home ambulatory at 1110.

## 2024-01-19 NOTE — DISCHARGE INSTRUCTIONS
Please return Wednesday 1/24/23 at 8:30 to prepare for your scheduled cerclage.    Medications safe for you to take for your cold  Thins out secretionshelps loosen congestion in chest and throat  Mucinex (Generic: Guafenesin)  Robitussin    Sudafed  Dayquil  Nyquil    For Cough:  Cough Drops

## 2024-01-19 NOTE — PLAN OF CARE
Goal Outcome Evaluation:  Late entry:   Pt arrived for scheduled cerclage at 0830. Reported that she has a cough, sinus congestion and a headache she rates about a 6. Covid test collected and sent, Dr Rubio informed of patient arrival. Awaiting test results. If covid positive plan per Dr is to reschedule the surgery.

## 2024-01-24 ENCOUNTER — HOSPITAL ENCOUNTER (OUTPATIENT)
Facility: CLINIC | Age: 34
Discharge: HOME | End: 2024-01-24
Attending: STUDENT IN AN ORGANIZED HEALTH CARE EDUCATION/TRAINING PROGRAM | Admitting: STUDENT IN AN ORGANIZED HEALTH CARE EDUCATION/TRAINING PROGRAM
Payer: COMMERCIAL

## 2024-01-24 ENCOUNTER — HOSPITAL ENCOUNTER (OUTPATIENT)
Facility: CLINIC | Age: 34
End: 2024-01-24
Admitting: STUDENT IN AN ORGANIZED HEALTH CARE EDUCATION/TRAINING PROGRAM
Payer: COMMERCIAL

## 2024-01-24 VITALS
DIASTOLIC BLOOD PRESSURE: 74 MMHG | OXYGEN SATURATION: 100 % | RESPIRATION RATE: 16 BRPM | TEMPERATURE: 98.2 F | SYSTOLIC BLOOD PRESSURE: 122 MMHG | HEART RATE: 90 BPM

## 2024-01-24 PROBLEM — Z98.890 HISTORY OF CERVICAL CERCLAGE: Status: ACTIVE | Noted: 2021-05-17

## 2024-01-24 LAB
ABO/RH(D): NORMAL
ANTIBODY SCREEN: NEGATIVE
BASOPHILS # BLD AUTO: 0 10E3/UL (ref 0–0.2)
BASOPHILS NFR BLD AUTO: 0 %
EOSINOPHIL # BLD AUTO: 0 10E3/UL (ref 0–0.7)
EOSINOPHIL NFR BLD AUTO: 1 %
ERYTHROCYTE [DISTWIDTH] IN BLOOD BY AUTOMATED COUNT: 13.4 % (ref 10–15)
HCT VFR BLD AUTO: 32.9 % (ref 35–47)
HGB BLD-MCNC: 11.1 G/DL (ref 11.7–15.7)
IMM GRANULOCYTES # BLD: 0.1 10E3/UL
IMM GRANULOCYTES NFR BLD: 1 %
LYMPHOCYTES # BLD AUTO: 1.3 10E3/UL (ref 0.8–5.3)
LYMPHOCYTES NFR BLD AUTO: 19 %
MCH RBC QN AUTO: 29 PG (ref 26.5–33)
MCHC RBC AUTO-ENTMCNC: 33.7 G/DL (ref 31.5–36.5)
MCV RBC AUTO: 86 FL (ref 78–100)
MONOCYTES # BLD AUTO: 0.3 10E3/UL (ref 0–1.3)
MONOCYTES NFR BLD AUTO: 5 %
NEUTROPHILS # BLD AUTO: 5.3 10E3/UL (ref 1.6–8.3)
NEUTROPHILS NFR BLD AUTO: 74 %
NRBC # BLD AUTO: 0 10E3/UL
NRBC BLD AUTO-RTO: 0 /100
PLATELET # BLD AUTO: 305 10E3/UL (ref 150–450)
RBC # BLD AUTO: 3.83 10E6/UL (ref 3.8–5.2)
SPECIMEN EXPIRATION DATE: NORMAL
WBC # BLD AUTO: 7.1 10E3/UL (ref 4–11)

## 2024-01-24 PROCEDURE — 59320 REVISION OF CERVIX: CPT | Mod: GC | Performed by: STUDENT IN AN ORGANIZED HEALTH CARE EDUCATION/TRAINING PROGRAM

## 2024-01-24 PROCEDURE — 710N000010 HC RECOVERY PHASE 1, LEVEL 2, PER MIN: Performed by: STUDENT IN AN ORGANIZED HEALTH CARE EDUCATION/TRAINING PROGRAM

## 2024-01-24 PROCEDURE — 370N000017 HC ANESTHESIA TECHNICAL FEE, PER MIN: Performed by: STUDENT IN AN ORGANIZED HEALTH CARE EDUCATION/TRAINING PROGRAM

## 2024-01-24 PROCEDURE — 250N000011 HC RX IP 250 OP 636: Performed by: STUDENT IN AN ORGANIZED HEALTH CARE EDUCATION/TRAINING PROGRAM

## 2024-01-24 PROCEDURE — 36415 COLL VENOUS BLD VENIPUNCTURE: CPT | Performed by: STUDENT IN AN ORGANIZED HEALTH CARE EDUCATION/TRAINING PROGRAM

## 2024-01-24 PROCEDURE — 999N000040 HC STATISTIC CONSULT NO CHARGE VASC ACCESS

## 2024-01-24 PROCEDURE — 258N000003 HC RX IP 258 OP 636: Performed by: STUDENT IN AN ORGANIZED HEALTH CARE EDUCATION/TRAINING PROGRAM

## 2024-01-24 PROCEDURE — 360N000074 HC SURGERY LEVEL 1, PER MIN: Performed by: STUDENT IN AN ORGANIZED HEALTH CARE EDUCATION/TRAINING PROGRAM

## 2024-01-24 PROCEDURE — 250N000013 HC RX MED GY IP 250 OP 250 PS 637: Performed by: STUDENT IN AN ORGANIZED HEALTH CARE EDUCATION/TRAINING PROGRAM

## 2024-01-24 PROCEDURE — 85025 COMPLETE CBC W/AUTO DIFF WBC: CPT | Performed by: STUDENT IN AN ORGANIZED HEALTH CARE EDUCATION/TRAINING PROGRAM

## 2024-01-24 PROCEDURE — 999N000141 HC STATISTIC PRE-PROCEDURE NURSING ASSESSMENT: Performed by: STUDENT IN AN ORGANIZED HEALTH CARE EDUCATION/TRAINING PROGRAM

## 2024-01-24 PROCEDURE — 86900 BLOOD TYPING SEROLOGIC ABO: CPT | Performed by: STUDENT IN AN ORGANIZED HEALTH CARE EDUCATION/TRAINING PROGRAM

## 2024-01-24 RX ORDER — LIDOCAINE 40 MG/G
CREAM TOPICAL
Status: DISCONTINUED | OUTPATIENT
Start: 2024-01-24 | End: 2024-01-24

## 2024-01-24 RX ORDER — CITRIC ACID/SODIUM CITRATE 334-500MG
30 SOLUTION, ORAL ORAL ONCE
Status: COMPLETED | OUTPATIENT
Start: 2024-01-24 | End: 2024-01-24

## 2024-01-24 RX ORDER — ONDANSETRON 2 MG/ML
INJECTION INTRAMUSCULAR; INTRAVENOUS PRN
Status: DISCONTINUED | OUTPATIENT
Start: 2024-01-24 | End: 2024-01-24

## 2024-01-24 RX ORDER — SODIUM CHLORIDE, SODIUM LACTATE, POTASSIUM CHLORIDE, CALCIUM CHLORIDE 600; 310; 30; 20 MG/100ML; MG/100ML; MG/100ML; MG/100ML
INJECTION, SOLUTION INTRAVENOUS CONTINUOUS PRN
Status: DISCONTINUED | OUTPATIENT
Start: 2024-01-24 | End: 2024-01-24

## 2024-01-24 RX ORDER — INDOMETHACIN 25 MG/1
50 CAPSULE ORAL ONCE
Status: COMPLETED | OUTPATIENT
Start: 2024-01-24 | End: 2024-01-24

## 2024-01-24 RX ORDER — ACETAMINOPHEN 325 MG/1
650 TABLET ORAL
Status: DISCONTINUED | OUTPATIENT
Start: 2024-01-24 | End: 2024-01-24 | Stop reason: HOSPADM

## 2024-01-24 RX ORDER — ACETAMINOPHEN 325 MG/1
975 TABLET ORAL ONCE
Status: COMPLETED | OUTPATIENT
Start: 2024-01-24 | End: 2024-01-24

## 2024-01-24 RX ORDER — CHLOROPROCAINE HYDROCHLORIDE 20 MG/ML
INJECTION, SOLUTION EPIDURAL; INFILTRATION; INTRACAUDAL; PERINEURAL
Status: COMPLETED | OUTPATIENT
Start: 2024-01-24 | End: 2024-01-24

## 2024-01-24 RX ORDER — SODIUM CHLORIDE, SODIUM LACTATE, POTASSIUM CHLORIDE, CALCIUM CHLORIDE 600; 310; 30; 20 MG/100ML; MG/100ML; MG/100ML; MG/100ML
INJECTION, SOLUTION INTRAVENOUS CONTINUOUS
Status: DISCONTINUED | OUTPATIENT
Start: 2024-01-24 | End: 2024-01-24

## 2024-01-24 RX ADMIN — PHENYLEPHRINE HYDROCHLORIDE 50 MCG/MIN: 10 INJECTION INTRAVENOUS at 10:42

## 2024-01-24 RX ADMIN — ONDANSETRON 4 MG: 2 INJECTION INTRAMUSCULAR; INTRAVENOUS at 10:07

## 2024-01-24 RX ADMIN — INDOMETHACIN 50 MG: 25 CAPSULE ORAL at 15:34

## 2024-01-24 RX ADMIN — ACETAMINOPHEN 975 MG: 325 TABLET, FILM COATED ORAL at 09:34

## 2024-01-24 RX ADMIN — SODIUM CITRATE AND CITRIC ACID MONOHYDRATE 30 ML: 500; 334 SOLUTION ORAL at 10:00

## 2024-01-24 RX ADMIN — CHLOROPROCAINE HYDROCHLORIDE 50 MG: 20 INJECTION, SOLUTION EPIDURAL; INFILTRATION; INTRACAUDAL; PERINEURAL at 10:41

## 2024-01-24 RX ADMIN — SODIUM CHLORIDE, POTASSIUM CHLORIDE, SODIUM LACTATE AND CALCIUM CHLORIDE: 600; 310; 30; 20 INJECTION, SOLUTION INTRAVENOUS at 09:41

## 2024-01-24 RX ADMIN — FAMOTIDINE 20 MG: 10 INJECTION, SOLUTION INTRAVENOUS at 10:07

## 2024-01-24 RX ADMIN — ACETAMINOPHEN 650 MG: 325 TABLET, FILM COATED ORAL at 15:34

## 2024-01-24 RX ADMIN — SODIUM CHLORIDE, POTASSIUM CHLORIDE, SODIUM LACTATE AND CALCIUM CHLORIDE: 600; 310; 30; 20 INJECTION, SOLUTION INTRAVENOUS at 10:28

## 2024-01-24 ASSESSMENT — ACTIVITIES OF DAILY LIVING (ADL)
ADLS_ACUITY_SCORE: 37

## 2024-01-24 NOTE — PROVIDER NOTIFICATION
24 0840   Provider Notification   Provider Name/Title Dr. Rubio   Method of Notification At Bedside   Request Evaluate in Person   Notification Reason Patient Arrived     0Data: [facility].   Reason for maternal/fetal assessment per patient is Cerclage (Cervical Cerclage)  .  Patient is a . Prenatal record reviewed.      OB History    Para Term  AB Living   10 4 4 0 5 3   SAB IAB Ectopic Multiple Live Births   5 0 0 0 4      # Outcome Date GA Lbr Michael/2nd Weight Sex Delivery Anes PTL Lv   10 Current            9 Term 22 37w6d 03:30 / 00:04 2.81 kg (6 lb 3.1 oz) M Vag-Spont None N RAMILA      Name: ROSIE LIRIANO      Apgar1: 9  Apgar5: 9   8 SAB 2021 10w0d    AB, MISSED         Birth Comments: MAB, baby stopped growing 8 weeks, D & C 11 weeks   7 Term 07/15/19 37w6d 04:04 / 00:09 2.55 kg (5 lb 10 oz) F Vag-Spont None N RAMILA      Birth Comments: PROM, spontaneous labor, 5 hour labor, epidural, 2nd degree, PPH      Complications: Hemorrhage      Name: DANAE LIRIANO-GINNA      Apgar1: 8  Apgar5: 9   6 Term 18 39w3d 01:16 / 00:20 2.75 kg (6 lb 1 oz) M Vag-Spont None N ND      Birth Comments: IUFD, no cardiac activity on admission prior to delivery, NSVB, intact no PPH, Admitted postpartum for preeclampsia with severe features. She presented to the ED with sudden onset of fatigue, numbness and tingling and shortness of breath.      Complications: Fetal demise > 22 weeks, delivered, current hospitalization      Name: Jay   5 SAB 17 11w0d    AB, MISSED         Birth Comments: BAby stopped growing 11 weeks, D & C at 15 weeks, NL blood loss   4 SAB 2017 5w0d             Birth Comments: SAB. no complication   3 Term 12/03/15 38w5d 03:45 / 01:16 3.11 kg (6 lb 13.7 oz) M Vag-Spont EPI  RAMILA      Birth Comments: PROM, spontaneous labor, 5 hour labor, epidural, 2nd degree, no PPH      Name: Lee Ann      Apgar1: 9  Apgar5: 9   2 SAB 2014 5w0d              Birth Comments: SAB no complications   1 SAB 2014 5w0d             Birth Comments: SAB, no complications      Obstetric Comments   HTN, PreE,  labor, Postpartum hemorrhage, and  mood disorder   2018 Severe Pre E Postpartum after term IUFD 39 weeks   2019: shortened cervix with cerclage, delivered at 37/6 weeks, PPH   Recurrent pregnancy loss      . Medical history:   Past Medical History:   Diagnosis Date    Anemia 2022    Depressive disorder     PPD following fetal loss    History of cervical cerclage 2021    3/2019: Placed after fetal anatomy scan at 21 wks (last pregnancy), then delivered at 37.6 wks     21 per [facility]:   - Prophylactic cerclage at as soon as available, plan for next week  - Comprehensive ultrasound at 18-20 weeks and serial growth ultrasonography every 4 weeks  - Administration of  corticosteroids if the patient is deemed to be at increased risk of imminent  delive    History of IUFD 2021- IUFD boy from placental abruption.  18: placenta pathology reviewed. Hematoma infarct noted, see formal report.     Per [facility] 21  - Weekly BPPs after 32 weeks  - Deliver at 38 weeks  [facility] consultation  recommend serial evaluation of fetal growth every 4 weeks in addition to  weekly  surveillance starting at 32 weeks and delivery at 38 weeks as previously discussed. She kandis    History of postpartum hemorrhage 2022    Plan TXA prior to birth  AMSTL    History of recurrent  2018    Taking prometrium    History of severe pre-eclampsia 2018    Hx of cervical incompetence in pregnancy 2021    Nausea/vomiting in pregnancy 10/05/2021    Recurrent pregnancy loss 2023    Severe pre-eclampsia, postpartum condition or complication 2018    Thrombocytopenia (H24) 2022    Varicella     Vitamin D deficiency 2018   . Gestational Age 15w1d. VSS. Fetal movement present. Patient denies  cramping, backache, vaginal discharge, pelvic pressure, UTI symptoms, GI problems, bloody show, vaginal bleeding, edema, headache, visual disturbances, epigastric or URQ pain, abdominal pain, rupture of membranes. Support personJamal present.  Action: Verbal consent for FHR by Filemon: 155. Triage assessment completed. Uterine assessment per pt: denies cramping or uterine contractions.   Response: Dr. Rubio informed of above and is at bedside. Plan per provider is for Cervical Cerclage procedure. Patient verbalized agreement with plan and consent for procedure obtained. Patient oriented to room and call light.

## 2024-01-24 NOTE — ANESTHESIA PROCEDURE NOTES
"Intrathecal injection Procedure Note    Pre-Procedure   Staff -        Anesthesiologist:  Jayashree Ruelas MD       Resident/Fellow: Alfredo Sung MD       Performed By: resident and with residents       Procedure performed by resident/fellow/CRNA in presence of a teaching physician.         Location: OB       Procedure Start/Stop Times: 1/24/2024 10:41 AM       Pre-Anesthestic Checklist: patient identified, IV checked, risks and benefits discussed, informed consent, monitors and equipment checked, pre-op evaluation, at physician/surgeon's request and post-op pain management  Timeout:       Correct Patient: Yes        Correct Procedure: Yes        Correct Site: Yes        Correct Position: Yes   Procedure Documentation  Procedure: intrathecal injection       Diagnosis: history indicated cerclage       Patient Position: sitting       Skin prep: Chloraprep       Insertion Site: L3-4. (midline approach).       Needle Gauge: 25.        Needle Length (Inches): 3.5        Spinal Needle Type: Pencan       Introducer used       Introducer: 20 G       # of attempts: 1 and  # of redirects:  1    Assessment/Narrative         Paresthesias: No.       Sensory Level: T10       CSF fluid: clear.       Opening pressure was cmH2O while  Sitting.      Medication(s) Administered   2% Chloroprocaine PF (Intrathecal) - Intrathecal   50 mg - 1/24/2024 10:41:00 AM  Medication Administration Time: 1/24/2024 10:41 AM      FOR [facility] ([facility] ONLY:   Pain Team Contact information: please page the Pain Team Via [employer]. Search \"Pain\". During daytime hours, please page the attending first. At night please page the resident first.      "

## 2024-01-24 NOTE — OP NOTE
Operative Note: Cervical Cerclage         Pre-Op Diagnosis:   1) Single intrauterine pregnancy at 15w1d by 11w1d sono  2) History of cervical insufficiency         Post-Op Diagnosis:   1) Same s/p history indicated Forrest cerclage         Procedure:   1) Forrest cervical cerclage, 1 suture (10 knots at 12 o'clock position)         Surgeons:   Attending: Angelica Jordan MD  Fellow: Gracie Rubio MD   Medical Student: Anjelica Weiner MS4         Anesthesia:   Spinal          Estimated Blood Loss:   5 cc         Findings:   1) Cervix closed prior to the procedure, closed following the procedure  2) Fetal heart tones confirmed by bedside Dop-tone following the procedure         Specimens:   1) None         Complications:   1) None apparent          History:     Pam Syed is a 33 year old  at 15w1d by 11w1d sono.  She was see in [facility] clinic for consult on 1/15/24 by Dr. Jorge, please see that note for full details of history. In brief, patient with history of 5 SAB and negative APLS testing, term IUFD in 2018, and 3 full term vaginal deliveries with cerclages in 2 pregnancies. The cerclage was initially placed in her 2019 pregnancy due to a finding of a short cervix at the time of anatomy US but she subsequently had a full term delivery. In her last pregnancy in  she had a history indicated cerclage.  She was counseled and desires to proceed with history indicated cerclage.  Written, informed consent was obtained.         Details of Procedure:   After administration of spinal anesthesia the patient was placed in the dorsal lithotomy position and prepped and draped in the usual fashion.  The urethra was cleansed with betadine, and the bladder drained via straight catheter.  A weighted speculum was placed into the vagina and right angle retractors were used to visualize the cervix. The vagina and cervix were copiously cleansed with betadine solution.  The anterior lip of the cervix was grasped with  a ring forcep.  A circumferential suture of #2 Ethilon was placed in the usual fashion at the cervicovaginal reflection with 10 knots tied at the 12 o'clock position.  Digital exam confirmed that the cervix was closed.    The bladder was drained of clear urine and a rectal exam confirmed that no sutures were present in the rectum.  The cervix and vaginal vault were inspected and noted to be free of injury and hemostatic.      The instruments were removed from the vagina. She tolerated the anesthesia well without incident. She was subsequently transferred to the recovery room in satisfactory condition.    Sponge and needle counts were correct at the close of the case.    Dr. Angelica Jordan was present and scrubbed for the duration of the procedure.    Gracie Rubio MD   Maternal-Fetal Medicine Fellow, PGY7  1/24/2024 11:28 AM

## 2024-01-24 NOTE — ANESTHESIA CARE TRANSFER NOTE
Patient: Pam Syed    Procedure: Procedure(s):  Cerclage cervical       Diagnosis: 15 weeks gestation of pregnancy [Z3A.15]  Diagnosis Additional Information: No value filed.    Anesthesia Type:   Spinal     Note:    Oropharynx: oropharynx clear of all foreign objects and spontaneously breathing  Level of Consciousness: awake  Oxygen Supplementation: room air    Independent Airway: airway patency satisfactory and stable  Dentition: dentition unchanged  Vital Signs Stable: post-procedure vital signs reviewed and stable  Report to RN Given: handoff report given  Patient transferred to: Labor and Delivery    Handoff Report: Identifed the Patient, Identified the Reponsible Provider, Reviewed the pertinent medical history, Discussed the surgical course, Reviewed Intra-OP anesthesia mangement and issues during anesthesia, Set expectations for post-procedure period and Allowed opportunity for questions and acknowledgement of understanding      Vitals:  Vitals Value Taken Time   BP     Temp     Pulse 69 01/24/24 1124   Resp 13 01/24/24 1124   SpO2     Vitals shown include unfiled device data.    Electronically Signed By: Alfredo Sung MD  January 24, 2024  11:25 AM

## 2024-01-24 NOTE — H&P
[facility]  OB History and Physical      Ginna Syed MRN# [id number]   Age: 33 year old YOB: 1990     CC:  cerclage    HPI:  Ms. Ginna Syed is a 33 year old  at 15w1d by 11w1d sono who presents for scheduled cerclage.  She was see in [facility] clinic for consult on 1/15/24 by Dr. Jorge, please see that note for full details of history.  In brief, patient with history of 5 SAB and negative APLS testing, term IUFD in 2018, and 3 full term vaginal deliveries with cerclages in 2 pregnancies.  The cerclage was initially placed in her 2019 pregnancy due to a finding of a short cervix at the time of anatomy US but she subsequently had a full term delivery.  In her last pregnancy in  she had a history indicated cerclage.    She denies vaginal bleeding, LOF, pain, contractions.  She does report ~1 week of congestion, cough, and headache, thus COVID testing was ordered and was negative.  She denies fever, chills, shortness of breath, chest pain, sick contacts.  Has not taken any medications for her symptoms.    Ultrasounds  1/15/24   IMPRESSION  ---------------------------------------------------------------------------------------------------------  1) Knight intrauterine pregnancy at 13w6d gestational age.  2) The nuchal translucency measurement is within the normal range.  3) The nasal bone was visualized.  4) Measurements consistent with established dates.  5) Visualized anatomy appears normal for early gestational age.    OB History  OB History    Para Term  AB Living   10 4 4 0 5 3   SAB IAB Ectopic Multiple Live Births   5 0 0 0 4      # Outcome Date GA Lbr Michael/2nd Weight Sex Delivery Anes PTL Lv   10 Current            9 Term 22 37w6d 03:30 / 00:04 2.81 kg (6 lb 3.1 oz) M Vag-Spont None N RAMILA      Name: DEANDRA,CAROL-GINNA      Apgar1: 9  Apgar5: 9   8 SAB 2021 10w0d    AB, MISSED         Birth Comments: MAB, baby stopped growing 8  weeks, D & C 11 weeks   7 Term 07/15/19 37w6d 04:04 / 00:09 2.55 kg (5 lb 10 oz) F Vag-Spont None N RAMILA      Birth Comments: PROM, spontaneous labor, 5 hour labor, epidural, 2nd degree, PPH      Complications: Hemorrhage      Name: DANAE LIRIANO-GINNA      Apgar1: 8  Apgar5: 9   6 Term 18 39w3d 01:16 / 00:20 2.75 kg (6 lb 1 oz) M Vag-Spont None N ND      Birth Comments: IUFD, no cardiac activity on admission prior to delivery, NSVB, intact no PPH, Admitted postpartum for preeclampsia with severe features. She presented to the ED with sudden onset of fatigue, numbness and tingling and shortness of breath.      Complications: Fetal demise > 22 weeks, delivered, current hospitalization      Name: Jay   5 SAB 17 11w0d    AB, MISSED         Birth Comments: BAby stopped growing 11 weeks, D & C at 15 weeks, NL blood loss   4 SAB 2017 5w0d             Birth Comments: SAB. no complication   3 Term 12/03/15 38w5d 03:45 / 01:16 3.11 kg (6 lb 13.7 oz) M Vag-Spont EPI  RAMILA      Birth Comments: PROM, spontaneous labor, 5 hour labor, epidural, 2nd degree, no PPH      Name: Lee Ann      Apgar1: 9  Apgar5: 9   2 SAB 2014 5w0d             Birth Comments: SAB no complications   1 2014 5w0d             Birth Comments: SAB, no complications      Obstetric Comments   HTN, PreE,  labor, Postpartum hemorrhage, and  mood disorder   2018 Severe Pre E Postpartum after term IUFD 39 weeks   2019: shortened cervix with cerclage, delivered at 37/6 weeks, PPH   Recurrent pregnancy loss          PMHx:   Past Medical History:   Diagnosis Date    Anemia 2022    Depressive disorder     PPD following fetal loss    History of cervical cerclage 2021    3/2019: Placed after fetal anatomy scan at 21 wks (last pregnancy), then delivered at 37.6 wks     21 per MFM:   - Prophylactic cerclage at as soon as available, plan for next week  - Comprehensive ultrasound at 18-20 weeks and serial growth  ultrasonography every 4 weeks  - Administration of  corticosteroids if the patient is deemed to be at increased risk of imminent  delive    History of IUFD 2021- IUFD boy from placental abruption.  18: placenta pathology reviewed. Hematoma infarct noted, see formal report.     Per [facility] 21  - Weekly BPPs after 32 weeks  - Deliver at 38 weeks  [facility] consultation  recommend serial evaluation of fetal growth every 4 weeks in addition to  weekly  surveillance starting at 32 weeks and delivery at 38 weeks as previously discussed. She kandis    History of postpartum hemorrhage 2022    Plan TXA prior to birth  AMSTL    History of recurrent  2018    Taking prometrium    History of severe pre-eclampsia 2018    Hx of cervical incompetence in pregnancy 2021    Nausea/vomiting in pregnancy 10/05/2021    Recurrent pregnancy loss 2023    Severe pre-eclampsia, postpartum condition or complication 2018    Thrombocytopenia (H24) 2022    Varicella     Vitamin D deficiency 2018     PSHx:   Past Surgical History:   Procedure Laterality Date    APPENDECTOMY Right     CERCLAGE CERVICAL N/A 3/22/2019    Procedure: CERCLAGE CERVICAL;  Surgeon: Liz Lima MD;  Location: [facility] L[facility]    CERCLAGE CERVICAL N/A 3/22/2019    Procedure: CERCLAGE CERVICAL;  Surgeon: Liz Lima MD;  Location: [facility] OR    CERCLAGE CERVICAL N/A 2021    Procedure: CERCLAGE, CERVIX, VAGINAL APPROACH;  Surgeon: Stella Orellana;  Location: [facility] L[facility]    DILATION AND CURETTAGE SUCTION N/A 2017    Procedure: DILATION AND CURETTAGE SUCTION;  Suction Dilation And Curettage ;  Surgeon: Yolanda Samaniego MD;  Location: [facility] OR    DILATION AND CURETTAGE SUCTION N/A 2021    Procedure: DILATION AND CURETTAGE, UTERUS, USING SUCTION;  Surgeon: Rosanna Ybarra MD;  Location: [facility] OR    DILATION AND CURETTAGE, HYSTEROSCOPY DIAGNOSTIC, COMBINED N/A 2017     Procedure: COMBINED DILATION AND CURETTAGE, HYSTEROSCOPY DIAGNOSTIC;  Operative Hysteroscopy, Dilation and Curettage ;  Surgeon: Eli Pickard MD;  Location: [facility] OR    GYN SURGERY  09/26/2017    suction D&C     Meds:   Current Outpatient Medications   Medication Instructions    albuterol (PROAIR HFA/PROVENTIL HFA/VENTOLIN HFA) 108 (90 Base) MCG/ACT inhaler INHALE 1 TO 2 PUFFS EVERY 4 HOURS AS NEEDED FOR WHEEZE FOR UP TO 30 DAYS    aspirin 81 mg, Oral, DAILY    cholecalciferol (VITAMIN D3) 125 mcg, Oral, DAILY, Take one capsule daily.    cyanocobalamin (VITAMIN B-12) 1,000 mcg, Oral, DAILY    docusate sodium (COLACE) 100 MG capsule     docusate sodium (COLACE) 100 mg, Oral, DAILY    ferrous sulfate (FEROSUL) 325 mg, Oral, DAILY WITH BREAKFAST    metoclopramide (REGLAN) 10 mg, Oral, 4 TIMES DAILY BEFORE MEALS & NIGHTLY    polyethylene glycol (MIRALAX) 17 g    Prenatal Vit-Fe Fumarate-FA (PRENATAL VITAMIN AND MINERAL) 28-0.8 MG TABS 1 tablet, Oral, DAILY    progesterone (PROMETRIUM) 200 mg, Vaginal, AT BEDTIME    progesterone (PROMETRIUM) 200 mg, Oral, 2 TIMES DAILY    senna-docusate (SENOKOT-S/PERICOLACE) 8.6-50 MG tablet 1 tablet, Oral, DAILY, Start after delivery.    SENNA-docusate sodium (SENNA S) 8.6-50 MG tablet 1 tablet, Oral, AT BEDTIME    vitamin C (ASCORBIC ACID) 250 mg, Oral, DAILY    vitamin D3 (CHOLECALCIFEROL) 10 MCG (400 UNIT) capsule 1 capsule, Oral, DAILY       Allergies:  No Known Allergies     FmHx:   Family History   Problem Relation Age of Onset    Asthma Mother     Hypertension Father     Cancer Father         pancreatic    No Known Problems Maternal Grandmother     No Known Problems Maternal Grandfather     No Known Problems Paternal Grandmother     No Known Problems Paternal Grandfather     Pneumonia Brother         passed away from pneumonia    No Known Problems Brother     No Known Problems Brother     No Known Problems Brother     No Known Problems Brother     No Known Problems  Sister     No Known Problems Sister     No Known Problems Sister     No Known Problems Sister     No Known Problems Sister     No Known Problems Son     No Known Problems Son     No Known Problems Daughter     Diabetes No family hx of     Breast Cancer No family hx of     Colon Cancer No family hx of      SocHx:  She denies any tobacco, alcohol, or other drug use during this pregnancy.    ROS:   As per HPI, otherwise negative.    PE:  Vit: Pending documentation, will review prior to OR     Gen: Alert, oriented, appears comfortable  CV: RRR  Pulm: Clear to auscultation bilateral fields, non-labored  Abd: Soft, gravid, non-tender  Ext: No edema  Cx: Deferred    Dop tones:  pending, will review prior to OR    Assessment/Plan  Ms. Pam Syed is a 33 year old  at 15w1d by 11w1d sono who presents for scheduled cerclage.  Unfortunately she had an active respiratory viral illness (Covid negative) last week and her cerclage was rescheduled for this week.  She is feeling much improved today with resolution of symptoms.      History of suspected cervical insufficiency  - T&S, CBC ordered and pending  - Previously discussed various etiologies for cervical shortening including  labor, infection,  rupture of membranes, and cervical insufficiency.  She has had cerclage x 2 and desires history indicated cerclage in this pregnancy.  - Doptones before and after the procedure  - Surgical consent signed.  - NPO since 0000  - Anesthesia alerted. Abx not indicated.    2. Viral URI symptoms  - resolved    The patient was discussed with Dr. Jordan who is in agreement with the treatment plan.    Gracie Rubio MD   Maternal-Fetal Medicine Fellow, PGY7  2024 8:35 AM    -----                                                                                                          MFM Physician Attestation  I saw this patient with the resident/fellow and agree with the their findings and plan of care as  documented in the note.  I personally reviewed her vital signs, medications, labs, pertinent imaging, and fetal monitoring.     Snell findings:   Pam Syed is a 33 year old  female who presents for scheduled cerclage placement. She has a history of two prior pregnancies requiring cerclage placement given short cervix that successfully resulted in term delivery.  She has been counseled on the r/b/a of cerclage.  She is recovered from previous URI symptoms and is ready to proceed with intervention today.    I personally spent a total of 40 minutes, including both face-to-face and non-face-to-face on the date of the encounter, addressing the above diagnosis. Activities performed in this time include chart review, obtaining / reviewing history, performing a medically necessary evaluation, documentation and counseling/care coordination/ordering tests/ordering meds.      Angelica Jordan MD  Maternal Fetal Medicine   Date of Service (when I saw the patient): 2024

## 2024-01-25 NOTE — CARE PLAN
Data: Patient denies backache, pelvic pressure, UTI symptoms, GI problems, edema, headache, visual disturbances, epigastric or URQ pain, abdominal pain, rupture of membranes. Pt reports intermittent uterine cramping and has had intermittent scant dark red spotting, after Cerclage, improved with warm packs and Tylenol, also had Indocin x1. Voiding without difficulty and ambulating independently. Spouse Nilson present.  Action: Patient instructed to report change in fetal movement, vaginal leaking of fluid or increase in vaginal bleeding, abdominal pain, or any concerns related to the pregnancy to her Provider.  Response: Dr. Hill informed of above. Plan per provider is to discharge to home. Patient verbalized understanding of education, discharge instructions, and verbalized agreement with plan. Discharged in wheelchair at 1900, accompanied by spouse and all belongings.

## 2024-01-25 NOTE — PROVIDER NOTIFICATION
01/24/24 1800   Provider Notification   Provider Name/Title    Method of Notification In Department   Request Evaluate - Remote   Notification Reason Status Update     MD notified that patient was able to void x2: 240 ml and then voided 150 ml 1 hr later, and bladder scan showing 32 ml residual after 2nd void. MD also notified that VSS, and patient able to tolerate regular diet and ambulating independently, without difficulty. Patient reports she is coping with lower abdominal cramping and heat packs are helping, and patient and spouse verbalize that they are ready to go home. Plan per MD to discharge pt to home and may use warm packs to abdomen and OTC Tylenol PRN.

## 2024-01-25 NOTE — CARE PLAN
Data: Pam Syed transferred to room 420 via cart at 1240 from PACU, after Cerclage procedure.  Action: Receiving unit notified of transfer: Yes. Patient and family notified of room change. Accompanied by this Registered Nurse. This RN continued bedside care for pt in new room. Belongings sent to receiving unit. Oriented patient to surroundings. Call light within reach.  Response: Patient tolerated transfer and is stable.

## 2024-01-25 NOTE — PROVIDER NOTIFICATION
01/24/24 1252   Provider Notification   Provider Name/Title Dr. Jordan   Method of Notification In Department   Notification Reason Status Update     MD in Dept, notified that pt reports mild uterine cramping. Plan to perform straight catheterization and  continue to monitor pt for increased pain or vaginal bleeding.

## 2024-01-25 NOTE — PROVIDER NOTIFICATION
01/24/24 1520   Provider Notification   Provider Name/Title Dr. Jordan   Method of Notification In Department   Request Evaluate - Remote   Notification Reason Uterine Activity;Pain     MD notified that pt reports mild to moderate cramping after heat packs and straight catheterization. MD will place order for Indocin x1.

## 2024-01-26 NOTE — ANESTHESIA POSTPROCEDURE EVALUATION
Patient: Pam Syed    Procedure: Procedure(s):  Cerclage cervical       Anesthesia Type:  Spinal    Note:  Disposition: Admission   Postop Pain Control: Uneventful            Sign Out: Well controlled pain   PONV: No   Neuro/Psych: Uneventful            Sign Out: Acceptable/Baseline neuro status   Airway/Respiratory: Uneventful            Sign Out: Acceptable/Baseline resp. status   CV/Hemodynamics: Uneventful            Sign Out: Acceptable CV status; No obvious hypovolemia; No obvious fluid overload   Other NRE: NONE   DID A NON-ROUTINE EVENT OCCUR? No           Last vitals:  Vitals Value Taken Time   /61 01/24/24 1200   Temp 36.9  C (98.4  F) 01/24/24 1200   Pulse 72 01/24/24 1200   Resp 16 01/24/24 1200   SpO2 99 % 01/24/24 1200       Electronically Signed By: Jayashree Friend MD

## 2024-02-05 NOTE — PATIENT INSTRUCTIONS
"Weeks 14 to 18 of Your Pregnancy: Care Instructions  Around this time, you may start to look pregnant. Your baby is now able to pass urine. And the first stool (meconium) is starting to collect in your baby's intestines. Hair is starting to grow on your baby's head.    You may notice some skin changes, such as itchy spots on your palms or acne on your face.   At your next doctor visit, you may have an ultrasound. So you might think about whether you want to know the sex of your baby. Also ask your doctor about flu and COVID-19 shots.      How to reduce stress   Ask for help when you need it.  Try to avoid things that cause you stress.  Seek out things that relieve stress, such as breathing exercises or yoga.     How to get exercise   If you don't usually exercise, start slowly. Short walks may be a good choice.  Try to be active 30 minutes a day, at least 5 days a week.  Avoid activities where you're more likely to fall.  Use light weights to reduce stress on your joints.     How to stay at a healthy weight for you   Talk to your doctor or midwife about how much weight you should gain.  It's generally best to gain:  About 28 to 40 pounds if you're underweight.  About 25 to 35 pounds if you're at a healthy weight.  About 15 to 25 pounds if you're overweight.  About 11 to 20 pounds if you're very overweight (obese).  Follow-up care is a key part of your treatment and safety. Be sure to make and go to all appointments, and call your doctor if you are having problems. It's also a good idea to know your test results and keep a list of the medicines you take.  Where can you learn more?  Go to https://www.[employer].net/patiented  Enter I453 in the search box to learn more about \"Weeks 14 to 18 of Your Pregnancy: Care Instructions.\"  Current as of: July 11, 2023               Content Version: 13.8    7383-1961 [employer].   Care instructions adapted under license by your healthcare professional. If you have " questions about a medical condition or this instruction, always ask your healthcare professional. Healthwise, [facility] disclaims any warranty or liability for your use of this information.      Second-Trimester Fetal Ultrasound: About This Test  What is it?     Fetal ultrasound is a test that uses sound waves to make pictures of your baby (fetus) and placenta inside the uterus. The test is the safest way to find out the age, size, and position of your baby. You also may be able to find out the sex of your baby. (But the test isn't done just to find out a baby's sex.)  No known risks to the mother or the baby are linked to fetal ultrasound. But you may feel anxious if the test reveals a problem with your pregnancy or baby.  Why is this test done?  In the second trimester, a fetal ultrasound is done to:  Estimate the number of weeks and days a fetus has developed since the beginning of the pregnancy. This is called the gestational age.  Look at the size and position of the fetus, the placenta, and the fluid that surrounds the fetus.  Find major birth defects, such as heart problems or problems with the brain and spinal cord (neural tube defects). But the test may not be able to find many minor defects and some major birth defects.  How do you prepare for the test?  In general, there's nothing you have to do before this test, unless your doctor tells you to.  How is the test done?  You may be able to leave your clothes on, or you will be given a gown to wear.  You will lie on your back on a padded examination table.  A gel will be spread on your belly. It will be removed after the test.  A small, handheld device called a transducer will be pressed against the gel on your skin and moved across your belly several times.  You may watch the monitor to see the picture of your baby during the test.  What happens after the test?  You will probably be able to go home right away.  You most likely will be able to go back to  "your usual activities right away.  Follow-up care is a key part of your treatment and safety. Be sure to make and go to all appointments, and call your doctor if you are having problems. It's also a good idea to keep a list of the medicines you take. Ask your doctor when you can expect to have your test results.  Where can you learn more?  Go to https://www.[employer]/patiented  Enter Y671 in the search box to learn more about \"Second-Trimester Fetal Ultrasound: About This Test.\"  Current as of: July 11, 2023               Content Version: 13.8    2484-8356 [email or web address].   Care instructions adapted under license by your healthcare professional. If you have questions about a medical condition or this instruction, always ask your healthcare professional. [email or web address] disclaims any warranty or liability for your use of this information.      During Pregnancy: Exercises  Introduction  Here are some examples of exercises to do during your pregnancy. Start each exercise slowly. Ease off the exercise if you start to have pain.  Talk to your doctor about when you can start these exercises and which ones will work best for you.  How to do the exercises  Neck rotation    Sit in a firm chair, or stand tall. If you're standing, keep your feet about hip-width apart.  Keeping your chin level, turn your head to the right and hold for 15 to 30 seconds.  Turn your head to the left and hold for 15 to 30 seconds.  Repeat 2 to 4 times.  Next stretch to the front    Sit in a firm chair, or stand tall. Look straight ahead. If you're standing, keep your feet about hip-width apart.  Slowly bend your head forward without moving your shoulders.  Hold for 15 to 30 seconds, then return to your starting position.  Repeat 2 to 4 times.  Back press    Place your feet 10 to 12 inches from the wall.  Rest your back flat against the wall and slide down the wall until your knees are slightly bent.  Press your lower back " against the wall by pulling in your stomach muscles.  Hold for 6 seconds, and then relax your stomach muscles and slide back up the wall.  Repeat 8 to 12 times.  Trunk twist (seated)    Sit on the floor with your legs crossed. If that's not comfortable, you can sit on a folded blanket so your buttocks are a few inches off the floor. Or you can sit on a chair with your knees comfortably spread apart and your feet flat on the floor.  Reach your left hand toward your right knee. You can place your right hand at your side for support.  Slowly twist your body (trunk) to your right.  Relax and return to your starting position.  Repeat 2 to 4 times.  Switch your hands and twist to your left.  Repeat 2 to 4 times.  Pelvic rocking    Kneeling on hands and knees, place your hands directly under your shoulders and your knees under your hips.  Breathe in deeply. Tuck your head downward and round your back up, making a curve with your back in the shape of the letter C. Hold this position for a count of 6.  Breathe out slowly and bring your head back up. Relax, keeping your back straight (don't allow it to curve toward the floor). Hold this for a count of 6.  Do this exercise 8 times or to your comfort level.  Pelvic tilt    This exercise strengthens your lower back and pelvis. It is for use during the first 4 months of pregnancy. After this point, lying on your back is not recommended, because it can cause blood flow problems for you and your baby.  Lie on your back.  Keep your knees relaxed.  Tighten your belly and buttocks muscles.  At the same time, gently shift your pelvis upward. This should flatten the curve in your back.  Hold for 6 seconds and then relax.  Gradually increase the number of tilts you do each day, to your comfort level.  Backward stretch    Kneel on hands and knees with your knees 8 to 10 inches apart, hands directly under your shoulders, and arms and back straight.  Keeping your arms straight, slowly lower  your buttocks toward your heels and tuck your head toward your knees. Hold for 15 to 30 seconds.  Slowly return to the kneeling position.  Repeat 2 to 4 times.  Forward bend    Sit comfortably in a chair, with your arms relaxed.  Slowly bend forward, allowing your arms to hang down in front of you. Lean only as far as you can without feeling discomfort or pressure on your belly.  Hold for 15 to 30 seconds and then slowly sit up straight.  Repeat 2 to 4 times or to your comfort level.  Leg lift crawl    Kneeling on hands and knees, place your hands directly under your shoulders and straighten your arms.  Tighten your belly muscles by pulling in your belly button toward your spine. Be sure you continue to breathe normally and do not hold your breath.  Lift your left knee and bring it toward your elbow.  Slowly extend your leg behind you without completely straightening it. Be careful not to let your hip drop down. Avoid arching your back.  Hold your leg behind you for about 6 seconds.  Return to your starting position.  Do the same exercise with your other leg.  Repeat 8 to 12 times for each leg.  Tailor sitting    Sit on the floor.  Bring your feet close to your body while crossing your ankles.  Hold this position for as long as you are comfortable.  Tailor stretching    Sit on the floor with your back straight, legs about 12 inches apart, and feet relaxed outward.  Stretch your hands forward toward your left foot, then sit up.  Stretch your hands straight forward, then sit up.  Stretch your hands forward toward your right foot, then sit up.  Hold each stretch for 15 to 30 seconds.  Repeat 2 to 4 times.  Follow-up care is a key part of your treatment and safety. Be sure to make and go to all appointments, and call your doctor if you are having problems. It's also a good idea to know your test results and keep a list of the medicines you take.  Current as of: July 11, 2023               Content Version: 13.8     9311-6029 [employer].   Care instructions adapted under license by your healthcare professional. If you have questions about a medical condition or this instruction, always ask your healthcare professional. Healthwise, [employer] disclaims any warranty or liability for your use of this information.

## 2024-02-06 ENCOUNTER — LAB (OUTPATIENT)
Dept: LAB | Facility: CLINIC | Age: 34
End: 2024-02-06
Attending: ADVANCED PRACTICE MIDWIFE
Payer: COMMERCIAL

## 2024-02-06 ENCOUNTER — PRENATAL OFFICE VISIT (OUTPATIENT)
Dept: OBGYN | Facility: CLINIC | Age: 34
End: 2024-02-06
Attending: ADVANCED PRACTICE MIDWIFE
Payer: COMMERCIAL

## 2024-02-06 VITALS
WEIGHT: 151 LBS | SYSTOLIC BLOOD PRESSURE: 113 MMHG | HEART RATE: 83 BPM | BODY MASS INDEX: 26.75 KG/M2 | DIASTOLIC BLOOD PRESSURE: 77 MMHG | HEIGHT: 63 IN

## 2024-02-06 DIAGNOSIS — O26.892 HEARTBURN DURING PREGNANCY IN SECOND TRIMESTER: ICD-10-CM

## 2024-02-06 DIAGNOSIS — O09.529 HIGH-RISK PREGNANCY, ELDERLY MULTIGRAVIDA, UNSPECIFIED TRIMESTER: ICD-10-CM

## 2024-02-06 DIAGNOSIS — O09.529 HIGH-RISK PREGNANCY, ELDERLY MULTIGRAVIDA, UNSPECIFIED TRIMESTER: Primary | ICD-10-CM

## 2024-02-06 DIAGNOSIS — R12 HEARTBURN DURING PREGNANCY IN SECOND TRIMESTER: ICD-10-CM

## 2024-02-06 LAB
C TRACH DNA SPEC QL NAA+PROBE: NEGATIVE
N GONORRHOEA DNA SPEC QL NAA+PROBE: NEGATIVE

## 2024-02-06 PROCEDURE — 36415 COLL VENOUS BLD VENIPUNCTURE: CPT

## 2024-02-06 PROCEDURE — 87491 CHLMYD TRACH DNA AMP PROBE: CPT | Performed by: ADVANCED PRACTICE MIDWIFE

## 2024-02-06 PROCEDURE — 99213 OFFICE O/P EST LOW 20 MIN: CPT | Performed by: ADVANCED PRACTICE MIDWIFE

## 2024-02-06 PROCEDURE — 99207 PR PRENATAL VISIT: CPT | Performed by: ADVANCED PRACTICE MIDWIFE

## 2024-02-06 PROCEDURE — 81511 FTL CGEN ABNOR FOUR ANAL: CPT

## 2024-02-06 PROCEDURE — 87591 N.GONORRHOEAE DNA AMP PROB: CPT | Performed by: ADVANCED PRACTICE MIDWIFE

## 2024-02-06 RX ORDER — OMEPRAZOLE 10 MG/1
20 CAPSULE, DELAYED RELEASE ORAL DAILY
Qty: 60 CAPSULE | Refills: 1 | Status: ON HOLD | OUTPATIENT
Start: 2024-02-06 | End: 2024-06-25

## 2024-02-06 NOTE — PROGRESS NOTES
"Subjective:     34 year old  at 17w0d presents for a routine prenatal appointment.      Denies vaginal bleeding or leakage of fluid.  Denies contractions or cramping.   Maybe starting to feel some flutters.   No HA, visual changes, RUQ or epigastric pain.     The patient presents with the following concerns:   - Interested in omeprazole. Been experiencing heart burn frequently, occasional vomiting. Losing appetite with anticipation of heart burn and the food coming back up.   - Had cerclage placed , reports minimal cramping after, has since resolved.  - Agreeable to GC/CT testing    Level II US scheduled 24.  Offered QS, pt accepted.  Normal NT.    Objective:  Vitals:    24 0826   BP: 113/77   Pulse: 83   Weight: 68.5 kg (151 lb)   Height: 1.6 m (5' 3\")   See OB flowsheet    Assessment/Plan:  Orders Placed This Encounter   Procedures    Maternal Quad Marker 2nd Trimester    Neisseria gonorrhoeae PCR    Chlamydia trachomatis PCR     - Reviewed total weight gain, encouraged continued healthy diet and exercise.      - Reviewed why/how to contact provider.   - Patient education/orders or handouts today: PTL signs/symptoms, Quad screen, fetal movement, level 2 u/s scheduled, and PTL handout  - Reviewed relief measures for heartburn. Rx sent for patient preference.  - GC/CT and QS collected   - Return to clinic in 4 weeks and prn if questions or concerns.     I, Reuben Bryan, am serving as a scribe; to document services personally performed by Justice Cosby CNM based on data collection and the provider's statements to me.  - AMELIA Lama  The encounter was performed by me and scribed by the student. The scribed note accurately reflects my personal services and decisions made by me. ASHIA Gipson CNM  "

## 2024-02-08 LAB
# FETUSES US: NORMAL
AFP MOM SERPL: 2.2
AFP SERPL-MCNC: 95 NG/ML
AGE - REPORTED: 36.5 YR
CURRENT SMOKER: NO
FAMILY MEMBER DISEASES HX: NO
GA METHOD: NORMAL
GA: NORMAL WK
HCG MOM SERPL: 1.42
HCG SERPL-ACNC: NORMAL IU/L
HX OF HEREDITARY DISORDERS: NO
IDDM PATIENT QL: NO
INHIBIN A MOM SERPL: 0.97
INHIBIN A SERPL-MCNC: 135 PG/ML
INTEGRATED SCN PATIENT-IMP: NORMAL
PATHOLOGY STUDY: NORMAL
SPECIMEN DRAWN SERPL: NORMAL
U ESTRIOL MOM SERPL: 1.25
U ESTRIOL SERPL-MCNC: 1.73 NG/ML

## 2024-02-14 ENCOUNTER — OFFICE VISIT (OUTPATIENT)
Dept: MATERNAL FETAL MEDICINE | Facility: CLINIC | Age: 34
End: 2024-02-14
Attending: OBSTETRICS & GYNECOLOGY
Payer: COMMERCIAL

## 2024-02-14 ENCOUNTER — HOSPITAL ENCOUNTER (OUTPATIENT)
Dept: ULTRASOUND IMAGING | Facility: CLINIC | Age: 34
Discharge: HOME | End: 2024-02-14
Attending: OBSTETRICS & GYNECOLOGY
Payer: COMMERCIAL

## 2024-02-14 DIAGNOSIS — Z87.59 HISTORY OF IUFD: ICD-10-CM

## 2024-02-14 DIAGNOSIS — O09.299 HX OF CERVICAL INCOMPETENCE IN PREGNANCY, CURRENTLY PREGNANT: Primary | ICD-10-CM

## 2024-02-14 DIAGNOSIS — O09.521 MULTIGRAVIDA OF ADVANCED MATERNAL AGE IN FIRST TRIMESTER: ICD-10-CM

## 2024-02-14 DIAGNOSIS — O09.522 MULTIGRAVIDA OF ADVANCED MATERNAL AGE IN SECOND TRIMESTER: ICD-10-CM

## 2024-02-14 DIAGNOSIS — O09.299 HX OF CERVICAL INCOMPETENCE IN PREGNANCY, CURRENTLY PREGNANT: ICD-10-CM

## 2024-02-14 PROCEDURE — 76811 OB US DETAILED SNGL FETUS: CPT | Mod: 26 | Performed by: OBSTETRICS & GYNECOLOGY

## 2024-02-14 PROCEDURE — 99213 OFFICE O/P EST LOW 20 MIN: CPT | Mod: 25 | Performed by: OBSTETRICS & GYNECOLOGY

## 2024-02-14 PROCEDURE — 76811 OB US DETAILED SNGL FETUS: CPT

## 2024-02-15 NOTE — PROGRESS NOTES
"Please see \"Imaging\" tab under \"Chart Review\" for details of today's visit.    Jacky Aguilar    "

## 2024-02-18 ENCOUNTER — HEALTH MAINTENANCE LETTER (OUTPATIENT)
Age: 34
End: 2024-02-18

## 2024-03-04 NOTE — PATIENT INSTRUCTIONS
"Thank you for trusting us with your care!     If you need to contact us for questions about:  Symptoms, Scheduling & Medical Questions; Non-urgent (2-3 day response) gM message, Urgent (needing response today) [phone] (if after 3:30pm next day response)   Prescriptions: Please call your Pharmacy   Billing: Hillary [phone] or [facility]:[phone]    Weeks 18 to 22 of Your Pregnancy: Care Instructions  At this stage you may find that your nausea and fatigue are gone. You may feel better overall and have more energy. But you might now also have some new discomforts, like sleep problems or leg cramps.    You may start to feel your baby move. These movements can feel like butterflies or bubbles.   Babies at this stage can now suck their thumbs.     Get some exercise every day.  And avoid caffeine late in the day.     Take a warm shower or bath before bed.  Try relaxation exercises to calm your mind and body.     Use extra pillows.  They can help you get comfortable.     Don't use sleeping pills or alcohol.  They could harm your baby.     For leg cramps, stretch and apply heat.  A warm bath, leg warmers, a heating pad, or a hot water bottle can help with muscle aches.   Stretches for leg cramps    Straighten your leg and bend your foot (flex your ankle) slowly upward, toward your knee. Bend your toes up and down.   Stand on a flat surface. Stretch your toes upward. For balance, hold on to the wall or something stable. If it feels okay, take small steps walking on your heels.   Follow-up care is a key part of your treatment and safety. Be sure to make and go to all appointments, and call your doctor if you are having problems. It's also a good idea to know your test results and keep a list of the medicines you take.  Where can you learn more?  Go to [email or web address].net/patiented  Enter W603 in the search box to learn more about \"Weeks 18 to 22 of Your Pregnancy: Care Instructions.\"  Current as " of: July 11, 2023               Content Version: 13.8    0128-9087 [employer].   Care instructions adapted under license by your healthcare professional. If you have questions about a medical condition or this instruction, always ask your healthcare professional. [employer] disclaims any warranty or liability for your use of this information.

## 2024-03-05 ENCOUNTER — PRENATAL OFFICE VISIT (OUTPATIENT)
Dept: OBGYN | Facility: CLINIC | Age: 34
End: 2024-03-05
Attending: ADVANCED PRACTICE MIDWIFE
Payer: COMMERCIAL

## 2024-03-05 VITALS
WEIGHT: 157 LBS | DIASTOLIC BLOOD PRESSURE: 76 MMHG | HEART RATE: 89 BPM | BODY MASS INDEX: 27.82 KG/M2 | SYSTOLIC BLOOD PRESSURE: 113 MMHG | HEIGHT: 63 IN

## 2024-03-05 DIAGNOSIS — O26.892 HEARTBURN DURING PREGNANCY IN SECOND TRIMESTER: ICD-10-CM

## 2024-03-05 DIAGNOSIS — R12 HEARTBURN DURING PREGNANCY IN SECOND TRIMESTER: ICD-10-CM

## 2024-03-05 DIAGNOSIS — E55.9 VITAMIN D DEFICIENCY: Chronic | ICD-10-CM

## 2024-03-05 DIAGNOSIS — O09.529 HIGH-RISK PREGNANCY, ELDERLY MULTIGRAVIDA, UNSPECIFIED TRIMESTER: Primary | ICD-10-CM

## 2024-03-05 DIAGNOSIS — M54.31 RIGHT SCIATIC NERVE PAIN: ICD-10-CM

## 2024-03-05 PROCEDURE — 99207 PR PRENATAL VISIT: CPT | Performed by: ADVANCED PRACTICE MIDWIFE

## 2024-03-05 PROCEDURE — 99214 OFFICE O/P EST MOD 30 MIN: CPT | Performed by: ADVANCED PRACTICE MIDWIFE

## 2024-03-05 RX ORDER — FERROUS GLUCONATE 324(38)MG
324 TABLET ORAL
Qty: 90 TABLET | Refills: 3 | Status: SHIPPED | OUTPATIENT
Start: 2024-03-05

## 2024-03-05 RX ORDER — LANOLIN ALCOHOL/MO/W.PET/CERES
1000 CREAM (GRAM) TOPICAL DAILY
Qty: 90 TABLET | Refills: 3 | Status: SHIPPED | OUTPATIENT
Start: 2024-03-05 | End: 2024-05-12

## 2024-03-05 RX ORDER — MULTIVIT WITH MINERALS/LUTEIN
250 TABLET ORAL DAILY
Qty: 90 TABLET | Refills: 3 | Status: SHIPPED | OUTPATIENT
Start: 2024-03-05

## 2024-03-05 NOTE — PROGRESS NOTES
"Subjective:      34 year old  at 21w0d presents for a routine prenatal appointment.       History of cervical insufficiency. History of term IUFD followed by postpartum preeclampsia with severe features. History of recurrent late first trimester loss:   growth US every 4 weeks, then weekly BPPs starting at 32 weeks (at S), delivery at 38    24: 18/1 weeks Posterior placenta, no previa, EFW 44%, AC 40%, cervix 2.8cm, cerclage in place. Denies any vaginal bleeding    Reviewed IOB labs:   Urine culture: No growth. Immune to Varicella, Rubella, Non-immune to Hep B: , , due for third dose of Hep B earliest date 24 and overdue date is 24  Screened negative for HIV, syphilis, Hep B, Hep C   Bloodtype: O pos, antibody screen negative, Hemoglobin 11.1  Vitamin D level: 18, currently supplementing with vitamin D  GC/CT: neg/neg  Sciatic right  Heart burn: has started Omeprazole once a day, feels like her heartburn returns in the afternoon    No vaginal bleeding or leakage of fluid. No contractions or cramping.   Positive fetal movement.       No HA, visual changes, RUQ or epigastric pain.  Concerns: Increase amount of fatigue and SOB. Nausea and vomiting has much improved. She is taking ASA daily and vitamin D daily 5000IU. Not taking a prenatal. Does still have right sciatic pain continuing, no changes in severity. Heart burn is improved, but notices heartburn symptoms are less controlled in the evening. Last hgb 11.1 in first trimester.     Objective:  Vitals:    24 0813   BP: 113/76   Pulse: 89   Weight: 71.2 kg (157 lb)   Height: 1.6 m (5' 3\")   See OB flowsheet    Assessment/Plan  (O09.529) High-risk pregnancy, elderly multigravida, unspecified trimester  (primary encounter diagnosis)    Orders Placed This Encounter   Procedures    US OB Follow Up >14 Weeks     Orders Placed This Encounter   Medications    ferrous gluconate (FERGON) 324 (38 Fe) MG tablet     Sig: Take 1 tablet (324 " mg) by mouth daily (with breakfast)     Dispense:  90 tablet     Refill:  3    vitamin C (ASCORBIC ACID) 250 MG tablet     Sig: Take 1 tablet (250 mg) by mouth daily     Dispense:  90 tablet     Refill:  3    cyanocobalamin (VITAMIN B-12) 1000 MCG tablet     Sig: Take 1 tablet (1,000 mcg) by mouth daily     Dispense:  90 tablet     Refill:  3     -Growth US scheduled for 24 weeks, will plan repeat US at 28 weeks  - Start taking iron w/ vitamin and B12  - Change how your taking omeprazole, take 10mg BID may increase to 20mg if necessary  - Continue taking ASA and D3  - PT consult placed for right sciatic pain  - Interested in birth control Mirena IUD, Copper IUD and Nexplanon information packets given  - Growth US scheduled at 24 weeks   - Reviewed MARIBEL and CDC (Hep B series does not need to be restarted and there needs to be at least 8 weeks in between 2nd and 3rd dose) needs 3rd HEP B vaccine at next appointment or by 4/24/24     Updated individualized prenatal care plan checklist on problemlist, reviewed relevant lab results   Return to clinic in 3 weeks and prn if questions or concerns.     I, (Angela Fagan), completed the PFSH and ROS. I then acted as a scribe for Lupe Buchanan CNM, for the remainder of the visit.  Lupe Buchanan CNM on 3/5/2024 at 9:01 AM    I agree with the PFSH and ROS as completed by the ELIAS Student, except for changes made by me.  The remainder of the encounter was performed by me and scribed by the WHNP Student.  The scribed note accurately reflects my personal services and decisions made by me.  Lupe Buchanan CNM

## 2024-03-26 NOTE — PATIENT INSTRUCTIONS
Thank you for trusting us with your care!     If you need to contact us for questions about:  Symptoms, Scheduling & Medical Questions; Non-urgent (2-3 day response) Ahmetjoe message, Urgent (needing response today) [phone] (if after 3:30pm next day response)   Prescriptions: Please call your Pharmacy   Billing: Hillary [phone] or [facility]:[phone]    Weeks 22 to 26 of Your Pregnancy: Care Instructions  Your baby's lungs are getting ready for breathing. Your baby may respond to your voice. Your baby likely turns less, and kicks or jerks more. Jerking may mean that your baby has hiccups.    Think about taking childbirth classes. And start to think about whether you want to have pain medicine during labor.    At your next doctor visit, you may be tested for anemia and for high blood sugar that first occurs during pregnancy (gestational diabetes). These conditions can cause problems for you and your baby.    To ease discomfort, such as back pain    Change your position often. Try not to sit or stand for too long.  Get some exercise. Things like walking or stretching may help.  Try using a heating pad or cold pack.    To ease or reduce swelling in your feet, ankles, hands, and fingers    Take off your rings.  Avoid high-sodium foods, such as potato chips.  Prop up your feet, and sleep with pillows under your feet.  Try to avoid standing for long periods of time.  Do not wear tight shoes.  Wear support stockings.  Kegel exercises to prevent urine from leaking    Squeeze your muscles as if you were trying not to pass gas. Your belly, legs, and buttocks shouldn't move. Hold the squeeze for 3 seconds, then relax for 5 to 10 seconds.    Add 1 second each week until you can squeeze for 10 seconds. Repeat the exercise 10 times a session. Do 3 to 8 sessions a day. If these exercises cause you pain, stop doing them and talk with your doctor.  Follow-up care is a key part of your treatment and safety. Be sure to  "make and go to all appointments, and call your doctor if you are having problems. It's also a good idea to know your test results and keep a list of the medicines you take.  Where can you learn more?  Go to https://www.Catapooolt.net/patiented  Enter G264 in the search box to learn more about \"Weeks 22 to 26 of Your Pregnancy: Care Instructions.\"  Current as of: July 10, 2023               Content Version: 14.0    9547-6512 [employer].   Care instructions adapted under license by your healthcare professional. If you have questions about a medical condition or this instruction, always ask your healthcare professional. [employer] disclaims any warranty or liability for your use of this information.      Round Ligament Pain: Care Instructions  Overview     Round ligament pain is a common pain during pregnancy. You may feel a sharp brief pain on one or both sides of your belly. It may go down into your groin. It's usually felt for the first time during the second trimester. This pain is a normal part of pregnancy.  Your uterus is supported by two ligaments that go from the top and sides of the uterus to the bones of the pelvis. These are the round ligaments. As your uterus grows, these ligaments stretch and tighten with your movements. This may be the cause of the pain. You may find that certain activities seem to cause pain. If you can, avoid those activities.  Your doctor can usually diagnose round ligament pain from your symptoms and an exam. If you have bleeding or other symptoms, your doctor may also do an imaging test, such as an ultrasound. Your doctor may suggest some things that can help the pain, such as rest and strengthening exercises.  Follow-up care is a key part of your treatment and safety. Be sure to make and go to all appointments, and call your doctor if you are having problems. It's also a good idea to know your test results and keep a list of the medicines you take.  How " "can you care for yourself at home?  If certain movements seem to trigger belly pain, see if you can avoid them or try moving more slowly so the ligaments don't stretch quickly.  Stay active. If your doctor says it's okay, try moderate exercise. You might try things like swimming, walking, or stretching. Ask your doctor about strengthening and stretching exercises that may help.  Try a heating pad or cold pack on the area. A warm bath or shower may also help.  Rest when you can.  Ask your doctor about taking acetaminophen for pain. Be safe with medicines. Read and follow all instructions on the label.  Try a belly support band. Some people find that these can help.  When should you call for help?   Call your doctor now or seek immediate medical care if:    You think you might be in labor.     You have new or worse pain.   Watch closely for changes in your health, and be sure to contact your doctor if you have any problems.  Where can you learn more?  Go to https://www.[employer].net/patiented  Enter R110 in the search box to learn more about \"Round Ligament Pain: Care Instructions.\"  Current as of: July 10, 2023               Content Version: 14.0    3179-5714 [employer].   Care instructions adapted under license by your healthcare professional. If you have questions about a medical condition or this instruction, always ask your healthcare professional. [employer] disclaims any warranty or liability for your use of this information.      Leg and Ankle Edema: Care Instructions  Your Care Instructions  Swelling in the legs, ankles, and feet is called edema. It is common after you sit or stand for a while. Long plane flights or car rides often cause swelling in the legs and feet. You may also have swelling if you have to stand for long periods of time at your job. Problems with the veins in the legs (varicose veins) and changes in hormones can also cause swelling. Sometimes the swelling in " the ankles and feet is caused by a more serious problem, such as heart failure, infection, blood clots, or liver or kidney disease.  Follow-up care is a key part of your treatment and safety. Be sure to make and go to all appointments, and call your doctor if you are having problems. It's also a good idea to know your test results and keep a list of the medicines you take.  How can you care for yourself at home?  If your doctor gave you medicine, take it as prescribed. Call your doctor if you think you are having a problem with your medicine.  Whenever you are resting, raise your legs up. Try to keep the swollen area higher than the level of your heart.  Take breaks from standing or sitting in one position.  Walk around to increase the blood flow in your lower legs.  Move your feet and ankles often while you stand, or tighten and relax your leg muscles.  Wear support stockings. Put them on in the morning, before swelling gets worse.  Eat a balanced diet. Lose weight if you need to.  Limit the amount of salt (sodium) in your diet. Salt holds fluid in the body and may increase swelling.  When should you call for help?   Call 911 anytime you think you may need emergency care. For example, call if:    You have symptoms of a blood clot in your lung (called a pulmonary embolism). These may include:  Sudden chest pain.  Trouble breathing.  Coughing up blood.   Call your doctor now or seek immediate medical care if:    You have signs of a blood clot, such as:  Pain in your calf, back of the knee, thigh, or groin.  Redness and swelling in your leg or groin.     You have symptoms of infection, such as:  Increased pain, swelling, warmth, or redness.  Red streaks or pus.  A fever.   Watch closely for changes in your health, and be sure to contact your doctor if:    Your swelling is getting worse.     You have new or worsening pain in your legs.     You do not get better as expected.   Where can you learn more?  Go to  "https://www.[employer].net/patiented  Enter N696 in the search box to learn more about \"Leg and Ankle Edema: Care Instructions.\"  Current as of: August 6, 2023               Content Version: 14.0    9940-2584 [employer].   Care instructions adapted under license by your healthcare professional. If you have questions about a medical condition or this instruction, always ask your healthcare professional. [employer] disclaims any warranty or liability for your use of this information.      Back Pain During Pregnancy: Care Instructions  Overview     Back pain has many possible causes. It is often caused by problems with muscles and ligaments in your back. The extra weight during pregnancy can put stress on your back. Moving, lifting, standing, sitting, or sleeping in an awkward way also can strain your back. Back pain can also be a sign of labor. Although it may hurt a lot, back pain often improves on its own. Use good home treatment, and take care not to stress your back.  Follow-up care is a key part of your treatment and safety. Be sure to make and go to all appointments, and call your doctor if you are having problems. It's also a good idea to know your test results and keep a list of the medicines you take.  How can you care for yourself at home?  Ask your doctor about taking acetaminophen (Tylenol) for pain. Do not take aspirin, ibuprofen (Advil, Motrin), or naproxen (Aleve).  Do not take two or more pain medicines at the same time unless the doctor told you to. Many pain medicines have acetaminophen, which is Tylenol. Too much acetaminophen (Tylenol) can be harmful.  Try heat or ice, whichever feels better. Apply it for 10 to 20 minutes at a time, several times a day. Put a thin cloth between the heat or ice and your skin. A warm bath or shower may also help.  Lie on your left side with your knees and hips bent and a pillow between your legs. This reduces stress on your back.  Try to " "avoid standing or sitting for too long or heavy lifting. If your job requires lots of standing, sitting, or heavy lifting, ask your employer if you can take short breaks or adjust your work activity. You can ask your doctor to write a note requesting these breaks or other adjustments.  Wear supportive, low-heeled shoes. Avoid flat or high-heeled shoes.  Try a belly support band. Supporting your belly can take the strain off your back.  Ask your doctor about how much exercise you can do. Regular exercise such as swimming, water aerobics, walking, or stretching can help with back pain.  Ask your doctor about exercises to stretch, strengthen, and relax your muscles. Your doctor may recommend physical therapy.  When should you call for help?   Call your doctor now or seek immediate medical care if:    You've been having regular contractions for an hour. This means that you've had at least 6 contractions within 1 hour, even after you change your position and drink fluids.     You have new numbness in your buttocks, genital or rectal areas, or legs.     You have a new loss of bowel or bladder control.     You have symptoms of a urinary tract infection. These may include:  Pain or burning when you urinate.  A frequent need to urinate without being able to pass much urine.  Pain in the flank, which is just below the rib cage and above the waist on either side of the back.  Blood in your urine.   Watch closely for changes in your health, and be sure to contact your doctor if:    You do not get better as expected.   Where can you learn more?  Go to https://www.iJigg.com.net/patiented  Enter C696 in the search box to learn more about \"Back Pain During Pregnancy: Care Instructions.\"  Current as of: July 10, 2023               Content Version: 14.0    4824-1703 [employer].   Care instructions adapted under license by your healthcare professional. If you have questions about a medical condition or this instruction, " always ask your healthcare professional. Healthwise, [employer] disclaims any warranty or liability for your use of this information.       Labor: Care Instructions  Overview      labor is the start of labor between 20 and 36 weeks of pregnancy. Most babies are born at 37 to 42 weeks of pregnancy. In labor, the uterus contracts to open the cervix. This is the first stage of childbirth.  labor can be caused by a problem with the baby, the mother, or both. Often the cause is not known.  In some cases, doctors use medicines to try to delay labor until 34 or more weeks of pregnancy. By this time, a baby has grown enough so that problems are not likely. In some cases--such as with a serious infection--it is healthier for the baby to be born early. Your treatment will depend on how far along you are in your pregnancy and on your health and your baby's health.  Follow-up care is a key part of your treatment and safety. Be sure to make and go to all appointments, and call your doctor if you are having problems. It's also a good idea to know your test results and keep a list of the medicines you take.  How can you care for yourself at home?  If your doctor prescribed medicines, take them exactly as directed. Call your doctor if you think you are having a problem with your medicine.  Rest until your doctor advises you about activity.  Do not have sexual intercourse unless your doctor says it is safe.  Use sanitary pads if you have vaginal bleeding. Using pads makes it easier to monitor your bleeding.  Do not smoke or allow others to smoke around you. If you need help quitting, talk to your doctor about stop-smoking programs and medicines. These can increase your chances of quitting for good.  When should you call for help?   Call 911  anytime you think you may need emergency care. For example, call if:    You passed out (lost consciousness).     You have a seizure.     You have severe vaginal bleeding.  "    You have severe pain in your belly or pelvis that doesn't get better between contractions.     You have had fluid gushing or leaking from your vagina and you know or think the umbilical cord is bulging into your vagina. If this happens, immediately get down on your knees so your rear end (buttocks) is higher than your head. This will decrease the pressure on the cord until help arrives.   Call your doctor now or seek immediate medical care if:    You have signs of preeclampsia, such as:  Sudden swelling of your face, hands, or feet.  New vision problems (such as dimness, blurring, or seeing spots).  A severe headache.     You have any vaginal bleeding.     You have belly pain or cramping.     You have a fever.     You have had regular contractions (with or without pain) for an hour. This means that you have 6 or more within 1 hour after you change your position and drink fluids.     You have a sudden release of fluid from the vagina.     You have low back pain or pelvic pressure that does not go away.     You notice that your baby has stopped moving or is moving much less than normal.   Watch closely for changes in your health, and be sure to contact your doctor if you have any problems.  Where can you learn more?  Go to https://www.[employer]ical.net/patiented  Enter Q400 in the search box to learn more about \" Labor: Care Instructions.\"  Current as of: July 10, 2023               Content Version: 14.0    9261-0816 [employer].   Care instructions adapted under license by your healthcare professional. If you have questions about a medical condition or this instruction, always ask your healthcare professional. [employer] disclaims any warranty or liability for your use of this information.      Weeks 22 to 26 of Your Pregnancy: Care Instructions  Your baby's lungs are getting ready for breathing. Your baby may respond to your voice. Your baby likely turns less, and kicks or jerks " "more. Jerking may mean that your baby has hiccups.    Think about taking childbirth classes. And start to think about whether you want to have pain medicine during labor.    At your next doctor visit, you may be tested for anemia and for high blood sugar that first occurs during pregnancy (gestational diabetes). These conditions can cause problems for you and your baby.    To ease discomfort, such as back pain    Change your position often. Try not to sit or stand for too long.  Get some exercise. Things like walking or stretching may help.  Try using a heating pad or cold pack.    To ease or reduce swelling in your feet, ankles, hands, and fingers    Take off your rings.  Avoid high-sodium foods, such as potato chips.  Prop up your feet, and sleep with pillows under your feet.  Try to avoid standing for long periods of time.  Do not wear tight shoes.  Wear support stockings.  Kegel exercises to prevent urine from leaking    Squeeze your muscles as if you were trying not to pass gas. Your belly, legs, and buttocks shouldn't move. Hold the squeeze for 3 seconds, then relax for 5 to 10 seconds.    Add 1 second each week until you can squeeze for 10 seconds. Repeat the exercise 10 times a session. Do 3 to 8 sessions a day. If these exercises cause you pain, stop doing them and talk with your doctor.  Follow-up care is a key part of your treatment and safety. Be sure to make and go to all appointments, and call your doctor if you are having problems. It's also a good idea to know your test results and keep a list of the medicines you take.  Where can you learn more?  Go to https://www.[employer].net/patiented  Enter G264 in the search box to learn more about \"Weeks 22 to 26 of Your Pregnancy: Care Instructions.\"  Current as of: July 10, 2023               Content Version: 14.0    3321-5357 [employer]wise[employer].   Care instructions adapted under license by your healthcare professional. If you have questions about a " medical condition or this instruction, always ask your healthcare professional. Healthwise, [facility] disclaims any warranty or liability for your use of this information.      Learning About Screening for Gestational Diabetes  What is gestational diabetes screening?     Screening for gestational diabetes is a way to look for high blood sugar during pregnancy. You drink some very sweet liquid. Then you have a blood test to see how your body uses sugar (glucose).  How is gestational diabetes screening done?  Screening for gestational diabetes may be done in a couple of ways.  Two-part screening.  Part one (glucose challenge test): A blood sample is taken after you drink a liquid that contains sugar (glucose). You don't need to stop eating or drinking before this test. If the test shows that you don't have a lot of sugar in your blood, you don't have gestational diabetes.  Part two (oral glucose tolerance test, or OGTT): If the first test shows a lot of sugar in your blood, then you may have an OGTT. You can't eat or drink for at least 8 hours before this test. A blood sample is taken, then you drink a sweet liquid. You have more blood tests after 1 to 3 hours. If the OGTT shows that you have a lot of sugar in your blood, you may have gestational diabetes.  One-part screening.  Sometimes doctors use the OGTT on its own. If the test shows that you don't have a lot of sugar in your blood, you don't have gestational diabetes. If you do have a lot of sugar in your blood, you may have the condition.  What are the risks of screening?  Your blood glucose level may drop very low toward the end of the test. If this happens, you may feel weak, hungry, and restless. Tell your doctor if you have these symptoms. The test usually will be stopped.  You may vomit after drinking the sweet liquid. If this happens, you may need to take the test at a later time.  Your doctor may do more glucose tests at other times during your  "pregnancy.  Follow-up care is a key part of your treatment and safety. Be sure to make and go to all appointments, and call your doctor if you are having problems. It's also a good idea to know your test results and keep a list of the medicines you take.  Where can you learn more?  Go to https://www.Pipewise.net/patiented  Enter A472 in the search box to learn more about \"Learning About Screening for Gestational Diabetes.\"  Current as of: October 2, 2023               Content Version: 14.0    7038-3233 [employer].   Care instructions adapted under license by your healthcare professional. If you have questions about a medical condition or this instruction, always ask your healthcare professional. [employer] disclaims any warranty or liability for your use of this information.      You have been provided the My Labor and Birth Wishes document.  Please review at home and bring to your next prenatal visit. Bring this sheet to the hospital for your birth. Give copies to your care team members and support person.   Additional copies can be found here:  [email or web address]  "

## 2024-03-27 ENCOUNTER — PRENATAL OFFICE VISIT (OUTPATIENT)
Dept: OBGYN | Facility: CLINIC | Age: 34
End: 2024-03-27
Attending: ADVANCED PRACTICE MIDWIFE
Payer: COMMERCIAL

## 2024-03-27 ENCOUNTER — ANCILLARY PROCEDURE (OUTPATIENT)
Dept: ULTRASOUND IMAGING | Facility: CLINIC | Age: 34
End: 2024-03-27
Attending: ADVANCED PRACTICE MIDWIFE
Payer: COMMERCIAL

## 2024-03-27 VITALS
HEART RATE: 92 BPM | SYSTOLIC BLOOD PRESSURE: 114 MMHG | BODY MASS INDEX: 28.37 KG/M2 | WEIGHT: 160.1 LBS | HEIGHT: 63 IN | DIASTOLIC BLOOD PRESSURE: 77 MMHG

## 2024-03-27 DIAGNOSIS — O09.529 HIGH-RISK PREGNANCY, ELDERLY MULTIGRAVIDA, UNSPECIFIED TRIMESTER: Primary | ICD-10-CM

## 2024-03-27 DIAGNOSIS — O09.529 HIGH-RISK PREGNANCY, ELDERLY MULTIGRAVIDA, UNSPECIFIED TRIMESTER: ICD-10-CM

## 2024-03-27 DIAGNOSIS — K59.00 CONSTIPATION, UNSPECIFIED CONSTIPATION TYPE: ICD-10-CM

## 2024-03-27 PROCEDURE — 76816 OB US FOLLOW-UP PER FETUS: CPT | Mod: 26 | Performed by: OBSTETRICS & GYNECOLOGY

## 2024-03-27 PROCEDURE — 99213 OFFICE O/P EST LOW 20 MIN: CPT | Performed by: ADVANCED PRACTICE MIDWIFE

## 2024-03-27 PROCEDURE — 76816 OB US FOLLOW-UP PER FETUS: CPT

## 2024-03-27 PROCEDURE — 99207 PR PRENATAL VISIT: CPT | Performed by: ADVANCED PRACTICE MIDWIFE

## 2024-03-27 RX ORDER — ASPIRIN 81 MG
100 TABLET, DELAYED RELEASE (ENTERIC COATED) ORAL DAILY
Qty: 90 TABLET | Refills: 1 | Status: ON HOLD | OUTPATIENT
Start: 2024-03-27 | End: 2024-06-25

## 2024-03-27 ASSESSMENT — PAIN SCALES - GENERAL: PAINLEVEL: NO PAIN (0)

## 2024-03-27 NOTE — PROGRESS NOTES
"Subjective:      34 year old  at 24w1d presents for a routine prenatal appointment.        Patient concerns: Feeling well overall. Had growth ultrasound today- EFW 53%tile, cephalic, MVP wnl.     Cerclage in place. Denies cramping/contractions, vaginal bleeding, discharge or leakage of fluid.    Experiencing some nausea. No HA, vision changes, ruq/epigastric pain.  Reports +fetal movement.    Objective:  Vitals:    24 1155   BP: 114/77   Pulse: 92   Weight: 72.6 kg (160 lb 1.6 oz)   Height: 1.6 m (5' 3\")       See OB flowsheet    Ultrasound:  Anatomy Scan:  Knight gestation.  Visualized: 4 Chamber Heart, Stomach, Kidneys, and Bladder.  Biometry:  BPD 5.8 cm 23w6d 32%   HC 21.8 cm 23w6d 21%   AC 19.7 cm 24w2d 47%   FL 4.5 cm 24w5d 57%   EFW (lbs/oz) 1 lbs               8ozs       EFW (g) 694 g 53%        Fetal heart rate: 149 bpm  Fetal presentation: Cephalic  Amniotic fluid: 7.1 cm MVP  Placenta: Posterior, no previa, > 2 cm from internal os  Technique: Transabdominal Imaging performed     Impression: AGA, normal MVP, Posterior placenta, Cephalic presentation.  Repeat growth scan in 4 weeks, weekly BPP at 32 weeks.    Assessment/Plan     Encounter Diagnoses   Name Primary?    High-risk pregnancy, elderly multigravida, unspecified trimester Yes    Constipation      Orders Placed This Encounter   Procedures    US OB >14 Weeks Follow Up     Orders Placed This Encounter   Medications    docusate sodium (COLACE) 100 MG tablet     Sig: Take 1 tablet (100 mg) by mouth daily     Dispense:  90 tablet     Refill:  1       - Reviewed total weight gain, encouraged continued healthy diet and exercise.      - Reviewed why/how to contact provider.      Patient education/orders or handouts today:  PTL signs/symptoms and fetal movement    - Reviewed normal growth ultrasound.  Continue q4 week growth ultrasounds, weekly BPPs at 32 weeks. Plans IOL at 38 weeks.  - Plan EOB next visit.    Continue scheduled prenatal " care, RTC in 4 weeks for EOB and prn if questions or concerns.      Vida Westfall, ASHIA, CNM

## 2024-04-26 ENCOUNTER — PRENATAL OFFICE VISIT (OUTPATIENT)
Dept: OBGYN | Facility: CLINIC | Age: 34
End: 2024-04-26
Attending: ADVANCED PRACTICE MIDWIFE
Payer: COMMERCIAL

## 2024-04-26 ENCOUNTER — LAB (OUTPATIENT)
Dept: LAB | Facility: CLINIC | Age: 34
End: 2024-04-26
Attending: ADVANCED PRACTICE MIDWIFE
Payer: COMMERCIAL

## 2024-04-26 ENCOUNTER — ANCILLARY PROCEDURE (OUTPATIENT)
Dept: ULTRASOUND IMAGING | Facility: CLINIC | Age: 34
End: 2024-04-26
Attending: ADVANCED PRACTICE MIDWIFE
Payer: COMMERCIAL

## 2024-04-26 VITALS
WEIGHT: 164.5 LBS | HEART RATE: 85 BPM | DIASTOLIC BLOOD PRESSURE: 78 MMHG | SYSTOLIC BLOOD PRESSURE: 112 MMHG | HEIGHT: 63 IN | BODY MASS INDEX: 29.15 KG/M2

## 2024-04-26 DIAGNOSIS — O09.529 HIGH-RISK PREGNANCY, ELDERLY MULTIGRAVIDA, UNSPECIFIED TRIMESTER: ICD-10-CM

## 2024-04-26 DIAGNOSIS — O09.529 HIGH-RISK PREGNANCY, ELDERLY MULTIGRAVIDA, UNSPECIFIED TRIMESTER: Primary | ICD-10-CM

## 2024-04-26 LAB
BASOPHILS # BLD AUTO: ABNORMAL 10*3/UL
BASOPHILS # BLD MANUAL: 0 10E3/UL (ref 0–0.2)
BASOPHILS NFR BLD AUTO: ABNORMAL %
BASOPHILS NFR BLD MANUAL: 0 %
BURR CELLS BLD QL SMEAR: SLIGHT
EOSINOPHIL # BLD AUTO: ABNORMAL 10*3/UL
EOSINOPHIL # BLD MANUAL: 0 10E3/UL (ref 0–0.7)
EOSINOPHIL NFR BLD AUTO: ABNORMAL %
EOSINOPHIL NFR BLD MANUAL: 0 %
ERYTHROCYTE [DISTWIDTH] IN BLOOD BY AUTOMATED COUNT: 15.1 % (ref 10–15)
GLUCOSE 1H P 50 G GLC PO SERPL-MCNC: 100 MG/DL (ref 70–129)
HCT VFR BLD AUTO: 31.2 % (ref 35–47)
HGB BLD-MCNC: 10.4 G/DL (ref 11.7–15.7)
IMM GRANULOCYTES # BLD: ABNORMAL 10*3/UL
IMM GRANULOCYTES NFR BLD: ABNORMAL %
LYMPHOCYTES # BLD AUTO: ABNORMAL 10*3/UL
LYMPHOCYTES # BLD MANUAL: 1.1 10E3/UL (ref 0.8–5.3)
LYMPHOCYTES NFR BLD AUTO: ABNORMAL %
LYMPHOCYTES NFR BLD MANUAL: 13 %
MCH RBC QN AUTO: 28.4 PG (ref 26.5–33)
MCHC RBC AUTO-ENTMCNC: 33.3 G/DL (ref 31.5–36.5)
MCV RBC AUTO: 85 FL (ref 78–100)
MONOCYTES # BLD AUTO: ABNORMAL 10*3/UL
MONOCYTES # BLD MANUAL: 0.3 10E3/UL (ref 0–1.3)
MONOCYTES NFR BLD AUTO: ABNORMAL %
MONOCYTES NFR BLD MANUAL: 4 %
NEUTROPHILS # BLD AUTO: ABNORMAL 10*3/UL
NEUTROPHILS # BLD MANUAL: 7 10E3/UL (ref 1.6–8.3)
NEUTROPHILS NFR BLD AUTO: ABNORMAL %
NEUTROPHILS NFR BLD MANUAL: 83 %
NRBC # BLD AUTO: 0 10E3/UL
NRBC BLD AUTO-RTO: 0 /100
PLAT MORPH BLD: ABNORMAL
PLATELET # BLD AUTO: 150 10E3/UL (ref 150–450)
POLYCHROMASIA BLD QL SMEAR: SLIGHT
RBC # BLD AUTO: 3.66 10E6/UL (ref 3.8–5.2)
RBC MORPH BLD: ABNORMAL
VIT D+METAB SERPL-MCNC: 43 NG/ML (ref 20–50)
WBC # BLD AUTO: 8.4 10E3/UL (ref 4–11)

## 2024-04-26 PROCEDURE — 36415 COLL VENOUS BLD VENIPUNCTURE: CPT

## 2024-04-26 PROCEDURE — 76816 OB US FOLLOW-UP PER FETUS: CPT | Mod: 26 | Performed by: OBSTETRICS & GYNECOLOGY

## 2024-04-26 PROCEDURE — 82950 GLUCOSE TEST: CPT

## 2024-04-26 PROCEDURE — 86780 TREPONEMA PALLIDUM: CPT

## 2024-04-26 PROCEDURE — 85027 COMPLETE CBC AUTOMATED: CPT

## 2024-04-26 PROCEDURE — 76816 OB US FOLLOW-UP PER FETUS: CPT

## 2024-04-26 PROCEDURE — 85007 BL SMEAR W/DIFF WBC COUNT: CPT

## 2024-04-26 PROCEDURE — 99213 OFFICE O/P EST LOW 20 MIN: CPT | Performed by: ADVANCED PRACTICE MIDWIFE

## 2024-04-26 PROCEDURE — 82306 VITAMIN D 25 HYDROXY: CPT

## 2024-04-26 PROCEDURE — 99207 PR PRENATAL VISIT: CPT | Performed by: ADVANCED PRACTICE MIDWIFE

## 2024-04-26 NOTE — PROGRESS NOTES
Subjective:  34 year old, , 28w3d, presents for prenatal visit.  Reports normal fetal movement. Denies cramping or contractions. Denies leaking of fluid or vaginal bleeding.   The patient presents with the following concerns:    - Starting to feel nervous about labor/delivery and hx of PPH   - Growth us today: EFW 32%, AC 22, MVP 5.83cm, cephalic        5/10/2019     3:13 PM 2019     2:44 PM 2022     3:19 PM   PHQ-9 SCORE   PHQ-9 Total Score 2 1 2     Education completed today includes breast feeding, [facility] hand out, contraception, counting movements, post partum depression, nitrous oxide, and doulas  Birth preferences reviewed: unmedicated. Will ask for pain med if needed.  Planning IOL at 38 weeks.  Labor support:   and considering   [place] Feeding plans: Breastfeed  Contraception planned: considering IUD, reviewed all options.  The following labs were ordered today:    GCT, CBC w platelets, Vitamin D, and Anti-treponema  Water birth consent form was not given.  Blood type:   ABO   Date Value Ref Range Status   2021 O  Final     RH(D)   Date Value Ref Range Status   2021 Pos  Final     Antibody Screen   Date Value Ref Range Status   2024 Negative Negative Final   2021 Neg  Final     Rhogam was not given.  TDAP was given.    A/P:  Encounter Diagnosis   Name Primary?    High-risk pregnancy, elderly multigravida, unspecified trimester Yes     Orders Placed This Encounter   Procedures    US OB Follow Up >14 Weeks    US OB Fetal Biophys Prf wo NST Singls W/Ltd    TDAP VACCINE (Adacel, Boostrix)  [[id number]]    Vitamin D Deficiency    Treponema Abs w Reflex to RPR and Titer    Glucose 1 Hour    CBC with Platelets & Differential     Reviewed plan for TXA and Pitocin PP d/t hx of PPH.   Continue scheduled prenatal care, RTC in 2 weeks    ASHIA Gipson CNM

## 2024-04-26 NOTE — PATIENT INSTRUCTIONS
Thank you for trusting us with your care!     If you need to contact us for questions about:  Symptoms, Scheduling & Medical Questions; Non-urgent (2-3 day response) Mg message, Urgent (needing response today) [phone] (if after 3:30pm next day response)   Prescriptions: Please call your Pharmacy   Billing: Hillary [phone] or [facility]:[phone]

## 2024-04-27 LAB — T PALLIDUM AB SER QL: NONREACTIVE

## 2024-04-29 ENCOUNTER — MYC MEDICAL ADVICE (OUTPATIENT)
Dept: OBGYN | Facility: CLINIC | Age: 34
End: 2024-04-29
Payer: COMMERCIAL

## 2024-04-29 DIAGNOSIS — O09.92 SUPERVISION OF HIGH RISK PREGNANCY IN SECOND TRIMESTER: ICD-10-CM

## 2024-04-30 RX ORDER — LANOLIN ALCOHOL/MO/W.PET/CERES
1000 CREAM (GRAM) TOPICAL DAILY
Qty: 90 TABLET | Refills: 1 | Status: SHIPPED | OUTPATIENT
Start: 2024-04-30

## 2024-05-09 NOTE — PROGRESS NOTES
"Subjective:      34 year old  at 30w3d presentst for a routine prenatal appointment.     Patient concerns: Feeling well overall. Cerclage in place. Has noticed increased thin discharge and occasional itching. Does not think it is amniotic fluid. Notices it is increased in the morning or when she has been sitting/laying down and then stands. Denies dysuria but has some vague urinary symptoms.     Denies cramping/contractions or vaginal bleeding.    Reports +fetal movement.  No HA, vision changes, ruq/epigastric pain.      Per MFM- serial growth US q 4w starting @ 24-26w, weekly BPP @ 32w. Needs to schedule.     Objective:  Vitals:    05/10/24 0910   BP: 110/77   Pulse: 86   Weight: 75.4 kg (166 lb 4.8 oz)   Height: 1.6 m (5' 3\")     See ob flowsheet    Pelvic Exam:  EG/BUS: Normal genital architecture without lesions, erythema or abnormal secretions Bartholin's, Urethra, [place]o's normal   Urethral meatus: normal   Urethra: no masses, tenderness  Bladder: no masses or tenderness     SSE:  cervix visually appears closed, thin milky discharge noted in vaginal vault, neg pooling, neg valsalva, cervix visually appears closed, cerclage     Negative ferning on microscopy     Multiplex vaginal pcr collected.    Assessment/Plan     Encounter Diagnoses   Name Primary?    High-risk pregnancy, elderly multigravida, unspecified trimester Yes    Cervical insufficiency during pregnancy in second trimester, antepartum     Heartburn during pregnancy in second trimester     Right sciatic nerve pain     Vaginal discharge      Orders Placed This Encounter   Procedures    US OB Biophys Single Gestation Measure    US OB Fetal Biophys Prf wo NST Singls W/Ltd    US OB Fetal Biophys Prf wo NST Singls W/Ltd    US OB Fetal Biophys Prf wo NST Singls W/Ltd    US OB Fetal Biophys Prf wo NST Singls W/Ltd    Routine UA with Microscopic    Multiplex Vaginal Panel by PCR    Urine Culture Aerobic Bacterial     - Reviewed total weight gain, " encouraged continued healthy diet and exercise.  .  Reviewed importance of daily fetal kick count and why/how to contact provider.    - Reviewed why/how to contact provider if headache/visual changes/RUQ or epigastric pain, decreased fetal movement, vaginal bleeding, leakage of fluid or more than 4 contractions in an hour.     Patient education/orders or handouts today:  PTL signs/symptoms    - Exam negative for PPROM.   - Multiplex vaginal PCR collected.  - UA with microscopic and UC sent.  - Reviewed recommended  surveillance by MFM.  Needs growth us q4 weeks, BPPs weekly starting at 32 weeks. Orders placed, will schedule.   Recommended q2 weeks CAS appts until 36 weeks, then weekly.     Continue scheduled prenatal care, RTC in 2 weeks and prn if questions or concerns.      ASHIA Grubbs, CNM

## 2024-05-10 ENCOUNTER — PRENATAL OFFICE VISIT (OUTPATIENT)
Dept: OBGYN | Facility: CLINIC | Age: 34
End: 2024-05-10
Attending: ADVANCED PRACTICE MIDWIFE
Payer: COMMERCIAL

## 2024-05-10 VITALS
WEIGHT: 166.3 LBS | HEART RATE: 86 BPM | SYSTOLIC BLOOD PRESSURE: 110 MMHG | BODY MASS INDEX: 29.46 KG/M2 | DIASTOLIC BLOOD PRESSURE: 77 MMHG | HEIGHT: 63 IN

## 2024-05-10 DIAGNOSIS — R12 HEARTBURN DURING PREGNANCY IN SECOND TRIMESTER: ICD-10-CM

## 2024-05-10 DIAGNOSIS — O09.529 HIGH-RISK PREGNANCY, ELDERLY MULTIGRAVIDA, UNSPECIFIED TRIMESTER: Primary | ICD-10-CM

## 2024-05-10 DIAGNOSIS — O34.32 CERVICAL INSUFFICIENCY DURING PREGNANCY IN SECOND TRIMESTER, ANTEPARTUM: ICD-10-CM

## 2024-05-10 DIAGNOSIS — N89.8 VAGINAL DISCHARGE: ICD-10-CM

## 2024-05-10 DIAGNOSIS — M54.31 RIGHT SCIATIC NERVE PAIN: ICD-10-CM

## 2024-05-10 DIAGNOSIS — O26.892 HEARTBURN DURING PREGNANCY IN SECOND TRIMESTER: ICD-10-CM

## 2024-05-10 LAB
ALBUMIN UR-MCNC: 30 MG/DL
APPEARANCE UR: CLEAR
BACTERIAL VAGINOSIS VAG-IMP: NEGATIVE
BILIRUB UR QL STRIP: NEGATIVE
CANDIDA DNA VAG QL NAA+PROBE: DETECTED
CANDIDA GLABRATA / CANDIDA KRUSEI DNA: NOT DETECTED
COLOR UR AUTO: YELLOW
GLUCOSE UR STRIP-MCNC: NEGATIVE MG/DL
HGB UR QL STRIP: NEGATIVE
KETONES UR STRIP-MCNC: NEGATIVE MG/DL
LEUKOCYTE ESTERASE UR QL STRIP: ABNORMAL
MUCOUS THREADS #/AREA URNS LPF: PRESENT /LPF
NITRATE UR QL: NEGATIVE
PH UR STRIP: 6 [PH] (ref 5–7)
RBC URINE: 1 /HPF
SP GR UR STRIP: 1.03 (ref 1–1.03)
SQUAMOUS EPITHELIAL: 4 /HPF
T VAGINALIS DNA VAG QL NAA+PROBE: NOT DETECTED
TRANSITIONAL EPI: <1 /HPF
UROBILINOGEN UR STRIP-MCNC: 4 MG/DL
WBC URINE: 4 /HPF

## 2024-05-10 PROCEDURE — 87086 URINE CULTURE/COLONY COUNT: CPT | Performed by: ADVANCED PRACTICE MIDWIFE

## 2024-05-10 PROCEDURE — 0352U MULTIPLEX VAGINAL PANEL BY PCR: CPT | Performed by: ADVANCED PRACTICE MIDWIFE

## 2024-05-10 PROCEDURE — 99212 OFFICE O/P EST SF 10 MIN: CPT | Performed by: ADVANCED PRACTICE MIDWIFE

## 2024-05-10 PROCEDURE — 81001 URINALYSIS AUTO W/SCOPE: CPT | Performed by: ADVANCED PRACTICE MIDWIFE

## 2024-05-10 PROCEDURE — 99207 PR PRENATAL VISIT: CPT | Performed by: ADVANCED PRACTICE MIDWIFE

## 2024-05-10 NOTE — PATIENT INSTRUCTIONS
"Weeks 30 to 32 of Your Pregnancy: Care Instructions  Your baby is growing more every day. Its eyes can open and close, and it may have hair on its head. Your baby may sleep 20 to 45 minutes at a time and is more active at certain times.    You should feel your baby move several times every day. Your baby now turns less and kicks more.    This is a good time to tour your hospital or birthing center. You may also want to find childcare if needed.    To ease heartburn    Avoid foods that make your symptoms worse, such as chocolate, spicy foods, and caffeine.  Avoid bending over or lying down after meals.  Do not eat for 2 hours before bedtime.  Take antacids like Tums, but don't take ones that have sodium bicarbonate, magnesium trisilicate, or aspirin.    To care for large, swollen veins (varicose veins)    Try to avoid standing for long periods of time.  Sit with your feet propped up.  Wear support hose.  Get some exercise every day, like walking or swimming.  Counting your baby's kicks  Your doctor may ask you to count your baby's movements, such as kicks, flutters, or rolls.    Find a quiet place, and get comfortable. Write down your start time. Count your baby's movements (except hiccups). When your baby has moved 10 times, you can stop counting. Write down how many minutes it took.    If an hour goes by and you don't feel 10 movements, have something to eat or drink. Count for another hour. If you don't feel at least 10 movements in the 2-hour period, call your doctor.  Follow-up care is a key part of your treatment and safety. Be sure to make and go to all appointments, and call your doctor if you are having problems. It's also a good idea to know your test results and keep a list of the medicines you take.  Where can you learn more?  Go to https://www.[employer].net/patiented  Enter X471 in the search box to learn more about \"Weeks 30 to 32 of Your Pregnancy: Care Instructions.\"  Current as of: July 10, " "2023               Content Version: 14.0    2043-6030 [employer].   Care instructions adapted under license by your healthcare professional. If you have questions about a medical condition or this instruction, always ask your healthcare professional. [employer] disclaims any warranty or liability for your use of this information.      Learning About Birth Control After Childbirth  What is birth control?  Birth control is any method used to prevent pregnancy. If you have vaginal sex without birth control, you could get pregnant--even if you haven't started having periods again. You're less likely to get pregnant while breastfeeding, but it's still possible. Finding birth control that works for you can help avoid an unplanned pregnancy.  There are many kinds of birth control. Each has pros and cons. Find what works for you. Talk to your doctor if you've just given birth or are breastfeeding.    Long-acting reversible contraception (LARC). These are placed inside your body by a doctor. They can prevent pregnancy for years.  Examples include:  An implant (hormonal).  Copper intrauterine device (IUD).  Hormonal IUDs.    Short-acting hormonal methods. These release hormones. Examples include:  Combination birth control pills (\"the pill\").  Skin patches.  A vaginal ring.  A shot.  Mini-pills. Choose progestin-only options soon after giving birth.    Barrier methods. Use these every time you have vaginal sex.  Examples include:  External (male) condoms.  Internal (female) condoms.  Diaphragms.  Cervical caps.  Sponges.    Spermicides. These kill sperm or stop sperm from moving. They can be gels, creams, foams, films, or tablets. Use them before vaginal sex.  Examples include:  Nonoxynol-9.  pH regulator gel.    Permanent birth control (sterilization). This can be an option if you're sure that you don't want to get pregnant later.  Examples include:  Vasectomy.  Having tubes tied (tubal " "ligation).    Emergency contraception. This is a backup method. Use it if you didn't use birth control or your birth control method failed.  Examples include:  Copper and hormonal IUDs.  Emergency contraceptive pills.    Fertility awareness. You'll learn when you are most likely to become pregnant (are fertile). You can avoid vaginal sex at that time.  It's also called:  Natural family planning.  The rhythm method.    Breastfeeding. This is most effective when all of these are true:  Your baby is younger than 6 months old.  You're breastfeeding and not bottle-feeding at all.  You aren't having periods.  Follow-up care is a key part of your treatment and safety. Be sure to make and go to all appointments, and call your doctor if you are having problems. It's also a good idea to know your test results and keep a list of the medicines you take.  Where can you learn more?  Go to https://www.[employer].net/patiented  Enter X408 in the search box to learn more about \"Learning About Birth Control After Childbirth.\"  Current as of: July 10, 2023               Content Version: 14.0    3938-4140 [employer].   Care instructions adapted under license by your healthcare professional. If you have questions about a medical condition or this instruction, always ask your healthcare professional. [employer] disclaims any warranty or liability for your use of this information.      "

## 2024-05-11 LAB — BACTERIA UR CULT: NORMAL

## 2024-05-12 DIAGNOSIS — B37.31 YEAST INFECTION OF THE VAGINA: Primary | ICD-10-CM

## 2024-05-12 RX ORDER — TERCONAZOLE 0.4 %
1 CREAM WITH APPLICATOR VAGINAL AT BEDTIME
Qty: 45 G | Refills: 0 | Status: ON HOLD | OUTPATIENT
Start: 2024-05-12 | End: 2024-06-25

## 2024-05-24 ENCOUNTER — ANCILLARY PROCEDURE (OUTPATIENT)
Dept: ULTRASOUND IMAGING | Facility: CLINIC | Age: 34
End: 2024-05-24
Attending: ADVANCED PRACTICE MIDWIFE
Payer: COMMERCIAL

## 2024-05-24 DIAGNOSIS — O09.529 HIGH-RISK PREGNANCY, ELDERLY MULTIGRAVIDA, UNSPECIFIED TRIMESTER: ICD-10-CM

## 2024-05-24 PROCEDURE — 76816 OB US FOLLOW-UP PER FETUS: CPT | Mod: 26 | Performed by: OBSTETRICS & GYNECOLOGY

## 2024-05-24 PROCEDURE — 76816 OB US FOLLOW-UP PER FETUS: CPT

## 2024-05-24 PROCEDURE — 76819 FETAL BIOPHYS PROFIL W/O NST: CPT | Mod: 26 | Performed by: OBSTETRICS & GYNECOLOGY

## 2024-05-24 PROCEDURE — 76819 FETAL BIOPHYS PROFIL W/O NST: CPT

## 2024-05-29 ENCOUNTER — TELEPHONE (OUTPATIENT)
Dept: OBGYN | Facility: CLINIC | Age: 34
End: 2024-05-29
Payer: COMMERCIAL

## 2024-05-31 ENCOUNTER — TELEPHONE (OUTPATIENT)
Dept: OBGYN | Facility: CLINIC | Age: 34
End: 2024-05-31

## 2024-05-31 ENCOUNTER — PRENATAL OFFICE VISIT (OUTPATIENT)
Dept: OBGYN | Facility: CLINIC | Age: 34
End: 2024-05-31
Attending: REGISTERED NURSE
Payer: COMMERCIAL

## 2024-05-31 VITALS
DIASTOLIC BLOOD PRESSURE: 85 MMHG | HEART RATE: 79 BPM | BODY MASS INDEX: 30.09 KG/M2 | WEIGHT: 169.8 LBS | SYSTOLIC BLOOD PRESSURE: 130 MMHG | HEIGHT: 63 IN

## 2024-05-31 DIAGNOSIS — O09.529 HIGH-RISK PREGNANCY, ELDERLY MULTIGRAVIDA, UNSPECIFIED TRIMESTER: Primary | ICD-10-CM

## 2024-05-31 DIAGNOSIS — M54.31 RIGHT SCIATIC NERVE PAIN: ICD-10-CM

## 2024-05-31 DIAGNOSIS — O26.892 HEARTBURN DURING PREGNANCY IN SECOND TRIMESTER: ICD-10-CM

## 2024-05-31 DIAGNOSIS — R12 HEARTBURN DURING PREGNANCY IN SECOND TRIMESTER: ICD-10-CM

## 2024-05-31 PROCEDURE — 99207 PR PRENATAL VISIT: CPT | Performed by: REGISTERED NURSE

## 2024-05-31 PROCEDURE — 99213 OFFICE O/P EST LOW 20 MIN: CPT | Performed by: REGISTERED NURSE

## 2024-05-31 NOTE — TELEPHONE ENCOUNTER
SUBJECTIVE:                                                    Camille Kline is a 7 year old female who presents to clinic today with father because of:    Chief Complaint   Patient presents with     Trauma        HPI:  Concerns: Pt fell on ice 01/10/17 and landed on left elbow. Pt has no fever. Therapy is ice pack.     Camille slipped on the ice yesterday afternoon during recess while at school.  She fell landing on the posterior part of her left elbow.  No significant swelling but father feels that there is some faint bruising over the area today.  They used ice and she took ibuprofen last night.  Still with pain over posterior elbow today.  Able to straighten arm but c/o pain.      Review Of Systems  Gen: No fever or weight loss  Skin: No rash  Eyes: No discharge or redness  Ears/Nose/Throat: No ear pain, rhinorrhea, or sore throat  Respiratory: no cough or respiratory distress  GI: No diarrhea or vomiting    PROBLEM LIST:  Patient Active Problem List    Diagnosis Date Noted     Family history of hypothyroidism 04/17/2014     Priority: Medium     Family history of bicuspid aortic valve 04/17/2014     Echo ordered but not done b/c dad's echo is WNL  Though the American Heart Association and American College of Cardiology only formally recommend 1st degree relatives be screened. It appears that the inheritance pattern is not simple, and can skip generations, and the bicuspid aortic foundation (http://bicuspidfoundation.com/bavfaqs.htm) recommends all relatives be tested. I think it makes sense to order an echo for her.     Not done.  Parents report dad's echo was normal and so child's not done.                 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 Has orapred prescription in case of exacerbation. Follows with [facility] at [facility]. Allergies are triggers, takes zyrtec prn.           MEDICATIONS:  Current Outpatient  Spoke with Vin to help coordinate cerclage removal in clinic. Any MD can do cerclage removal in clinic, or she could have it done in L&D. Needs to be when she's 36-37w, 6/18-6/25.  Will offer both options to pt and update CNM team with what she chooses   "Prescriptions   Medication Sig Dispense Refill     beclomethasone (QVAR) 40 MCG/ACT Inhaler Inhale 2 puffs into the lungs once as needed       albuterol (ALBUTEROL) 108 (90 BASE) MCG/ACT inhaler Inhale 2 puffs into the lungs every 6 hours as needed        ALLERGIES:  No Known Allergies    Problem list and histories reviewed & adjusted, as indicated.    OBJECTIVE:                                                      /80 mmHg  Pulse 88  Temp(Src) 97.2  F (36.2  C) (Oral)  Ht 4' 5.94\" (1.37 m)  Wt 66 lb 9.6 oz (30.21 kg)  BMI 16.10 kg/m2   Blood pressure percentiles are 79% systolic and 96% diastolic based on 2000 NHANES data. Blood pressure percentile targets: 90: 115/74, 95: 119/78, 99 + 5 mmH/91.   GEN:  alert, no distress; breathing easily  EYES: normal, no discharge or redness  EARS: TM's gray and translucent bilaterally  NOSE: clear  THROAT: clear  NECK: supple, no nodes  CHEST: clear bilaterally, no wheezes or crackles.    CV:  regular rate and rhythm with no murmur.  ABDOMEN: soft, nontender, no hepatosplenomegaly.  SKIN: normal, no rashes or lesions       DIAGNOSTICS: X-ray of left elbow:  normal    ASSESSMENT/PLAN:                                                    (U62.496O) Elbow injury, left, initial encounter  (primary encounter diagnosis)  Plan: XR Elbow Left 2 Views, order for DME        See back if not improving over the next 7-10 days.  Discussed use of a splint (given in clinic) as needed for comfort.  Ibuprofen 200-300 mg 2-3 times daily as needed for discomfort and icing of area.      (Z23) Need for influenza vaccination  Plan: HC FLU VAC PRESRV FREE QUAD SPLIT VIR 3+YRS IM               FOLLOW UP: If not improving or if worsening    WENDY GREWAL MD  [facility]'s      "

## 2024-05-31 NOTE — PROGRESS NOTES
"Subjective:      34 year old  at 33w3d presentst for a routine prenatal appointment.     no vaginal bleeding or leakage of fluid.  Denies concerning contractions or cramping.   Reports regular fetal movement.       No HA, visual changes, RUQ or epigastric pain.  Rec'd tdap 2024  Weekly BPPs scheduled.    Patient concerns:   - pelvic pain, using maternity support belt when up and moving around.    - R sciatic nerve pain improving with home remedies including heat and massage. Has not needed PT yet.    - requests cerclage removal coordination.       Objective:  Vitals:    24 1312   BP: 130/85   Pulse: 79   Weight: 77 kg (169 lb 12.8 oz)   Height: 1.6 m (5' 3\")   , see ob flowsheet    Blood type: O POS   Assessment/Plan     Patient Active Problem List    Diagnosis Date Noted    Heartburn during pregnancy in second trimester 2024     Priority: Medium    Right sciatic nerve pain 2024     Priority: Medium     3/4/24 right sciatic nerve pain      Cervical insufficiency during pregnancy in second trimester, antepartum 2024     Priority: Medium     Cervical cerclage procedure 24      High-risk pregnancy, elderly multigravida, unspecified trimester 2023     Priority: Medium     [facility] Provider Group choice: CNM group  Partner's name: Nilson  [x]NOB folder  [x]Dating by 11w1d US  [x] 1st trimester screening: Patient declines 1st tri genetic screening  [x]declines AFP/QS  [x]Fetal anatomy US ordered  [x]Rubella immune  [x]Hep B NOT immune previously - #1 2023, #2 2023  [x]Varicella immune  []Pap  [x] recommended LD ASA after 12 wks for PRE-E risk  [x] No increased risk for GDM  [x]No need for utox in labor  []COVID vaccine completed  _____________________________________  [x]EOB folder  [x]PP Contraception plan: considering IUD  [x]Labor plans: unmedicated  [x]: maybe  [x]Infant feeding plan: breast  []FLU shot  [x]TDAP given  []RSV  [] Water " birth interest  [x]GCT  ________________________________________  [] OTC PP meds sent  []PP plans, time off, support system discussed, resources offered  []Planning CS-ERAS pkt        Depressive disorder 2023     Priority: Medium    Recurrent pregnancy loss 2023     Priority: Medium    Varicella 2023     Priority: Medium    History of postpartum hemorrhage 2022     Priority: Medium     Plan TXA prior to birth  AMSTL      Anemia 2022     Priority: Medium     Anemia, reviewed supplementation      Hx of cervical incompetence in pregnancy 2021     Priority: Medium     MFM 1/15/24   will plan for placement of history indicated cerclage, we will follow growth with US given history of IUFD, plan weekly BPP at 32 weeks and plan for birth in the 38th  week.     Detailed ultrasound is recommended at 18-20 weeks.      Nausea/vomiting in pregnancy 10/05/2021     Priority: Medium     IV hydration orders placed  Zofran  B6/Unisom      History of IUFD 2021     Priority: Medium     18- IUFD boy from placental abruption.  18: placenta pathology reviewed. Hematoma infarct noted, see formal report.    2/15/24-   Recommend serial growth US q 4w starting @ 24-26w  Weekly BPP @ 32w  Delivery @ 38w  Cerclage removal at 36-37 week      History of cervical cerclage 2021     Priority: Medium     3/2019: Placed after fetal anatomy scan at 21 wks (last pregnancy), then delivered at 37.6 wks    21 per MFM:   - Prophylactic cerclage at as soon as available, plan for next week  - Comprehensive ultrasound at 18-20 weeks and serial growth ultrasonography every 4 weeks  - Administration of  corticosteroids if the patient is deemed to be at increased risk of imminent  delivery.  - Urine culture every trimester with aggressive treatment of bacteriuria.  MFM recommend serial evaluation of fetal growth every 4 weeks in addition to  weekly  surveillance starting at 32  weeks and delivery at 38 weeks as previously discussed. She will need cerclage removal between 36-37 weeks.      History of severe pre-eclampsia 2018     Priority: Medium     : baseline labs  Recommendations:  - Continue taking prophylactic aspirin                                        - Recommend baseline HELLP labs                                           - Recommend taking prenatal vitamin                                         - Patient can discontinue progesterone since she has maintained pregnancy to the second trimester already                -  testing with weekly BPP should occur starting at 32 weeks.           Vitamin D deficiency 2018     Priority: Medium     /21: 11, pls discuss at next visit, start 5,000 international unit(s) daily--Rx sent      History of recurrent  2018     Priority: Medium     X6. Taking prometrium         No orders of the defined types were placed in this encounter.    - Call made to RN team to offer cerclage removal in the office with MD or in triage. Will occur between 36-37 weeks per MFM. Will follow up with patient re: coordination  - Encouraged rest, hydration and use of maternity support belt for pelvic pain. May also use Tylenol PRN for mild-moderate pain. She states understanding and is agreeable.     Updated individualized prenatal care plan on the problem list for 3rd trimester    Return to clinic in 2 weeks and prn if questions or concerns.     ASHIA Bob CNM

## 2024-05-31 NOTE — TELEPHONE ENCOUNTER
Spoke with Dr. Ellison about limited in-clinic availability to schedule cerclage removal, and she recommended that Pam schedule in L&D since they have more availability and the necessary supplies.    6/20 morning. Called L&D to schedule cerclage removal -- Scheduled for 0830 on 6/20. Called Pam and let her know, also sent [place] message with the date/time per pt request.

## 2024-05-31 NOTE — PATIENT INSTRUCTIONS
"Weeks 32 to 34 of Your Pregnancy: Care Instructions    Decide whether you want to bank or donate your baby's umbilical cord blood. If you want to save this blood, you have to arrange for it ahead of time.    Decide about circumcision. Personal, Baptism, or cultural beliefs may play a role in your decision. You get to decide what you want for your baby.    Learn how to ease hemorrhoids.    Get more liquids, fruits, vegetables, and fiber in your diet.  Avoid sitting for too long.  Clean yourself with moist toilet paper. Or try witch hazel pads.  Try ice packs or warm sitz baths for discomfort.  Use hydrocortisone cream for pain or itching.  Ask your doctor about stool softeners.    Consider the benefits of breastfeeding.    It reduces your baby's risk of sudden infant death syndrome (SIDS).   babies are less likely to get certain infections. And they're less likely to be obese or get diabetes later in life.  It can lower your risk of breast and ovarian cancers and osteoporosis.  It saves you money.  Follow-up care is a key part of your treatment and safety. Be sure to make and go to all appointments, and call your doctor if you are having problems. It's also a good idea to know your test results and keep a list of the medicines you take.  Where can you learn more?  Go to https://www.[facility].net/patiented  Enter X711 in the search box to learn more about \"Weeks 32 to 34 of Your Pregnancy: Care Instructions.\"  Current as of: July 10, 2023               Content Version: 14.0    2327-5981 [employer].   Care instructions adapted under license by your healthcare professional. If you have questions about a medical condition or this instruction, always ask your healthcare professional. [employer] disclaims any warranty or liability for your use of this information.      " 50 yo woman presenting with bilateral reproducible leg pain.  CK, Calcium.  Reeval. 52 yo woman presenting with bilateral reproducible leg pain.  CK, Calcium.  No rhabdo, no hypocalcemia, no e/o septic joint, no trauma.  Referral to ortho.  Patient informed of ED visit findings, understands plan.  Patient provided with written and further verbal instructions not included in discharge paperwork.  Patient instructed to follow up with their primary care physician in 2-3 days and return for new, worsened, or persistent symptoms.

## 2024-06-03 ENCOUNTER — MYC MEDICAL ADVICE (OUTPATIENT)
Dept: OBGYN | Facility: CLINIC | Age: 34
End: 2024-06-03
Payer: COMMERCIAL

## 2024-06-05 NOTE — PATIENT INSTRUCTIONS
Weeks 34 to 36 of Your Pregnancy: Care Instructions  Your belly has grown quite large. It's almost time to give birth! Your baby's lungs are almost ready to breathe air. The skull bones are firm enough to protect your baby's head. But they're soft enough to move down through the birth canal.    You might be wondering what to expect during labor. Because each birth is different, there's no way to know exactly what childbirth will be like for you. Talk to your doctor or midwife about any concerns you have.    You'll probably have a test for group B streptococcus (GBS). GBS is bacteria that can live in the vagina and rectum. GBS can make your baby sick after birth. If you test positive, you'll get antibiotics during labor.    Choose what type of pain relief you would like during labor.  You can choose from a few types, including medicine and non-medicine options. You may want to use several types of pain relief.     Know how medicines can help with pain during labor.  Some medicines lower anxiety and help with some of the pain. Others make your lower body numb so that you will feel less pain.     Tell your doctor about your pain medicine choice.  Do this before you start labor or very early in your labor. You may be able to change your mind during labor.     Learn about the stages of labor.    The first stage includes the early (latent) and active phases of labor. Contractions start in early labor. During active labor, contractions get stronger, last longer, and happen more often. And the cervix opens more rapidly.  The second stage starts when you're ready to push. During this stage, your baby is born.  During the third stage, you'll usually have a few more contractions to push out the placenta.   Follow-up care is a key part of your treatment and safety. Be sure to make and go to all appointments, and call your doctor if you are having problems. It's also a good idea to know your test results and keep a list of the  "medicines you take.  Where can you learn more?  Go to https://www.[employer].net/patiented  Enter B912 in the search box to learn more about \"Weeks 34 to 36 of Your Pregnancy: Care Instructions.\"  Current as of: July 10, 2023               Content Version: 14.0    2200-6960 [employer].   Care instructions adapted under license by your healthcare professional. If you have questions about a medical condition or this instruction, always ask your healthcare professional. [employer] disclaims any warranty or liability for your use of this information.      Group B Strep During Pregnancy: Care Instructions  Overview     Group B strep infection is caused by a type of bacteria. It's a different kind of bacteria than the kind that causes strep throat.  You may have this kind of bacteria in your body. Sometimes it may cause an infection, but most of the time it doesn't make you sick or cause symptoms. But if you pass the bacteria to your baby during the birth, it can cause serious health problems for your baby.  If you have this bacteria in your body, you will get antibiotics when you are in labor. Antibiotics help prevent problems for a  baby.  After birth, doctors will watch and may test your baby. If your baby tests positive for Group B strep, your baby will get antibiotics.  If you plan to breastfeed your baby, don't worry. It will be safe to breastfeed.  Follow-up care is a key part of your treatment and safety. Be sure to make and go to all appointments, and call your doctor if you are having problems. It's also a good idea to know your test results and keep a list of the medicines you take.  How can you care for yourself at home?  If your doctor has prescribed antibiotics, take them as directed. Do not stop taking them just because you feel better. You need to take the full course of antibiotics.  Tell your doctor if you are allergic to any antibiotic.  If you go into labor, or your " "water breaks, go to the hospital. Your doctor will give you antibiotics to help protect your baby from infection.  Tell the doctors and nurses if you have an allergy to penicillin.  Tell the doctors and nurses at the hospital that you tested positive for group B strep.  When should you call for help?   Call your doctor now or seek immediate medical care if:    You have symptoms of a urinary tract infection. These may include:  Pain or burning when you urinate.  A frequent need to urinate without being able to pass much urine.  Pain in the flank, which is just below the rib cage and above the waist on either side of the back.  Blood in your urine.  A fever.     You think you are in labor or your water has broken.     You have pain in your belly or pelvis.   Watch closely for changes in your health, and be sure to contact your doctor if you have any problems.  Where can you learn more?  Go to https://www.[employer].IFTTT/patiented  Enter M001 in the search box to learn more about \"Group B Strep During Pregnancy: Care Instructions.\"  Current as of: June 12, 2023               Content Version: 14.0    6362-5419 [employer].   Care instructions adapted under license by your healthcare professional. If you have questions about a medical condition or this instruction, always ask your healthcare professional. [employer] disclaims any warranty or liability for your use of this information.      Circumcision in Infants: What to Expect at Home  Your Child's Recovery  After circumcision, your baby's penis may look red and swollen. It may have petroleum jelly and gauze on it. The gauze will likely come off when your baby urinates. Follow your doctor's directions about whether to put clean gauze back on your baby's penis or to leave the gauze off. If you need to remove gauze from the penis, use warm water to soak the gauze and gently loosen it.  The doctor may have used a Plastibell device to do the " circumcision. If so, your baby will have a plastic ring around the head of the penis. The ring should fall off by itself in 10 to 12 days.  A thin, yellow film may form over the area the day after the procedure. This is part of the normal healing process. It should go away in a few days.  Your baby may seem fussy while the area heals. It may hurt for your baby to urinate. This pain often gets better in 3 or 4 days. But it may last for up to 2 weeks.  Even though your baby's penis will likely start to feel better after 3 or 4 days, it may look worse. The penis often starts to look like it's getting better after about 7 to 10 days.  This care sheet gives you a general idea about how long it will take for your child to recover. But each child recovers at a different pace. Follow the steps below to help your child get better as quickly as possible.  How can you care for your child at home?  Activity    Let your baby rest as much as possible. Sleeping will help with recovery.     You can give your baby a sponge bath the day after surgery. Ask your doctor when it is okay to give your baby a bath.   Medicines    Your doctor will tell you if and when your child can restart any medicines. The doctor will also give you instructions about your child taking any new medicines.     Your doctor may recommend giving your baby acetaminophen (Tylenol) to help with pain after the procedure. Be safe with medicines. Give your child medicines exactly as prescribed. Call your doctor if you think your child is having a problem with a medicine.     Do not give your child two or more pain medicines at the same time unless the doctor told you to. Many pain medicines have acetaminophen, which is Tylenol. Too much acetaminophen (Tylenol) can be harmful.   Circumcision care    Always wash your hands before and after touching the circumcision area.     Gently wash your baby's penis with plain, warm water after each diaper change, and pat it dry.  "Do not use soap. Don't use hydrogen peroxide or alcohol. They can slow healing.     Do not try to remove the film that forms on the penis. The film will go away on its own.     Put plenty of petroleum jelly (such as Vaseline) on the circumcision area during each diaper change. This will prevent your baby's penis from sticking to the diaper while it heals.     Fasten your baby's diapers loosely so that there is less pressure on the penis while it heals.   Follow-up care is a key part of your child's treatment and safety. Be sure to make and go to all appointments, and call your doctor if your child is having problems. It's also a good idea to know your child's test results and keep a list of the medicines your child takes.  When should you call for help?   Call your doctor now or seek immediate medical care if:    Your baby has a fever over 100.4 F.     Your baby is extremely fussy or irritable, has a high-pitched cry, or refuses to eat.     Your baby does not have a wet diaper within 12 hours after the circumcision.     You find a spot of bleeding larger than a 2-inch [place] from the incision.     Your baby has signs of infection. Signs may include severe swelling; redness; a red streak on the shaft of the penis; or a thick, yellow discharge.   Watch closely for changes in your child's health, and be sure to contact your doctor if:    A Plastibell device was used for the circumcision and the ring has not fallen off after 10 to 12 days.   Where can you learn more?  Go to https://www.[employer].net/patiented  Enter S255 in the search box to learn more about \"Circumcision in Infants: What to Expect at Home.\"  Current as of: October 24, 2023               Content Version: 14.0    9858-8221 [employer].   Care instructions adapted under license by your healthcare professional. If you have questions about a medical condition or this instruction, always ask your healthcare professional. [employer] " [employer] disclaims any warranty or liability for your use of this information.    Thank you for trusting us with your care!     If you need to contact us for questions about:  Symptoms, Scheduling & Medical Questions; Non-urgent (2-3 day response) Mg message, Urgent (needing response today) [phone] (if after 3:30pm next day response)   Prescriptions: Please call your Pharmacy   Billing: Hillary [phone] or [facility]:[phone]

## 2024-06-06 ENCOUNTER — ANCILLARY PROCEDURE (OUTPATIENT)
Dept: ULTRASOUND IMAGING | Facility: CLINIC | Age: 34
End: 2024-06-06
Attending: ADVANCED PRACTICE MIDWIFE
Payer: COMMERCIAL

## 2024-06-06 ENCOUNTER — PRENATAL OFFICE VISIT (OUTPATIENT)
Dept: OBGYN | Facility: CLINIC | Age: 34
End: 2024-06-06
Attending: ADVANCED PRACTICE MIDWIFE
Payer: COMMERCIAL

## 2024-06-06 VITALS
WEIGHT: 172.6 LBS | DIASTOLIC BLOOD PRESSURE: 89 MMHG | SYSTOLIC BLOOD PRESSURE: 136 MMHG | BODY MASS INDEX: 30.58 KG/M2 | HEART RATE: 84 BPM | HEIGHT: 63 IN

## 2024-06-06 DIAGNOSIS — O09.529 HIGH-RISK PREGNANCY, ELDERLY MULTIGRAVIDA, UNSPECIFIED TRIMESTER: Primary | ICD-10-CM

## 2024-06-06 DIAGNOSIS — M54.31 RIGHT SCIATIC NERVE PAIN: ICD-10-CM

## 2024-06-06 DIAGNOSIS — O09.529 HIGH-RISK PREGNANCY, ELDERLY MULTIGRAVIDA, UNSPECIFIED TRIMESTER: ICD-10-CM

## 2024-06-06 DIAGNOSIS — B37.31 YEAST INFECTION OF THE VAGINA: ICD-10-CM

## 2024-06-06 DIAGNOSIS — O26.892 HEARTBURN DURING PREGNANCY IN SECOND TRIMESTER: ICD-10-CM

## 2024-06-06 DIAGNOSIS — O34.32 CERVICAL INSUFFICIENCY DURING PREGNANCY IN SECOND TRIMESTER, ANTEPARTUM: ICD-10-CM

## 2024-06-06 DIAGNOSIS — Z87.59 HISTORY OF SEVERE PRE-ECLAMPSIA: ICD-10-CM

## 2024-06-06 DIAGNOSIS — R12 HEARTBURN DURING PREGNANCY IN SECOND TRIMESTER: ICD-10-CM

## 2024-06-06 PROBLEM — D69.6 GESTATIONAL THROMBOCYTOPENIA (H): Status: ACTIVE | Noted: 2024-06-06

## 2024-06-06 PROBLEM — O99.119 GESTATIONAL THROMBOCYTOPENIA (H): Status: ACTIVE | Noted: 2024-06-06

## 2024-06-06 LAB
BACTERIAL VAGINOSIS VAG-IMP: NEGATIVE
CANDIDA DNA VAG QL NAA+PROBE: DETECTED
CANDIDA GLABRATA / CANDIDA KRUSEI DNA: NOT DETECTED
T VAGINALIS DNA VAG QL NAA+PROBE: NOT DETECTED

## 2024-06-06 PROCEDURE — 76819 FETAL BIOPHYS PROFIL W/O NST: CPT | Mod: 26 | Performed by: OBSTETRICS & GYNECOLOGY

## 2024-06-06 PROCEDURE — 76819 FETAL BIOPHYS PROFIL W/O NST: CPT

## 2024-06-06 PROCEDURE — 99207 PR PRENATAL VISIT: CPT | Performed by: NURSE PRACTITIONER

## 2024-06-06 PROCEDURE — 99211 OFF/OP EST MAY X REQ PHY/QHP: CPT | Mod: 25 | Performed by: NURSE PRACTITIONER

## 2024-06-06 PROCEDURE — 0352U MULTIPLEX VAGINAL PANEL BY PCR: CPT | Performed by: NURSE PRACTITIONER

## 2024-06-06 NOTE — PROGRESS NOTES
"Subjective:      34 year old  at 34w2d presentst for a routine prenatal appointment with , Nilson, and two kids.   Denies vaginal bleeding or leakage of fluid. Denies contractions. Feeling regular fetal movement.    - Patient concerns: Having lots of low belly pressure - this has been ongoing and is not new for her. Has a hard time standing for more than 5 minutes; pressure is completely relieved when she lies down. Feels like the baby \"is weighing me down.\" She has a pregnancy support belt that she has tried.   - Having headaches at least once a day for the last few days. Taking tylenol which has helped. She had one this morning that was relieved by taking a shower. No current HA at this time. Feels she is having more swelling in her face, hands, and feet, especially when standing up. Laying down in bed helps with the swelling. Denies vision changes and RUQ pain.   - Additionally, she experiences fatigue, having trouble caring for her children who are now out of school for the summer; happy that her  is able to have FMLA.  Sleeping ok at night, although does have trouble finding a comfortable position. Is able to rest when lying on the couch.   - Was recently treated for yeast infection with Terconazole cream that she finished about 2 weeks ago; the itching resolved and now has since returned. Wondering if she can restart treatment.   - Pam is emotional during visit, stating she gets emotional at the end of pregnancy when she starts to think about her previous experiences in the 3rd trimester.    , Nilson, has been staying home with Pam to help around the house and with their 2 children to allow Pam to rest. Pam expresses gratitude for this time to rest and take care of herself.    Reviewed EOB labs with patient.  Reviewed TDAP Previously given 2024 in clinic    Cerclage removal scheduled for 24 at 8:30 in the morning. This will take place at L&D.     We will follow growth " "with US given history of IUFD. Weekly BPPs starting at 32 weeks and plan for birth in the 38th week.    Objective:  Vitals:    06/06/24 1434   BP: 136/89   Pulse: 84   Weight: 78.3 kg (172 lb 9.6 oz)   Height: 1.6 m (5' 3\")   , see ob flowsheet  Assessment/Plan:     Encounter Diagnoses   Name Primary?    Heartburn during pregnancy in second trimester     Right sciatic nerve pain     High-risk pregnancy, elderly multigravida, unspecified trimester Yes    Cervical insufficiency during pregnancy in second trimester, antepartum     History of severe pre-eclampsia     Yeast infection of the vagina      Orders Placed This Encounter   Procedures    AST    ALT    CBC with Platelets    Uric acid    Creatinine    Protein  random urine     ABO   Date Value Ref Range Status   06/04/2021 O  Final     RH(D)   Date Value Ref Range Status   06/04/2021 Pos  Final     Antibody Screen   Date Value Ref Range Status   01/24/2024 Negative Negative Final   06/04/2021 Neg  Final   Rhogam  was not given.    - Reviewed total weight gain, encouraged continued healthy diet and exercise. Reviewed importance of daily fetal kick count and why/how to contact provider.  - Reviewed why/how to contact provider if headache/visual changes/RUQ or epigastric pain, decreased fetal movement, vaginal bleeding, leakage of fluid or more than 4 contractions in an hour.   - Reviewed GBS screening at 35-36 wks.    Discussed importance of monitoring for signs and symptoms of pre-eclampsia (headaches not relieved by rest/tylenol, RUQ pain, vision changes) and to present to the birth center in the hospital if symptoms of headaches worsen or if any pre-e symptoms arise. Patient will have pre-e labwork drawn today given her headaches, fatigue, and hx of severe pre-E; BP is <140/90, but the last 2 have been more elevated than her previous. Recommend BP check and BPP early next week.     Encouraged Pam to utilize her belly bands to help with her abdominal pressure and " to continue eating well, drinking lots of water, and resting. She will continue to elevate her legs, which has been helping with swelling.    Vaginal swab self-collected by the patient for yeast infection symptoms. Pam plans to  a 7-day over the counter monistat vaginal cream treatment on the way home to address the return of her vaginitis.     Pam verbalized understanding and agreed to this plan. She will closely monitor herself and call if she has any concerns, questions, or if symptoms worsen/persist.    I, Vita Carter, completed the PFSH and ROS. I then acted as a scribe for ELIAS Smith, for the remainder of the visit.  Vita Carter, BSN, RN, ELIAS DNP Student     I agree with the PFSH and ROS as completed by the ELIAS Student, except for changes made by me.  The remainder of the encounter was performed by me and scribed by the ELIAS Student.  The scribed note accurately reflects my personal services and decisions made by me.  Corina Herrera DNP, APRN, ELIAS

## 2024-06-10 ENCOUNTER — ANCILLARY PROCEDURE (OUTPATIENT)
Dept: ULTRASOUND IMAGING | Facility: CLINIC | Age: 34
End: 2024-06-10
Attending: ADVANCED PRACTICE MIDWIFE
Payer: COMMERCIAL

## 2024-06-10 DIAGNOSIS — O09.529 HIGH-RISK PREGNANCY, ELDERLY MULTIGRAVIDA, UNSPECIFIED TRIMESTER: ICD-10-CM

## 2024-06-10 PROCEDURE — 76819 FETAL BIOPHYS PROFIL W/O NST: CPT | Mod: 26 | Performed by: OBSTETRICS & GYNECOLOGY

## 2024-06-10 PROCEDURE — 76819 FETAL BIOPHYS PROFIL W/O NST: CPT

## 2024-06-10 NOTE — TELEPHONE ENCOUNTER
FMLA forms for Nilson to be with Pam from now until delivery completed, emailed to Ana Ureña at [facility], copy given to Pam and Nilson in clinic, and original copy sent to scanning.

## 2024-06-12 ENCOUNTER — HOSPITAL ENCOUNTER (OUTPATIENT)
Facility: CLINIC | Age: 34
Discharge: HOME | End: 2024-06-12
Attending: STUDENT IN AN ORGANIZED HEALTH CARE EDUCATION/TRAINING PROGRAM | Admitting: STUDENT IN AN ORGANIZED HEALTH CARE EDUCATION/TRAINING PROGRAM
Payer: COMMERCIAL

## 2024-06-12 ENCOUNTER — ALLIED HEALTH/NURSE VISIT (OUTPATIENT)
Dept: OBGYN | Facility: CLINIC | Age: 34
End: 2024-06-12
Attending: ADVANCED PRACTICE MIDWIFE
Payer: COMMERCIAL

## 2024-06-12 ENCOUNTER — PRE VISIT (OUTPATIENT)
Dept: OBGYN | Facility: CLINIC | Age: 34
End: 2024-06-12

## 2024-06-12 VITALS — DIASTOLIC BLOOD PRESSURE: 92 MMHG | SYSTOLIC BLOOD PRESSURE: 134 MMHG

## 2024-06-12 VITALS — TEMPERATURE: 98.2 F | DIASTOLIC BLOOD PRESSURE: 84 MMHG | SYSTOLIC BLOOD PRESSURE: 129 MMHG

## 2024-06-12 DIAGNOSIS — Z87.59 HISTORY OF SEVERE PRE-ECLAMPSIA: Primary | ICD-10-CM

## 2024-06-12 DIAGNOSIS — O16.3 ELEVATED BLOOD PRESSURE AFFECTING PREGNANCY IN THIRD TRIMESTER, ANTEPARTUM: Primary | ICD-10-CM

## 2024-06-12 LAB
ALBUMIN MFR UR ELPH: 26.6 MG/DL
ALT SERPL W P-5'-P-CCNC: 13 U/L (ref 0–50)
AST SERPL W P-5'-P-CCNC: 16 U/L (ref 0–45)
CREAT SERPL-MCNC: 0.57 MG/DL (ref 0.51–0.95)
CREAT UR-MCNC: 200.8 MG/DL
EGFRCR SERPLBLD CKD-EPI 2021: >90 ML/MIN/1.73M2
ERYTHROCYTE [DISTWIDTH] IN BLOOD BY AUTOMATED COUNT: 17.3 % (ref 10–15)
HCT VFR BLD AUTO: 33.8 % (ref 35–47)
HGB BLD-MCNC: 11.4 G/DL (ref 11.7–15.7)
MCH RBC QN AUTO: 28.7 PG (ref 26.5–33)
MCHC RBC AUTO-ENTMCNC: 33.7 G/DL (ref 31.5–36.5)
MCV RBC AUTO: 85 FL (ref 78–100)
PLATELET # BLD AUTO: 104 10E3/UL (ref 150–450)
PROT/CREAT 24H UR: 0.13 MG/MG CR (ref 0–0.2)
RBC # BLD AUTO: 3.97 10E6/UL (ref 3.8–5.2)
WBC # BLD AUTO: 8 10E3/UL (ref 4–11)

## 2024-06-12 PROCEDURE — 36415 COLL VENOUS BLD VENIPUNCTURE: CPT

## 2024-06-12 PROCEDURE — 84460 ALANINE AMINO (ALT) (SGPT): CPT

## 2024-06-12 PROCEDURE — 250N000013 HC RX MED GY IP 250 OP 250 PS 637

## 2024-06-12 PROCEDURE — 85041 AUTOMATED RBC COUNT: CPT

## 2024-06-12 PROCEDURE — 84450 TRANSFERASE (AST) (SGOT): CPT

## 2024-06-12 PROCEDURE — 84156 ASSAY OF PROTEIN URINE: CPT

## 2024-06-12 PROCEDURE — 99207 PR NO BILLABLE SERVICE THIS VISIT: CPT

## 2024-06-12 PROCEDURE — G0463 HOSPITAL OUTPT CLINIC VISIT: HCPCS

## 2024-06-12 PROCEDURE — 82565 ASSAY OF CREATININE: CPT

## 2024-06-12 RX ORDER — ACETAMINOPHEN 500 MG
1000 TABLET ORAL ONCE
Status: COMPLETED | OUTPATIENT
Start: 2024-06-12 | End: 2024-06-12

## 2024-06-12 RX ADMIN — ACETAMINOPHEN 1000 MG: 500 TABLET ORAL at 11:49

## 2024-06-12 ASSESSMENT — ACTIVITIES OF DAILY LIVING (ADL)
ADLS_ACUITY_SCORE: 20

## 2024-06-12 NOTE — PROGRESS NOTES
BLOOD PRESSURE CHECK    Subjective:    Pam is being seen in clinic for a blood pressure check due to  History of pre-eclampsia with borderline blood pressures . Patient is pregnant (35w1d).    Patient reports taking the following antihypertensive medications: none    Patient reports severe headache and lower extremity edema.  Edema worsens throughout the day.  1+ on exam.  Almost daily headaches.  She feels that these are becoming more frequent.  They do respond to tylenol.         Objective:    Patient Vitals for the past 24 hrs:   BP   06/12/24 1035 (!) 134/92   06/12/24 1022 129/89   06/12/24 1021 (!) 130/90        Two blood pressures were taken, at least one minute apart.     Plan:    Blood pressure results are in the HIGH (-159 or DBP ) range of the ACOG Hypertensive Disorders of pregnancy thresholds for pregnant and recently pregnant patients.    Contacted Dr Terry GONZALEZ. Provider recommended evaluation in [facility]. Discussed plan with patient. Reviewed with patient to call triage number if symptoms (chest pain, shortness of breath, visual changes, severe headache, lower extremity edema or right upper quadrant pain) occur. Return to clinic in 1 week (scheduled OB visit). Patient verbalized understanding of plan. Routed encounter to provider.

## 2024-06-12 NOTE — PROGRESS NOTES
Obstetrics Triage Note    HPI:  Pam Syed is a 34 year old  at 35w1d by LMP c/w 11w1d US, pregnancy notable as listed below.     Patient presents for evaluation of elevated blood pressures in the clinic of 130s/90s with associated headache. Reports that she purchased a home blood pressure cuff a few days ago and that her pressures at home have been 120s/80s. Endorses that she had a headache last evening that was responsive to tylenol. Reports that the headache returned today which prompted her to present to clinic for a blood pressure check. States that the headache is frontal and not associated with visual changes, however, she is experiencing some light sensitivity. No history of migraines or headaches outside of pregnancy. No chest pain or shortness of breath. Reports isolated incident of RUQ pain that she says has resolved.     Experiencing regular fetal movement. No leakage of fluid, vaginal bleeding, or contractions. No nausea, vomiting, epigastric pain, dysuria, or other systemic symptoms. States she is currently being treated for a yeast infection and that she has noticed improvement in her vaginal itching and irritation.     Pregnancy is complicated by:  - History of preeclampsia with severe features   - History of PPH  - Gestational thrombocytopenia (platelets most recently 130 on 24)  - Cerclage in place (History indicated Forrest cerclage placed on 42; 1 suture at 12 o'clock position)  - History of  demise (39w3d)    ROS:  Negative except as mentioned in HPI.    PMH:  Past Medical History:   Diagnosis Date    Anemia 2022    Depressive disorder     PPD following fetal loss    History of cervical cerclage 2021    3/2019: Placed after fetal anatomy scan at 21 wks (last pregnancy), then delivered at 37.6 wks     21 per MFM:   - Prophylactic cerclage at as soon as available, plan for next week  - Comprehensive ultrasound at 18-20 weeks and serial growth  ultrasonography every 4 weeks  - Administration of  corticosteroids if the patient is deemed to be at increased risk of imminent  delive    History of IUFD 2021- IUFD boy from placental abruption.  18: placenta pathology reviewed. Hematoma infarct noted, see formal report.     Per [facility] 21  - Weekly BPPs after 32 weeks  - Deliver at 38 weeks  [facility] consultation  recommend serial evaluation of fetal growth every 4 weeks in addition to  weekly  surveillance starting at 32 weeks and delivery at 38 weeks as previously discussed. She kandis    History of postpartum hemorrhage 2022    Plan TXA prior to birth  AMSTL    History of recurrent  2018    Taking prometrium    History of severe pre-eclampsia 2018    Hx of cervical incompetence in pregnancy 2021    Nausea/vomiting in pregnancy 10/05/2021    Recurrent pregnancy loss 2023    Severe pre-eclampsia, postpartum condition or complication 2018    Thrombocytopenia (H24) 2022    Varicella     Vitamin D deficiency 2018       PSHx:  Past Surgical History:   Procedure Laterality Date    APPENDECTOMY Right     CERCLAGE CERVICAL N/A 3/22/2019    Procedure: CERCLAGE CERVICAL;  Surgeon: Liz Lima MD;  Location: [facility] L[facility]    CERCLAGE CERVICAL N/A 3/22/2019    Procedure: CERCLAGE CERVICAL;  Surgeon: Liz Lima MD;  Location: [facility] OR    CERCLAGE CERVICAL N/A 2021    Procedure: CERCLAGE, CERVIX, VAGINAL APPROACH;  Surgeon: Stella Orellana;  Location: [facility] L[facility]    CERCLAGE CERVICAL N/A 2024    Procedure: Cerclage cervical;  Surgeon: Angelica Jordan MD;  Location: [facility] L[facility]    DILATION AND CURETTAGE SUCTION N/A 2017    Procedure: DILATION AND CURETTAGE SUCTION;  Suction Dilation And Curettage ;  Surgeon: Yolanda Samaniego MD;  Location: [facility]    DILATION AND CURETTAGE SUCTION N/A 2021    Procedure: DILATION AND CURETTAGE, UTERUS, USING SUCTION;  Surgeon: Tate  Rosanna Dang MD;  Location: [facility] OR    DILATION AND CURETTAGE, HYSTEROSCOPY DIAGNOSTIC, COMBINED N/A 11/8/2017    Procedure: COMBINED DILATION AND CURETTAGE, HYSTEROSCOPY DIAGNOSTIC;  Operative Hysteroscopy, Dilation and Curettage ;  Surgeon: Eli Pickard MD;  Location: [facility] OR    GYN SURGERY  09/26/2017    suction D&C     Medications:  No current facility-administered medications for this encounter.     Allergies:   No Known Allergies    Physical Exam:   Vitals:    06/12/24 1208 06/12/24 1247 06/12/24 1413 06/12/24 1430   BP: 136/85 125/84 125/77 129/84   BP Location:       Patient Position:       Cuff Size:       Temp:       TempSrc:          Gen: resting comfortably, in NAD  CV: Regular rate and rhythm, no murmurs.   Pulm: Clear to auscultation bilaterally. No wheezes or crackles.   Abd: Soft, gravid, non-tender to palpation in RUQ.   Neuro: Bilateral patellar reflexes 1+, no clonus bilaterally  Ext: Trace pedal edema bilaterally     NST:  FHT: , moderate variability, accelerations present, no decelerations  [place]: <1 contractions in 10 minutes     Labs:  Component      Latest Ref Rng 6/12/2024  12:07 PM   AST      0 - 45 U/L 16      Component      Latest Ref Rng 6/12/2024  12:07 PM   ALT      0 - 50 U/L 13      Component      Latest Ref Rng 6/12/2024  12:07 PM   Creatinine      0.51 - 0.95 mg/dL 0.57    GFR Estimate      >60 mL/min/1.73m2 >90          Component      Latest Ref Rng 6/12/2024  12:07 PM   WBC      4.0 - 11.0 10e3/uL 8.0    RBC Count      3.80 - 5.20 10e6/uL 3.97    Hemoglobin      11.7 - 15.7 g/dL 11.4 (L)    Hematocrit      35.0 - 47.0 % 33.8 (L)    MCV      78 - 100 fL 85    MCH      26.5 - 33.0 pg 28.7    MCHC      31.5 - 36.5 g/dL 33.7    RDW      10.0 - 15.0 % 17.3 (H)    Platelet Count      150 - 450 10e3/uL 104 (L)       Legend:  (L) Low  (H) High    Component      Latest Ref Rng 6/12/2024  11:35 AM   Total Protein Urine mg/mg Creat      0.00 - 0.20 mg/mg Cr 0.13         Assessment/Plan: Pam Syed is a 34 year old  at 35w1d by LMP c/w 11w1d US who presents to rule out hypertensive disorders of pregnancy given elevated blood pressure in clinic with associated headache.     #History of preeclampsia with severe features   - HELLP labs wnl   - AST: 16   - ALT: 13   - UPC: 0.13   - Creat: 0.57   - Platelets: 104 (Patient with gTCP, most recently 130 on )  - Blood pressure monitoring continued in triage for a total of 4 hours from time of first elevated pressure with all blood pressures normotensive. Patient not yet meeting criteria for gestational hypertension or preeclampsia given normal labs, improvement in headache, and absence of elevated blood pressures > 4 hours apart  - Will have Pam follow up in the outpatient setting for HELLP labs prior to her next prenatal appointment. Labs ordered.     #Headache   - Completely resolved with administration of Tylenol     #Fetal well being  - Reactive and reassuring NST collected in labor and delivery triage    Patient seen and plan of care discussed with Dr. Tate Zapata MD  Obstetrics and Gynecology, PGY-2  24 2:32 PM    [facility] staff:  Appreciate note by Dr. Zapata.  I have seen and examined the patient without the resident. I have reviewed, edited, and agree with the note.      Rosanna Ybarra MD, FACOG

## 2024-06-12 NOTE — DISCHARGE INSTRUCTIONS
Learning About When to Call Your Doctor During Pregnancy (After 20 Weeks)  Overview  It's common to have concerns about what might be a problem when you're pregnant. Most pregnancies don't have any serious problems. But it's still important to know when to call your doctor if you have certain symptoms or signs of labor.  These are general suggestions. Your doctor may give you some more information about when to call.  When to call your doctor (after 20 weeks)  Call 911  anytime you think you may need emergency care. For example, call if:  You have severe vaginal bleeding. This means you are soaking through a pad each hour for 2 or more hours.  You have sudden, severe pain in your belly.  You have chest pain, are short of breath, or cough up blood.  You passed out (lost consciousness).  You have a seizure.  You see or feel the umbilical cord.  You think you are about to deliver your baby and can't make it safely to the hospital or birthing center.  Call your doctor now or seek immediate medical care if:  You have vaginal bleeding.  You have belly pain.  You have a fever.  You are dizzy or lightheaded, or you feel like you may faint.  You have signs of a blood clot in your leg (called a deep vein thrombosis), such as:  Pain in the calf, back of the knee, thigh, or groin.  Swelling in your leg or groin.  A color change on the leg or groin. The skin may be reddish or purplish, depending on your usual skin color.  You have symptoms of preeclampsia, such as:  Sudden swelling of your face, hands, or feet.  New vision problems (such as dimness, blurring, or seeing spots).  A severe headache.  You have a sudden release of fluid from your vagina. (You think your water broke.)  You've been having regular contractions for an hour. This means that you've had at least 6 contractions within 1 hour, even after you change your position and drink fluids.  You notice that your baby has stopped moving or is moving less than  "normal.  You have signs of heart failure, such as:  New or increased shortness of breath.  New or worse swelling in your legs, ankles, or feet.  Sudden weight gain, such as more than 2 to 3 pounds in a day or 5 pounds in a week.  Feeling so tired or weak that you cannot do your usual activities.  You have symptoms of a urinary tract infection. These may include:  Pain or burning when you urinate.  A frequent need to urinate without being able to pass much urine.  Pain in the flank, which is just below the rib cage and above the waist on either side of the back.  Blood in your urine.  Watch closely for changes in your health, and be sure to contact your doctor if:  You have vaginal discharge that smells bad.  You feel sad, anxious, or hopeless for more than a few days.  You have skin changes, such as a rash, itching, or a yellow color to your skin.  You have other concerns about your pregnancy.  If you have labor signs at 37 weeks or more  If you have signs of labor at 37 weeks or more, your doctor may tell you to call when your labor becomes more active. Symptoms of active labor include:  Contractions that are regular.  Contractions that are less than 5 minutes apart.  Contractions that are hard to talk through.  Follow-up care is a key part of your treatment and safety. Be sure to make and go to all appointments, and call your doctor if you are having problems. It's also a good idea to know your test results and keep a list of the medicines you take.  Where can you learn more?  Go to https://www.Visual[employer].net/patiented  Enter N531 in the search box to learn more about \"Learning About When to Call Your Doctor During Pregnancy (After 20 Weeks).\"  Current as of: July 10, 2023               Content Version: 14.0    9450-6846 [employer].   Care instructions adapted under license by your healthcare professional. If you have questions about a medical condition or this instruction, always ask your healthcare " professional. [employer] disclaims any warranty or liability for your use of this information.

## 2024-06-12 NOTE — PLAN OF CARE
Patient arrived from clinic for pre-e eval d/t mild range pressures in clinic and headache. EFM applied with consent, see doc flow. BP's here in triage WNL. Tylenol administered for HA, patient states her headache is gone and feeling much better. Denies RUQ pain and visual changes. Labs drawn, platelets 104 otherwise labs WNL.

## 2024-06-12 NOTE — PLAN OF CARE
VSS in triage, patient reports HA is gone. States she feels well. OK for discharge per Dr. Zapata and Dr. Ybarra. Dicharge instructions given, patient verbalizes understanding.

## 2024-06-14 ENCOUNTER — LAB (OUTPATIENT)
Dept: LAB | Facility: CLINIC | Age: 34
End: 2024-06-14
Payer: COMMERCIAL

## 2024-06-14 DIAGNOSIS — O16.3 ELEVATED BLOOD PRESSURE AFFECTING PREGNANCY IN THIRD TRIMESTER, ANTEPARTUM: ICD-10-CM

## 2024-06-14 LAB
ALT SERPL W P-5'-P-CCNC: 10 U/L (ref 0–50)
AST SERPL W P-5'-P-CCNC: 15 U/L (ref 0–45)
CREAT SERPL-MCNC: 0.72 MG/DL (ref 0.51–0.95)
EGFRCR SERPLBLD CKD-EPI 2021: >90 ML/MIN/1.73M2
ERYTHROCYTE [DISTWIDTH] IN BLOOD BY AUTOMATED COUNT: 17.4 % (ref 10–15)
HCT VFR BLD AUTO: 32.5 % (ref 35–47)
HGB BLD-MCNC: 10.9 G/DL (ref 11.7–15.7)
MCH RBC QN AUTO: 28.8 PG (ref 26.5–33)
MCHC RBC AUTO-ENTMCNC: 33.5 G/DL (ref 31.5–36.5)
MCV RBC AUTO: 86 FL (ref 78–100)
PLATELET # BLD AUTO: 117 10E3/UL (ref 150–450)
RBC # BLD AUTO: 3.78 10E6/UL (ref 3.8–5.2)
WBC # BLD AUTO: 8 10E3/UL (ref 4–11)

## 2024-06-14 PROCEDURE — 36415 COLL VENOUS BLD VENIPUNCTURE: CPT

## 2024-06-14 PROCEDURE — 82565 ASSAY OF CREATININE: CPT

## 2024-06-14 PROCEDURE — 84450 TRANSFERASE (AST) (SGOT): CPT

## 2024-06-14 PROCEDURE — 84460 ALANINE AMINO (ALT) (SGPT): CPT

## 2024-06-14 PROCEDURE — 85014 HEMATOCRIT: CPT

## 2024-06-16 ENCOUNTER — APPOINTMENT (OUTPATIENT)
Dept: LAB | Facility: CLINIC | Age: 34
End: 2024-06-16
Payer: COMMERCIAL

## 2024-06-16 LAB
ALBUMIN MFR UR ELPH: 9.6 MG/DL
COLLECT DURATION TIME UR: 24 H
PROT 24H UR-MRATE: 96 MG/SPECIMEN
SPECIMEN VOL UR: 1000 ML

## 2024-06-16 PROCEDURE — 84156 ASSAY OF PROTEIN URINE: CPT

## 2024-06-16 PROCEDURE — 81050 URINALYSIS VOLUME MEASURE: CPT

## 2024-06-18 ENCOUNTER — HOSPITAL ENCOUNTER (OUTPATIENT)
Dept: ULTRASOUND IMAGING | Facility: CLINIC | Age: 34
Discharge: HOME | End: 2024-06-18
Attending: OBSTETRICS & GYNECOLOGY
Payer: COMMERCIAL

## 2024-06-18 ENCOUNTER — ANCILLARY PROCEDURE (OUTPATIENT)
Dept: ULTRASOUND IMAGING | Facility: CLINIC | Age: 34
End: 2024-06-18
Attending: ADVANCED PRACTICE MIDWIFE
Payer: COMMERCIAL

## 2024-06-18 ENCOUNTER — OFFICE VISIT (OUTPATIENT)
Dept: MATERNAL FETAL MEDICINE | Facility: CLINIC | Age: 34
End: 2024-06-18
Attending: OBSTETRICS & GYNECOLOGY
Payer: COMMERCIAL

## 2024-06-18 ENCOUNTER — PRENATAL OFFICE VISIT (OUTPATIENT)
Dept: OBGYN | Facility: CLINIC | Age: 34
End: 2024-06-18
Attending: ADVANCED PRACTICE MIDWIFE
Payer: COMMERCIAL

## 2024-06-18 VITALS
HEIGHT: 63 IN | DIASTOLIC BLOOD PRESSURE: 89 MMHG | WEIGHT: 173.6 LBS | BODY MASS INDEX: 30.76 KG/M2 | SYSTOLIC BLOOD PRESSURE: 127 MMHG | HEART RATE: 81 BPM

## 2024-06-18 DIAGNOSIS — O36.5990 FETAL GROWTH RESTRICTION ANTEPARTUM: ICD-10-CM

## 2024-06-18 DIAGNOSIS — R12 HEARTBURN DURING PREGNANCY IN SECOND TRIMESTER: ICD-10-CM

## 2024-06-18 DIAGNOSIS — O09.529 HIGH-RISK PREGNANCY, ELDERLY MULTIGRAVIDA, UNSPECIFIED TRIMESTER: Primary | ICD-10-CM

## 2024-06-18 DIAGNOSIS — O36.5990 FETAL GROWTH RESTRICTION ANTEPARTUM: Primary | ICD-10-CM

## 2024-06-18 DIAGNOSIS — O26.892 HEARTBURN DURING PREGNANCY IN SECOND TRIMESTER: ICD-10-CM

## 2024-06-18 DIAGNOSIS — O09.529 HIGH-RISK PREGNANCY, ELDERLY MULTIGRAVIDA, UNSPECIFIED TRIMESTER: ICD-10-CM

## 2024-06-18 DIAGNOSIS — M54.31 RIGHT SCIATIC NERVE PAIN: ICD-10-CM

## 2024-06-18 PROCEDURE — 99213 OFFICE O/P EST LOW 20 MIN: CPT | Mod: 25 | Performed by: OBSTETRICS & GYNECOLOGY

## 2024-06-18 PROCEDURE — 76820 UMBILICAL ARTERY ECHO: CPT | Mod: 26 | Performed by: OBSTETRICS & GYNECOLOGY

## 2024-06-18 PROCEDURE — 76820 UMBILICAL ARTERY ECHO: CPT

## 2024-06-18 PROCEDURE — 59025 FETAL NON-STRESS TEST: CPT | Mod: XU

## 2024-06-18 PROCEDURE — 99213 OFFICE O/P EST LOW 20 MIN: CPT | Mod: 25 | Performed by: ADVANCED PRACTICE MIDWIFE

## 2024-06-18 PROCEDURE — 99207 PR PRENATAL VISIT: CPT | Performed by: ADVANCED PRACTICE MIDWIFE

## 2024-06-18 PROCEDURE — 59025 FETAL NON-STRESS TEST: CPT | Mod: 26 | Performed by: OBSTETRICS & GYNECOLOGY

## 2024-06-18 PROCEDURE — 76819 FETAL BIOPHYS PROFIL W/O NST: CPT

## 2024-06-18 PROCEDURE — 76816 OB US FOLLOW-UP PER FETUS: CPT | Mod: 26 | Performed by: OBSTETRICS & GYNECOLOGY

## 2024-06-18 PROCEDURE — 87653 STREP B DNA AMP PROBE: CPT | Performed by: ADVANCED PRACTICE MIDWIFE

## 2024-06-18 PROCEDURE — 76815 OB US LIMITED FETUS(S): CPT | Mod: 26 | Performed by: OBSTETRICS & GYNECOLOGY

## 2024-06-18 PROCEDURE — 76819 FETAL BIOPHYS PROFIL W/O NST: CPT | Mod: 26 | Performed by: OBSTETRICS & GYNECOLOGY

## 2024-06-18 NOTE — NURSING NOTE
Patient reports positive fetal movement. NST Performed due to FGR.  Dr. Teresa reviewed efm tracing. See NST/BPP Doc Flowsheet tab.

## 2024-06-18 NOTE — PATIENT INSTRUCTIONS
Thank you for trusting us with your care!     If you need to contact us for questions about:  Symptoms, Scheduling & Medical Questions; Non-urgent (2-3 day response) Mg message, Urgent (needing response today) [phone] (if after 3:30pm next day response)   Prescriptions: Please call your Pharmacy   Billing: Hillary [phone] or [facility]:[phone]

## 2024-06-18 NOTE — PROGRESS NOTES
Please see the imaging tab for details of the ultrasound performed today.    Pam had an assessment of fetal growth today that was consistent with a new diagnosis of FGR (EFW 7.5%ile, AC <1st%ile) with less than expected interval growth. Additionally, her blood pressures have been monitored closely and she has had two mild range blood pressures, but they were <4 hours apart and therefore has not yet met criteria for a hypertensive disorder of pregnancy. She also has a history of a term IUFD secondary to placental abruption with preeclampsia.     After shared decision making regarding delivery timing in the setting of her obstetric history and new diagnosis of FGR we discussed the following plan:  1) NST to be performed inpatient on 6/20/2024 at the time of cerclage removal.  2) Delivery is recommended at 37 weeks gestation.   3) If meets criteria for gestational hypertension or preeclampsia at any point moving forward then delivery would be recommended at the time of diagnosis.    Vita Teresa MD  Specialist in Maternal-Fetal Medicine

## 2024-06-18 NOTE — PROGRESS NOTES
"Subjective:     34 year old  at 36w0d presents for a routine prenatal appointment.  No vaginal bleeding or leakage of fluid. No contractions or cramping. Endorses some tightening but denies pain.   Reports regular fetal movement.       Daily HA's, taking tylenol and they resolve.  Denies RUQ or epigastric pain.     Patient concerns:   LLE edema slightly > RLE, resolves with elevation and denies pain in LLE.  Endorses fatigue but is sleeping well at night.  Has had 2 mildly elevated BP, did not meet criteria for GHTN  Pt very worried about US results today and hx of IUFD at 39 wks, would like IOL as soon as recommended    Objective:  Vitals:    24 1431   BP: 127/89   Pulse: 81   Weight: 78.7 kg (173 lb 9.6 oz)   Height: 1.6 m (5' 3\")    See OB flowsheet    Assessment/Plan     Patient Active Problem List    Diagnosis Date Noted    High-risk pregnancy, elderly multigravida, unspecified trimester 2023     Priority: High     [facility] Provider Group choice: CNM group  Partner's name: Nilson  [x]NOB folder  [x]Dating by 11w1d US  [x] 1st trimester screening: Patient declines 1st tri genetic screening  [x]declines AFP/QS  [x]Fetal anatomy US ordered  [x]Rubella immune  [x]Hep B NOT immune previously - #1 2023, #2 2023  [x]Varicella immune  []Pap  [x] recommended LD ASA after 12 wks for PRE-E risk  [x] No increased risk for GDM  [x]No need for utox in labor  []COVID vaccine completed  _____________________________________  [x]EOB folder  [x]PP Contraception plan: considering IUD  [x]Labor plans: unmedicated  [x]: maybe  [x]Infant feeding plan: breast  []FLU shot  [x]TDAP given  []RSV  [] Water birth interest  [x]GCT  ________________________________________  [] OTC PP meds sent  []PP plans, time off, support system discussed, resources offered  []Planning CS-ERAS pkt        Fetal growth restriction antepartum 2024     Priority: Medium           Gestational " thrombocytopenia (H24) 2024     Priority: Medium     Monthly plts until 36 weeks and then weekly.       Heartburn during pregnancy in second trimester 2024     Priority: Medium    Right sciatic nerve pain 2024     Priority: Medium     3/4/24 right sciatic nerve pain      Cervical insufficiency during pregnancy in second trimester, antepartum 2024     Priority: Medium     Cervical cerclage procedure 24      Depressive disorder 2023     Priority: Medium    Recurrent pregnancy loss 2023     Priority: Medium    Varicella 2023     Priority: Medium    History of postpartum hemorrhage 2022     Priority: Medium     Plan TXA prior to birth  AMSTL      Anemia 2022     Priority: Medium     Anemia, reviewed supplementation      Hx of cervical incompetence in pregnancy 2021     Priority: Medium     MFM 1/15/24   will plan for placement of history indicated cerclage, we will follow growth with US given history of IUFD, plan weekly BPP at 32 weeks and plan for birth in the 38th  week.     Detailed ultrasound is recommended at 18-20 weeks.      Nausea/vomiting in pregnancy 10/05/2021     Priority: Medium     IV hydration orders placed  Zofran  B6/Unisom      History of IUFD 2021     Priority: Medium     18- IUFD boy from placental abruption.  18: placenta pathology reviewed. Hematoma infarct noted, see formal report.    2/15/24-   Recommend serial growth US q 4w starting @ 24-26w  Weekly BPP @ 32w  Delivery @ 38w  Cerclage removal at 36-37 week      History of cervical cerclage 2021     Priority: Medium     3/2019: Placed after fetal anatomy scan at 21 wks (last pregnancy), then delivered at 37.6 wks    21 per MFM:   - Prophylactic cerclage at as soon as available, plan for next week  - Comprehensive ultrasound at 18-20 weeks and serial growth ultrasonography every 4 weeks  - Administration of  corticosteroids if the patient is  deemed to be at increased risk of imminent  delivery.  - Urine culture every trimester with aggressive treatment of bacteriuria.  MFM recommend serial evaluation of fetal growth every 4 weeks in addition to  weekly  surveillance starting at 32 weeks and delivery at 38 weeks as previously discussed. She will need cerclage removal between 36-37 weeks.      History of severe pre-eclampsia 2018     Priority: Medium     : baseline labs  Recommendations:  - Continue taking prophylactic aspirin                                        - Recommend baseline HELLP labs                                           - Recommend taking prenatal vitamin                                         - Patient can discontinue progesterone since she has maintained pregnancy to the second trimester already                -  testing with weekly BPP should occur starting at 32 weeks.           Vitamin D deficiency 2018     Priority: Medium     /: 11, pls discuss at next visit, start 5,000 international unit(s) daily--Rx sent      History of recurrent  2018     Priority: Medium     X6. Taking prometrium             5/10/2019     3:13 PM 2019     2:44 PM 2022     3:19 PM   PHQ-9 SCORE   PHQ-9 Total Score 2 1 2         5/10/2019     3:13 PM 2019     2:44 PM 2022     3:19 PM   PHQ   PHQ-9 Total Score 2 1 2   Q9: Thoughts of better off dead/self-harm past 2 weeks Not at all Not at all Not at all     Orders Placed This Encounter   Procedures    Group B strep PCR     Discussed new onset FGR on US today, called to M, able to see patient for dopplers today  Cerclage removal and NST on , plan IOL at 37 wks  Roro Keita  I agree with the PFSH and ROS as completed by the student, except for changes made by me. The remainder of the encounter scribed by the student. The scribed note accurately reflects my personal services and decisions made by me.  Kim Shaffer DNP,  CNM, APRN

## 2024-06-19 DIAGNOSIS — O36.5990 FETAL GROWTH RESTRICTION ANTEPARTUM: Primary | ICD-10-CM

## 2024-06-19 PROBLEM — O99.820 GBS (GROUP B STREPTOCOCCUS CARRIER), +RV CULTURE, CURRENTLY PREGNANT: Status: ACTIVE | Noted: 2024-06-19

## 2024-06-19 LAB — GP B STREP DNA SPEC QL NAA+PROBE: POSITIVE

## 2024-06-20 ENCOUNTER — HOSPITAL ENCOUNTER (OUTPATIENT)
Facility: CLINIC | Age: 34
Discharge: HOME | End: 2024-06-20
Attending: MIDWIFE | Admitting: OBSTETRICS & GYNECOLOGY
Payer: COMMERCIAL

## 2024-06-20 ENCOUNTER — TELEPHONE (OUTPATIENT)
Dept: OBGYN | Facility: CLINIC | Age: 34
End: 2024-06-20

## 2024-06-20 VITALS — TEMPERATURE: 98.3 F | RESPIRATION RATE: 18 BRPM | SYSTOLIC BLOOD PRESSURE: 120 MMHG | DIASTOLIC BLOOD PRESSURE: 80 MMHG

## 2024-06-20 LAB
CLUE CELLS: PRESENT
TRICHOMONAS, WET PREP: ABNORMAL
WBC'S/HIGH POWER FIELD, WET PREP: ABNORMAL
YEAST, WET PREP: ABNORMAL

## 2024-06-20 PROCEDURE — 87210 SMEAR WET MOUNT SALINE/INK: CPT

## 2024-06-20 PROCEDURE — 59899 UNLISTED PX MAT CARE&DLVR: CPT

## 2024-06-20 PROCEDURE — 59025 FETAL NON-STRESS TEST: CPT

## 2024-06-20 PROCEDURE — G0463 HOSPITAL OUTPT CLINIC VISIT: HCPCS | Mod: 25

## 2024-06-20 ASSESSMENT — ACTIVITIES OF DAILY LIVING (ADL): ADLS_ACUITY_SCORE: 20

## 2024-06-20 NOTE — TELEPHONE ENCOUNTER
"Received a call from M RN Marguerite Mauricio about an order that was placed yesterday (6/19) for a [facility] ultrasound for new FGR diagnosis.    Pt diagnosed w/ FGR on 6/18, and [facility] was able to do this ultrasound the same day. The recommendations were as follows:  \"After shared decision making regarding delivery timing in the setting of her obstetric history and new diagnosis of FGR we discussed the following plan:  1) NST to be performed inpatient on 6/20/2024 at the time of cerclage removal.  2) Delivery is recommended at 37 weeks gestation.  3) If meets criteria for gestational hypertension or preeclampsia at any point moving forward then delivery would be recommended at the time of diagnosis.\"    Since patient already had a US for FGR on 6/18, had an NST done today, and will be induced on Monday 6/24, [facility] is wondering if the US for FGR that was ordered yesterday is necessary. They also have no availability for tomorrow, which would be the only time this scan could be done before pt is induced. Routed to CNM team for confirmation that they do not need to schedule this scan.    Once I hear from CNM team, I will email [facility] RN to let her know what they say.  "

## 2024-06-20 NOTE — PLAN OF CARE
Goal Outcome Evaluation:             Data: Patient admitted to room [facility] at 08.0. Patient is a . Prenatal record reviewed.   OB History    Para Term  AB Living   10 4 4 0 5 3   SAB IAB Ectopic Multiple Live Births   5 0 0 0 4      # Outcome Date GA Lbr Michael/2nd Weight Sex Type Anes PTL Lv   10 Current            9 Term 22 37w6d 03:30 / 00:04 2.81 kg (6 lb 3.1 oz) M Vag-Spont None N RAMILA      Name: ROSIE LIRIANO      Apgar1: 9  Apgar5: 9   8 SAB 2021 10w0d    AB, MISSED         Birth Comments: MAB, baby stopped growing 8 weeks, D & C 11 weeks   7 Term 07/15/19 37w6d 04:04 / 00:09 2.55 kg (5 lb 10 oz) F Vag-Spont None N RAMILA      Birth Comments: PROM, spontaneous labor, 5 hour labor, epidural, 2nd degree, PPH      Complications: Hemorrhage      Name: DORITA LIRIANO      Apgar1: 8  Apgar5: 9   6 Term 18 39w3d 01:16 / 00:20 2.75 kg (6 lb 1 oz) M Vag-Spont None N ND      Birth Comments: IUFD, no cardiac activity on admission prior to delivery, NSVB, intact no PPH, Admitted postpartum for preeclampsia with severe features. She presented to the ED with sudden onset of fatigue, numbness and tingling and shortness of breath.      Complications: Fetal demise > 22 weeks, delivered, current hospitalization      Name: Jay   5 SAB 17 11w0d    AB, MISSED         Birth Comments: BAby stopped growing 11 weeks, D & C at 15 weeks, NL blood loss   4 SAB 2017 5w0d             Birth Comments: SAB. no complication   3 Term 12/03/15 38w5d 03:45 / 01:16 3.11 kg (6 lb 13.7 oz) M Vag-Spont EPI  RAMILA      Birth Comments: PROM, spontaneous labor, 5 hour labor, epidural, 2nd degree, no PPH      Name: Lee Ann      Apgar1: 9  Apgar5: 9   2 SAB 2014 5w0d             Birth Comments: SAB no complications   1 2014 5w0d             Birth Comments: SAB, no complications      Obstetric Comments   HTN, PreE,  labor, Postpartum hemorrhage, and  mood disorder   2018 Severe Pre E  Postpartum after term IUFD 39 weeks   2019: shortened cervix with cerclage, delivered at 37/6 weeks, PPH   Recurrent pregnancy loss      .  Medical History:   Past Medical History:   Diagnosis Date    Anemia 2022    Depressive disorder     PPD following fetal loss    History of cervical cerclage 2021    3/2019: Placed after fetal anatomy scan at 21 wks (last pregnancy), then delivered at 37.6 wks     21 per [facility]:   - Prophylactic cerclage at as soon as available, plan for next week  - Comprehensive ultrasound at 18-20 weeks and serial growth ultrasonography every 4 weeks  - Administration of  corticosteroids if the patient is deemed to be at increased risk of imminent  delive    History of IUFD 2021- IUFD boy from placental abruption.  18: placenta pathology reviewed. Hematoma infarct noted, see formal report.     Per [facility] 21  - Weekly BPPs after 32 weeks  - Deliver at 38 weeks  [facility] consultation  recommend serial evaluation of fetal growth every 4 weeks in addition to  weekly  surveillance starting at 32 weeks and delivery at 38 weeks as previously discussed. She kandis    History of postpartum hemorrhage 2022    Plan TXA prior to birth  AMSTL    History of recurrent  2018    Taking prometrium    History of severe pre-eclampsia 2018    Hx of cervical incompetence in pregnancy 2021    Nausea/vomiting in pregnancy 10/05/2021    Recurrent pregnancy loss 2023    Severe pre-eclampsia, postpartum condition or complication 2018    Thrombocytopenia (H24) 2022    Varicella     Vitamin D deficiency 2018   .  Gestational age 36w2d. Vital signs per doc flowsheet. Fetal movement present. Patient reports cerclage removal   as reason for admission. Support persons Nilson present.  Action: Care of patient assumed at 0830. Verbal consent for EFM, external fetal monitors applied. Admission assessment completed.  Patient and support persons educated on labor process. Patient instructed to report change in fetal movement, contractions, vaginal leaking of fluid or bleeding, abdominal pain, or any concerns related to the pregnancy to her nurse/physician. Patient oriented to room, call light in reach.   Response: Dr. Marroquin informed of arrival. Patient verbalized understanding of education and verbalized agreement with plan. Dr Marroquin notified that NST time extended.     Addendum 0950, cerclage removed, tracing reviewed with provider. NST reactive, patient ok to discharge with return precautions and plan for return on 6/24 for IOL. Jade PARTIDA CNM notified of plan. Discharged ambulatory at 1000.

## 2024-06-20 NOTE — PROGRESS NOTES
Triage Note       S: Patient presents today for cerclage removal. Denies contractions, vaginal bleeding, LOF. +FM. Nervous about pain control as she reports removal of her last cerclage was uncomfortable.     Pregnancy Complicated By:   - Hx cervical incompetency   - Hx rescue cerclage, 21 weeks with subsequent term pregnancy   - Hx history-indicated cerclage, with subsequent term pregnancy   - Fetal growth restriction, EFW 7.5%ile, AC <1st%ile   - Hx Severe preeclampsia, PTA ASA   - Hx term  demise (2018), 2/2 placental abruption in the setting of severe preeclampsia   - Elevated blood pressures   - Hx PPD, following  demise   - Hx recurrent pregnancy loss, APLS testing negative   - Gestational thrombocytopenia   - GBS positive status     O: /80   Temp 98.3  F (36.8  C) (Oral)   Resp 18   LMP 10/15/2023 (Approximate)        A/P: Pam Syed is a 34 year old  at 36w2d who presents for cerclage removal. Placed on  by Dr. Rubio and Dr. Jordan. One #2 Ethilon suture used with knots tied at 12 o' clock position. Removed intact.     # Cerclage Removal   - Provided nitrous oxide for pain management   - Tolerated removal without additional complications  - Reviewed return precautions including heavy bright vaginal bleeding, leakage of fluid, painful contractions, or decreased fetal movement.  - Given history will plan to deliver at 37 weeks gestation. Desires delivery with CNM providers     # Elevated Blood pressures   Found to have elevated blood pressures less than 4 hours apart on evaluation . HELLP labs notable for thrombocytopenia (known gTCP) otherwise within normal limits.   - Pressures normotensive during evaluation; not meeting criteria for hypertensive disorder in pregnancy at this time    # Fetal Wellbeing   - NST reactive     Dispo: home with plans to present for scheduled IOL on  with [facility] CNM providers.    Discussed with Dr. Shahla Marroquin MD, MPH,  MS  Obstetrics, Gynecology & Women's Health   Resident, PGY-3  06/20/2024 4:02 PM

## 2024-06-24 ENCOUNTER — HOSPITAL ENCOUNTER (INPATIENT)
Facility: CLINIC | Age: 34
LOS: 2 days | Discharge: HOME | End: 2024-06-26
Attending: MIDWIFE | Admitting: ADVANCED PRACTICE MIDWIFE
Payer: COMMERCIAL

## 2024-06-24 DIAGNOSIS — O13.9 GESTATIONAL HYPERTENSION, ANTEPARTUM: ICD-10-CM

## 2024-06-24 DIAGNOSIS — Z87.59 HISTORY OF SEVERE PRE-ECLAMPSIA: Chronic | ICD-10-CM

## 2024-06-24 LAB
ABO/RH(D): NORMAL
ACANTHOCYTES BLD QL SMEAR: NORMAL
ALBUMIN MFR UR ELPH: 40.3 MG/DL
ALBUMIN SERPL BCG-MCNC: 3.4 G/DL (ref 3.5–5.2)
ALBUMIN SERPL BCG-MCNC: 3.5 G/DL (ref 3.5–5.2)
ALP SERPL-CCNC: 150 U/L (ref 40–150)
ALP SERPL-CCNC: 160 U/L (ref 40–150)
ALT SERPL W P-5'-P-CCNC: 10 U/L (ref 0–50)
ALT SERPL W P-5'-P-CCNC: 9 U/L (ref 0–50)
ANION GAP SERPL CALCULATED.3IONS-SCNC: 13 MMOL/L (ref 7–15)
ANION GAP SERPL CALCULATED.3IONS-SCNC: 14 MMOL/L (ref 7–15)
ANTIBODY SCREEN: NEGATIVE
APTT PPP: 27 SECONDS (ref 22–38)
AST SERPL W P-5'-P-CCNC: 14 U/L (ref 0–45)
AST SERPL W P-5'-P-CCNC: 15 U/L (ref 0–45)
AUER BODIES BLD QL SMEAR: NORMAL
BASO STIPL BLD QL SMEAR: NORMAL
BILIRUB SERPL-MCNC: 0.3 MG/DL
BILIRUB SERPL-MCNC: 0.3 MG/DL
BITE CELLS BLD QL SMEAR: NORMAL
BLISTER CELLS BLD QL SMEAR: NORMAL
BUN SERPL-MCNC: 11.7 MG/DL (ref 6–20)
BUN SERPL-MCNC: 9.2 MG/DL (ref 6–20)
BURR CELLS BLD QL SMEAR: NORMAL
CALCIUM SERPL-MCNC: 9 MG/DL (ref 8.6–10)
CALCIUM SERPL-MCNC: 9.2 MG/DL (ref 8.6–10)
CHLORIDE SERPL-SCNC: 106 MMOL/L (ref 98–107)
CHLORIDE SERPL-SCNC: 106 MMOL/L (ref 98–107)
CREAT SERPL-MCNC: 0.61 MG/DL (ref 0.51–0.95)
CREAT SERPL-MCNC: 0.63 MG/DL (ref 0.51–0.95)
CREAT UR-MCNC: 115.9 MG/DL
DACRYOCYTES BLD QL SMEAR: NORMAL
DEPRECATED HCO3 PLAS-SCNC: 19 MMOL/L (ref 22–29)
DEPRECATED HCO3 PLAS-SCNC: 19 MMOL/L (ref 22–29)
EGFRCR SERPLBLD CKD-EPI 2021: >90 ML/MIN/1.73M2
EGFRCR SERPLBLD CKD-EPI 2021: >90 ML/MIN/1.73M2
ELLIPTOCYTES BLD QL SMEAR: NORMAL
ERYTHROCYTE [DISTWIDTH] IN BLOOD BY AUTOMATED COUNT: 17.3 % (ref 10–15)
ERYTHROCYTE [DISTWIDTH] IN BLOOD BY AUTOMATED COUNT: 17.5 % (ref 10–15)
FIBRINOGEN PPP-MCNC: 377 MG/DL (ref 170–490)
FRAGMENTS BLD QL SMEAR: NORMAL
GLUCOSE SERPL-MCNC: 103 MG/DL (ref 70–99)
GLUCOSE SERPL-MCNC: 111 MG/DL (ref 70–99)
HCT VFR BLD AUTO: 33.2 % (ref 35–47)
HCT VFR BLD AUTO: 35.1 % (ref 35–47)
HGB BLD-MCNC: 11.2 G/DL (ref 11.7–15.7)
HGB BLD-MCNC: 12.1 G/DL (ref 11.7–15.7)
HGB C CRYSTALS: NORMAL
HOWELL-JOLLY BOD BLD QL SMEAR: NORMAL
INR PPP: 1.01 (ref 0.85–1.15)
MCH RBC QN AUTO: 29 PG (ref 26.5–33)
MCH RBC QN AUTO: 29.7 PG (ref 26.5–33)
MCHC RBC AUTO-ENTMCNC: 33.7 G/DL (ref 31.5–36.5)
MCHC RBC AUTO-ENTMCNC: 34.5 G/DL (ref 31.5–36.5)
MCV RBC AUTO: 86 FL (ref 78–100)
MCV RBC AUTO: 86 FL (ref 78–100)
NEUTS HYPERSEG BLD QL SMEAR: NORMAL
PLAT MORPH BLD: NORMAL
PLATELET # BLD AUTO: 105 10E3/UL (ref 150–450)
PLATELET # BLD AUTO: 117 10E3/UL (ref 150–450)
POLYCHROMASIA BLD QL SMEAR: NORMAL
POTASSIUM SERPL-SCNC: 3.9 MMOL/L (ref 3.4–5.3)
POTASSIUM SERPL-SCNC: 4 MMOL/L (ref 3.4–5.3)
PROT SERPL-MCNC: 6.2 G/DL (ref 6.4–8.3)
PROT SERPL-MCNC: 6.5 G/DL (ref 6.4–8.3)
PROT/CREAT 24H UR: 0.35 MG/MG CR (ref 0–0.2)
RBC # BLD AUTO: 3.86 10E6/UL (ref 3.8–5.2)
RBC # BLD AUTO: 4.08 10E6/UL (ref 3.8–5.2)
RBC AGGLUT BLD QL: NORMAL
RBC MORPH BLD: NORMAL
ROULEAUX BLD QL SMEAR: NORMAL
SICKLE CELLS BLD QL SMEAR: NORMAL
SMUDGE CELLS BLD QL SMEAR: NORMAL
SODIUM SERPL-SCNC: 138 MMOL/L (ref 135–145)
SODIUM SERPL-SCNC: 139 MMOL/L (ref 135–145)
SPECIMEN EXPIRATION DATE: NORMAL
SPHEROCYTES BLD QL SMEAR: NORMAL
STOMATOCYTES BLD QL SMEAR: NORMAL
T PALLIDUM AB SER QL: NONREACTIVE
TARGETS BLD QL SMEAR: NORMAL
TOXIC GRANULES BLD QL SMEAR: NORMAL
VARIANT LYMPHS BLD QL SMEAR: NORMAL
WBC # BLD AUTO: 15.2 10E3/UL (ref 4–11)
WBC # BLD AUTO: 8.2 10E3/UL (ref 4–11)

## 2024-06-24 PROCEDURE — 86780 TREPONEMA PALLIDUM: CPT | Performed by: ADVANCED PRACTICE MIDWIFE

## 2024-06-24 PROCEDURE — 85610 PROTHROMBIN TIME: CPT | Performed by: ADVANCED PRACTICE MIDWIFE

## 2024-06-24 PROCEDURE — 250N000011 HC RX IP 250 OP 636: Mod: JZ | Performed by: ADVANCED PRACTICE MIDWIFE

## 2024-06-24 PROCEDURE — 36415 COLL VENOUS BLD VENIPUNCTURE: CPT | Performed by: ADVANCED PRACTICE MIDWIFE

## 2024-06-24 PROCEDURE — 85384 FIBRINOGEN ACTIVITY: CPT | Performed by: ADVANCED PRACTICE MIDWIFE

## 2024-06-24 PROCEDURE — 84156 ASSAY OF PROTEIN URINE: CPT | Performed by: ADVANCED PRACTICE MIDWIFE

## 2024-06-24 PROCEDURE — 258N000003 HC RX IP 258 OP 636: Mod: JZ | Performed by: ADVANCED PRACTICE MIDWIFE

## 2024-06-24 PROCEDURE — 82247 BILIRUBIN TOTAL: CPT | Performed by: ADVANCED PRACTICE MIDWIFE

## 2024-06-24 PROCEDURE — 84450 TRANSFERASE (AST) (SGOT): CPT | Performed by: ADVANCED PRACTICE MIDWIFE

## 2024-06-24 PROCEDURE — 250N000009 HC RX 250: Performed by: ADVANCED PRACTICE MIDWIFE

## 2024-06-24 PROCEDURE — 84155 ASSAY OF PROTEIN SERUM: CPT | Performed by: ADVANCED PRACTICE MIDWIFE

## 2024-06-24 PROCEDURE — 86900 BLOOD TYPING SEROLOGIC ABO: CPT | Performed by: ADVANCED PRACTICE MIDWIFE

## 2024-06-24 PROCEDURE — 85027 COMPLETE CBC AUTOMATED: CPT | Performed by: ADVANCED PRACTICE MIDWIFE

## 2024-06-24 PROCEDURE — 999N000016 HC STATISTIC ATTENDANCE AT DELIVERY

## 2024-06-24 PROCEDURE — 59400 OBSTETRICAL CARE: CPT | Performed by: ADVANCED PRACTICE MIDWIFE

## 2024-06-24 PROCEDURE — 120N000002 HC R&B MED SURG/OB UMMC

## 2024-06-24 PROCEDURE — 722N000001 HC LABOR CARE VAGINAL DELIVERY SINGLE

## 2024-06-24 PROCEDURE — 250N000013 HC RX MED GY IP 250 OP 250 PS 637: Performed by: ADVANCED PRACTICE MIDWIFE

## 2024-06-24 PROCEDURE — 85730 THROMBOPLASTIN TIME PARTIAL: CPT | Performed by: ADVANCED PRACTICE MIDWIFE

## 2024-06-24 PROCEDURE — 10907ZC DRAINAGE OF AMNIOTIC FLUID, THERAPEUTIC FROM PRODUCTS OF CONCEPTION, VIA NATURAL OR ARTIFICIAL OPENING: ICD-10-PCS | Performed by: ADVANCED PRACTICE MIDWIFE

## 2024-06-24 RX ORDER — OXYTOCIN 10 [USP'U]/ML
INJECTION, SOLUTION INTRAMUSCULAR; INTRAVENOUS
Status: DISCONTINUED
Start: 2024-06-24 | End: 2024-06-24 | Stop reason: WASHOUT

## 2024-06-24 RX ORDER — PENICILLIN G 3000000 [IU]/50ML
3 INJECTION, SOLUTION INTRAVENOUS EVERY 4 HOURS
Status: DISCONTINUED | OUTPATIENT
Start: 2024-06-24 | End: 2024-06-24 | Stop reason: HOSPADM

## 2024-06-24 RX ORDER — KETOROLAC TROMETHAMINE 30 MG/ML
30 INJECTION, SOLUTION INTRAMUSCULAR; INTRAVENOUS
Status: COMPLETED | OUTPATIENT
Start: 2024-06-24 | End: 2024-06-24

## 2024-06-24 RX ORDER — NALOXONE HYDROCHLORIDE 0.4 MG/ML
0.4 INJECTION, SOLUTION INTRAMUSCULAR; INTRAVENOUS; SUBCUTANEOUS
Status: DISCONTINUED | OUTPATIENT
Start: 2024-06-24 | End: 2024-06-24 | Stop reason: HOSPADM

## 2024-06-24 RX ORDER — ACETAMINOPHEN 325 MG/1
975 TABLET ORAL EVERY 6 HOURS PRN
Status: DISCONTINUED | OUTPATIENT
Start: 2024-06-24 | End: 2024-06-24

## 2024-06-24 RX ORDER — PROCHLORPERAZINE MALEATE 10 MG
10 TABLET ORAL EVERY 6 HOURS PRN
Status: DISCONTINUED | OUTPATIENT
Start: 2024-06-24 | End: 2024-06-24 | Stop reason: HOSPADM

## 2024-06-24 RX ORDER — SODIUM CHLORIDE, SODIUM LACTATE, POTASSIUM CHLORIDE, CALCIUM CHLORIDE 600; 310; 30; 20 MG/100ML; MG/100ML; MG/100ML; MG/100ML
INJECTION, SOLUTION INTRAVENOUS CONTINUOUS PRN
Status: DISCONTINUED | OUTPATIENT
Start: 2024-06-24 | End: 2024-06-24 | Stop reason: HOSPADM

## 2024-06-24 RX ORDER — FENTANYL CITRATE 50 UG/ML
100 INJECTION, SOLUTION INTRAMUSCULAR; INTRAVENOUS ONCE
Status: COMPLETED | OUTPATIENT
Start: 2024-06-24 | End: 2024-06-24

## 2024-06-24 RX ORDER — MISOPROSTOL 200 UG/1
400 TABLET ORAL
Status: DISCONTINUED | OUTPATIENT
Start: 2024-06-24 | End: 2024-06-26 | Stop reason: HOSPADM

## 2024-06-24 RX ORDER — BISACODYL 10 MG
10 SUPPOSITORY, RECTAL RECTAL DAILY PRN
Status: DISCONTINUED | OUTPATIENT
Start: 2024-06-24 | End: 2024-06-26 | Stop reason: HOSPADM

## 2024-06-24 RX ORDER — LIDOCAINE 40 MG/G
CREAM TOPICAL
Status: DISCONTINUED | OUTPATIENT
Start: 2024-06-24 | End: 2024-06-24 | Stop reason: HOSPADM

## 2024-06-24 RX ORDER — MODIFIED LANOLIN
OINTMENT (GRAM) TOPICAL
Status: DISCONTINUED | OUTPATIENT
Start: 2024-06-24 | End: 2024-06-26 | Stop reason: HOSPADM

## 2024-06-24 RX ORDER — LOPERAMIDE HCL 2 MG
4 CAPSULE ORAL
Status: DISCONTINUED | OUTPATIENT
Start: 2024-06-24 | End: 2024-06-24 | Stop reason: HOSPADM

## 2024-06-24 RX ORDER — MISOPROSTOL 200 UG/1
400 TABLET ORAL
Status: DISCONTINUED | OUTPATIENT
Start: 2024-06-24 | End: 2024-06-24 | Stop reason: HOSPADM

## 2024-06-24 RX ORDER — MISOPROSTOL 200 UG/1
800 TABLET ORAL
Status: DISCONTINUED | OUTPATIENT
Start: 2024-06-24 | End: 2024-06-24 | Stop reason: HOSPADM

## 2024-06-24 RX ORDER — METOCLOPRAMIDE 10 MG/1
10 TABLET ORAL EVERY 6 HOURS PRN
Status: DISCONTINUED | OUTPATIENT
Start: 2024-06-24 | End: 2024-06-24 | Stop reason: HOSPADM

## 2024-06-24 RX ORDER — OXYTOCIN/0.9 % SODIUM CHLORIDE 30/500 ML
100-340 PLASTIC BAG, INJECTION (ML) INTRAVENOUS CONTINUOUS PRN
Status: DISCONTINUED | OUTPATIENT
Start: 2024-06-24 | End: 2024-06-24

## 2024-06-24 RX ORDER — ONDANSETRON 2 MG/ML
4 INJECTION INTRAMUSCULAR; INTRAVENOUS EVERY 6 HOURS PRN
Status: DISCONTINUED | OUTPATIENT
Start: 2024-06-24 | End: 2024-06-24 | Stop reason: HOSPADM

## 2024-06-24 RX ORDER — TRANEXAMIC ACID 10 MG/ML
1 INJECTION, SOLUTION INTRAVENOUS EVERY 30 MIN PRN
Status: DISCONTINUED | OUTPATIENT
Start: 2024-06-24 | End: 2024-06-24 | Stop reason: HOSPADM

## 2024-06-24 RX ORDER — PROCHLORPERAZINE 25 MG
25 SUPPOSITORY, RECTAL RECTAL EVERY 12 HOURS PRN
Status: DISCONTINUED | OUTPATIENT
Start: 2024-06-24 | End: 2024-06-24 | Stop reason: HOSPADM

## 2024-06-24 RX ORDER — OXYTOCIN/0.9 % SODIUM CHLORIDE 30/500 ML
340 PLASTIC BAG, INJECTION (ML) INTRAVENOUS CONTINUOUS PRN
Status: DISCONTINUED | OUTPATIENT
Start: 2024-06-24 | End: 2024-06-26 | Stop reason: HOSPADM

## 2024-06-24 RX ORDER — CARBOPROST TROMETHAMINE 250 UG/ML
250 INJECTION, SOLUTION INTRAMUSCULAR
Status: DISCONTINUED | OUTPATIENT
Start: 2024-06-24 | End: 2024-06-26 | Stop reason: HOSPADM

## 2024-06-24 RX ORDER — ONDANSETRON 4 MG/1
4 TABLET, ORALLY DISINTEGRATING ORAL EVERY 6 HOURS PRN
Status: DISCONTINUED | OUTPATIENT
Start: 2024-06-24 | End: 2024-06-24 | Stop reason: HOSPADM

## 2024-06-24 RX ORDER — MISOPROSTOL 200 UG/1
800 TABLET ORAL
Status: DISCONTINUED | OUTPATIENT
Start: 2024-06-24 | End: 2024-06-26 | Stop reason: HOSPADM

## 2024-06-24 RX ORDER — LOPERAMIDE HCL 2 MG
2 CAPSULE ORAL
Status: DISCONTINUED | OUTPATIENT
Start: 2024-06-24 | End: 2024-06-24 | Stop reason: HOSPADM

## 2024-06-24 RX ORDER — TRANEXAMIC ACID 10 MG/ML
1 INJECTION, SOLUTION INTRAVENOUS EVERY 30 MIN PRN
Status: DISCONTINUED | OUTPATIENT
Start: 2024-06-24 | End: 2024-06-26 | Stop reason: HOSPADM

## 2024-06-24 RX ORDER — IBUPROFEN 800 MG/1
800 TABLET, FILM COATED ORAL
Status: COMPLETED | OUTPATIENT
Start: 2024-06-24 | End: 2024-06-24

## 2024-06-24 RX ORDER — PENICILLIN G POTASSIUM 5000000 [IU]/1
5 INJECTION, POWDER, FOR SOLUTION INTRAMUSCULAR; INTRAVENOUS ONCE
Status: COMPLETED | OUTPATIENT
Start: 2024-06-24 | End: 2024-06-24

## 2024-06-24 RX ORDER — IBUPROFEN 800 MG/1
800 TABLET, FILM COATED ORAL EVERY 6 HOURS PRN
Status: DISCONTINUED | OUTPATIENT
Start: 2024-06-25 | End: 2024-06-26 | Stop reason: HOSPADM

## 2024-06-24 RX ORDER — CARBOPROST TROMETHAMINE 250 UG/ML
250 INJECTION, SOLUTION INTRAMUSCULAR
Status: DISCONTINUED | OUTPATIENT
Start: 2024-06-24 | End: 2024-06-24 | Stop reason: HOSPADM

## 2024-06-24 RX ORDER — CEFAZOLIN SODIUM 2 G/100ML
2 INJECTION, SOLUTION INTRAVENOUS ONCE
Status: COMPLETED | OUTPATIENT
Start: 2024-06-24 | End: 2024-06-24

## 2024-06-24 RX ORDER — ACETAMINOPHEN 325 MG/1
650 TABLET ORAL EVERY 4 HOURS PRN
Status: DISCONTINUED | OUTPATIENT
Start: 2024-06-24 | End: 2024-06-26 | Stop reason: HOSPADM

## 2024-06-24 RX ORDER — LOPERAMIDE HCL 2 MG
2 CAPSULE ORAL
Status: DISCONTINUED | OUTPATIENT
Start: 2024-06-24 | End: 2024-06-26 | Stop reason: HOSPADM

## 2024-06-24 RX ORDER — OXYTOCIN/0.9 % SODIUM CHLORIDE 30/500 ML
1-24 PLASTIC BAG, INJECTION (ML) INTRAVENOUS CONTINUOUS
Status: DISCONTINUED | OUTPATIENT
Start: 2024-06-24 | End: 2024-06-24 | Stop reason: HOSPADM

## 2024-06-24 RX ORDER — NALOXONE HYDROCHLORIDE 0.4 MG/ML
0.2 INJECTION, SOLUTION INTRAMUSCULAR; INTRAVENOUS; SUBCUTANEOUS
Status: DISCONTINUED | OUTPATIENT
Start: 2024-06-24 | End: 2024-06-24 | Stop reason: HOSPADM

## 2024-06-24 RX ORDER — OXYTOCIN 10 [USP'U]/ML
10 INJECTION, SOLUTION INTRAMUSCULAR; INTRAVENOUS
Status: DISCONTINUED | OUTPATIENT
Start: 2024-06-24 | End: 2024-06-26 | Stop reason: HOSPADM

## 2024-06-24 RX ORDER — METOCLOPRAMIDE HYDROCHLORIDE 5 MG/ML
10 INJECTION INTRAMUSCULAR; INTRAVENOUS EVERY 6 HOURS PRN
Status: DISCONTINUED | OUTPATIENT
Start: 2024-06-24 | End: 2024-06-24 | Stop reason: HOSPADM

## 2024-06-24 RX ORDER — OXYTOCIN/0.9 % SODIUM CHLORIDE 30/500 ML
340 PLASTIC BAG, INJECTION (ML) INTRAVENOUS CONTINUOUS PRN
Status: DISCONTINUED | OUTPATIENT
Start: 2024-06-24 | End: 2024-06-24 | Stop reason: HOSPADM

## 2024-06-24 RX ORDER — MISOPROSTOL 200 UG/1
TABLET ORAL
Status: COMPLETED
Start: 2024-06-24 | End: 2024-06-24

## 2024-06-24 RX ORDER — DOCUSATE SODIUM 100 MG/1
100 CAPSULE, LIQUID FILLED ORAL DAILY
Status: DISCONTINUED | OUTPATIENT
Start: 2024-06-24 | End: 2024-06-26 | Stop reason: HOSPADM

## 2024-06-24 RX ORDER — LOPERAMIDE HCL 2 MG
4 CAPSULE ORAL
Status: COMPLETED | OUTPATIENT
Start: 2024-06-24 | End: 2024-06-24

## 2024-06-24 RX ORDER — OXYTOCIN 10 [USP'U]/ML
10 INJECTION, SOLUTION INTRAMUSCULAR; INTRAVENOUS
Status: DISCONTINUED | OUTPATIENT
Start: 2024-06-24 | End: 2024-06-24 | Stop reason: HOSPADM

## 2024-06-24 RX ORDER — LIDOCAINE HYDROCHLORIDE 10 MG/ML
INJECTION, SOLUTION EPIDURAL; INFILTRATION; INTRACAUDAL; PERINEURAL
Status: DISCONTINUED
Start: 2024-06-24 | End: 2024-06-24 | Stop reason: WASHOUT

## 2024-06-24 RX ORDER — ONDANSETRON 2 MG/ML
8 INJECTION INTRAMUSCULAR; INTRAVENOUS ONCE
Status: DISCONTINUED | OUTPATIENT
Start: 2024-06-24 | End: 2024-06-24

## 2024-06-24 RX ORDER — METHYLERGONOVINE MALEATE 0.2 MG/ML
200 INJECTION INTRAVENOUS
Status: DISCONTINUED | OUTPATIENT
Start: 2024-06-24 | End: 2024-06-26 | Stop reason: HOSPADM

## 2024-06-24 RX ORDER — ONDANSETRON 2 MG/ML
4 INJECTION INTRAMUSCULAR; INTRAVENOUS ONCE
Status: DISCONTINUED | OUTPATIENT
Start: 2024-06-24 | End: 2024-06-26 | Stop reason: HOSPADM

## 2024-06-24 RX ORDER — HYDROCORTISONE 25 MG/G
CREAM TOPICAL 3 TIMES DAILY PRN
Status: DISCONTINUED | OUTPATIENT
Start: 2024-06-24 | End: 2024-06-26 | Stop reason: HOSPADM

## 2024-06-24 RX ORDER — CITRIC ACID/SODIUM CITRATE 334-500MG
30 SOLUTION, ORAL ORAL
Status: DISCONTINUED | OUTPATIENT
Start: 2024-06-24 | End: 2024-06-24 | Stop reason: HOSPADM

## 2024-06-24 RX ORDER — FENTANYL CITRATE 50 UG/ML
100 INJECTION, SOLUTION INTRAMUSCULAR; INTRAVENOUS
Status: DISCONTINUED | OUTPATIENT
Start: 2024-06-24 | End: 2024-06-24 | Stop reason: HOSPADM

## 2024-06-24 RX ORDER — METHYLERGONOVINE MALEATE 0.2 MG/ML
200 INJECTION INTRAVENOUS
Status: DISCONTINUED | OUTPATIENT
Start: 2024-06-24 | End: 2024-06-24 | Stop reason: HOSPADM

## 2024-06-24 RX ORDER — OXYTOCIN 10 [USP'U]/ML
10 INJECTION, SOLUTION INTRAMUSCULAR; INTRAVENOUS
Status: DISCONTINUED | OUTPATIENT
Start: 2024-06-24 | End: 2024-06-24

## 2024-06-24 RX ORDER — OXYTOCIN/0.9 % SODIUM CHLORIDE 30/500 ML
PLASTIC BAG, INJECTION (ML) INTRAVENOUS
Status: DISCONTINUED
Start: 2024-06-24 | End: 2024-06-24 | Stop reason: WASHOUT

## 2024-06-24 RX ADMIN — KETOROLAC TROMETHAMINE 30 MG: 30 INJECTION, SOLUTION INTRAMUSCULAR at 18:45

## 2024-06-24 RX ADMIN — Medication 340 ML/HR: at 18:14

## 2024-06-24 RX ADMIN — MISOPROSTOL 800 MCG: 200 TABLET ORAL at 19:14

## 2024-06-24 RX ADMIN — LOPERAMIDE HYDROCHLORIDE 2 MG: 2 CAPSULE ORAL at 23:40

## 2024-06-24 RX ADMIN — PENICILLIN G 3 MILLION UNITS: 3000000 INJECTION, SOLUTION INTRAVENOUS at 14:01

## 2024-06-24 RX ADMIN — LOPERAMIDE HYDROCHLORIDE 4 MG: 2 CAPSULE ORAL at 19:45

## 2024-06-24 RX ADMIN — ACETAMINOPHEN 650 MG: 325 TABLET, FILM COATED ORAL at 23:05

## 2024-06-24 RX ADMIN — SODIUM CHLORIDE, POTASSIUM CHLORIDE, SODIUM LACTATE AND CALCIUM CHLORIDE: 600; 310; 30; 20 INJECTION, SOLUTION INTRAVENOUS at 10:01

## 2024-06-24 RX ADMIN — Medication 2 MILLI-UNITS/MIN: at 10:01

## 2024-06-24 RX ADMIN — PENICILLIN G POTASSIUM 5 MILLION UNITS: 5000000 POWDER, FOR SOLUTION INTRAMUSCULAR; INTRAPLEURAL; INTRATHECAL; INTRAVENOUS at 10:02

## 2024-06-24 RX ADMIN — TRANEXAMIC ACID 1 G: 10 INJECTION, SOLUTION INTRAVENOUS at 17:57

## 2024-06-24 RX ADMIN — CEFAZOLIN SODIUM 2 G: 2 INJECTION, SOLUTION INTRAVENOUS at 19:57

## 2024-06-24 RX ADMIN — FENTANYL CITRATE 100 MCG: 50 INJECTION INTRAMUSCULAR; INTRAVENOUS at 19:25

## 2024-06-24 RX ADMIN — CARBOPROST TROMETHAMINE 250 MCG: 250 INJECTION, SOLUTION INTRAMUSCULAR at 19:38

## 2024-06-24 RX ADMIN — ONDANSETRON 4 MG: 2 INJECTION INTRAMUSCULAR; INTRAVENOUS at 20:38

## 2024-06-24 ASSESSMENT — ACTIVITIES OF DAILY LIVING (ADL)
ADLS_ACUITY_SCORE: 20
ADLS_ACUITY_SCORE: 35
ADLS_ACUITY_SCORE: 20

## 2024-06-24 NOTE — PLAN OF CARE
Pam is here for IOL for FGR. Her miller score was 7. IV Pitocin at 2 units started at 1001.    Her BP was 144/89 at 1011. Repeat BP in 15 minutes was 147/90. Provider Justice Cosby notified in department. Urine sample sent for proteinuria. HELPP labs ordered. She complained of headache and neck pain. Hot packs provided and received the order for Tylenol.     Report given and care transferred to MIRIAM Carrasquillo at 1100.

## 2024-06-24 NOTE — PROGRESS NOTES
Data: Patient admitted to room 442 at 0745. Patient is a . Prenatal record reviewed.   OB History    Para Term  AB Living   10 4 4 0 5 3   SAB IAB Ectopic Multiple Live Births   5 0 0 0 4      # Outcome Date GA Lbr Imchael/2nd Weight Sex Type Anes PTL Lv   10 Current            9 Term 22 37w6d 03:30 / 00:04 2.81 kg (6 lb 3.1 oz) M Vag-Spont None N RAMILA      Name: ROSIE LIRIANO      Apgar1: 9  Apgar5: 9   8 SAB 2021 10w0d    AB, MISSED         Birth Comments: MAB, baby stopped growing 8 weeks, D & C 11 weeks   7 Term 07/15/19 37w6d 04:04 / 00:09 2.55 kg (5 lb 10 oz) F Vag-Spont None N RAMILA      Birth Comments: PROM, spontaneous labor, 5 hour labor, epidural, 2nd degree, PPH      Complications: Hemorrhage      Name: DORITA LIRIANO      Apgar1: 8  Apgar5: 9   6 Term 18 39w3d 01:16 / 00:20 2.75 kg (6 lb 1 oz) M Vag-Spont None N ND      Birth Comments: IUFD, no cardiac activity on admission prior to delivery, NSVB, intact no PPH, Admitted postpartum for preeclampsia with severe features. She presented to the ED with sudden onset of fatigue, numbness and tingling and shortness of breath.      Complications: Fetal demise > 22 weeks, delivered, current hospitalization      Name: Jay   5 17 11w0d    AB, MISSED         Birth Comments: BAby stopped growing 11 weeks, D & C at 15 weeks, NL blood loss   4 2017 5w0d             Birth Comments: SAB. no complication   3 Term 12/03/15 38w5d 03:45 / 01:16 3.11 kg (6 lb 13.7 oz) M Vag-Spont EPI  RAMILA      Birth Comments: PROM, spontaneous labor, 5 hour labor, epidural, 2nd degree, no PPH      Name: Lee Ann      Apgar1: 9  Apgar5: 9   2 SAB 2014 5w0d             Birth Comments: SAB no complications   1 2014 5w0d             Birth Comments: SAB, no complications      Obstetric Comments   HTN, PreE,  labor, Postpartum hemorrhage, and  mood disorder   2018 Severe Pre E Postpartum after term IUFD 39 weeks    2019: shortened cervix with cerclage, delivered at 37/6 weeks, PPH   Recurrent pregnancy loss      .  Medical History:   Past Medical History:   Diagnosis Date    Anemia 2022    Depressive disorder     PPD following fetal loss    History of cervical cerclage 2021    3/2019: Placed after fetal anatomy scan at 21 wks (last pregnancy), then delivered at 37.6 wks     21 per [facility]:   - Prophylactic cerclage at as soon as available, plan for next week  - Comprehensive ultrasound at 18-20 weeks and serial growth ultrasonography every 4 weeks  - Administration of  corticosteroids if the patient is deemed to be at increased risk of imminent  delive    History of IUFD 2021- IUFD boy from placental abruption.  18: placenta pathology reviewed. Hematoma infarct noted, see formal report.     Per [facility] 21  - Weekly BPPs after 32 weeks  - Deliver at 38 weeks  MF consultation  recommend serial evaluation of fetal growth every 4 weeks in addition to  weekly  surveillance starting at 32 weeks and delivery at 38 weeks as previously discussed. She kandis    History of postpartum hemorrhage 2022    Plan TXA prior to birth  AMSTL    History of recurrent  2018    Taking prometrium    History of severe pre-eclampsia 2018    Hx of cervical incompetence in pregnancy 2021    Nausea/vomiting in pregnancy 10/05/2021    Recurrent pregnancy loss 2023    Severe pre-eclampsia, postpartum condition or complication 2018    Thrombocytopenia (H24) 2022    Varicella     Vitamin D deficiency 2018   .  Gestational age 36w6d. Vital signs per doc flowsheet. Fetal movement present. Patient reports Induction Of Labor   as reason for admission. Support persons spouse Nilson present.  Action: Verbal consent for EFM, external fetal monitors applied. Admission assessment completed. Patient and support persons educated on labor process. Patient  instructed to report change in fetal movement, contractions, vaginal leaking of fluid or bleeding, abdominal pain, or any concerns related to the pregnancy to her nurse/physician. Patient oriented to room, call light in reach.   Response: ANGELIC Cosby informed of patient's arrival and patient status. SVE done and miller score was 7. Plan per provider is induction of labor with Pitocin and start prophylactic antibiotics for positive GBS status. Patient verbalized understanding of education and verbalized agreement with plan.

## 2024-06-24 NOTE — L&D DELIVERY NOTE
Delivery Summary    Pam Syed MRN# [id number]   Age: 34 year old YOB: 1990     ASSESSMENT & PLAN:   Delivery Note    Labor Course:   Pam Syed is a 34 year old   at 36w6d presented to labor floor at 0745 for IOL for fetal growth restriction and history of fetal demise at term. On admission she was 3/60/-2. Pitocin was started at 1000. Labor progressed, patient agreeable to AROM at 1650 for clear fluid. At 1755 had an urge to push with contractions, found to be 8/90/0. TXA for PPH prevention started at this time.  Pt used movement and breathing for comfort measures and for pain management.    Delivery Course:  Progressed to complete at 1807 and started pushing at 1807. NICU called to birth d/t PTD. Pushed effectively. FHR with 135 decels with pushing effort, moderate variability throughout. While in semi-fowlers position the fetal head delivered OA and restituted to JOVANA. No nuchal cord. Shoulders easily delivered under maternal effort. Baby boy was born at 1812 and was immediately placed on mom's abdomen. Spontaneous breath, attempt to cry, well flexed, Hr>100.  IV pitocin started after delivery of infant. Delayed cord clamping for 2.5 minutes then clamped x2 and cut by FOB. Cord blood obtained for typing. Placenta spontaneously delivered intact without difficulty via Schultze mechanism at 1820. Inspection of vagina and perineum revealed an intact perineum. Fundus is firm and midline. Mom and baby are stable.      IUP at 36 weeks gestation delivered on 2024.     delivery of a viable Male infant.  Weight : 5 pounds 4 ounces   Apgars of 8 at 1 minute and 9 at 5 minutes.  Labor was induced.  Medications administered  in labor:  Pain Rx none; Antibiotics Yes: PCN for GBS positive status. Adequately treated  Perineum: Intact  Placenta-mechanism: spontaneous, intact,  with a 3 vessel cord. IV oxytocin was given.  QBL was 130.  Complications of pregnancy, labor and delivery:  None  Birth attendants: ASHIA Goldberg CNM and AMELIA Cornelius     Justen Syed [[id number]]      Labor Event Times      Latent labor onset date/time: 2024 1800    Active labor onset date: 24 Onset time:  6:00 PM   Dilation complete date: 24 Complete time:  6:04 PM   Start pushing date/time: 2024 1805          Labor Events     labor?: Yes  Labor Type: Induction/Cervical ripening  Predominate monitoring during 1st stage: continuous electronic fetal monitoring     Antibiotics received during labor?: Yes  Reason for Antibiotics: GBS  Antibiotics received for GBS: Penicillin  Antibiotics Given (GBS): Greater than 4 hours prior to delivery       Rupture date/time: 24 1649   Rupture type: Artificial Rupture of Membranes  Fluid color: Clear  Fluid odor: Normal     Induction: Oxytocin, AROM  Induction date/time: 24 1001    Cervical ripening date/time:      Indications for induction: Fetal Indications or Congenital Malformations (Comment to specify), Other Pregnant Patient Indications (Comment to specify)       Delivery/Placenta Date and Time      Delivery Date: 24 Delivery Time:  6:12 PM   Placenta Date/Time: 2024  6:20 PM  Oxytocin given at the time of delivery: after delivery of baby  Delivering clinician: Kilo Cosby APRN CNM   Other personnel present at delivery:  Provider Role   Kilo Cosby APRN CNM Certified Nurse Midwife   Jocelyn Spencer Student             Vaginal Counts       Initial count performed by 2 team members:  Two Team Members   kilo cosby melissa Needles Suture Needles Sponges (RETIRED) Instruments   Initial counts 2  5    Added to count       Relief counts       Final counts 2  5            Placed during labor Accounted for at the end of labor   FSE NA NA   IUPC NA NA   Cervidil NA NA                      Final count correct?: Yes  Pre-Birth Team Brief: Complete  Post-Birth Team Debrief: Complete       Apgars     Living status: Living   1 Minute 5 Minute 10 Minute 15 Minute 20 Minute   Skin color: 1  1       Heart rate: 2  2       Reflex irritability: 2  2       Muscle tone: 2  2       Respiratory effort: 1  2       Total: 8  9       Apgars assigned by: VALERIE MEJIA RN       Cord      Vessels: 3 Vessels    Cord Complications: Knot               Cord Blood Disposition: Lab    Gases Sent?: No    Delayed cord clamping?: Yes    Cord Clamping Delay (seconds): >120 seconds    Stem cell collection?: No           [place] Resuscitation    Methods: None  [place] Care at Delivery: Called to delivery midwife Justice Cosby for gestational age of 36+6. Baby with quiet cry at delivery. Cord clamped at 120 seconds of life. Brought to warmer at 2 minutes of life. Warm dried and stimulated. Gross physical exam within normal limits.  nursery to resume care.    This resuscitation and all procedures were performed by this provider/author of this note.   ASHIA Urbina, CNP-BC 2024 6:23 PM          Measurements      Weight: 5 lb 4 oz           Skin to Skin and Feeding Plan      Skin to skin initiation date/time: 1841    Skin to skin with: Mother  Skin to skin end date/time:            Labor Events and Shoulder Dystocia    Fetal Tracing Prior to Delivery: Category 1  Shoulder dystocia present?: Neg       Delivery (Maternal) (Provider to Complete) ([id number])    Episiotomy: None  Perineal lacerations: None    Repair suture: None  Genital tract inspection done: Pos       Blood Loss  Mother: Pam Syed Martínez #[id number]     Start of Mother's Information      Delivery Blood Loss  24 1800 - 24 1850      Delivery QBL (mL) Hospital Encounter 130 mL    Total  130 mL               End of Mother's Information  Mother: Beto Syedlaquita Soliz #[id number]                Delivery - Provider to Complete ([id number])    Delivering clinician: Justice Cosby APRN CNM  Delivery Type (Choose the 1 that will go to the Birth History): Vaginal,  Spontaneous                         Other personnel:  Provider Role   Justice Cosby APRN CNM Certified Nurse Midwife   Jocelyn Spencer Student                    Placenta    Date/Time: 6/24/2024  6:20 PM  Removal: Spontaneous  Comments: Shultze  Disposition: Hospital disposal             Anesthesia    Method: None                    Presentation and Position    Presentation: Vertex     Occiput Anterior                  I, Jocelyn Spencer, am serving as a scribe; to document services personally performed by ANGELIC Rendon APRN based on data collection and the provider's statements to me.   AMELIA Cornelius, RN, BSN    I was present, gowned and gloved for the entire procedure which was scribed by the SNM.  The scribed note accurately reflects my personal services and decisions made by me.  Documentation reflects events of labor and procedures completed.   ASHIA Gipson CNM

## 2024-06-24 NOTE — H&P
"ADMIT NOTE  =================  36w6d    Pam Syed is a 34 year old female with an Patient's last menstrual period was 10/15/2023 (approximate). and Estimated Date of Delivery: 2024 is admitted to the [facility] on 2024 at 9:31 AM for induction of labor.  Indication: fetal growth restriction and history of term IUFD.     HPI  ================  Pam Syed arrived to the birthplace for IOL. Cerclage removed on . She is excited and nervous for labor today.  Reports feeling intermittent mild cramping. Denies leaking of fluid or bleeding. Reports good fetal movement.    Denies fever, cough, SOB or chest pain. Denies having contact with anyone who is Covid-19 positive. Pt has had Covid-19 Vaccinations.   Fetal movement- active  ROM- no  Vaginal bleeding- none  GBS- positive  FOB- at bedside, Nilson    Weight gain- 173 - 149 lbs, Total weight gain- 24 lbs  Height- 63\"  BMI- 26.4  First prenatal visit at 11 weeks, Total visits- 10    PROBLEM LIST  =================  Patient Active Problem List    Diagnosis Date Noted    Labor and delivery, indication for care 2024     Priority: Medium    GBS (group B Streptococcus carrier), +RV culture, currently pregnant 2024     Priority: Medium    Fetal growth restriction antepartum 2024     Priority: Medium     :  After shared decision making regarding delivery timing in the setting of her obstetric history and new diagnosis of FGR we discussed the following plan:  1) NST to be performed inpatient on 2024 at the time of cerclage removal.  2) Delivery is recommended at 37 weeks gestation.   3) If meets criteria for gestational hypertension or preeclampsia at any point moving forward then delivery would be recommended at the time of diagnosis.     Vita Teresa MD  Specialist in Maternal-Fetal Medicine         Gestational thrombocytopenia (H24) 2024     Priority: Medium     Monthly plts until 36 weeks and then weekly. "       Heartburn during pregnancy in second trimester 03/05/2024     Priority: Medium    Right sciatic nerve pain 03/05/2024     Priority: Medium     3/4/24 right sciatic nerve pain      Cervical insufficiency during pregnancy in second trimester, antepartum 01/19/2024     Priority: Medium     Cervical cerclage procedure 1/24/24      High-risk pregnancy, elderly multigravida, unspecified trimester 12/27/2023     Priority: Medium     [facility] Patient Provider Group choice: CNM group  Partner's name: Nilson  [x]NOB folder  [x]Dating by 11w1d US  [x] 1st trimester screening: Patient declines 1st tri genetic screening  [x]declines AFP/QS  [x]Fetal anatomy US ordered  [x]Rubella immune  [x]Hep B NOT immune previously - #1 1/2023, #2 12/2023  [x]Varicella immune  []Pap  [x] recommended LD ASA after 12 wks for PRE-E risk  [x] No increased risk for GDM  [x]No need for utox in labor  []COVID vaccine completed  _____________________________________  [x]EOB folder  [x]PP Contraception plan: considering IUD  [x]Labor plans: unmedicated  [x]: maybe  [x]Infant feeding plan: breast  []FLU shot  [x]TDAP given  []RSV  [] Water birth interest  [x]GCT  ________________________________________  [] OTC PP meds sent  []PP plans, time off, support system discussed, resources offered  []Planning CS-ERAS pkt        Depressive disorder 12/13/2023     Priority: Medium    Recurrent pregnancy loss 12/13/2023     Priority: Medium    Varicella 12/13/2023     Priority: Medium    History of postpartum hemorrhage 02/25/2022     Priority: Medium     Plan TXA prior to birth  AMSTL      Anemia 02/09/2022     Priority: Medium     Anemia, reviewed supplementation      Hx of cervical incompetence in pregnancy 11/17/2021     Priority: Medium     MFM 1/15/24   will plan for placement of history indicated cerclage, we will follow growth with US given history of IUFD, plan weekly BPP at 32 weeks and plan for birth in the 38th  week.      Detailed ultrasound is recommended at 18-20 weeks.      Nausea/vomiting in pregnancy 10/05/2021     Priority: Medium     IV hydration orders placed  Zofran  B6/Unisom      History of IUFD 2021     Priority: Medium     18- IUFD boy from placental abruption.  18: placenta pathology reviewed. Hematoma infarct noted, see formal report.    2/15/24-   Recommend serial growth US q 4w starting @ 24-26w  Weekly BPP @ 32w  Delivery @ 38w  Cerclage removal at 36-37 week      History of cervical cerclage 2021     Priority: Medium     3/2019: Placed after fetal anatomy scan at 21 wks (last pregnancy), then delivered at 37.6 wks    21 per MFM:   - Prophylactic cerclage at as soon as available, plan for next week  - Comprehensive ultrasound at 18-20 weeks and serial growth ultrasonography every 4 weeks  - Administration of  corticosteroids if the patient is deemed to be at increased risk of imminent  delivery.  - Urine culture every trimester with aggressive treatment of bacteriuria.  MFM recommend serial evaluation of fetal growth every 4 weeks in addition to  weekly  surveillance starting at 32 weeks and delivery at 38 weeks as previously discussed. She will need cerclage removal between 36-37 weeks.      History of severe pre-eclampsia 2018     Priority: Medium     : baseline labs  Recommendations:  - Continue taking prophylactic aspirin                                        - Recommend baseline HELLP labs                                           - Recommend taking prenatal vitamin                                         - Patient can discontinue progesterone since she has maintained pregnancy to the second trimester already                -  testing with weekly BPP should occur starting at 32 weeks.           Vitamin D deficiency 2018     Priority: Medium     21: 11, pls discuss at next visit, start 5,000 international unit(s) daily--Rx  sent      History of recurrent  2018     Priority: Medium     X6. Taking prometrium       HISTORIES  ============  No Known Allergies  Past Medical History:   Diagnosis Date    Anemia 2022    Depressive disorder     PPD following fetal loss    History of cervical cerclage 2021    3/2019: Placed after fetal anatomy scan at 21 wks (last pregnancy), then delivered at 37.6 wks     21 per [facility]:   - Prophylactic cerclage at as soon as available, plan for next week  - Comprehensive ultrasound at 18-20 weeks and serial growth ultrasonography every 4 weeks  - Administration of  corticosteroids if the patient is deemed to be at increased risk of imminent  delive    History of IUFD 2021- IUFD boy from placental abruption.  18: placenta pathology reviewed. Hematoma infarct noted, see formal report.     Per MFM 21  - Weekly BPPs after 32 weeks  - Deliver at 38 weeks  [facility] consultation  recommend serial evaluation of fetal growth every 4 weeks in addition to  weekly  surveillance starting at 32 weeks and delivery at 38 weeks as previously discussed. She kandis    History of postpartum hemorrhage 2022    Plan TXA prior to birth  AMSTL    History of recurrent  2018    Taking prometrium    History of severe pre-eclampsia 2018    Hx of cervical incompetence in pregnancy 2021    Nausea/vomiting in pregnancy 10/05/2021    Recurrent pregnancy loss 2023    Severe pre-eclampsia, postpartum condition or complication 2018    Thrombocytopenia (H24) 2022    Varicella     Vitamin D deficiency 2018     Past Surgical History:   Procedure Laterality Date    APPENDECTOMY Right     CERCLAGE CERVICAL N/A 3/22/2019    Procedure: CERCLAGE CERVICAL;  Surgeon: Liz Lima MD;  [facility]: [facility]    CERCLAGE CERVICAL N/A 3/22/2019    Procedure: CERCLAGE CERVICAL;  Surgeon: Liz Lima MD;  Location: [facility] OR    CERCLAGE  CERVICAL N/A 2021    Procedure: CERCLAGE, CERVIX, VAGINAL APPROACH;  Surgeon: Stella Orellana;  [facility]: [facility] L[facility]    CERCLAGE CERVICAL N/A 2024    Procedure: Cerclage cervical;  Surgeon: Angelica Jordan MD;  [facility]: [facility] L[facility]    DILATION AND CURETTAGE SUCTION N/A 2017    Procedure: DILATION AND CURETTAGE SUCTION;  Suction Dilation And Curettage ;  Surgeon: Yolanda Samaniego MD;  [facility]: [facility]    DILATION AND CURETTAGE SUCTION N/A 2021    Procedure: DILATION AND CURETTAGE, UTERUS, USING SUCTION;  Surgeon: Rosanna Ybarra MD;  [facility]: [facility] OR    DILATION AND CURETTAGE, HYSTEROSCOPY DIAGNOSTIC, COMBINED N/A 2017    Procedure: COMBINED DILATION AND CURETTAGE, HYSTEROSCOPY DIAGNOSTIC;  Operative Hysteroscopy, Dilation and Curettage ;  Surgeon: Eli Pickard MD;  Location: [facility] OR    GYN SURGERY  2017    suction D&C   .  Family History   Problem Relation Age of Onset    Asthma Mother     Hypertension Father     Cancer Father         pancreatic    No Known Problems Maternal Grandmother     No Known Problems Maternal Grandfather     No Known Problems Paternal Grandmother     No Known Problems Paternal Grandfather     Pneumonia Brother         passed away from pneumonia    No Known Problems Brother     No Known Problems Brother     No Known Problems Brother     No Known Problems Brother     No Known Problems Sister     No Known Problems Sister     No Known Problems Sister     No Known Problems Sister     No Known Problems Sister     No Known Problems Son     No Known Problems Son     No Known Problems Daughter     Diabetes No family hx of     Breast Cancer No family hx of     Colon Cancer No family hx of      Social History     Tobacco Use    Smoking status: Never     Passive exposure: Never    Smokeless tobacco: Never   Substance Use Topics    Alcohol use: No     OB History    Para Term  AB Living   10 4 4 0 5 3   SAB IAB Ectopic Multiple Live Births   5  0 0 0 4      # Outcome Date GA Lbr Michael/2nd Weight Sex Type Anes PTL Lv   10 Current            9 Term 22 37w6d 03:30 / 00:04 2.81 kg (6 lb 3.1 oz) M Vag-Spont None N RAMILA      Name: ROSIE LIRIANO      Apgar1: 9  Apgar5: 9   8 SAB 2021 10w0d    AB, MISSED         Birth Comments: MAB, baby stopped growing 8 weeks, D & C 11 weeks   7 Term 07/15/19 37w6d 04:04 / 00:09 2.55 kg (5 lb 10 oz) F Vag-Spont None N RAMILA      Birth Comments: PROM, spontaneous labor, 5 hour labor, epidural, 2nd degree, PPH      Complications: Hemorrhage      Name: DORITA LIRIANO      Apgar1: 8  Apgar5: 9   6 Term 18 39w3d 01:16 / 00:20 2.75 kg (6 lb 1 oz) M Vag-Spont None N ND      Birth Comments: IUFD, no cardiac activity on admission prior to delivery, NSVB, intact no PPH, Admitted postpartum for preeclampsia with severe features. She presented to the ED with sudden onset of fatigue, numbness and tingling and shortness of breath.      Complications: Fetal demise > 22 weeks, delivered, current hospitalization      Name: Jay Baker SAB 17 11w0d    AB, MISSED         Birth Comments: BAby stopped growing 11 weeks, D & C at 15 weeks, NL blood loss   4 SAB 2017 5w0d             Birth Comments: SAB. no complication   3 Term 12/03/15 38w5d 03:45 / 01:16 3.11 kg (6 lb 13.7 oz) M Vag-Spont EPI  RAMILA      Birth Comments: PROM, spontaneous labor, 5 hour labor, epidural, 2nd degree, no PPH      Name: Lee Ann      Apgar1: 9  Apgar5: 9   2 2014 5w0d             Birth Comments: SAB no complications   1 2014 5w0d             Birth Comments: SAB, no complications      Obstetric Comments   HTN, PreE,  labor, Postpartum hemorrhage, and  mood disorder   2018 Severe Pre E Postpartum after term IUFD 39 weeks   2019: shortened cervix with cerclage, delivered at 37/6 weeks, PPH   Recurrent pregnancy loss        LABS:   ===========  Prenatal Labs:  Rhogam not indicated   Lab Results   Component Value Date    ABO O  2021    RH Pos 2021    AS Negative 2024    RUQIGG Positive 2023    HEPBANG Nonreactive 2023    TREPAB Negative 2018    HGB 10.9 (L) 2024    HIAGAB Nonreactive 2023    GLU1 100 2024     Rubella Immune  GBS positive    ROS  =========  Pt denies significant respiratory, cardiovacular, GI, or muscular/skeletalcomplaints.    See RN data base ROS.     PHYSICAL EXAM:  ===============  /88 (BP Location: Left arm, Patient Position: Semi-[place]'s, Cuff Size: Adult Regular)   Temp 97.9  F (36.6  C) (Oral)   Resp 18   LMP 10/15/2023 (Approximate)   General appearance: comfortable  RESP: respirations even and unlabored  CV: appears well perfused  ABDOMEN:  soft, nontender, no epigastric pain  SKIN: no suspicious lesions or rashes  NEURO: Denies headache, blurred vision, other vision changes  PSYCH: mentation appears normal. and affect normal/bright  Legs: No edema     Abdomen: gravid, vertex fetus per Leopold's, non-tender between contractions.   EFW-  6.5 lbs.   CONTRACTIONS: every 2-9 minutes and mild  FETAL HEART TONES: continuous EFM- baseline 140 with moderate variability, periods of minimal variability, and positive accelerations. No decelerations.  PELVIC EXAM: 3/ 60%/ Posterior/ soft/ -2   JACOB SCORE: 7  BLOODY SHOW: no   ROM: no  FLUID: none    ASSESSMENT:  ==============  IUP @ 36w6d admitted for induction of labor.  Indication: Indication: fetal growth restriction in the 7th percentile and history of IUFD in the 39th week.    NST REACTIVE  Fetal Heart Tones - category one  GBS- positive  Gestational thrombocytopenia  Elevated BP without diagnosis of GHTN  History of PPH    Patient Active Problem List   Diagnosis    History of recurrent     Vitamin D deficiency    History of severe pre-eclampsia    History of cervical cerclage    History of IUFD    Nausea/vomiting in pregnancy    Hx of cervical incompetence in pregnancy    Anemia    History of  postpartum hemorrhage    Depressive disorder    Recurrent pregnancy loss    Varicella    High-risk pregnancy, elderly multigravida, unspecified trimester    Cervical insufficiency during pregnancy in second trimester, antepartum    Heartburn during pregnancy in second trimester    Right sciatic nerve pain    Gestational thrombocytopenia (H24)    Fetal growth restriction antepartum    GBS (group B Streptococcus carrier), +RV culture, currently pregnant    Labor and delivery, indication for care     PLAN:  ===========  - Admit - see IP orders  - Reviewed timing of delivery with MFM, reasonable to induce at this time d/t patient's history and current FGR.  - Pain medication options of nitrous oxide, fentanyl IV and epidural anesthesia reviewed with pt. Pt is interested in IV fentanyl.  - Labor induction with Pitocin risks and benefits reviewed with pt. Agreeable to plan  - Prophylactic IV antibiotic for positive GBS status reviewed with pt. Agreeable to PCN.  - Ambulation, hydration, position changes, birthing ball and tub options to facilitate labor reviewed with pt .  - Plan for TXA and active management of the third stage of labor for history of PPH  - Re-evaluate in 4 hours or earlier as indicated by maternal and fetal status    IJocelyn, am serving as a scribe; to document services personally performed by  Justice Cosby CNM, APRN based on data collection and the provider's statements to me. - AMELIA Cornelius, RN, BSN  The encounter was performed by me and scribed by the student. The scribed note accurately reflects my personal services and decisions made by me. ASHIA Gipson CNM

## 2024-06-24 NOTE — PLAN OF CARE
Vaginal Delivery Note   of viable Male with CNM Justice Cosby in attendance.  NICU/Nursery MIRIAM Adkins at bedside present.  Infant with spontaneous cry after cord clamping and brought to warmer, dried and stimulated.  APGAR at 1 minute: 8  and APGAR at 5 minutes: 9.  Placenta delivered with out complication, none, Intact no repair, desire cares provided.  Mother had increased bleeding with 1845 check. CNM called to room at 1855. Cytotec given and straight catheter performed for 200cc. CNM remain at bedside.     Recovery vitals stable. Report given to Elizabeth Campso RN

## 2024-06-24 NOTE — PROGRESS NOTES
SUBJECTIVE:  ==============  General appearance: comfortable, resting in bed. Contractions are beginning to feeling stronger and more like painful cramps.    Support: Nilson, at bedside    OBJECTIVE:  ==============  VITALS  Blood pressure (!) 147/88, pulse 76, temperature 98.6  F (37  C), temperature source Oral, resp. rate 18, weight 77.6 kg (171 lb), last menstrual period 10/15/2023, not currently breastfeeding.  Patient Vitals for the past 24 hrs:   BP Temp Temp src Pulse Resp Weight   24 1330 (!) 147/88 -- -- 76 18 --   24 1230 138/86 98.6  F (37  C) Oral 80 20 --   24 1121 (!) 140/87 -- -- 80 20 --   24 1055 -- -- -- -- -- 77.6 kg (171 lb)   24 1033 (!) 147/90 -- -- -- -- --   24 1011 (!) 144/89 98  F (36.7  C) Oral -- 18 --   24 0805 138/88 97.9  F (36.6  C) Oral -- 18 --     FETAL HEART RATE ASSESSMENT:  Baseline rate 135, normal  Variability moderate  Accelerations present   Decelerations not present      CONTRACTIONS: Contractions every 3-7 minutes.   Pitocin- 8 mu/min.,  Antibiotics- PCN    ROM: not ruptured  PELVIC EXAM:deferred    Assessment: EFM interpretation suggests absence of concern for fetal metabolic acidemia at this time due to accelerations present, decelerations absent, heart rate: normal baseline, and variability: moderate    Labor course:     0900 SVE 3/60/-2, post/soft; miller 7  1000- Pit and PCN started    ASSESSMENT:  ==============  Pam Burnhamclarke Syed  34 year old  female  Estimated Date of Delivery: 2024  IUP @ 36w6d IOL for fetal growth restriction in the 7% and history of fetal demise in the 39th week   Fetal Heart Rate Tracing category one   GBS- positive- antibiotics started  Elevated blood pressure without diagnoses of pre-eclampsia  Proteinuria, pr/cr 0.35  Gestational thrombocytopenia    Patient Active Problem List   Diagnosis    History of recurrent     Vitamin D deficiency    History of severe pre-eclampsia     History of cervical cerclage    History of IUFD    Nausea/vomiting in pregnancy    Hx of cervical incompetence in pregnancy    Anemia    History of postpartum hemorrhage    Depressive disorder    Recurrent pregnancy loss    Varicella    High-risk pregnancy, elderly multigravida, unspecified trimester    Cervical insufficiency during pregnancy in second trimester, antepartum    Heartburn during pregnancy in second trimester    Right sciatic nerve pain    Gestational thrombocytopenia (H24)    Fetal growth restriction antepartum    GBS (group B Streptococcus carrier), +RV culture, currently pregnant    Labor and delivery, indication for care     PLAN:  ===========  - Ambulation, hydration, position changes, birthing ball/sling and tub options to facilitate labor.  - Continue labor induction with Pitocin   - Reevaluate in 2-4 hours or earlier as indicated     IJocelyn, am serving as a scribe; to document services personally performed by  Justice Cosby CNM, APRN based on data collection and the provider's statements to me. - AMELIA Cornelius, RN, BSN  The encounter was performed by me and scribed by the student. The scribed note accurately reflects my personal services and decisions made by me. ASHIA Gipson CNM

## 2024-06-24 NOTE — PROGRESS NOTES
SUBJECTIVE:  ==============  General appearance: Beginning to feel more uncomfortable with contractions. Would like SVE and AROM if possible    Support: Nilson, at bedside    OBJECTIVE:  ==============  VITALS  Blood pressure 137/84, pulse 76, temperature 98.5  F (36.9  C), temperature source Oral, resp. rate 18, weight 77.6 kg (171 lb), last menstrual period 10/15/2023, not currently breastfeeding.  Patient Vitals for the past 24 hrs:   BP Temp Temp src Pulse Resp Weight   24 1522 137/84 98.5  F (36.9  C) Oral 76 18 --   24 1330 (!) 147/88 -- -- 76 18 --   24 1230 138/86 98.6  F (37  C) Oral 80 20 --   24 1121 (!) 140/87 -- -- 80 20 --   24 1055 -- -- -- -- -- 77.6 kg (171 lb)   24 1033 (!) 147/90 -- -- -- -- --   24 1011 (!) 144/89 98  F (36.7  C) Oral -- 18 --   24 0805 138/88 97.9  F (36.6  C) Oral -- 18 --     FETAL HEART RATE ASSESSMENT:  Baseline rate 135, normal  Variability moderate with periods of minimal variability   Accelerations present   Decelerations not present      CONTRACTIONS: Contractions every 2-4 minutes.   Pitocin- 14 mu/min.,  Antibiotics- PCN    ROM: AROM clear fluid  PELVIC EXAM: %/ Mid/ soft/ -1     Assessment: EFM interpretation suggests absence of concern for fetal metabolic acidemia at this time due to accelerations present, heart rate: normal baseline, and variability: moderate    Labor course:     0900 SVE 3/60/-2, post/soft; miller 7  1000 Pit and PCN started  1400 PCN adequate  1645 SVE 4/60/-1 AROM, clear    ASSESSMENT:  ==============  Pam Syed  34 year old  female  Estimated Date of Delivery: 2024  IUP @ 36w6d IOL for fetal growth restriction in the 7% and history of fetal demise in the 39th week  Fetal Heart Rate Tracing category one  GBS- positive- adequately treated  Elevated blood pressure without diagnosis of pre-eclampsia  Proteinuria  Gestational thrombocytopenia    Patient Active Problem List    Diagnosis    History of recurrent     Vitamin D deficiency    History of severe pre-eclampsia    History of cervical cerclage    History of IUFD    Nausea/vomiting in pregnancy    Hx of cervical incompetence in pregnancy    Anemia    History of postpartum hemorrhage    Depressive disorder    Recurrent pregnancy loss    Varicella    High-risk pregnancy, elderly multigravida, unspecified trimester    Cervical insufficiency during pregnancy in second trimester, antepartum    Heartburn during pregnancy in second trimester    Right sciatic nerve pain    Gestational thrombocytopenia (H24)    Fetal growth restriction antepartum    GBS (group B Streptococcus carrier), +RV culture, currently pregnant    Labor and delivery, indication for care      PLAN:  ===========  - Ambulation, hydration, position changes, birthing ball/sling and tub options to facilitate labor.  - Labor augmentation with AROM risks and benefits reviewed with pt. Agreeable to plan.  - Continue labor induction with Pitocin   - Reevaluate in 4 hours or earlier as indicated per maternal and fetal status    Jocelyn NUÑEZ, am serving as a scribe; to document services personally performed by  Justice Cosby CNM, ASHIA based on data collection and the provider's statements to me. - AMELIA Cornelius, RN, BSN    The encounter was performed by me and scribed by the student. The scribed note accurately reflects my personal services and decisions made by me. ASHIA Gipson CNM

## 2024-06-25 LAB
ERYTHROCYTE [DISTWIDTH] IN BLOOD BY AUTOMATED COUNT: 17.6 % (ref 10–15)
HCT VFR BLD AUTO: 29.8 % (ref 35–47)
HGB BLD-MCNC: 10.1 G/DL (ref 11.7–15.7)
MCH RBC QN AUTO: 29.4 PG (ref 26.5–33)
MCHC RBC AUTO-ENTMCNC: 33.9 G/DL (ref 31.5–36.5)
MCV RBC AUTO: 87 FL (ref 78–100)
PLATELET # BLD AUTO: 107 10E3/UL (ref 150–450)
RBC # BLD AUTO: 3.43 10E6/UL (ref 3.8–5.2)
WBC # BLD AUTO: 15 10E3/UL (ref 4–11)

## 2024-06-25 PROCEDURE — 85027 COMPLETE CBC AUTOMATED: CPT | Performed by: ADVANCED PRACTICE MIDWIFE

## 2024-06-25 PROCEDURE — 120N000002 HC R&B MED SURG/OB UMMC

## 2024-06-25 PROCEDURE — 250N000013 HC RX MED GY IP 250 OP 250 PS 637: Performed by: NURSE PRACTITIONER

## 2024-06-25 PROCEDURE — 250N000013 HC RX MED GY IP 250 OP 250 PS 637: Performed by: ADVANCED PRACTICE MIDWIFE

## 2024-06-25 PROCEDURE — 36415 COLL VENOUS BLD VENIPUNCTURE: CPT | Performed by: ADVANCED PRACTICE MIDWIFE

## 2024-06-25 RX ORDER — IBUPROFEN 600 MG/1
600 TABLET, FILM COATED ORAL EVERY 6 HOURS PRN
Qty: 60 TABLET | Refills: 0 | Status: SHIPPED | OUTPATIENT
Start: 2024-06-25

## 2024-06-25 RX ORDER — NIFEDIPINE 30 MG/1
30 TABLET, EXTENDED RELEASE ORAL DAILY
Status: DISCONTINUED | OUTPATIENT
Start: 2024-06-25 | End: 2024-06-26

## 2024-06-25 RX ORDER — PANTOPRAZOLE SODIUM 20 MG/1
20 TABLET, DELAYED RELEASE ORAL
Status: DISCONTINUED | OUTPATIENT
Start: 2024-06-26 | End: 2024-06-25

## 2024-06-25 RX ORDER — PANTOPRAZOLE SODIUM 20 MG/1
20 TABLET, DELAYED RELEASE ORAL
Status: DISCONTINUED | OUTPATIENT
Start: 2024-06-25 | End: 2024-06-26 | Stop reason: HOSPADM

## 2024-06-25 RX ORDER — NIFEDIPINE 30 MG
30 TABLET, EXTENDED RELEASE ORAL DAILY
Qty: 60 TABLET | Refills: 1 | Status: SHIPPED | OUTPATIENT
Start: 2024-06-26 | End: 2024-06-26

## 2024-06-25 RX ORDER — ACETAMINOPHEN 325 MG/1
650 TABLET ORAL EVERY 4 HOURS PRN
Status: SHIPPED
Start: 2024-06-25

## 2024-06-25 RX ADMIN — IBUPROFEN 800 MG: 800 TABLET, FILM COATED ORAL at 00:58

## 2024-06-25 RX ADMIN — PANTOPRAZOLE SODIUM 20 MG: 20 TABLET, DELAYED RELEASE ORAL at 22:57

## 2024-06-25 RX ADMIN — NIFEDIPINE 30 MG: 30 TABLET, FILM COATED, EXTENDED RELEASE ORAL at 00:58

## 2024-06-25 RX ADMIN — NIFEDIPINE 30 MG: 30 TABLET, FILM COATED, EXTENDED RELEASE ORAL at 08:04

## 2024-06-25 RX ADMIN — ACETAMINOPHEN 650 MG: 325 TABLET, FILM COATED ORAL at 16:12

## 2024-06-25 RX ADMIN — IBUPROFEN 800 MG: 800 TABLET, FILM COATED ORAL at 09:22

## 2024-06-25 ASSESSMENT — ACTIVITIES OF DAILY LIVING (ADL)
ADLS_ACUITY_SCORE: 20

## 2024-06-25 NOTE — PLAN OF CARE
Patient arrived to [facility] unit via wheelchair at 2135 ,with belongings, accompanied by spouse/ significant other, with infant in bassinet. Received report from  MIRIAM Johnson  and checked bands. Unit and room orientation  completed . Call light given; no concerns present at this time. Continue with plan of care.

## 2024-06-25 NOTE — PROVIDER NOTIFICATION
"   06/25/24 1628   Provider Notification   Provider Name/Title Jade Dyer CNM   Method of Notification Electronic Page     Updated pt ranks HA as \"7\".  /71.  Gave Tylenol.  Pt thinks HA is from misaligned sleep position.    "

## 2024-06-25 NOTE — PROGRESS NOTES
Patient diagnosed with PreE without severe features in labor. One severe feature BP was noted 170/103 postpartum but the rest have remained stable at normotensive to mild range.    Consulted with MD regarding Pre-E, recommended starting nifedipine.  -Order placed for 30mg nifedipine daily. 1st dose now.    Continue to monitor closely.    I, Kaye CISNEROS, am serving as a scribe; to document services personally performed by  Vida Westfall CNM based on data collection and the provider's statements to me.     Kaye CISNEROS     The encounter was performed by me and scribed by the SNM. The scribed note accurately reflects my personal services and decisions made by me.     ASHIA Grubbs CNM

## 2024-06-25 NOTE — PLAN OF CARE
Goal Outcome Evaluation:    Vital signs WDL. Postpartum checks: WDL.  Pt eating and drinking without issue. Pt ambulating and voiding independently. Pt performing self-cares. Pt medicated for pain during the shift. Pt had 3 loose stools this shift. Plans to breast feed, formula fed this shift. Positive attachment behaviors observed with infant. Support person present.

## 2024-06-25 NOTE — LACTATION NOTE
This note was copied from a baby's chart.  Consult For: Late  Infant    Infant Name: Carla    Infant's Primary Care Clinic: [facility]    Delivery Information: Carla was born at Gestational Age: 36w6d via vaginal delivery on 2024 6:12 PM     Maternal Health History:  Maternal past medical history, problem list and prior to admission medications reviewed and notable for   Information for the patient's mother:  Pam Syed [[id number]]     Past Medical History:   Diagnosis Date    Anemia 2022    Depressive disorder     PPD following fetal loss    History of cervical cerclage 2021    3/2019: Placed after fetal anatomy scan at 21 wks (last pregnancy), then delivered at 37.6 wks     21 per [facility]:   - Prophylactic cerclage at as soon as available, plan for next week  - Comprehensive ultrasound at 18-20 weeks and serial growth ultrasonography every 4 weeks  - Administration of  corticosteroids if the patient is deemed to be at increased risk of imminent  delive    History of IUFD 2021- IUFD boy from placental abruption.  18: placenta pathology reviewed. Hematoma infarct noted, see formal report.     Per MFM 21  - Weekly BPPs after 32 weeks  - Deliver at 38 weeks  [facility] consultation  recommend serial evaluation of fetal growth every 4 weeks in addition to  weekly  surveillance starting at 32 weeks and delivery at 38 weeks as previously discussed. She kandis    History of postpartum hemorrhage 2022    Plan TXA prior to birth  AMSTL    History of recurrent  2018    Taking prometrium    History of severe pre-eclampsia 2018    Hx of cervical incompetence in pregnancy 2021    Nausea/vomiting in pregnancy 10/05/2021    Recurrent pregnancy loss 2023    Severe pre-eclampsia, postpartum condition or complication 2018    Thrombocytopenia (H24) 2022    Varicella     Vitamin D deficiency 2018    ,    Information for the patient's mother:  Pam Syed [[id number]]     Patient Active Problem List   Diagnosis    History of recurrent     Vitamin D deficiency    History of severe pre-eclampsia    History of cervical cerclage    History of IUFD    Nausea/vomiting in pregnancy    Hx of cervical incompetence in pregnancy    Anemia    History of postpartum hemorrhage    Depressive disorder    Recurrent pregnancy loss    Varicella    High-risk pregnancy, elderly multigravida, unspecified trimester    Cervical insufficiency during pregnancy in second trimester, antepartum    Heartburn during pregnancy in second trimester    Right sciatic nerve pain    Gestational thrombocytopenia (H24)    Fetal growth restriction antepartum    GBS (group B Streptococcus carrier), +RV culture, currently pregnant    Labor and delivery, indication for care      Maternal Breast Exam:  Pam noted breast growth and sensitivity in early pregnancy. She denies any history of breast/chest injury or surgery. Her breasts are soft and symmetrical with bilateral intact, everted  nipples. She has been able to hand express colostrum.     Breastfeeding/ Lactation History: Pam reports that she exclusively  all of her other children without difficulty.    Oral exam of baby:  Carla has normal jaw, moderate arched palate, visualized excellent lift of tongue though unable to assess extension of tongue and unable to elicit suck on gloved finger with Carla biting down and tongue thrusting.    Infant information: Carla was SGA at birth (10th percentile on [place] growth chart) and has age appropriate output. 21 hours old at time of visit.    Weight Change Since Birth: N/A, <24 hours old.    Feeding History:  Carla  immediately following delivery but then required formula supplementation due to hypoglycemia. He received formula overnight. Today Pam has been working on latching and is supplementing him with 10 mL of formula  following each feeding. She has started pumping and hand expressing.    Feeding Assessment:  Carla was too sleepy to latch as he had just been fed a bottle about 30 minutes prior, so  left contact number on whiteboard and encouraged to call at next feeding time.    Addendum: Called back to room when Carla was cueing. Pam unswaddled him and brought him to breast in cradle hold. Initially he was not opening wide and latching just onto the nipple, but after taking him off and waiting for wide gape, Pam independently latched him with deep, comfortable latch. He sustained the latch well with coordinated sucks and intermittent swallows. Pam performed breast compressions throughout the feeding. After 15 minutes when he was no longer responding to stimulation to continue actively feeding he was latched onto the second side. Pam will then give Carla to Nilson to give the supplemental formula while Pam pumps.    Education:   [x] Potential feeding challenges of late  infants  [x] Potential benefits of using a nipple shield  [x] Expected  feeding patterns in the first few days (pg. 38 of Your Guide to To Postpartum and [place] Care)/ the Second Night  [x] Stages of milk production  [x] Benefits of hand expression of colostrum  [x] Early feeding cues   [x] Feeding frequency- encourage at least every 3 hours.     [x] Benefits of feeding on cue  [x] Benefits of skin to skin  [x] Breastfeeding positions  [x] Tips to get and maintain a deep latch  [x] Nutritive vs.non-nutritive sucking  [x] Gentle breast compressions as needed to enhance milk transfer  [x] How to tell when baby is finished  [x] How to tell if baby is getting enough  [x] Expected  output  [x] [place] weight loss  [x] Infant Feeding Log  [] [employer] Breastfeeding/Pumping videos  [x] Signs breastfeeding is going well (comfortable latch, audible swallows, age appropriate output and weight loss)    [] Tips to prevent engorgement  []  Signs of engorgement  [] Tips to manage engorgement  [x] Hand expression and pumping recommendations  [x] Supplement recommendations   [x] Satiety cues   [x] [facility] breast pump part/infant feeding supplies cleaning recommendations  [x] Inpatient breastfeeding support  [x] Outpatient lactation resources and follow up recommendations    Handouts: Infant Feeding Log (Week 1, Your Guide to Postpartum &  Care Book) and [facility] Lactation Resources    Home Breast Pump: Pam would like a Spectra pump at discharge.    Plan: Continue breastfeeding every 2-3 hours with RN support as needed with a goal of 8-12 feedings per day. Watch for early feeding cues, but if too sleepy and no cues after 3 hours offer breast and go directly to supplementation if no interest.     Encourage frequent skin to skin. Due to infant prematurity, encourage hand expression after each feeding until milk is in and hands on pumping at least 6-8 times in 24 hours to help bring in full milk supply. Feed back any expressed milk and review order set for supplement recommendations. Continue giving expressed milk supplement until closer to expected due date, or as indicated with follow up lactation visits.      Family encouraged to follow up with outpatient lactation consultant at clinic within a week of discharge for support with LPT infant, help to monitor infant weight and milk transfer, establish supply & eventually wean from pumping and nipple shield if used.          Domi Tam RN, IBCLC   Lactation Consultant  Imelda: [facility] [phone]  Office: [phone]

## 2024-06-25 NOTE — PROGRESS NOTES
Data: Pam Syde transferred to [place] [address] via wheelchair at 2135. Baby transferred via bassinet.  Action: Receiving unit notified of transfer: Yes. Patient and family notified of room change. Report given to Yolanda at 2140. Belongings sent to receiving unit. Accompanied by Registered Nurse. Oriented patient to surroundings. Call light within reach. ID bands double-checked with receiving RN.  Response: Patient tolerated transfer and is stable.

## 2024-06-25 NOTE — PROGRESS NOTES
Post Partum Note    Pam Syed      MRN#: [id number]  Age: 34 year old      YOB: 1990      Post-partum day #1    SIGNIFICANT PROBLEMS:  Patient Active Problem List    Diagnosis Date Noted    Labor and delivery, indication for care 2024     Priority: Medium    GBS (group B Streptococcus carrier), +RV culture, currently pregnant 2024     Priority: Medium    Fetal growth restriction antepartum 2024     Priority: Medium     :  After shared decision making regarding delivery timing in the setting of her obstetric history and new diagnosis of FGR we discussed the following plan:  1) NST to be performed inpatient on 2024 at the time of cerclage removal.  2) Delivery is recommended at 37 weeks gestation.   3) If meets criteria for gestational hypertension or preeclampsia at any point moving forward then delivery would be recommended at the time of diagnosis.     Vita Teresa MD  Specialist in Maternal-Fetal Medicine         Gestational thrombocytopenia (H24) 2024     Priority: Medium     Monthly plts until 36 weeks and then weekly.       Heartburn during pregnancy in second trimester 2024     Priority: Medium    Right sciatic nerve pain 2024     Priority: Medium     3/4/24 right sciatic nerve pain      Cervical insufficiency during pregnancy in second trimester, antepartum 2024     Priority: Medium     Cervical cerclage procedure 24      High-risk pregnancy, elderly multigravida, unspecified trimester 2023     Priority: Medium     [facility] Patient Provider Group choice: CNM group  Partner's name: Nilson  [x]NOB folder  [x]Dating by 11w1d US  [x] 1st trimester screening: Patient declines 1st tri genetic screening  [x]declines AFP/QS  [x]Fetal anatomy US ordered  [x]Rubella immune  [x]Hep B NOT immune previously - #1 2023, #2 2023  [x]Varicella immune  []Pap  [x] recommended LD ASA after 12 wks for PRE-E risk  [x]  No increased risk for GDM  [x]No need for utox in labor  []COVID vaccine completed  _____________________________________  [x]EOB folder  [x]PP Contraception plan: considering IUD  [x]Labor plans: unmedicated  [x]: maybe  [x]Infant feeding plan: breast  []FLU shot  [x]TDAP given  []RSV  [] Water birth interest  [x]GCT  ________________________________________  [] OTC PP meds sent  []PP plans, time off, support system discussed, resources offered  []Planning CS-ERAS pkt        Depressive disorder 12/13/2023     Priority: Medium    Recurrent pregnancy loss 12/13/2023     Priority: Medium    Varicella 12/13/2023     Priority: Medium    History of postpartum hemorrhage 02/25/2022     Priority: Medium     Plan TXA prior to birth  AMSTL      Anemia 02/09/2022     Priority: Medium     Anemia, reviewed supplementation      Hx of cervical incompetence in pregnancy 11/17/2021     Priority: Medium     MFM 1/15/24   will plan for placement of history indicated cerclage, we will follow growth with US given history of IUFD, plan weekly BPP at 32 weeks and plan for birth in the 38th  week.     Detailed ultrasound is recommended at 18-20 weeks.      Nausea/vomiting in pregnancy 10/05/2021     Priority: Medium     IV hydration orders placed  Zofran  B6/Unisom      History of IUFD 05/18/2021     Priority: Medium     8/23/18- IUFD boy from placental abruption.  9/9/18: placenta pathology reviewed. Hematoma infarct noted, see formal report.    2/15/24-   Recommend serial growth US q 4w starting @ 24-26w  Weekly BPP @ 32w  Delivery @ 38w  Cerclage removal at 36-37 week      History of cervical cerclage 05/17/2021     Priority: Medium     3/2019: Placed after fetal anatomy scan at 21 wks (last pregnancy), then delivered at 37.6 wks    11/11/21 per MFM:   - Prophylactic cerclage at as soon as available, plan for next week  - Comprehensive ultrasound at 18-20 weeks and serial growth ultrasonography every 4 weeks  - Administration of   corticosteroids if the patient is deemed to be at increased risk of imminent  delivery.  - Urine culture every trimester with aggressive treatment of bacteriuria.  M recommend serial evaluation of fetal growth every 4 weeks in addition to  weekly  surveillance starting at 32 weeks and delivery at 38 weeks as previously discussed. She will need cerclage removal between 36-37 weeks.      History of severe pre-eclampsia 2018     Priority: Medium     : baseline labs  Recommendations:  - Continue taking prophylactic aspirin                                        - Recommend baseline HELLP labs                                           - Recommend taking prenatal vitamin                                         - Patient can discontinue progesterone since she has maintained pregnancy to the second trimester already                -  testing with weekly BPP should occur starting at 32 weeks.           Vitamin D deficiency 2018     Priority: Medium     21: 11, pls discuss at next visit, start 5,000 international unit(s) daily--Rx sent      History of recurrent  2018     Priority: Medium     X6. Taking prometrium         INTERVAL HISTORY:  Patient Vitals for the past 24 hrs:   BP Temp Temp src Pulse Resp Weight   24 0738 127/86 98.1  F (36.7  C) Oral 81 16 --   24 0450 125/77 98.1  F (36.7  C) Oral 82 16 --   24 0050 132/87 98.2  F (36.8  C) Oral 73 16 --   24 2140 (!) 131/93 98.1  F (36.7  C) Oral 80 16 --   244 129/79 -- -- -- -- --   24 2100 139/80 -- -- -- -- --   24 (!) 148/96 -- -- -- -- --   24 (!) 158/96 97.7  F (36.5  C) Oral -- 20 --   24 (!) 152/85 -- -- -- -- --   24 (!) 157/88 -- -- -- -- --   24 (!) 170/103 -- -- -- -- --   24 190 (!) 151/84 -- -- -- -- --   24 1846 (!) 152/84 -- -- 62 18 --   24 1831 (!) 141/96 -- -- 68 18 --    06/24/24 1712 (!) 148/87 98.4  F (36.9  C) Oral 70 18 --   06/24/24 1522 137/84 98.5  F (36.9  C) Oral 76 18 --   06/24/24 1330 (!) 147/88 -- -- 76 18 --   06/24/24 1230 138/86 98.6  F (37  C) Oral 80 20 --   06/24/24 1121 (!) 140/87 -- -- 80 20 --   06/24/24 1055 -- -- -- -- -- 77.6 kg (171 lb)   06/24/24 1033 (!) 147/90 -- -- -- -- --   06/24/24 1011 (!) 144/89 98  F (36.7  C) Oral -- 18 --       Pt stable, baby is rooming in  Breast feeding status:initiated and well established  Complications since 2 hours post delivery: None  Patient is tolerating activity well Voiding without difficulty, cramping is relieved by Ibuprophen, lochia is decreasing and patient denies clots.  Perineal pain is is minimal.    postpartum exam   Breasts:soft, filling  Nipples:erect, no lesions, intact  Abdomen: soft, nontender, fundus firm, umb/-1, midline  Perineum:  is intact, healing well, approximated, no edema, erythema, bruising, hematoma or s/s of infection  Lochia: min rubra, no clots, no odor  Legs: nontender, trace edema      Postpartum hemoglobin   Hemoglobin   Date Value Ref Range Status   06/25/2024 10.1 (L) 11.7 - 15.7 g/dL Final   06/04/2021 11.6 (L) 11.7 - 15.7 g/dL Final     Blood type   Lab Results   Component Value Date    ABO O 06/04/2021       Lab Results   Component Value Date    RH Pos 06/04/2021     Rubella status immune   ASSESSMENT/PLAN:  Stable Post-partum day #1  Complications:anemic, plan for iron supplementation and preeclampsia without SF on nifedipine 30 XR  plan to enroll in [facility] program   Plan d/c home tomorrow   RTC  2 and 6 weeks  Refer to [facility] program   Teaching done: D/C Instructions: Nutrition/Activity, Engorgement Management, Birth Control Options, Warning Signs/When to Call: Excessive Bleeding, Infection, PP Depression, Kegals and Crunches, RTC Clinic for PP Appointment, and PNV    Postpartum warning s/s reviewed, including bleeding/clots, fever, mastitis, or depression    Birthcontrol  planned:Lactation amenorrhea and Condoms  Current Discharge Medication List        START taking these medications    Details   acetaminophen (TYLENOL) 325 MG tablet Take 2 tablets (650 mg) by mouth every 4 hours as needed for mild pain or fever (greater than or equal to 38 C /100.4 F (oral) or 38.5 C/ 101.4 F (core).)    Associated Diagnoses:  (normal spontaneous vaginal delivery)      NIFEdipine ER OSMOTIC (ADALAT CC) 30 MG 24 hr tablet Take 1 tablet (30 mg) by mouth daily  Qty: 60 tablet, Refills: 1    Associated Diagnoses:  (normal spontaneous vaginal delivery); Gestational hypertension, antepartum           CONTINUE these medications which have NOT CHANGED    Details   albuterol (PROAIR HFA/PROVENTIL HFA/VENTOLIN HFA) 108 (90 Base) MCG/ACT inhaler       cholecalciferol (VITAMIN D3) 125 mcg (5000 units) capsule Take 1 capsule (125 mcg) by mouth daily Take one capsule daily.  Qty: 90 capsule, Refills: 1    Associated Diagnoses: Supervision of high risk pregnancy in second trimester      cyanocobalamin (VITAMIN B-12) 1000 MCG tablet Take 1 tablet (1,000 mcg) by mouth daily  Qty: 90 tablet, Refills: 1    Associated Diagnoses: Supervision of high risk pregnancy in second trimester      ferrous gluconate (FERGON) 324 (38 Fe) MG tablet Take 1 tablet (324 mg) by mouth daily (with breakfast)  Qty: 90 tablet, Refills: 3    Associated Diagnoses: High-risk pregnancy, elderly multigravida, unspecified trimester      senna-docusate (SENOKOT-S/PERICOLACE) 8.6-50 MG tablet Take 1 tablet by mouth daily Start after delivery.  Qty: 100 tablet, Refills: 0    Associated Diagnoses: Supervision of high risk pregnancy in first trimester      vitamin C (ASCORBIC ACID) 250 MG tablet Take 1 tablet (250 mg) by mouth daily  Qty: 90 tablet, Refills: 3    Associated Diagnoses: High-risk pregnancy, elderly multigravida, unspecified trimester           STOP taking these medications       aspirin 81 MG EC tablet Comments:   Reason for  Stopping:         docusate sodium (COLACE) 100 MG tablet Comments:   Reason for Stopping:         omeprazole (PRILOSEC) 10 MG DR capsule Comments:   Reason for Stopping:         terconazole (TERAZOL 7) 0.4 % vaginal cream Comments:   Reason for Stopping:             Jade ANG CNM

## 2024-06-25 NOTE — PROVIDER NOTIFICATION
06/25/24 0745   Provider Notification   Provider Name/Title Jade Dyer CNM   Method of Notification Electronic Page     Clarification requested on 0800 scheduled nifedipine.  Last had nifedipine 30 mg at 0058.  BP at 0740 127/86 and pulse 81.  Scheduled for daily 24 hrs.  Per MEMO Dyer CNM okay to give scheduled 0800 nifedipine 30 mg now.  Given.

## 2024-06-25 NOTE — PROGRESS NOTES
"Summary:  Pam is comfortable and stable.  PreE and gest thrombocytopenia.  PLTs this am 107.  VSS.  Assessments WNL.  No reported s/s of PreE.  Ambulating without dizziness.  Voiding without difficulty.  Up to shower.  Breast feeding and pumping started this morning.  SGA. LPT NB.  LC notified and will see pt.  Mom would like to exclusively br feed but parents state understanding of supplementation with formula.  Reviewed tyl/motrin prn and other comfort measures.  Pt states understanding and will request prn.  Held colace as loose stools overnight after receiving hemabate.  Nilson is present and supportive.  Call light is within reach for assistance.  Seen by MEMO Dyer CNM this morning - she will discuss [facility] BP with pt tomorrow.      Summary at 1900:  headache now a \"2\" - tolerable- declines any further meds..  VSS.  Assessments WNL.  Breast feeding and pumping. Voiding and walking without difficulty.    "

## 2024-06-25 NOTE — PROVIDER NOTIFICATION
06/24/24 1755   Provider Notification   Provider Name/Title ANGELIC Cosby   Method of Notification In Department   Request Evaluate in Person   Notification Reason Labor Status;SVE       Patient feels the urge to push. Request for SVE

## 2024-06-25 NOTE — PROGRESS NOTES
Late entry d/t high acuity pt care.     Called to room by RN for increase in PP bleeding.    @ 181, TXA was given for PPH prophylaxis, IV pitocin after delivery.     Upon evaluation,  uterus was firm, above umbilicus and deviated to right.   -Straight cath perfomed with an output of 200ml.  - Cytotec 800 rectal given at 191.   - Recommended manual sweep. Pt agreeable. 100mcg fentanyl given prior.  Manual sweep performed with return of multiple clots totally approx 400ml. No placenta fragments seen, no further clots felt.  - Fundus then U-3, firm with massage.   - Hemabate given at 190.    MD called to bedside for evaluation. Upon arrival, fundus remains firm, midline, U -3, scant bleeding.    2gm ancef ordered.    Continue to monitor closely. Coag labs ordered.    Of note, one severe range BP of 170/103, repeat not severe.   CMP, CBC with plts ordered.    Continue to monitor closely.    Vida Westfall, ASHIA, CNM

## 2024-06-26 VITALS
TEMPERATURE: 98 F | RESPIRATION RATE: 16 BRPM | DIASTOLIC BLOOD PRESSURE: 67 MMHG | HEART RATE: 83 BPM | SYSTOLIC BLOOD PRESSURE: 114 MMHG | WEIGHT: 169.9 LBS | BODY MASS INDEX: 30.1 KG/M2

## 2024-06-26 PROBLEM — O14.90 PREECLAMPSIA: Status: ACTIVE | Noted: 2024-06-26

## 2024-06-26 PROCEDURE — 250N000013 HC RX MED GY IP 250 OP 250 PS 637: Performed by: ADVANCED PRACTICE MIDWIFE

## 2024-06-26 RX ADMIN — IBUPROFEN 800 MG: 800 TABLET, FILM COATED ORAL at 06:54

## 2024-06-26 RX ADMIN — DOCUSATE SODIUM 100 MG: 100 CAPSULE, LIQUID FILLED ORAL at 06:54

## 2024-06-26 RX ADMIN — ACETAMINOPHEN 650 MG: 325 TABLET, FILM COATED ORAL at 06:51

## 2024-06-26 ASSESSMENT — ACTIVITIES OF DAILY LIVING (ADL)
ADLS_ACUITY_SCORE: 20

## 2024-06-26 NOTE — LACTATION NOTE
This note was copied from a baby's chart.  Follow Up Consult    Infant Name: Carla    Infant's Primary Care Clinic: [facility]    Maternal Assessment: Pam reports that her breasts are starting to feel heavier today, has not yet seen an increase in milk.      Infant Assessment:  Carla has age appropriate output and weight loss.      Weight Change Since Birth: -2% at 2 day old      Feeding History: Pam reports that she is latching Carla first at each feeding and then supplementing with her pumped milk and formula. He started cueing for more milk last night so they increased the supplement to 15 mL after each feeding.    Feeding Assessment: No feeding assessment done. Pam is breastfeeding independently and is aware of how to contact lactation for support as desired.     Education:   [] Expected  feeding patterns in the first few days (pg. 38 of Your Guide to To Postpartum and  Care)/ the Second Night  [x] Stages of milk production  [x] Benefits of hand expression of colostrum  [] Early feeding cues     [] Benefits of feeding on cue  [] Benefits of skin to skin  [] Breastfeeding positions  [] Tips to get and maintain a deep latch  [] Nutritive vs.non-nutritive sucking  [] Gentle breast compressions as needed to enhance milk transfer  [] How to tell when baby is finished  [x] How to tell if baby is getting enough  [x] Expected  output  [x] [place] weight loss  [] Infant Feeding Log  [] [employer] Breastfeeding/Pumping videos  [] Signs breastfeeding is going well (comfortable latch, audible swallows, age appropriate output and weight loss)    [] Tips to prevent engorgement  [] Signs of engorgement  [] Tips to manage engorgement  [x] Pumping recommendations (based on patient need)  [x] [facility] breast pump part/infant feeding supplies cleaning recommendations  [x] Inpatient breastfeeding support  [x] Outpatient lactation resources    Handouts: Infant Feeding Log (Week 1, Your Guide to Postpartum &  [place] Care Book) and [facility] Lactation Resources    Home Breast Pump: Spectra S2 dispensed    Plan: Continue breastfeeding every 2-3 hours with RN support as needed with a goal of 8-12 feedings per day. Watch for early feeding cues, but if too sleepy and no cues after 3 hours offer breast and go directly to supplementation if no interest.     Encourage frequent skin to skin. Due to infant prematurity, encourage hand expression after each feeding until milk is in and hands on pumping at least 6-8 times in 24 hours to help bring in full milk supply. Feed back any expressed milk and review order set for supplement recommendations. Continue giving expressed milk supplement until closer to expected due date, or as indicated with follow up lactation visits.      Family encouraged to follow up with outpatient lactation consultant at clinic within a week of discharge for support with LPT infant, help to monitor infant weight and milk transfer, establish supply & eventually wean from pumping. Reviewed outpatient lactation support options through [facility]. Family is familiar with outpatient lactation support at [facility] as this is where they went with their first baby.           Domi Tam, RN, IBCLC   Lactation Consultant  [place]elda: [facility]act[facility]ist [facility] [phone]  Office: [phone]

## 2024-06-26 NOTE — PLAN OF CARE
AFVSS. Patient ambulating and voiding without difficulty. Fundus firm with scant lochia. Patient states cramping pain managed with as needed tylenol and ibuprofen. Patient is breastfeeding independently, she is also supplementing LPT/SGA baby after breastfeeding. Patient is also breast pumping after feeds. Will continue to provide support and education as needed. Continue present cares.

## 2024-06-26 NOTE — PLAN OF CARE
Goal Outcome Evaluation:      Plan of Care Reviewed With: patient    Overall Patient Progress: improving     AFVSS. Postpartum assessments WDL. Patient denies HA, (did have headache yesterday afternoon, now resolved) visual changes or epigastric pain. She is voiding and ambulating without difficulty. Fundus firm with scant lochia. She is independent with self cares. Patient and spouse caring for baby. Mother is breastfeeding baby independently. She is supplementing LPT baby with 10ml formula after feedings. She is also breast pumping and feeding milk back to baby. She states cramping pain managed with prn tylenol and ibuprofen. She is hoping to discharge home today. Will continue to provide support and education as needed. Continue present cares.

## 2024-06-26 NOTE — DISCHARGE INSTRUCTIONS
Warning Signs after Having a Baby    Keep this paper on your fridge or somewhere else where you can see it.    Call your provider if you have any of these symptoms up to 12 weeks after having your baby.    Thoughts of hurting yourself or your baby  Pain in your chest or trouble breathing  Severe headache not helped by pain medicine  Eyesight concerns (blurry vision, seeing spots or flashes of light, other changes to eyesight)  Fainting, shaking or other signs of a seizure    Call 9-1-1 if you feel that it is an emergency.     The symptoms below can happen to anyone after giving birth. They can be very serious. Call your provider if you have any of these warning signs.    My provider s phone number: _______________________    Losing too much blood (hemorrhage)    Call your provider if you soak through a pad in less than an hour or pass blood clots bigger than a golf ball. These may be signs that you are bleeding too much.    Blood clots in the legs or lungs    After you give birth, your body naturally clots its blood to help prevent blood loss. Sometimes this increased clotting can happen in other areas of the body, like the legs or lungs. This can block your blood flow and be very dangerous.     Call your provider if you:  Have a red, swollen spot on the back of your leg that is warm or painful when you touch it.   Are coughing up blood.     Infection    Call your provider if you have any of these symptoms:  Fever of 100.4 F (38 C) or higher.  Pain or redness around your stitches if you had an incision.   Any yellow, white, or green fluid coming from places where you had stitches or surgery.    Mood Problems (postpartum depression)    Many people feel sad or have mood changes after having a baby. But for some people, these mood swings are worse.     Call your provider right away if you feel so anxious or nervous that you can't care for yourself or your baby.    Preeclampsia (high blood pressure)    Even if you  didn't have high blood pressure when you were pregnant, you are at risk for the high blood pressure disease called preeclampsia. This risk can last up to 12 weeks after giving birth.     Call your provider if you have:   Pain on your right side under your rib cage  Sudden swelling in the hands and face    Remember: You know your body. If something doesn't feel right, get medical help.     For informational purposes only. Not to replace the advice of your health care provider. Copyright 2020 [employer] [facility]. All rights reserved. Clinically reviewed by Marilin Wilkins, RNC-OB, MSN. [email or web address] 220279 - Rev 02/23.    Checking Your Blood Pressure at Home  During and after pregnancy  How do I measure my blood pressure?  It s important to take the readings at the same time each day, such as morning and evening. Take your blood pressure before taking any morning medications.  How to get the most accurate reading  30 minutes before checking your blood pressure, avoid the following:  Drinking caffeine  Drinking alcohol  Eating  Smoking  Exercising  5 minutes before checking your blood pressure:  Use the bathroom and urinate so you have an empty bladder.  Sit still in a chair for around 5 minutes. Stay calm and relaxed and do not talk if possible.  To check your blood pressure:     Sit up straight in a chair.  Place your feet on the floor. Don t cross your ankles or legs.  Rest your arm at the level of your heart on a table or desk or on the arm of a chair. Use the same arm every day.  Pull up your shirt sleeve. Don t take the measurement over clothes.  Wrap the blood pressure cuff around the upper part of your left arm, 1 inch (2.5 cm) above your elbow.  Fit the cuff snugly around your arm. You should be able to place only one finger between the cuff and your arm.  Position the cord so that it rests in the bend of your elbow.  Press the power button.  Sit quietly while the cuff inflates and deflates.  Read the  digital reading on the monitor screen and write the numbers down (record them) in a notebook.  Wait 2-3 minutes, then repeat the steps, starting at step 1.  Which features do you need?  Arm cuff monitors give the most exact readings.  Wrist and finger blood pressure monitors are often less exact.  Pick a blood pressure monitor that has passed tests to show they measure exactly. Blood pressure cuffs for sale in the [place] that have passed tests are listed on the website www.validatebp.org.  Some monitors that have passed tests are:  Omron 3 Series Upper Arm Blood Pressure Monitor (Model FU7957)  Omron 5 Series Upper Arm Blood Pressure Monitor (Model XG7644)  Omron 7 Series Upper Arm Blood Pressure Monitor (Model HEM-7320)  [employer] Upper Arm Blood Pressure Monitor with Talking Function (UA 1030T)  Don t use smartphone apps. There are many smartphone apps that claim to check your blood pressure using the pulse in your wrist or finger. These don t work. They haven t passed any tests. Don t give your clinic a blood pressure reading from a smartphone ermias.  If you have a flexible spending account (FSA) or health savings account (HSA), you may wish to pay yourself back (reimburse) for the machine and cuff. A blood pressure monitor is an allowed over-the- counter (OTC) item to pay yourself back from these accounts.  Cuff size  The size of the arm cuff is a key feature. Make sure the cuff is the right size for your arm. If the cuff isn t the right size, readings will either be too high or low.  To know what size cuff to buy, measure the distance around your bicep (upper arm).  Use a flexible measuring tape or . Place the measuring tape MCC between your armpit and elbow. Measure the distance around your arm in inches.  You may need to buy a cuff apart from the machine to get the right size.  Cuff sizes and arm measurements  Small adult: 22 to 26 cm (8.7 to 10.2 inches)  Adult: 27 to 34 cm (10.6 to 13.4  "inches)  Large adult: 35 to 44 cm (13.8 to 17.3 inches)  Adult thigh: 45 to 52 cm (17.7 to 20.5 inches)    Copyright statement\" content=\"For informational purposes only. Not to replace the advice of your health care provider. Photo: ID [id number]   Timothy  "[employer].com. Text copyright   2023 [facility]. All rights reserved. Clinically reviewed by [facility]. [employer] 310973 - REV 03/23.  Know Your Blood Pressure Numbers  For patients who've had a high blood pressure disorder of pregnancy  What to know about high blood pressure disorders of pregnancy  People who had high blood pressure during pregnancy may continue to have high blood pressure for up to 12 weeks after pregnancy. It can also raise your lifetime risk of chronic high blood pressure, heart disease and blood vessel disease. It is vital that you keep monitoring your blood pressure and taking steps to control it.   The general guidelines below are what your blood pressures mean.  A heart healthy lifestyle that includes blood pressure control can help reduce these risks. A good blood pressure goal is less than 130/80 mmHg long-term.  Talk to your provider about high blood pressure disorder of pregnancy and what this means for your lifelong health.   Know your numbers  Read \"Checking Your Blood Pressure at Home\" to learn the best way to take your blood pressure.  Refer to the next page for general guidelines about what your blood pressures mean and what to do about them. Follow these instructions unless your provider tells you something different.  For more resources, visit www.preeclampsia.org.  What to do if your blood pressure is high  Act right away if you have numbers in the yellow or red range--don't wait for a scheduled appointment.  When to call your provider  Regardless of your blood pressure, call your healthcare provider right away if you develop any of these symptoms:  Severe headache  Chest " "pain  Trouble breathing  Stomach pain  Changes in vision  Swelling in your hands and face.  Please say, \"I am having symptoms of high blood pressure. My provider told me to call and ask to be seen right away when I have these symptoms.\"  If you ARE pregnant OR   it has been less than 12 weeks since you delivered  Systolic pressure (top number) is....  Diastolic (bottom number) is.... Your blood pressure is....   160 or higher  or 110 or higher VERY HIGH. Check it again in 10 minutes, then contact your provider.   140 - 159  or 90 - 109 HIGH. Keep checking blood pressure 2 times a day. If your blood pressure is in this range for 2 readings, contact your provider within 24 hours. We will discuss starting or increasing your blood pressure medicine.   100 - 139  and 60 - 89 NORMAL. Your blood pressure looks great!   Keep checking it 2 times a day.   Less than 100  or Less than 60 LOW. Check your blood pressure again in   10 minutes, then contact your provider. We may need to make changes to your blood pressure medicine.     Call your provider right away if you have these symptoms: a severe headache, vision changes, shortness of breath, chest pain, or right upper belly pain. Call even if your blood pressure is okay.  Call 9-1-1 if you feel the symptoms are severe and that it is an emergency.   If you are NOT pregnant OR   it has been more than 12 weeks since you delivered  Systolic pressure (top number) is....  Diastolic (bottom number) is.... Your blood pressure is....   Higher than 180 and/or Higher than 120 Hypertensive crisis. Call your doctor right away.   140 or higher or  90 or higher Hypertension Stage 2. Follow up with your provider.   130-139 or Less than 80 Hypertension Stage 1. Follow up with your provider.   120-129 and Less than 80 Elevated blood pressure (pre-hypertension). You are at higher risk of developing high blood pressure. Follow up with your provider.   Less than 120 and Less than 80 Normal.     " Call your provider right away if you have these symptoms: a severe headache, vision changes, shortness of breath, chest pain, or right upper belly pain. Call even if your blood pressure is okay.  Call 9-1-1 if you feel the symptoms are severe and that it is an emergency.   For informational purposes only. Not to replace the advice of your health care provider. Copyright   2023 [facility]. All rights reserved. Clinically reviewed by [facility]. [employer] 343135 - 03/23.

## 2024-06-26 NOTE — PROGRESS NOTES
"Summary:  Pam is comfortable and stable.  Reported HA and given tylenol/motrin and rest on night shift.  Pt reported HA as \"2\" and declined further meds.  Hold order on nifedepine.  No vision changes or epigastric pain.  Assessments WNL.voiding without difficulty.  Walking without dizziness.   Seen by Lupe Montanez CNM this morning. States readiness for discharge to home today.   Pam states she has been monitoring her own BP at home - has cuff.  Agreeable with [facility] - info given.  Seen by OLGA this morning and pump given.  Reports breast feeding and pumping are going well and states resources.  Nilson supportive.  EDS 7.  Pt declines need to be seen by  while here and states resources/clinic if needed.  Plan for discharge to home today.    "

## 2024-06-26 NOTE — PROGRESS NOTES
Summary:  reviewed discharge information and pt discharged at 1405.  Waiting for transport to escort out of hospital.  VSS.  Assessments WNL.  HA resolved.  States understanding of LC resources, home care, clinic visits and [facility] BP.  Verified was able to sign up via My Chart.  Reports has BP cuff at home - info given on how to take and readings - states understanding.  Given Spectra breast pump.  Pt states understanding of when to seek care, follow up, Rx and resources.  States all belongings with her.  NB ID bands checked/matched at discharge.  Pam discharged to home in stable and comfortable condition with Nilson and Carla.  Transport escorted out via wheelchair at 1440.  Prior to discharge verified with ANGELIC Corona that only motrin filled RX for take home.  Nifedipine RX returned to discharge pharmacy.

## 2024-06-26 NOTE — DISCHARGE SUMMARY
[facility] Discharge Summary    Pam Syed MRN# [id number]   Age: 34 year old YOB: 1990     Date of Admission:  6/24/2024  Date of Discharge::  06/26/24  Admitting Midwife:  ASHIA Cary CNM  Discharge Midwife:  Lupe Buchanan CNM      Home clinic: [facility]           Admission Diagnoses:   Maternity(07/16/2024 IOL)  Labor and delivery, indication for care  IOL for Preeclampsia without SF  History of IUFD 2018  Fetal Growth Restriction  GBS          Discharge Diagnosis:     Normal spontaneous vaginal delivery  Intrauterine pregnancy at 36/6 weeks gestation  Acute Blood Loss Anemia   Thrombo  PP HTN          Procedures:     Procedure(s): NSVB                Medications Prior to Admission:     Medications Prior to Admission   Medication Sig Dispense Refill Last Dose    albuterol (PROAIR HFA/PROVENTIL HFA/VENTOLIN HFA) 108 (90 Base) MCG/ACT inhaler    Unknown    cholecalciferol (VITAMIN D3) 125 mcg (5000 units) capsule Take 1 capsule (125 mcg) by mouth daily Take one capsule daily. 90 capsule 1 Past Week    cyanocobalamin (VITAMIN B-12) 1000 MCG tablet Take 1 tablet (1,000 mcg) by mouth daily 90 tablet 1 6/23/2024    ferrous gluconate (FERGON) 324 (38 Fe) MG tablet Take 1 tablet (324 mg) by mouth daily (with breakfast) 90 tablet 3 6/23/2024    senna-docusate (SENOKOT-S/PERICOLACE) 8.6-50 MG tablet Take 1 tablet by mouth daily Start after delivery. 100 tablet 0 Past Week    vitamin C (ASCORBIC ACID) 250 MG tablet Take 1 tablet (250 mg) by mouth daily 90 tablet 3 6/23/2024    [DISCONTINUED] aspirin 81 MG EC tablet Take 1 tablet (81 mg) by mouth daily 90 tablet 3 6/23/2024    [DISCONTINUED] docusate sodium (COLACE) 100 MG tablet Take 1 tablet (100 mg) by mouth daily 90 tablet 1 6/23/2024    [DISCONTINUED] omeprazole (PRILOSEC) 10 MG DR capsule Take 2 capsules (20 mg) by mouth daily 60 capsule 1 6/23/2024    [DISCONTINUED] terconazole  (TERAZOL 7) 0.4 % vaginal cream Place 1 applicator vaginally at bedtime 45 g 0 Past Week             Discharge Medications:     Current Discharge Medication List        START taking these medications    Details   acetaminophen (TYLENOL) 325 MG tablet Take 2 tablets (650 mg) by mouth every 4 hours as needed for mild pain or fever (greater than or equal to 38 C /100.4 F (oral) or 38.5 C/ 101.4 F (core).)    Associated Diagnoses:  (normal spontaneous vaginal delivery)      ibuprofen (ADVIL/MOTRIN) 600 MG tablet Take 1 tablet (600 mg) by mouth every 6 hours as needed for moderate pain  Qty: 60 tablet, Refills: 0    Associated Diagnoses:  (normal spontaneous vaginal delivery)           CONTINUE these medications which have NOT CHANGED    Details   albuterol (PROAIR HFA/PROVENTIL HFA/VENTOLIN HFA) 108 (90 Base) MCG/ACT inhaler       cholecalciferol (VITAMIN D3) 125 mcg (5000 units) capsule Take 1 capsule (125 mcg) by mouth daily Take one capsule daily.  Qty: 90 capsule, Refills: 1    Associated Diagnoses: Supervision of high risk pregnancy in second trimester      cyanocobalamin (VITAMIN B-12) 1000 MCG tablet Take 1 tablet (1,000 mcg) by mouth daily  Qty: 90 tablet, Refills: 1    Associated Diagnoses: Supervision of high risk pregnancy in second trimester      ferrous gluconate (FERGON) 324 (38 Fe) MG tablet Take 1 tablet (324 mg) by mouth daily (with breakfast)  Qty: 90 tablet, Refills: 3    Associated Diagnoses: High-risk pregnancy, elderly multigravida, unspecified trimester      senna-docusate (SENOKOT-S/PERICOLACE) 8.6-50 MG tablet Take 1 tablet by mouth daily Start after delivery.  Qty: 100 tablet, Refills: 0    Associated Diagnoses: Supervision of high risk pregnancy in first trimester      vitamin C (ASCORBIC ACID) 250 MG tablet Take 1 tablet (250 mg) by mouth daily  Qty: 90 tablet, Refills: 3    Associated Diagnoses: High-risk pregnancy, elderly multigravida, unspecified trimester           STOP taking  these medications       aspirin 81 MG EC tablet Comments:   Reason for Stopping:         docusate sodium (COLACE) 100 MG tablet Comments:   Reason for Stopping:         omeprazole (PRILOSEC) 10 MG DR capsule Comments:   Reason for Stopping:         terconazole (TERAZOL 7) 0.4 % vaginal cream Comments:   Reason for Stopping:                     Consultations:   No consultations were requested during this admission          Brief History of Labor:   Labor Course:   Pam Syed is a 34 year old   at 36w6d presented to labor floor at 0745 for IOL for fetal growth restriction and history of fetal demise at term. On admission she was 3/60/-2. Pitocin was started at 1000. Labor progressed, patient agreeable to AROM at 1650 for clear fluid. At 1755 had an urge to push with contractions, found to be 8/90/0. TXA for PPH prevention started at this time.  Pt used movement and breathing for comfort measures and for pain management.     Delivery Course:  Progressed to complete at 1807 and started pushing at 1807. NICU called to birth d/t PTD. Pushed effectively. FHR with 135 decels with pushing effort, moderate variability throughout. While in semi-fowlers position the fetal head delivered OA and restituted to JOVANA. No nuchal cord. Shoulders easily delivered under maternal effort. Baby boy was born at 1812 and was immediately placed on mom's abdomen. Spontaneous breath, attempt to cry, well flexed, Hr>100.  IV pitocin started after delivery of infant. Delayed cord clamping for 2.5 minutes then clamped x2 and cut by FOB. Cord blood obtained for typing. Placenta spontaneously delivered intact without difficulty via Schultze mechanism at 1820. Inspection of vagina and perineum revealed an intact perineum. Fundus is firm and midline. Mom and baby are stable.      IUP at 36 weeks gestation delivered on 2024.     delivery of a viable Male infant.  Weight : 5 pounds 4 ounces   Apgars of 8 at 1 minute and 9 at 5  minutes.  Labor was induced.  Medications administered  in labor:  Pain Rx none; Antibiotics Yes: PCN for GBS positive status. Adequately treated  Perineum: Intact  Placenta-mechanism: spontaneous, intact,  with a 3 vessel cord. IV oxytocin was given.  QBL was 130.  Complications of pregnancy, labor and delivery: None  Birth attendants: ASHIA Goldberg CNISIAH and AMELIA Cornelius     Assessment Day of Discharge    Patient Vitals for the past 24 hrs:   BP Temp Temp src Pulse Resp Weight   06/26/24 0837 120/74 -- Oral 81 16 --   06/26/24 0347 113/69 98  F (36.7  C) Oral 84 18 77.1 kg (169 lb 14.4 oz)   06/25/24 2320 113/73 98.3  F (36.8  C) Oral 85 16 --   06/25/24 2000 116/71 98.2  F (36.8  C) Oral 87 16 --   06/25/24 1929 -- -- -- -- -- 73.6 kg (162 lb 3.2 oz)   06/25/24 1605 113/71 98.1  F (36.7  C) Oral 80 16 --   06/25/24 1128 123/79 98.3  F (36.8  C) Oral 86 16 --       Pt stable, baby is rooming in  Breast feeding status: well established  Complications since 2 hours post delivery: bleeding postpartum necessitating manual uterine sweep, QBL 800mL  Patient is tolerating acitivity well. Voiding without difficulty, cramping is minimal, lochia is decreasing and patient denies clots.  Perineal pain is is minimal.    postpartum exam   Breasts: soft, filling, colostrum  Nipples: erect, no lesions, intact  Abdomen: soft, nontender, fundus firm, umb/-1, midline  Perineum:  is intact, healing well, approximated, no edema, erythema, bruising, hematoma or s/s of infection  Lochia: min rubra, no clots, no odor  Legs: nontender, trace edema           Hospital Course:     Pam Syed feels ready for discharge. Reviewed birth experience. Pleased with her care. She is up and active in the room independently, is voiding without difficulty. Ambulating without dizziness or calf pain. Tolerating normal diet and passing flatus. No BM yet. Voiding without issue.  Pain is well controlled with current medications of ibuprofen  "and tylenol.   Pam developed a headache day 2 postpartum, resolved with tylenol/ibuprofen and rest. Denies visual changes, RUQ pian, chest pain. Pam thinks the THAPA is from lack of sleep    LOCHIA: Bleeding is light without clots.  INFANT FEEDING: Baby \"Yunus\" is feeding on cue.   PP CONTRACEPTION: plans include condoms.   PP SUPPORT:  Pam Syed will be home with the children, Nilson will take 6 weeks off and can take more if this is needed. They also have family who will help.    MOOD ASSESSMENT: She reports history of depression and PTSD from losing a child. Will pay close attention to mood and call with any concerns.      Recent Labs   Lab 24  0808 24  2056 24  0954   HGB 10.1* 12.1 11.2*        ASSESSMENT/PLAN:  8 yo    Day 2 postpartum, NSVB   IOL for preeclampsia without SF  Intact perineum   Was started on Nifedipine, but discharge due to low BP  Breastfeeding established  History of PP depression or anxiety and IUFD  Planning condoms for pp contraception  Complications:anemic, plan for iron supplementation and HA postpartum  Plan d/c home today  RTC 2 and 6 weeks  Teaching done: D/C Instructions: Nutrition/Activity, Engorgement Management, Birth Control Options, Warning Signs/When to Call: Excessive Bleeding, Infection, PP Depression, Kegals and Crunches, RTC Clinic for PP Appointment, PNV, and Iron supplemenation    -Encouraged Pam to call with any concerns regarding postpartum anxiety/depression    Reviewed recommendation to discontinue Nifedipine due to being normotensive and /74 (113/69 overnight) and HA. Enrolled in [facility] BP program. Continue to take BP twice a day and send to RN team. May need to restart medications  -Has blood pressure cuff at home.     Postpartum warning s/s reviewed, including bleeding/clots, fever, mastitis, or depression/anxiety           Discharge Instructions and Follow-Up:     Discharge diet: Regular   Discharge activity: Activity as " tolerated   Discharge follow-up: Follow up with [facility] service in two weeks for a phone visit and in six weeks for an in person postpartum exam   Wound care: Warm baths to promote circulation and healing           Discharge Disposition:     Discharged to home        Lupe Buchanan CNM

## 2024-06-27 ENCOUNTER — PATIENT OUTREACH (OUTPATIENT)
Dept: CARE COORDINATION | Facility: CLINIC | Age: 34
End: 2024-06-27
Payer: COMMERCIAL

## 2024-06-27 NOTE — PROGRESS NOTES
"Clinic Care Coordination Contact  Transitions of Care Outreach  Chief Complaint   Patient presents with    Clinic Care Coordination - Post [facility]       Most Recent Admission Date: 2024   Most Recent Admission Diagnosis:      Most Recent Discharge Date: 2024   Most Recent Discharge Diagnosis:  (normal spontaneous vaginal delivery) - O80  Gestational hypertension, antepartum - O13.9  Encounter for care or examination of mother immediately after delivery - Z39.0  History of severe pre-eclampsia - Z87.59     Transitions of Care Assessment    Discharge Assessment  How are you doing now that you are home?: \" Doing great thank you \"  How are your symptoms? (Red Flag symptoms escalate to triage hotline per guidelines): Improved  Do you know how to contact your clinic care team if you have future questions or changes to your health status? : Yes  Does the patient have their discharge instructions? : Yes  Does the patient have questions regarding their discharge instructions? : No  Were you started on any new medications or were there changes to any of your previous medications? : Yes  Does the patient have all of their medications?: Yes  Do you have questions regarding any of your medications? : No  Do you have all of your needed medical supplies or equipment (DME)?  (i.e. oxygen tank, CPAP, cane, etc.): Yes    Post-op ([facility] Only)  If the patient had a surgery or procedure, do they have any questions for a nurse?: No         [facility] Explained and offered Care Coordination support to eligible patients: No    Patient accepted? No    Follow up Plan     Discharge Follow-Up  Discharge follow up appointment scheduled in alignment with recommended follow up timeframe or Transitions of Risk Category? (Low = within 30 days; Moderate= within 14 days; High= within 7 days): No    No future appointments.    Outpatient Plan as outlined on AVS reviewed with patient.    For any urgent concerns, please contact our 24 hour " nurse triage line: [phone] ([phone])       Blanca Rosa MA

## 2024-06-28 ENCOUNTER — MEDICAL CORRESPONDENCE (OUTPATIENT)
Dept: HEALTH INFORMATION MANAGEMENT | Facility: CLINIC | Age: 34
End: 2024-06-28
Payer: COMMERCIAL

## 2024-07-09 ENCOUNTER — PRENATAL OFFICE VISIT (OUTPATIENT)
Dept: OBGYN | Facility: CLINIC | Age: 34
End: 2024-07-09
Attending: MIDWIFE
Payer: COMMERCIAL

## 2024-07-09 VITALS
HEIGHT: 63 IN | HEART RATE: 81 BPM | SYSTOLIC BLOOD PRESSURE: 111 MMHG | BODY MASS INDEX: 27.62 KG/M2 | WEIGHT: 155.9 LBS | DIASTOLIC BLOOD PRESSURE: 79 MMHG

## 2024-07-09 PROCEDURE — 99207 PR POST PARTUM EXAM: CPT | Performed by: MIDWIFE

## 2024-07-09 PROCEDURE — 99213 OFFICE O/P EST LOW 20 MIN: CPT | Performed by: MIDWIFE

## 2024-07-09 ASSESSMENT — EDINBURGH POSTNATAL DEPRESSION SCALE (EPDS)
I HAVE FELT SAD OR MISERABLE: NOT VERY OFTEN
I HAVE BEEN ANXIOUS OR WORRIED FOR NO GOOD REASON: HARDLY EVER
I HAVE BEEN ABLE TO LAUGH AND SEE THE FUNNY SIDE OF THINGS: NOT QUITE SO MUCH NOW
THE THOUGHT OF HARMING MYSELF HAS OCCURRED TO ME: NEVER
I HAVE BEEN ABLE TO LAUGH AND SEE THE FUNNY SIDE OF THINGS: NOT QUITE SO MUCH NOW
I HAVE FELT SCARED OR PANICKY FOR NO GOOD REASON: YES, SOMETIMES
TOTAL SCORE: 9
I HAVE BEEN SO UNHAPPY THAT I HAVE HAD DIFFICULTY SLEEPING: NOT AT ALL
THINGS HAVE BEEN GETTING ON TOP OF ME: YES, SOMETIMES I HAVEN'T BEEN COPING AS WELL AS USUAL
I HAVE BLAMED MYSELF UNNECESSARILY WHEN THINGS WENT WRONG: NO, NEVER
I HAVE LOOKED FORWARD WITH ENJOYMENT TO THINGS: RATHER LESS THAN I USED TO
I HAVE BEEN SO UNHAPPY THAT I HAVE BEEN CRYING: ONLY OCCASIONALLY
I HAVE LOOKED FORWARD WITH ENJOYMENT TO THINGS: RATHER LESS THAN I USED TO
I HAVE FELT SCARED OR PANICKY FOR NO GOOD REASON: YES, SOMETIMES
THE THOUGHT OF HARMING MYSELF HAS OCCURRED TO ME: NEVER
I HAVE BLAMED MYSELF UNNECESSARILY WHEN THINGS WENT WRONG: NO, NEVER
THINGS HAVE BEEN GETTING ON TOP OF ME: YES, SOMETIMES I HAVEN'T BEEN COPING AS WELL AS USUAL
I HAVE BEEN SO UNHAPPY THAT I HAVE HAD DIFFICULTY SLEEPING: NOT AT ALL
I HAVE FELT SAD OR MISERABLE: NOT VERY OFTEN
I HAVE BEEN ANXIOUS OR WORRIED FOR NO GOOD REASON: HARDLY EVER
I HAVE BEEN SO UNHAPPY THAT I HAVE BEEN CRYING: ONLY OCCASIONALLY

## 2024-07-09 ASSESSMENT — PAIN SCALES - GENERAL: PAINLEVEL: MILD PAIN (3)

## 2024-07-09 NOTE — PROGRESS NOTES
"Subjective:  34 year old  presents for 2 week post partum visit s/p  delivery on for breastfeeding and mood check.          Admission Diagnoses:   Maternity(2024 IOL)  Labor and delivery, indication for care  IOL for Preeclampsia without SF  History of IUFD 2018  Fetal Growth Restriction  GBS          Discharge Diagnosis:      Normal spontaneous vaginal delivery  Intrauterine pregnancy at 36/6 weeks gestation  Acute Blood Loss Anemia   Thrombo  PP HTN       - Pt is doing well. Baby is growing well. \"Yunus\" Supplemented with formula at first but now rarely doing bottles  - Breastfeeding with effective latch to both breasts. No nipple pain.   - Lochia very little.   - Mood has been overall good, but does feel emotional. Coping well with support from mom and sister and her  and older kids.   - Planning birth control. Reviewed options. Pt leaning toward implant or non-hormonal IUD  - Is due for pap  - Has been following with [facility] BP program    Objective:  General- no apparent distress  Breast- soft, NT, nipples intact, no cracking redness or bleeding   Abdomen- Fundus non tender  Extremities- no edema  /79   Pulse 81   Ht 1.6 m (5' 2.99\")   Wt 70.7 kg (155 lb 14.4 oz)   LMP 10/15/2023 (Approximate)   Breastfeeding Yes   BMI 27.62 kg/m      Assessment/Plan:  2 weeks postpartum s/p  delivery  - No signs or symptoms of PPD   - Breastfeeding well  -Planning implant or Paragard for birth control  -Is due for pap 10/2023 - plan at 6w PP visit  - Continue to submit Bps with [facility] BP program  - RTO in 4 weeks for 6 week check and prn if questions or concerns.  - All pt's questions discussed and answered.  Pt verbalized understanding of and agreement to plan of care.     24 min spent on the date of the encounter in chart review, patient visit, review of tests, documentation and/or discussion with other providers about the issues documented above.     ASHIA Aranda CNM  "

## 2024-07-20 ENCOUNTER — MYC MEDICAL ADVICE (OUTPATIENT)
Dept: OBGYN | Facility: CLINIC | Age: 34
End: 2024-07-20
Payer: COMMERCIAL

## 2024-07-22 ENCOUNTER — TELEPHONE (OUTPATIENT)
Dept: OBGYN | Facility: CLINIC | Age: 34
End: 2024-07-22
Payer: COMMERCIAL

## 2024-07-22 ENCOUNTER — MYC MEDICAL ADVICE (OUTPATIENT)
Dept: OBGYN | Facility: CLINIC | Age: 34
End: 2024-07-22
Payer: COMMERCIAL

## 2024-07-22 NOTE — TELEPHONE ENCOUNTER
Forms received via [employer]. Forms printed, labeled and placed in [facility] bin for completion.

## 2024-07-30 ENCOUNTER — TELEPHONE (OUTPATIENT)
Dept: MATERNAL FETAL MEDICINE | Facility: CLINIC | Age: 34
End: 2024-07-30
Payer: COMMERCIAL

## 2024-07-30 NOTE — TELEPHONE ENCOUNTER
Home Observation of Postpartum Elevated BP (HOPE-BP) Program     Pam Syed enrolled in the HOPE-BP Program on 6/26/2024, 34 days ago. Delivered on 6/24/2024 . Diagnosis: Preeclampsia without severe features. Medication(s) prescribed:  no medications .  Patient reported the following blood pressures in the past 72 hours:       6/27/2024 6/28/2024 7/1/2024 7/4/2024 7/8/2024 7/13/2024 7/22/2024   [facility] Health Trends BP Review Flowsheet   Systolic (Patient Reported) 124 126 129 115 116 114 125   Diastolic (Patient Reported) 84 84 87 84 80 81 75        Spoke with patient reminded pt to continue to enter BP's in to My Chart, pt states she has been checking and they are WNL but forgets to enter, states will do that today.  Advised pt will send hand off letter next week, prior to PPV, pt VU.  Kacie Ceballos RN

## 2024-08-09 ENCOUNTER — LAB (OUTPATIENT)
Dept: LAB | Facility: CLINIC | Age: 34
End: 2024-08-09
Attending: ADVANCED PRACTICE MIDWIFE
Payer: COMMERCIAL

## 2024-08-09 ENCOUNTER — PRENATAL OFFICE VISIT (OUTPATIENT)
Dept: OBGYN | Facility: CLINIC | Age: 34
End: 2024-08-09
Attending: ADVANCED PRACTICE MIDWIFE
Payer: COMMERCIAL

## 2024-08-09 VITALS
SYSTOLIC BLOOD PRESSURE: 137 MMHG | BODY MASS INDEX: 27.82 KG/M2 | DIASTOLIC BLOOD PRESSURE: 82 MMHG | WEIGHT: 157 LBS | HEIGHT: 63 IN | HEART RATE: 75 BPM

## 2024-08-09 DIAGNOSIS — D50.8 IRON DEFICIENCY ANEMIA SECONDARY TO INADEQUATE DIETARY IRON INTAKE: ICD-10-CM

## 2024-08-09 DIAGNOSIS — Z12.4 SCREENING FOR MALIGNANT NEOPLASM OF CERVIX: ICD-10-CM

## 2024-08-09 PROBLEM — B01.9 VARICELLA: Status: RESOLVED | Noted: 2023-12-13 | Resolved: 2024-08-09

## 2024-08-09 PROBLEM — O99.820 GBS (GROUP B STREPTOCOCCUS CARRIER), +RV CULTURE, CURRENTLY PREGNANT: Status: RESOLVED | Noted: 2024-06-19 | Resolved: 2024-08-09

## 2024-08-09 PROBLEM — O36.5990 FETAL GROWTH RESTRICTION ANTEPARTUM: Status: RESOLVED | Noted: 2024-06-18 | Resolved: 2024-08-09

## 2024-08-09 PROBLEM — Z98.890 HISTORY OF CERVICAL CERCLAGE: Status: RESOLVED | Noted: 2021-05-17 | Resolved: 2024-08-09

## 2024-08-09 PROBLEM — O21.9 NAUSEA/VOMITING IN PREGNANCY: Status: RESOLVED | Noted: 2021-10-05 | Resolved: 2024-08-09

## 2024-08-09 PROBLEM — O99.119 GESTATIONAL THROMBOCYTOPENIA (H): Status: RESOLVED | Noted: 2024-06-06 | Resolved: 2024-08-09

## 2024-08-09 PROBLEM — O09.529 HIGH-RISK PREGNANCY, ELDERLY MULTIGRAVIDA, UNSPECIFIED TRIMESTER: Status: RESOLVED | Noted: 2023-12-27 | Resolved: 2024-08-09

## 2024-08-09 PROBLEM — O09.299 HX OF CERVICAL INCOMPETENCE IN PREGNANCY, CURRENTLY PREGNANT: Status: RESOLVED | Noted: 2021-11-17 | Resolved: 2024-08-09

## 2024-08-09 PROBLEM — M54.31 RIGHT SCIATIC NERVE PAIN: Status: RESOLVED | Noted: 2024-03-05 | Resolved: 2024-08-09

## 2024-08-09 PROBLEM — Z87.59 HISTORY OF POSTPARTUM HEMORRHAGE: Status: RESOLVED | Noted: 2022-02-25 | Resolved: 2024-08-09

## 2024-08-09 PROBLEM — D69.6 GESTATIONAL THROMBOCYTOPENIA (H): Status: RESOLVED | Noted: 2024-06-06 | Resolved: 2024-08-09

## 2024-08-09 LAB
ERYTHROCYTE [DISTWIDTH] IN BLOOD BY AUTOMATED COUNT: 13.2 % (ref 10–15)
HCT VFR BLD AUTO: 34.3 % (ref 35–47)
HGB BLD-MCNC: 11.3 G/DL (ref 11.7–15.7)
MCH RBC QN AUTO: 28.6 PG (ref 26.5–33)
MCHC RBC AUTO-ENTMCNC: 32.9 G/DL (ref 31.5–36.5)
MCV RBC AUTO: 87 FL (ref 78–100)
PLATELET # BLD AUTO: 205 10E3/UL (ref 150–450)
RBC # BLD AUTO: 3.95 10E6/UL (ref 3.8–5.2)
WBC # BLD AUTO: 5.7 10E3/UL (ref 4–11)

## 2024-08-09 PROCEDURE — 99207 PR POST PARTUM EXAM: CPT | Performed by: ADVANCED PRACTICE MIDWIFE

## 2024-08-09 PROCEDURE — 87624 HPV HI-RISK TYP POOLED RSLT: CPT | Performed by: ADVANCED PRACTICE MIDWIFE

## 2024-08-09 PROCEDURE — 36415 COLL VENOUS BLD VENIPUNCTURE: CPT

## 2024-08-09 PROCEDURE — 99213 OFFICE O/P EST LOW 20 MIN: CPT | Mod: 25 | Performed by: ADVANCED PRACTICE MIDWIFE

## 2024-08-09 PROCEDURE — 85027 COMPLETE CBC AUTOMATED: CPT

## 2024-08-09 PROCEDURE — G0145 SCR C/V CYTO,THINLAYER,RESCR: HCPCS | Performed by: ADVANCED PRACTICE MIDWIFE

## 2024-08-09 ASSESSMENT — PAIN SCALES - GENERAL: PAINLEVEL: NO PAIN (0)

## 2024-08-09 ASSESSMENT — EDINBURGH POSTNATAL DEPRESSION SCALE (EPDS)
I HAVE FELT SCARED OR PANICKY FOR NO GOOD REASON: YES, SOMETIMES
I HAVE BEEN SO UNHAPPY THAT I HAVE HAD DIFFICULTY SLEEPING: NOT VERY OFTEN
I HAVE BLAMED MYSELF UNNECESSARILY WHEN THINGS WENT WRONG: NO, NEVER
THINGS HAVE BEEN GETTING ON TOP OF ME: YES, SOMETIMES I HAVEN'T BEEN COPING AS WELL AS USUAL
I HAVE BEEN ABLE TO LAUGH AND SEE THE FUNNY SIDE OF THINGS: NOT QUITE SO MUCH NOW
I HAVE BEEN ANXIOUS OR WORRIED FOR NO GOOD REASON: YES, SOMETIMES
THE THOUGHT OF HARMING MYSELF HAS OCCURRED TO ME: NEVER
I HAVE LOOKED FORWARD WITH ENJOYMENT TO THINGS: RATHER LESS THAN I USED TO
TOTAL SCORE: 11
I HAVE BEEN SO UNHAPPY THAT I HAVE BEEN CRYING: ONLY OCCASIONALLY
I HAVE FELT SAD OR MISERABLE: NOT VERY OFTEN

## 2024-08-09 NOTE — NURSING NOTE
SUBJECTIVE:   Pam Syed is here for her 6-week postpartum checkup.     PHQ-9 score: edinburgh 11  Hx of Abuse:  No    Delivery Date: 24.    Delivering provider:  Justice Cosby CNM.    Type of delivery:  .  Perineum:  in tact.     Delivery complications: GBS+, FGR, Pre-e without severe features  Infant gender:  boy, weight 5 pounds 4 oz.  Feeding Method:   and formula.  Complications reported with feeding:  none, infant thriving .    Bleeding:  Moderate to light.  Duration:  6 weeks.  Menses resumed:  No  Bowel/Urinary problems:  No    Contraception Planned:  condoms  She  has not had intercourse since delivery..      Depression Response    Patient completed the PHQ-9 assessment for depression and scored >9? No, EDPS 11  Question 9 on the PHQ-9 was positive for suicidality? No  Does patient have current mental health provider? No    Is this a virtual visit? No    I personally notified the following: visit provider

## 2024-08-09 NOTE — PATIENT INSTRUCTIONS
Thank you for trusting us with your care!   Please be aware, if you are on Mychart, you may see your results prior to your providers review. If labs are abnormal, we will call or message you on [employer] [facility] with a follow up plan.    If you need to contact us for questions about:  Symptoms, Scheduling & Medical Questions; Non-urgent (2-3 day response) kidthing message, Urgent (needing response today) [phone] (if after 3:30pm next day response)   Prescriptions: Please call your Pharmacy   Billing: Hillary [phone] or [facility]:[phone]

## 2024-08-09 NOTE — PROGRESS NOTES
"Nursing Notes:   Hilary Mckeon  2024 11:08 AM  Sign at exiting of workspace  SUBJECTIVE:   Pma Syed is here for her 6-week postpartum checkup.     PHQ-9 score: edinburgh 11  Hx of Abuse:  No    Delivery Date: 24.    Delivering provider:  Justice Cosby CNM.    Type of delivery:  .  Perineum:  in tact.     Delivery complications: GBS+, FGR, Pre-e without severe features  Infant gender:  boy, weight 5 pounds 4 oz.  Feeding Method:   and formula.  Complications reported with feeding:  none, infant thriving .    Bleeding:  Moderate to light.  Duration:  6 weeks.  Menses resumed:  No  Bowel/Urinary problems:  No    Contraception Planned:  condoms  She  has not had intercourse since delivery..      Depression Response    Patient completed the PHQ-9 assessment for depression and scored >9? No, EDPS 11  Question 9 on the PHQ-9 was positive for suicidality? No  Does patient have current mental health provider? No    Is this a virtual visit? No    I personally notified the following: visit provider ASHIA Powell CNM              ================================================================  ROS: 10 point ROS neg other than the symptoms noted above in the HPI.   Pam reports that she is feeling well overall.  Does report fatigue due to poor sleep related to  cares.  States that mood is stable and has support from  and family.  Denies concerns about her mental wellbeing today.      EXAM:  /82   Pulse 75   Ht 1.6 m (5' 2.99\")   Wt 71.2 kg (157 lb)   LMP 10/15/2023 (Approximate)   Breastfeeding Yes   BMI 27.82 kg/m      General: healthy, alert, and no distress  Psych: NEGATIVE  Breasts:  Lactating, Nipples intact with no lesions, Non-tender, and No S/S of yeast or mastitis  Abdomen: Benign, Soft, flat, non-tender, No masses, organomegaly, and Diastasis less than 1-2 FB  Incision:  None   Vulva:  Normal genitalia and Bartholin's, Urethra, [place]'s normal  Vagina:  " rugated and blood in vault  Cervix:  Multiparous, and no lesions.    Uterus:  bimanual exam deferred at patient request.    ASSESSMENT:   Encounter Diagnoses   Name Primary?    Routine postpartum follow-up Yes    Postpartum care and examination of lactating mother     Screening for malignant neoplasm of cervix     Iron deficiency anemia secondary to inadequate dietary iron intake       Normal postpartum exam after   Pregnancy was complicated by:  short cervix, elevated blood pressure and postpartum hemorrhage.      PLAN:  Orders Placed This Encounter   Procedures    Obtaining, preparing and conveyance of cervical or vaginal smear to laboratory.    Pap Thin Layer Screen with HPV - Recommended Age 30 - 65 Years    CBC with platelets            Patient consented to pap smear collection today.  Consents to CBC today  Contraception options discussed, patient considering Mirena, but desires to use condoms for now.  ASHIA MartinM

## 2024-08-13 LAB
HPV HR 12 DNA CVX QL NAA+PROBE: NEGATIVE
HPV16 DNA CVX QL NAA+PROBE: NEGATIVE
HPV18 DNA CVX QL NAA+PROBE: NEGATIVE
HUMAN PAPILLOMA VIRUS FINAL DIAGNOSIS: NORMAL

## 2024-08-15 LAB
BKR LAB AP GYN ADEQUACY: NORMAL
BKR LAB AP GYN INTERPRETATION: NORMAL
BKR LAB AP PREVIOUS ABNORMAL: NORMAL
PATH REPORT.COMMENTS IMP SPEC: NORMAL
PATH REPORT.COMMENTS IMP SPEC: NORMAL
PATH REPORT.RELEVANT HX SPEC: NORMAL

## 2025-01-20 NOTE — LETTER
2019       RE: Pam Syed  [address]  [place] [address]     Dear Colleague,    Thank you for referring your patient, Pam Syed, to the [facility] at [facility]. Please see a copy of my visit note below.    Nursing Notes:   Olga Milian LPN  2019  2:44 PM  Incomplete  Chief Complaint   Patient presents with     Postpartum Care      7/15/2019   Olga Milian LPN      SUBJECTIVE:   Pam Syed is here for her 6-week postpartum checkup.     PHQ-9 score:   Hx of Abuse:  No    Delivery Date: 7/15/2019.    Delivering provider:  Kacie Obrien MD.    Type of delivery:  .  Perineum:  in tact.     Delivery complications: None  Infant gender:  girl, weight 5 pounds 10 oz.  Feeding Method:  .  Complications reported with feeding:  none, infant thriving.  Bleeding:  None.  Duration:   Menses resumed:  No  Bowel/Urinary problems:  Yes     Contraception Planned:  oral contraceptive  She  has had intercourse since delivery and experienced  No discomfort.  .     Very happy with new daughter PP going well baby nursing frequently no formula  Pt is home full time enjoying daughter some sleep deprivation but coping well  Will start POP now  Had PPH but no heavy bleeding PP has been taking po FE supp intermittently will have blood drawn later  Not today  Ordered here with son and spouse and baby mood is good.    pt was noted to be 6cm in clinic not in labor  IOL by AROM  2 hr labor -birth  Went well but PPH managed by med no issues PP       ================================================================  ROS: 10 point ROS neg other than the symptoms noted above in the HPI.   CONSTITUTIONAL: negative  RESPIRATORY: negative  CARDIOVASCULAR: negative  GI: negative  : negative  MUSCULO-SKELETAL: negative  SKIN: negative  HEMATOLOGIC: negative  ALLERGY/IMMUN: negative  PSYCHIATRIC: negative    EXAM:  There were  Stable.   Continue current medications-lisinopril  Advised to check BP at home or local pharmacy and record readings and bring to next office visit  Goal BP of 130/80 recommended  Advised to reduce sodium intake and follow heart healthy/DASH diet  Avoid ibuprofen, naprosyn, pseudoephedrine, or other medications that may elevated blood pressure  Follow up in 6 months   no vitals taken for this visit.    General: healthy, alert and no distress  Psych: NEGATIVE  Last PHQ-9 score on record= No Value exists for the : HP#PHQ9  Breasts:  Lactating, Nipples intact with no lesions, Non-tender and No S/S of yeast or mastitis  Abdomen: Benign, Soft, flat, non-tender, No masses, organomegaly and Diastasis less than 1-2 FB  Vulva:  Normal genitalia and Bartholin's, Urethra, [place]'s normal  Vagina:  normal with good muscle tone and without discharge  Cervix:  Multiparous,, no lesions and pink, moist, closed, without lesion or CMT.    Uterus:  fully involuted and non-tender and anteflexed, small, smooth, firm, mobile w/o pain    Adnexa:  Within normal limits and No masses, nodularity, tenderness  Recto-vaginal:   anus normal    ASSESSMENT:   Encounter Diagnosis   Name Primary?     Routine postpartum follow-up Yes      Normal postpartum exam after   Hx prior IUFD  And severe preeclampsia   Pregnancy was complicated by:  Hx prior IUFD hx severe preeclampsia .      PLAN:  Orders Placed This Encounter   Procedures     hCG qual urine POCT      Orders Placed This Encounter   Medications     norethindrone (MICRONOR) 0.35 MG tablet     Sig: Take 1 tablet (0.35 mg) by mouth daily     Dispense:  90 tablet     Refill:  3        Risks and benefits of prescribed medications discussed.  Medication instructions reviewed.  Discussed calcium intake, vitamins and supplements including Vitamin D  Exercise encouraged  Follow up in 1 year     ASHIA Nagy CNM

## 2025-03-09 ENCOUNTER — HEALTH MAINTENANCE LETTER (OUTPATIENT)
Age: 35
End: 2025-03-09

## (undated) DEVICE — SOL WATER IRRIG 1000ML BOTTLE 07139-09

## (undated) DEVICE — PREP CHLORHEXIDINE 4% 32OZ

## (undated) DEVICE — SOL NACL 0.9% IRRIG 1000ML BOTTLE 2F7124

## (undated) DEVICE — NDL COUNTER 20CT 31142493

## (undated) DEVICE — SUCTION CANNULA UTERINE 11MM CVD 21554

## (undated) DEVICE — LUBRICATING JELLY 2.7GM T00137

## (undated) DEVICE — PREP POVIDONE IODINE USP 7.5% CLEANING SOL 64538161

## (undated) DEVICE — GLOVE PROTEXIS W/NEU-THERA 6.5  2D73TE65

## (undated) DEVICE — TUBING SYS AQUILEX BLUE INFLOW AQL-110 YLW OUTFLOW AQL-111

## (undated) DEVICE — SUCTION MANIFOLD DORNOCH ULTRA CART UL-CL500

## (undated) DEVICE — PREP SKIN SCRUB TRAY 4461A

## (undated) DEVICE — SUCTION CANISTER BEMIS HI FLOW 006772-901

## (undated) DEVICE — Device

## (undated) DEVICE — SPECIMEN TRAP VACUUM SUCTION SAFETOUCH 003853-902

## (undated) DEVICE — SUCTION VACUUM CANISTER STANDARD W/LID&CAPS 003987-901

## (undated) DEVICE — GLOVE PROTEXIS BLUE W/NEU-THERA 7.5  2D73EB75

## (undated) DEVICE — CATH INTERMITTENT CLEAN-CATH FEMALE 14FR 6" VINYL LF 420614

## (undated) DEVICE — GLOVE PROTEXIS BLUE W/NEU-THERA 7.0  2D73EB70

## (undated) DEVICE — LIGHT HANDLE X1 31140133

## (undated) DEVICE — TUBING VACUUM COLLECTION 6FT 23116

## (undated) DEVICE — NDL SPINAL 22GA 3.5" QUINCKE 405181

## (undated) DEVICE — SOL WATER IRRIG 1000ML BOTTLE 2F7114

## (undated) DEVICE — LINEN TOWEL PACK X5 5464

## (undated) DEVICE — GLOVE PROTEXIS MICRO 6.5  2D73PM65

## (undated) DEVICE — SOL NACL 0.9% IRRIG 3000ML BAG 2B7477

## (undated) DEVICE — PAD CHUX UNDERPAD 30X30"

## (undated) DEVICE — LINEN GOWN X4 5410

## (undated) DEVICE — GLOVE PROTEXIS BLUE W/NEU-THERA 6.5  2D73EB65

## (undated) DEVICE — SU MERSILENE 5-0 CTXDA 16" RS22

## (undated) DEVICE — PAD CHUX UNDERPAD 30X36" P3036C

## (undated) DEVICE — STRAP KNEE/BODY 31143004

## (undated) DEVICE — DRAPE POUCH IRR 1016

## (undated) DEVICE — TUBING SUCTION MEDI-VAC 1/4"X20' N620A

## (undated) DEVICE — SU ETHILON 2 CT-2 30" D-6865

## (undated) DEVICE — LIGHT HANDLE X2

## (undated) DEVICE — PREP POVIDONE IODINE USP 10% TOPICAL SOL 64537161

## (undated) DEVICE — CATH TRAY FOLEY 16FR BARDEX W/DRAIN BAG STATLOCK 300316A

## (undated) DEVICE — TUBING SUCTION 10'X3/16" N510

## (undated) DEVICE — TUBING SUCTION VACUUM COLLECTION 6FT 610

## (undated) DEVICE — SUCTION TIP YANKAUER W/O VENT K86

## (undated) DEVICE — GLOVE ESTEEM POWDER FREE SMT 7.5  2D72PT75

## (undated) DEVICE — SYR BULB IRRIG 50ML LATEX FREE 0035280

## (undated) DEVICE — SPONGE RAY-TEC 4X8" 7318

## (undated) DEVICE — GLOVE PROTEXIS BLUE W/NEU-THERA 6.0  2D73EB60

## (undated) DEVICE — DECANTER TRANSFER DEVICE 2008S

## (undated) DEVICE — DRAPE GYN/UROLOGY FLUID POUCH TUR 29455

## (undated) DEVICE — GLOVE PROTEXIS W/NEU-THERA 6.0  2D73TE60

## (undated) DEVICE — SPECIMEN BAG BEMIS HI FLOW SUCTION WHITE SOCK 533810

## (undated) DEVICE — GLOVE ESTEEM POWDER FREE SMT 6.0  2D72PT60

## (undated) DEVICE — SYR 01ML TBC SLIP TIP W/O NDL

## (undated) DEVICE — SEAL SET MYOSURE ROD LENS SCOPE SINGLE USE 40-902

## (undated) DEVICE — COVER PROBE ULTRASOUND 3D W/GEL 5X96" LF 20-P3D596

## (undated) RX ORDER — VASOPRESSIN 20 U/ML
INJECTION PARENTERAL
Status: DISPENSED
Start: 2017-11-08

## (undated) RX ORDER — LIDOCAINE HYDROCHLORIDE 20 MG/ML
INJECTION, SOLUTION EPIDURAL; INFILTRATION; INTRACAUDAL; PERINEURAL
Status: DISPENSED
Start: 2017-11-08

## (undated) RX ORDER — FENTANYL CITRATE-0.9 % NACL/PF 10 MCG/ML
PLASTIC BAG, INJECTION (ML) INTRAVENOUS
Status: DISPENSED
Start: 2024-01-24

## (undated) RX ORDER — FENTANYL CITRATE 50 UG/ML
INJECTION, SOLUTION INTRAMUSCULAR; INTRAVENOUS
Status: DISPENSED
Start: 2017-09-26

## (undated) RX ORDER — ONDANSETRON 2 MG/ML
INJECTION INTRAMUSCULAR; INTRAVENOUS
Status: DISPENSED
Start: 2021-11-17

## (undated) RX ORDER — ACETAMINOPHEN 325 MG/1
TABLET ORAL
Status: DISPENSED
Start: 2021-06-04

## (undated) RX ORDER — DOXYCYCLINE 100 MG/1
CAPSULE ORAL
Status: DISPENSED
Start: 2021-06-04

## (undated) RX ORDER — PROPOFOL 10 MG/ML
INJECTION, EMULSION INTRAVENOUS
Status: DISPENSED
Start: 2017-11-08

## (undated) RX ORDER — GABAPENTIN 300 MG/1
CAPSULE ORAL
Status: DISPENSED
Start: 2017-11-08

## (undated) RX ORDER — IBUPROFEN 600 MG/1
TABLET, FILM COATED ORAL
Status: DISPENSED
Start: 2017-11-08

## (undated) RX ORDER — ACETAMINOPHEN 500 MG
TABLET ORAL
Status: DISPENSED
Start: 2017-09-26

## (undated) RX ORDER — ONDANSETRON 2 MG/ML
INJECTION INTRAMUSCULAR; INTRAVENOUS
Status: DISPENSED
Start: 2021-06-04

## (undated) RX ORDER — GABAPENTIN 300 MG/1
CAPSULE ORAL
Status: DISPENSED
Start: 2017-09-26

## (undated) RX ORDER — ACETAMINOPHEN 325 MG/1
TABLET ORAL
Status: DISPENSED
Start: 2017-11-08

## (undated) RX ORDER — DEXAMETHASONE SODIUM PHOSPHATE 4 MG/ML
INJECTION, SOLUTION INTRA-ARTICULAR; INTRALESIONAL; INTRAMUSCULAR; INTRAVENOUS; SOFT TISSUE
Status: DISPENSED
Start: 2017-11-08

## (undated) RX ORDER — LIDOCAINE HYDROCHLORIDE 10 MG/ML
INJECTION, SOLUTION EPIDURAL; INFILTRATION; INTRACAUDAL; PERINEURAL
Status: DISPENSED
Start: 2017-11-08

## (undated) RX ORDER — CHLOROPROCAINE HYDROCHLORIDE 30 MG/ML
INJECTION, SOLUTION EPIDURAL; INFILTRATION; INTRACAUDAL; PERINEURAL
Status: DISPENSED
Start: 2024-01-24

## (undated) RX ORDER — PHENYLEPHRINE HCL IN 0.9% NACL 50MG/250ML
PLASTIC BAG, INJECTION (ML) INTRAVENOUS
Status: DISPENSED
Start: 2024-01-24

## (undated) RX ORDER — LIDOCAINE HYDROCHLORIDE 10 MG/ML
INJECTION, SOLUTION EPIDURAL; INFILTRATION; INTRACAUDAL; PERINEURAL
Status: DISPENSED
Start: 2021-06-04

## (undated) RX ORDER — ONDANSETRON 2 MG/ML
INJECTION INTRAMUSCULAR; INTRAVENOUS
Status: DISPENSED
Start: 2017-09-26

## (undated) RX ORDER — FENTANYL CITRATE 50 UG/ML
INJECTION, SOLUTION INTRAMUSCULAR; INTRAVENOUS
Status: DISPENSED
Start: 2017-11-08

## (undated) RX ORDER — PROPOFOL 10 MG/ML
INJECTION, EMULSION INTRAVENOUS
Status: DISPENSED
Start: 2017-09-26

## (undated) RX ORDER — FENTANYL CITRATE 50 UG/ML
INJECTION, SOLUTION INTRAMUSCULAR; INTRAVENOUS
Status: DISPENSED
Start: 2021-06-04

## (undated) RX ORDER — ONDANSETRON 2 MG/ML
INJECTION INTRAMUSCULAR; INTRAVENOUS
Status: DISPENSED
Start: 2017-11-08

## (undated) RX ORDER — ONDANSETRON 2 MG/ML
INJECTION INTRAMUSCULAR; INTRAVENOUS
Status: DISPENSED
Start: 2024-01-24

## (undated) RX ORDER — DOXYCYCLINE 100 MG/10ML
INJECTION, POWDER, LYOPHILIZED, FOR SOLUTION INTRAVENOUS
Status: DISPENSED
Start: 2017-09-26

## (undated) RX ORDER — LIDOCAINE HYDROCHLORIDE 20 MG/ML
INJECTION, SOLUTION EPIDURAL; INFILTRATION; INTRACAUDAL; PERINEURAL
Status: DISPENSED
Start: 2021-06-04

## (undated) RX ORDER — LIDOCAINE HYDROCHLORIDE 20 MG/ML
INJECTION, SOLUTION EPIDURAL; INFILTRATION; INTRACAUDAL; PERINEURAL
Status: DISPENSED
Start: 2017-09-26